# Patient Record
Sex: FEMALE | Race: WHITE | NOT HISPANIC OR LATINO | Employment: OTHER | URBAN - METROPOLITAN AREA
[De-identification: names, ages, dates, MRNs, and addresses within clinical notes are randomized per-mention and may not be internally consistent; named-entity substitution may affect disease eponyms.]

---

## 2019-04-03 DIAGNOSIS — M54.30 SCIATICA: Primary | ICD-10-CM

## 2019-04-08 DIAGNOSIS — M54.2 CERVICALGIA: Primary | ICD-10-CM

## 2019-04-12 ENCOUNTER — HOSPITAL ENCOUNTER (OUTPATIENT)
Dept: RADIOLOGY | Facility: HOSPITAL | Age: 49
Discharge: HOME | End: 2019-04-12
Attending: NURSE PRACTITIONER
Payer: MEDICAID

## 2019-04-12 ENCOUNTER — HOSPITAL ENCOUNTER (OUTPATIENT)
Dept: RADIOLOGY | Facility: HOSPITAL | Age: 49
Discharge: HOME | End: 2019-04-12
Attending: NURSE PRACTITIONER

## 2019-04-12 DIAGNOSIS — M54.30 SCIATICA: ICD-10-CM

## 2019-04-12 DIAGNOSIS — M54.2 CERVICALGIA: ICD-10-CM

## 2019-04-12 PROCEDURE — 72141 MRI NECK SPINE W/O DYE: CPT | Mod: TC

## 2019-04-12 PROCEDURE — 72141 MRI NECK SPINE W/O DYE: CPT | Mod: 26,,, | Performed by: RADIOLOGY

## 2019-04-12 PROCEDURE — 72148 MRI LUMBAR SPINE WITHOUT CONTRAST: ICD-10-PCS | Mod: 26,,, | Performed by: RADIOLOGY

## 2019-04-12 PROCEDURE — 72148 MRI LUMBAR SPINE W/O DYE: CPT | Mod: TC

## 2019-04-12 PROCEDURE — 72148 MRI LUMBAR SPINE W/O DYE: CPT | Mod: 26,,, | Performed by: RADIOLOGY

## 2019-04-12 PROCEDURE — 72141 MRI CERVICAL SPINE WITHOUT CONTRAST: ICD-10-PCS | Mod: 26,,, | Performed by: RADIOLOGY

## 2019-04-15 ENCOUNTER — TELEPHONE (OUTPATIENT)
Dept: PAIN MEDICINE | Facility: CLINIC | Age: 49
End: 2019-04-15

## 2019-04-15 DIAGNOSIS — M54.6 PAIN IN THORACIC SPINE: Primary | ICD-10-CM

## 2019-04-15 NOTE — TELEPHONE ENCOUNTER
Spoke with patient, states that she is approved for [facility]. Patient scheduled for 4/23/19.    ----- Message from Vi Richards sent at 4/15/2019  1:09 PM CDT -----  Type: Returning call    Who Called:  Patient  Best Call Back Number: [phone]  Additional Information: Claudette with [facility] calling nurse (Yolanda)back/stated okay to call patient and schedule appointment or call back if any questions.

## 2019-04-18 ENCOUNTER — HOSPITAL ENCOUNTER (OUTPATIENT)
Dept: RADIOLOGY | Facility: HOSPITAL | Age: 49
Discharge: HOME | End: 2019-04-18
Attending: NURSE PRACTITIONER
Payer: MEDICAID

## 2019-04-18 DIAGNOSIS — M54.6 PAIN IN THORACIC SPINE: ICD-10-CM

## 2019-04-18 PROCEDURE — 72146 MRI THORACIC SPINE WITHOUT CONTRAST: ICD-10-PCS | Mod: 26,,, | Performed by: RADIOLOGY

## 2019-04-18 PROCEDURE — 72146 MRI CHEST SPINE W/O DYE: CPT | Mod: 26,,, | Performed by: RADIOLOGY

## 2019-04-18 PROCEDURE — 72146 MRI CHEST SPINE W/O DYE: CPT | Mod: TC

## 2019-04-23 ENCOUNTER — OFFICE VISIT (OUTPATIENT)
Dept: PAIN MEDICINE | Facility: CLINIC | Age: 49
End: 2019-04-23
Payer: MEDICAID

## 2019-04-23 VITALS
TEMPERATURE: 98 F | DIASTOLIC BLOOD PRESSURE: 81 MMHG | HEIGHT: 65 IN | BODY MASS INDEX: 31.16 KG/M2 | SYSTOLIC BLOOD PRESSURE: 121 MMHG | HEART RATE: 101 BPM | WEIGHT: 187 LBS

## 2019-04-23 DIAGNOSIS — M79.10 MYALGIA: ICD-10-CM

## 2019-04-23 DIAGNOSIS — G89.4 CHRONIC PAIN DISORDER: Primary | ICD-10-CM

## 2019-04-23 DIAGNOSIS — M47.816 LUMBAR SPONDYLOSIS: ICD-10-CM

## 2019-04-23 DIAGNOSIS — M79.18 PIRIFORMIS MUSCLE PAIN: ICD-10-CM

## 2019-04-23 DIAGNOSIS — M53.3 SACROILIAC JOINT PAIN: ICD-10-CM

## 2019-04-23 DIAGNOSIS — M51.36 DDD (DEGENERATIVE DISC DISEASE), LUMBAR: ICD-10-CM

## 2019-04-23 DIAGNOSIS — M62.830 LUMBAR PARASPINAL MUSCLE SPASM: ICD-10-CM

## 2019-04-23 PROCEDURE — 99999 PR PBB SHADOW E&M-EST. PATIENT-LVL III: ICD-10-PCS | Mod: PBBFAC,,, | Performed by: ANESTHESIOLOGY

## 2019-04-23 PROCEDURE — 99999 PR PBB SHADOW E&M-EST. PATIENT-LVL III: CPT | Mod: PBBFAC,,, | Performed by: ANESTHESIOLOGY

## 2019-04-23 PROCEDURE — 99205 PR OFFICE/OUTPT VISIT, NEW, LEVL V, 60-74 MIN: ICD-10-PCS | Mod: S$PBB,,, | Performed by: ANESTHESIOLOGY

## 2019-04-23 PROCEDURE — 99205 OFFICE O/P NEW HI 60 MIN: CPT | Mod: S$PBB,,, | Performed by: ANESTHESIOLOGY

## 2019-04-23 PROCEDURE — 99213 OFFICE O/P EST LOW 20 MIN: CPT | Mod: PBBFAC | Performed by: ANESTHESIOLOGY

## 2019-04-23 RX ORDER — FUROSEMIDE 20 MG/1
20 TABLET ORAL DAILY
Status: ON HOLD | COMMUNITY
Start: 2019-02-25 | End: 2020-07-01 | Stop reason: HOSPADM

## 2019-04-23 RX ORDER — TRAZODONE HYDROCHLORIDE 100 MG/1
100 TABLET ORAL NIGHTLY
Refills: 2 | COMMUNITY
Start: 2019-04-10 | End: 2019-08-27

## 2019-04-23 RX ORDER — NAPROXEN 500 MG/1
TABLET ORAL
COMMUNITY
Start: 2018-12-27 | End: 2019-05-20 | Stop reason: SDUPTHER

## 2019-04-23 RX ORDER — LISINOPRIL 10 MG/1
10 TABLET ORAL DAILY
Status: ON HOLD | COMMUNITY
Start: 2019-02-25 | End: 2020-07-01 | Stop reason: HOSPADM

## 2019-04-23 RX ORDER — OXCARBAZEPINE 300 MG/1
300 TABLET, FILM COATED ORAL 3 TIMES DAILY
Refills: 2 | COMMUNITY
Start: 2019-04-10 | End: 2020-06-22 | Stop reason: CLARIF

## 2019-04-23 RX ORDER — PROMETHAZINE HYDROCHLORIDE AND DEXTROMETHORPHAN HYDROBROMIDE 6.25; 15 MG/5ML; MG/5ML
SYRUP ORAL
Status: ON HOLD | COMMUNITY
Start: 2019-04-01 | End: 2019-04-29 | Stop reason: CLARIF

## 2019-04-23 RX ORDER — HYDROXYZINE PAMOATE 25 MG/1
25 CAPSULE ORAL NIGHTLY
COMMUNITY
End: 2019-11-04

## 2019-04-23 RX ORDER — METFORMIN HYDROCHLORIDE 1000 MG/1
1000 TABLET ORAL 2 TIMES DAILY WITH MEALS
Status: ON HOLD | COMMUNITY
Start: 2019-02-25

## 2019-04-23 RX ORDER — ALPRAZOLAM 0.5 MG/1
TABLET ORAL
Status: ON HOLD | COMMUNITY
Start: 2019-04-15 | End: 2019-04-29 | Stop reason: CLARIF

## 2019-04-23 RX ORDER — PANTOPRAZOLE SODIUM 40 MG/1
40 TABLET, DELAYED RELEASE ORAL DAILY
Status: ON HOLD | COMMUNITY
Start: 2019-02-25

## 2019-04-23 RX ORDER — ESCITALOPRAM OXALATE 20 MG/1
20 TABLET ORAL DAILY
Refills: 3 | Status: ON HOLD | COMMUNITY
Start: 2019-04-06

## 2019-04-23 RX ORDER — DICLOFENAC SODIUM 10 MG/G
GEL TOPICAL
Status: ON HOLD | COMMUNITY
Start: 2019-02-25 | End: 2019-04-29 | Stop reason: CLARIF

## 2019-04-23 RX ORDER — METHOCARBAMOL 500 MG/1
500 TABLET, FILM COATED ORAL
COMMUNITY
Start: 2019-02-25 | End: 2019-05-20 | Stop reason: SDUPTHER

## 2019-04-23 RX ORDER — ALBUTEROL SULFATE 90 UG/1
1 AEROSOL, METERED RESPIRATORY (INHALATION) EVERY 4 HOURS PRN
Refills: 0 | Status: ON HOLD | COMMUNITY
Start: 2019-04-02

## 2019-04-23 NOTE — LETTER
April 23, 2019      Vira Conn NP  [address] [place] [address] [address]  [place] [address]           [facility]  [address] [place] [address]  Phone: [phone]  Fax: [phone]          Patient: Nicole Harris   MR Number: [id number]   YOB: 1970   Date of Visit: 4/23/2019       Dear Dr. Vira Conn:    Thank you for referring Nicole Harris to me for evaluation. Attached you will find relevant portions of my assessment and plan of care.    If you have questions, please do not hesitate to call me. I look forward to following Nicole Harris along with you.    Sincerely,    Sherri Gardiner MD    Enclosure  CC:  No Recipients    If you would like to receive this communication electronically, please contact [email or web address] or [phone] to request more information on [employer] Link access.    For providers and/or their staff who would like to refer a patient to [facility], please contact us through our one-stop-shop provider referral line, [facility], at [phone].    If you feel you have received this communication in error or would no longer like to receive these types of communications, please e-mail [email or web address]
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
## 2019-04-23 NOTE — PATIENT INSTRUCTIONS
We are going to start home piriformis stretches as shown in the office to help with this pain.      Piriformis stretch:  In a seated position, cross the affected leg over the unaffected leg, place your hand on the knee of the affected leg, push down on your knee.  Roll your hips forward until you feel the stretch. Hold this position for 10 seconds. Repeat 3 times. Repeat this session up to 4-6 times daily as needed.    I also recommend using a tennis ball or your fetch it ball for myofascial release.  To do this, place the tennis ball under your hip while seated.  Move it around until you find the point of maximal tenderness.  Lower your full weight on to the tennis ball for 10 sec to a minute as tolerated.  You may repeat this as often as he would like.  Please note that this may cause some temporary numbness in your leg and/or foot that will begin to resolve immediately upon cessation.    Increase robaxin to 2-3 tablets at nighttime as needed for pain.
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
## 2019-04-24 DIAGNOSIS — M54.16 LUMBAR RADICULOPATHY: Primary | ICD-10-CM

## 2019-04-24 DIAGNOSIS — M51.36 DDD (DEGENERATIVE DISC DISEASE), LUMBAR: ICD-10-CM

## 2019-04-29 ENCOUNTER — HOSPITAL ENCOUNTER (OUTPATIENT)
Facility: HOSPITAL | Age: 49
Discharge: HOME | End: 2019-04-29
Attending: ANESTHESIOLOGY | Admitting: ANESTHESIOLOGY
Payer: MEDICAID

## 2019-04-29 DIAGNOSIS — M51.36 DDD (DEGENERATIVE DISC DISEASE), LUMBAR: Primary | ICD-10-CM

## 2019-04-29 DIAGNOSIS — M51.36 LUMBAR DEGENERATIVE DISC DISEASE: ICD-10-CM

## 2019-04-29 PROBLEM — M51.369 LUMBAR DEGENERATIVE DISC DISEASE: Status: ACTIVE | Noted: 2019-04-29

## 2019-04-29 LAB — POCT GLUCOSE: 242 MG/DL (ref 70–110)

## 2019-04-29 PROCEDURE — 62323 NJX INTERLAMINAR LMBR/SAC: CPT | Mod: ,,, | Performed by: ANESTHESIOLOGY

## 2019-04-29 PROCEDURE — 62323 NJX INTERLAMINAR LMBR/SAC: CPT | Performed by: ANESTHESIOLOGY

## 2019-04-29 PROCEDURE — 99152 MOD SED SAME PHYS/QHP 5/>YRS: CPT | Performed by: ANESTHESIOLOGY

## 2019-04-29 PROCEDURE — 63600175 PHARM REV CODE 636 W HCPCS: Performed by: ANESTHESIOLOGY

## 2019-04-29 PROCEDURE — 99152 PR MOD CONSCIOUS SEDATION, SAME PHYS, 5+ YRS, FIRST 15 MIN: ICD-10-PCS | Mod: ,,, | Performed by: ANESTHESIOLOGY

## 2019-04-29 PROCEDURE — 99152 MOD SED SAME PHYS/QHP 5/>YRS: CPT | Mod: ,,, | Performed by: ANESTHESIOLOGY

## 2019-04-29 PROCEDURE — 25000003 PHARM REV CODE 250: Performed by: ANESTHESIOLOGY

## 2019-04-29 PROCEDURE — 62323 PR INJ LUMBAR/SACRAL, W/IMAGING GUIDANCE: ICD-10-PCS | Mod: ,,, | Performed by: ANESTHESIOLOGY

## 2019-04-29 RX ORDER — SODIUM CHLORIDE 9 MG/ML
INJECTION, SOLUTION INTRAVENOUS CONTINUOUS
Status: ACTIVE | OUTPATIENT
Start: 2019-04-29

## 2019-04-29 RX ORDER — MIDAZOLAM HYDROCHLORIDE 5 MG/ML
INJECTION INTRAMUSCULAR; INTRAVENOUS
Status: DISCONTINUED | OUTPATIENT
Start: 2019-04-29 | End: 2019-04-29 | Stop reason: HOSPADM

## 2019-04-29 RX ADMIN — SODIUM CHLORIDE: 9 INJECTION, SOLUTION INTRAVENOUS at 12:04

## 2019-04-29 NOTE — DISCHARGE SUMMARY
"Discharge Note  Short Stay      SUMMARY     Admit Date: 4/29/2019    Attending Physician: Sherri Gardiner    Procedure: L4/5 Interlaminar Epidural Steroid Injection    Discharge Physician: Sherri Gardiner    Discharge Date: 4/29/2019 2:20 PM    Final Diagnosis: Lumbar radiculopathy [M54.16]  DDD (degenerative disc disease), lumbar [M51.36]    Disposition: Home or self care    Patient Instructions:   Current Discharge Medication List      CONTINUE these medications which have NOT CHANGED    Details   albuterol (PROVENTIL/VENTOLIN HFA) 90 mcg/actuation inhaler Inhale 1 puff into the lungs every 4 (four) hours as needed.   Refills: 0      escitalopram oxalate (LEXAPRO) 20 MG tablet Take 20 mg by mouth once daily.   Refills: 3      furosemide (LASIX) 20 MG tablet Take 20 mg by mouth once daily.       hydrOXYzine pamoate (VISTARIL) 25 MG Cap Take 25 mg by mouth every evening.       lisinopril 10 MG tablet Take 10 mg by mouth once daily.       metFORMIN (GLUCOPHAGE) 1000 MG tablet pt states she takes 500 mg po bid-the 1000mg dose "makes me sick"      methocarbamol (ROBAXIN) 500 MG Tab Take 500 mg by mouth.       naproxen (NAPROSYN) 500 MG tablet TAKE ONE TABLET BY MOUTH TWICE DAILY WITH FOOD as needed      OXcarbazepine (TRILEPTAL) 300 MG Tab Take 300 mg by mouth 3 (three) times daily.   Refills: 2      pantoprazole (PROTONIX) 40 MG tablet Take by mouth.      traZODone (DESYREL) 100 MG tablet Take 100 mg by mouth every evening.   Refills: 2             Resume home diet and activity    "

## 2019-04-29 NOTE — OP NOTE
Date of Procedure: 04/29/2019    Procedure: L4/5 Interlaminar Epidural Steroid Injection    Referring Provider: None    Pre-op diagnosis: Lumbar radiculopathy [M54.16]  DDD (degenerative disc disease), lumbar [M51.36]    Post-op diagnosis: Lumbar radiculopathy [M54.16]  DDD (degenerative disc disease), lumbar [M51.36]     Physician: Dr. Sherri Gardiner     Assistant: None    Anesthestia: local/IV sedation: Versed 2 mg IV.  Conscious sedation given by MD and monitored by RN.  Total sedation time was less than 30 min. (See nurse documentation and case log for sedation time)    EBL: None    Specimens: None    All medications, allergies, and relevant histories were reviewed. No recent antibiotics or infections.  A time-out was taken to verify the correct patient, procedure, laterality, and appropriate medications/allergies.    Fluoroscopically-Guided, Contrast-Controlled Lumbar Interlaminar Epidural Steroid Injection:     Following denial of allergy and review of potential side effects and complications including but not necessarily limited to infection, allergic reaction, local tissue breakdown, nerve injury, paresis, paralysis, spinal cord injury, and seizure, the patient indicated they understood and agreed to proceed.     Patient was placed in prone position. Lumbar area was prepped and draped in sterile manner. Local Xylocaine 1% was given subcutaneously at the level L4/5. An 18-gauge Tuohy needle was introduced atraumatically in the interspinous space and advanced up to the epidural space using fluoroscopic guidance in the AP position. Loss of resistance to air was used to identify the epidural space using fluoroscopic guidance in the lateral position. Following negative aspiration for blood and CSF and confirming the absence of paresthesias, injection of approximately 1 cc of Omnipaque 300 demonstrated excellent epidural spread without vascular or intrathecal uptake. At this point, 2cc of 0.25% marcaine with 80 mg  of Depo-Medrol was injected without complication. The needle was flushed with 1% lidocaine and withdrawn.     Patient tolerated the procedure well without any side effects. No noted complications.     The patient was followed post procedure and discharged under their own power in excellent condition in the company of a responsible adult.     Future Management:   If helpful, can repeat as needed.    Follow up with my clinic in 3 weeks or sooner if needed

## 2019-04-29 NOTE — INTERVAL H&P NOTE
The patient has been examined and the H&P has been reviewed:    I concur with the findings and no changes have occurred since H&P was written.     No change in the location or quality of the pain since the most recent clinic visit.  No new symptoms.  She wishes to proceed with the procedure today.    PE, unchanged from previous:  CV:  RRR  Resp: unlabored, no wheezing.    NPO since MN.      Anesthesia/Surgery risks, benefits and alternative options discussed and understood by patient/family.          Active Hospital Problems    Diagnosis  POA    Lumbar degenerative disc disease [M51.36]  Yes      Resolved Hospital Problems   No resolved problems to display.

## 2019-04-30 ENCOUNTER — TELEPHONE (OUTPATIENT)
Dept: NEUROSURGERY | Facility: CLINIC | Age: 49
End: 2019-04-30

## 2019-04-30 NOTE — TELEPHONE ENCOUNTER
Contacted patient to schedule office visit, she has Medicaid as her primary insurance and appointment not scheduled.  She plans to go thru [email or web address] assistance for further advice.

## 2019-05-07 VITALS
HEIGHT: 64 IN | SYSTOLIC BLOOD PRESSURE: 110 MMHG | WEIGHT: 192 LBS | DIASTOLIC BLOOD PRESSURE: 73 MMHG | BODY MASS INDEX: 32.78 KG/M2 | OXYGEN SATURATION: 96 % | TEMPERATURE: 98 F | RESPIRATION RATE: 15 BRPM | HEART RATE: 95 BPM

## 2019-05-20 ENCOUNTER — OFFICE VISIT (OUTPATIENT)
Dept: PAIN MEDICINE | Facility: CLINIC | Age: 49
End: 2019-05-20

## 2019-05-20 VITALS
BODY MASS INDEX: 31.76 KG/M2 | TEMPERATURE: 98 F | SYSTOLIC BLOOD PRESSURE: 123 MMHG | HEIGHT: 64 IN | DIASTOLIC BLOOD PRESSURE: 76 MMHG | RESPIRATION RATE: 16 BRPM | WEIGHT: 186 LBS | HEART RATE: 73 BPM

## 2019-05-20 DIAGNOSIS — M79.10 MYALGIA: ICD-10-CM

## 2019-05-20 DIAGNOSIS — M50.30 DDD (DEGENERATIVE DISC DISEASE), CERVICAL: ICD-10-CM

## 2019-05-20 DIAGNOSIS — M62.838 CERVICAL PARASPINAL MUSCLE SPASM: ICD-10-CM

## 2019-05-20 DIAGNOSIS — M47.816 LUMBAR SPONDYLOSIS: ICD-10-CM

## 2019-05-20 DIAGNOSIS — M62.830 LUMBAR PARASPINAL MUSCLE SPASM: ICD-10-CM

## 2019-05-20 DIAGNOSIS — G89.4 CHRONIC PAIN DISORDER: Primary | ICD-10-CM

## 2019-05-20 DIAGNOSIS — M51.36 DDD (DEGENERATIVE DISC DISEASE), LUMBAR: ICD-10-CM

## 2019-05-20 PROCEDURE — 99214 PR OFFICE/OUTPT VISIT, EST, LEVL IV, 30-39 MIN: ICD-10-PCS | Mod: ,,, | Performed by: ANESTHESIOLOGY

## 2019-05-20 PROCEDURE — 99999 PR PBB SHADOW E&M-EST. PATIENT-LVL IV: CPT | Mod: PBBFAC,,, | Performed by: ANESTHESIOLOGY

## 2019-05-20 PROCEDURE — 99214 OFFICE O/P EST MOD 30 MIN: CPT | Mod: ,,, | Performed by: ANESTHESIOLOGY

## 2019-05-20 PROCEDURE — 99999 PR PBB SHADOW E&M-EST. PATIENT-LVL IV: ICD-10-PCS | Mod: PBBFAC,,, | Performed by: ANESTHESIOLOGY

## 2019-05-20 RX ORDER — METHOCARBAMOL 500 MG/1
500-1000 TABLET, FILM COATED ORAL 2 TIMES DAILY PRN
Qty: 360 TABLET | Refills: 3 | Status: SHIPPED | OUTPATIENT
Start: 2019-05-20 | End: 2019-07-16

## 2019-05-20 RX ORDER — NAPROXEN 500 MG/1
500 TABLET ORAL 2 TIMES DAILY PRN
Qty: 180 TABLET | Refills: 3 | Status: SHIPPED | OUTPATIENT
Start: 2019-05-20 | End: 2020-05-14
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
## 2019-05-20 NOTE — LETTER
May 21, 2019      Vira Conn, NP  [address] [place] [address] [address]  [place] [address]           [facility]  [address] [place] [address]  Phone: [phone]  Fax: [phone]          Patient: Nicole Harris   MR Number: [id number]   YOB: 1970   Date of Visit: 5/20/2019       Dear Dr. Vira Conn:    Thank you for referring Nicole Harris to me for evaluation. Attached you will find relevant portions of my assessment and plan of care.    If you have questions, please do not hesitate to call me. I look forward to following Nicole Harris along with you.    Sincerely,    Sherri Gardiner MD    Enclosure  CC:  No Recipients    If you would like to receive this communication electronically, please contact [email or web address] or [phone] to request more information on [facility] access.    For providers and/or their staff who would like to refer a patient to [facility], please contact us through our one-stop-shop provider referral line, [facility], at [phone].    If you feel you have received this communication in error or would no longer like to receive these types of communications, please e-mail [email or web address]
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
## 2019-05-20 NOTE — PATIENT INSTRUCTIONS
Recommend calling [facility]'s financial assistance department to see about co-pay assistance.  Their number is [phone]

## 2019-05-27 ENCOUNTER — HOSPITAL ENCOUNTER (OUTPATIENT)
Facility: HOSPITAL | Age: 49
Discharge: HOME | End: 2019-05-27
Attending: ANESTHESIOLOGY | Admitting: ANESTHESIOLOGY

## 2019-05-27 VITALS
HEART RATE: 62 BPM | BODY MASS INDEX: 31.76 KG/M2 | DIASTOLIC BLOOD PRESSURE: 65 MMHG | WEIGHT: 186 LBS | OXYGEN SATURATION: 90 % | TEMPERATURE: 98 F | SYSTOLIC BLOOD PRESSURE: 108 MMHG | RESPIRATION RATE: 18 BRPM | HEIGHT: 64 IN

## 2019-05-27 DIAGNOSIS — M50.30 DDD (DEGENERATIVE DISC DISEASE), CERVICAL: Primary | ICD-10-CM

## 2019-05-27 LAB — POCT GLUCOSE: 148 MG/DL (ref 70–110)

## 2019-05-27 PROCEDURE — 62321 PR INJ CERV/THORAC, W/GUIDANCE: ICD-10-PCS | Mod: ,,, | Performed by: ANESTHESIOLOGY

## 2019-05-27 PROCEDURE — 62321 NJX INTERLAMINAR CRV/THRC: CPT | Mod: ,,, | Performed by: ANESTHESIOLOGY

## 2019-05-27 PROCEDURE — 62321 NJX INTERLAMINAR CRV/THRC: CPT | Performed by: ANESTHESIOLOGY

## 2019-05-27 PROCEDURE — 99152 PR MOD CONSCIOUS SEDATION, SAME PHYS, 5+ YRS, FIRST 15 MIN: ICD-10-PCS | Mod: ,,, | Performed by: ANESTHESIOLOGY

## 2019-05-27 PROCEDURE — 63600175 PHARM REV CODE 636 W HCPCS: Performed by: ANESTHESIOLOGY

## 2019-05-27 PROCEDURE — 25000003 PHARM REV CODE 250: Performed by: ANESTHESIOLOGY

## 2019-05-27 PROCEDURE — 99152 MOD SED SAME PHYS/QHP 5/>YRS: CPT | Mod: ,,, | Performed by: ANESTHESIOLOGY

## 2019-05-27 PROCEDURE — 99152 MOD SED SAME PHYS/QHP 5/>YRS: CPT | Performed by: ANESTHESIOLOGY

## 2019-05-27 RX ORDER — MIDAZOLAM HYDROCHLORIDE 1 MG/ML
INJECTION INTRAMUSCULAR; INTRAVENOUS
Status: DISCONTINUED | OUTPATIENT
Start: 2019-05-27 | End: 2019-05-27 | Stop reason: HOSPADM

## 2019-05-27 RX ORDER — SODIUM CHLORIDE 9 MG/ML
INJECTION, SOLUTION INTRAVENOUS CONTINUOUS
Status: DISCONTINUED | OUTPATIENT
Start: 2019-05-27 | End: 2019-05-27 | Stop reason: HOSPADM

## 2019-05-27 RX ADMIN — SODIUM CHLORIDE: 9 INJECTION, SOLUTION INTRAVENOUS at 12:05

## 2019-05-27 NOTE — PLAN OF CARE
Arrived to pacu from or per cervical injection. Responds verbally. No c/o discomfort at this time.

## 2019-05-27 NOTE — DISCHARGE SUMMARY
"Discharge Note  Short Stay      SUMMARY     Admit Date: 5/27/2019    Attending Physician: Sherri Gardiner    Procedure: C7/T1 Epidural Steroid Injection    Discharge Physician: Sherri Gardiner    Discharge Date: 5/27/2019 2:28 PM    Final Diagnosis: DDD (degenerative disc disease), cervical [M50.30]    Disposition: Home or self care    Patient Instructions:   Current Discharge Medication List      CONTINUE these medications which have NOT CHANGED    Details   albuterol (PROVENTIL/VENTOLIN HFA) 90 mcg/actuation inhaler Inhale 1 puff into the lungs every 4 (four) hours as needed.   Refills: 0      furosemide (LASIX) 20 MG tablet Take 20 mg by mouth once daily.       hydrOXYzine pamoate (VISTARIL) 25 MG Cap Take 25 mg by mouth every evening.       lisinopril 10 MG tablet Take 10 mg by mouth once daily.       metFORMIN (GLUCOPHAGE) 1000 MG tablet pt states she takes 500 mg po bid-the 1000mg dose "makes me sick"      methocarbamol (ROBAXIN) 500 MG Tab Take 1-2 tablets (500-1,000 mg total) by mouth 2 (two) times daily as needed (pain).  Qty: 360 tablet, Refills: 3    Associated Diagnoses: Cervical paraspinal muscle spasm; Myalgia; Lumbar paraspinal muscle spasm      naproxen (NAPROSYN) 500 MG tablet Take 1 tablet (500 mg total) by mouth 2 (two) times daily as needed (pain).  Qty: 180 tablet, Refills: 3    Associated Diagnoses: Chronic pain disorder; DDD (degenerative disc disease), lumbar; DDD (degenerative disc disease), cervical; Lumbar spondylosis; Cervical paraspinal muscle spasm; Myalgia; Lumbar paraspinal muscle spasm      OXcarbazepine (TRILEPTAL) 300 MG Tab Take 300 mg by mouth 3 (three) times daily.   Refills: 2      traZODone (DESYREL) 100 MG tablet Take 100 mg by mouth every evening.   Refills: 2      escitalopram oxalate (LEXAPRO) 20 MG tablet Take 20 mg by mouth once daily.   Refills: 3      pantoprazole (PROTONIX) 40 MG tablet Take by mouth.             Resume home diet and activity    "

## 2019-05-27 NOTE — INTERVAL H&P NOTE
The patient has been examined and the H&P has been reviewed:    I concur with the findings and no changes have occurred since H&P was written.     No change in the location or quality of the pain since the most recent clinic visit.  No new symptoms.  She wishes to proceed with the procedure today.    PE, unchanged from previous:  CV:  RRR  Resp: unlabored, no wheezing.    NPO since MN.      Anesthesia/Surgery risks, benefits and alternative options discussed and understood by patient/family.          Active Hospital Problems    Diagnosis  POA    DDD (degenerative disc disease), cervical [M50.30]  Yes      Resolved Hospital Problems   No resolved problems to display.

## 2019-05-27 NOTE — OP NOTE
Date: 05/27/2019    Procedure: C7/T1 Epidural Steroid Injection    Referring Provider: None     Pre-op diagnosis: DDD (degenerative disc disease), cervical [M50.30]    Post-op diagnosis: DDD (degenerative disc disease), cervical [M50.30]    Physician: Dr. Sherri Gardiner     Assistant: None    Anesthestia: local/IV sedation: Versed 2 mg IV.  Conscious sedation given by MD and monitored by RN.  Total sedation time was less than 30 min. (See nurse documentation and case log for sedation time)    All medications, allergies, and relevant histories were reviewed. No recent antibiotics or infections.   A time-out was taken to verify the correct patient, procedure, laterality, and appropriate medications/allergies.    Fluoroscopically-Guided, Contrast-Controlled Cervical Interlaminar Epidural Steroid Injection:     Following denial of allergy and review of potential side effects and complications including but not necessarily limited to infection, allergic reaction, local tissue breakdown, nerve injury, paresis, paralysis, spinal cord injury, and seizure, the patient indicated they understood and agreed to proceed.     The patient was positioned prone and prepped and draped in the usual sterile fashion. The C7-T1 interspace was identified fluoroscopically. The skin was anesthetized via 25-gauge 1.5 needle with approximately 4cc of bicarbonated 1% lidocaine. A 20-gauge Tuohy needle was atraumatically introduced and advanced under fluoroscopic guidance. Using LINNETTE to saline technique with 0.9 % saline the epidural space was entered without difficulties. Following negative aspiration for blood and CSF and confirming the absence of paresthesias, injection of approximately 1.5 cc of Omnipaque 300 demonstrated excellent epidural spread without vascular or intrathecal uptake. At this point, 2 cc of 0.9% normal saline with 80 mg of Depo-Medrol was injected without complication. The needle was flushed with 1% lidocaine withdrawn.      The patient was followedpost procedure and discharged under their own power in excellent condition.    Future Management:   If helpful, can repeat as needed.    Follow up with my clinic in 3 weeks or sooner if needed

## 2019-05-27 NOTE — DISCHARGE INSTRUCTIONS
Cervical Epidural Injection    For certain types of neck pain, your doctor may suggest a cervical epidural injection. During this procedure, medicine is injected deep into your neck near your spine. The injection helps the doctor find the source of your pain. It can also help relieve your pain and soreness either temporarily or more permanently. However, it can be associated with serious complications.  The cervical vertebrae  The cervical vertebrae are the bones that support your neck and head. They form the top part of your spine. The tunnel made by these vertebrae is called the spinal canal. The spinal cord runs through the spinal canal, inside a sac called the dura. Nerves branch off the spinal cord and exit between the vertebrae. Pressure on one of these nerves may cause it to become inflamed (irritated and swollen). An inflamed nerve in your neck may cause neck pain, numbness, or weakness  that may also be felt in your head or arms.  The cervical epidural injection  In certain conditions, medication can be injected into the epidural space. This space surrounds the dura within the spinal canal. Using an anesthetic to reduce discomfort from the procedure, a needle is inserted between the bones of the neck. When the correct location is reached, the steroid treatment may be introduced. The injection is usually done with the help of imaging such as fluoroscopy. Care is taken to ensure a sterile procedure to reduce the chance of infection (which is rare, but can be very serious). The procedure is typically done by one of several types of specialists such as a neurosurgeon, a pain specialist, an interventional radiologist, or anesthesiologist.  Possible risks and complications  · Infection  · Spinal headaches  · Bleeding  · Nerve damage  · Spinal cord damage  · Prolonged increase in pain  Serious complications of various types have been reported. Talk with your doctor.   Date Last Reviewed: 10/19/2015  © 9631-4002  [employer]. [address] [place] [address], [place] [address]. All rights reserved. This information is not intended as a substitute for professional medical care. Always follow your healthcare professional's instructions.      RESUME NORMAL ACTIVITIES TOMORROW.

## 2019-06-13 ENCOUNTER — TELEPHONE (OUTPATIENT)
Dept: PAIN MEDICINE | Facility: CLINIC | Age: 49
End: 2019-06-13

## 2019-06-13 NOTE — TELEPHONE ENCOUNTER
LVM advising patient that appointment with Dr. Gardiner that is scheduled on 6/18/19 @ 11am will need to be rescheduled. Offered patient 9am on same day, as time is on hold for patient.

## 2019-06-18 ENCOUNTER — TELEPHONE (OUTPATIENT)
Dept: PAIN MEDICINE | Facility: CLINIC | Age: 49
End: 2019-06-18

## 2019-06-18 ENCOUNTER — OFFICE VISIT (OUTPATIENT)
Dept: PAIN MEDICINE | Facility: CLINIC | Age: 49
End: 2019-06-18

## 2019-06-18 VITALS
WEIGHT: 195 LBS | SYSTOLIC BLOOD PRESSURE: 124 MMHG | HEIGHT: 64 IN | RESPIRATION RATE: 16 BRPM | DIASTOLIC BLOOD PRESSURE: 71 MMHG | HEART RATE: 59 BPM | BODY MASS INDEX: 33.29 KG/M2 | TEMPERATURE: 98 F

## 2019-06-18 DIAGNOSIS — M62.830 LUMBAR PARASPINAL MUSCLE SPASM: ICD-10-CM

## 2019-06-18 DIAGNOSIS — M51.36 DDD (DEGENERATIVE DISC DISEASE), LUMBAR: Primary | ICD-10-CM

## 2019-06-18 DIAGNOSIS — M79.18 PIRIFORMIS MUSCLE PAIN: ICD-10-CM

## 2019-06-18 DIAGNOSIS — M50.30 DDD (DEGENERATIVE DISC DISEASE), CERVICAL: ICD-10-CM

## 2019-06-18 DIAGNOSIS — M53.3 SACROILIAC JOINT PAIN: ICD-10-CM

## 2019-06-18 DIAGNOSIS — M79.10 MYALGIA: ICD-10-CM

## 2019-06-18 DIAGNOSIS — M62.838 CERVICAL PARASPINAL MUSCLE SPASM: ICD-10-CM

## 2019-06-18 DIAGNOSIS — M51.16 LUMBAR DISC HERNIATION WITH RADICULOPATHY: ICD-10-CM

## 2019-06-18 DIAGNOSIS — G89.4 CHRONIC PAIN DISORDER: ICD-10-CM

## 2019-06-18 DIAGNOSIS — M47.816 LUMBAR SPONDYLOSIS: ICD-10-CM

## 2019-06-18 PROCEDURE — 99214 PR OFFICE/OUTPT VISIT, EST, LEVL IV, 30-39 MIN: ICD-10-PCS | Mod: S$PBB,,, | Performed by: ANESTHESIOLOGY

## 2019-06-18 PROCEDURE — 99214 OFFICE O/P EST MOD 30 MIN: CPT | Mod: S$PBB,,, | Performed by: ANESTHESIOLOGY

## 2019-06-18 PROCEDURE — 99999 PR PBB SHADOW E&M-EST. PATIENT-LVL V: CPT | Mod: PBBFAC,,, | Performed by: ANESTHESIOLOGY

## 2019-06-18 PROCEDURE — 99999 PR PBB SHADOW E&M-EST. PATIENT-LVL V: ICD-10-PCS | Mod: PBBFAC,,, | Performed by: ANESTHESIOLOGY

## 2019-06-18 PROCEDURE — 99215 OFFICE O/P EST HI 40 MIN: CPT | Mod: PBBFAC | Performed by: ANESTHESIOLOGY

## 2019-06-18 RX ORDER — BETAMETHASONE SODIUM PHOSPHATE AND BETAMETHASONE ACETATE 3; 3 MG/ML; MG/ML
6 INJECTION, SUSPENSION INTRA-ARTICULAR; INTRALESIONAL; INTRAMUSCULAR; SOFT TISSUE
Status: CANCELLED | OUTPATIENT
Start: 2019-06-18 | End: 2019-06-18

## 2019-06-18 RX ORDER — NORTRIPTYLINE HYDROCHLORIDE 25 MG/1
25-50 CAPSULE ORAL NIGHTLY
Qty: 60 CAPSULE | Refills: 5 | Status: SHIPPED | OUTPATIENT
Start: 2019-06-18 | End: 2019-11-04

## 2019-06-18 RX ORDER — AMOXICILLIN 500 MG/1
500 CAPSULE ORAL EVERY 8 HOURS
Refills: 0 | COMMUNITY
Start: 2019-06-12 | End: 2019-07-16 | Stop reason: ALTCHOICE

## 2019-06-18 RX ORDER — BUPIVACAINE HYDROCHLORIDE 2.5 MG/ML
9 INJECTION, SOLUTION EPIDURAL; INFILTRATION; INTRACAUDAL
Status: CANCELLED | OUTPATIENT
Start: 2019-06-18 | End: 2019-06-18

## 2019-06-18 NOTE — TELEPHONE ENCOUNTER
Spoke with patient, advised that Dr. Gardiner has no availability this afternoon. Patient is scheduled for 7/9/19 with wait list for appointment. Patient voiced understanding.    ----- Message from Reshma Patel sent at 6/18/2019 12:19 PM CDT -----  Type:  Sooner Apoointment Request    Caller is requesting a sooner appointment.  Caller declined first available appointment listed below.  Caller will not accept being placed on the waitlist and is requesting a message be sent to doctor.    Name of Caller:  patient  When is the first available appointment?  In July 2019  Symptoms:  This appt this morning was a 3 week followup procedure appt but she is having pain and wants to come in this afternoon  Best Call Back Number:  [phone]  Additional Information:  Please call patient to advise about coming into office today
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
## 2019-06-18 NOTE — PATIENT INSTRUCTIONS
We are going to start nortriptyline 25 mg at nighttime for your low back pain.  To help with side effects, I recommend starting this slowly.  This medication may make you sleepy, so do not drive until you know how it affect you.  If it does not make you too sleepy, you can increase this to 50 mg.  Some people choose to only take this at night.  Other side effects are generally mild and include diarrhea, nausea, and dizziness.

## 2019-06-18 NOTE — LETTER
June 20, 2019      Vira Conn, NP  [address] [place] [address] [address]  [place] [address]           [facility]  [address] [place] [address]  Phone: [phone]  Fax: [phone]          Patient: Nicole Harris   MR Number: [id number]   YOB: 1970   Date of Visit: 6/18/2019       Dear Dr. Vira Conn:    Thank you for referring Nicole Harris to me for evaluation. Attached you will find relevant portions of my assessment and plan of care.    If you have questions, please do not hesitate to call me. I look forward to following Nicole Harris along with you.    Sincerely,    Sherri Gardiner MD    Enclosure  CC:  No Recipients    If you would like to receive this communication electronically, please contact [email or web address] or [phone] to request more information on [facility] access.    For providers and/or their staff who would like to refer a patient to [facility], please contact us through our one-stop-shop provider referral line, [facility], at [phone].    If you feel you have received this communication in error or would no longer like to receive these types of communications, please [email or web address] [email or web address]
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
## 2019-07-08 ENCOUNTER — HOSPITAL ENCOUNTER (OUTPATIENT)
Facility: HOSPITAL | Age: 49
Discharge: HOME | End: 2019-07-08
Attending: ANESTHESIOLOGY | Admitting: ANESTHESIOLOGY

## 2019-07-08 VITALS
RESPIRATION RATE: 17 BRPM | OXYGEN SATURATION: 97 % | HEART RATE: 96 BPM | HEIGHT: 64 IN | SYSTOLIC BLOOD PRESSURE: 136 MMHG | WEIGHT: 190 LBS | DIASTOLIC BLOOD PRESSURE: 74 MMHG | BODY MASS INDEX: 32.44 KG/M2 | TEMPERATURE: 99 F

## 2019-07-08 DIAGNOSIS — M51.36 LUMBAR DEGENERATIVE DISC DISEASE: Primary | ICD-10-CM

## 2019-07-08 PROCEDURE — 62323 NJX INTERLAMINAR LMBR/SAC: CPT | Mod: ,,, | Performed by: ANESTHESIOLOGY

## 2019-07-08 PROCEDURE — 25000003 PHARM REV CODE 250: Performed by: ANESTHESIOLOGY

## 2019-07-08 PROCEDURE — 63600175 PHARM REV CODE 636 W HCPCS: Performed by: ANESTHESIOLOGY

## 2019-07-08 PROCEDURE — 99152 MOD SED SAME PHYS/QHP 5/>YRS: CPT | Performed by: ANESTHESIOLOGY

## 2019-07-08 PROCEDURE — 62323 PR INJ LUMBAR/SACRAL, W/IMAGING GUIDANCE: ICD-10-PCS | Mod: ,,, | Performed by: ANESTHESIOLOGY

## 2019-07-08 PROCEDURE — 99152 PR MOD CONSCIOUS SEDATION, SAME PHYS, 5+ YRS, FIRST 15 MIN: ICD-10-PCS | Mod: ,,, | Performed by: ANESTHESIOLOGY

## 2019-07-08 PROCEDURE — 20553 NJX 1/MLT TRIGGER POINTS 3/>: CPT | Mod: 59,,, | Performed by: ANESTHESIOLOGY

## 2019-07-08 PROCEDURE — 20553 NJX 1/MLT TRIGGER POINTS 3/>: CPT | Performed by: ANESTHESIOLOGY

## 2019-07-08 PROCEDURE — 20553 PR INJECT TRIGGER POINTS, > 3: ICD-10-PCS | Mod: 59,,, | Performed by: ANESTHESIOLOGY

## 2019-07-08 PROCEDURE — 62323 NJX INTERLAMINAR LMBR/SAC: CPT | Performed by: ANESTHESIOLOGY

## 2019-07-08 PROCEDURE — S0020 INJECTION, BUPIVICAINE HYDRO: HCPCS | Performed by: ANESTHESIOLOGY

## 2019-07-08 PROCEDURE — 99152 MOD SED SAME PHYS/QHP 5/>YRS: CPT | Mod: ,,, | Performed by: ANESTHESIOLOGY

## 2019-07-08 RX ORDER — SODIUM CHLORIDE 9 MG/ML
INJECTION, SOLUTION INTRAVENOUS CONTINUOUS
Status: DISCONTINUED | OUTPATIENT
Start: 2019-07-08 | End: 2019-07-08 | Stop reason: HOSPADM

## 2019-07-08 RX ORDER — BUPIVACAINE HYDROCHLORIDE 5 MG/ML
INJECTION, SOLUTION EPIDURAL; INTRACAUDAL
Status: DISCONTINUED | OUTPATIENT
Start: 2019-07-08 | End: 2019-07-08 | Stop reason: HOSPADM

## 2019-07-08 RX ORDER — METHYLPREDNISOLONE ACETATE 80 MG/ML
INJECTION, SUSPENSION INTRA-ARTICULAR; INTRALESIONAL; INTRAMUSCULAR; SOFT TISSUE
Status: DISCONTINUED | OUTPATIENT
Start: 2019-07-08 | End: 2019-07-08 | Stop reason: HOSPADM

## 2019-07-08 RX ORDER — FENTANYL CITRATE 50 UG/ML
INJECTION, SOLUTION INTRAMUSCULAR; INTRAVENOUS
Status: DISCONTINUED | OUTPATIENT
Start: 2019-07-08 | End: 2019-07-08 | Stop reason: HOSPADM

## 2019-07-08 RX ORDER — LIDOCAINE HYDROCHLORIDE 10 MG/ML
INJECTION INFILTRATION; PERINEURAL
Status: DISCONTINUED | OUTPATIENT
Start: 2019-07-08 | End: 2019-07-08 | Stop reason: HOSPADM

## 2019-07-08 RX ORDER — MIDAZOLAM HYDROCHLORIDE 1 MG/ML
INJECTION INTRAMUSCULAR; INTRAVENOUS
Status: DISCONTINUED | OUTPATIENT
Start: 2019-07-08 | End: 2019-07-08 | Stop reason: HOSPADM

## 2019-07-08 NOTE — DISCHARGE SUMMARY
"Discharge Note  Short Stay      SUMMARY     Admit Date: 7/8/2019    Attending Physician: Sherri Gardiner    Procedure: L4/5 Interlaminar Epidural Steroid Injection and cervical trigger point injections    Discharge Physician: Sherri Gardiner    Discharge Date: 7/8/2019 2:56 PM    Final Diagnosis: DDD (degenerative disc disease), lumbar [M51.36]  Myalgia [M79.10]    Disposition: Home or self care    Patient Instructions:   Current Discharge Medication List      CONTINUE these medications which have NOT CHANGED    Details   albuterol (PROVENTIL/VENTOLIN HFA) 90 mcg/actuation inhaler Inhale 1 puff into the lungs every 4 (four) hours as needed.   Refills: 0      escitalopram oxalate (LEXAPRO) 20 MG tablet Take 20 mg by mouth once daily.   Refills: 3      furosemide (LASIX) 20 MG tablet Take 20 mg by mouth once daily.       hydrOXYzine pamoate (VISTARIL) 25 MG Cap Take 25 mg by mouth every evening.       lisinopril 10 MG tablet Take 10 mg by mouth once daily.       metFORMIN (GLUCOPHAGE) 1000 MG tablet pt states she takes 500 mg po bid-the 1000mg dose "makes me sick"      methocarbamol (ROBAXIN) 500 MG Tab Take 1-2 tablets (500-1,000 mg total) by mouth 2 (two) times daily as needed (pain).  Qty: 360 tablet, Refills: 3    Associated Diagnoses: Cervical paraspinal muscle spasm; Myalgia; Lumbar paraspinal muscle spasm      naproxen (NAPROSYN) 500 MG tablet Take 1 tablet (500 mg total) by mouth 2 (two) times daily as needed (pain).  Qty: 180 tablet, Refills: 3    Associated Diagnoses: Chronic pain disorder; DDD (degenerative disc disease), lumbar; DDD (degenerative disc disease), cervical; Lumbar spondylosis; Cervical paraspinal muscle spasm; Myalgia; Lumbar paraspinal muscle spasm      nortriptyline (PAMELOR) 25 MG capsule Take 1-2 capsules (25-50 mg total) by mouth every evening.  Qty: 60 capsule, Refills: 5    Associated Diagnoses: DDD (degenerative disc disease), lumbar; Lumbar disc herniation with radiculopathy; Chronic " pain disorder; Lumbar spondylosis; Cervical paraspinal muscle spasm; Myalgia; Sacroiliac joint pain; Lumbar paraspinal muscle spasm; Piriformis muscle pain; DDD (degenerative disc disease), cervical      amoxicillin (AMOXIL) 500 MG capsule Take 500 mg by mouth every 8 (eight) hours.  Refills: 0      OXcarbazepine (TRILEPTAL) 300 MG Tab Take 300 mg by mouth 3 (three) times daily.   Refills: 2      pantoprazole (PROTONIX) 40 MG tablet Take by mouth.      traZODone (DESYREL) 100 MG tablet Take 100 mg by mouth every evening.   Refills: 2             Resume home diet and activity

## 2019-07-08 NOTE — OP NOTE
Date of Procedure: 07/08/2019    Procedure: L4/5 Interlaminar Epidural Steroid Injection and cervical trigger point injections    Referring Provider: None    Pre-op diagnosis: DDD (degenerative disc disease), lumbar [M51.36]  Myalgia [M79.10]    Post-op diagnosis: DDD (degenerative disc disease), lumbar [M51.36]  Myalgia [M79.10]    Physician: Dr. Sherri Gardiner     Assistant: None    Anesthestia: local/IV sedation: Versed 2 mg and fentanyl 50 mcg IV.  Conscious sedation given by MD and monitored by RN.  Total sedation time was less than 30 min. (See nurse documentation and case log for sedation time)      EBL: None    Specimens: None    All medications, allergies, and relevant histories were reviewed. No recent antibiotics or infections.  A time-out was taken to verify the correct patient, procedure, laterality, and appropriate medications/allergies.    Fluoroscopically-Guided, Contrast-Controlled Lumbar Interlaminar Epidural Steroid Injection:     Following denial of allergy and review of potential side effects and complications including but not necessarily limited to infection, allergic reaction, local tissue breakdown, nerve injury, paresis, paralysis, spinal cord injury, and seizure, the patient indicated they understood and agreed to proceed.     Patient was placed in prone position. Lumbar area was prepped and draped in sterile manner. Local Xylocaine 1% was given subcutaneously at the level L4/5. An 18-gauge Tuohy needle was introduced atraumatically in the interspinous space and advanced up to the epidural space using fluoroscopic guidance in the AP position. Loss of resistance to air was used to identify the epidural space using fluoroscopic guidance in the lateral position. Following negative aspiration for blood and CSF and confirming the absence of paresthesias, injection of approximately 1 cc of Omnipaque 300 demonstrated excellent epidural spread without vascular or intrathecal uptake. At this point,  2cc of 0.25% marcaine with 80 mg of Depo-Medrol was injected without complication. The needle was flushed with 1% lidocaine and withdrawn.     Trigger Point Injection:   The procedure was discussed with the patient including complications of damage, bleeding, infection, and failure of pain relief.     All medications, allergies, and relevant histories were reviewed. No recent antibiotics or infections.  A time-out was taken to verify the correct patient, procedure, laterality, and appropriate medications/allergies.    Trigger points were identified by palpation and marked. CHG prep of sites done. A 27-gauge needle was advanced to the point of maximal tenderness, and 1-2 mL of 0.5% bupivacaine was injected after negative aspiration in a fanlike distribution. All sites done in the same manner. Patient tolerated the procedure well and without complications. Sites injected included: bilateral cervical paraspinals x 6 (trap, lev scap, splen cervicis (where neck meets shoulder)     Patient tolerated the procedure well without any side effects. No noted complications.     The patient was followed post procedure and discharged under their own power in excellent condition in the company of a responsible adult.     Future Management:   If helpful, can repeat as needed.    Follow up with my clinic in 1-3 weeks or sooner if needed

## 2019-07-10 ENCOUNTER — TELEPHONE (OUTPATIENT)
Dept: NEUROSURGERY | Facility: CLINIC | Age: 49
End: 2019-07-10

## 2019-07-10 NOTE — TELEPHONE ENCOUNTER
----- Message from Glendy Dailey sent at 7/10/2019 11:07 AM CDT -----  Contact: self  Patient need to speak to someone regarding rescheduling appt that is for 07/11 at 1:15pm    Due to weather         Patient contact [phone] (home)

## 2019-07-16 ENCOUNTER — OFFICE VISIT (OUTPATIENT)
Dept: PAIN MEDICINE | Facility: CLINIC | Age: 49
End: 2019-07-16

## 2019-07-16 VITALS
HEART RATE: 87 BPM | BODY MASS INDEX: 32.61 KG/M2 | TEMPERATURE: 99 F | HEIGHT: 64 IN | SYSTOLIC BLOOD PRESSURE: 126 MMHG | DIASTOLIC BLOOD PRESSURE: 80 MMHG | RESPIRATION RATE: 16 BRPM | WEIGHT: 191 LBS

## 2019-07-16 DIAGNOSIS — M47.816 LUMBAR SPONDYLOSIS: ICD-10-CM

## 2019-07-16 DIAGNOSIS — G89.4 CHRONIC PAIN DISORDER: Primary | ICD-10-CM

## 2019-07-16 DIAGNOSIS — M79.18 MYOFASCIAL PAIN: ICD-10-CM

## 2019-07-16 DIAGNOSIS — M79.10 MYALGIA: ICD-10-CM

## 2019-07-16 DIAGNOSIS — M51.36 DDD (DEGENERATIVE DISC DISEASE), LUMBAR: ICD-10-CM

## 2019-07-16 DIAGNOSIS — M62.838 CERVICAL PARASPINAL MUSCLE SPASM: ICD-10-CM

## 2019-07-16 PROCEDURE — 20553 NJX 1/MLT TRIGGER POINTS 3/>: CPT | Mod: PBBFAC | Performed by: ANESTHESIOLOGY

## 2019-07-16 PROCEDURE — 99214 OFFICE O/P EST MOD 30 MIN: CPT | Mod: S$PBB,,, | Performed by: ANESTHESIOLOGY

## 2019-07-16 PROCEDURE — 99999 PR PBB SHADOW E&M-EST. PATIENT-LVL III: CPT | Mod: PBBFAC,,, | Performed by: ANESTHESIOLOGY

## 2019-07-16 PROCEDURE — 20553 PR INJECT TRIGGER POINTS, > 3: ICD-10-PCS | Mod: S$PBB,,, | Performed by: ANESTHESIOLOGY

## 2019-07-16 PROCEDURE — 99214 PR OFFICE/OUTPT VISIT, EST, LEVL IV, 30-39 MIN: ICD-10-PCS | Mod: S$PBB,,, | Performed by: ANESTHESIOLOGY

## 2019-07-16 PROCEDURE — 20553 NJX 1/MLT TRIGGER POINTS 3/>: CPT | Mod: S$PBB,,, | Performed by: ANESTHESIOLOGY

## 2019-07-16 PROCEDURE — 99999 PR PBB SHADOW E&M-EST. PATIENT-LVL III: ICD-10-PCS | Mod: PBBFAC,,, | Performed by: ANESTHESIOLOGY

## 2019-07-16 PROCEDURE — 99213 OFFICE O/P EST LOW 20 MIN: CPT | Mod: PBBFAC | Performed by: ANESTHESIOLOGY

## 2019-07-16 RX ADMIN — CLONIDINE 50 MCG: 100 INJECTION, SOLUTION EPIDURAL at 03:07

## 2019-07-16 RX ADMIN — BUPIVACAINE HYDROCHLORIDE 23.75 MG: 2.5 INJECTION, SOLUTION EPIDURAL; INFILTRATION; INTRACAUDAL; PERINEURAL at 03:07
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
## 2019-07-16 NOTE — LETTER
July 19, 2019      Vira Conn NP  [address] [place] [address] Dr  [place] [address]           [facility]  [address] [place] [address]  Phone: [phone]  Fax: [phone]          Patient: Nicole Harris   MR Number: [id number]   YOB: 1970   Date of Visit: 7/16/2019       Dear Dr. Vira Conn:    Thank you for referring Nicole Harris to me for evaluation. Attached you will find relevant portions of my assessment and plan of care.    If you have questions, please do not hesitate to call me. I look forward to following Nicole Harris along with you.    Sincerely,    Sherri Gardiner MD    Enclosure  CC:  No Recipients    If you would like to receive this communication electronically, please contact [email or web address] or [phone] to request more information on [facility] [employer] access.    For providers and/or their staff who would like to refer a patient to [facility], please contact us through our one-stop-shop provider referral line, [facility], at [phone].    If you feel you have received this communication in error or would no longer like to receive these types of communications, please e-mail [email or web address]

## 2019-07-19 RX ORDER — BUPIVACAINE HYDROCHLORIDE 2.5 MG/ML
9.5 INJECTION, SOLUTION EPIDURAL; INFILTRATION; INTRACAUDAL ONCE
Status: COMPLETED | OUTPATIENT
Start: 2019-07-16 | End: 2019-07-16

## 2019-07-19 RX ORDER — CLONIDINE 100 UG/ML
100 INJECTION, SOLUTION EPIDURAL ONCE
Status: COMPLETED | OUTPATIENT
Start: 2019-07-16 | End: 2019-07-16

## 2019-07-30 ENCOUNTER — TELEPHONE (OUTPATIENT)
Dept: PAIN MEDICINE | Facility: CLINIC | Age: 49
End: 2019-07-30

## 2019-07-30 NOTE — TELEPHONE ENCOUNTER
Spoke with patient, advised that provider is not in clinic and the first available is the appointment slot she is scheduled in. Also advised that if pain worsens or becomes unbearable, she can go to ER. Patient voiced understanding and states she will go to ER later.      ----- Message from Chantelle Larsen sent at 7/30/2019 11:19 AM CDT -----  Contact: PT  Type:  Same Day Appointment Request    Caller is requesting a same day appointment.  Caller declined first available appointment listed below.    Name of Caller:PT  When is the first available appointment?8/20  Symptoms:she hurt her back  Best Call Back Number:[phone]  Additional Information: .    Thank you

## 2019-07-31 ENCOUNTER — HOSPITAL ENCOUNTER (EMERGENCY)
Facility: HOSPITAL | Age: 49
Discharge: HOME | End: 2019-07-31
Attending: FAMILY MEDICINE

## 2019-07-31 VITALS
HEART RATE: 101 BPM | OXYGEN SATURATION: 99 % | RESPIRATION RATE: 20 BRPM | WEIGHT: 190 LBS | DIASTOLIC BLOOD PRESSURE: 81 MMHG | HEIGHT: 65 IN | TEMPERATURE: 98 F | SYSTOLIC BLOOD PRESSURE: 133 MMHG | BODY MASS INDEX: 31.65 KG/M2

## 2019-07-31 DIAGNOSIS — M54.9 CHRONIC BACK PAIN, UNSPECIFIED BACK LOCATION, UNSPECIFIED BACK PAIN LATERALITY: Primary | ICD-10-CM

## 2019-07-31 DIAGNOSIS — G89.29 CHRONIC BACK PAIN, UNSPECIFIED BACK LOCATION, UNSPECIFIED BACK PAIN LATERALITY: Primary | ICD-10-CM

## 2019-07-31 PROCEDURE — 96372 THER/PROPH/DIAG INJ SC/IM: CPT

## 2019-07-31 PROCEDURE — 63600175 PHARM REV CODE 636 W HCPCS: Performed by: NURSE PRACTITIONER

## 2019-07-31 PROCEDURE — 99284 EMERGENCY DEPT VISIT MOD MDM: CPT | Mod: 25

## 2019-07-31 RX ORDER — TRAMADOL HYDROCHLORIDE 50 MG/1
50 TABLET ORAL EVERY 6 HOURS PRN
Qty: 10 TABLET | Refills: 0 | Status: SHIPPED | OUTPATIENT
Start: 2019-07-31 | End: 2019-08-03

## 2019-07-31 RX ORDER — KETOROLAC TROMETHAMINE 30 MG/ML
30 INJECTION, SOLUTION INTRAMUSCULAR; INTRAVENOUS
Status: COMPLETED | OUTPATIENT
Start: 2019-07-31 | End: 2019-07-31

## 2019-07-31 RX ORDER — ORPHENADRINE CITRATE 30 MG/ML
60 INJECTION INTRAMUSCULAR; INTRAVENOUS
Status: COMPLETED | OUTPATIENT
Start: 2019-07-31 | End: 2019-07-31

## 2019-07-31 RX ORDER — CYCLOBENZAPRINE HCL 10 MG
10 TABLET ORAL 3 TIMES DAILY PRN
Qty: 15 TABLET | Refills: 0 | Status: SHIPPED | OUTPATIENT
Start: 2019-07-31 | End: 2019-08-05

## 2019-07-31 RX ADMIN — ORPHENADRINE CITRATE 60 MG: 30 INJECTION INTRAMUSCULAR; INTRAVENOUS at 01:07

## 2019-07-31 RX ADMIN — KETOROLAC TROMETHAMINE 30 MG: 30 INJECTION, SOLUTION INTRAMUSCULAR at 01:07

## 2019-07-31 NOTE — ED PROVIDER NOTES
Encounter Date: 2019       History     Chief Complaint   Patient presents with    Back Pain     Onset x 1 week. States pain is located to the entire back. Describes pain to be constant and sharp in nature.     Nicole Harris is a 49 y.o female with PMHx including anxiety, bipolar, depression, DM, hypertension and insomnia. She presents to ED with complaint of worsening back pain x 1 week    She reports thoracic and lumbar spine pain. Pain is exacerbated by certain movements. Pain is constant, stabbing, non-radiating in nature.    Patient denies injury or trauma. She reports chronic neck and back pain for over 10 years. She is currently in pain management.    No fever, body aches or chills    No urinary symptoms        Review of patient's allergies indicates:   Allergen Reactions    Hydrocodone Nausea And Vomiting     Past Medical History:   Diagnosis Date    Anxiety     Bipolar disorder     Depression     Diabetes mellitus, type 2     HTN (hypertension)     Insomnia      Past Surgical History:   Procedure Laterality Date     SECTION      Injection, Steroid, Epidural - C7/T1 EPIDURAL STEROID INJECTION N/A 2019    [facility]    Injection, Steroid, Epidural - L4/5 EPIDURAL STEROID INJECTION N/A 2019    [facility]    Injection, Steroid, Epidural - L4/5 EPIDURAL STEROID INJECTION N/A 2019    [facility]    INJECTION, TRIGGER POINT - CERVICAL REGION N/A 2019    [facility]    TONSILLECTOMY       No family history on file.  Social History     Tobacco Use    Smoking status: Never Smoker    Smokeless tobacco: Never Used   Substance Use Topics    Alcohol use: Yes     Frequency: Monthly or less     Comment: occasional    Drug use: Yes     Types: Marijuana     Review of Systems   Constitutional: Negative.  Negative for fever.   HENT: Negative.   Negative for sore throat.    Eyes: Negative.    Respiratory: Negative.  Negative for shortness of breath.    Cardiovascular: Negative.  Negative for chest pain.   Gastrointestinal: Negative.  Negative for nausea.   Endocrine: Negative.    Genitourinary: Negative.  Negative for dysuria.   Musculoskeletal: Positive for back pain.   Skin: Negative for rash.   Allergic/Immunologic: Negative.    Neurological: Negative.  Negative for weakness.   Hematological: Negative.  Does not bruise/bleed easily.   Psychiatric/Behavioral: Negative.    All other systems reviewed and are negative.      Physical Exam     Initial Vitals [07/31/19 1242]   BP Pulse Resp Temp SpO2   133/81 101 20 98.4 °F (36.9 °C) 99 %      MAP       --         Physical Exam    Nursing note and vitals reviewed.  Constitutional: She appears well-developed and well-nourished.   HENT:   Head: Normocephalic.   Eyes: Pupils are equal, round, and reactive to light.   Neck: Normal range of motion.   Cardiovascular: Normal rate, regular rhythm and normal heart sounds.   Pulmonary/Chest: Breath sounds normal.   Musculoskeletal: She exhibits tenderness.        Thoracic back: She exhibits tenderness, bony tenderness, pain and spasm. She exhibits normal range of motion, no swelling, no edema, no deformity, no laceration and normal pulse.        Lumbar back: She exhibits decreased range of motion, tenderness, bony tenderness, pain and spasm. She exhibits no swelling, no edema, no deformity, no laceration and normal pulse.        Back:    Neurological: She is alert and oriented to person, place, and time. GCS score is 15. GCS eye subscore is 4. GCS verbal subscore is 5. GCS motor subscore is 6.   Skin: Skin is warm and dry. Capillary refill takes less than 2 seconds.   Psychiatric: She has a normal mood and affect. Her behavior is normal. Judgment and thought content normal.         ED Course   Procedures  Labs Reviewed - No data to display       Imaging Results    None           Medical Decision Making:   Initial Assessment:   Patient with complaint of worsening back pain x 1 week    She reports thoracic and lumbar spine pain. Pain is exacerbated by certain movements. Pain with diffuse back pain that  is constant, stabbing, non-radiating in nature.    Patient denies injury or trauma. She reports chronic neck and back pain for over 10 years. She is currently in pain management.    No fever, body aches or chills    No urinary symptoms    Differential Diagnosis:   Degenerative changes, herniated disk, fibromyalgia, chronic pain disorder    ED Management:  Meds admin    Discussed physical exam findings with patient  No acute emergent medical condition identified at this time to warrant further testing/diagnostics  At this time, I believe the patient is clinically stable for discharge.   Patient to follow up with PCP in 1-2 days.  The patient acknowledges that close follow up with a MD is required after all ER visits  Pt given instructions; take all medications prescribed in the ER as directed.   Patient agrees to comply with all instruction and direction given in the ER  Pt agrees to return to ER if any symptoms reoccur                               Clinical Impression:       ICD-10-CM ICD-9-CM   1. Chronic back pain, unspecified back location, unspecified back pain laterality M54.9 724.5    G89.29 338.29                                Xena Beverly NP  07/31/19 174

## 2019-08-08 ENCOUNTER — OFFICE VISIT (OUTPATIENT)
Dept: NEUROSURGERY | Facility: CLINIC | Age: 49
End: 2019-08-08

## 2019-08-08 ENCOUNTER — HOSPITAL ENCOUNTER (OUTPATIENT)
Dept: RADIOLOGY | Facility: HOSPITAL | Age: 49
Discharge: HOME | End: 2019-08-08
Attending: PHYSICIAN ASSISTANT

## 2019-08-08 VITALS — HEART RATE: 118 BPM | SYSTOLIC BLOOD PRESSURE: 132 MMHG | DIASTOLIC BLOOD PRESSURE: 84 MMHG

## 2019-08-08 DIAGNOSIS — M48.02 FORAMINAL STENOSIS OF CERVICAL REGION: Primary | ICD-10-CM

## 2019-08-08 DIAGNOSIS — G89.29 OTHER CHRONIC PAIN: ICD-10-CM

## 2019-08-08 DIAGNOSIS — M79.7 FIBROMYALGIA: ICD-10-CM

## 2019-08-08 DIAGNOSIS — M48.02 FORAMINAL STENOSIS OF CERVICAL REGION: ICD-10-CM

## 2019-08-08 PROCEDURE — 72050 XR CERVICAL SPINE AP LAT WITH FLEX EXTEN: ICD-10-PCS | Mod: 26,,, | Performed by: RADIOLOGY

## 2019-08-08 PROCEDURE — 72050 X-RAY EXAM NECK SPINE 4/5VWS: CPT | Mod: 26,,, | Performed by: RADIOLOGY

## 2019-08-08 PROCEDURE — 72050 X-RAY EXAM NECK SPINE 4/5VWS: CPT | Mod: TC,FY

## 2019-08-08 PROCEDURE — 99204 OFFICE O/P NEW MOD 45 MIN: CPT | Mod: S$GLB,,, | Performed by: PHYSICIAN ASSISTANT

## 2019-08-08 PROCEDURE — 99204 PR OFFICE/OUTPT VISIT, NEW, LEVL IV, 45-59 MIN: ICD-10-PCS | Mod: S$GLB,,, | Performed by: PHYSICIAN ASSISTANT

## 2019-08-08 NOTE — LETTER
August 9, 2019      Sherri Gardiner MD  [address]  [address]  [facility] [address]           [address]  [facility] [address]  Phone: [phone]          Patient: Nicole Harris   MR Number: [id number]   YOB: 1970   Date of Visit: 8/8/2019       Dear Dr. Sherri Gardiner:    Thank you for referring Nicole Harris to me for evaluation. Attached you will find relevant portions of my assessment and plan of care.    If you have questions, please do not hesitate to call me. I look forward to following Nicole Harris along with you.    Sincerely,    Angela Velasquez PA-C    Enclosure  CC:  No Recipients    If you would like to receive this communication electronically, please contact [email or web address] or [phone] to request more information on [employer] Link access.    For providers and/or their staff who would like to refer a patient to [facility], please contact us through our one-stop-shop provider referral line, [facility] , at [phone].    If you feel you have received this communication in error or would no longer like to receive these types of communications, please e-mail [email or web address]

## 2019-08-08 NOTE — PROGRESS NOTES
[facility] - Neurosurgery  Clinic Consult     Consult Requested By: Sherri Gardiner MD  PCP: Vira Conn NP    SUBJECTIVE:     Chief Complaint:   Chief Complaint   Patient presents with    Lumbar Spine Pain (L-Spine)     patient reports low back pain with bilateral leg pain; numbness and tingling present; right leg is worse; pain 6/10    Cervical Spine Pain (C-spine)     patient reports cervical pain with bilateral arm pain; c/o numbness and tingling; pain 6/10       History of Present Illness:  Nicole Harris is a 49 y.o. right handed female with fibromyalgia who presents for evaluation of chronic neck and low back pain. She reports the pain has been present for more than 10 years. She reports numbness/tingling in no specific dermatome in bilateral arms and legs. She reports generalized weakness. She denies difficulty with hand dexterity, dropping objects, or gait instability. Denies bowel/bladder dysfunction. She has not attended physical therapy. She has received injections with pain management without relief.     PHQ 26  Oswestry Disability Index 74    Past Medical History:   Diagnosis Date    Anxiety     Bipolar disorder     Depression     Diabetes mellitus, type 2     HTN (hypertension)     Insomnia      Past Surgical History:   Procedure Laterality Date     SECTION      Injection, Steroid, Epidural - C7/T1 EPIDURAL STEROID INJECTION N/A 2019    [facility]    Injection, Steroid, Epidural - L4/5 EPIDURAL STEROID INJECTION N/A 2019    [facility]    Injection, Steroid, Epidural - L4/5 EPIDURAL STEROID INJECTION N/A 2019    [facility] [place]    INJECTION, TRIGGER POINT - CERVICAL REGION N/A 2019    [facility]    TONSILLECTOMY       History reviewed. No pertinent family history.  Social History     Tobacco Use    Smoking status: Never Smoker  "   Smokeless tobacco: Never Used   Substance Use Topics    Alcohol use: Yes     Frequency: Monthly or less     Comment: occasional    Drug use: Yes     Types: Marijuana      Review of patient's allergies indicates:   Allergen Reactions    Hydrocodone Nausea And Vomiting       Current Outpatient Medications:     albuterol (PROVENTIL/VENTOLIN HFA) 90 mcg/actuation inhaler, Inhale 1 puff into the lungs every 4 (four) hours as needed. , Disp: , Rfl: 0    escitalopram oxalate (LEXAPRO) 20 MG tablet, Take 20 mg by mouth once daily. , Disp: , Rfl: 3    furosemide (LASIX) 20 MG tablet, Take 20 mg by mouth once daily. , Disp: , Rfl:     hydrOXYzine pamoate (VISTARIL) 25 MG Cap, Take 25 mg by mouth every evening. , Disp: , Rfl:     lisinopril 10 MG tablet, Take 10 mg by mouth once daily. , Disp: , Rfl:     metFORMIN (GLUCOPHAGE) 1000 MG tablet, pt states she takes 500 mg po bid-the 1000mg dose "makes me sick", Disp: , Rfl:     naproxen (NAPROSYN) 500 MG tablet, Take 1 tablet (500 mg total) by mouth 2 (two) times daily as needed (pain)., Disp: 180 tablet, Rfl: 3    nortriptyline (PAMELOR) 25 MG capsule, Take 1-2 capsules (25-50 mg total) by mouth every evening., Disp: 60 capsule, Rfl: 5    OXcarbazepine (TRILEPTAL) 300 MG Tab, Take 300 mg by mouth 3 (three) times daily. , Disp: , Rfl: 2    pantoprazole (PROTONIX) 40 MG tablet, Take by mouth., Disp: , Rfl:     traZODone (DESYREL) 100 MG tablet, Take 100 mg by mouth every evening. , Disp: , Rfl: 2  No current facility-administered medications for this visit.     Facility-Administered Medications Ordered in Other Visits:     0.9%  NaCl infusion, , Intravenous, Continuous, Sherri Gardiner MD, Last Rate: 20 mL/hr at 04/29/19 1227    Review of Systems:   Constitutional: no fever, chills or night sweats. No changes in weight   Eyes: no visual changes   ENT: no nasal congestion or sore throat   Respiratory: no cough or shortness of breath   Cardiovascular: no chest " pain or palpitations   Gastrointestinal: no nausea or vomiting   Genitourinary: no hematuria or dysuria   Integument/Breast: no rash or pruritis   Hematologic/Lymphatic: no easy bruising or lymphadenopathy   Musculoskeletal: +myalgias, arthralgias, neck pain, back pain   Neurological: no seizures or tremors +paresthesias   Behavioral/Psych: no auditory or visual hallucinations   Endocrine: no heat or cold intolerance         OBJECTIVE:     Vital Signs (Most Recent):  Pulse: (!) 118 (08/08/19 0947)  BP: 132/84 (08/08/19 0947)    Physical Exam:   General: well developed, well nourished, no distress.   Neurologic: Alert and oriented. Thought content appropriate. GCS 15.   Head: normocephalic, atraumatic  Eyes: EOMI.  Neck: trachea midline, no JVD   Cardiovascular: no LE edema  Pulmonary: normal respirations, no signs of respiratory distress  Abdomen: non-distended  Sensory: intact to light touch throughout  Skin: Skin is warm, dry and intact.  Motor Strength: Moves all extremities spontaneously with good tone. No abnormal movements seen. 4-5/5 throughout, effort limited   DTR's: 2 + and symmetric in UE and LE  Clark: absent  Clonus: absent  Gait: slow    Tandem Gait: No difficulty           Able to stand on heels & toes  Spine: diffusely TTP           Diagnostic Results:  I have independently reviewed the following imaging and agree with findings  MRI cervical spine  Impression     1. Mild reversal the normal cervical lordosis.  2. Congenital cervical spinal stenosis.  3. Degenerative disc disease at C2-C3 resulting in mild narrowing the left neural foramen.  4. Degenerative disc disease at C3-C4 resulting in mild central spinal canal stenosis with mild narrowing of the right neural foramen and moderate narrowing the left neural foramen.  5. Multilevel degenerative disc disease from C4 through C7 resulting in mild to moderate central spinal canal stenosis with severe bilateral neural foraminal narrowing.  6.  Degenerative disc disease at C7-T1 resulting in mild central spinal canal stenosis with moderate narrowing of the right neural foramen and mild narrowing the left neural foramen.     MRI thoracic spine  Impression     1. Minimal dextroscoliosis.  2. Mild multilevel degenerative disc disease without significant central spinal canal stenosis.     MRI lumbar spine  Impression     1. Minimal levoscoliosis.  2. Degenerative disc disease at L2-L3 resulting in mild central spinal canal stenosis.  3. Multilevel degenerative disc disease at L3-L4 resulting in mild central spinal canal stenosis with mild bilateral neural foraminal narrowing.  4. Degenerative disc disease at L4-L5 with a small central partially extruded focal disc herniation resulting in moderate central spinal canal stenosis with moderate bilateral neural foraminal narrowing.           ASSESSMENT/PLAN:     Foraminal stenosis of cervical region  -     X-Ray Cervical Spine AP Lat with Flexion  Extension; Future; Expected date: 08/08/2019  -     EMG W/ ULTRASOUND AND NERVE CONDUCTION TEST 2 Extremities; Future  -     Ambulatory Referral to Physical/Occupational Therapy    Fibromyalgia  -     X-Ray Cervical Spine AP Lat with Flexion  Extension; Future; Expected date: 08/08/2019  -     EMG W/ ULTRASOUND AND NERVE CONDUCTION TEST 2 Extremities; Future  -     Ambulatory Referral to Physical/Occupational Therapy    Other chronic pain  -     X-Ray Cervical Spine AP Lat with Flexion  Extension; Future; Expected date: 08/08/2019  -     EMG W/ ULTRASOUND AND NERVE CONDUCTION TEST 2 Extremities; Future  -     Ambulatory Referral to Physical/Occupational Therapy        Nicole Harris is a 49 y.o. female with chronic neck and back pain with nondermatomal UE and LE paresthesias. Patient is intact on exam with mild effort limiting weakness. She is diffusely TTP which is likely related to her fibromyalgia. MRI thoracic and lumbar spine without surgical indication. She has  foraminal stenosis in the cervical spine. Will obtain dynamic x-rays and EMG of the UE to better evaluate for radiculopathy. I have also referred the patient to physical therapy.     I will review EMG once complete and call patient with results.     Patient verbalized understanding of plan. Encouraged to call with any questions or concerns.

## 2019-08-12 ENCOUNTER — TELEPHONE (OUTPATIENT)
Dept: NEUROLOGY | Facility: CLINIC | Age: 49
End: 2019-08-12

## 2019-08-12 NOTE — TELEPHONE ENCOUNTER
----- Message from Nina Mayfield MA sent at 8/12/2019  1:33 PM CDT -----  Contact: Patient      ----- Message -----  From: Arcelia Rebolledo  Sent: 8/12/2019  11:53 AM  To: Duong Ramos Staff    Type:  Patient Returning Call    Who Called:  Patient  Who Left Message for Patient:  Hailey  Does the patient know what this is regarding?:  Appt  Best Call Back Number: [phone]

## 2019-08-13 ENCOUNTER — TELEPHONE (OUTPATIENT)
Dept: NEUROLOGY | Facility: CLINIC | Age: 49
End: 2019-08-13

## 2019-08-13 NOTE — TELEPHONE ENCOUNTER
----- Message from Glendy Dailey sent at 8/12/2019  4:56 PM CDT -----  Contact: self  Patient requesting to speak to nurse regarding appt that is for tomorrow 08/13 for EMG      Patient contact is [phone] (cejn)

## 2019-08-13 NOTE — TELEPHONE ENCOUNTER
Returned pt call and rescheduled EMG appt. Date/time/location confirmed. Verbalized understanding.

## 2019-08-20 ENCOUNTER — TELEPHONE (OUTPATIENT)
Dept: PAIN MEDICINE | Facility: CLINIC | Age: 49
End: 2019-08-20

## 2019-08-27 ENCOUNTER — OFFICE VISIT (OUTPATIENT)
Dept: PAIN MEDICINE | Facility: CLINIC | Age: 49
End: 2019-08-27

## 2019-08-27 ENCOUNTER — OFFICE VISIT (OUTPATIENT)
Dept: SURGERY | Facility: CLINIC | Age: 49
End: 2019-08-27

## 2019-08-27 VITALS
HEIGHT: 65 IN | BODY MASS INDEX: 32.69 KG/M2 | HEART RATE: 83 BPM | SYSTOLIC BLOOD PRESSURE: 128 MMHG | WEIGHT: 196.19 LBS | OXYGEN SATURATION: 97 % | RESPIRATION RATE: 18 BRPM | DIASTOLIC BLOOD PRESSURE: 74 MMHG

## 2019-08-27 VITALS
DIASTOLIC BLOOD PRESSURE: 78 MMHG | BODY MASS INDEX: 32.69 KG/M2 | HEART RATE: 79 BPM | TEMPERATURE: 98 F | WEIGHT: 196.19 LBS | HEIGHT: 65 IN | SYSTOLIC BLOOD PRESSURE: 126 MMHG

## 2019-08-27 DIAGNOSIS — M79.18 PIRIFORMIS MUSCLE PAIN: ICD-10-CM

## 2019-08-27 DIAGNOSIS — J44.9 CHRONIC OBSTRUCTIVE PULMONARY DISEASE, UNSPECIFIED COPD TYPE: ICD-10-CM

## 2019-08-27 DIAGNOSIS — M51.16 LUMBAR DISC HERNIATION WITH RADICULOPATHY: ICD-10-CM

## 2019-08-27 DIAGNOSIS — M79.7 FIBROMYALGIA: ICD-10-CM

## 2019-08-27 DIAGNOSIS — Z12.11 ENCOUNTER FOR SCREENING COLONOSCOPY: Primary | ICD-10-CM

## 2019-08-27 DIAGNOSIS — M51.36 DDD (DEGENERATIVE DISC DISEASE), LUMBAR: ICD-10-CM

## 2019-08-27 DIAGNOSIS — I10 ESSENTIAL HYPERTENSION: ICD-10-CM

## 2019-08-27 DIAGNOSIS — M47.816 LUMBAR SPONDYLOSIS: ICD-10-CM

## 2019-08-27 DIAGNOSIS — R13.19 ESOPHAGEAL DYSPHAGIA: ICD-10-CM

## 2019-08-27 DIAGNOSIS — G89.29 OTHER CHRONIC PAIN: ICD-10-CM

## 2019-08-27 DIAGNOSIS — R10.13 EPIGASTRIC PAIN: ICD-10-CM

## 2019-08-27 DIAGNOSIS — M79.18 MYOFASCIAL PAIN: ICD-10-CM

## 2019-08-27 DIAGNOSIS — M62.830 LUMBAR PARASPINAL MUSCLE SPASM: ICD-10-CM

## 2019-08-27 DIAGNOSIS — G89.4 CHRONIC PAIN DISORDER: Primary | ICD-10-CM

## 2019-08-27 DIAGNOSIS — E11.9 TYPE 2 DIABETES MELLITUS WITHOUT COMPLICATION, WITHOUT LONG-TERM CURRENT USE OF INSULIN: ICD-10-CM

## 2019-08-27 PROCEDURE — 99214 PR OFFICE/OUTPT VISIT, EST, LEVL IV, 30-39 MIN: ICD-10-PCS | Mod: S$PBB,,, | Performed by: ANESTHESIOLOGY

## 2019-08-27 PROCEDURE — 99213 OFFICE O/P EST LOW 20 MIN: CPT | Mod: PBBFAC,PO,25 | Performed by: ANESTHESIOLOGY

## 2019-08-27 PROCEDURE — 99999 PR PBB SHADOW E&M-EST. PATIENT-LVL III: CPT | Mod: PBBFAC,,, | Performed by: ANESTHESIOLOGY

## 2019-08-27 PROCEDURE — 20553 PR INJECT TRIGGER POINTS, > 3: ICD-10-PCS | Mod: S$PBB,,, | Performed by: ANESTHESIOLOGY

## 2019-08-27 PROCEDURE — 99203 OFFICE O/P NEW LOW 30 MIN: CPT | Mod: S$GLB,,, | Performed by: SURGERY

## 2019-08-27 PROCEDURE — 20553 NJX 1/MLT TRIGGER POINTS 3/>: CPT | Mod: PBBFAC,PO | Performed by: ANESTHESIOLOGY

## 2019-08-27 PROCEDURE — 20553 NJX 1/MLT TRIGGER POINTS 3/>: CPT | Mod: S$PBB,,, | Performed by: ANESTHESIOLOGY

## 2019-08-27 PROCEDURE — 99999 PR PBB SHADOW E&M-EST. PATIENT-LVL III: ICD-10-PCS | Mod: PBBFAC,,, | Performed by: ANESTHESIOLOGY

## 2019-08-27 PROCEDURE — 99214 OFFICE O/P EST MOD 30 MIN: CPT | Mod: S$PBB,,, | Performed by: ANESTHESIOLOGY

## 2019-08-27 PROCEDURE — 99203 PR OFFICE/OUTPT VISIT, NEW, LEVL III, 30-44 MIN: ICD-10-PCS | Mod: S$GLB,,, | Performed by: SURGERY

## 2019-08-27 RX ORDER — LIDOCAINE HYDROCHLORIDE 10 MG/ML
1 INJECTION, SOLUTION EPIDURAL; INFILTRATION; INTRACAUDAL; PERINEURAL ONCE
Status: CANCELLED | OUTPATIENT
Start: 2019-08-27 | End: 2019-08-27

## 2019-08-27 RX ORDER — SODIUM CHLORIDE, SODIUM LACTATE, POTASSIUM CHLORIDE, CALCIUM CHLORIDE 600; 310; 30; 20 MG/100ML; MG/100ML; MG/100ML; MG/100ML
INJECTION, SOLUTION INTRAVENOUS CONTINUOUS
Status: CANCELLED | OUTPATIENT
Start: 2019-08-27

## 2019-08-27 RX ORDER — BUPIVACAINE HYDROCHLORIDE 5 MG/ML
9 INJECTION, SOLUTION EPIDURAL; INTRACAUDAL ONCE
Status: COMPLETED | OUTPATIENT
Start: 2019-08-27 | End: 2019-08-30

## 2019-08-27 RX ORDER — SODIUM, POTASSIUM,MAG SULFATES 17.5-3.13G
SOLUTION, RECONSTITUTED, ORAL ORAL
Qty: 2 BOTTLE | Refills: 0 | Status: ON HOLD | OUTPATIENT
Start: 2019-08-27 | End: 2019-09-18 | Stop reason: HOSPADM

## 2019-08-27 RX ORDER — CLONIDINE 100 UG/ML
150 INJECTION, SOLUTION EPIDURAL ONCE
Status: COMPLETED | OUTPATIENT
Start: 2019-08-27 | End: 2019-08-27

## 2019-08-27 RX ADMIN — CLONIDINE 150 MCG: 100 INJECTION, SOLUTION EPIDURAL at 02:08
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
## 2019-08-27 NOTE — H&P (VIEW-ONLY)
"[facility] - General Surgery  General Surgery  H&P      Patient Name: Nicole Harris  MRN: [id number]     Chief Complaint: "I am being scoped today"    HPI:  Ms. Harris  is seen today in consultation from Andree Crow NP-C  for colon cancer screening. She has no complaints. No nausea, vomiting, diarrhea, constipation, melena, hematochezia, change in bowel habits, change in stool characteristics, weight loss, decrease in caliber of stool, etc. No family history of colon cancer. No aggravating or alleviating factors. No other associated symptoms. No prior colonoscopy.  She is also experiencing intermittent epigastric nonradiating burning pain and esophageal dysphagia for solids but not liquids.  She has a history of constipation predominant IBS.      Allergies & Meds:     Allergen Reactions    Hydrocodone Nausea And Vomiting       Current Outpatient Medications   Medication Sig Dispense Refill    albuterol (PROVENTIL/VENTOLIN HFA) 90 mcg/actuation inhaler Inhale 1 puff into the lungs every 4 (four) hours as needed.   0    escitalopram oxalate (LEXAPRO) 20 MG tablet Take 20 mg by mouth once daily.   3    furosemide (LASIX) 20 MG tablet Take 20 mg by mouth once daily.       hydrOXYzine pamoate (VISTARIL) 25 MG Cap Take 25 mg by mouth every evening.       lisinopril 10 MG tablet Take 10 mg by mouth once daily.       metFORMIN (GLUCOPHAGE) 1000 MG tablet pt states she takes 500 mg po bid-the 1000mg dose "makes me sick"      naproxen (NAPROSYN) 500 MG tablet Take 1 tablet (500 mg total) by mouth 2 (two) times daily as needed (pain). 180 tablet 3    nortriptyline (PAMELOR) 25 MG capsule Take 1-2 capsules (25-50 mg total) by mouth every evening. 60 capsule 5    OXcarbazepine (TRILEPTAL) 300 MG Tab Take 300 mg by mouth 3 (three) times daily.   2    pantoprazole (PROTONIX) 40 MG tablet Take by mouth.      sodium,potassium,mag sulfates (SUPREP BOWEL PREP KIT) 17.5-3.13-1.6 gram " SolR Follow written instructions provided by Clinic 2 Bottle 0       Facility-Administered Medications Ordered in Other Visits   Medication Dose Route Frequency Provider Last Rate Last Dose    0.9%  NaCl infusion   Intravenous Continuous Sherri Gardiner MD 20 mL/hr at 19 1227         PMFSHx:  Past Medical History:   Diagnosis Date    Anxiety     Bipolar disorder     Depression     Diabetes mellitus, type 2     HTN (hypertension)     Insomnia        Past Surgical History:   Procedure Laterality Date     SECTION      Injection, Steroid, Epidural - C7/T1 EPIDURAL STEROID INJECTION N/A 2019    Performed by [facility] at [facility]    Injection, Steroid, Epidural - L4/5 EPIDURAL STEROID INJECTION N/A 2019    Performed [facility] at [facility] [place]    Injection, Steroid, Epidural - L4/5 EPIDURAL STEROID INJECTION N/A 2019    Performed by [facility] at [facility] [place]    INJECTION, TRIGGER POINT - CERVICAL REGION N/A 2019    Performed by [facility]iner [facility] at [facility] [place]    TONSILLECTOMY         History reviewed. No pertinent family history.    Social History     Tobacco Use    Smoking status: Never Smoker    Smokeless tobacco: Never Used   Substance Use Topics    Alcohol use: Yes     Frequency: Monthly or less     Comment: occasional    Drug use: Yes     Types: Marijuana       Review of Systems   Constitutional: Negative for appetite change, chills, fatigue, fever and unexpected weight change.   HENT: Negative for congestion, dental problem, ear pain, mouth sores, postnasal drip, rhinorrhea, sore throat, tinnitus and voice change.    Eyes: Negative for photophobia, pain, discharge and visual disturbance.   Respiratory: Negative for cough, chest tightness, shortness of breath and wheezing.    Cardiovascular: Negative for chest pain, palpitations and leg swelling.   Gastrointestinal: Negative for blood in stool, constipation, diarrhea, nausea and vomiting.    Endocrine: Negative for cold intolerance, heat intolerance, polydipsia, polyphagia and polyuria.   Genitourinary: Negative for difficulty urinating, dysuria, flank pain, frequency, hematuria and urgency.   Musculoskeletal: Negative for arthralgias, joint swelling and myalgias.   Skin: Negative for color change and rash.   Allergic/Immunologic: Negative for immunocompromised state.   Neurological: Negative for dizziness, tremors, seizures, syncope, speech difficulty, weakness, numbness and headaches.   Hematological: Negative for adenopathy. Does not bruise/bleed easily.   Psychiatric/Behavioral: Negative for agitation, confusion, hallucinations, self-injury and suicidal ideas. The patient is not nervous/anxious.           Physical Exam   Constitutional: She is oriented to person, place, and time. She appears well-developed and well-nourished. She is active.  Non-toxic appearance. No distress. Body mass index is 32.65 kg/m².   HENT: Head: Normocephalic and atraumatic.   Right Ear: Hearing and external ear normal. No drainage or tenderness.   Left Ear: Hearing and external ear normal. No drainage or tenderness.   Nose: Nose normal. No rhinorrhea. No epistaxis.   Mouth/Throat: Uvula is midline, oropharynx is clear and moist and mucous membranes are normal. Mucous membranes are not pale, not dry and not cyanotic. No oropharyngeal exudate.   Eyes: Pupils are equal, round, and reactive to light. Conjunctivae and lids are normal. Right eye exhibits no discharge and no exudate. Left eye exhibits no discharge and no exudate. Right conjunctiva is not injected. Right eye exhibits no nystagmus. Left eye exhibits no nystagmus.   Neck: Trachea normal, full passive range of motion without pain and phonation normal. No JVD present. Carotid bruit is not present. No tracheal deviation present. No thyroid mass and no thyromegaly present.   Cardiovascular: Normal rate, regular rhythm, S1 normal, S2 normal, normal heart sounds and  intact distal pulses. PMI is not displaced. Exam reveals no gallop and no friction rub.   No murmur heard.  Pulmonary/Chest: Effort normal and breath sounds normal. No accessory muscle usage. No respiratory distress. She exhibits no mass, no tenderness and no crepitus. Right breast exhibits no inverted nipple, no mass, no nipple discharge, no skin change and no tenderness. Left breast exhibits no inverted nipple, no mass, no nipple discharge, no skin change and no tenderness. Breasts are symmetrical.   Abdominal: Soft. Normal appearance and bowel sounds are normal. She exhibits no distension, no fluid wave, no abdominal bruit and no mass. There is no hepatosplenomegaly. There is no tenderness. There is no rebound, no guarding and negative Escobar's sign. No hernia. Hernia confirmed negative in the right inguinal area and confirmed negative in the left inguinal area.   Genitourinary: Rectum normal and vagina normal. There is no rash or lesion on the right labia. There is no rash or lesion on the left labia. Right adnexum displays no mass. Left adnexum displays no mass. No vaginal discharge found.   Musculoskeletal:        Cervical back: Normal.        Thoracic back: Normal.        Lumbar back: Normal.        Right upper arm: Normal.        Left upper arm: Normal.        Right forearm: Normal.        Left forearm: Normal.        Right hand: Normal.        Left hand: Normal.        Right upper leg: Normal.        Left upper leg: Normal.        Right lower leg: Normal.        Left lower leg: Normal.        Right foot: Normal.        Left foot: Normal.   Lymphadenopathy:        Head (right side): No submental, no submandibular and no posterior auricular adenopathy present.        Head (left side): No submental, no submandibular and no posterior auricular adenopathy present.     She has no cervical adenopathy.     She has no axillary adenopathy.        Right: No inguinal and no supraclavicular adenopathy present.         Left: No inguinal and no supraclavicular adenopathy present.   Neurological: She is alert and oriented to person, place, and time. She has normal strength. No cranial nerve deficit or sensory deficit. Coordination and gait normal. GCS eye subscore is 4. GCS verbal subscore is 5. GCS motor subscore is 6.   Skin: Skin is warm, dry and intact. No rash noted. No cyanosis. Nails show no clubbing.   Psychiatric: She has a normal mood and affect. Her speech is normal. Judgment and thought content normal. Her mood appears not anxious. Her affect is not inappropriate. She is not actively hallucinating. She does not exhibit a depressed mood.         Test Results:  Pending    Assessment:       Due for colon cancer screening.  Esophageal dysphagia.  Epigastric abdominal pain.  Heme positive stool.  Differential diagnosis includes but is not limited to esophageal neoplasm, esophagitis, esophageal ulcer, gastroesophageal reflux disease, gastritis, gastric ulcer, gastric cancer, duodenitis, duodenal ulcer, inflammatory bowel disease, diverticulosis, hemorrhoids, gastrointestinal angiodysplasias, benign gastrointestinal neoplasm, colon cancer, etc.  Options at this time include but are not limited to endoscopy, second opinion, capsule endoscopy, radiologic studies, observation, etc.  Endoscopy warranted.  Multiple comorbid issues ( chronic pain, COPD, HTN, DM ) increase the complexity of required perioperative evaluation & management, risks of complications, and likely will increase the difficulty and complexity of postoperative care, etc.  These issues must be factored in to preoperative evaluation and perioperative management.    1. Encounter for screening colonoscopy    2. Esophageal dysphagia    3. Epigastric pain    4. Other chronic pain    5. Chronic obstructive pulmonary disease, unspecified COPD type    6. Essential hypertension    7. Type 2 diabetes mellitus without complication, without long-term current use of insulin     8. Fibromyalgia        Plan:   Encounter for screening colonoscopy  -     Case Request Operating Room: COLONOSCOPY, ESOPHAGOGASTRODUODENOSCOPY (EGD)  -     Comprehensive metabolic panel; Future; Expected date: 08/27/2019  -     CBC auto differential; Future; Expected date: 08/27/2019  -     Pregnancy, urine rapid; Future; Expected date: 08/27/2019  -     EKG 12-lead; Future; Expected date: 08/27/2019    Esophageal dysphagia    Epigastric pain    Other chronic pain    Chronic obstructive pulmonary disease, unspecified COPD type    Essential hypertension    Type 2 diabetes mellitus without complication, without long-term current use of insulin    Fibromyalgia    Other orders  -     sodium,potassium,mag sulfates (SUPREP BOWEL PREP KIT) 17.5-3.13-1.6 gram SolR; Follow written instructions provided by Clinic  Dispense: 2 Bottle; Refill: 0        Follow up in about 5 weeks (around 10/1/2019) for routine post-endosocpy visit.    Counseling/Medical Decision Making:  Nicole Harris was counseled regarding the results of the evaluation thus far and the differential diagnosis.  Diagnostic and therapeutic options were discussed in detail along with the risks, benefits, possible complications, details of, and indications for each option.  Diagnoses discussed included but were not limited to: hemorrhoids, diverticulosis, cancer, IBD, IBS, benign tumors, angiodysplasias.  Options discussed included but were not limited to: colonoscopy, proctoscopy, anoscopy, sigmoidoscopy, radiologic studies, PillCam, empiric therapy, second opinion, etc. Possible complications of colonoscopy discussed included but were not limited to: bleeding, infections, endocarditis, injury to internal organs, incomplete exam, failure to diagnose cancer or other serious condition, need for emergency surgery, need for a temporary colostomy, need for additional operations or procedures, etc.  Entire conversation was held in layman's terms.  Questions  solicited and answered.  I read the consent form to her verbatim.  All unfamiliar terms were defined.  Krames booklets were provided on colonoscopy, polyps, diverticulosis, hemorrhoids, cancer, etc.  A copy of the consent form was provided as well for her review outside the office / hospital prior to the procedure.  At the conclusion of the conversation she voiced complete understanding of all we discussed and satisfaction that all of her questions had been answered to her full understanding.  She stated that she felt fully informed and totally capable of making an informed decision.  She desires and requests to proceed with colonoscopy.

## 2019-08-27 NOTE — LETTER
August 27, 2019      Andree Crow NP  [address] [place] [address]           [facility]  [address] [place] [address]  Phone: [phone]  Fax: [phone]          Patient: Nicole Harris   MR Number: [id number]   YOB: 1970   Date of Visit: 8/27/2019       Dear Andree Crow:    Thank you for referring Nicole Harris to me for evaluation. Attached you will find relevant portions of my assessment and plan of care.    If you have questions, please do not hesitate to call me. I look forward to following Nicole Harris along with you.    Sincerely,    Da Diallo MD    Enclosure  CC:  No Recipients    If you would like to receive this communication electronically, please contact [email or web address] or [phone] to request more information on [employer] access.    For providers and/or their staff who would like to refer a patient to [facility], please contact us through our one-stop-shop provider referral line, [facility], at [phone].    If you feel you have received this communication in error or would no longer like to receive these types of communications, please e-mail [email or web address]

## 2019-08-27 NOTE — H&P
"[facility] - General Surgery  General Surgery  H&P      Patient Name: Nicole Harris  MRN: [id number]     Chief Complaint: "I am being scoped today"    HPI:  Ms. Harris  is seen today in consultation from Andree Crow NP-C  for colon cancer screening. She has no complaints. No nausea, vomiting, diarrhea, constipation, melena, hematochezia, change in bowel habits, change in stool characteristics, weight loss, decrease in caliber of stool, etc. No family history of colon cancer. No aggravating or alleviating factors. No other associated symptoms. No prior colonoscopy.  She is also experiencing intermittent epigastric nonradiating burning pain and esophageal dysphagia for solids but not liquids.  She has a history of constipation predominant IBS.      Allergies & Meds:     Allergen Reactions    Hydrocodone Nausea And Vomiting       Current Outpatient Medications   Medication Sig Dispense Refill    albuterol (PROVENTIL/VENTOLIN HFA) 90 mcg/actuation inhaler Inhale 1 puff into the lungs every 4 (four) hours as needed.   0    escitalopram oxalate (LEXAPRO) 20 MG tablet Take 20 mg by mouth once daily.   3    furosemide (LASIX) 20 MG tablet Take 20 mg by mouth once daily.       hydrOXYzine pamoate (VISTARIL) 25 MG Cap Take 25 mg by mouth every evening.       lisinopril 10 MG tablet Take 10 mg by mouth once daily.       metFORMIN (GLUCOPHAGE) 1000 MG tablet pt states she takes 500 mg po bid-the 1000mg dose "makes me sick"      naproxen (NAPROSYN) 500 MG tablet Take 1 tablet (500 mg total) by mouth 2 (two) times daily as needed (pain). 180 tablet 3    nortriptyline (PAMELOR) 25 MG capsule Take 1-2 capsules (25-50 mg total) by mouth every evening. 60 capsule 5    OXcarbazepine (TRILEPTAL) 300 MG Tab Take 300 mg by mouth 3 (three) times daily.   2    pantoprazole (PROTONIX) 40 MG tablet Take by mouth.      sodium,potassium,mag sulfates (SUPREP BOWEL PREP KIT) 17.5-3.13-1.6 gram " SolR Follow written instructions provided by Clinic 2 Bottle 0       Facility-Administered Medications Ordered in Other Visits   Medication Dose Route Frequency Provider Last Rate Last Dose    0.9%  NaCl infusion   Intravenous Continuous Sherri Gardiner MD 20 mL/hr at 19 1227         PMFSHx:  Past Medical History:   Diagnosis Date    Anxiety     Bipolar disorder     Depression     Diabetes mellitus, type 2     HTN (hypertension)     Insomnia        Past Surgical History:   Procedure Laterality Date     SECTION      Injection, Steroid, Epidural - C7/T1 EPIDURAL STEROID INJECTION N/A 2019    Performed by Sherri Gardiner [facility] at [facility]    Injection, Steroid, Epidural - L4/5 EPIDURAL STEROID INJECTION N/A 2019    Performed by Sherri Gardiner MD at [facility]    Injection, Steroid, Epidural - L4/5 EPIDURAL STEROID INJECTION N/A 2019    Performed by Sherri Gardiner MD at [facility]    INJECTION, TRIGGER POINT - CERVICAL REGION N/A 2019    Performed by Sherri Gardiner MD at [facility]    TONSILLECTOMY         History reviewed. No pertinent family history.    Social History     Tobacco Use    Smoking status: Never Smoker    Smokeless tobacco: Never Used   Substance Use Topics    Alcohol use: Yes     Frequency: Monthly or less     Comment: occasional    Drug use: Yes     Types: Marijuana       Review of Systems   Constitutional: Negative for appetite change, chills, fatigue, fever and unexpected weight change.   HENT: Negative for congestion, dental problem, ear pain, mouth sores, postnasal drip, rhinorrhea, sore throat, tinnitus and voice change.    Eyes: Negative for photophobia, pain, discharge and visual disturbance.   Respiratory: Negative for cough, chest tightness, shortness of breath and wheezing.    Cardiovascular: Negative for chest pain, palpitations and leg swelling.   Gastrointestinal: Negative for blood in stool, constipation, diarrhea, nausea and vomiting.    Endocrine: Negative for cold intolerance, heat intolerance, polydipsia, polyphagia and polyuria.   Genitourinary: Negative for difficulty urinating, dysuria, flank pain, frequency, hematuria and urgency.   Musculoskeletal: Negative for arthralgias, joint swelling and myalgias.   Skin: Negative for color change and rash.   Allergic/Immunologic: Negative for immunocompromised state.   Neurological: Negative for dizziness, tremors, seizures, syncope, speech difficulty, weakness, numbness and headaches.   Hematological: Negative for adenopathy. Does not bruise/bleed easily.   Psychiatric/Behavioral: Negative for agitation, confusion, hallucinations, self-injury and suicidal ideas. The patient is not nervous/anxious.           Physical Exam   Constitutional: She is oriented to person, place, and time. She appears well-developed and well-nourished. She is active.  Non-toxic appearance. No distress. Body mass index is 32.65 kg/m².   HENT: Head: Normocephalic and atraumatic.   Right Ear: Hearing and external ear normal. No drainage or tenderness.   Left Ear: Hearing and external ear normal. No drainage or tenderness.   Nose: Nose normal. No rhinorrhea. No epistaxis.   Mouth/Throat: Uvula is midline, oropharynx is clear and moist and mucous membranes are normal. Mucous membranes are not pale, not dry and not cyanotic. No oropharyngeal exudate.   Eyes: Pupils are equal, round, and reactive to light. Conjunctivae and lids are normal. Right eye exhibits no discharge and no exudate. Left eye exhibits no discharge and no exudate. Right conjunctiva is not injected. Right eye exhibits no nystagmus. Left eye exhibits no nystagmus.   Neck: Trachea normal, full passive range of motion without pain and phonation normal. No JVD present. Carotid bruit is not present. No tracheal deviation present. No thyroid mass and no thyromegaly present.   Cardiovascular: Normal rate, regular rhythm, S1 normal, S2 normal, normal heart sounds and  intact distal pulses. PMI is not displaced. Exam reveals no gallop and no friction rub.   No murmur heard.  Pulmonary/Chest: Effort normal and breath sounds normal. No accessory muscle usage. No respiratory distress. She exhibits no mass, no tenderness and no crepitus. Right breast exhibits no inverted nipple, no mass, no nipple discharge, no skin change and no tenderness. Left breast exhibits no inverted nipple, no mass, no nipple discharge, no skin change and no tenderness. Breasts are symmetrical.   Abdominal: Soft. Normal appearance and bowel sounds are normal. She exhibits no distension, no fluid wave, no abdominal bruit and no mass. There is no hepatosplenomegaly. There is no tenderness. There is no rebound, no guarding and negative Escobar's sign. No hernia. Hernia confirmed negative in the right inguinal area and confirmed negative in the left inguinal area.   Genitourinary: Rectum normal and vagina normal. There is no rash or lesion on the right labia. There is no rash or lesion on the left labia. Right adnexum displays no mass. Left adnexum displays no mass. No vaginal discharge found.   Musculoskeletal:        Cervical back: Normal.        Thoracic back: Normal.        Lumbar back: Normal.        Right upper arm: Normal.        Left upper arm: Normal.        Right forearm: Normal.        Left forearm: Normal.        Right hand: Normal.        Left hand: Normal.        Right upper leg: Normal.        Left upper leg: Normal.        Right lower leg: Normal.        Left lower leg: Normal.        Right foot: Normal.        Left foot: Normal.   Lymphadenopathy:        Head (right side): No submental, no submandibular and no posterior auricular adenopathy present.        Head (left side): No submental, no submandibular and no posterior auricular adenopathy present.     She has no cervical adenopathy.     She has no axillary adenopathy.        Right: No inguinal and no supraclavicular adenopathy present.         Left: No inguinal and no supraclavicular adenopathy present.   Neurological: She is alert and oriented to person, place, and time. She has normal strength. No cranial nerve deficit or sensory deficit. Coordination and gait normal. GCS eye subscore is 4. GCS verbal subscore is 5. GCS motor subscore is 6.   Skin: Skin is warm, dry and intact. No rash noted. No cyanosis. Nails show no clubbing.   Psychiatric: She has a normal mood and affect. Her speech is normal. Judgment and thought content normal. Her mood appears not anxious. Her affect is not inappropriate. She is not actively hallucinating. She does not exhibit a depressed mood.         Test Results:  Pending    Assessment:       Due for colon cancer screening.  Esophageal dysphagia.  Epigastric abdominal pain.  Heme positive stool.  Differential diagnosis includes but is not limited to esophageal neoplasm, esophagitis, esophageal ulcer, gastroesophageal reflux disease, gastritis, gastric ulcer, gastric cancer, duodenitis, duodenal ulcer, inflammatory bowel disease, diverticulosis, hemorrhoids, gastrointestinal angiodysplasias, benign gastrointestinal neoplasm, colon cancer, etc.  Options at this time include but are not limited to endoscopy, second opinion, capsule endoscopy, radiologic studies, observation, etc.  Endoscopy warranted.  Multiple comorbid issues ( chronic pain, COPD, HTN, DM ) increase the complexity of required perioperative evaluation & management, risks of complications, and likely will increase the difficulty and complexity of postoperative care, etc.  These issues must be factored in to preoperative evaluation and perioperative management.    1. Encounter for screening colonoscopy    2. Esophageal dysphagia    3. Epigastric pain    4. Other chronic pain    5. Chronic obstructive pulmonary disease, unspecified COPD type    6. Essential hypertension    7. Type 2 diabetes mellitus without complication, without long-term current use of insulin     8. Fibromyalgia        Plan:   Encounter for screening colonoscopy  -     Case Request Operating Room: COLONOSCOPY, ESOPHAGOGASTRODUODENOSCOPY (EGD)  -     Comprehensive metabolic panel; Future; Expected date: 08/27/2019  -     CBC auto differential; Future; Expected date: 08/27/2019  -     Pregnancy, urine rapid; Future; Expected date: 08/27/2019  -     EKG 12-lead; Future; Expected date: 08/27/2019    Esophageal dysphagia    Epigastric pain    Other chronic pain    Chronic obstructive pulmonary disease, unspecified COPD type    Essential hypertension    Type 2 diabetes mellitus without complication, without long-term current use of insulin    Fibromyalgia    Other orders  -     sodium,potassium,mag sulfates (SUPREP BOWEL PREP KIT) 17.5-3.13-1.6 gram SolR; Follow written instructions provided by Clinic  Dispense: 2 Bottle; Refill: 0        Follow up in about 5 weeks (around 10/1/2019) for routine post-endosocpy visit.    Counseling/Medical Decision Making:  Nicole Harris was counseled regarding the results of the evaluation thus far and the differential diagnosis.  Diagnostic and therapeutic options were discussed in detail along with the risks, benefits, possible complications, details of, and indications for each option.  Diagnoses discussed included but were not limited to: hemorrhoids, diverticulosis, cancer, IBD, IBS, benign tumors, angiodysplasias.  Options discussed included but were not limited to: colonoscopy, proctoscopy, anoscopy, sigmoidoscopy, radiologic studies, PillCam, empiric therapy, second opinion, etc. Possible complications of colonoscopy discussed included but were not limited to: bleeding, infections, endocarditis, injury to internal organs, incomplete exam, failure to diagnose cancer or other serious condition, need for emergency surgery, need for a temporary colostomy, need for additional operations or procedures, etc.  Entire conversation was held in layman's terms.  Questions  solicited and answered.  I read the consent form to her verbatim.  All unfamiliar terms were defined.  Krames booklets were provided on colonoscopy, polyps, diverticulosis, hemorrhoids, cancer, etc.  A copy of the consent form was provided as well for her review outside the office / hospital prior to the procedure.  At the conclusion of the conversation she voiced complete understanding of all we discussed and satisfaction that all of her questions had been answered to her full understanding.  She stated that she felt fully informed and totally capable of making an informed decision.  She desires and requests to proceed with colonoscopy.

## 2019-08-29 DIAGNOSIS — M50.30 DDD (DEGENERATIVE DISC DISEASE), CERVICAL: ICD-10-CM

## 2019-08-29 DIAGNOSIS — M54.12 CERVICAL RADICULOPATHY: Primary | ICD-10-CM

## 2019-08-30 RX ADMIN — BUPIVACAINE HYDROCHLORIDE 45 MG: 5 INJECTION, SOLUTION EPIDURAL; INTRACAUDAL; PERINEURAL at 02:08

## 2019-09-03 ENCOUNTER — CLINICAL SUPPORT (OUTPATIENT)
Dept: REHABILITATION | Facility: HOSPITAL | Age: 49
End: 2019-09-03
Attending: ANESTHESIOLOGY

## 2019-09-03 DIAGNOSIS — M62.81 MUSCLE WEAKNESS (GENERALIZED): ICD-10-CM

## 2019-09-03 DIAGNOSIS — R53.81 PHYSICAL DECONDITIONING: ICD-10-CM

## 2019-09-03 PROCEDURE — 97162 PT EVAL MOD COMPLEX 30 MIN: CPT

## 2019-09-03 PROCEDURE — 97110 THERAPEUTIC EXERCISES: CPT

## 2019-09-03 NOTE — PLAN OF CARE
"Physical Therapy Evaluation/Plan of Care    Name: Nicole Harris  [facility] Number: [id number]    Diagnosis:   Encounter Diagnoses   Name Primary?    Physical deconditioning     Muscle weakness (generalized)      Physician: Sherri Gardiner MD  Treatment Orders: PT Eval and Treat    Past Medical History:   Diagnosis Date    Anxiety     Bipolar disorder     Depression     Diabetes mellitus, type 2     HTN (hypertension)     Insomnia      Current Outpatient Medications   Medication Sig    albuterol (PROVENTIL/VENTOLIN HFA) 90 mcg/actuation inhaler Inhale 1 puff into the lungs every 4 (four) hours as needed.     escitalopram oxalate (LEXAPRO) 20 MG tablet Take 20 mg by mouth once daily.     furosemide (LASIX) 20 MG tablet Take 20 mg by mouth once daily.     hydrOXYzine pamoate (VISTARIL) 25 MG Cap Take 25 mg by mouth every evening.     lisinopril 10 MG tablet Take 10 mg by mouth once daily.     metFORMIN (GLUCOPHAGE) 1000 MG tablet pt states she takes 500 mg po bid-the 1000mg dose "makes me sick"    naproxen (NAPROSYN) 500 MG tablet Take 1 tablet (500 mg total) by mouth 2 (two) times daily as needed (pain).    nortriptyline (PAMELOR) 25 MG capsule Take 1-2 capsules (25-50 mg total) by mouth every evening.    OXcarbazepine (TRILEPTAL) 300 MG Tab Take 300 mg by mouth 3 (three) times daily.     pantoprazole (PROTONIX) 40 MG tablet Take by mouth.    sodium,potassium,mag sulfates (SUPREP BOWEL PREP KIT) 17.5-3.13-1.6 gram SolR Follow written instructions provided by Clinic     No current facility-administered medications for this visit.      Facility-Administered Medications Ordered in Other Visits   Medication    0.9%  NaCl infusion     Review of patient's allergies indicates:   Allergen Reactions    Hydrocodone Nausea And Vomiting     Precautions: standard    Evaluation Date: 9/3/19  Visit # authorized: 12  Authorization period: 12/31/19  Plan of care Expiration: 12/12/19    Subjective     Onset/JACOBO: " "lifting injury    Onset Date: 2010  Prior Level of Function: independent ADL's and IADL's   Current level of Function: pain limiting functional mobility, uses std cane at times in community  Social History: patient lives alone in a single story home which is handicapped accessible  Med History: fibromyalgia, chronic pain  Occupation: does not work    History of Present Illness: Nicole is a 49 y.o. female that presents to [facility] secondary to neck and low back pain . Nicole reports increase in her pain level over the past 6 months but does not recall inciting injury or previous falls contributing. She has had several procedures which she reports benefits were temporary. She also reports a lot of anxiety and stress in her life contributing to her symptoms at this time. She reports she was doing Maurizio up until a few years ago but had to discontinue secondary to worsening of her pain. Currently she describes pain in mid to low back, LE's L > R, neck, fingers, skin, bones, and muscles "all over" affecting her ADL's, mobility, and quality of life.    Imaging: X-ray or MRI taken and revealed: Lumbar: 1. Minimal levoscoliosis.  2. Degenerative disc disease at L2-L3 resulting in mild central spinal canal stenosis.  3. Multilevel degenerative disc disease at L3-L4 resulting in mild central spinal canal stenosis with mild bilateral neural foraminal narrowing.  4. Degenerative disc disease at L4-L5 with a small central partially extruded focal disc herniation resulting in moderate central spinal canal stenosis with moderate bilateral neural foraminal narrowing.  Thoracic: 1. Minimal dextroscoliosis.  2. Mild multilevel degenerative disc disease without significant central spinal canal stenosis.  Cervical: 1. Mild reversal the normal cervical lordosis.  2. Congenital cervical spinal stenosis.  3. Degenerative disc disease at C2-C3 resulting in mild narrowing the left neural foramen.  4. Degenerative disc disease at " "C3-C4 resulting in mild central spinal canal stenosis with mild narrowing of the right neural foramen and moderate narrowing the left neural foramen.  5. Multilevel degenerative disc disease from C4 through C7 resulting in mild to moderate central spinal canal stenosis with severe bilateral neural foraminal narrowing.  6. Degenerative disc disease at C7-T1 resulting in mild central spinal canal stenosis with moderate narrowing of the right neural foramen and mild narrowing the left neural foramen.    Pain: current 4/10, worst 10/10, best 4/10, Aching, Throbbing and stabbing in her mid to low back, constant  Radicular symptoms: LLE > RLE  Aggravating factors: standing, sitting, walking, "everything"  Easing factors: change in position, rest, ice sometimes helps    Interventional Therapies:   · 04/29/2019:  L4/5 ILESI: 90% benefit of her low back pain  · 05/27/2019:  C7/T1 interlaminar SILVIA:  70% benefit  · 07/08/2019:  L4/5 ILESI:  80% relief of her low back pain    Pts goals: pain relief    No cultural, environmental, or spiritual barriers identified to treatment or learning.    Objective     Observation: Patient is a well developed, well nourished female in NAD. Overall posture, gait, and movement patterns are guarded.  Posture:  Slouched sitting and standing posture, forward head/rounded shoulders, dowagers hump, protruding abdomen    Lumbar Range of Motion:    Degrees Pain   Flexion 20   End range pain        Extension 0   Increase pain        Left Side Bending 20 End range pain        Right Side Bending 20 End pain pain        Left rotation   42 End range pain        Right Rotation   30 End range pain           Lower Extremity Strength  Unable to formally assess secondary to break away weakness and patient effort due to pain, however, estimated to be WFL's with left side weaker than right.    Special Tests:  -Repeated Flexion: unable to perform  -Repeated Ext: unable to perform  -Piriformis Test: NT  -Prone " Instability Test: NT  -Bridge Test: NT  -OH Squat: NT  -Slump Test: neg B  -Fabers Test: pos pain B  -Fadirs Test: pos pain B  -SLR Test: equivocal B    DTR:   Right Left   Patellar (L3-4) 2+ 2+   Achilles (S1) 1+ 1+   Femoral Nerve: NT  Joint Mobility: decreased segmental mobility in lumbar spine  Palpation: significant ttp thoracic and lumbar paraspinals, and bilateral SIJ's,   Sensation: impaired light touch bilateral LE's  Flexibility:     90/90 SLR = R moderate restriction, L moderate restriction   Jason's test: R moderate restriction, L moderate restriction   Ruslan test: R moderate restriction, L moderate restriction    Functional Limitations Reports -   Category: mobility  Tool: Mod Oswestry  Score: 70% impairment    PT Evaluation Completed: Yes  Discussed Plan of Care with patient: Yes    TREATMENT:    Home Exercises and Patient Education Provided    Education provided re: use of ice and/or heat as needed for pain and muscle relaxation  - importance of HEP for carry over between treatment sessions and best rehab outcome  - progress towards goals   - role of therapy in multi - disciplinary team, goals for therapy  Pt educated on condition, POC, and expectations in therapy.  No spiritual or educational barriers to learning provided    Home exercises: seated heel-toe raises, seated LAQ's, and bilateral scapular retraction with 10 sec hold, exercises to be performed per written instructions provided  Pt will be provided HEP during course of treatment with progressions as appropriate. Pt was advised to perform these exercises free of pain, and to stop performing them if pain occurs.   Nicole demonstrated good  understanding of the education provided.     Assessment     Nicole is a 49 y.o. female referred to outpatient physical therapy and presents to PT with chronic pain disorder. Patient demonstrates limitations as described in the problem list. Pt will benefit from physcial therapy services in order to maximize  pain free and/or functional mobility. The following goals were discussed with the patient and patient is in agreement with them as to be addressed in the treatment plan.   Pt prognosis is Good.   Pt will benefit from skilled outpatient Physical Therapy to address the deficits stated above and in the chart below, provide pt/family education, and to maximize pt's level of independence.     Anticipated barriers to physical therapy: chronicity of pain    Medical necessity is demonstrated by the following IMPAIRMENTS/PROBLEM LIST:  weakness, impaired endurance, impaired sensation, impaired functional mobility, pain and decreased ROM    Long Term Goals: 6 weeks    Pain: Decrease pain to 2/10 to allow for improved ADL's and functional mobility  Strength: Improve strength in core muscles to 5/5 for improved lumbopelvic stability  ROM: Improve ROM to 80% of normal limits   Functional scale: Improve score on Mod Oswestry to < / = 58% impairment   Lifting: Lift 20 lbs to waist level, 10 lbs to shoulder level, 5 lbs to overhead without pain or compensation  Walking: Increase walking distance and/or duration to 500' without increase pain   Postures: Increase sitting and/or standing duration to 20 min without increase pain   Transfers: Perform sit to stand and supine <> sit transfers without increased pain or limitation  Exercise: demonstrate independence with home exercise program to maintain gains made in therapy.      Plan   Pt will be treated by physical therapy 1-2 times a week for 6 weeks for Pt Education, HEP, therapeutic exercises, neuromuscular re-education, joint mobilizations, modalities prn to achieve established goals. Nicole may at times be seen by a PTA as part of the Rehab Team.     Cont PT for 6 weeks.     Yonas Anderson, PT    I certify the need for these services furnished under this plan of treatment and while under my care.______________________________ Physician/Referring Practitioner  Date of Signature

## 2019-09-03 NOTE — PATIENT INSTRUCTIONS
Heel Raise (Sitting)        Raise heels, keeping toes on floor.  Repeat ____ times per set. Do ____ sets per session. Do ____ sessions per day.     [email or web address]     Copyright © [employer]. All rights reserved.   Toe Raise (Sitting)        Raise toes, keeping heels on floor.  Repeat ____ times per set. Do ____ sets per session. Do ____ sessions per day.     [email or web address]     Copyright © [employer]. All rights reserved.   Knee Extension (Sitting)        Place ____ pound weight on left ankle and straighten knee fully, lower slowly.  Repeat ____ times per set. Do ____ sets per session. Do ____ sessions per day.     [email or web address]     Copyright © [employer]. All rights reserved.   Scapular Retraction (Standing)        With arms at sides, pinch shoulder blades together.  Repeat ____ times per set. Do ____ sets per session. Do ____ sessions per day.     [email or web address]     Copyright © [employer]. All rights reserved.

## 2019-09-04 ENCOUNTER — TELEPHONE (OUTPATIENT)
Dept: SURGERY | Facility: CLINIC | Age: 49
End: 2019-09-04

## 2019-09-04 NOTE — TELEPHONE ENCOUNTER
----- Message from Mary Saenz sent at 9/4/2019  1:29 PM CDT -----  Contact: [phone]  Patient requesting a call concerning the medication its too expensive and she requesting it to be called in to [facility] pharmacy.      Patient will be using   [facility] [place] - [address] [address] [address]  [address] [address] [address]  [place] [address]  Phone: [phone] Fax: [phone]    Please call patient at [phone].     Thanks!

## 2019-09-04 NOTE — TELEPHONE ENCOUNTER
Returned pt's phone call. Pt states that her prep is too expensive and is asking it be sent in to [facility] in [place].  Faxed prescription.  Confirmation received.  Advised pt to call the office back for any further questions/concerns. Pt verbalizes understanding.

## 2019-09-05 PROBLEM — R53.81 PHYSICAL DECONDITIONING: Status: ACTIVE | Noted: 2019-09-05

## 2019-09-05 PROBLEM — M62.81 MUSCLE WEAKNESS (GENERALIZED): Status: ACTIVE | Noted: 2019-09-05

## 2019-09-05 NOTE — PROGRESS NOTES
See Initial Evaluation in the POC section          PT met face to face with Anibal Miller PTA to discuss patient's treatment plan and progress towards established goals.  Treatment will be continued as described in initial report/eval and progress notes.  Patient will be seen by physical therapist every sixth visit and minimally once per month.    Additional information: N/A

## 2019-09-10 ENCOUNTER — TELEPHONE (OUTPATIENT)
Dept: PAIN MEDICINE | Facility: CLINIC | Age: 49
End: 2019-09-10

## 2019-09-10 ENCOUNTER — CLINICAL SUPPORT (OUTPATIENT)
Dept: REHABILITATION | Facility: HOSPITAL | Age: 49
End: 2019-09-10
Attending: ANESTHESIOLOGY

## 2019-09-10 DIAGNOSIS — M62.81 MUSCLE WEAKNESS (GENERALIZED): ICD-10-CM

## 2019-09-10 DIAGNOSIS — R53.81 PHYSICAL DECONDITIONING: ICD-10-CM

## 2019-09-10 PROCEDURE — 97014 ELECTRIC STIMULATION THERAPY: CPT | Mod: PN

## 2019-09-10 PROCEDURE — 97110 THERAPEUTIC EXERCISES: CPT | Mod: PN

## 2019-09-10 NOTE — TELEPHONE ENCOUNTER
----- Message from Mary Saenz sent at 9/10/2019  3:28 PM CDT -----  Contact: [phone]  Patient is requesting a call back from the nurse concerning getting order to PT full back and dry needling.    Please call the patient upon request at phone number [phone].

## 2019-09-10 NOTE — PROGRESS NOTES
Physical Therapy Daily Treatment Note   Name: Nicole Harris 1970  MRN: [id number]    Visit Date: 9/10/2019  Visit #: 4/12  Authorization period Expiration: 12/31/19  Plan of Care Expiration: 12/12/19  Precautions: standard    Time In: 1400   Time Out: 1500  Total 1:1 Treatment Time: 60 min    Treatment Diagnosis:   Encounter Diagnoses   Name Primary?    Muscle weakness (generalized)     Physical deconditioning      Physician: Sherri Gardiner MD    Subjective   Pt reports: having difficulty sleeping last night  Pain Scale:  5/10 on VAS currently, tba/10 on VAS post treatment  Pain Location: right LE    Objective   Nicole received therapeutic exercises to develop strength, flexibility, posture and core stabilization for 45 minutes including:  NUSTEP  x10 min, abd bracing w pelvic tilt, stretch to HS and HC bell. 10 x 5 sec hold.  Nerve flossing bell LE 5 x.  Prone on elbows  X 10, x 10 w [place] ext.  No change in sx.   Pt layed in prone for IFC.   Nicole received the following unsupervised modalities after being cleared for contradictions: IFC Electrical Stimulation:  Nicole received IFC Electrical Stimulation for pain control applied to the low back. Pt received stimulation at  scan at a frequency of 20 min minutes. Nicole tolderated treatment well without any adverse effects.        Home Exercises and Education Provided     Education provided re:   - progress towards goals   - role of therapy in multi - disciplinary team, goals for therapy  Pt educated on condition, POC, and expectations in therapy.  No spiritual or educational barriers to learning provided    Home exercises:  Pt will be provided HEP during course of treatment with progressions as appropriate. Pt was advised to perform these exercises free of pain, and to stop performing them if pain occurs.   Nicole demonstrated good  understanding of the education provided.     Assessment   Nicole is progressing well towards her  goals and no updates to goals at this time.     Pt prognosis is Good. Pt will continue to benefit from skilled outpatient physical therapy to address the deficits listed in the problem list chart on initial evaluation, provide pt/family education and to maximize pt's level of independence in the home and community environment.     Medical necessity is demonstrated by the impairments and functional limitations listed on the Initial Evaluation.     Anticipated barriers to physical therapy: motivation and pain   Pt's spiritual, cultural and educational needs considered and pt agreeable to plan of care and goals.    Goals   Progressing, not met      Plan   Continue with established Plan of Care towards Physical Therapy goals.   Discussed Plan of Care with patient: Yes    Darlin Crow, PT  9/10/2019

## 2019-09-10 NOTE — TELEPHONE ENCOUNTER
Contacted patient.  She stated PT is advising dry needling and an order for the entire spine? I told pt the referral was written to evaluate and treat the chronic neck and back pain.  Unsure of how you want to handle this.

## 2019-09-16 ENCOUNTER — TELEPHONE (OUTPATIENT)
Dept: SURGERY | Facility: CLINIC | Age: 49
End: 2019-09-16

## 2019-09-16 ENCOUNTER — HOSPITAL ENCOUNTER (OUTPATIENT)
Dept: CARDIOLOGY | Facility: HOSPITAL | Age: 49
Discharge: HOME | End: 2019-09-16
Attending: SURGERY

## 2019-09-16 DIAGNOSIS — Z12.11 ENCOUNTER FOR SCREENING COLONOSCOPY: ICD-10-CM

## 2019-09-16 PROCEDURE — 93005 ELECTROCARDIOGRAM TRACING: CPT

## 2019-09-16 NOTE — TELEPHONE ENCOUNTER
Returned pt's phone call. Advised her that she is only supposed to have 1 box of the Suprep with the 2 6 oz bottles inside.  Advised pt that she will need to start her prep tomorrow for her procedure that is scheduled on Wednesday.  Also went over the instructions and times with the patient.  Pt verbalizes understanding of all instructions.

## 2019-09-16 NOTE — TELEPHONE ENCOUNTER
LVM for patient that it was OK for her to eat anything she wants today 09/16/19 up until midnight. Pt instructed that she has a clear liquid diet tomorrow on 09/17/19 and nothing to eat or drink on 09/18/19 the day of her procedure

## 2019-09-16 NOTE — TELEPHONE ENCOUNTER
----- Message from Shanita Mei sent at 9/16/2019  3:26 PM CDT -----  Contact: Patient  Type: Needs Medical Advice    Who Called:  Patient  Best Call Back Number:   Additional Information: Calling to find out if she can eat mashed potatoes today

## 2019-09-16 NOTE — TELEPHONE ENCOUNTER
----- Message from Joann Barfield sent at 9/16/2019  8:57 AM CDT -----  Contact: self  Type: Needs Medical Advice    Who Called:  patient  Best Call Back Number: [phone] (home)   Additional Information: Patient asked to have a nurse to call her back she has two prep boxes and has no clue please call her to advise should use both of them now or later she need is very confused please call her asap

## 2019-09-17 ENCOUNTER — TELEPHONE (OUTPATIENT)
Dept: SURGERY | Facility: CLINIC | Age: 49
End: 2019-09-17

## 2019-09-17 NOTE — TELEPHONE ENCOUNTER
Returned pt's phone call. Advised pt that she is supposed to drink her 1st dose at 2 pm and 2nd dose at 8 pm per her instruction sheet.  She states that she drank the 1st dose about an hour ago.  Advised pt to try to drink plenty of fluids and possibly try Sprite because it may help settle her stomach.  Advised pt to take her 2nd dose a little sooner around 6:30 pm or 7pm since she took the 1st dose early. She verbalizes understanding of all instructions.

## 2019-09-17 NOTE — TELEPHONE ENCOUNTER
----- Message from Brendan Bravo sent at 9/17/2019 12:58 PM CDT -----  Contact: pt  Type: Needs Medical Advice    Who Called:  pt    Best Call Back Number: [phone]  Additional Information: she states the bowel prep made her sick. Pt would like to know what she should do. Please call to advise.

## 2019-09-18 ENCOUNTER — HOSPITAL ENCOUNTER (OUTPATIENT)
Facility: HOSPITAL | Age: 49
Discharge: HOME | End: 2019-09-18
Attending: SURGERY | Admitting: SURGERY

## 2019-09-18 ENCOUNTER — ANESTHESIA EVENT (OUTPATIENT)
Dept: SURGERY | Facility: HOSPITAL | Age: 49
End: 2019-09-18

## 2019-09-18 ENCOUNTER — ANESTHESIA (OUTPATIENT)
Dept: SURGERY | Facility: HOSPITAL | Age: 49
End: 2019-09-18

## 2019-09-18 VITALS
HEIGHT: 65 IN | TEMPERATURE: 98 F | RESPIRATION RATE: 18 BRPM | WEIGHT: 196 LBS | HEART RATE: 70 BPM | OXYGEN SATURATION: 97 % | SYSTOLIC BLOOD PRESSURE: 131 MMHG | DIASTOLIC BLOOD PRESSURE: 82 MMHG | BODY MASS INDEX: 32.65 KG/M2

## 2019-09-18 DIAGNOSIS — Z12.11 ENCOUNTER FOR SCREENING COLONOSCOPY: ICD-10-CM

## 2019-09-18 PROBLEM — K29.30 CHRONIC SUPERFICIAL GASTRITIS WITHOUT BLEEDING: Status: ACTIVE | Noted: 2019-09-18

## 2019-09-18 LAB
POCT GLUCOSE: 188 MG/DL (ref 70–110)
POCT GLUCOSE: 229 MG/DL (ref 70–110)

## 2019-09-18 PROCEDURE — 88305 TISSUE EXAM BY PATHOLOGIST: CPT | Mod: 26,,, | Performed by: PATHOLOGY

## 2019-09-18 PROCEDURE — 43239 EGD BIOPSY SINGLE/MULTIPLE: CPT | Mod: 59 | Performed by: SURGERY

## 2019-09-18 PROCEDURE — 88342 IMHCHEM/IMCYTCHM 1ST ANTB: CPT | Mod: 26,,, | Performed by: PATHOLOGY

## 2019-09-18 PROCEDURE — 43450 DILATE ESOPHAGUS 1/MULT PASS: CPT

## 2019-09-18 PROCEDURE — 43450 DILATE ESOPHAGUS 1/MULT PASS: CPT | Mod: 51,,, | Performed by: SURGERY

## 2019-09-18 PROCEDURE — G0121 COLON CA SCRN NOT HI RSK IND: ICD-10-PCS | Mod: ,,, | Performed by: SURGERY

## 2019-09-18 PROCEDURE — 63600175 PHARM REV CODE 636 W HCPCS

## 2019-09-18 PROCEDURE — G0121 COLON CA SCRN NOT HI RSK IND: HCPCS | Mod: ,,, | Performed by: SURGERY

## 2019-09-18 PROCEDURE — D9220A PRA ANESTHESIA: ICD-10-PCS | Mod: ,,, | Performed by: ANESTHESIOLOGY

## 2019-09-18 PROCEDURE — 43239 PR EGD, FLEX, W/BIOPSY, SGL/MULTI: ICD-10-PCS | Mod: 51,,, | Performed by: SURGERY

## 2019-09-18 PROCEDURE — 37000009 HC ANESTHESIA EA ADD 15 MINS: Performed by: SURGERY

## 2019-09-18 PROCEDURE — 88342 IMHCHEM/IMCYTCHM 1ST ANTB: CPT | Performed by: PATHOLOGY

## 2019-09-18 PROCEDURE — 88342 TISSUE SPECIMEN TO PATHOLOGY - SURGERY: ICD-10-PCS | Mod: 26,,, | Performed by: PATHOLOGY

## 2019-09-18 PROCEDURE — 27201012 HC FORCEPS, HOT/COLD, DISP: Performed by: SURGERY

## 2019-09-18 PROCEDURE — D9220A PRA ANESTHESIA: Mod: ,,, | Performed by: ANESTHESIOLOGY

## 2019-09-18 PROCEDURE — 63600175 PHARM REV CODE 636 W HCPCS: Performed by: SURGERY

## 2019-09-18 PROCEDURE — 45378 DIAGNOSTIC COLONOSCOPY: CPT | Performed by: SURGERY

## 2019-09-18 PROCEDURE — G0121 COLON CA SCRN NOT HI RSK IND: HCPCS | Performed by: SURGERY

## 2019-09-18 PROCEDURE — 43450 PR DILATE ESOPHAGUS: ICD-10-PCS | Mod: 51,,, | Performed by: SURGERY

## 2019-09-18 PROCEDURE — 43239 EGD BIOPSY SINGLE/MULTIPLE: CPT | Mod: 51,,, | Performed by: SURGERY

## 2019-09-18 PROCEDURE — 88305 TISSUE SPECIMEN TO PATHOLOGY - SURGERY: ICD-10-PCS | Mod: 26,,, | Performed by: PATHOLOGY

## 2019-09-18 PROCEDURE — 37000008 HC ANESTHESIA 1ST 15 MINUTES: Performed by: SURGERY

## 2019-09-18 PROCEDURE — 88305 TISSUE EXAM BY PATHOLOGIST: CPT | Performed by: PATHOLOGY

## 2019-09-18 PROCEDURE — 63600175 PHARM REV CODE 636 W HCPCS: Performed by: NURSE ANESTHETIST, CERTIFIED REGISTERED

## 2019-09-18 RX ORDER — SODIUM CHLORIDE, SODIUM LACTATE, POTASSIUM CHLORIDE, CALCIUM CHLORIDE 600; 310; 30; 20 MG/100ML; MG/100ML; MG/100ML; MG/100ML
125 INJECTION, SOLUTION INTRAVENOUS CONTINUOUS
Status: DISCONTINUED | OUTPATIENT
Start: 2019-09-18 | End: 2019-09-18 | Stop reason: HOSPADM

## 2019-09-18 RX ORDER — SODIUM CHLORIDE, SODIUM LACTATE, POTASSIUM CHLORIDE, CALCIUM CHLORIDE 600; 310; 30; 20 MG/100ML; MG/100ML; MG/100ML; MG/100ML
INJECTION, SOLUTION INTRAVENOUS CONTINUOUS
Status: DISCONTINUED | OUTPATIENT
Start: 2019-09-18 | End: 2019-09-18 | Stop reason: HOSPADM

## 2019-09-18 RX ORDER — PROPOFOL 10 MG/ML
VIAL (ML) INTRAVENOUS
Status: DISCONTINUED | OUTPATIENT
Start: 2019-09-18 | End: 2019-09-18

## 2019-09-18 RX ORDER — ONDANSETRON 2 MG/ML
4 INJECTION INTRAMUSCULAR; INTRAVENOUS ONCE
Status: COMPLETED | OUTPATIENT
Start: 2019-09-18 | End: 2019-09-18

## 2019-09-18 RX ORDER — ONDANSETRON 2 MG/ML
INJECTION INTRAMUSCULAR; INTRAVENOUS
Status: COMPLETED
Start: 2019-09-18 | End: 2019-09-18

## 2019-09-18 RX ORDER — MIDAZOLAM HYDROCHLORIDE 1 MG/ML
INJECTION, SOLUTION INTRAMUSCULAR; INTRAVENOUS
Status: DISCONTINUED | OUTPATIENT
Start: 2019-09-18 | End: 2019-09-18

## 2019-09-18 RX ORDER — ONDANSETRON 2 MG/ML
4 INJECTION INTRAMUSCULAR; INTRAVENOUS DAILY PRN
Status: DISCONTINUED | OUTPATIENT
Start: 2019-09-18 | End: 2019-09-18 | Stop reason: HOSPADM

## 2019-09-18 RX ORDER — LIDOCAINE HYDROCHLORIDE 10 MG/ML
1 INJECTION, SOLUTION EPIDURAL; INFILTRATION; INTRACAUDAL; PERINEURAL ONCE
Status: DISCONTINUED | OUTPATIENT
Start: 2019-09-18 | End: 2019-09-18 | Stop reason: HOSPADM

## 2019-09-18 RX ADMIN — PROPOFOL 40 MG: 10 INJECTION, EMULSION INTRAVENOUS at 11:09

## 2019-09-18 RX ADMIN — PROPOFOL 20 MG: 10 INJECTION, EMULSION INTRAVENOUS at 10:09

## 2019-09-18 RX ADMIN — ONDANSETRON 4 MG: 2 INJECTION INTRAMUSCULAR; INTRAVENOUS at 08:09

## 2019-09-18 RX ADMIN — SODIUM CHLORIDE, SODIUM LACTATE, POTASSIUM CHLORIDE, AND CALCIUM CHLORIDE: .6; .31; .03; .02 INJECTION, SOLUTION INTRAVENOUS at 08:09

## 2019-09-18 RX ADMIN — PROPOFOL 20 MG: 10 INJECTION, EMULSION INTRAVENOUS at 11:09

## 2019-09-18 RX ADMIN — MIDAZOLAM HYDROCHLORIDE 1 MG: 1 INJECTION, SOLUTION INTRAMUSCULAR; INTRAVENOUS at 10:09

## 2019-09-18 RX ADMIN — PROPOFOL 30 MG: 10 INJECTION, EMULSION INTRAVENOUS at 11:09

## 2019-09-18 NOTE — [employer] PATIENT INSTRUCTIONS
Discharge Summary/Instructions after an Endoscopic Procedure  Patient Name: Nicole Harris  Patient MRN: [id number]  Patient YOB: 1970 Wednesday, September 18, 2019  Da Diallo MD  RESTRICTIONS:  During your procedure today, you received medications for sedation.  These   medications may affect your judgment, balance and coordination.  Therefore,   for 24 hours, you have the following restrictions:   - DO NOT drive a car, operate machinery, make legal/financial decisions,   sign important papers or drink alcohol.    ACTIVITY:  Today: no heavy lifting, straining or running due to procedural   sedation/anesthesia.  The following day: return to full activity including work.  DIET:  Eat and drink normally unless instructed otherwise.     TREATMENT FOR COMMON SIDE EFFECTS:  - Mild abdominal pain, nausea, belching, bloating or excessive gas:  rest,   eat lightly and use a heating pad.  - Sore Throat: treat with throat lozenges and/or gargle with warm salt   water.  - Because air was used during the procedure, expelling large amounts of air   from your rectum or belching is normal.  - If a bowel prep was taken, you may not have a bowel movement for 1-3 days.    This is normal.  SYMPTOMS TO WATCH FOR AND REPORT TO YOUR PHYSICIAN:  1. Abdominal pain or bloating, other than gas cramps.  2. Chest pain.  3. Back pain.  4. Signs of infection such as: chills or fever occurring within 24 hours   after the procedure.  5. Rectal bleeding, which would show as bright red, maroon, or black stools.   (A tablespoon of blood from the rectum is not serious, especially if   hemorrhoids are present.)  6. Vomiting.  7. Weakness or dizziness.  GO DIRECTLY TO THE NEAREST EMERGENCY ROOM IF YOU HAVE ANY OF THE FOLLOWING:      Difficulty breathing              Chills and/or fever over 101 F   Persistent vomiting and/or vomiting blood   Severe abdominal pain   Severe chest pain   Black, tarry stools   Bleeding- more than one  tablespoon   Any other symptom or condition that you feel may need urgent attention  Your doctor recommends these additional instructions:  If any biopsies were taken, your doctors clinic will contact you in 1 to 2   weeks with any results.  - Discharge patient to home.   - Resume previous diet.   - Continue present medications.   - Repeat colonoscopy in 10 years for screening purposes.   - Return to my office in 2 weeks.   - See EGD Report  For questions, problems or results please call your physician - Da Diallo MD at Work:  [phone].  [facility] EMERGENCY ROOM PHONE NUMBER: [phone]  IF A COMPLICATION OR EMERGENCY SITUATION ARISES AND YOU ARE UNABLE TO REACH   YOUR PHYSICIAN - GO DIRECTLY TO THE EMERGENCY ROOM.  MD Da Bernard MD  9/18/2019 12:07:18 PM  This report has been verified and signed electronically.  [employer]

## 2019-09-18 NOTE — DISCHARGE SUMMARY
"[facility]  Discharge Note  Short Stay    Admit Date: 9/18/2019    Discharge Date and Time: No discharge date for patient encounter.     Attending Physician: Da Diallo MD     Discharge Provider: Da Diallo    Diagnoses:  Active Hospital Problems    Diagnosis  POA    *Encounter for screening colonoscopy [Z12.11]  Not Applicable    Chronic superficial gastritis without bleeding [K29.30]  Yes      Resolved Hospital Problems   No resolved problems to display.       Discharged Condition: good    Hospital Course: Patient was admitted for an outpatient procedure and tolerated the procedure well with no complications.    Final Diagnoses: Same as principal problem.    Disposition: Home or Self Care    Follow up/Patient Instructions:    Medications:  Reconciled Home Medications:      Medication List      CONTINUE taking these medications    albuterol 90 mcg/actuation inhaler  Commonly known as:  PROVENTIL/VENTOLIN HFA  Inhale 1 puff into the lungs every 4 (four) hours as needed.     escitalopram oxalate 20 MG tablet  Commonly known as:  LEXAPRO  Take 20 mg by mouth once daily.     hydrOXYzine pamoate 25 MG Cap  Commonly known as:  VISTARIL  Take 25 mg by mouth every evening.     LASIX 20 MG tablet  Generic drug:  furosemide  Take 20 mg by mouth once daily.     lisinopril 10 MG tablet  Take 10 mg by mouth once daily.     metFORMIN 1000 MG tablet  Commonly known as:  GLUCOPHAGE  pt states she takes 500 mg po bid-the 1000mg dose "makes me sick"     naproxen 500 MG tablet  Commonly known as:  NAPROSYN  Take 1 tablet (500 mg total) by mouth 2 (two) times daily as needed (pain).     nortriptyline 25 MG capsule  Commonly known as:  PAMELOR  Take 1-2 capsules (25-50 mg total) by mouth every evening.     OXcarbazepine 300 MG Tab  Commonly known as:  TRILEPTAL  Take 300 mg by mouth 3 (three) times daily.     PROTONIX 40 MG tablet  Generic drug:  pantoprazole  Take by mouth.        STOP taking these medications  "   sodium,potassium,mag sulfates 17.5-3.13-1.6 gram Solr  Commonly known as:  SUPREP BOWEL PREP KIT          Discharge Procedure Orders   Diet diabetic     No driving until:     Order Specific Question Answer Comments   Date: 9/19/2019      Follow-up Information     Da Diallo MD. Schedule an appointment as soon as possible for a visit in 2 weeks.    Specialty:  General Surgery  Why:  Routine Post Endoscopy Visit  Contact information:  [address]  [facility] [address] [phone]                   Discharge Procedure Orders (must include Diet, Follow-up, Activity):   Discharge Procedure Orders (must include Diet, Follow-up, Activity)   Diet diabetic     No driving until:     Order Specific Question Answer Comments   Date: 9/19/2019

## 2019-09-18 NOTE — INTERVAL H&P NOTE
The patient has been examined and the H&P has been reviewed:    I concur with the findings and no changes have occurred since H&P was written.     Lab results reviewed from 9/16/2019.  CBC showed no evidence of leukocytosis, anemia, or thrombocytopenia.  Electrolytes revealed a mild hyponatremia, hypocarbia, and hypocalcemia.  BUN and creatinine showed no evidence of renal dysfunction.  Glucose was elevated consistent with the history of diabetes.  Liver profile showed no evidence of hepatocellular disease or biliary obstruction. Hyponatremia likely corrects.  Pregnancy test was negative.    EKG reviewed from 9/16/2019.  Twelve lead tracing showed normal sinus rhythm with nonspecific T-wave changes.    Surgery risks, benefits and alternative options discussed and understood by patient/family.    No evident contraindication to proceeding with planned endoscopy today.          Active Hospital Problems    Diagnosis  POA    Encounter for screening colonoscopy [Z12.11]  Not Applicable      Resolved Hospital Problems   No resolved problems to display.

## 2019-09-18 NOTE — ANESTHESIA POSTPROCEDURE EVALUATION
Anesthesia Post Evaluation    Patient: Nicole Harris    Procedure(s) Performed: Procedure(s) (LRB):  COLONOSCOPY (N/A)  ESOPHAGOGASTRODUODENOSCOPY (EGD) (N/A)    Final Anesthesia Type: general  Patient location during evaluation: PACU  Patient participation: Yes- Able to Participate  Level of consciousness: awake and awake and alert  Post-procedure vital signs: reviewed and stable  Pain management: adequate  Airway patency: patent  PONV status at discharge: No PONV  Anesthetic complications: no      Cardiovascular status: blood pressure returned to baseline  Respiratory status: unassisted and spontaneous ventilation  Hydration status: euvolemic  Follow-up not needed.          Vitals Value Taken Time   /82 9/18/2019 12:15 PM   Temp 36.7 °C (98.1 °F) 9/18/2019  8:09 AM   Pulse 70 9/18/2019 12:15 PM   Resp 18 9/18/2019 12:15 PM   SpO2 97 % 9/18/2019 12:15 PM         Event Time     Out of Recovery 12:00:00          Pain/Alyssia Score: Alyssia Score: 10 (9/18/2019 12:00 PM)  Modified Alyssia Score: 20 (9/18/2019 12:15 PM)

## 2019-09-18 NOTE — ANESTHESIA PREPROCEDURE EVALUATION
09/18/2019  Nicole Harris is a 49 y.o., female.    Pre-op Assessment    I have reviewed the Patient Summary Reports.    I have reviewed the Nursing Notes.   I have reviewed the Medications.     Review of Systems  Anesthesia Hx:  No problems with previous Anesthesia  Neg history of prior surgery. Denies Family Hx of Anesthesia complications.   Denies Personal Hx of Anesthesia complications.   Social:  Non-Smoker    Hematology/Oncology:  Hematology Normal   Oncology Normal     EENT/Dental:EENT/Dental Normal   Cardiovascular:   Hypertension, well controlled    Pulmonary:   COPD, mild    Renal/:  Renal/ Normal     Hepatic/GI:  Hepatic/GI Normal    Musculoskeletal:   Arthritis     Neurological:   Neuromuscular Disease,    Endocrine:   Diabetes, well controlled, type 2    Dermatological:  Skin Normal    Psych:   Psychiatric History   Bipolar         Physical Exam  General:  Morbid Obesity    Airway/Jaw/Neck:  Airway Findings: Mallampati: III                Anesthesia Plan  Type of Anesthesia, risks & benefits discussed:  Anesthesia Type:  general  Patient's Preference:   Intra-op Monitoring Plan: standard ASA monitors  Intra-op Monitoring Plan Comments:   Post Op Pain Control Plan:   Post Op Pain Control Plan Comments:   Induction:   IV  Beta Blocker:  Patient is not currently on a Beta-Blocker (No further documentation required).       Informed Consent: Patient understands risks and agrees with Anesthesia plan.  Questions answered. Anesthesia consent signed with patient.  ASA Score: 3     Day of Surgery Review of History & Physical:    H&P update referred to the provider.

## 2019-09-18 NOTE — TRANSFER OF CARE
"Anesthesia Transfer of Care Note    Patient: Nicole Harris    Procedure(s) Performed: Procedure(s) (LRB):  COLONOSCOPY (N/A)  ESOPHAGOGASTRODUODENOSCOPY (EGD) (N/A)    Patient location: PACU    Anesthesia Type: general    Transport from OR: Transported from OR on room air with adequate spontaneous ventilation    Post pain: adequate analgesia    Post assessment: no apparent anesthetic complications and tolerated procedure well    Post vital signs: stable    Level of consciousness: sedated and responds to stimulation    Nausea/Vomiting: no nausea/vomiting    Complications: none    Transfer of care protocol was followed      Last vitals:   Visit Vitals  /79   Pulse 87   Temp 36.7 °C (98.1 °F) (Oral)   Resp 18   Ht 5' 5" (1.651 m)   Wt 88.9 kg (196 lb)   LMP 07/06/2019   SpO2 (!) 94%   Breastfeeding? No   BMI 32.62 kg/m²     "

## 2019-09-18 NOTE — [employer] PATIENT INSTRUCTIONS
Discharge Summary/Instructions after an Endoscopic Procedure  Patient Name: Nicole Harris  Patient MRN: [id number]  Patient YOB: 1970 Wednesday, September 18, 2019  Da Diallo MD  RESTRICTIONS:  During your procedure today, you received medications for sedation.  These   medications may affect your judgment, balance and coordination.  Therefore,   for 24 hours, you have the following restrictions:   - DO NOT drive a car, operate machinery, make legal/financial decisions,   sign important papers or drink alcohol.    ACTIVITY:  Today: no heavy lifting, straining or running due to procedural   sedation/anesthesia.  The following day: return to full activity including work.  DIET:  Eat and drink normally unless instructed otherwise.     TREATMENT FOR COMMON SIDE EFFECTS:  - Mild abdominal pain, nausea, belching, bloating or excessive gas:  rest,   eat lightly and use a heating pad.  - Sore Throat: treat with throat lozenges and/or gargle with warm salt   water.  - Because air was used during the procedure, expelling large amounts of air   from your rectum or belching is normal.  - If a bowel prep was taken, you may not have a bowel movement for 1-3 days.    This is normal.  SYMPTOMS TO WATCH FOR AND REPORT TO YOUR PHYSICIAN:  1. Abdominal pain or bloating, other than gas cramps.  2. Chest pain.  3. Back pain.  4. Signs of infection such as: chills or fever occurring within 24 hours   after the procedure.  5. Rectal bleeding, which would show as bright red, maroon, or black stools.   (A tablespoon of blood from the rectum is not serious, especially if   hemorrhoids are present.)  6. Vomiting.  7. Weakness or dizziness.  GO DIRECTLY TO THE NEAREST EMERGENCY ROOM IF YOU HAVE ANY OF THE FOLLOWING:      Difficulty breathing              Chills and/or fever over 101 F   Persistent vomiting and/or vomiting blood   Severe abdominal pain   Severe chest pain   Black, tarry stools   Bleeding- more than one  tablespoon   Any other symptom or condition that you feel may need urgent attention  Your doctor recommends these additional instructions:  If any biopsies were taken, your doctors clinic will contact you in 1 to 2   weeks with any results.  - Discharge patient to home.   - Resume previous diet.   - Continue present medications.   - Await pathology results.   - Treat Helicobacter if present  - Review Colonoscopy Report  - Further recommendations will depend on clinical course.  - Return to my office in 2 weeks.   - Patient has a contact number available for emergencies.  The signs and   symptoms of potential delayed complications were discussed with the   patient.  Return to normal activities tomorrow.  Written discharge   instructions were provided to the patient.  For questions, problems or results please call your physician - Da Diallo MD at Work:  [phone].  [facility] EMERGENCY ROOM PHONE NUMBER: [phone]  IF A COMPLICATION OR EMERGENCY SITUATION ARISES AND YOU ARE UNABLE TO REACH   YOUR PHYSICIAN - GO DIRECTLY TO THE EMERGENCY ROOM.  MD Da Bernard MD  9/18/2019 1:05:52 PM  This report has been verified and signed electronically.  [employer]

## 2019-09-20 ENCOUNTER — PROCEDURE VISIT (OUTPATIENT)
Dept: NEUROLOGY | Facility: CLINIC | Age: 49
End: 2019-09-20

## 2019-09-20 DIAGNOSIS — G89.29 OTHER CHRONIC PAIN: ICD-10-CM

## 2019-09-20 DIAGNOSIS — M79.7 FIBROMYALGIA: ICD-10-CM

## 2019-09-20 DIAGNOSIS — M48.02 FORAMINAL STENOSIS OF CERVICAL REGION: ICD-10-CM

## 2019-09-20 DIAGNOSIS — G56.03 BILATERAL CARPAL TUNNEL SYNDROME: Primary | ICD-10-CM

## 2019-09-20 PROCEDURE — 95910 NRV CNDJ TEST 7-8 STUDIES: CPT | Mod: PBBFAC,PN | Performed by: PSYCHIATRY & NEUROLOGY

## 2019-09-20 PROCEDURE — 95886 PR EMG COMPLETE, W/ NERVE CONDUCTION STUDIES, 5+ MUSCLES: ICD-10-PCS | Mod: 26,S$PBB,, | Performed by: PSYCHIATRY & NEUROLOGY

## 2019-09-20 PROCEDURE — 95910 NRV CNDJ TEST 7-8 STUDIES: CPT | Mod: 26,S$PBB,, | Performed by: PSYCHIATRY & NEUROLOGY

## 2019-09-20 PROCEDURE — 95910 PR NERVE CONDUCTION STUDY; 7-8 STUDIES: ICD-10-PCS | Mod: 26,S$PBB,, | Performed by: PSYCHIATRY & NEUROLOGY

## 2019-09-20 PROCEDURE — 95886 MUSC TEST DONE W/N TEST COMP: CPT | Mod: 26,S$PBB,, | Performed by: PSYCHIATRY & NEUROLOGY

## 2019-09-20 PROCEDURE — 95886 MUSC TEST DONE W/N TEST COMP: CPT | Mod: PBBFAC,PN | Performed by: PSYCHIATRY & NEUROLOGY

## 2019-09-22 ENCOUNTER — TELEPHONE (OUTPATIENT)
Dept: PAIN MEDICINE | Facility: CLINIC | Age: 49
End: 2019-09-22

## 2019-09-23 ENCOUNTER — TELEPHONE (OUTPATIENT)
Dept: SURGERY | Facility: CLINIC | Age: 49
End: 2019-09-23

## 2019-09-23 ENCOUNTER — TELEPHONE (OUTPATIENT)
Dept: PAIN MEDICINE | Facility: CLINIC | Age: 49
End: 2019-09-23

## 2019-09-23 NOTE — PROCEDURES
[facility]   [facility]  [address] [place] [address]  [phone]             Full Name: Nicole Harris Gender: Female  Patient ID: [id number] YOB: 1970      Visit Date: 9/20/2019 12:58  Age: 49 Years 8 Months Old  Examining Physician: Raina Griffin D.O., ABPN, AOBNP, ABEM   Referring Physician: Angela Velasquez PA-C   History: Patient complains of numbness, tingling and weakness of bilateral hands, worse on the right.  She also complains of neck pain.  She has a history of diabetes.      Sensory NCS      Nerve / Sites Rec. Site Onset Lat Peak Lat NP Amp Segments Distance Velocity     ms ms µV  cm m/s   R Median - Digit II (Antidromic)      Wrist Dig II NR NR NR Wrist - Dig II 13 NR      Ref.   ?3.60 ?15.0 Ref.  ?50   L Median - Digit II (Antidromic)      Wrist Dig II 6.04 7.81 42.1 Wrist - Dig II 13 22      Ref.   ?3.60 ?15.0 Ref.  ?50   R Ulnar - Digit V (Antidromic)      Wrist Dig V 2.14 2.71 31.5 Wrist - Dig V 11 52      Ref.   ?3.10 ?12.0 Ref.  ?50   L Ulnar - Digit V (Antidromic)      Wrist Dig V 1.93 2.55 34.2 Wrist - Dig V 11 57      Ref.   ?3.10 ?12.0 Ref.  ?50       Motor NCS      Nerve / Sites Muscle Latency Ref. Amplitude Ref. Amp % Duration Segments Distance Lat Diff Ref. Velocity Ref.     ms ms mV mV % ms  cm ms ms m/s m/s   R Median - APB      Wrist APB 6.30 ?4.00 1.3 ?6.0 100 7.19 Wrist - APB           Elbow APB 9.58  1.2  95.6 10.36 Elbow - Wrist 20 3.28  61 ?50   L Median - APB      Wrist APB 7.76 ?4.00 9.7 ?6.0 100 4.90 Wrist - APB           Elbow APB 11.93  9.2  94.9 5.26 Elbow - Wrist 20 4.17  48 ?50   R Ulnar - ADM      Wrist ADM 3.02 ?3.10 7.6 ?7.0 100 5.31 Wrist - ADM           B.Elbow ADM 6.41  5.8  76.4 5.31 B.Elbow - Wrist 19 3.39  56 ?50      A.Elbow ADM 8.59  5.6  73.8 5.42 A.Elbow - B.Elbow 11 2.19  50    L Ulnar - ADM      Wrist ADM 2.60 ?3.10 12.6 ?7.0 100 4.69 Wrist - ADM           B.Elbow ADM 5.73  11.8  93.3  5.31 B.Elbow - Wrist 18 3.12  58 ?50      A.Elbow ADM 7.71  11.8  93.1 5.16 A.Elbow - B.Elbow 11 1.98  56        F  Wave      Nerve Fmin Ref.    ms ms   R Median - APB 30.42 ?31.00   R Ulnar - ADM 27.40 ?32.00   L Median - APB 32.66 ?31.00   L Ulnar - ADM 26.56 ?32.00       EMG Summary Table     Spontaneous Recruitment Activation Duration Amplitude Polyphasia Comment   Muscle Ins Act Fib Fasc Pattern - - - - -   L. First dorsal interosseous Normal 0 0 Normal Normal Normal Normal Normal Normal   L. Abductor pollicis brevis Normal 0 0 Sl Dec Normal +1 +1 +1 Normal   L. Pronator teres Normal 0 0 Normal Normal Normal Normal Normal Normal   L. Biceps brachii Normal 0 0 Normal Normal Normal Normal Normal Normal   L. Triceps brachii Normal 0 0 Normal Normal Normal Normal Normal Normal   L. Deltoid Normal 0 0 Normal Normal Normal Normal Normal Normal   L. Cervical paraspinals Normal 0 0 Normal Normal Normal Normal Normal Normal   R. First dorsal interosseous Normal 0 0 Normal Normal Normal Normal Normal Normal   R. Abductor pollicis brevis Normal 0 0 Sl Dec Normal +1 +1 +1 Normal   R. Pronator teres Normal 0 0 Normal Normal Normal Normal Normal Normal   R. Biceps brachii Normal 0 0 Normal Normal Normal Normal Normal Normal   R. Triceps brachii Normal 0 0 Normal Normal Normal Normal Normal Normal   R. Deltoid Normal 0 0 Normal Normal Normal Normal Normal Normal   R. Cervical paraspinals Normal 0 0 Normal Normal Normal Normal Normal Normal         Summary:  Nerve conduction studies were performed on both upper extremities. Right median sensory response was absent. Left median sensory response was markedly prolonged with a normal amplitude.  Bilateral ulnar sensory responses were normal in amplitude and latency.  Right median motor response was markedly prolonged with a reduced amplitude and normal velocity.  Left median motor response was markedly prolonged with normal amplitude and velocity.  Bilateral ulnar motor responses  were normal in amplitude, latency and velocity.  Right median and bilateral ulnar minimal F wave latencies are normal. Left median minimal F-wave latency is prolonged.  Needle EMG was performed in both upper extremities. No active denervation was present in any muscle tested.  Motor units were large, long and poly phasic with reduced recruitment in the abductor pollicis brevis muscles bilaterally.  All other motor unit morphology and recruitment patterns were normal.    Impression:  This is an abnormal EMG of both upper extremities.  There is electrophysiologic evidence of the followin. Chronic, right, moderately severe median mononeuropathy across the wrist (carpal tunnel syndrome) without active denervation.  2. Chronic, left, mild to moderate median mononeuropathy across the wrist (carpal tunnel syndrome) without active denervation.  There is no evidence of any other focal neuropathy, peripheral neuropathy, plexopathy or radiculopathy on the study.      Thank you for referring to the [facility] in [place].  Please feel free to contact the clinic if you have any further questions regarding this study or report.        ____________________________  Raina Griffin D.O., ABPN, AOBNP, QUEENIE

## 2019-09-23 NOTE — TELEPHONE ENCOUNTER
Spoke with patient, advised that procedure must be rescheduled. Patient voiced understanding and procedure date changed to 10/14/19. Patient confirmed date and contact information provided for patient to get specific time to arrive.       ----- Message from Nanette Crow sent at 9/23/2019  7:09 AM CDT -----  Type: Needs Medical Advice    Who Called: self   Symptoms (please be specific):    How long has patient had these symptoms:    Pharmacy name and phone #:    Best Call Back Number: [phone]  Additional Information: Patient asking for the arrival time for scheduled procedure today.

## 2019-09-23 NOTE — TELEPHONE ENCOUNTER
Returned pt's phone call. Advised pt that her pathology results are still pending at this time and make take anywhere from 10-14 days to be resulted.  Pt has follow up appointment on 10-1-19 at 3:30 pm for her results. Pt verbalizes understanding.

## 2019-09-23 NOTE — TELEPHONE ENCOUNTER
----- Message from Rosemary Bailey sent at 9/23/2019  4:10 PM CDT -----  Contact: Nicole  Type: Needs Medical Advice    Who Called:  patient  Best Call Back Number: [phone] (home)   Additional Information: requesting a call back to see if results are in yet--please advise--thank you

## 2019-09-24 ENCOUNTER — CLINICAL SUPPORT (OUTPATIENT)
Dept: REHABILITATION | Facility: HOSPITAL | Age: 49
End: 2019-09-24
Attending: ANESTHESIOLOGY

## 2019-09-24 DIAGNOSIS — R53.81 PHYSICAL DECONDITIONING: ICD-10-CM

## 2019-09-24 DIAGNOSIS — M62.81 MUSCLE WEAKNESS (GENERALIZED): ICD-10-CM

## 2019-09-24 DIAGNOSIS — M79.7 FIBROMYALGIA: ICD-10-CM

## 2019-09-24 DIAGNOSIS — M51.36 LUMBAR DEGENERATIVE DISC DISEASE: ICD-10-CM

## 2019-09-24 DIAGNOSIS — G89.29 OTHER CHRONIC PAIN: ICD-10-CM

## 2019-09-24 PROCEDURE — 97110 THERAPEUTIC EXERCISES: CPT | Mod: PN

## 2019-09-24 PROCEDURE — 97140 MANUAL THERAPY 1/> REGIONS: CPT | Mod: PN

## 2019-09-24 NOTE — PROGRESS NOTES
Physical Therapy Daily Treatment Note   Name: Nicole Harris 1970  MRN: [id number]    Visit Date: 9/24/2019  Visit #: 3/12  Authorization period Expiration: 12/31/19  Plan of Care Expiration: 12/12/19  Precautions: standard    Time In: 1400   Time Out: 1500  Total 1:1 Treatment Time: 60 min    Treatment Diagnosis:    No diagnosis found.  Physician: Sherri Gardiner MD    Subjective   Pt reports: having difficulty sleeping last night  Pain Scale:  5/10 on VAS currently, tba/10 on VAS post treatment  Pain Location: right LE    Objective   Nicole received therapeutic exercises to develop strength, flexibility, posture and core stabilization for 45 minutes including:    NUSTEP  X 10 min,   abd bracing w pelvic tilt, 2 x 10  Supine marching 2 x 10  Bridging 2 x 10   Stretch to hip flexor w opposing knee to chest jason w assist   Assisted glut stretch 5 x 30 sec hold    Manual therapy to Jason UT x 7 min             NOT PERFORMED THIS SESSION   Nerve flossing jason LE 5 x.  Prone on elbows  X 10, x 10 w Mckensie ext.  No change in sx.   Pt layed in prone for IFC.   Nicole received the following unsupervised modalities after being cleared for contradictions: IFC Electrical Stimulation:  Nicole received IFC Electrical Stimulation for pain control applied to the low back. Pt received stimulation at  scan at a frequency of 20 min minutes. Nicole tolderated treatment well without any adverse effects.        Home Exercises and Education Provided     Education provided re:   - progress towards goals   - role of therapy in multi - disciplinary team, goals for therapy  Pt educated on condition, POC, and expectations in therapy.  No spiritual or educational barriers to learning provided    Home exercises:  Pt will be provided HEP during course of treatment with progressions as appropriate. Pt was advised to perform these exercises free of pain, and to stop performing them if pain occurs.   Nicole  demonstrated good  understanding of the education provided.     Assessment   Good participation this session. She will attend aquatic therapy next session    Pt prognosis is Good. Pt will continue to benefit from skilled outpatient physical therapy to address the deficits listed in the problem list chart on initial evaluation, provide pt/family education and to maximize pt's level of independence in the home and community environment.     Medical necessity is demonstrated by the impairments and functional limitations listed on the Initial Evaluation.     Anticipated barriers to physical therapy: motivation and pain   Pt's spiritual, cultural and educational needs considered and pt agreeable to plan of care and goals.    Goals   Progressing, not met      Plan   Continue with established Plan of Care towards Physical Therapy goals.   Discussed Plan of Care with patient: Yes    Darlin Crow, PT  9/24/2019

## 2019-09-26 ENCOUNTER — CLINICAL SUPPORT (OUTPATIENT)
Dept: REHABILITATION | Facility: HOSPITAL | Age: 49
End: 2019-09-26
Attending: ANESTHESIOLOGY

## 2019-09-26 DIAGNOSIS — M62.81 MUSCLE WEAKNESS (GENERALIZED): ICD-10-CM

## 2019-09-26 DIAGNOSIS — R53.81 PHYSICAL DECONDITIONING: ICD-10-CM

## 2019-09-26 PROCEDURE — 97113 AQUATIC THERAPY/EXERCISES: CPT | Mod: PN

## 2019-09-27 ENCOUNTER — TELEPHONE (OUTPATIENT)
Dept: SURGERY | Facility: CLINIC | Age: 49
End: 2019-09-27

## 2019-09-27 NOTE — PROGRESS NOTES
Physical Therapy Daily Treatment Note   Name: Nicole Harris 1970  MRN: [id number]    Visit Date: 9/26/2019  Visit #: 3/12  Authorization period Expiration: 12/31/19  Plan of Care Expiration: 12/12/19  Precautions: standard    Time In: 1400   Time Out: 1500  Total 1:1 Treatment Time: 55 min    Treatment Diagnosis:    Encounter Diagnoses   Name Primary?    Muscle weakness (generalized)     Physical deconditioning      Physician: Sherri Gardiner MD    Subjective   Pt reports: having difficulty sleeping last night  Pain Scale:  5/10 on VAS currently, tba/10 on VAS post treatment  Pain Location: right LE    Objective   Nicole received therapeutic exercises to develop strength, flexibility, posture and core stabilization for 55 minutes including:    Aquatic Exercise 1:1 x 30 mins  Forward Walking x 5 mins   Backward Walking x 5 mins  Side Steps x 5 mins  Squats x 15  Marching x 15  SLR x 15  Hip Abd x 15  Hip Ext x 15   donkey kocks x 2 x 10  Toes raises wirh UE support x 20  Standing hip 4 way  x 10  Leg cirlcles c 2 in  Lumbar distraction w noodle x 5 min  Stretches to HS,     NOT PERFORMED THIS SESSION  NUSTEP  X 10 min,   abd bracing w pelvic tilt, 2 x 10  Supine marching 2 x 10  Bridging 2 x 10   Stretch to hip flexor w opposing knee to chest jason w assist   Assisted glut stretch 5 x 30 sec hold  Manual therapy to Jason UT x 7 min    Nerve flossing jason LE 5 x.  Prone on elbows  X 10, x 10 w Mckensie ext.  No change in sx.   Pt layed in prone for IFC.   Nicole received the following unsupervised modalities after being cleared for contradictions: IFC Electrical Stimulation:  Nicole received IFC Electrical Stimulation for pain control applied to the low back. Pt received stimulation at  scan at a frequency of 20 min minutes. Nicole tolderated treatment well without any adverse effects.        Home Exercises and Education Provided     Education provided re:   - progress towards goals   -  role of therapy in multi - disciplinary team, goals for therapy  Pt educated on condition, POC, and expectations in therapy.  No spiritual or educational barriers to learning provided    Home exercises:  Pt will be provided HEP during course of treatment with progressions as appropriate. Pt was advised to perform these exercises free of pain, and to stop performing them if pain occurs.   Nicole demonstrated good  understanding of the education provided.     Assessment   vaishnavi aquatic session w no adverse affects    Pt prognosis is Good. Pt will continue to benefit from skilled outpatient physical therapy to address the deficits listed in the problem list chart on initial evaluation, provide pt/family education and to maximize pt's level of independence in the home and community environment.     Medical necessity is demonstrated by the impairments and functional limitations listed on the Initial Evaluation.     Anticipated barriers to physical therapy: motivation and pain   Pt's spiritual, cultural and educational needs considered and pt agreeable to plan of care and goals.    Goals   Progressing, not met      Plan   Continue with established Plan of Care towards Physical Therapy goals.   Discussed Plan of Care with patient: Yes    Darlin Crow, PT  9/27/2019

## 2019-09-27 NOTE — TELEPHONE ENCOUNTER
Writer spoke to patient and let her know that Dr Diallo will give her the results from her pathology report at her scheduled f/u appt on Tuesday 10/01/19. Patient expressed verbal understanding.

## 2019-10-01 ENCOUNTER — OFFICE VISIT (OUTPATIENT)
Dept: SURGERY | Facility: CLINIC | Age: 49
End: 2019-10-01

## 2019-10-01 ENCOUNTER — CLINICAL SUPPORT (OUTPATIENT)
Dept: REHABILITATION | Facility: HOSPITAL | Age: 49
End: 2019-10-01
Attending: ANESTHESIOLOGY

## 2019-10-01 VITALS
OXYGEN SATURATION: 95 % | SYSTOLIC BLOOD PRESSURE: 127 MMHG | HEIGHT: 65 IN | HEART RATE: 101 BPM | RESPIRATION RATE: 14 BRPM | WEIGHT: 190.44 LBS | DIASTOLIC BLOOD PRESSURE: 84 MMHG | TEMPERATURE: 98 F | BODY MASS INDEX: 31.73 KG/M2

## 2019-10-01 DIAGNOSIS — M62.81 MUSCLE WEAKNESS (GENERALIZED): ICD-10-CM

## 2019-10-01 DIAGNOSIS — R53.81 PHYSICAL DECONDITIONING: ICD-10-CM

## 2019-10-01 DIAGNOSIS — K29.30 CHRONIC SUPERFICIAL GASTRITIS WITHOUT BLEEDING: Primary | ICD-10-CM

## 2019-10-01 PROCEDURE — 99213 OFFICE O/P EST LOW 20 MIN: CPT | Mod: S$GLB,,, | Performed by: SURGERY

## 2019-10-01 PROCEDURE — 97110 THERAPEUTIC EXERCISES: CPT | Mod: PN

## 2019-10-01 PROCEDURE — 97014 ELECTRIC STIMULATION THERAPY: CPT | Mod: PN

## 2019-10-01 PROCEDURE — 97010 HOT OR COLD PACKS THERAPY: CPT | Mod: PN

## 2019-10-01 PROCEDURE — 99213 PR OFFICE/OUTPT VISIT, EST, LEVL III, 20-29 MIN: ICD-10-PCS | Mod: S$GLB,,, | Performed by: SURGERY

## 2019-10-01 RX ORDER — ARIPIPRAZOLE 2 MG/1
TABLET ORAL
Refills: 1 | COMMUNITY
Start: 2019-08-28 | End: 2020-06-22 | Stop reason: CLARIF

## 2019-10-01 RX ORDER — DAPAGLIFLOZIN 10 MG/1
1 TABLET, FILM COATED ORAL EVERY MORNING
Refills: 2 | COMMUNITY
Start: 2019-09-24 | End: 2020-06-22 | Stop reason: CLARIF

## 2019-10-01 NOTE — PROGRESS NOTES
"Subjective:       Patient ID: Nicole Harris is a 49 y.o. female.    Chief Complaint: Follow-up ( EGD/ Colonoscopy 9-18-19)      HPI:  Ms. Harris presents today following a recent outpatient EGD and colonoscopy  She presents today with no complaints.  No nausea, vomiting, fevers, chills, abdominal pain, diarrhea, constipation, melena, hematochezia, hematemesis, etc.  Appetite is excellent.  Weight is stable.  Activity tolerance is normal.  Overall she feels "great".  EGD revealed mild chronic gastritis.  Colonoscopy was completely unremarkable.  Duodenal biopsies revealed mild chronic duodenitis, gastric biopsies revealed moderate chronic gastritis, gastric biopsy showed no evidence of Helicobacter, esophageal biopsies revealed evidence of mild chronic esophagitis.  Dysphagia symptom she was experiencing prior to endoscopy have resolved following dilation.      Allergies & Meds:  Review of patient's allergies indicates:   Allergen Reactions    Hydrocodone Nausea And Vomiting       Current Outpatient Medications   Medication Sig Dispense Refill    albuterol (PROVENTIL/VENTOLIN HFA) 90 mcg/actuation inhaler Inhale 1 puff into the lungs every 4 (four) hours as needed.   0    escitalopram oxalate (LEXAPRO) 20 MG tablet Take 20 mg by mouth once daily.   3    furosemide (LASIX) 20 MG tablet Take 20 mg by mouth once daily.       lisinopril 10 MG tablet Take 10 mg by mouth once daily.       naproxen (NAPROSYN) 500 MG tablet Take 1 tablet (500 mg total) by mouth 2 (two) times daily as needed (pain). 180 tablet 3    nortriptyline (PAMELOR) 25 MG capsule Take 1-2 capsules (25-50 mg total) by mouth every evening. 60 capsule 5    pantoprazole (PROTONIX) 40 MG tablet Take by mouth.      ARIPiprazole (ABILIFY) 2 MG Tab take 1 tablet by oral route every day  1    FARXIGA 10 mg Tab Take 1 tablet by mouth every morning.  2    hydrOXYzine pamoate (VISTARIL) 25 MG Cap Take 25 mg by mouth every evening.       metFORMIN " "(GLUCOPHAGE) 1000 MG tablet pt states she takes 500 mg po bid-the 1000mg dose "makes me sick"      OXcarbazepine (TRILEPTAL) 300 MG Tab Take 300 mg by mouth 3 (three) times daily.   2     No current facility-administered medications for this visit.      Facility-Administered Medications Ordered in Other Visits   Medication Dose Route Frequency Provider Last Rate Last Dose    0.9%  NaCl infusion   Intravenous Continuous Sherri Gardiner MD 20 mL/hr at 19 1227         PMFSHx:    Past Medical History:   Diagnosis Date    Anxiety     Bipolar disorder     Depression     Diabetes mellitus, type 2     HTN (hypertension)     Insomnia        Past Surgical History:   Procedure Laterality Date     SECTION      COLONOSCOPY N/A 2019    Procedure: COLONOSCOPY;  Surgeon: Da Diallo MD;  Location: [facility] ENDO;  Service: General;  Laterality: N/A;    EPIDURAL STEROID INJECTION N/A 2019    Procedure: Injection, Steroid, Epidural - L4/5 EPIDURAL STEROID INJECTION;  Surgeon: Sherri Gardiner MD;  Location: [facility] OR;  Service: Pain Management;  Laterality: N/A;    EPIDURAL STEROID INJECTION N/A 2019    Procedure: Injection, Steroid, Epidural - C7/T1 EPIDURAL STEROID INJECTION;  Surgeon: Sherri Gardiner MD;  Location: [facility] OR;  Service: Pain Management;  Laterality: N/A;    EPIDURAL STEROID INJECTION N/A 2019    Procedure: Injection, Steroid, Epidural - L4/5 EPIDURAL STEROID INJECTION;  Surgeon: Sherri Gardiner MD;  Location: [facility] OR;  Service: Pain Management;  Laterality: N/A;    ESOPHAGOGASTRODUODENOSCOPY N/A 2019    Procedure: ESOPHAGOGASTRODUODENOSCOPY (EGD);  Surgeon: Da Diallo MD;  Location: [facility] ENDO;  Service: General;  Laterality: N/A;    TONSILLECTOMY      TRIGGER POINT INJECTION N/A 2019    Procedure: INJECTION, TRIGGER POINT - CERVICAL REGION;  Surgeon: Sherri Gardiner MD;  Location: [facility] OR;  Service: Pain Management;  Laterality: N/A;       History " reviewed. No pertinent family history.    Social History     Tobacco Use    Smoking status: Former Smoker     Packs/day: 1.00     Years: 0.00     Pack years: 0.00     Last attempt to quit: 2012     Years since quittin.0    Smokeless tobacco: Never Used   Substance Use Topics    Alcohol use: Yes     Frequency: Monthly or less     Comment: occasional    Drug use: Yes     Types: Marijuana, Other-see comments         Review of Systems   Constitutional: Negative for appetite change, chills, fatigue, fever and unexpected weight change.   HENT: Negative for congestion, dental problem, ear pain, mouth sores, postnasal drip, rhinorrhea, sore throat, tinnitus, trouble swallowing and voice change.    Eyes: Negative for photophobia, pain, discharge and visual disturbance.   Respiratory: Negative for cough, chest tightness, shortness of breath and wheezing.    Cardiovascular: Negative for chest pain, palpitations and leg swelling.   Gastrointestinal: Negative for abdominal pain, blood in stool, constipation, diarrhea, nausea and vomiting.   Endocrine: Negative for cold intolerance, heat intolerance, polydipsia, polyphagia and polyuria.   Genitourinary: Negative for difficulty urinating, dysuria, flank pain, frequency, hematuria and urgency.   Musculoskeletal: Negative for arthralgias, joint swelling and myalgias.   Skin: Negative for color change and rash.   Allergic/Immunologic: Negative for immunocompromised state.   Neurological: Negative for dizziness, tremors, seizures, syncope, speech difficulty, weakness, numbness and headaches.   Hematological: Negative for adenopathy. Does not bruise/bleed easily.   Psychiatric/Behavioral: Negative for agitation, confusion, hallucinations, self-injury and suicidal ideas. The patient is not nervous/anxious.        Objective:      Physical Exam   Constitutional: She appears well-developed and well-nourished.  Non-toxic appearance. She does not appear ill. No distress.    Cardiovascular: Normal rate, regular rhythm and normal heart sounds. PMI is not displaced. Exam reveals no gallop.   No murmur heard.  Pulmonary/Chest: Effort normal and breath sounds normal. No accessory muscle usage. No tachypnea. No respiratory distress.   Abdominal: Soft. Normal appearance and bowel sounds are normal. There is no tenderness. No hernia.   Skin: Skin is warm, dry and intact. No rash noted.           Medical Records Review:  EGD, colonoscopy, and pathology reports were reviewed from 9/18/2019 with the above summarized findings confirmed.      Assessment:       Resolved dysphagia.  Chronic gastritis.  Chronic duodenitis.  Gastroesophageal reflux disease with esophagitis.  Symptoms resolved.  Normal screening colonoscopy.  Due for repeat screening colonoscopy in 10 years.    1. Chronic superficial gastritis without bleeding        Plan:   Chronic superficial gastritis without bleeding      Continue Protonix.  Follow up for any concerns or questions as needed, resume care with PCP.

## 2019-10-01 NOTE — PROGRESS NOTES
Physical Therapy Daily Treatment Note   Name: Nicole Harris 1970  MRN: [id number]    Visit Date: 10/1/2019  Visit #: 5/12  Authorization period Expiration: 12/31/19  Plan of Care Expiration: 12/12/19  Precautions: standard    Time In: 2:00 PM   Time Out: 3:00 PM   Total 1:1 Treatment Time: 60 min    Treatment Diagnosis:    Encounter Diagnoses   Name Primary?    Muscle weakness (generalized)     Physical deconditioning      Physician: Sherri Gardiner MD    Subjective   Pt reports: having difficulty sleeping last night  Pain Scale:  5/10 on VAS currently   Pain Location: right LE    Objective   Nicole received therapeutic exercises to develop strength, flexibility, posture and core stabilization for 45 minutes including:    NUSTEP  X 15 min   abd bracing w pelvic tilt, 2 x 10  Supine marching 2 x 10  Bridging x 15  Stretch to hip flexor w opposing knee to chest jason w assist   Assisted glut stretch 5 x 30 sec hold    Manual therapy to Jason UT x 7 min             NOT PERFORMED THIS SESSION   Nerve flossing jason LE 5 x.  Prone on elbows  X 10, x 10 w Mckensie ext.  No change in sx.   Pt layed in prone for IFC.   Nicole received the following unsupervised modalities after being cleared for contradictions: IFC Electrical Stimulation:  Nicole received IFC Electrical Stimulation for pain control applied to the low back. Pt received stimulation at static at a frequency of 20 min minutes. Nicole tolderated treatment well without any adverse effects.        Home Exercises and Education Provided     Education provided re:   - progress towards goals   - role of therapy in multi - disciplinary team, goals for therapy  Pt educated on condition, POC, and expectations in therapy.  No spiritual or educational barriers to learning provided    Home exercises:  Pt will be provided HEP during course of treatment with progressions as appropriate. Pt was advised to perform these exercises free of pain, and  to stop performing them if pain occurs.   Nicole demonstrated good  understanding of the education provided.     Assessment   Patient with limited tolerance of exercise today.     Pt prognosis is Good. Pt will continue to benefit from skilled outpatient physical therapy to address the deficits listed in the problem list chart on initial evaluation, provide pt/family education and to maximize pt's level of independence in the home and community environment.     Medical necessity is demonstrated by the impairments and functional limitations listed on the Initial Evaluation.     Anticipated barriers to physical therapy: motivation and pain   Pt's spiritual, cultural and educational needs considered and pt agreeable to plan of care and goals.    Goals   Progressing, not met      Plan   Continue with established Plan of Care towards Physical Therapy goals.   Discussed Plan of Care with patient: Yes    Jonathan Favre, PTA  10/1/2019

## 2019-10-02 ENCOUNTER — TELEPHONE (OUTPATIENT)
Dept: NEUROSURGERY | Facility: CLINIC | Age: 49
End: 2019-10-02

## 2019-10-02 DIAGNOSIS — G56.13 MEDIAN NEUROPATHY OF BOTH UPPER EXTREMITIES: Primary | ICD-10-CM

## 2019-10-02 NOTE — TELEPHONE ENCOUNTER
Called patient to discuss EMG results: bilateral median neuropathy. Will refer to hand surgery for CTR discussion.     Angela Velasquez PA-C  Neurosurgery - [facility]

## 2019-10-07 ENCOUNTER — TELEPHONE (OUTPATIENT)
Dept: PAIN MEDICINE | Facility: CLINIC | Age: 49
End: 2019-10-07

## 2019-10-07 NOTE — TELEPHONE ENCOUNTER
Spoke with patient, rescheduled f/u for 11/4/19      ----- Message from Rosette Argueta sent at 10/7/2019  2:14 PM CDT -----  Contact: patient  Type: Needs Medical Advice    Who Called:  patient  Symptoms (please be specific):  Want to know if can't make scheduled appt for 10/8 can she still have injection done on 10/14  Best Call Back Number: [phone]  Additional Information: please call to advise

## 2019-10-17 ENCOUNTER — CLINICAL SUPPORT (OUTPATIENT)
Dept: REHABILITATION | Facility: HOSPITAL | Age: 49
End: 2019-10-17
Attending: ANESTHESIOLOGY

## 2019-10-17 DIAGNOSIS — R53.81 PHYSICAL DECONDITIONING: ICD-10-CM

## 2019-10-17 DIAGNOSIS — M62.81 MUSCLE WEAKNESS (GENERALIZED): ICD-10-CM

## 2019-10-17 PROCEDURE — 97014 ELECTRIC STIMULATION THERAPY: CPT | Mod: PN

## 2019-10-17 PROCEDURE — 97110 THERAPEUTIC EXERCISES: CPT | Mod: PN

## 2019-10-17 PROCEDURE — 97010 HOT OR COLD PACKS THERAPY: CPT | Mod: PN

## 2019-10-17 NOTE — PROGRESS NOTES
Physical Therapy Daily Treatment Note   Name: Nicole Harris 1970  MRN: [id number]    Visit Date: 10/17/2019  Visit #: 6/12  Authorization period Expiration: 12/31/19  Plan of Care Expiration: 12/12/19  Precautions: standard    Time In:1055 am   Time Out: 1155 aM   Total 1:1 Treatment Time: 60 min    Treatment Diagnosis:    Encounter Diagnoses   Name Primary?    Muscle weakness (generalized)     Physical deconditioning      Physician: Sherri Gardiner MD    Subjective   Pt reports: having difficulty sleeping last night  Pain Scale:  5/10 on VAS currently   Pain Location: right LE    Objective   Nicole received therapeutic exercises to develop strength, flexibility, posture and core stabilization for 45 minutes including:    NUSTEP  X 15 min   abd bracing w pelvic tilt, 2 x 10  Supine marching 2 x 10  Bridging x 15  Stretch to hip flexor w opposing knee to chest bell w assist   Assisted glut stretch 5 x 30 sec hold   Nerve flossing bell LE 5 x.  Prone on elbows  X 10, x 10 w Mckensie ext.  No change in sx.   Pt layed in prone for IFC.   Nicole received the following unsupervised modalities after being cleared for contradictions: IFC Electrical Stimulation:  Nicole received IFC Electrical Stimulation for pain control applied to the low back. Pt received stimulation at static at a frequency of 15 min minutes. Nicole tolderated treatment well without any adverse effects.        Home Exercises and Education Provided     Education provided re:   - progress towards goals   - role of therapy in multi - disciplinary team, goals for therapy  Pt educated on condition, POC, and expectations in therapy.  No spiritual or educational barriers to learning provided    Home exercises:  Pt will be provided HEP during course of treatment with progressions as appropriate. Pt was advised to perform these exercises free of pain, and to stop performing them if pain occurs.   Nicole demonstrated good   understanding of the education provided.     Assessment   Patient with limited tolerance of exercise today.     Pt prognosis is Good. Pt will continue to benefit from skilled outpatient physical therapy to address the deficits listed in the problem list chart on initial evaluation, provide pt/family education and to maximize pt's level of independence in the home and community environment.     Medical necessity is demonstrated by the impairments and functional limitations listed on the Initial Evaluation.     Anticipated barriers to physical therapy: motivation and pain   Pt's spiritual, cultural and educational needs considered and pt agreeable to plan of care and goals.    Goals   Progressing, not met      Plan   Continue with established Plan of Care towards Physical Therapy goals.   Discussed Plan of Care with patient: Yes  Supervised face to face visit with Doug Koch PTA to discuss, current level of function, POC and progress toward goals.   Darlin Crow, PT  10/17/2019

## 2019-10-21 ENCOUNTER — HOSPITAL ENCOUNTER (OUTPATIENT)
Facility: HOSPITAL | Age: 49
Discharge: HOME | End: 2019-10-21
Attending: ANESTHESIOLOGY | Admitting: ANESTHESIOLOGY

## 2019-10-21 VITALS
SYSTOLIC BLOOD PRESSURE: 121 MMHG | WEIGHT: 195 LBS | BODY MASS INDEX: 32.49 KG/M2 | OXYGEN SATURATION: 97 % | TEMPERATURE: 98 F | RESPIRATION RATE: 18 BRPM | HEART RATE: 83 BPM | DIASTOLIC BLOOD PRESSURE: 64 MMHG | HEIGHT: 65 IN

## 2019-10-21 DIAGNOSIS — M79.18 MYOFASCIAL PAIN: ICD-10-CM

## 2019-10-21 DIAGNOSIS — M50.30 DDD (DEGENERATIVE DISC DISEASE), CERVICAL: Primary | ICD-10-CM

## 2019-10-21 LAB — POCT GLUCOSE: 238 MG/DL (ref 70–110)

## 2019-10-21 PROCEDURE — 99152 PR MOD CONSCIOUS SEDATION, SAME PHYS, 5+ YRS, FIRST 15 MIN: ICD-10-PCS | Mod: ,,, | Performed by: ANESTHESIOLOGY

## 2019-10-21 PROCEDURE — 99152 MOD SED SAME PHYS/QHP 5/>YRS: CPT | Mod: ,,, | Performed by: ANESTHESIOLOGY

## 2019-10-21 PROCEDURE — 62321 NJX INTERLAMINAR CRV/THRC: CPT | Mod: ,,, | Performed by: ANESTHESIOLOGY

## 2019-10-21 PROCEDURE — 62321 NJX INTERLAMINAR CRV/THRC: CPT | Performed by: ANESTHESIOLOGY

## 2019-10-21 PROCEDURE — 25500020 PHARM REV CODE 255: Performed by: ANESTHESIOLOGY

## 2019-10-21 PROCEDURE — 63600175 PHARM REV CODE 636 W HCPCS: Performed by: ANESTHESIOLOGY

## 2019-10-21 PROCEDURE — 99153 MOD SED SAME PHYS/QHP EA: CPT | Performed by: ANESTHESIOLOGY

## 2019-10-21 PROCEDURE — 20552 NJX 1/MLT TRIGGER POINT 1/2: CPT | Performed by: ANESTHESIOLOGY

## 2019-10-21 PROCEDURE — 99152 MOD SED SAME PHYS/QHP 5/>YRS: CPT | Performed by: ANESTHESIOLOGY

## 2019-10-21 PROCEDURE — 62321 PR INJ CERV/THORAC, W/GUIDANCE: ICD-10-PCS | Mod: ,,, | Performed by: ANESTHESIOLOGY

## 2019-10-21 PROCEDURE — 20552 NJX 1/MLT TRIGGER POINT 1/2: CPT | Mod: ,,, | Performed by: ANESTHESIOLOGY

## 2019-10-21 PROCEDURE — 20552 PR INJECT TRIGGER POINT, 1 OR 2: ICD-10-PCS | Mod: ,,, | Performed by: ANESTHESIOLOGY

## 2019-10-21 RX ORDER — METHYLPREDNISOLONE ACETATE 80 MG/ML
INJECTION, SUSPENSION INTRA-ARTICULAR; INTRALESIONAL; INTRAMUSCULAR; SOFT TISSUE
Status: DISCONTINUED | OUTPATIENT
Start: 2019-10-21 | End: 2019-10-21 | Stop reason: HOSPADM

## 2019-10-21 RX ORDER — SODIUM CHLORIDE 9 MG/ML
INJECTION, SOLUTION INTRAVENOUS CONTINUOUS
Status: DISCONTINUED | OUTPATIENT
Start: 2019-10-21 | End: 2019-10-21 | Stop reason: HOSPADM

## 2019-10-21 RX ORDER — MIDAZOLAM HYDROCHLORIDE 5 MG/ML
INJECTION INTRAMUSCULAR; INTRAVENOUS
Status: DISCONTINUED | OUTPATIENT
Start: 2019-10-21 | End: 2019-10-21 | Stop reason: HOSPADM

## 2019-10-21 NOTE — H&P
HPI  Patient presenting for Procedure(s) (LRB):  Injection, Steroid, Epidural, CERVICAL C7/T1 SILVIA (N/A)     Patient on Anti-coagulation No    No health changes since previous encounter    Past Medical History:   Diagnosis Date    Anxiety     Bipolar disorder     Depression     Diabetes mellitus, type 2     HTN (hypertension)     Insomnia      Past Surgical History:   Procedure Laterality Date     SECTION      COLONOSCOPY N/A 2019    Procedure: COLONOSCOPY;  Surgeon: Da Diallo MD;  Location: [facility];  Service: General;  Laterality: N/A;    EPIDURAL STEROID INJECTION N/A 2019    Procedure: Injection, Steroid, Epidural - L4/5 EPIDURAL STEROID INJECTION;  Surgeon: Sherri Gardiner MD;  Location: [facility] OR;  Service: Pain Management;  Laterality: N/A;    EPIDURAL STEROID INJECTION N/A 2019    Procedure: Injection, Steroid, Epidural - C7/T1 EPIDURAL STEROID INJECTION;  Surgeon: Sherri Gardiner MD;  Location: [facility] OR;  Service: Pain Management;  Laterality: N/A;    EPIDURAL STEROID INJECTION N/A 2019    Procedure: Injection, Steroid, Epidural - L4/5 EPIDURAL STEROID INJECTION;  Surgeon: Sherri Gardiner MD;  Location: [facility] OR;  Service: Pain Management;  Laterality: N/A;    ESOPHAGOGASTRODUODENOSCOPY N/A 2019    Procedure: ESOPHAGOGASTRODUODENOSCOPY (EGD);  Surgeon: Da Diallo MD;  Location: [facility];  Service: General;  Laterality: N/A;    TONSILLECTOMY      TRIGGER POINT INJECTION N/A 2019    Procedure: INJECTION, TRIGGER POINT - CERVICAL REGION;  Surgeon: Sherri Gardiner MD;  Location: [facility] OR;  Service: Pain Management;  Laterality: N/A;     Review of patient's allergies indicates:   Allergen Reactions    Hydrocodone Nausea And Vomiting      Current Facility-Administered Medications   Medication    0.9%  NaCl infusion     Facility-Administered Medications Ordered in Other Encounters   Medication    0.9%  NaCl infusion       PMHx, PSHx, Allergies,  "Medications reviewed in epic    ROS negative except pain complaints in HPI    OBJECTIVE:    BP (!) 110/58 (BP Location: Right arm, Patient Position: Lying)   Pulse (!) 58   Temp 97.9 °F (36.6 °C) (Oral)   Resp 20   Ht 5' 5" (1.651 m)   Wt 88.5 kg (195 lb)   LMP 07/06/2019   SpO2 97%   Breastfeeding? No   BMI 32.45 kg/m²     PHYSICAL EXAMINATION:    GENERAL: Well appearing, in no acute distress, alert and oriented x3.  PSYCH:  Mood and affect appropriate.  SKIN: Skin color, texture, turgor normal, no rashes or lesions which will impact the procedure.  CV: RRR   PULM: No evidence of respiratory difficulty, symmetric chest rise.   NEURO: Cranial nerves grossly intact.    Plan:    Proceed with procedure as planned Procedure(s) (LRB):  Injection, Steroid, Epidural, CERVICAL C7/T1 SILVIA (N/A)    Sherri Gardiner  10/21/2019            "

## 2019-10-21 NOTE — PLAN OF CARE
Pre op glucose 238  
Pt tolerating sprite and now in restroom. Pt awake and alert. VSS. Pt verbalizes discharge instructions.   
9

## 2019-10-21 NOTE — OP NOTE
Date: 10/21/2019    Procedure: C7/T1 Epidural Steroid Injection and thoracic TPIs    Referring Provider: None     Pre-op diagnosis: Cervical radiculopathy [M54.12]  DDD (degenerative disc disease), cervical [M50.30]  Myofascial pain    Post-op diagnosis: Cervical radiculopathy [M54.12]  DDD (degenerative disc disease), cervical [M50.30]  Myofascial pain    Physician: Dr. Sherri Gardiner     Assistant: None    Anesthestia: local/IV sedation: Versed 2 mg IV.  Conscious sedation given by MD and monitored by RN.  Total sedation time was less than 15 min. (See nurse documentation and case log for sedation time)      All medications, allergies, and relevant histories were reviewed. No recent antibiotics or infections.   A time-out was taken to verify the correct patient, procedure, laterality, and appropriate medications/allergies.    Fluoroscopically-Guided, Contrast-Controlled Cervical Interlaminar Epidural Steroid Injection:     Following denial of allergy and review of potential side effects and complications including but not necessarily limited to infection, allergic reaction, local tissue breakdown, nerve injury, paresis, paralysis, spinal cord injury, and seizure, the patient indicated they understood and agreed to proceed.     The patient was positioned prone and prepped and draped in the usual sterile fashion. The C7-T1 interspace was identified fluoroscopically. The skin was anesthetized via 25-gauge 1.5 needle with approximately 4cc of bicarbonated 1% lidocaine. A 20-gauge Tuohy needle was atraumatically introduced and advanced under fluoroscopic guidance. Using LINNETTE to saline technique with 0.9 % saline the epidural space was entered without difficulties. Following negative aspiration for blood and CSF and confirming the absence of paresthesias, injection of approximately 1.5 cc of Omnipaque 300 demonstrated excellent epidural spread without vascular or intrathecal uptake. At this point, 2 cc of 0.9% normal  saline with 80 mg of Depo-Medrol was injected without complication. The needle was flushed with 1% lidocaine withdrawn.     Trigger Point Injection:   The procedure was discussed with the patient including complications of damage, bleeding, infection, and failure of pain relief.     All medications, allergies, and relevant histories were reviewed. No recent antibiotics or infections.  A time-out was taken to verify the correct patient, procedure, laterality, and appropriate medications/allergies.    Trigger points were identified by palpation and marked. CHG prep of sites done. A 27-gauge needle was advanced to the point of maximal tenderness, and 8 mL of a mixture of lidocaine with 10 mg Depo-Medrol was injected after negative aspiration in a fanlike distribution. All sites done in the same manner. Patient tolerated the procedure well and without complications. Sites injected included: right thoracic paraspinals x 2     The patient tolerated the procedure well and was discharged in excellent condition.    The patient was followedpost procedure and discharged under their own power in excellent condition.    Future Management:   If helpful, can repeat as needed.    Follow up with my clinic in 3 weeks or sooner if needed

## 2019-10-22 ENCOUNTER — CLINICAL SUPPORT (OUTPATIENT)
Dept: REHABILITATION | Facility: HOSPITAL | Age: 49
End: 2019-10-22
Attending: ANESTHESIOLOGY

## 2019-10-22 DIAGNOSIS — R53.81 PHYSICAL DECONDITIONING: ICD-10-CM

## 2019-10-22 DIAGNOSIS — M62.81 MUSCLE WEAKNESS (GENERALIZED): ICD-10-CM

## 2019-10-22 PROCEDURE — 97140 MANUAL THERAPY 1/> REGIONS: CPT | Mod: PN

## 2019-10-22 PROCEDURE — 97124 MASSAGE THERAPY: CPT | Mod: PN

## 2019-10-22 PROCEDURE — 97110 THERAPEUTIC EXERCISES: CPT | Mod: PN

## 2019-10-22 PROCEDURE — 97014 ELECTRIC STIMULATION THERAPY: CPT | Mod: PN

## 2019-10-22 NOTE — PROGRESS NOTES
Physical Therapy Daily Treatment Note   Name: Nicole Harris 1970  MRN: [id number]    Visit Date: 10/22/2019  Visit #: 7/12  Authorization period Expiration: 12/31/19  Plan of Care Expiration: 12/12/19  Precautions: standard    Time In300               Time Out: 445   Total 1:1 Treatment Time: 45 min    Treatment Diagnosis:    Encounter Diagnoses   Name Primary?    Muscle weakness (generalized)     Physical deconditioning      Physician: Sherri Gardiner MD    Subjective   Pt reports: pt had steroid injection previous day; limited with thera exes  Pain Scale:  6/10 on VAS currently   Pain Location: mid back    Objective   Nicole received therapeutic exercises to develop strength, flexibility, posture and core stabilization for 45 minutes including:    NOT PERFORMED THIS SESSION  NUSTEP  X 15 min   abd bracing w pelvic tilt, 2 x 10  Supine marching 2 x 10  Bridging x 15  Stretch to hip flexor w opposing knee to chest bell w assist   Assisted glut stretch 5 x 30 sec hold   Nerve flossing bell LE 5 x.  Prone on elbows  X 10, x 10 w Mckensie ext.  No change in sx.     10/24/19  Stretch to bell LE in all planes x 3  Manual therapy to include piriformis release, deep tissue massage of paraspinals, MFR  Along spine for 25 min  Pt layed in prone for IFC.   Nicole received the following unsupervised modalities after being cleared for contradictions: IFC Electrical Stimulation:  Nicole received IFC Electrical Stimulation for pain control applied to the low back. Pt received stimulation at static at a frequency of 15 min minutes. Nicole tolderated treatment well without any adverse effects.        Home Exercises and Education Provided     Education provided re:   - progress towards goals   - role of therapy in multi - disciplinary team, goals for therapy  Pt educated on condition, POC, and expectations in therapy.  No spiritual or educational barriers to learning provided    Home exercises:  Pt  will be provided HEP during course of treatment with progressions as appropriate. Pt was advised to perform these exercises free of pain, and to stop performing them if pain occurs.   Nicole demonstrated good  understanding of the education provided.     Assessment   Patient needs motivation to perform exes and participate fully in session. .     Pt prognosis is Good. Pt will continue to benefit from skilled outpatient physical therapy to address the deficits listed in the problem list chart on initial evaluation, provide pt/family education and to maximize pt's level of independence in the home and community environment.     Medical necessity is demonstrated by the impairments and functional limitations listed on the Initial Evaluation.     Anticipated barriers to physical therapy: motivation and pain   Pt's spiritual, cultural and educational needs considered and pt agreeable to plan of care and goals.    Goals   Progressing, not met      Plan   Continue with established Plan of Care towards Physical Therapy goals.   Discussed Plan of Care with patient: Yes  Supervised face to face visit with Doug Koch PTA to discuss, current level of function, POC and progress toward goals.   Darlin Crow, PT  10/22/2019

## 2019-10-24 ENCOUNTER — CLINICAL SUPPORT (OUTPATIENT)
Dept: REHABILITATION | Facility: HOSPITAL | Age: 49
End: 2019-10-24
Attending: ANESTHESIOLOGY

## 2019-10-24 DIAGNOSIS — R53.81 PHYSICAL DECONDITIONING: ICD-10-CM

## 2019-10-24 DIAGNOSIS — M62.81 MUSCLE WEAKNESS (GENERALIZED): ICD-10-CM

## 2019-10-24 PROCEDURE — 97014 ELECTRIC STIMULATION THERAPY: CPT | Mod: PN

## 2019-10-24 PROCEDURE — 97110 THERAPEUTIC EXERCISES: CPT | Mod: PN

## 2019-10-24 NOTE — PROGRESS NOTES
Physical Therapy Daily Treatment Note   Name: Nicole Harris 1970  MRN: [id number]    Visit Date: 10/24/2019  Visit #: 7/12  Authorization period Expiration: 12/31/19  Plan of Care Expiration: 12/12/19  Precautions: standard    Time In 11               Time Out: 1155  Total 1:1 Treatment Time: 45 min    Treatment Diagnosis:    Encounter Diagnoses   Name Primary?    Muscle weakness (generalized)     Physical deconditioning      Physician: Sherri Gardiner MD    Subjective   Pt reportspt reports feeling good today, with some soreness   Pain Scale:  4/10 on VAS currently   Pain Location: mid back    Objective   Nicole received therapeutic exercises to develop strength, flexibility, posture and core stabilization for 45 minutes including:    Treadmill at slow pace allowing pt to inc stride and stretch x 15 min  Pt layed in prone for IFC.   Nicole received the following unsupervised modalities after being cleared for contradictions: IFC Electrical Stimulation:  Nicole received IFC Electrical Stimulation for pain control applied to the low back. Pt received stimulation at static at a frequency of 15 min minutes. Nicole tolerated treatment well without any adverse effects.        abd bracing w pelvic tilt, 2 x 10 NOT PERFORMED THIS SESSION  Supine marching 2 x 10 NOT PERFORMED THIS SESSION  Bridging x 15 NOT PERFORMED THIS SESSION  Stretch to hip flexor w opposing knee to chest bell w assist   Assisted glut stretch 5 x 30 sec hold   Nerve flossing bell LE 5 x.NOT PERFORMED THIS SESSION  Prone on elbows  X 10, x 10 w Mckensie ext.   Stretch to hip adductors in butterfly  Stretch to  Hip add w one knee flexed to chest  Clamshells x 10 R and L sidelyingx 10  Butterfly exes x 10          Home Exercises and Education Provided     Education provided re:   - progress towards goals   - role of therapy in multi - disciplinary team, goals for therapy  Pt educated on condition, POC, and expectations in  therapy.  No spiritual or educational barriers to learning provided    Home exercises:  Pt will be provided HEP during course of treatment with progressions as appropriate. Pt was advised to perform these exercises free of pain, and to stop performing them if pain occurs.   Nicole demonstrated good  understanding of the education provided.     Assessment   Pt enjoyed the treadmill stating it felt good.  She requests to stretch in every session, with no interest or refusal to perform all of the exes stating it hurts.  She does perform  30 -40% of the exes prescribed.      Pt prognosis is Good. Pt will continue to benefit from skilled outpatient physical therapy to address the deficits listed in the problem list chart on initial evaluation, provide pt/family education and to maximize pt's level of independence in the home and community environment.     Medical necessity is demonstrated by the impairments and functional limitations listed on the Initial Evaluation.     Anticipated barriers to physical therapy: motivation and pain   Pt's spiritual, cultural and educational needs considered and pt agreeable to plan of care and goals.    Goals   Progressing, not met      Plan   Continue with established Plan of Care towards Physical Therapy goals.   Discussed Plan of Care with patient: Yes  Supervised face to face visit with Dogu Koch PTA to discuss, current level of function, POC and progress toward goals.   Darlin Crow, PT  10/24/2019

## 2019-10-28 ENCOUNTER — PATIENT MESSAGE (OUTPATIENT)
Dept: ORTHOPEDICS | Facility: CLINIC | Age: 49
End: 2019-10-28

## 2019-10-31 ENCOUNTER — TELEPHONE (OUTPATIENT)
Dept: NEUROLOGY | Facility: CLINIC | Age: 49
End: 2019-10-31

## 2019-10-31 NOTE — TELEPHONE ENCOUNTER
Returned pt call and discussed fax number; faxed documentation to Leidy Christensen, pts  at [phone] per pt request.

## 2019-10-31 NOTE — TELEPHONE ENCOUNTER
----- Message from [employer] sent at 10/31/2019  1:01 PM CDT -----  Contact: Patient  Type: Needs Medical Advice    Who Called:  Patient  Best Call Back Number: [phone]  Additional Information: Patient would like a copy of their diagnosis faxed to [phone]. Please call to advise. Thanks!

## 2019-11-01 ENCOUNTER — OFFICE VISIT (OUTPATIENT)
Dept: ORTHOPEDICS | Facility: CLINIC | Age: 49
End: 2019-11-01

## 2019-11-01 ENCOUNTER — HOSPITAL ENCOUNTER (OUTPATIENT)
Dept: RADIOLOGY | Facility: HOSPITAL | Age: 49
Discharge: HOME | End: 2019-11-01
Attending: ORTHOPAEDIC SURGERY

## 2019-11-01 VITALS
HEART RATE: 69 BPM | DIASTOLIC BLOOD PRESSURE: 75 MMHG | SYSTOLIC BLOOD PRESSURE: 124 MMHG | RESPIRATION RATE: 18 BRPM | HEIGHT: 65 IN | WEIGHT: 195.13 LBS | BODY MASS INDEX: 32.51 KG/M2

## 2019-11-01 DIAGNOSIS — M79.642 BILATERAL HAND PAIN: Primary | ICD-10-CM

## 2019-11-01 DIAGNOSIS — M79.641 BILATERAL HAND PAIN: ICD-10-CM

## 2019-11-01 DIAGNOSIS — M54.10 RADICULAR PAIN OF UPPER EXTREMITY: ICD-10-CM

## 2019-11-01 DIAGNOSIS — G56.03 CARPAL TUNNEL SYNDROME ON BOTH SIDES: Primary | ICD-10-CM

## 2019-11-01 DIAGNOSIS — M79.642 BILATERAL HAND PAIN: ICD-10-CM

## 2019-11-01 DIAGNOSIS — G56.23 CUBITAL TUNNEL SYNDROME, BILATERAL: ICD-10-CM

## 2019-11-01 DIAGNOSIS — M79.641 BILATERAL HAND PAIN: Primary | ICD-10-CM

## 2019-11-01 PROCEDURE — 73110 X-RAY EXAM OF WRIST: CPT | Mod: 50,TC,PN

## 2019-11-01 PROCEDURE — 73110 X-RAY EXAM OF WRIST: CPT | Mod: 26,50,, | Performed by: RADIOLOGY

## 2019-11-01 PROCEDURE — 73130 XR HAND COMPLETE 3 VIEWS BILATERAL: ICD-10-PCS | Mod: 26,50,, | Performed by: RADIOLOGY

## 2019-11-01 PROCEDURE — 73110 XR WRIST COMPLETE 3 VIEWS BILATERAL: ICD-10-PCS | Mod: 26,50,, | Performed by: RADIOLOGY

## 2019-11-01 PROCEDURE — 20526 PR INJECT CARPAL TUNNEL: ICD-10-PCS | Mod: 50,S$PBB,, | Performed by: ORTHOPAEDIC SURGERY

## 2019-11-01 PROCEDURE — 99999 PR PBB SHADOW E&M-EST. PATIENT-LVL III: ICD-10-PCS | Mod: PBBFAC,,, | Performed by: ORTHOPAEDIC SURGERY

## 2019-11-01 PROCEDURE — 73130 X-RAY EXAM OF HAND: CPT | Mod: 26,50,, | Performed by: RADIOLOGY

## 2019-11-01 PROCEDURE — 99213 OFFICE O/P EST LOW 20 MIN: CPT | Mod: PBBFAC,25,PN | Performed by: ORTHOPAEDIC SURGERY

## 2019-11-01 PROCEDURE — 73130 X-RAY EXAM OF HAND: CPT | Mod: 50,TC,PN

## 2019-11-01 PROCEDURE — 99205 OFFICE O/P NEW HI 60 MIN: CPT | Mod: 25,S$PBB,, | Performed by: ORTHOPAEDIC SURGERY

## 2019-11-01 PROCEDURE — 99205 PR OFFICE/OUTPT VISIT, NEW, LEVL V, 60-74 MIN: ICD-10-PCS | Mod: 25,S$PBB,, | Performed by: ORTHOPAEDIC SURGERY

## 2019-11-01 PROCEDURE — 99999 PR PBB SHADOW E&M-EST. PATIENT-LVL III: CPT | Mod: PBBFAC,,, | Performed by: ORTHOPAEDIC SURGERY

## 2019-11-01 PROCEDURE — 20526 THER INJECTION CARP TUNNEL: CPT | Mod: 50,S$PBB,, | Performed by: ORTHOPAEDIC SURGERY

## 2019-11-01 RX ORDER — TRIAMCINOLONE ACETONIDE 40 MG/ML
40 INJECTION, SUSPENSION INTRA-ARTICULAR; INTRAMUSCULAR
Status: DISCONTINUED | OUTPATIENT
Start: 2019-11-01 | End: 2019-11-01 | Stop reason: HOSPADM

## 2019-11-01 RX ADMIN — TRIAMCINOLONE ACETONIDE 40 MG: 40 INJECTION, SUSPENSION INTRA-ARTICULAR; INTRAMUSCULAR at 10:11

## 2019-11-01 NOTE — PROGRESS NOTES
"  Subjective:      Patient ID: Nicole Harris is a 49 y.o. female.    Chief Complaint: Pain of the Right Hand and Pain of the Left Hand    Referring Provider: Angela Velasquez Pa-c  [address] [place] [address]  [address]  [place] [address]    HPI:  Ms. Harris is a 49-year-old right-hand-dominant lady who presented today for evaluation of approximately 8-9 years of numbness and tingling with shooting pains into the fingers of both of her hands which began approximately in  after her arm started falling asleep.  Her right hand is worse than her left and her symptoms have increased over the last year.  All the fingers of her hands are affected.  She stated, I can not snap the fingers on my right hand anymore I can on my left."  She drops items and her symptoms awaken her at night.  She used to be able to flicker hands to get feeling in him but cannot now.  She is seeing a spine surgeon who has given her epidural steroid injections which helped to relieve her symptoms for approximately 1 week.  She has taken NSAIDs with help.  She has not done physical/occupational therapy, worn braces, nor had injections. She stated that typically her symptoms radiate from her shoulders to her hands.    Past Medical History:   Diagnosis Date    Anxiety     Bipolar disorder     Depression     Diabetes mellitus, type 2     HTN (hypertension)      *  *  *  *  *  *  *  *  *  * Insomnia  Hyperlipidemia  Seasonal allergies  Anxiety  GERD  IBS  Hiatal hernia  Fibromyalgia  Pelvic inflammatory disease  Migraines  Pain management      Past Surgical History:   Procedure Laterality Date     SECTION      COLONOSCOPY N/A 2019    Procedure: COLONOSCOPY;  Surgeon: Da Diallo MD;  Location: [facility] ENDO;  Service: General;  Laterality: N/A;    EPIDURAL STEROID INJECTION N/A 2019    Procedure: Injection, Steroid, Epidural - L4/5 EPIDURAL STEROID INJECTION;  Surgeon: Sherri Gardiner MD;  Location: [facility] OR;  Service: " Pain Management;  Laterality: N/A;    EPIDURAL STEROID INJECTION N/A 2019    Procedure: Injection, Steroid, Epidural - C7/T1 EPIDURAL STEROID INJECTION;  Surgeon: Sherri Gardiner MD;  Location: [facility] OR;  Service: Pain Management;  Laterality: N/A;    EPIDURAL STEROID INJECTION N/A 2019    Procedure: Injection, Steroid, Epidural - L4/5 EPIDURAL STEROID INJECTION;  Surgeon: Sherri Gardiner MD;  Location: [facility] OR;  Service: Pain Management;  Laterality: N/A;    EPIDURAL STEROID INJECTION N/A 10/21/2019    Procedure: Injection, Steroid, Epidural, CERVICAL C7/T1 SILVIA;  Surgeon: Sherri Gardiner MD;  Location: [facility] OR;  Service: Pain Management;  Laterality: N/A;  19    ESOPHAGOGASTRODUODENOSCOPY N/A 2019    Procedure: ESOPHAGOGASTRODUODENOSCOPY (EGD);  Surgeon: Da Diallo MD;  Location: [facility] ENDO;  Service: General;  Laterality: N/A;    TONSILLECTOMY      TRIGGER POINT INJECTION N/A 2019    Procedure: INJECTION, TRIGGER POINT - CERVICAL REGION;  Surgeon: Sherri Gardiner MD;  Location: [facility] OR;  Service: Pain Management;  Laterality: N/A;    TRIGGER POINT INJECTION N/A 10/21/2019       * Procedure: INJECTION, TRIGGER POINT;  Surgeon: Sherri Gardiner MD;  Location: [facility] OR;  Service: Pain Management;  Laterality: N/A;  COLONOSCOPY       Review of patient's allergies indicates:   Allergen Reactions    Hydrocodone Nausea And Vomiting       Social History     Occupational History    Retired    Tobacco Use    Smoking status: Former Smoker     Packs/day: 1.00     Years: 0.00     Pack years: 0.00     Last attempt to quit: 2012     Years since quittin.1    Smokeless tobacco: Never Used   Substance and Sexual Activity    Alcohol use: Yes     Frequency: Monthly or less     Comment: occasional    Drug use: Yes     Types: Marijuana, Other-see comments    Sexual activity: Yes      Family history:  Father:  Alive, CHF.  Mother:  , cancer.  Sister:  1,  , cancer.  Daughter:  1, alive, chronic plantar fasciitis.    Previous Hospitalizations:  Childbirth, pelvic inflammatory disease.    ROS:   Review of Systems   Constitution: Negative for chills and fever.   HENT: Negative for congestion.    Eyes: Negative for blurred vision.   Cardiovascular: Negative for chest pain.   Respiratory: Negative for cough.    Endocrine: Negative for polydipsia.   Hematologic/Lymphatic: Negative for adenopathy.   Skin: Negative for flushing and itching.   Musculoskeletal: Negative for gout.   Gastrointestinal: Positive for heartburn.   Genitourinary: Negative for nocturia.   Neurological: Positive for headaches. Negative for seizures.   Psychiatric/Behavioral: Positive for depression. The patient is nervous/anxious.    Allergic/Immunologic: Positive for environmental allergies.           Objective:      Physical Exam:   General: AAOx3.  No acute distress  HEENT: Normocephalic, PEARLA EOMI.  Fair dentition.  Neck: Supple, No JVD  Chest: Symetric, equal excursion on inspiration  Abdomen: Soft NTND  Vascular:  Pulses intact and equal bilaterally.  Capillary refill less than 3 seconds and equal bilaterally  Neurologic:  Pinprick and soft touch intact and equal bilaterally. Tinel's both wrists negative. Tinel's positive right elbow.  Phalen's negative both wrists.  Elbow flexion test positive both elbows.  Spurling's positive.  Integment:  No ecchymosis, no errythema  Extremity:  Wrist:  Pronation/supination equal bilaterally 90/85 degrees. Dorsiflexion/volar flexion equal bilaterally 80/75 degrees. Nontender in the anatomic snuffbox bilaterally. Nontender at the 1st dorsal compartment bilaterally.  No swelling at the 1st dorsal compartment bilaterally.  Finkelstein's negative bilaterally.  Nontender at the scapholunate interval bilaterally. Aguero's test negative bilaterally. Nontender at the DRUJ/TFCC bilaterally. Nontender with forced ulnar deviation bilaterally.  strength  equal bilaterally. Minimal thenar atrophy both hands.  Wartenberg sign mildly positive both hands.  Durkan's test negative both hands.                      Elbow:  Pronation/supination equal bilaterally 90/85 degrees. Extension/flexion equal bilaterally 0/130 degrees. Nontender at the epicondyles bilaterally.  Radial/ulna stressing equal bilaterally with endpoint.  Nontender over the radial head bilaterally.  Nontender over the olecranon and the triceps insertion both elbows.  Triceps tendon intact throughout full distribution.                       C-spine:  Forward flexion/backward flexion 70/70 degrees. Right/left rotation 80/80 degrees. Right/left side bending 50/50 degrees. Mild tenderness with C-spine motion. Mild tenderness with C-spine palpation.  Radiography:  Personally reviewed x-rays of both wrists and hands completed on 11/01/2019 showed mild widening of the scapholunate interval bilateral hands and an accessory ossicle off the PT form both hands.  Carpal canals are open without interposing masses.  Changes of the 5th metacarpal of the left hand consistent with a healed shaft fracture.  Neurodiagnostic studies:  Nerve conduction study of both upper extremities completed through [facility] on 09/20/2019 was consistent with moderate to severe carpal tunnel syndrome of the right upper extremity and mild to moderate carpal tunnel syndrome of the left upper extremity      Assessment:       Impression:      1. Carpal tunnel syndrome on both sides    2. Clinical cubital tunnel syndrome, bilateral    3. Radicular pain of upper extremity          Plan:       1.  Discussed physical examination and radiographic findings with the patient. Nicole understands that she has a multifaceted problem of which it appears that she has some radicular problems causing her hand issues along with neurodiagnostic proven carpal tunnel syndrome and she also appears to have cubital tunnel syndrome.  She has not completed  conservative management with respect to the carpal tunnel syndrome and recommend she trial conservative management on her hands.  Also discussed in detail with the patient that she appears to have a radicular component which may be the primary nidus of her bilateral upper extremity issues since her pain radiates from her shoulders to her hands on both extremities.  She may also have a double crush and addressing 1 issue may help but not completely resolved the other issue unless both issues are addressed.  2.  Offered a steroid injection to both carpal canals, she elected to proceed.  3.  Cock-up wrist splints to both hands.  4.  Home exercises to include carpal canal massage and stretching were shown discussed with the patient.  5.  Continue with NSAIDs as tolerated allowed by PCM.  6.  Recommend the patient return to her spine surgeon and discuss possible spine surgery as this may help resolve her upper extremity issues especially since epidural steroid injections did help her symptoms.  7.  Discussed possible referral to physical therapy she would like to hold off for now.  8.  [facility] portal was discussed with the patient and information was given.  The patient was encouraged to use the portal for future encounters.  9.  Follow up in 1 month for re-evaluation.

## 2019-11-01 NOTE — LETTER
November 1, 2019      Angela Velasquez PA-C  [address]  [address]  [facility] [address]           [facility]  [address] [place] [address] [address]  [place] [address]  Phone: [phone]  Fax: [phone]          Patient: Nicole Harris   MR Number: [id number]   YOB: 1970   Date of Visit: 11/1/2019       Dear Angela Velasquez:    Thank you for referring Nicole Harris to me for evaluation. Attached you will find relevant portions of my assessment and plan of care.    If you have questions, please do not hesitate to call me. I look forward to following Nicole Harris along with you.    Sincerely,    Alcides Yancey, DO    Enclosure  CC:  No Recipients    If you would like to receive this communication electronically, please contact [email or web address] or [phone] to request more information on [employer] access.    For providers and/or their staff who would like to refer a patient to [facility], please contact us through our one-stop-shop provider referral line, [facility], at [phone].    If you feel you have received this communication in error or would no longer like to receive these types of communications, please e-mail [email or web address]

## 2019-11-04 ENCOUNTER — OFFICE VISIT (OUTPATIENT)
Dept: PAIN MEDICINE | Facility: CLINIC | Age: 49
End: 2019-11-04

## 2019-11-04 VITALS
BODY MASS INDEX: 31.65 KG/M2 | WEIGHT: 190 LBS | SYSTOLIC BLOOD PRESSURE: 139 MMHG | HEART RATE: 76 BPM | TEMPERATURE: 98 F | HEIGHT: 65 IN | OXYGEN SATURATION: 97 % | DIASTOLIC BLOOD PRESSURE: 76 MMHG

## 2019-11-04 DIAGNOSIS — M51.16 LUMBAR DISC HERNIATION WITH RADICULOPATHY: ICD-10-CM

## 2019-11-04 DIAGNOSIS — M54.12 CERVICAL RADICULOPATHY: ICD-10-CM

## 2019-11-04 DIAGNOSIS — M50.30 DDD (DEGENERATIVE DISC DISEASE), CERVICAL: ICD-10-CM

## 2019-11-04 DIAGNOSIS — M79.18 MYOFASCIAL PAIN: ICD-10-CM

## 2019-11-04 DIAGNOSIS — G89.4 CHRONIC PAIN DISORDER: Primary | ICD-10-CM

## 2019-11-04 DIAGNOSIS — M62.830 LUMBAR PARASPINAL MUSCLE SPASM: ICD-10-CM

## 2019-11-04 DIAGNOSIS — M62.838 CERVICAL PARASPINAL MUSCLE SPASM: ICD-10-CM

## 2019-11-04 DIAGNOSIS — M47.816 LUMBAR SPONDYLOSIS: ICD-10-CM

## 2019-11-04 DIAGNOSIS — M51.36 DDD (DEGENERATIVE DISC DISEASE), LUMBAR: ICD-10-CM

## 2019-11-04 PROCEDURE — 99999 PR PBB SHADOW E&M-EST. PATIENT-LVL III: ICD-10-PCS | Mod: PBBFAC,,, | Performed by: ANESTHESIOLOGY

## 2019-11-04 PROCEDURE — 99213 OFFICE O/P EST LOW 20 MIN: CPT | Mod: PBBFAC,PO | Performed by: ANESTHESIOLOGY

## 2019-11-04 PROCEDURE — 99214 PR OFFICE/OUTPT VISIT, EST, LEVL IV, 30-39 MIN: ICD-10-PCS | Mod: S$PBB,,, | Performed by: ANESTHESIOLOGY

## 2019-11-04 PROCEDURE — 99999 PR PBB SHADOW E&M-EST. PATIENT-LVL III: CPT | Mod: PBBFAC,,, | Performed by: ANESTHESIOLOGY

## 2019-11-04 PROCEDURE — 99214 OFFICE O/P EST MOD 30 MIN: CPT | Mod: S$PBB,,, | Performed by: ANESTHESIOLOGY

## 2019-11-04 RX ORDER — LORAZEPAM 1 MG/1
TABLET ORAL
Refills: 0 | COMMUNITY
Start: 2019-10-16 | End: 2020-07-09

## 2019-11-04 RX ORDER — FLUCONAZOLE 150 MG/1
TABLET ORAL
Refills: 5 | COMMUNITY
Start: 2019-10-21 | End: 2020-06-22 | Stop reason: CLARIF

## 2019-11-04 NOTE — PATIENT INSTRUCTIONS
"Recommend talking to Niru about additional treatment options for your diabetes.    Recommend reading "The Obesity Code" by Dr. Jey Mirza.    Recommend continuing the stretches and exercises from PT, as well as actually going to the facility.    Recommending considering the over the counter supplement called Alirio's legs cramps if still having issues with your legs.  Take as directed on the box.    Recommend drinking at least half your body weight (190/2 = 95) in ounces per day.  When your blood sugar is high, you lose water faster, so you will need to drink more.     Recommend restarting the nortriptyline 25 mg at bedtime, increasing to 50 mg after 1 week if needed.   "

## 2019-11-04 NOTE — PROGRESS NOTES
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 still having issues with your legs.  Take as directed on the box.  5. Recommend drinking at least half your body weight (190/2 = 95) in ounces per day.  When your blood sugar is high, you lose water faster, so you will need to drink more.   6. Recommend restarting the nortriptyline 25 mg at bedtime, increasing to 50 mg after 1 week if needed.   7. She has now had 4 steroid doses in 4 months.  We will need to be mindful of her total steroid dose moving forward  8. Continue naproxen 500 mg, no more than twice daily as needed.  9. Continue Lexapro.  10. Continue physical therapy. She will likely need extensive physical therapy. We had an extensive discussion regarding the expected time course for PT for chronic pain vs after a surgery.  Specifically, we discussed that PT for chronic pain almost always causes a worsening of pain before any improvement, which is generally not seen for 3-6 months, in which time she will likely already be home doing home exercises given that a formal course of PT generally lasts anywhere from 6-12 weeks.    11. RTC in 3-6 weeks or sooner if needed.    Trigger Point Injection:   The procedure was discussed with the patient including complications of damage, bleeding, infection, and failure of pain " relief.     All medications, allergies, and relevant histories were reviewed. No recent antibiotics or infections.  A time-out was taken to verify the correct patient, procedure, laterality, and appropriate medications/allergies.    Trigger points were identified by palpation and marked. CHG prep of sites done. A 27-gauge needle was advanced to the point of maximal tenderness, and is come up from a 2 mL of a mixture of 0.5% bupivacaine with clonidine 150 mcg was injected after negative aspiration in a fanlike distribution. All sites done in the same manner. Patient tolerated the procedure well and without complications. Sites injected included:  Bilateral upper traps, bilateral levator scap, bilateral rhomboids at T4, bilateral rhomboids at T6     The patient tolerated the procedure well and was discharged in excellent condition.      Sherri Gardiner MD  11/04/2019     The above plan and management options were discussed at length with patient. Patient is in agreement with the above and verbalized understanding. It will be communicated with the referring physician via electronic record, fax, or mail.

## 2019-11-06 ENCOUNTER — CLINICAL SUPPORT (OUTPATIENT)
Dept: REHABILITATION | Facility: HOSPITAL | Age: 49
End: 2019-11-06
Attending: ANESTHESIOLOGY

## 2019-11-06 DIAGNOSIS — M62.81 MUSCLE WEAKNESS (GENERALIZED): Primary | ICD-10-CM

## 2019-11-06 DIAGNOSIS — R53.81 PHYSICAL DECONDITIONING: ICD-10-CM

## 2019-11-06 PROCEDURE — 97110 THERAPEUTIC EXERCISES: CPT | Mod: PN

## 2019-11-06 PROCEDURE — 97014 ELECTRIC STIMULATION THERAPY: CPT | Mod: PN

## 2019-11-06 NOTE — PROGRESS NOTES
Physical Therapy Daily Treatment Note   Name: Nicole Harris 1970  MRN: [id number]    Visit Date: 11/6/2019  Visit #: 8/12  Authorization period Expiration: 12/31/19  Plan of Care Expiration: 12/12/19  Precautions: standard    Time In 1:55 PM               Time Out: 3:05 PM   Total 1:1 Treatment Time: 45 min    Treatment Diagnosis:    Encounter Diagnoses   Name Primary?    Muscle weakness (generalized) Yes    Physical deconditioning      Physician: Sherri Gardiner MD    Subjective   Pt reportspt reports feeling good today, with some soreness   Pain Scale:  5/10 on VAS currently   Pain Location: mid back    Objective   Nicole received therapeutic exercises to develop strength, flexibility, posture and core stabilization for 45 minutes including:    Treadmill at 1.7 mph x 15 min  Pelvic Tilts x 15  Supine marching with pelvic tilt x 15   Bridging x 15  S/L Clams x 15  DNP This Session:  Stretch to hip flexor w opposing knee to chest bell w assist   Assisted glut stretch 5 x 30 sec hold  Nerve flossing bell LE 5 x.NOT PERFORMED THIS SESSION  Prone on elbows  X 10, x 10 w [place] ext.   Stretch to hip adductors in butterfly  Stretch to  Hip add w one knee flexed to chest  Butterfly exes x 10    IFC x 20 mins to low back with CP after exercises.         Home Exercises and Education Provided     Education provided re:   - progress towards goals   - role of therapy in multi - disciplinary team, goals for therapy  Pt educated on condition, POC, and expectations in therapy.  No spiritual or educational barriers to learning provided    Home exercises:  Pt will be provided HEP during course of treatment with progressions as appropriate. Pt was advised to perform these exercises free of pain, and to stop performing them if pain occurs.   Nicole demonstrated good  understanding of the education provided.     Assessment   Pt performed several exercises then decided she had enough and was ready  for e-stim; patient has been inconsistent with attending therapy resulting in minimal progress.     Pt prognosis is Good. Pt will continue to benefit from skilled outpatient physical therapy to address the deficits listed in the problem list chart on initial evaluation, provide pt/family education and to maximize pt's level of independence in the home and community environment.     Medical necessity is demonstrated by the impairments and functional limitations listed on the Initial Evaluation.     Anticipated barriers to physical therapy: motivation and pain   Pt's spiritual, cultural and educational needs considered and pt agreeable to plan of care and goals.    Goals   Progressing, not met      Plan   Continue with established Plan of Care towards Physical Therapy goals.   Discussed Plan of Care with patient: Yes  Supervised face to face visit with Doug Koch PTA to discuss, current level of function, POC and progress toward goals.   Jonathan Favre, PTA  11/6/2019

## 2019-11-08 ENCOUNTER — CLINICAL SUPPORT (OUTPATIENT)
Dept: REHABILITATION | Facility: HOSPITAL | Age: 49
End: 2019-11-08
Attending: ANESTHESIOLOGY

## 2019-11-08 DIAGNOSIS — M62.81 MUSCLE WEAKNESS (GENERALIZED): Primary | ICD-10-CM

## 2019-11-08 DIAGNOSIS — R53.81 PHYSICAL DECONDITIONING: ICD-10-CM

## 2019-11-08 PROCEDURE — 97014 ELECTRIC STIMULATION THERAPY: CPT | Mod: PN

## 2019-11-08 PROCEDURE — 97010 HOT OR COLD PACKS THERAPY: CPT | Mod: PN

## 2019-11-08 NOTE — PROGRESS NOTES
"                            Physical Therapy Daily Treatment Note   Name: Nicole Harris 1970  MRN: [id number]    Visit Date: 11/8/2019  Visit #: 9/12  Authorization period Expiration: 12/31/19  Plan of Care Expiration: 12/12/19  Precautions: standard    Time In 10:00 AM               Time Out: 11:10 AM   Total 1:1 Treatment Time: 45 min    Treatment Diagnosis:    Encounter Diagnoses   Name Primary?    Muscle weakness (generalized) Yes    Physical deconditioning      Physician: Sherri Gardiner MD    Subjective   Pt reports: "I am not really feeling good today."  Pain Scale:  5/10 on VAS currently   Pain Location: mid back    Objective   Nicole received therapeutic exercises to develop strength, flexibility, posture and core stabilization for 45 minutes including:    Treadmill at 1.7 mph x 15 min  Nustep level 5 x 20 mins  Pelvic Tilts x 15  Supine marching with pelvic tilt x 15   Bridging x 15  S/L Clams x 15  DNP This Session:  Stretch to hip flexor w opposing knee to chest bell w assist   Assisted glut stretch 5 x 30 sec hold  Nerve flossing bell LE 5 x.NOT PERFORMED THIS SESSION  Prone on elbows  X 10, x 10 w [place] ext.   Stretch to hip adductors in butterfly  Stretch to  Hip add w one knee flexed to chest  Butterfly exes x 10    IFC x 25 mins to low back with CP after exercises.         Home Exercises and Education Provided     Education provided re:   - progress towards goals   - role of therapy in multi - disciplinary team, goals for therapy  Pt educated on condition, POC, and expectations in therapy.  No spiritual or educational barriers to learning provided    Home exercises:  Pt will be provided HEP during course of treatment with progressions as appropriate. Pt was advised to perform these exercises free of pain, and to stop performing them if pain occurs.   Nicole demonstrated good  understanding of the education provided.     Assessment   Pt only rode the nustep then decided she had enough and " was ready for e-stim; patient has been inconsistent with attending therapy resulting in minimal progress.     Pt prognosis is Good. Pt will continue to benefit from skilled outpatient physical therapy to address the deficits listed in the problem list chart on initial evaluation, provide pt/family education and to maximize pt's level of independence in the home and community environment.     Medical necessity is demonstrated by the impairments and functional limitations listed on the Initial Evaluation.     Anticipated barriers to physical therapy: motivation and pain   Pt's spiritual, cultural and educational needs considered and pt agreeable to plan of care and goals.    Goals   Progressing, not met      Plan   Continue with established Plan of Care towards Physical Therapy goals.   Discussed Plan of Care with patient: Yes  Supervised face to face visit with Doug Koch PTA to discuss, current level of function, POC and progress toward goals.   Jonathan Favre, PTA  11/8/2019

## 2019-11-13 ENCOUNTER — CLINICAL SUPPORT (OUTPATIENT)
Dept: REHABILITATION | Facility: HOSPITAL | Age: 49
End: 2019-11-13
Attending: ANESTHESIOLOGY

## 2019-11-13 DIAGNOSIS — M62.81 MUSCLE WEAKNESS (GENERALIZED): Primary | ICD-10-CM

## 2019-11-13 DIAGNOSIS — R53.81 PHYSICAL DECONDITIONING: ICD-10-CM

## 2019-11-13 PROCEDURE — 97110 THERAPEUTIC EXERCISES: CPT | Mod: PN

## 2019-11-13 PROCEDURE — 97010 HOT OR COLD PACKS THERAPY: CPT | Mod: PN

## 2019-11-13 NOTE — PROGRESS NOTES
Physical Therapy Daily Treatment Note   Name: Nicole Harris 1970  MRN: [id number]    Visit Date: 11/13/2019  Visit #: 10/12  Authorization period Expiration: 12/31/19  Plan of Care Expiration: 12/12/19  Precautions: standard    Time In 12:45 PM               Time Out: 1:55 PM   Total 1:1 Treatment Time: 45 min    Treatment Diagnosis:    Encounter Diagnoses   Name Primary?    Muscle weakness (generalized) Yes    Physical deconditioning      Physician: Sherri Gardiner MD    Subjective   Pt reports: No new c/o's.   Pain Scale:  5/10 on VAS currently   Pain Location: mid back    Objective   Nicole received therapeutic exercises to develop strength, flexibility, posture and core stabilization for 45 minutes including:    Treadmill at 1.7 mph x 15 min  Nustep level 5 x 25 mins  Pelvic Tilts x 15  Supine marching with pelvic tilt x 15   Bridging x 15  SLR x 10  S/L Clams x 15  Prone Lumbar Ext x 10   DNP This Session:  Stretch to hip flexor w opposing knee to chest bell w assist   Assisted glut stretch 5 x 30 sec hold  Nerve flossing bell LE x 5  Stretch to hip adductors in butterfly  Stretch to  Hip add w one knee flexed to chest  Butterfly exes x 10    CP x 15 mins to low back with  after exercises.         Home Exercises and Education Provided     Education provided re:   - progress towards goals   - role of therapy in multi - disciplinary team, goals for therapy  Pt educated on condition, POC, and expectations in therapy.  No spiritual or educational barriers to learning provided    Home exercises:  Pt will be provided HEP during course of treatment with progressions as appropriate. Pt was advised to perform these exercises free of pain, and to stop performing them if pain occurs.   Nicole demonstrated good  understanding of the education provided.     Assessment   Pt able to perform more exercises prior to becoming fatigued; patient has been inconsistent with attending therapy  resulting in minimal progress.     Pt prognosis is Good. Pt will continue to benefit from skilled outpatient physical therapy to address the deficits listed in the problem list chart on initial evaluation, provide pt/family education and to maximize pt's level of independence in the home and community environment.     Medical necessity is demonstrated by the impairments and functional limitations listed on the Initial Evaluation.     Anticipated barriers to physical therapy: motivation and pain   Pt's spiritual, cultural and educational needs considered and pt agreeable to plan of care and goals.    Goals   Progressing toward goals, but not met at this time.      Plan   Continue with established Plan of Care towards Physical Therapy goals.   Discussed Plan of Care with patient: Yes  Supervised face to face visit with Doug Koch PTA to discuss, current level of function, POC and progress toward goals.   Jonathan Favre, PTA  11/13/2019

## 2019-11-26 ENCOUNTER — CLINICAL SUPPORT (OUTPATIENT)
Dept: REHABILITATION | Facility: HOSPITAL | Age: 49
End: 2019-11-26
Attending: ANESTHESIOLOGY

## 2019-11-26 DIAGNOSIS — R53.81 PHYSICAL DECONDITIONING: ICD-10-CM

## 2019-11-26 DIAGNOSIS — M62.81 MUSCLE WEAKNESS (GENERALIZED): Primary | ICD-10-CM

## 2019-11-26 PROCEDURE — 97110 THERAPEUTIC EXERCISES: CPT | Mod: PN

## 2019-11-26 NOTE — PROGRESS NOTES
"                            Physical Therapy Daily Treatment Note   Name: Nicole Harris 1970  MRN: [id number]    Visit Date: 11/26/2019  Visit #: 11/12  Authorization period Expiration: 12/31/19  Plan of Care Expiration: 12/12/19  Precautions: standard    Time In 12:50 PM               Time Out: 1:50 PM   Total 1:1 Treatment Time: 45 min    Treatment Diagnosis:    Encounter Diagnoses   Name Primary?    Muscle weakness (generalized) Yes    Physical deconditioning      Physician: Sherri Gardiner MD    Subjective   Pt reports: "My left leg has been hurting."  Pain Scale: 7- 8/10 on VAS currently   Pain Location: mid back    Objective   Nicole received therapeutic exercises to develop strength, flexibility, posture and core stabilization for 45 minutes including:    Treadmill at 1.7 mph x 15 min  Nustep level 5 x 15 mins  Pelvic Tilts x 15  Supine marching with pelvic tilt x 15   Bridging x 15  SLR x 10  S/L Clams x 15  DKC with SB x 3 mins  Prone Lumbar Ext x 10   Manual H/S Stretches 5 x 15 sec  Manual Piriformis Stretches 5 x 15 sec  DNP This Session:  Stretch to hip flexor w opposing knee to chest bell w assist   Assisted glut stretch 5 x 30 sec hold  Nerve flossing bell LE x 5  Stretch to hip adductors in butterfly  Stretch to  Hip add w one knee flexed to chest  Butterfly exes x 10    CP x 15 mins to low back with  after exercises.         Home Exercises and Education Provided     Education provided re:   - progress towards goals   - role of therapy in multi - disciplinary team, goals for therapy  Pt educated on condition, POC, and expectations in therapy.  No spiritual or educational barriers to learning provided    Home exercises:  Pt will be provided HEP during course of treatment with progressions as appropriate. Pt was advised to perform these exercises free of pain, and to stop performing them if pain occurs.   Nicole demonstrated good  understanding of the education provided.     Assessment   Pt able " to perform more exercises prior to becoming fatigued; patient has been inconsistent with attending therapy resulting in minimal progress.     Pt prognosis is Good. Pt will continue to benefit from skilled outpatient physical therapy to address the deficits listed in the problem list chart on initial evaluation, provide pt/family education and to maximize pt's level of independence in the home and community environment.     Medical necessity is demonstrated by the impairments and functional limitations listed on the Initial Evaluation.     Anticipated barriers to physical therapy: motivation and pain   Pt's spiritual, cultural and educational needs considered and pt agreeable to plan of care and goals.    Goals   Progressing toward goals, but not met at this time.      Plan   Continue with established Plan of Care towards Physical Therapy goals.   Discussed Plan of Care with patient: Yes  Supervised face to face visit with Doug Koch PTA to discuss, current level of function, POC and progress toward goals.   Jonathan Favre, PTA  11/26/2019

## 2019-12-03 ENCOUNTER — CLINICAL SUPPORT (OUTPATIENT)
Dept: REHABILITATION | Facility: HOSPITAL | Age: 49
End: 2019-12-03
Attending: ANESTHESIOLOGY

## 2019-12-03 DIAGNOSIS — R53.81 PHYSICAL DECONDITIONING: ICD-10-CM

## 2019-12-03 DIAGNOSIS — M62.81 MUSCLE WEAKNESS (GENERALIZED): Primary | ICD-10-CM

## 2019-12-03 PROCEDURE — 97110 THERAPEUTIC EXERCISES: CPT | Mod: PN

## 2019-12-03 PROCEDURE — 97010 HOT OR COLD PACKS THERAPY: CPT | Mod: PN

## 2019-12-03 PROCEDURE — 97014 ELECTRIC STIMULATION THERAPY: CPT | Mod: PN

## 2019-12-03 NOTE — PROGRESS NOTES
"                            Physical Therapy Daily Treatment Note   Name: Nicole Harris 1970  MRN: [id number]    Visit Date: 12/3/2019  Visit #: 12/12  Authorization period Expiration: 12/31/19  Plan of Care Expiration: 12/12/19  Precautions: standard    Time In 12:55 PM               Time Out: 2:20 PM   Total 1:1 Treatment Time: 45 min    Treatment Diagnosis:    Encounter Diagnoses   Name Primary?    Muscle weakness (generalized) Yes    Physical deconditioning      Physician: Sherri Gardiner MD    Subjective   Pt reports: "My back is killing me today."   Pain Scale: 7/10 on VAS currently   Pain Location: mid back    Objective   Nicole received therapeutic exercises to develop strength, flexibility, posture and core stabilization for 45 minutes including:    Treadmill at 1.7 mph x 15 min  Nustep level 4 x 20 mins  Pelvic Tilts x 15  Supine marching with pelvic tilt x 15   Bridging x 15  SLR x 10  S/L Clams x 15  DKC with SB x 3 mins  Prone Lumbar Ext x 10   Manual H/S Stretches 5 x 15 sec  Manual Piriformis Stretches 5 x 15 sec  DNP This Session:  Stretch to hip flexor w opposing knee to chest bell w assist   Assisted glut stretch 5 x 30 sec hold  Nerve flossing bell LE x 5  Stretch to hip adductors in butterfly  Stretch to Hip add w one knee flexed to chest  Butterfly exes x 10    CP x 15 mins to low back with IFC x 15 mins after exercises.         Home Exercises and Education Provided     Education provided re:   - progress towards goals   - role of therapy in multi - disciplinary team, goals for therapy  Pt educated on condition, POC, and expectations in therapy.  No spiritual or educational barriers to learning provided    Home exercises:  Pt will be provided HEP during course of treatment with progressions as appropriate. Pt was advised to perform these exercises free of pain, and to stop performing them if pain occurs.   Nicole demonstrated good  understanding of the education provided.     Assessment "   Pt able to perform more exercises prior to becoming fatigued; patient has been inconsistent with attending therapy resulting in minimal progress.     Pt prognosis is Good. Pt will continue to benefit from skilled outpatient physical therapy to address the deficits listed in the problem list chart on initial evaluation, provide pt/family education and to maximize pt's level of independence in the home and community environment.     Medical necessity is demonstrated by the impairments and functional limitations listed on the Initial Evaluation.     Anticipated barriers to physical therapy: motivation and pain   Pt's spiritual, cultural and educational needs considered and pt agreeable to plan of care and goals.    Goals   Progressing toward goals, but not met at this time.      Plan   Continue with established Plan of Care towards Physical Therapy goals.   Discussed Plan of Care with patient: Yes  Supervised face to face visit with Doug Koch PTA to discuss, current level of function, POC and progress toward goals.   Jonathan Favre, PTA  12/3/2019

## 2019-12-09 ENCOUNTER — CLINICAL SUPPORT (OUTPATIENT)
Dept: REHABILITATION | Facility: HOSPITAL | Age: 49
End: 2019-12-09
Attending: ANESTHESIOLOGY

## 2019-12-09 DIAGNOSIS — R53.81 PHYSICAL DECONDITIONING: ICD-10-CM

## 2019-12-09 DIAGNOSIS — M62.81 MUSCLE WEAKNESS (GENERALIZED): Primary | ICD-10-CM

## 2019-12-09 PROCEDURE — 97110 THERAPEUTIC EXERCISES: CPT | Mod: PN

## 2019-12-09 PROCEDURE — 97010 HOT OR COLD PACKS THERAPY: CPT | Mod: PN

## 2019-12-09 NOTE — PROGRESS NOTES
"                            Physical Therapy Daily Treatment Note   Name: Nicole Harris 1970  MRN: [id number]    Visit Date: 12/9/2019  Visit #: 16  Authorization period Expiration: 12/31/19  Plan of Care Expiration: 12/12/19  Precautions: standard    Time In 10:20 AM               Time Out: 11:55 AM   Total 1:1 Treatment Time: 45 min    Treatment Diagnosis:    Encounter Diagnoses   Name Primary?    Muscle weakness (generalized) Yes    Physical deconditioning      Physician: Sherri Gardiner MD    Subjective   Pt reports: "My back and legs are really bothering me."   Pain Scale: 7/10 on VAS currently   Pain Location: mid back    Objective   Nicole received therapeutic exercises to develop strength, flexibility, posture and core stabilization for 45 minutes including:    Treadmill at 1.7 mph x 10 min  Nustep level 4 x 20 mins  Pelvic Tilts x 15  Supine marching with pelvic tilt x 15   Bridging x 15  SLR x 10  S/L Clams x 15  DKC with SB x 3 mins  Prone Lumbar Ext x 10   Manual H/S Stretches 5 x 15 sec  Manual Piriformis Stretches 5 x 15 sec  DNP This Session:  Stretch to hip flexor w opposing knee to chest bell w assist   Assisted glut stretch 5 x 30 sec hold  Nerve flossing bell LE x 5  Stretch to hip adductors in butterfly  Stretch to Hip add w one knee flexed to chest  Butterfly exes x 10    CP x 15 mins to low back x 15 mins after exercises.         Home Exercises and Education Provided     Education provided re:   - progress towards goals   - role of therapy in multi - disciplinary team, goals for therapy  Pt educated on condition, POC, and expectations in therapy.  No spiritual or educational barriers to learning provided    Home exercises:  Pt will be provided HEP during course of treatment with progressions as appropriate. Pt was advised to perform these exercises free of pain, and to stop performing them if pain occurs.   Nicole demonstrated good  understanding of the education provided.     Assessment "   Pt did pretty well with exercises; will continue to progress as tolerated to improve strength and decrease pain.    Pt prognosis is Good. Pt will continue to benefit from skilled outpatient physical therapy to address the deficits listed in the problem list chart on initial evaluation, provide pt/family education and to maximize pt's level of independence in the home and community environment.     Medical necessity is demonstrated by the impairments and functional limitations listed on the Initial Evaluation.     Anticipated barriers to physical therapy: motivation and pain   Pt's spiritual, cultural and educational needs considered and pt agreeable to plan of care and goals.    Goals   Progressing toward goals, but not met at this time.      Plan   Continue with established Plan of Care towards Physical Therapy goals.   Discussed Plan of Care with patient: Yes  Supervised face to face visit with Doug Koch PTA to discuss, current level of function, POC and progress toward goals.   Jonathan Favre, PTA  12/9/2019

## 2019-12-12 ENCOUNTER — HOSPITAL ENCOUNTER (EMERGENCY)
Facility: HOSPITAL | Age: 49
Discharge: HOME | End: 2019-12-12
Attending: FAMILY MEDICINE

## 2019-12-12 VITALS
OXYGEN SATURATION: 96 % | HEIGHT: 65 IN | DIASTOLIC BLOOD PRESSURE: 65 MMHG | WEIGHT: 198 LBS | RESPIRATION RATE: 20 BRPM | TEMPERATURE: 99 F | HEART RATE: 95 BPM | BODY MASS INDEX: 32.99 KG/M2 | SYSTOLIC BLOOD PRESSURE: 127 MMHG
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
## 2019-12-12 DIAGNOSIS — T07.XXXA MULTIPLE CONTUSIONS: ICD-10-CM

## 2019-12-12 PROCEDURE — 99284 EMERGENCY DEPT VISIT MOD MDM: CPT | Mod: 25

## 2019-12-12 PROCEDURE — 73502 X-RAY EXAM HIP UNI 2-3 VIEWS: CPT | Mod: 26,LT,, | Performed by: RADIOLOGY

## 2019-12-12 PROCEDURE — 73502 XR PELVIS 3 VIEW INC HIP 2 VIEW LEFT: ICD-10-PCS | Mod: 26,LT,, | Performed by: RADIOLOGY

## 2019-12-12 PROCEDURE — 73502 X-RAY EXAM HIP UNI 2-3 VIEWS: CPT | Mod: TC,FY,LT

## 2019-12-12 PROCEDURE — 72070 XR THORACIC SPINE AP LATERAL: ICD-10-PCS | Mod: 26,,, | Performed by: RADIOLOGY

## 2019-12-12 PROCEDURE — 72070 X-RAY EXAM THORAC SPINE 2VWS: CPT | Mod: 26,,, | Performed by: RADIOLOGY

## 2019-12-12 PROCEDURE — 72070 X-RAY EXAM THORAC SPINE 2VWS: CPT | Mod: TC,FY

## 2019-12-12 RX ORDER — CYCLOBENZAPRINE HCL 10 MG
10 TABLET ORAL 3 TIMES DAILY PRN
Qty: 12 TABLET | Refills: 0 | Status: SHIPPED | OUTPATIENT
Start: 2019-12-12 | End: 2019-12-17

## 2019-12-12 NOTE — ED PROVIDER NOTES
Encounter Date: 2019       History     Chief Complaint   Patient presents with    Fall     Patient tripped over her dog, complaining of left hip, lower back and left ankle pain.     Nicole Harris is a 49 y.o female with PMHx including bipolar, depression, DM, HTN, and insomnia. She presents to ED with complaint of injuries sustained from fall last night    Patient reports that she attempted to separate her dogs from fighting and fell onto floor    She complaints of pain to back, left hip and left ankle.    She denies head injury or LOC    Lower extremities NVI           Review of patient's allergies indicates:   Allergen Reactions    Hydrocodone Nausea And Vomiting     Past Medical History:   Diagnosis Date    Anxiety     Bipolar disorder     Depression     Diabetes mellitus, type 2     HTN (hypertension)     Insomnia      Past Surgical History:   Procedure Laterality Date     SECTION      COLONOSCOPY N/A 2019    Procedure: COLONOSCOPY;  Surgeon: Da Diallo MD;  Location: [facility] ENDO;  Service: General;  Laterality: N/A;    EPIDURAL STEROID INJECTION N/A 2019    Procedure: Injection, Steroid, Epidural - L4/5 EPIDURAL STEROID INJECTION;  Surgeon: Sherri Gardiner MD;  Location: [facility] OR;  Service: Pain Management;  Laterality: N/A;    EPIDURAL STEROID INJECTION N/A 2019    Procedure: Injection, Steroid, Epidural - C7/T1 EPIDURAL STEROID INJECTION;  Surgeon: Sherri Gardiner MD;  Location: [facility] OR;  Service: Pain Management;  Laterality: N/A;    EPIDURAL STEROID INJECTION N/A 2019    Procedure: Injection, Steroid, Epidural - L4/5 EPIDURAL STEROID INJECTION;  Surgeon: Sherri Gardiner MD;  Location: [facility] OR;  Service: Pain Management;  Laterality: N/A;    EPIDURAL STEROID INJECTION N/A 10/21/2019    Procedure: Injection, Steroid, Epidural, CERVICAL C7/T1 SILVIA;  Surgeon: Sherri Gardiner MD;  Location: [facility] OR;  Service: Pain Management;  Laterality: N/A;  19     ESOPHAGOGASTRODUODENOSCOPY N/A 2019    Procedure: ESOPHAGOGASTRODUODENOSCOPY (EGD);  Surgeon: Da Diallo MD;  Location: [facility] ENDO;  Service: General;  Laterality: N/A;    TONSILLECTOMY      TRIGGER POINT INJECTION N/A 2019    Procedure: INJECTION, TRIGGER POINT - CERVICAL REGION;  Surgeon: Sherri Gardiner MD;  Location: [facility] OR;  Service: Pain Management;  Laterality: N/A;    TRIGGER POINT INJECTION N/A 10/21/2019    Procedure: INJECTION, TRIGGER POINT;  Surgeon: Sherri Gardiner MD;  Location: [facility] OR;  Service: Pain Management;  Laterality: N/A;     History reviewed. No pertinent family history.  Social History     Tobacco Use    Smoking status: Former Smoker     Packs/day: 1.00     Years: 0.00     Pack years: 0.00     Last attempt to quit: 2012     Years since quittin.2    Smokeless tobacco: Never Used   Substance Use Topics    Alcohol use: Yes     Frequency: Monthly or less     Comment: occasional    Drug use: Yes     Types: Marijuana, Other-see comments     Review of Systems   Constitutional: Negative.  Negative for fever.   HENT: Negative.  Negative for sore throat.    Eyes: Negative.    Respiratory: Negative.  Negative for shortness of breath.    Cardiovascular: Negative.  Negative for chest pain.   Gastrointestinal: Negative.  Negative for nausea.   Endocrine: Negative.    Genitourinary: Negative.  Negative for dysuria.   Musculoskeletal: Positive for arthralgias (left hip and ankle) and back pain.   Skin: Negative for rash.   Allergic/Immunologic: Negative.    Neurological: Negative.  Negative for weakness.   Hematological: Negative.  Does not bruise/bleed easily.   Psychiatric/Behavioral: Negative.    All other systems reviewed and are negative.      Physical Exam     Initial Vitals [19 1009]   BP Pulse Resp Temp SpO2   127/65 95 20 98.7 °F (37.1 °C) 96 %      MAP       --         Physical Exam    Nursing note and vitals reviewed.  Constitutional: She appears  well-developed and well-nourished.   HENT:   Head: Normocephalic.   Eyes: Conjunctivae are normal.   Neck: Normal range of motion. Neck supple.   Cardiovascular: Normal rate.   Pulmonary/Chest: Breath sounds normal.   Musculoskeletal: She exhibits tenderness.        Right hip: Normal.        Left hip: She exhibits decreased range of motion, tenderness and bony tenderness. She exhibits normal strength, no swelling, no crepitus, no deformity and no laceration.        Left ankle: She exhibits normal range of motion, no swelling, no ecchymosis, no deformity (mild.), no laceration and normal pulse. Tenderness. Medial malleolus tenderness found. Achilles tendon normal.        Thoracic back: She exhibits decreased range of motion, tenderness, bony tenderness, pain and spasm. She exhibits no swelling, no edema, no deformity, no laceration and normal pulse.        Lumbar back: Normal.        Back:         Feet:    Neurological: She is alert and oriented to person, place, and time. GCS score is 15. GCS eye subscore is 4. GCS verbal subscore is 5. GCS motor subscore is 6.   Skin: Skin is warm. Capillary refill takes less than 2 seconds.   Psychiatric: She has a normal mood and affect. Her behavior is normal. Judgment and thought content normal.         ED Course   Procedures  Labs Reviewed - No data to display       Imaging Results    None          Medical Decision Making:   Initial Assessment:   Patient with complaint of injuries sustained from fall last night    Patient reports that she attempted to separate her dogs from fighting and fell onto floor    She complaints of pain to back, left hip and left ankle.    She denies head injury or LOC    Lower extremities NVI       Differential Diagnosis:   Bone injury, strain, contusion  ED Management:  XR thoracic spine and left hip with no acute findings    Discussed physical exam findings with patient  No acute emergent medical condition identified at this time to warrant further  testing/diagnostics  At this time, I believe the patient is clinically stable for discharge.   Patient to follow up with PCP in 1-2 days.  The patient acknowledges that close follow up with a MD is required after all ER visits  Pt given instructions; take all medications prescribed in the ER as directed.   Patient agrees to comply with all instruction and direction given in the ER  Pt agrees to return to ER if any symptoms reoccur                                          Clinical Impression:       ICD-10-CM ICD-9-CM   1. Fall, initial encounter W19.XXXA E888.9   2. Multiple contusions T07.XXXA 924.8                             Xena Beverly NP  12/12/19 1202

## 2019-12-13 ENCOUNTER — OFFICE VISIT (OUTPATIENT)
Dept: ORTHOPEDICS | Facility: CLINIC | Age: 49
End: 2019-12-13

## 2019-12-13 VITALS
BODY MASS INDEX: 32.99 KG/M2 | SYSTOLIC BLOOD PRESSURE: 125 MMHG | HEIGHT: 65 IN | DIASTOLIC BLOOD PRESSURE: 85 MMHG | HEART RATE: 80 BPM | WEIGHT: 198 LBS

## 2019-12-13 DIAGNOSIS — G56.23 CUBITAL TUNNEL SYNDROME, BILATERAL: ICD-10-CM

## 2019-12-13 DIAGNOSIS — M54.10 RADICULAR PAIN OF UPPER EXTREMITY: ICD-10-CM

## 2019-12-13 DIAGNOSIS — G56.03 CARPAL TUNNEL SYNDROME ON BOTH SIDES: Primary | ICD-10-CM

## 2019-12-13 PROCEDURE — 99214 PR OFFICE/OUTPT VISIT, EST, LEVL IV, 30-39 MIN: ICD-10-PCS | Mod: S$PBB,,, | Performed by: ORTHOPAEDIC SURGERY

## 2019-12-13 PROCEDURE — 99214 OFFICE O/P EST MOD 30 MIN: CPT | Mod: S$PBB,,, | Performed by: ORTHOPAEDIC SURGERY

## 2019-12-13 PROCEDURE — 99999 PR PBB SHADOW E&M-EST. PATIENT-LVL III: CPT | Mod: PBBFAC,,, | Performed by: ORTHOPAEDIC SURGERY

## 2019-12-13 PROCEDURE — 99213 OFFICE O/P EST LOW 20 MIN: CPT | Mod: PBBFAC,PN | Performed by: ORTHOPAEDIC SURGERY

## 2019-12-13 PROCEDURE — 99999 PR PBB SHADOW E&M-EST. PATIENT-LVL III: ICD-10-PCS | Mod: PBBFAC,,, | Performed by: ORTHOPAEDIC SURGERY

## 2019-12-13 NOTE — PROGRESS NOTES
Subjective:      Patient ID: Nicole Harris is a 49 y.o. female.    Chief Complaint: Hand Pain (Bilateral hands)      HPI: Ms. Harris returns today for re-evaluation of both of her hands.  At her last visit she was diagnosed with carpal tunnel syndrome and clinical cubital tunnel syndrome.  She also had radicular symptoms of her upper extremities.  He has seen a spine surgeon who said that she did not need spine surgery.  She has been getting some epidural steroid injections which gave her relief but her symptoms recur.  She has been wearing her wrist splints but does not wear them at night.  She stated that the steroid injections in her hands did improve her symptoms but she still has numbness and tingling.  She is still being awakened at night with her symptoms.    ROS:  New diagnosis/surgery/prescriptions since last office visit on 11/01/2019.  Epidural steroid injections.  Constitution: Negative for chills and fever.   HENT: Negative for congestion.    Eyes: Negative for blurred vision.   Cardiovascular: Negative for chest pain.   Respiratory: Negative for cough.    Endocrine: Negative for polydipsia.   Hematologic/Lymphatic: Negative for adenopathy.   Skin: Negative for flushing and itching.   Musculoskeletal: Negative for gout.   Gastrointestinal: Positive for heartburn.   Genitourinary: Negative for nocturia.   Neurological: Positive for headaches. Negative for seizures.   Psychiatric/Behavioral: Positive for depression. The patient is nervous/anxious.    Allergic/Immunologic: Positive for environmental allergies.       Objective:      Physical Exam:   General: AAOx3.  No acute distress  Vascular:  Pulses intact and equal bilaterally.  Capillary refill less than 3 seconds and equal bilaterally  Neurologic:  Pinprick and soft touch intact and equal bilaterally.  Spurling's positive.  Tinel's positive both wrists and both elbows.  Phalen's positive bilaterally. Elbow flexion test positive  bilaterally  Integment:  No ecchymosis, no errythema  Extremity:  Wrist:  Pronation/supination equal bilaterally 90/85 degrees. Dorsiflexion/volar flexion equal bilaterally 80/75 degrees. Nontender in the anatomic snuffbox bilaterally. Nontender at the 1st dorsal compartment bilaterally.  No swelling at the 1st dorsal compartment bilaterally.  Finkelstein's negative bilaterally.  Nontender at the scapholunate interval bilaterally. Aguero's test negative bilaterally. Nontender at the DRUJ/TFCC bilaterally. Nontender with forced ulnar deviation bilaterally.  strength equal bilaterally. Minimal thenar atrophy both hands.  Wartenberg sign mildly positive both hands.  Durkan's test negative both hands.                      Elbow:  Pronation/supination equal bilaterally 90/85 degrees. Extension/flexion equal bilaterally 0/130 degrees. Nontender at the epicondyles bilaterally.  Radial/ulna stressing equal bilaterally with endpoint.  Nontender over the radial head bilaterally.  Nontender over the olecranon and the triceps insertion both elbows.  Triceps tendon intact throughout full distribution.                       C-spine:  Forward flexion/backward flexion 70/70 degrees. Right/left rotation 80/80 degrees. Right/left side bending 50/50 degrees. Mild tenderness with C-spine motion. Mild tenderness with C-spine palpation.  Radiography:  No new x-rays done today.        Assessment:       Impression:   1.  Bilateral carpal tunnel syndrome.  2.  Clinical bilateral cubital tunnel syndrome.  3.  Radicular upper extremity pain.      Plan:       1.  Discussed physical examination and radiographic findings with the patient. Nicole understands that she has multiple issues associated her hands where she appears to have what is called a double crush.  She could have her neck addressed and may improve or she could have releases of her wrist and elbow to see if she improves.  She states she is deathly afraid of surgery and does not  want surgery and wants to continue with conservative management.  2.  Discussed carpal tunnel and cubital tunnel release in detail with the patient she declined.  3.  Continue with wrist splints.  She also understands she should wear them at night.  4.  Take NSAIDs as tolerated allowed by PCM.  5.  Continue with home exercises as shown discussed.  6.  [facility] portal was discussed with the patient and information was given.  The patient was encouraged to use the portal for future encounters.  7.  Follow up p.r.n..  If the patient is still symptomatic at next visit will readdress nerve decompression.

## 2020-01-07 ENCOUNTER — HOSPITAL ENCOUNTER (OUTPATIENT)
Dept: RADIOLOGY | Facility: HOSPITAL | Age: 50
Discharge: HOME | End: 2020-01-07
Attending: NURSE PRACTITIONER

## 2020-01-07 DIAGNOSIS — M25.552 LEFT HIP PAIN: ICD-10-CM

## 2020-01-07 DIAGNOSIS — M25.552 LEFT HIP PAIN: Primary | ICD-10-CM

## 2020-01-07 PROCEDURE — 73502 X-RAY EXAM HIP UNI 2-3 VIEWS: CPT | Mod: 26,LT,, | Performed by: RADIOLOGY

## 2020-01-07 PROCEDURE — 73502 X-RAY EXAM HIP UNI 2-3 VIEWS: CPT | Mod: TC,FY,LT

## 2020-01-07 PROCEDURE — 73502 XR HIP 2 VIEW LEFT: ICD-10-PCS | Mod: 26,LT,, | Performed by: RADIOLOGY

## 2020-01-08 ENCOUNTER — CLINICAL SUPPORT (OUTPATIENT)
Dept: REHABILITATION | Facility: HOSPITAL | Age: 50
End: 2020-01-08
Attending: ANESTHESIOLOGY

## 2020-01-08 DIAGNOSIS — G89.29 CHRONIC LEFT-SIDED LOW BACK PAIN WITH LEFT-SIDED SCIATICA: Primary | ICD-10-CM

## 2020-01-08 DIAGNOSIS — M51.36 LUMBAR DEGENERATIVE DISC DISEASE: ICD-10-CM

## 2020-01-08 DIAGNOSIS — R53.81 PHYSICAL DECONDITIONING: ICD-10-CM

## 2020-01-08 DIAGNOSIS — M62.81 MUSCLE WEAKNESS (GENERALIZED): ICD-10-CM

## 2020-01-08 DIAGNOSIS — M54.42 CHRONIC LEFT-SIDED LOW BACK PAIN WITH LEFT-SIDED SCIATICA: Primary | ICD-10-CM

## 2020-01-08 PROCEDURE — 97161 PT EVAL LOW COMPLEX 20 MIN: CPT | Mod: PN

## 2020-01-08 NOTE — PLAN OF CARE
[facility]  Physical Therapy Initial Evaluation    Name: Nicole Harris  Clinic Number: [id number]    Therapy Diagnosis:   Encounter Diagnoses   Name Primary?    Lumbar degenerative disc disease     Physical deconditioning     Muscle weakness (generalized)     Chronic left-sided low back pain with left-sided sciatica Yes     Physician: Vira Conn, NP    Physician Orders: PT Eval and Treat   Medical Diagnosis: Left Hip pain, Lumbar DDD  Evaluation Date: 2020  Authorization period Expiration: 2020  Plan of Care Certification Period: 2020    Visit #: 1/ Visits authorized: 12  Time In: 9:50 AM   Time Out: 11:00 AM   Total Billable Time: 60 minutes    Precautions: Standard    Subjective   Date of onset: Chronic - see initial PT Evaluation on 9/3/2019.  Nicole completed 16 PT visits with last one on 19.   Date of Surgery: n/a    Past Medical History:   Diagnosis Date    Anxiety     Bipolar disorder     Depression     Diabetes mellitus, type 2     HTN (hypertension)     Insomnia      Nicole Harris  has a past surgical history that includes  section; Tonsillectomy; Epidural steroid injection (N/A, 2019); Epidural steroid injection (N/A, 2019); Epidural steroid injection (N/A, 2019); Trigger point injection (N/A, 2019); Colonoscopy (N/A, 2019); Esophagogastroduodenoscopy (N/A, 2019); Epidural steroid injection (N/A, 10/21/2019); and Trigger point injection (N/A, 10/21/2019).    Nicole has a current medication list which includes the following prescription(s): albuterol, aripiprazole, escitalopram oxalate, farxiga, fluconazole, furosemide, lisinopril, lorazepam, metformin, naproxen, oxcarbazepine, and pantoprazole, and the following Facility-Administered Medications: sodium chloride 0.9%.    Review of patient's allergies indicates:   Allergen Reactions    Hydrocodone Nausea And Vomiting        Imaging, recent xray of  hips : : negative; other spinal xrays / MRI already noted in Epic;     Prior Therapy: Nicole has been seen in PT since 9/3/2019 for current condition; Her last visit was 12/9/19 - was told that she needed new orders to continue - went to see her PCP and new orders received.   Social History: Patient lives in a single story home with 0 steps to enter ; lives alone; home is handicapped accessible   Occupation: does not work   Prior Level of Function: sedentary; but independent with ADL's and ambulation  Current Level of Function: sedentary; Independent with ADL's ; uses a cane at times for ambulation secondary to pain       Pain: current 5/10, worst 10/10, best 5/10,   Throbbing, Tight and Sharp, constant  Radicular symptoms: left LE - hip primarily   Aggravating Factors: everything - it's always there   Easing Factors: ice, lying down, heating pad and rest      Onset/JACOBO: Bailey has been a physical therapy patient here since October 2019 - Last visit was in December; Has fallen since this time - trying to separate her dogs and she lost her balance and fell onto her knees; went to ER with c/o's of left hip pain - xrays were negative;     History of current condition - Nicole reports: chronic history of symptoms, including over a year history of chronic pain; she participated fairly well in therapy, performing about 30-40% of the exercises on her POC; she quit coming right before the holidays and is now just getting back; she really has not demonstrated much progress towards goals established; she continues to have pain that limits her mobility; she reports chronic pain limiting her her ADL's, mobility and quality of life; Pain in mid-back to lower back, bilateral hips/LE's with left greater than right; Bilateral carpal tunnel with right greater than left; She has had multiple SILVIA over the past year and does return to Dr. Gardiner again in about 1-2 weeks.     Pts goals: To improve my quality of life by moving more with  less pain       Objective     Observation: Nicole ambulated into clinic; no AD noted; She moves slowly, guarded, but does not appear in distress.     Posture: Slouched sitting and standing posture; forward head, rounded shoulders; dowagers hump and protruding abdomen     Gait: slow pace, increased lateral trunk flexion noted.     Lumbar Range of Motion:    Degrees Pain   Flexion 20   + pain at end range         Extension 10   + end range pain         Left Side Bending 20 ok        Right Side Bending 20 ok        Left rotation   44 End range discomfort         Right Rotation   35 End range discomfort            Lower Extremity Strength  Right LE  Left LE    Knee extension: 5/5 Knee extension: 5/5   Knee flexion: 5/5 Knee flexion: 5/5   Hip flexion: 5/5 Hip flexion: 5/5   Hip extension:  5/5 Hip extension: 5/5   Hip abduction: 5/5 Hip abduction: 5/5   Hip adduction: 5/5 Hip adduction 5/5   Ankle dorsiflexion: 5/5 Ankle dorsiflexion: 5/5   Ankle plantarflexion: 5/5 Ankle plantarflexion: 5/5   Upper abdominals 3-/5     Lower abdominals 3-/5     Back extensors 3-/5       Special Tests:    Repeated Flexion Positive   Repeated Extension Positive   Prone Instability Negative   Straight Leg Raise Negative   Slump Negative   Quadrant Negative   Femoral nerve test Negative       DTR:   Right Left Comment   Patellar (L3-4) 2+ 2+    Achilles (S1) 1+ 1+        Joint Mobility:   Lumbar: CPA restricted,   RPA restricted  LPA restricted    Thoracic: restricted    Palpation: moderate tenderness to palpation at lumbar paraspinals, thoracic paraspinals, bilateral hips     Sensation: intact to light touch     Flexibility:     90/90 SLR = R moderate restriction, L moderate restriction   Ely's test: R moderate restriction, L moderate restriction   Jason's test: R moderate restriction, L moderate restriction   Ruslan test: R moderate restriction, L moderate restriction      Functional Limitations Reports - G Codes  Category: Mobility, Body  position, Carrying, Self care, Other  Tool: Oswestry   Score: 70% impairment     PT Evaluation Completed: Yes  Discussed Plan of Care with patient: Yes    TREATMENT:    Home Exercises and Patient Education Provided  Education provided re:   - progress towards goals   - role of therapy in multi - disciplinary team, goals for therapy  Pt educated on condition, POC, and expectations in therapy.  No spiritual or educational barriers to learning provided    Home exercises:  Pt will be provided HEP during course of treatment with progressions as appropriate. Pt was advised to perform these exercises free of pain, and to stop performing them if pain occurs.   Nicole demonstrate good  understanding of the education provided.     Assessment     Nicole is a 49 y.o. female referred to outpatient physical therapy and presents to PT with chronic pain disorder . Nicole initiated physical therapy back in September and has competed 16 visits to date with minimal improvement in her pain levels. Completed Oswestry remains at 70% impairment - no change from initial evaluation.  Nicole has not  Been seen since December 9th - she called to get herself back on the schedule as she felt she was getting worse in terms of stiffness and pain. Nicole did have a fall early in December that preceded a trip to the ER for increased left hip pain. Nicole will be returning to Dr. Gardiner in a week or two - will continue with PT until at least this visit. Patient demonstrates limitations as described in the problem list. Pt will benefit from physcial therapy services in order to maximize pain free functional mobility. The following goals were discussed with the patient and patient is in agreement with them as to be addressed in the treatment plan.     Anticipated barriers to physical therapy: none    Medical necessity is demonstrated by the following IMPAIRMENTS/PROBLEM LIST:    weakness, impaired endurance, impaired sensation, impaired functional mobility,  pain and decreased ROM        GOALS:    Long Term Goals: 6 weeks    Pain: Decrease pain to no more than 3/10 to allow for improved ability to perform ADL's and functional mobility   Strength: Improve strength in core muscles to 5/5 for improved lumbopelvic stability  ROM: Improve ROM to 75% of normal limits   Functional scale: Improve score on Oswestry  to < 58% impairment   Lifting: Lift 20 lbs to waist level, 10 lbs to shoulder level, 5 lbs to overhead without pain or compensation  Walking: Increase walking distance and/or duration to 700 ft without pain   Postures: Increase sitting and/or standing duration to 20 mins  without pain   Transfers: Perform Sit <> Stand and Supine <> Sit  transfers without increased pain or limitation  Exercise: demonstrate independence with home exercise program to maintain gains made in therapy.        Plan   .    Outpatient physical therapy 2 times weekly to include: Manual Therapy, Moist Heat/ Ice, Neuromuscular Re-ed, Patient Education and Therapeutic Exercise. Cont PT for 6 weeks.   Pt may be seen by PTA as part of the rehabilitation team.     I certify the need for these services furnished under this plan of treatment and while under my care.    Quita Holliday, PT          Attestation:   I have seen the patient, reviewed the therapist's plan of care, and I agree with the plan of care.   I certify the need for these services furnished under this plan of treatment and while under my care.         _______________            ________                                               _____________________  Physician/Referring Practitioner                                                            Date of Signature

## 2020-01-14 ENCOUNTER — CLINICAL SUPPORT (OUTPATIENT)
Dept: REHABILITATION | Facility: HOSPITAL | Age: 50
End: 2020-01-14
Attending: ANESTHESIOLOGY

## 2020-01-14 DIAGNOSIS — M62.81 MUSCLE WEAKNESS (GENERALIZED): ICD-10-CM

## 2020-01-14 DIAGNOSIS — M54.42 CHRONIC LEFT-SIDED LOW BACK PAIN WITH LEFT-SIDED SCIATICA: Primary | ICD-10-CM

## 2020-01-14 DIAGNOSIS — G89.29 CHRONIC LEFT-SIDED LOW BACK PAIN WITH LEFT-SIDED SCIATICA: Primary | ICD-10-CM

## 2020-01-14 DIAGNOSIS — R53.81 PHYSICAL DECONDITIONING: ICD-10-CM

## 2020-01-14 PROCEDURE — 97140 MANUAL THERAPY 1/> REGIONS: CPT | Mod: PN

## 2020-01-14 PROCEDURE — 97014 ELECTRIC STIMULATION THERAPY: CPT | Mod: PN

## 2020-01-14 PROCEDURE — 97110 THERAPEUTIC EXERCISES: CPT | Mod: PN

## 2020-01-14 NOTE — PROGRESS NOTES
Physical Therapy Daily Note     Name: Nicole BONILLA [facility] Number: [id number]  Diagnosis:   Encounter Diagnoses   Name Primary?    Muscle weakness (generalized)     Physical deconditioning     Chronic left-sided low back pain with left-sided sciatica Yes     Physician: Vira Conn NP  Precautions: standard   Visit #: 2 of 12  PTA Visit #: 0  Time In: 3:00 PM   Time Out: 4:00 pm     Subjective     Pt reports: My back is hurting today   Pain Scale: Nicole rates pain on a scale of 0-10 to be 6 currently.    Objective     Nicole received individual therapeutic exercises to develop strength, ROM, flexibility, posture and core stabilization for 30 minutes including:    Nustep level 4 x 20 mins  Pelvic Tilts x 15  Supine marching with pelvic tilt x 15   Bridging x 15  SLR x 10  S/L Clams x 15  DKC with SB x 3 mins  Prone Lumbar Ext x 10   Manual H/S Stretches 5 x 15 sec  Manual Piriformis Stretches 5 x 15 sec       Nicole received the following manual therapy techniques: Joint mobilizations and Soft tissue Mobilization were applied to the: lumbar spine  for 15 minutes including:  S/L lumbar flexion to all levels on both right and left; Prone - lamina release with paraspinal skin rolling.      The patient received the following direct contact modalities after being cleared for contraindications:     The patient received the following supervised modalities after being cleared for contradictions: Ice and e-stim to lumbar spine x 17 mins.    Written Home Exercises Provided:   To continue with lumbar stretches from previous PT   Pt demo good understanding of the education provided. Nicole demonstrated good return demonstration of activities.     Education provided re:  Nicole verbalized good understanding of education provided.   No spiritual or educational barriers to learning provided    Assessment     Patient tolerated treatment well.  Having some increased      Short Stay Endoscopy History and Physical    PCP - Carlyle Gordillo MD    Procedure - Colonoscopy  + EGD  ASA - per anesthesia  Mallampati - per anesthesia  History of Anesthesia problems - no  Family history Anesthesia problems - no   Plan of anesthesia - General / MAC    HPI:  This is a 44 y.o. female here for evaluation of :   EGD for reflux, dyspepsia, gastroparesis, nausea, dysphagia.  Potential botox pylorus for gastroparesis.    Colonoscopy for change in bowel habits, abdominal pains, family hx of colon cancer (mother diagnosed in 50s), abnormal CT imaging concern for diverticulitis in past      ROS:  Constitutional: No fevers, chills, No weight loss  CV: No chest pain  Pulm: No cough, No shortness of breath  GI: see HPI  Derm: No rash    Medical History:  has a past medical history of Allergy, Anemia, Asthma, Depression, Diabetes with neurologic complications, GERD (gastroesophageal reflux disease), High cholesterol, Hyperprolactinemia, Hypertension, Pseudotumor cerebri, Seizures, Simple endometrial hyperplasia (2018), Sleep apnea, and Smoker.    Surgical History:  has a past surgical history that includes Cholecystectomy; Sinus surgery;  section; Breast cyst aspiration; hysteroscopy with dilation and curettage of uterus (N/A, 10/16/2018); Colonoscopy; and Upper gastrointestinal endoscopy.    Family History: family history includes Breast cancer in her maternal grandmother; COPD in her father; Cancer in her maternal grandmother; Colon cancer in her maternal grandmother; Colon cancer (age of onset: 50) in her mother; Colon polyps in her maternal grandmother and mother; Diabetes in her mother; Heart attack in her father; Heart disease in her father; Hypertension in her father and mother; Ulcers in her father.. Otherwise no colon cancer, inflammatory bowel disease, or GI malignancies.    Social History:  reports that she has been smoking cigarettes.  She has a 15.00 pack-year smoking history. she has  musculoskeletal pain today; no increased sciatic pain.   This is a 49 y.o. female referred to outpatient physical therapy and presents with a medical diagnosis of lumbar radiculopathy  and demonstrates limitations as described in the problem list. Pt prognosis is Good. Pt will continue to benefit from skilled outpatient physical therapy to address the deficits listed in the problem list, provide pt/family education and to maximize pt's level of independence in the home and community environment.     Goals as follows:    Long Term Goals: 6 weeks     Pain: Decrease pain to no more than 3/10 to allow for improved ability to perform ADL's and functional mobility   Strength: Improve strength in core muscles to 5/5 for improved lumbopelvic stability  ROM: Improve ROM to 75% of normal limits   Functional scale: Improve score on Oswestry  to < 58% impairment   Lifting: Lift 20 lbs to waist level, 10 lbs to shoulder level, 5 lbs to overhead without pain or compensation  Walking: Increase walking distance and/or duration to 700 ft without pain   Postures: Increase sitting and/or standing duration to 20 mins  without pain   Transfers: Perform Sit <> Stand and Supine <> Sit  transfers without increased pain or limitation  Exercise: demonstrate independence with home exercise program to maintain gains made in therapy     Plan     Continue with established Plan of Care towards PT goals.    Therapist: Quita Holliday, PT  1/14/2020   never used smokeless tobacco. She reports that she does not drink alcohol or use drugs.    Review of patient's allergies indicates:  No Known Allergies    Medications:   Medications Prior to Admission   Medication Sig Dispense Refill Last Dose    acetaZOLAMIDE (DIAMOX) 500 mg CpSR Take 1 capsule (500 mg total) by mouth 2 (two) times daily. 360 capsule 1 1/13/2019 at Unknown time    albuterol 90 mcg/actuation inhaler Inhale 2 puffs into the lungs every 6 (six) hours as needed for Wheezing. Rescue 1 Inhaler 0 Past Week at Unknown time    albuterol-ipratropium (DUO-NEB) 2.5 mg-0.5 mg/3 mL nebulizer solution Take 3 mLs by nebulization every 6 (six) hours as needed for Wheezing or Shortness of Breath. Rescue 100 mL 3 Past Week at Unknown time    blood sugar diagnostic Strp 1 each by Misc.(Non-Drug; Combo Route) route 4 (four) times daily before meals and nightly. ACCU CHEK ELVIA PLUS METER 200 each 3 1/13/2019 at Unknown time    blood-glucose meter (ACCU-CHEK ELVIA PLUS METER) [facility] [place] [facility] TEST FOUR TIMES DAILY BEFORE MEALS AND EVERY NIGHT 90 each 1 1/13/2019 at Unknown time    budesonide-formoterol 160-4.5 mcg (SYMBICORT) 160-4.5 mcg/actuation HFAA Inhale 2 puffs into the lungs every 12 (twelve) hours. Controller 1 Inhaler 5 Past Week at Unknown time    capsaicin 0.1 % Crea Apply 1 application topically 2 (two) times daily. 56.6 g 1 Past Month at Unknown time    fluticasone (FLONASE) 50 mcg/actuation nasal spray 1 spray (50 mcg total) by Each Nare route once daily. 16 g 5 Past Week at Unknown time    hydroCHLOROthiazide (HYDRODIURIL) 25 MG tablet Take 1 tablet (25 mg total) by mouth once daily. 30 tablet 11 Past Week at Unknown time    hydrOXYzine (ATARAX) 50 MG tablet Take 1-4 at night for itching. 120 tablet 3 Past Week at Unknown time    lancets (ACCU-CHEK SOFTCLIX LANCETS) Misc 1 Device by Misc.(Non-Drug; Combo Route) route 4 (four) times daily. 200 each 3 1/13/2019 at Unknown time    levocetirizine (XYZAL) 5 MG  tablet Take 1 tablet (5 mg total) by mouth every evening. 30 tablet 11 Past Week at Unknown time    losartan (COZAAR) 100 MG tablet TAKE 1 TABLET BY MOUTH EVERY DAY 90 tablet 0 Past Week at Unknown time    medroxyPROGESTERone (PROVERA) 10 MG tablet Take 1 tablet (10 mg total) by mouth once daily. TAKE DAY 1-12 Q MONTH 12 tablet 12 Past Week at Unknown time    meloxicam (MOBIC) 15 MG tablet TAKE 1 TABLET(15 MG) BY MOUTH DAILY AS NEEDED FOR HEADACHE 90 tablet 1 Past Week at Unknown time    metFORMIN (GLUCOPHAGE-XR) 500 MG 24 hr tablet Take 2 tablets (1,000 mg total) by mouth 2 (two) times daily with meals. 360 tablet 0 Past Week at Unknown time    mometasone 0.1% (ELOCON) 0.1 % cream    Past Week at Unknown time    montelukast (SINGULAIR) 10 mg tablet TAKE 1 TABLET(10 MG) BY MOUTH EVERY EVENING 30 tablet 0 Past Week at Unknown time    naproxen (EC NAPROSYN) 500 MG EC tablet Take 1 tablet (500 mg total) by mouth 2 (two) times daily. 30 tablet 3 Past Week at Unknown time    nicotine polacrilex 2 MG Lozg Take 1 lozenge (2 mg total) by mouth as needed. 168 lozenge 0 Past Week at Unknown time    ondansetron (ZOFRAN) 8 MG tablet Take 1 tablet (8 mg total) by mouth every 8 (eight) hours as needed for Nausea. 90 tablet 5 1/13/2019 at Unknown time    oxyCODONE-acetaminophen (PERCOCET)  mg per tablet TAKE 1 TABLET BY MOUTH EVERY 8 HOURS AS NEEDED. MAY CAUSE DROWSINESS  0 Past Week at Unknown time    pantoprazole (PROTONIX) 40 MG tablet TK 1 T PO QD 1 HOUR B MEALS  5 Past Week at Unknown time    QUEtiapine (SEROQUEL) 25 MG Tab TAKE 1 TABLET(25 MG) BY MOUTH EVERY EVENING 90 tablet 0 Past Week at Unknown time    tiZANidine (ZANAFLEX) 4 MG tablet Take 4 mg by mouth every 8 (eight) hours.    1/13/2019 at Unknown time    topiramate (TOPAMAX) 100 MG tablet Take 3 tablets (300 mg total) by mouth 2 (two) times daily. 180 tablet 5 1/13/2019 at Unknown time    venlafaxine (EFFEXOR-XR) 75 MG 24 hr capsule TAKE 1  CAPSULE(75 MG) BY MOUTH EVERY EVENING 90 capsule 1 1/13/2019 at Unknown time    doxycycline (VIBRAMYCIN) 100 MG Cap Take 1 capsule (100 mg total) by mouth every 12 (twelve) hours. for 10 days 20 capsule 0 Unknown at Unknown time    GAVILYTE-G 236-22.74-6.74 -5.86 gram suspension   0 Unknown at Unknown time    plecanatide (TRULANCE) 3 mg Tab Take 3 mg by mouth once daily. 30 tablet 6 Unknown at Unknown time    sumatriptan (IMITREX) 50 MG tablet Take 1 tab at onset of headaches.  If no improvement in 2 hours, take another.  Do not take more than 2 tabs in 24 hours. 9 tablet 5 More than a month at Unknown time    tiotropium (SPIRIVA) 18 mcg inhalation capsule Inhale 1 capsule (18 mcg total) into the lungs once daily. Controller 30 capsule 11 Unknown at Unknown time         Physical Exam:    Vital Signs:   Vitals:    01/14/19 0927   BP: 123/60   Pulse: 70   Resp: 17   Temp: 97.9 °F (36.6 °C)       General Appearance: Well appearing , obese nontoxic appearing  Eyes:    No scleral icterus  ENT: Neck supple, Lips, mucosa, and tongue normal  Lungs: nonlabored respirations ; no stridor  Heart:  Regular rate ; distal pulse intact  Abdomen: Soft, non tender, non distended ; no organomegaly ; obese abd  Extremities: 2+ pulses, no clubbing, cyanosis or edema  Skin: No rash      Labs:  Lab Results   Component Value Date    WBC 5.30 11/05/2018    HGB 11.2 (L) 11/05/2018    HCT 36.9 (L) 11/05/2018     11/05/2018    CHOL 201 (H) 01/22/2018    TRIG 104 01/22/2018    HDL 56 01/22/2018    ALT 11 11/05/2018    AST 11 11/05/2018     (L) 11/05/2018    K 3.6 11/05/2018     11/05/2018    CREATININE 0.9 11/05/2018    BUN 13 11/05/2018    CO2 24 11/05/2018    TSH 1.229 11/05/2018    INR 1.0 10/24/2016    HGBA1C 5.8 (H) 07/30/2018    MICROALBUR 7.2 04/01/2014       I have explained the risks and benefits of endoscopy procedures to the patient including but not limited to bleeding, perforation, infection, and  death.      Brady Morales MD

## 2020-01-16 ENCOUNTER — CLINICAL SUPPORT (OUTPATIENT)
Dept: REHABILITATION | Facility: HOSPITAL | Age: 50
End: 2020-01-16
Attending: ANESTHESIOLOGY

## 2020-01-16 DIAGNOSIS — G89.29 CHRONIC LEFT-SIDED LOW BACK PAIN WITH LEFT-SIDED SCIATICA: Primary | ICD-10-CM

## 2020-01-16 DIAGNOSIS — M54.42 CHRONIC LEFT-SIDED LOW BACK PAIN WITH LEFT-SIDED SCIATICA: Primary | ICD-10-CM

## 2020-01-16 DIAGNOSIS — M62.81 MUSCLE WEAKNESS (GENERALIZED): ICD-10-CM

## 2020-01-16 PROCEDURE — 97010 HOT OR COLD PACKS THERAPY: CPT | Mod: PN,CQ

## 2020-01-16 PROCEDURE — 97110 THERAPEUTIC EXERCISES: CPT | Mod: PN,CQ

## 2020-01-16 PROCEDURE — 97014 ELECTRIC STIMULATION THERAPY: CPT | Mod: PN,CQ

## 2020-01-16 NOTE — PROGRESS NOTES
Physical Therapy Daily Note     Name: Nicole BONILLA [facility] Number: [id number]  Diagnosis:   Encounter Diagnoses   Name Primary?    Chronic left-sided low back pain with left-sided sciatica Yes    Muscle weakness (generalized)      Physician: Vira Conn NP  Precautions: standard   Visit #: 3 of 12  PTA Visit #: 1  Time In: 12:50 PM   Time Out: 2:10 PM     Subjective     Pt reports: My back is hurting today   Pain Scale: Nicole rates pain on a scale of 0-10 to be 6 currently.    Objective     Nicole received individual therapeutic exercises to develop strength, ROM, flexibility, posture and core stabilization for 30 minutes including:    Recumbent Bike x 15 mins  Treadmill x 10 mins  Pelvic Tilts x 15  Supine marching with pelvic tilt x 15   Bridging x 15  SLR x 10  S/L Hip Abd x 10  S/L Clams x 15  DKC with SB x 3 mins  Prone Lumbar Ext x 10   Manual H/S Stretches 5 x 15 sec  Manual Piriformis Stretches 5 x 15 sec     DNP  Nicoel received the following manual therapy techniques: Joint mobilizations and Soft tissue Mobilization were applied to the: lumbar spine  for 15 minutes including:  S/L lumbar flexion to all levels on both right and left; Prone - lamina release with paraspinal skin rolling.      The patient received the following direct contact modalities after being cleared for contraindications:     The patient received the following supervised modalities after being cleared for contradictions: CP with IFC to lumbar spine x 15 mins after exercise.     Written Home Exercises Provided:   To continue with lumbar stretches from previous PT   Pt demo good understanding of the education provided. Nicole demonstrated good return demonstration of activities.     Education provided re:  Nicole verbalized good understanding of education provided.   No spiritual or educational barriers to learning provided    Assessment     Patient tolerated treatment well.   Having some increased musculoskeletal pain today; no increased sciatic pain.   This is a 49 y.o. female referred to outpatient physical therapy and presents with a medical diagnosis of lumbar radiculopathy  and demonstrates limitations as described in the problem list. Pt prognosis is Good. Pt will continue to benefit from skilled outpatient physical therapy to address the deficits listed in the problem list, provide pt/family education and to maximize pt's level of independence in the home and community environment.     Goals as follows:    Long Term Goals: 6 weeks     Pain: Decrease pain to no more than 3/10 to allow for improved ability to perform ADL's and functional mobility   Strength: Improve strength in core muscles to 5/5 for improved lumbopelvic stability  ROM: Improve ROM to 75% of normal limits   Functional scale: Improve score on Oswestry  to < 58% impairment   Lifting: Lift 20 lbs to waist level, 10 lbs to shoulder level, 5 lbs to overhead without pain or compensation  Walking: Increase walking distance and/or duration to 700 ft without pain   Postures: Increase sitting and/or standing duration to 20 mins  without pain   Transfers: Perform Sit <> Stand and Supine <> Sit  transfers without increased pain or limitation  Exercise: demonstrate independence with home exercise program to maintain gains made in therapy     Plan     Continue with established Plan of Care towards PT goals.    Therapist: Jonathan Favre, PTA  1/16/2020

## 2020-01-21 ENCOUNTER — CLINICAL SUPPORT (OUTPATIENT)
Dept: REHABILITATION | Facility: HOSPITAL | Age: 50
End: 2020-01-21
Attending: ANESTHESIOLOGY

## 2020-01-21 DIAGNOSIS — M54.42 CHRONIC LEFT-SIDED LOW BACK PAIN WITH LEFT-SIDED SCIATICA: Primary | ICD-10-CM

## 2020-01-21 DIAGNOSIS — G89.29 CHRONIC LEFT-SIDED LOW BACK PAIN WITH LEFT-SIDED SCIATICA: Primary | ICD-10-CM

## 2020-01-21 PROCEDURE — 97110 THERAPEUTIC EXERCISES: CPT | Mod: PN,CQ

## 2020-01-21 NOTE — PROGRESS NOTES
Physical Therapy Daily Note     Name: Nicole BONILLA [facility] Number: [id number]  Diagnosis:   Encounter Diagnosis   Name Primary?    Chronic left-sided low back pain with left-sided sciatica Yes     Physician: Vira Conn NP  Precautions: standard   Visit #: 4 of 12  PTA Visit #: 2  Time In: 9:55 AM   Time Out: 10:35 AM     Subjective     Pt reports: I am a little disappointed today, my pain management doctor called and canceled my appointment.   Pain Scale: Nicole rates pain on a scale of 0-10 to be 7 currently.    Objective     Nicole received individual therapeutic exercises to develop strength, ROM, flexibility, posture and core stabilization for 30 minutes including:    Recumbent Bike x 10 mins  Treadmill x 10 mins  Pelvic Tilts x 15  Supine marching with pelvic tilt x 15   Bridging x 15  SLR x 10  S/L Hip Abd x 10  S/L Clams x 15  DKC with SB x 3 mins  Prone Lumbar Ext x 10   Manual H/S Stretches 5 x 15 sec  Manual Piriformis Stretches 5 x 15 sec     DNP  Nicole received the following manual therapy techniques: Joint mobilizations and Soft tissue Mobilization were applied to the: lumbar spine  for 15 minutes including:  S/L lumbar flexion to all levels on both right and left; Prone - lamina release with paraspinal skin rolling.      The patient received the following direct contact modalities after being cleared for contraindications:     The patient received the following supervised modalities after being cleared for contradictions: CP with IFC to lumbar spine x 15 mins after exercise.     Written Home Exercises Provided:   To continue with lumbar stretches from previous PT   Pt demo good understanding of the education provided. Nicole demonstrated good return demonstration of activities.     Education provided re:  Nicole verbalized good understanding of education provided.   No spiritual or educational barriers to learning provided    Assessment      Patient tolerated treatment well.  Having some increased musculoskeletal pain today; no increased sciatic pain.   This is a 50 y.o. female referred to outpatient physical therapy and presents with a medical diagnosis of lumbar radiculopathy  and demonstrates limitations as described in the problem list. Pt prognosis is Good. Pt will continue to benefit from skilled outpatient physical therapy to address the deficits listed in the problem list, provide pt/family education and to maximize pt's level of independence in the home and community environment.     Goals as follows:    Long Term Goals: 6 weeks     Pain: Decrease pain to no more than 3/10 to allow for improved ability to perform ADL's and functional mobility   Strength: Improve strength in core muscles to 5/5 for improved lumbopelvic stability  ROM: Improve ROM to 75% of normal limits   Functional scale: Improve score on Oswestry  to < 58% impairment   Lifting: Lift 20 lbs to waist level, 10 lbs to shoulder level, 5 lbs to overhead without pain or compensation  Walking: Increase walking distance and/or duration to 700 ft without pain   Postures: Increase sitting and/or standing duration to 20 mins  without pain   Transfers: Perform Sit <> Stand and Supine <> Sit  transfers without increased pain or limitation  Exercise: demonstrate independence with home exercise program to maintain gains made in therapy     Plan     Continue with established Plan of Care towards PT goals.    Therapist: Jonathan Favre, PTA  1/21/2020

## 2020-01-27 ENCOUNTER — CLINICAL SUPPORT (OUTPATIENT)
Dept: REHABILITATION | Facility: HOSPITAL | Age: 50
End: 2020-01-27
Attending: ANESTHESIOLOGY

## 2020-01-27 DIAGNOSIS — M54.42 CHRONIC LEFT-SIDED LOW BACK PAIN WITH LEFT-SIDED SCIATICA: Primary | ICD-10-CM

## 2020-01-27 DIAGNOSIS — G89.29 CHRONIC LEFT-SIDED LOW BACK PAIN WITH LEFT-SIDED SCIATICA: Primary | ICD-10-CM

## 2020-01-27 PROCEDURE — 97110 THERAPEUTIC EXERCISES: CPT | Mod: PN,CQ

## 2020-01-27 PROCEDURE — 97014 ELECTRIC STIMULATION THERAPY: CPT | Mod: PN,CQ

## 2020-01-27 PROCEDURE — 97010 HOT OR COLD PACKS THERAPY: CPT | Mod: PN,CQ

## 2020-01-27 NOTE — PROGRESS NOTES
Physical Therapy Daily Note     Name: Nicole BONILLA [facility] Number: [id number]  Diagnosis:   Encounter Diagnosis   Name Primary?    Chronic left-sided low back pain with left-sided sciatica Yes     Physician: Vira Conn NP  Precautions: standard   Visit #: 5 of 12  PTA Visit #: 3  Time In: 12:45 PM   Time Out: 1:45 PM     Subjective     Pt reports: No new c/o's.  Pain Scale: Nicole rates pain on a scale of 0-10 to be 6 currently.    Objective     Nicole received individual therapeutic exercises to develop strength, ROM, flexibility, posture and core stabilization for 30 minutes including:    Nustep level 5 x 10 mins  Treadmill x 10 mins  Pelvic Tilts x 15  Supine marching with pelvic tilt x 15   Bridging x 15  SLR x 10  S/L Hip Abd x 10  S/L Clams x 15  DKC with SB x 3 mins  Prone Lumbar Ext x 10   Manual H/S Stretches 5 x 15 sec  Manual Piriformis Stretches 5 x 15 sec     DNP  Nicole received the following manual therapy techniques: Joint mobilizations and Soft tissue Mobilization were applied to the: lumbar spine  for 15 minutes including:  S/L lumbar flexion to all levels on both right and left; Prone - lamina release with paraspinal skin rolling.      The patient received the following direct contact modalities after being cleared for contraindications:     The patient received the following supervised modalities after being cleared for contradictions: CP with IFC to thoracic/lumbar spine x 15 mins after exercise.     Written Home Exercises Provided:   To continue with lumbar stretches from previous PT   Pt demo good understanding of the education provided. Nicole demonstrated good return demonstration of activities.     Education provided re:  Nicole verbalized good understanding of education provided.   No spiritual or educational barriers to learning provided    Assessment     Patient tolerated treatment well.  Having some increased musculoskeletal  pain today; no increased sciatic pain.   This is a 50 y.o. female referred to outpatient physical therapy and presents with a medical diagnosis of lumbar radiculopathy  and demonstrates limitations as described in the problem list. Pt prognosis is Good. Pt will continue to benefit from skilled outpatient physical therapy to address the deficits listed in the problem list, provide pt/family education and to maximize pt's level of independence in the home and community environment.     Goals as follows:    Long Term Goals: 6 weeks     Pain: Decrease pain to no more than 3/10 to allow for improved ability to perform ADL's and functional mobility   Strength: Improve strength in core muscles to 5/5 for improved lumbopelvic stability  ROM: Improve ROM to 75% of normal limits   Functional scale: Improve score on Oswestry  to < 58% impairment   Lifting: Lift 20 lbs to waist level, 10 lbs to shoulder level, 5 lbs to overhead without pain or compensation  Walking: Increase walking distance and/or duration to 700 ft without pain   Postures: Increase sitting and/or standing duration to 20 mins  without pain   Transfers: Perform Sit <> Stand and Supine <> Sit  transfers without increased pain or limitation  Exercise: demonstrate independence with home exercise program to maintain gains made in therapy     Plan     Continue with established Plan of Care towards PT goals.    Therapist: Jonathan Favre, PTA  1/27/2020

## 2020-02-04 ENCOUNTER — CLINICAL SUPPORT (OUTPATIENT)
Dept: REHABILITATION | Facility: HOSPITAL | Age: 50
End: 2020-02-04
Attending: ANESTHESIOLOGY

## 2020-02-04 DIAGNOSIS — R53.81 PHYSICAL DECONDITIONING: ICD-10-CM

## 2020-02-04 DIAGNOSIS — M62.81 MUSCLE WEAKNESS (GENERALIZED): Primary | ICD-10-CM

## 2020-02-04 PROCEDURE — 97110 THERAPEUTIC EXERCISES: CPT | Mod: PN

## 2020-02-04 NOTE — PROGRESS NOTES
Physical Therapy Daily Note     Name: Nicole BONILLA [facility] Number: [id number]  Diagnosis:   Encounter Diagnoses   Name Primary?    Muscle weakness (generalized) Yes    Physical deconditioning      Physician: Vira Conn NP  Precautions: standard   Visit #: 6 of 12  PTA Visit #: 3  Time In: 10:50 AM  Time Out: 12:00 PM     Subjective     Pt reports: No new c/o's. Lower back continues to hurt.   Pain Scale: Nicole rates pain on a scale of 0-10 to be 6 currently.    Objective     Nicole received individual therapeutic exercises to develop strength, ROM, flexibility, posture and core stabilization for 30 minutes including:    Nustep level 5 x 10 mins  Treadmill x 10 mins  Pelvic Tilts x 15  Supine marching with pelvic tilt x 15   Bridging x 15  SLR x 10  S/L Hip Abd x 10  S/L Clams x 15  DKC with SB x 3 mins  Straight leg bridges with feet on ball x 15   Prone Lumbar Ext x 10   Manual H/S Stretches 5 x 15 sec  Manual Piriformis Stretches 5 x 15 sec     DNP  Nicole received the following manual therapy techniques: Joint mobilizations and Soft tissue Mobilization were applied to the: lumbar spine  for 15 minutes including:  S/L lumbar flexion to all levels on both right and left; Prone - lamina release with paraspinal skin rolling.      The patient received the following direct contact modalities after being cleared for contraindications:     The patient received the following supervised modalities after being cleared for contradictions: CP to  thoracic/lumbar spine x 15 mins after exercise.     Written Home Exercises Provided:   To continue with lumbar stretches from previous PT   Pt demo good understanding of the education provided. Nicole demonstrated good return demonstration of activities.     Education provided re:  Nicole verbalized good understanding of education provided.   No spiritual or educational barriers to learning provided    Assessment      Patient tolerated treatment well.  Having some increased musculoskeletal pain today; no increased sciatic pain.   This is a 50 y.o. female referred to outpatient physical therapy and presents with a medical diagnosis of lumbar radiculopathy  and demonstrates limitations as described in the problem list. Pt prognosis is Good. Pt will continue to benefit from skilled outpatient physical therapy to address the deficits listed in the problem list, provide pt/family education and to maximize pt's level of independence in the home and community environment.     Goals as follows:    Long Term Goals: 6 weeks     Pain: Decrease pain to no more than 3/10 to allow for improved ability to perform ADL's and functional mobility   Strength: Improve strength in core muscles to 5/5 for improved lumbopelvic stability  ROM: Improve ROM to 75% of normal limits   Functional scale: Improve score on Oswestry  to < 58% impairment   Lifting: Lift 20 lbs to waist level, 10 lbs to shoulder level, 5 lbs to overhead without pain or compensation  Walking: Increase walking distance and/or duration to 700 ft without pain   Postures: Increase sitting and/or standing duration to 20 mins  without pain   Transfers: Perform Sit <> Stand and Supine <> Sit  transfers without increased pain or limitation  Exercise: demonstrate independence with home exercise program to maintain gains made in therapy     Plan     Continue with established Plan of Care towards PT goals.    Therapist: Quita Holliday, PT  2/4/2020

## 2020-03-11 ENCOUNTER — CLINICAL SUPPORT (OUTPATIENT)
Dept: REHABILITATION | Facility: HOSPITAL | Age: 50
End: 2020-03-11
Attending: ANESTHESIOLOGY

## 2020-03-11 DIAGNOSIS — R53.81 PHYSICAL DECONDITIONING: ICD-10-CM

## 2020-03-11 DIAGNOSIS — M79.7 FIBROMYALGIA: ICD-10-CM

## 2020-03-11 DIAGNOSIS — M54.42 CHRONIC LEFT-SIDED LOW BACK PAIN WITH LEFT-SIDED SCIATICA: Primary | ICD-10-CM

## 2020-03-11 DIAGNOSIS — G89.29 CHRONIC LEFT-SIDED LOW BACK PAIN WITH LEFT-SIDED SCIATICA: Primary | ICD-10-CM

## 2020-03-11 DIAGNOSIS — M62.81 MUSCLE WEAKNESS (GENERALIZED): ICD-10-CM

## 2020-03-11 PROCEDURE — 97010 HOT OR COLD PACKS THERAPY: CPT | Mod: PN

## 2020-03-11 PROCEDURE — 97110 THERAPEUTIC EXERCISES: CPT | Mod: PN

## 2020-03-11 PROCEDURE — 97140 MANUAL THERAPY 1/> REGIONS: CPT | Mod: PN

## 2020-03-11 NOTE — PROGRESS NOTES
Physical Therapy Daily Note     Name: Nicole BONILLA [facility] Number: [id number]  Diagnosis:   Encounter Diagnoses   Name Primary?    Chronic left-sided low back pain with left-sided sciatica Yes    Fibromyalgia     Muscle weakness (generalized)     Physical deconditioning      Physician: Vira Conn NP  Precautions: standard   Visit #: 7   PTA Visit #: 3  Time In:  2:40 PM   Time Out:  4:00 PM     Subjective     Pt reports:  Nicole has not been to therapy since 2020 - Financial Assistance date had ; Nicole has not returned to see Dr. Gardiner - has an appointment on the  of this month.   Pain Scale: Nicole rates pain on a scale of 0-10 to be 6 currently. Pain located in lower back/sacral area.     Objective     Nicole received individual therapeutic exercises to develop strength, ROM, flexibility, posture and core stabilization for 30 minutes including:    Nustep level 5 x 10 mins  Treadmill x 10 mins  Pelvic Tilts x 15  Supine marching with pelvic tilt x 15   Bridging x 15  SLR x 10  S/L Hip Abd x 10  S/L Clams x 15  DKC with SB x 3 mins  Straight leg bridges with feet on ball x 15   Prone Lumbar Ext x 10   Manual H/S Stretches 5 x 15 sec  Manual Piriformis Stretches 5 x 15 sec      Nicole received the following manual therapy techniques: Joint mobilizations and Soft tissue Mobilization were applied to the: lumbar spine  for 15 minutes including:  S/L lumbar flexion to all levels on both right and left; Prone - lamina release with paraspinal skin rolling.      The patient received the following direct contact modalities after being cleared for contraindications:     The patient received the following supervised modalities after being cleared for contradictions: CP to  thoracic/lumbar spine x 15 mins after exercise.     Written Home Exercises Provided:   To continue with lumbar stretches from previous PT   Pt demo good understanding of the  education provided. Nicole demonstrated good return demonstration of activities.     Education provided re:  Nicole verbalized good understanding of education provided.   No spiritual or educational barriers to learning provided    Assessment     Patient tolerated treatment well.  Having some increased musculoskeletal pain today; no increased sciatic pain, just having sacral pain.   This is a 50 y.o. female referred to outpatient physical therapy and presents with a medical diagnosis of lumbar radiculopathy  and demonstrates limitations as described in the problem list. Pt prognosis is Good. Pt will continue to benefit from skilled outpatient physical therapy to address the deficits listed in the problem list, provide pt/family education and to maximize pt's level of independence in the home and community environment.     Goals as follows:    Long Term Goals: 6 weeks     Pain: Decrease pain to no more than 3/10 to allow for improved ability to perform ADL's and functional mobility   Strength: Improve strength in core muscles to 5/5 for improved lumbopelvic stability  ROM: Improve ROM to 75% of normal limits   Functional scale: Improve score on Oswestry  to < 58% impairment   Lifting: Lift 20 lbs to waist level, 10 lbs to shoulder level, 5 lbs to overhead without pain or compensation  Walking: Increase walking distance and/or duration to 700 ft without pain   Postures: Increase sitting and/or standing duration to 20 mins  without pain   Transfers: Perform Sit <> Stand and Supine <> Sit  transfers without increased pain or limitation  Exercise: demonstrate independence with home exercise program to maintain gains made in therapy     Plan     Continue with established Plan of Care towards PT goals. Will see patient 1-2x/week until she returns to Dr. Gardiner on the 23rd.     Therapist: Quita Holliday, PT  3/11/2020

## 2020-03-19 ENCOUNTER — CLINICAL SUPPORT (OUTPATIENT)
Dept: REHABILITATION | Facility: HOSPITAL | Age: 50
End: 2020-03-19
Attending: ANESTHESIOLOGY

## 2020-03-19 DIAGNOSIS — M79.7 FIBROMYALGIA: Primary | ICD-10-CM

## 2020-03-19 NOTE — PROGRESS NOTES
Nicole came in to clinic today for Physical Therapy.  She was c/o pain all over - arms, legs, neck and back. Pain levels 8-9/10. Stated she just hurt everywhere and was having a flare-up of her fibromylagia.  She is due to see Dr. Gardiner next week.  Nicole started to do some exercise on the Nu-Step - lasted about 2 mins and decided that she was hurting too much to stay.  She left, stated that she would call for her next appointment following her visit with Dr. Gardiner.       Quita Holliday, PT  3/19/2020

## 2020-03-24 ENCOUNTER — OFFICE VISIT (OUTPATIENT)
Dept: PAIN MEDICINE | Facility: CLINIC | Age: 50
End: 2020-03-24

## 2020-03-24 ENCOUNTER — PATIENT MESSAGE (OUTPATIENT)
Dept: PAIN MEDICINE | Facility: CLINIC | Age: 50
End: 2020-03-24

## 2020-03-24 DIAGNOSIS — M62.838 CERVICAL PARASPINAL MUSCLE SPASM: ICD-10-CM

## 2020-03-24 DIAGNOSIS — M54.12 CERVICAL RADICULOPATHY: ICD-10-CM

## 2020-03-24 DIAGNOSIS — M50.30 DDD (DEGENERATIVE DISC DISEASE), CERVICAL: ICD-10-CM

## 2020-03-24 DIAGNOSIS — G89.4 CHRONIC PAIN DISORDER: Primary | ICD-10-CM

## 2020-03-24 DIAGNOSIS — M79.18 MYOFASCIAL PAIN: ICD-10-CM

## 2020-03-24 PROCEDURE — 99213 PR OFFICE/OUTPT VISIT, EST, LEVL III, 20-29 MIN: ICD-10-PCS | Mod: 95,,, | Performed by: ANESTHESIOLOGY

## 2020-03-24 PROCEDURE — 99213 OFFICE O/P EST LOW 20 MIN: CPT | Mod: 95,,, | Performed by: ANESTHESIOLOGY

## 2020-03-24 NOTE — PATIENT INSTRUCTIONS
As we discussed in our visit today, increase nortriptyline to 2 capsules at night.  If this is helpful, I will send in a refill in 2 weeks.  If this is not helpful, we will increase this.    Continue the home stretching exercises as much as tolerated.

## 2020-03-24 NOTE — LETTER
March 24, 2020      Vira Conn NP  [address] [place] [address] [address] [place] [address]           [facility]  [address] [facility]  [place] [address]  Phone: [phone]  Fax: [phone]          Patient: Nicole Harris   MR Number: [id number]   YOB: 1970   Date of Visit: 3/24/2020       Dear Dr. Vira Conn:    Thank you for referring Nicole Harris to me for evaluation. Attached you will find relevant portions of my assessment and plan of care.    If you have questions, please do not hesitate to call me. I look forward to following Nicole Harris along with you.    Sincerely,    Sherri Gardiner MD    Enclosure  CC:  No Recipients    If you would like to receive this communication electronically, please contact [email or web address] or [phone] to request more information on [employer] access.    For providers and/or their staff who would like to refer a patient to [facility], please contact us through our one-stop-shop provider referral line, [facility], at [phone].    If you feel you have received this communication in error or would no longer like to receive these types of communications, please e-mail [email or web address]

## 2020-03-24 NOTE — PROGRESS NOTES
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 disease process, prognosis, treatment plan, and risks and benefits. The patient is aware of the risks and benefits of the medications being prescribed, common side effects, and proper usage. The following is the plan we agreed on:     1. I do not recommend any additional injections at this point due to her blood sugar.  2. I recommend talking to her primary care provider regarding her blood sugar.  3. Increase nortriptyline to 50 mg at bedtime.  She does not need a refill on this today.  We will touch base in 2 weeks.  If this is helpful, I will send in a refill at 50 mg.  If not, we will increase it to 75 mg  4. Recommend reading "The Obesity Code" by Dr. Jey Mirza.  5. Recommending considering the over the counter supplement called Alirio's legs cramps if still having issues with your legs.  Take as directed on the box.  6. Recommend drinking at least half your body weight (190/2 = 95) in ounces per day.  When your blood sugar is high, you lose water faster, so you will need to drink more.   7. She has now had 4 steroid doses in 4 months.  We will need to be mindful of her total steroid dose moving forward  8. Continue naproxen 500 mg, no more than twice daily as needed.  9. Continue Lexapro.  10. Continue physical therapy. She will likely need " extensive physical therapy. We had an extensive discussion regarding the expected time course for PT for chronic pain vs after a surgery.  Specifically, we discussed that PT for chronic pain almost always causes a worsening of pain before any improvement, which is generally not seen for 3-6 months, in which time she will likely already be home doing home exercises given that a formal course of PT generally lasts anywhere from 6-12 weeks.    11. RTC in 2 weeks for a virtual visit    Sherri Gardiner MD  03/24/2020     The above plan and management options were discussed at length with patient. Patient is in agreement with the above and verbalized understanding. It will be communicated with the referring physician via electronic record, fax, or mail.

## 2020-05-08 RX ORDER — NORTRIPTYLINE HYDROCHLORIDE 50 MG/1
50 CAPSULE ORAL NIGHTLY
Qty: 30 CAPSULE | Refills: 11 | Status: SHIPPED | OUTPATIENT
Start: 2020-05-08 | End: 2020-06-01 | Stop reason: SDUPTHER

## 2020-05-08 NOTE — TELEPHONE ENCOUNTER
----- Message from Mehnaz Shaikh sent at 5/8/2020  9:46 AM CDT -----  Contact: Pt  Pt called and need a refill on     Nortoripcylyne 50mg     Pharmacy phone:[phone]    Pt can be reached at [phone]

## 2020-05-11 ENCOUNTER — TELEPHONE (OUTPATIENT)
Dept: PAIN MEDICINE | Facility: CLINIC | Age: 50
End: 2020-05-11

## 2020-05-11 NOTE — TELEPHONE ENCOUNTER
----- Message from Sil Bernard sent at 5/11/2020 11:10 AM CDT -----  Contact: Nicole garcia  Type:  RX Refill Request    Who Called:  Nicole  Refill or New Rx:  refill  RX Name and Strength:  nortriptyline (PAMELOR) 50 MG capsule  How is the patient currently taking it? (ex. 1XDay):  bid  Is this a 30 day or 90 day RX:  90  Preferred Pharmacy with phone number:      [facility] [address] [address] [address]  [address] [address] [address]  [place] [address]  Phone: [phone] Fax: [phone]      Local or Mail Order:  local  Ordering Provider:  Zuleyka Baron Call Back Number:  [phone]  Additional Information:  Pls call pt if any issues w/ refill

## 2020-05-11 NOTE — TELEPHONE ENCOUNTER
Notified pt. she has refills listed on Rx. Pt was unaware. Instructed any further questions or problems do not hesitate to contact our office.

## 2020-06-01 RX ORDER — NORTRIPTYLINE HYDROCHLORIDE 50 MG/1
100 CAPSULE ORAL NIGHTLY
Qty: 60 CAPSULE | Refills: 11 | Status: SHIPPED | OUTPATIENT
Start: 2020-06-01 | End: 2023-01-10

## 2020-06-01 NOTE — TELEPHONE ENCOUNTER
----- Message from Jack Wan sent at 6/1/2020 10:07 AM CDT -----  Contact: Ptnt [phone]  Type:  RX Refill Request    Who Called:  Ptnt [phone]    Refill or New Rx:  Refill    RX Name and Strength:  nortriptyline (PAMELOR) 50 MG capsule 30 capsule 11 5/8/2020 5/8/2021 --  Sig - Route: Take 1 capsule (50 mg total) by mouth every evening. - Oral  Sent to pharmacy as: nortriptyline (PAMELOR) 50 MG capsule  Class: Normal  Order: [id number]      How is the patient currently taking it? (ex. 1XDay):  See above.    Is this a 30 day or 90 day RX:  30 day    Preferred Pharmacy with phone number:      [facility] [address] [facility] [address] [address]  [address] [address] [address]  Phone: [phone] Fax: [phone]    Local or Mail Order:  Local    Ordering Provider:  Dr Estefanía Baron Call Back Number:  Ptnt [phone]    Additional Information:      Advised she is completely out of the medication since last Thursday at this time. Advised Dr Gardiner had changed her dosage to 100 MG a day. Please send to pharmcy as 100 MG per day as soon as possible.

## 2020-06-09 DIAGNOSIS — Z12.31 ENCOUNTER FOR SCREENING MAMMOGRAM FOR BREAST CANCER: Primary | ICD-10-CM

## 2020-06-10 ENCOUNTER — TELEPHONE (OUTPATIENT)
Dept: PAIN MEDICINE | Facility: CLINIC | Age: 50
End: 2020-06-10

## 2020-06-10 ENCOUNTER — TELEPHONE (OUTPATIENT)
Dept: DERMATOLOGY | Facility: CLINIC | Age: 50
End: 2020-06-10

## 2020-06-10 ENCOUNTER — HOSPITAL ENCOUNTER (OUTPATIENT)
Dept: RADIOLOGY | Facility: HOSPITAL | Age: 50
Discharge: HOME | End: 2020-06-10
Attending: NURSE PRACTITIONER
Payer: MEDICAID

## 2020-06-10 VITALS — BODY MASS INDEX: 32.49 KG/M2 | HEIGHT: 65 IN | WEIGHT: 195 LBS

## 2020-06-10 DIAGNOSIS — Z12.31 ENCOUNTER FOR SCREENING MAMMOGRAM FOR BREAST CANCER: ICD-10-CM

## 2020-06-10 PROCEDURE — 77063 MAMMO DIGITAL SCREENING BILAT WITH TOMOSYNTHESIS_CAD: ICD-10-PCS | Mod: 26,,, | Performed by: RADIOLOGY

## 2020-06-10 PROCEDURE — 77067 SCR MAMMO BI INCL CAD: CPT | Mod: TC

## 2020-06-10 PROCEDURE — 77063 BREAST TOMOSYNTHESIS BI: CPT | Mod: 26,,, | Performed by: RADIOLOGY

## 2020-06-10 PROCEDURE — 77067 SCR MAMMO BI INCL CAD: CPT | Mod: 26,,, | Performed by: RADIOLOGY

## 2020-06-10 PROCEDURE — 77067 MAMMO DIGITAL SCREENING BILAT WITH TOMOSYNTHESIS_CAD: ICD-10-PCS | Mod: 26,,, | Performed by: RADIOLOGY

## 2020-06-10 NOTE — TELEPHONE ENCOUNTER
----- Message from Clayton Dee sent at 6/10/2020  2:05 PM CDT -----  Type:  Patient Returning Call    Who Called:  Patient  Who Left Message for Patient:  Monica  Does the patient know what this is regarding?:  Appointment  Best Call Back Number:  [phone]  Additional Information:

## 2020-06-10 NOTE — TELEPHONE ENCOUNTER
rec'd referral for pt to be seen in clinic no answer, not able to leave message. Referral faxed back to office w/ note to have pt return call to schedule appt.

## 2020-06-17 ENCOUNTER — OFFICE VISIT (OUTPATIENT)
Dept: PAIN MEDICINE | Facility: CLINIC | Age: 50
End: 2020-06-17
Payer: MEDICAID

## 2020-06-17 VITALS
SYSTOLIC BLOOD PRESSURE: 124 MMHG | DIASTOLIC BLOOD PRESSURE: 78 MMHG | BODY MASS INDEX: 32.18 KG/M2 | TEMPERATURE: 98 F | HEART RATE: 111 BPM | WEIGHT: 193.13 LBS | HEIGHT: 65 IN

## 2020-06-17 DIAGNOSIS — M51.36 DDD (DEGENERATIVE DISC DISEASE), LUMBAR: ICD-10-CM

## 2020-06-17 DIAGNOSIS — M50.30 DEGENERATIVE DISC DISEASE, CERVICAL: ICD-10-CM

## 2020-06-17 DIAGNOSIS — M79.18 MYOFASCIAL PAIN SYNDROME: Primary | ICD-10-CM

## 2020-06-17 PROCEDURE — 96372 PR INJECTION,THERAP/PROPH/DIAG2ST, IM OR SUBCUT: ICD-10-PCS | Mod: ,,, | Performed by: ANESTHESIOLOGY

## 2020-06-17 PROCEDURE — 99999 PR PBB SHADOW E&M-EST. PATIENT-LVL III: ICD-10-PCS | Mod: PBBFAC,,, | Performed by: ANESTHESIOLOGY

## 2020-06-17 PROCEDURE — 99999 PR PBB SHADOW E&M-EST. PATIENT-LVL III: CPT | Mod: PBBFAC,,, | Performed by: ANESTHESIOLOGY

## 2020-06-17 PROCEDURE — 99213 OFFICE O/P EST LOW 20 MIN: CPT | Mod: PBBFAC,PO | Performed by: ANESTHESIOLOGY

## 2020-06-17 PROCEDURE — 96372 THER/PROPH/DIAG INJ SC/IM: CPT | Mod: ,,, | Performed by: ANESTHESIOLOGY

## 2020-06-17 PROCEDURE — 99214 PR OFFICE/OUTPT VISIT, EST, LEVL IV, 30-39 MIN: ICD-10-PCS | Mod: S$PBB,25,, | Performed by: ANESTHESIOLOGY

## 2020-06-17 PROCEDURE — 99214 OFFICE O/P EST MOD 30 MIN: CPT | Mod: S$PBB,25,, | Performed by: ANESTHESIOLOGY

## 2020-06-17 RX ORDER — CYCLOBENZAPRINE HCL 10 MG
10 TABLET ORAL 3 TIMES DAILY PRN
Qty: 90 TABLET | Refills: 2 | Status: SHIPPED | OUTPATIENT
Start: 2020-06-17 | End: 2020-08-28

## 2020-06-17 RX ORDER — METHYLPREDNISOLONE ACETATE 40 MG/ML
40 INJECTION, SUSPENSION INTRA-ARTICULAR; INTRALESIONAL; INTRAMUSCULAR; SOFT TISSUE
Status: COMPLETED | OUTPATIENT
Start: 2020-06-17 | End: 2020-06-17

## 2020-06-17 RX ORDER — LINAGLIPTIN 5 MG/1
5 TABLET, FILM COATED ORAL DAILY
Status: ON HOLD | COMMUNITY
End: 2020-07-01 | Stop reason: HOSPADM

## 2020-06-17 RX ORDER — NAPROXEN 500 MG/1
500 TABLET ORAL 2 TIMES DAILY
Status: ON HOLD | COMMUNITY
End: 2020-07-01 | Stop reason: HOSPADM

## 2020-06-17 RX ADMIN — METHYLPREDNISOLONE ACETATE 40 MG: 40 INJECTION, SUSPENSION INTRA-ARTICULAR; INTRALESIONAL; INTRAMUSCULAR; SOFT TISSUE at 05:06
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
## 2020-06-17 NOTE — LETTER
June 17, 2020      Vira Conn, NP  [address] [place] [address] [address] [place] [address]           [facility]  [address] [facility]  [place] [address]  Phone: [phone]  Fax: [phone]          Patient: Nicole Harris   MR Number: [id number]   YOB: 1970   Date of Visit: 6/17/2020       Dear Dr. Vira Conn:    Thank you for referring Nicole Harris to me for evaluation. Attached you will find relevant portions of my assessment and plan of care.    If you have questions, please do not hesitate to call me. I look forward to following Nicole Harris along with you.    Sincerely,    Emily José MD    Enclosure  CC:  No Recipients    If you would like to receive this communication electronically, please contact [email or web address] or [phone] to request more information on [facility] Link access.    For providers and/or their staff who would like to refer a patient to [facility], please contact us through our one-stop-shop provider referral line, [facility], at [phone].    If you feel you have received this communication in error or would no longer like to receive these types of communications, please e-mail [email or web address]

## 2020-06-22 ENCOUNTER — HOSPITAL ENCOUNTER (EMERGENCY)
Facility: HOSPITAL | Age: 50
Discharge: ANOTHER HOSPITAL | End: 2020-06-23
Attending: FAMILY MEDICINE
Payer: MEDICAID

## 2020-06-22 DIAGNOSIS — R07.9 CHEST PAIN: ICD-10-CM

## 2020-06-22 DIAGNOSIS — R52 PAIN: ICD-10-CM

## 2020-06-22 DIAGNOSIS — I25.9 CARDIAC ISCHEMIA: Primary | ICD-10-CM

## 2020-06-22 DIAGNOSIS — E11.9 TYPE 2 DIABETES MELLITUS WITHOUT COMPLICATION, WITHOUT LONG-TERM CURRENT USE OF INSULIN: ICD-10-CM

## 2020-06-22 LAB
ALBUMIN SERPL BCP-MCNC: 4.4 G/DL (ref 3.5–5.2)
ALP SERPL-CCNC: 79 U/L (ref 55–135)
ALT SERPL W/O P-5'-P-CCNC: 32 U/L (ref 10–44)
ANION GAP SERPL CALC-SCNC: 11 MMOL/L (ref 8–16)
AST SERPL-CCNC: 18 U/L (ref 10–40)
BASOPHILS # BLD AUTO: 0.05 K/UL (ref 0–0.2)
BASOPHILS NFR BLD: 0.4 % (ref 0–1.9)
BILIRUB SERPL-MCNC: 0.8 MG/DL (ref 0.1–1)
BNP SERPL-MCNC: 16 PG/ML (ref 0–99)
BUN SERPL-MCNC: 23 MG/DL (ref 6–20)
CALCIUM SERPL-MCNC: 9.4 MG/DL (ref 8.7–10.5)
CHLORIDE SERPL-SCNC: 103 MMOL/L (ref 95–110)
CO2 SERPL-SCNC: 21 MMOL/L (ref 23–29)
CREAT SERPL-MCNC: 0.7 MG/DL (ref 0.5–1.4)
DIFFERENTIAL METHOD: ABNORMAL
EOSINOPHIL # BLD AUTO: 0.1 K/UL (ref 0–0.5)
EOSINOPHIL NFR BLD: 0.6 % (ref 0–8)
ERYTHROCYTE [DISTWIDTH] IN BLOOD BY AUTOMATED COUNT: 13.1 % (ref 11.5–14.5)
EST. GFR  (AFRICAN AMERICAN): >60 ML/MIN/1.73 M^2
EST. GFR  (NON AFRICAN AMERICAN): >60 ML/MIN/1.73 M^2
GLUCOSE SERPL-MCNC: 288 MG/DL (ref 70–110)
HCT VFR BLD AUTO: 39.4 % (ref 37–48.5)
HGB BLD-MCNC: 13 G/DL (ref 12–16)
IMM GRANULOCYTES # BLD AUTO: 0.08 K/UL (ref 0–0.04)
IMM GRANULOCYTES NFR BLD AUTO: 0.7 % (ref 0–0.5)
INR PPP: 0.9 (ref 0.8–1.2)
LYMPHOCYTES # BLD AUTO: 3.4 K/UL (ref 1–4.8)
LYMPHOCYTES NFR BLD: 28.9 % (ref 18–48)
MCH RBC QN AUTO: 29 PG (ref 27–31)
MCHC RBC AUTO-ENTMCNC: 33 G/DL (ref 32–36)
MCV RBC AUTO: 88 FL (ref 82–98)
MONOCYTES # BLD AUTO: 1 K/UL (ref 0.3–1)
MONOCYTES NFR BLD: 8.1 % (ref 4–15)
NEUTROPHILS # BLD AUTO: 7.3 K/UL (ref 1.8–7.7)
NEUTROPHILS NFR BLD: 61.3 % (ref 38–73)
NRBC BLD-RTO: 0 /100 WBC
PLATELET # BLD AUTO: 343 K/UL (ref 150–350)
PMV BLD AUTO: 9.7 FL (ref 9.2–12.9)
POTASSIUM SERPL-SCNC: 4 MMOL/L (ref 3.5–5.1)
PROT SERPL-MCNC: 7.2 G/DL (ref 6–8.4)
PROTHROMBIN TIME: 9.5 SEC (ref 9–12.5)
RBC # BLD AUTO: 4.48 M/UL (ref 4–5.4)
SODIUM SERPL-SCNC: 135 MMOL/L (ref 136–145)
TROPONIN I SERPL DL<=0.01 NG/ML-MCNC: <0.01 NG/ML (ref 0.02–0.5)
WBC # BLD AUTO: 11.84 K/UL (ref 3.9–12.7)

## 2020-06-22 PROCEDURE — 84484 ASSAY OF TROPONIN QUANT: CPT

## 2020-06-22 PROCEDURE — 94760 N-INVAS EAR/PLS OXIMETRY 1: CPT

## 2020-06-22 PROCEDURE — 80053 COMPREHEN METABOLIC PANEL: CPT

## 2020-06-22 PROCEDURE — 71045 X-RAY EXAM CHEST 1 VIEW: CPT | Mod: TC,FY

## 2020-06-22 PROCEDURE — 93005 ELECTROCARDIOGRAM TRACING: CPT

## 2020-06-22 PROCEDURE — 36000 PLACE NEEDLE IN VEIN: CPT

## 2020-06-22 PROCEDURE — 25000242 PHARM REV CODE 250 ALT 637 W/ HCPCS: Performed by: FAMILY MEDICINE

## 2020-06-22 PROCEDURE — 85025 COMPLETE CBC W/AUTO DIFF WBC: CPT

## 2020-06-22 PROCEDURE — 99285 EMERGENCY DEPT VISIT HI MDM: CPT | Mod: 25

## 2020-06-22 PROCEDURE — 71045 X-RAY EXAM CHEST 1 VIEW: CPT | Mod: 26,,, | Performed by: RADIOLOGY

## 2020-06-22 PROCEDURE — 85610 PROTHROMBIN TIME: CPT

## 2020-06-22 PROCEDURE — 83880 ASSAY OF NATRIURETIC PEPTIDE: CPT

## 2020-06-22 PROCEDURE — 25000003 PHARM REV CODE 250: Performed by: FAMILY MEDICINE

## 2020-06-22 PROCEDURE — 71045 XR CHEST AP PORTABLE: ICD-10-PCS | Mod: 26,,, | Performed by: RADIOLOGY

## 2020-06-22 RX ORDER — NITROGLYCERIN 0.4 MG/1
0.4 TABLET SUBLINGUAL
Status: COMPLETED | OUTPATIENT
Start: 2020-06-22 | End: 2020-06-22

## 2020-06-22 RX ORDER — ASPIRIN 325 MG
325 TABLET, DELAYED RELEASE (ENTERIC COATED) ORAL
Status: COMPLETED | OUTPATIENT
Start: 2020-06-22 | End: 2020-06-22

## 2020-06-22 RX ADMIN — NITROGLYCERIN 0.4 MG: 0.4 TABLET SUBLINGUAL at 10:06

## 2020-06-22 RX ADMIN — ASPIRIN 325 MG: 325 TABLET, DELAYED RELEASE ORAL at 10:06

## 2020-06-23 ENCOUNTER — HOSPITAL ENCOUNTER (INPATIENT)
Facility: HOSPITAL | Age: 50
LOS: 5 days | Discharge: HOME | End: 2020-07-01
Attending: EMERGENCY MEDICINE | Admitting: INTERNAL MEDICINE
Payer: MEDICAID

## 2020-06-23 ENCOUNTER — HOSPITAL ENCOUNTER (OUTPATIENT)
Dept: RADIOLOGY | Facility: HOSPITAL | Age: 50
Discharge: HOME | End: 2020-06-23
Payer: MEDICAID

## 2020-06-23 ENCOUNTER — CLINICAL SUPPORT (OUTPATIENT)
Dept: CARDIOLOGY | Facility: HOSPITAL | Age: 50
End: 2020-06-23
Payer: MEDICAID

## 2020-06-23 VITALS
HEART RATE: 101 BPM | SYSTOLIC BLOOD PRESSURE: 147 MMHG | TEMPERATURE: 98 F | HEIGHT: 64 IN | BODY MASS INDEX: 33.12 KG/M2 | OXYGEN SATURATION: 98 % | DIASTOLIC BLOOD PRESSURE: 76 MMHG | RESPIRATION RATE: 18 BRPM | WEIGHT: 194 LBS

## 2020-06-23 DIAGNOSIS — I25.10 CORONARY ARTERY DISEASE: ICD-10-CM

## 2020-06-23 DIAGNOSIS — E11.59 TYPE 2 DIABETES MELLITUS WITH OTHER CIRCULATORY COMPLICATION, WITHOUT LONG-TERM CURRENT USE OF INSULIN: ICD-10-CM

## 2020-06-23 DIAGNOSIS — Z95.1 S/P CABG X 3: ICD-10-CM

## 2020-06-23 DIAGNOSIS — I10 ESSENTIAL HYPERTENSION: ICD-10-CM

## 2020-06-23 DIAGNOSIS — R07.9 CHEST PAIN: ICD-10-CM

## 2020-06-23 DIAGNOSIS — R94.39 ABNORMAL STRESS TEST: ICD-10-CM

## 2020-06-23 DIAGNOSIS — E11.9 TYPE 2 DIABETES MELLITUS WITHOUT COMPLICATION, WITHOUT LONG-TERM CURRENT USE OF INSULIN: ICD-10-CM

## 2020-06-23 DIAGNOSIS — I20.0 UNSTABLE ANGINA: ICD-10-CM

## 2020-06-23 DIAGNOSIS — I25.110 CORONARY ARTERY DISEASE INVOLVING NATIVE CORONARY ARTERY OF NATIVE HEART WITH UNSTABLE ANGINA PECTORIS: ICD-10-CM

## 2020-06-23 DIAGNOSIS — R94.31 NONSPECIFIC ABNORMAL ELECTROCARDIOGRAM (ECG) (EKG): ICD-10-CM

## 2020-06-23 DIAGNOSIS — Z01.818 PRE-OP TESTING: Primary | ICD-10-CM

## 2020-06-23 DIAGNOSIS — R07.9 CHEST PAIN, UNSPECIFIED TYPE: ICD-10-CM

## 2020-06-23 DIAGNOSIS — M50.30 DEGENERATIVE DISC DISEASE, CERVICAL: ICD-10-CM

## 2020-06-23 DIAGNOSIS — Z98.890 POST-OPERATIVE STATE: ICD-10-CM

## 2020-06-23 LAB
CV PHARM DOSE: 0.4 MG
CV STRESS BASE HR: 87 BPM
DIASTOLIC BLOOD PRESSURE: 80 MMHG
ESTIMATED AVG GLUCOSE: 249 MG/DL (ref 68–131)
GLUCOSE SERPL-MCNC: 199 MG/DL (ref 70–110)
GLUCOSE SERPL-MCNC: 209 MG/DL (ref 70–110)
GLUCOSE SERPL-MCNC: 213 MG/DL (ref 70–110)
GLUCOSE SERPL-MCNC: 227 MG/DL (ref 70–110)
HBA1C MFR BLD HPLC: 10.3 % (ref 4.5–6.2)
OHS CV CPX 85 PERCENT MAX PREDICTED HEART RATE MALE: 136
OHS CV CPX MAX PREDICTED HEART RATE: 160
OHS CV CPX PATIENT IS FEMALE: 1
OHS CV CPX PATIENT IS MALE: 0
OHS CV CPX PEAK DIASTOLIC BLOOD PRESSURE: 82 MMHG
OHS CV CPX PEAK HEAR RATE: 123 BPM
OHS CV CPX PEAK RATE PRESSURE PRODUCT: NORMAL
OHS CV CPX PEAK SYSTOLIC BLOOD PRESSURE: 143 MMHG
OHS CV CPX PERCENT MAX PREDICTED HEART RATE ACHIEVED: 77
OHS CV CPX RATE PRESSURE PRODUCT PRESENTING: NORMAL
SARS-COV-2 RDRP RESP QL NAA+PROBE: NEGATIVE
SYSTOLIC BLOOD PRESSURE: 132 MMHG
TROPONIN I SERPL DL<=0.01 NG/ML-MCNC: <0.03 NG/ML
TROPONIN I SERPL DL<=0.01 NG/ML-MCNC: <0.03 NG/ML

## 2020-06-23 PROCEDURE — 83036 HEMOGLOBIN GLYCOSYLATED A1C: CPT

## 2020-06-23 PROCEDURE — 63600175 PHARM REV CODE 636 W HCPCS: Performed by: NURSE PRACTITIONER

## 2020-06-23 PROCEDURE — 36415 COLL VENOUS BLD VENIPUNCTURE: CPT

## 2020-06-23 PROCEDURE — 25000003 PHARM REV CODE 250: Performed by: NURSE PRACTITIONER

## 2020-06-23 PROCEDURE — 93005 ELECTROCARDIOGRAM TRACING: CPT | Performed by: INTERNAL MEDICINE

## 2020-06-23 PROCEDURE — C9113 INJ PANTOPRAZOLE SODIUM, VIA: HCPCS | Performed by: NURSE PRACTITIONER

## 2020-06-23 PROCEDURE — G0378 HOSPITAL OBSERVATION PER HR: HCPCS

## 2020-06-23 PROCEDURE — 84484 ASSAY OF TROPONIN QUANT: CPT | Mod: 91

## 2020-06-23 PROCEDURE — 63600175 PHARM REV CODE 636 W HCPCS: Performed by: INTERNAL MEDICINE

## 2020-06-23 PROCEDURE — 25000003 PHARM REV CODE 250: Performed by: THORACIC SURGERY (CARDIOTHORACIC VASCULAR SURGERY)

## 2020-06-23 PROCEDURE — 99900035 HC TECH TIME PER 15 MIN (STAT)

## 2020-06-23 PROCEDURE — 93017 CV STRESS TEST TRACING ONLY: CPT

## 2020-06-23 PROCEDURE — 25000003 PHARM REV CODE 250: Performed by: INTERNAL MEDICINE

## 2020-06-23 PROCEDURE — 25000003 PHARM REV CODE 250: Performed by: FAMILY MEDICINE

## 2020-06-23 PROCEDURE — 99285 EMERGENCY DEPT VISIT HI MDM: CPT | Mod: 25

## 2020-06-23 PROCEDURE — A9502 TC99M TETROFOSMIN: HCPCS

## 2020-06-23 PROCEDURE — U0002 COVID-19 LAB TEST NON-CDC: HCPCS

## 2020-06-23 PROCEDURE — 84484 ASSAY OF TROPONIN QUANT: CPT

## 2020-06-23 RX ORDER — ACETAMINOPHEN 325 MG/1
650 TABLET ORAL EVERY 4 HOURS PRN
Status: DISCONTINUED | OUTPATIENT
Start: 2020-06-23 | End: 2020-07-01 | Stop reason: HOSPADM

## 2020-06-23 RX ORDER — SODIUM CHLORIDE 0.9 % (FLUSH) 0.9 %
10 SYRINGE (ML) INJECTION
Status: DISCONTINUED | OUTPATIENT
Start: 2020-06-23 | End: 2020-06-26

## 2020-06-23 RX ORDER — IBUPROFEN 200 MG
24 TABLET ORAL
Status: DISCONTINUED | OUTPATIENT
Start: 2020-06-23 | End: 2020-06-23

## 2020-06-23 RX ORDER — NAPROXEN SODIUM 220 MG/1
81 TABLET, FILM COATED ORAL DAILY
Status: DISCONTINUED | OUTPATIENT
Start: 2020-06-23 | End: 2020-06-26

## 2020-06-23 RX ORDER — LORAZEPAM 1 MG/1
1 TABLET ORAL ONCE
Status: COMPLETED | OUTPATIENT
Start: 2020-06-23 | End: 2020-06-23

## 2020-06-23 RX ORDER — LORAZEPAM 1 MG/1
1 TABLET ORAL NIGHTLY PRN
Status: DISCONTINUED | OUTPATIENT
Start: 2020-06-23 | End: 2020-07-01 | Stop reason: HOSPADM

## 2020-06-23 RX ORDER — PANTOPRAZOLE SODIUM 40 MG/10ML
40 INJECTION, POWDER, LYOPHILIZED, FOR SOLUTION INTRAVENOUS DAILY
Status: DISCONTINUED | OUTPATIENT
Start: 2020-06-23 | End: 2020-06-23

## 2020-06-23 RX ORDER — ONDANSETRON 2 MG/ML
4 INJECTION INTRAMUSCULAR; INTRAVENOUS EVERY 8 HOURS PRN
Status: DISCONTINUED | OUTPATIENT
Start: 2020-06-23 | End: 2020-06-26

## 2020-06-23 RX ORDER — REGADENOSON 0.08 MG/ML
0.4 INJECTION, SOLUTION INTRAVENOUS ONCE
Status: COMPLETED | OUTPATIENT
Start: 2020-06-23 | End: 2020-06-23

## 2020-06-23 RX ORDER — IBUPROFEN 200 MG
16 TABLET ORAL
Status: DISCONTINUED | OUTPATIENT
Start: 2020-06-23 | End: 2020-06-23

## 2020-06-23 RX ORDER — NORTRIPTYLINE HYDROCHLORIDE 25 MG/1
100 CAPSULE ORAL NIGHTLY
Status: DISCONTINUED | OUTPATIENT
Start: 2020-06-23 | End: 2020-07-01 | Stop reason: HOSPADM

## 2020-06-23 RX ORDER — PANTOPRAZOLE SODIUM 40 MG/1
40 TABLET, DELAYED RELEASE ORAL
Status: DISCONTINUED | OUTPATIENT
Start: 2020-06-23 | End: 2020-07-01 | Stop reason: HOSPADM

## 2020-06-23 RX ORDER — ALBUTEROL SULFATE 90 UG/1
1 AEROSOL, METERED RESPIRATORY (INHALATION) EVERY 4 HOURS PRN
Status: DISCONTINUED | OUTPATIENT
Start: 2020-06-23 | End: 2020-06-25

## 2020-06-23 RX ORDER — INSULIN ASPART 100 [IU]/ML
1-10 INJECTION, SOLUTION INTRAVENOUS; SUBCUTANEOUS
Status: DISCONTINUED | OUTPATIENT
Start: 2020-06-23 | End: 2020-06-26

## 2020-06-23 RX ORDER — INSULIN ASPART 100 [IU]/ML
0-5 INJECTION, SOLUTION INTRAVENOUS; SUBCUTANEOUS
Status: DISCONTINUED | OUTPATIENT
Start: 2020-06-23 | End: 2020-06-23

## 2020-06-23 RX ORDER — GLUCAGON 1 MG
1 KIT INJECTION
Status: DISCONTINUED | OUTPATIENT
Start: 2020-06-23 | End: 2020-06-23

## 2020-06-23 RX ORDER — TALC
6 POWDER (GRAM) TOPICAL NIGHTLY PRN
Status: DISCONTINUED | OUTPATIENT
Start: 2020-06-23 | End: 2020-06-26

## 2020-06-23 RX ORDER — NITROGLYCERIN 0.4 MG/1
0.4 TABLET SUBLINGUAL EVERY 5 MIN PRN
Status: DISCONTINUED | OUTPATIENT
Start: 2020-06-23 | End: 2020-06-26

## 2020-06-23 RX ORDER — LORAZEPAM 0.5 MG/1
1 TABLET ORAL
Status: COMPLETED | OUTPATIENT
Start: 2020-06-23 | End: 2020-06-23

## 2020-06-23 RX ORDER — LABETALOL HYDROCHLORIDE 5 MG/ML
10 INJECTION, SOLUTION INTRAVENOUS EVERY 6 HOURS PRN
Status: DISCONTINUED | OUTPATIENT
Start: 2020-06-23 | End: 2020-06-26

## 2020-06-23 RX ORDER — PANTOPRAZOLE SODIUM 40 MG/10ML
40 INJECTION, POWDER, LYOPHILIZED, FOR SOLUTION INTRAVENOUS ONCE
Status: COMPLETED | OUTPATIENT
Start: 2020-06-23 | End: 2020-06-23

## 2020-06-23 RX ORDER — ESCITALOPRAM OXALATE 10 MG/1
20 TABLET ORAL DAILY
Status: DISCONTINUED | OUTPATIENT
Start: 2020-06-23 | End: 2020-07-01 | Stop reason: HOSPADM

## 2020-06-23 RX ORDER — ATORVASTATIN CALCIUM 40 MG/1
40 TABLET, FILM COATED ORAL NIGHTLY
Status: DISCONTINUED | OUTPATIENT
Start: 2020-06-23 | End: 2020-07-01 | Stop reason: HOSPADM

## 2020-06-23 RX ORDER — MORPHINE SULFATE 2 MG/ML
2 INJECTION, SOLUTION INTRAMUSCULAR; INTRAVENOUS EVERY 4 HOURS PRN
Status: DISCONTINUED | OUTPATIENT
Start: 2020-06-23 | End: 2020-06-26

## 2020-06-23 RX ORDER — BISACODYL 10 MG
10 SUPPOSITORY, RECTAL RECTAL DAILY PRN
Status: DISCONTINUED | OUTPATIENT
Start: 2020-06-23 | End: 2020-06-26

## 2020-06-23 RX ORDER — LISINOPRIL 10 MG/1
10 TABLET ORAL DAILY
Status: DISCONTINUED | OUTPATIENT
Start: 2020-06-23 | End: 2020-06-28

## 2020-06-23 RX ADMIN — ATORVASTATIN CALCIUM 40 MG: 40 TABLET, FILM COATED ORAL at 10:06

## 2020-06-23 RX ADMIN — ALUMINUM HYDROXIDE, MAGNESIUM HYDROXIDE, AND SIMETHICONE: 200; 200; 20 SUSPENSION ORAL at 04:06

## 2020-06-23 RX ADMIN — LISINOPRIL 10 MG: 10 TABLET ORAL at 09:06

## 2020-06-23 RX ADMIN — ASPIRIN 81 MG 81 MG: 81 TABLET ORAL at 09:06

## 2020-06-23 RX ADMIN — INSULIN ASPART 4 UNITS: 100 INJECTION, SOLUTION INTRAVENOUS; SUBCUTANEOUS at 12:06

## 2020-06-23 RX ADMIN — INSULIN ASPART 2 UNITS: 100 INJECTION, SOLUTION INTRAVENOUS; SUBCUTANEOUS at 10:06

## 2020-06-23 RX ADMIN — REGADENOSON 0.4 MG: 0.08 INJECTION, SOLUTION INTRAVENOUS at 09:06

## 2020-06-23 RX ADMIN — MORPHINE SULFATE 2 MG: 2 INJECTION, SOLUTION INTRAMUSCULAR; INTRAVENOUS at 09:06

## 2020-06-23 RX ADMIN — PANTOPRAZOLE SODIUM 40 MG: 40 INJECTION, POWDER, FOR SOLUTION INTRAVENOUS at 04:06

## 2020-06-23 RX ADMIN — LORAZEPAM 1 MG: 1 TABLET ORAL at 04:06

## 2020-06-23 RX ADMIN — LORAZEPAM 1 MG: 0.5 TABLET ORAL at 12:06

## 2020-06-23 RX ADMIN — NORTRIPTYLINE HYDROCHLORIDE 100 MG: 25 CAPSULE ORAL at 10:06

## 2020-06-23 RX ADMIN — MORPHINE SULFATE 2 MG: 2 INJECTION, SOLUTION INTRAMUSCULAR; INTRAVENOUS at 10:06

## 2020-06-23 RX ADMIN — INSULIN ASPART 4 UNITS: 100 INJECTION, SOLUTION INTRAVENOUS; SUBCUTANEOUS at 05:06

## 2020-06-23 RX ADMIN — ESCITALOPRAM OXALATE 20 MG: 10 TABLET ORAL at 09:06

## 2020-06-23 NOTE — CONSULTS
[facility]  Department of Cardiology  Consult Note      PATIENT NAME: Nicole Harris  MRN: [id number]  TODAY'S DATE: 06/23/2020  ADMIT DATE: 6/23/2020                          CONSULT REQUESTED BY: Dede Hidalgo MD    SUBJECTIVE     PRINCIPAL PROBLEM: Chest pain      REASON FOR CONSULT: chest pain    From H&P: The patient is a 50-year-old nonsmoker female with history significant for hypertension, diabetes mellitus, fibromyalgia, chronic neck and back pain.  She was initially seen at an outlying emergency department with chest pain.  Work up was unmarkable except abnormal EKG (T wave inversions V1-V3) and subsequently transferred here for further evaluation and treatment.     She reports that she had neck pain and headache yesterday afternoon. After eating ice cream, she later developed moderate to severe mid chest pain, radiated to right chestt, associated with bilateral jaw discomfort, shortness of breath, and nausea. She felt that her neck was swollen. There were no aggravating/alleviating factors. Pain lasted for a few hours and relieved after NTG sublingual given in the ED. Currently, she reports headache, neck pain, and indigestion.     She reports having injection for next pain last week.  She denies fever, chills, cough, abdominal pain, vomiting, diarrhea, urinary symptoms.  She denies weight gain or edema.     Workup so for:  Afebrile  Vital signs stable  WBC 4.48  Hemoglobin 13  Hematocrit 39.4  Platelet 343  Sodium 135  Potassium 4.0  Glucose 288  BUN 23, creatinine 0.7  Troponin less than 0.01  COVID 19 screen negative  EKG, personally reviewed, SR with T wave inversions V1-V3        HPI:  Ms. Harris is a 50-year-old female who presented to the emergency room with complaints of mid sternal chest discomfort that radiated to the right side of her chest yesterday on 2 occasions.  Patient reports that she initially had the chest discomfort while sitting in her car and then later felt  the same chest discomfort after eating and ice cream .  Patient reports experiencing shortness of breath and being easily fatigued on exertion for quite some time.  However she denies any exertional chest discomfort.    Patient has about a 10 year history of diabetes along with history of hypertension, fibromyalgia, chronic neck and back pain, and history of smoking for about 10 years.        Review of patient's allergies indicates:   Allergen Reactions    Hydrocodone Nausea And Vomiting       Past Medical History:   Diagnosis Date    Anxiety     Bipolar disorder     Depression     Diabetes mellitus, type 2     HTN (hypertension)     Insomnia      Past Surgical History:   Procedure Laterality Date     SECTION      COLONOSCOPY N/A 2019    Procedure: COLONOSCOPY;  Surgeon: Da Diallo MD;  Location: [facility] ENDO;  Service: General;  Laterality: N/A;    EPIDURAL STEROID INJECTION N/A 2019    Procedure: Injection, Steroid, Epidural - L4/5 EPIDURAL STEROID INJECTION;  Surgeon: Sherri Gardiner MD;  Location: [facility] OR;  Service: Pain Management;  Laterality: N/A;    EPIDURAL STEROID INJECTION N/A 2019    Procedure: Injection, Steroid, Epidural - C7/T1 EPIDURAL STEROID INJECTION;  Surgeon: Sherri Gardiner MD;  Location: [facility] OR;  Service: Pain Management;  Laterality: N/A;    EPIDURAL STEROID INJECTION N/A 2019    Procedure: Injection, Steroid, Epidural - L4/5 EPIDURAL STEROID INJECTION;  Surgeon: Sherri Gardiner MD;  Location: [facility] OR;  Service: Pain Management;  Laterality: N/A;    EPIDURAL STEROID INJECTION N/A 10/21/2019    Procedure: Injection, Steroid, Epidural, CERVICAL C7/T1 SILVIA;  Surgeon: Sherri Gardiner MD;  Location: [facility] OR;  Service: Pain Management;  Laterality: N/A;  19    ESOPHAGOGASTRODUODENOSCOPY N/A 2019    Procedure: ESOPHAGOGASTRODUODENOSCOPY (EGD);  Surgeon: Da Diallo MD;  Location: [facility] ENDO;  Service: General;  Laterality: N/A;     TONSILLECTOMY      TRIGGER POINT INJECTION N/A 2019    Procedure: INJECTION, TRIGGER POINT - CERVICAL REGION;  Surgeon: Sherri Gardiner MD;  Location: [facility] OR;  Service: Pain Management;  Laterality: N/A;    TRIGGER POINT INJECTION N/A 10/21/2019    Procedure: INJECTION, TRIGGER POINT;  Surgeon: Sherri Gardiner MD;  Location: [facility] OR;  Service: Pain Management;  Laterality: N/A;     Social History     Tobacco Use    Smoking status: Former Smoker     Packs/day: 1.00     Years: 0.00     Pack years: 0.00     Quit date: 2012     Years since quittin.7    Smokeless tobacco: Never Used   Substance Use Topics    Alcohol use: Yes     Frequency: Monthly or less     Comment: occasional    Drug use: Yes     Types: Marijuana, Other-see comments        REVIEW OF SYSTEMS  CONSTITUTIONAL: Negative for chills, fatigue and fever.   EYES: No double vision, No blurred vision  NEURO: No headaches, No dizziness  RESPIRATORY: Negative for cough and wheezing.    CARDIOVASCULAR:  Positive for chest pain. Negative for palpitations and leg swelling.  Positive for shortness of breath and fatigue on exertion  GI: Negative for abdominal pain, No melena, diarrhea, nausea and vomiting.   : Negative for dysuria and frequency, Negative for hematuria  SKIN: Negative for bruising, Negative for edema or discoloration noted.   ENDOCRINE: Negative for polyphagia, Negative for heat intolerance, Negative for cold intolerance  PSYCHIATRIC: Negative for depression, Negative for anxiety, Negative for memory loss  MUSCULOSKELETAL: Negative for neck pain, Negative for muscle weakness, Negative for back pain     OBJECTIVE     VITAL SIGNS (Most Recent)  Temp: 97.7 °F (36.5 °C) (20 0719)  Pulse: 95 (20 0719)  Resp: 18 (20 0947)  BP: (!) 153/84 (20 1001)  SpO2: 97 % (20 0719)    I & O (Last 24H):    Intake/Output Summary (Last 24 hours) at 2020 1021  Last data filed at 2020 0457  Gross per 24 hour    Intake --   Output 400 ml   Net -400 ml       WEIGHTS  Wt Readings from Last 1 Encounters:   06/23/20 0456 87.6 kg (193 lb 2 oz)   06/23/20 0257 88 kg (194 lb 0.1 oz)       PHYSICAL EXAM  GENERAL: well built, well nourished, well-developed in no apparent distress alert and oriented.   HEENT: Normocephalic. Pupils normal and conjunctivae normal.  Mucous membranes normal, no cyanosis or icterus, trachea central,no pallor or icterus is noted..   NECK: No JVD. No bruit..   CARDIAC: Regular rate and rhythm. S1 is normal.S2 is normal.No gallops, clicks or murmurs noted at this time.  CHEST ANATOMY: normal.   LUNGS: Clear to auscultation. No wheezing or rhonchi..   ABDOMEN: Soft no masses or organomegaly.  No abdomen pulsations or bruits.  Normal bowel sounds. No pulsations and no masses felt, No guarding or rebound.   URINARY: No jules catheter   EXTREMITIES: No cyanosis, clubbing or edema noted at this time., no calf tenderness bilaterally.   PERIPHERAL VASCULAR SYSTEM: Good palpable distal pulses.   CENTRAL NERVOUS SYSTEM: No focal motor or sensory deficits noted.   SKIN: Skin without lesions, moist, well perfused.   MUSCLE STRENGTH & TONE: No noteable weakness, atrophy or abnormal movement.     HOME MEDICATIONS:  Current Facility-Administered Medications on File Prior to Encounter   Medication Dose Route Frequency Provider Last Rate Last Dose    0.9%  NaCl infusion   Intravenous Continuous Sherri Gardiner MD 20 mL/hr at 04/29/19 1227      [COMPLETED] aspirin EC tablet 325 mg  325 mg Oral ED 1 Time Van Hernandez MD   325 mg at 06/22/20 2226    [COMPLETED] LORazepam tablet 1 mg  1 mg Oral ED 1 Time Van Hernandez MD   1 mg at 06/23/20 0038    [COMPLETED] nitroGLYCERIN SL tablet 0.4 mg  0.4 mg Sublingual ED 1 Time Van Hernandez MD   0.4 mg at 06/22/20 2227     Current Outpatient Medications on File Prior to Encounter   Medication Sig Dispense Refill    albuterol (PROVENTIL/VENTOLIN HFA) 90  "mcg/actuation inhaler Inhale 1 puff into the lungs every 4 (four) hours as needed.   0    cyclobenzaprine (FLEXERIL) 10 MG tablet Take 1 tablet (10 mg total) by mouth 3 (three) times daily as needed for Muscle spasms. 90 tablet 2    escitalopram oxalate (LEXAPRO) 20 MG tablet Take 20 mg by mouth once daily.   3    furosemide (LASIX) 20 MG tablet Take 20 mg by mouth once daily.       linaGLIPtin (TRADJENTA) 5 mg Tab tablet Take 5 mg by mouth once daily.      lisinopril 10 MG tablet Take 10 mg by mouth once daily.       LORazepam (ATIVAN) 1 MG tablet TAKE 1 TABLET BY MOUTH ONCE DAILY AS NEEDED AT BEDTIME  0    metFORMIN (GLUCOPHAGE) 1000 MG tablet pt states she takes 500 mg po bid-the 1000mg dose "makes me sick"      naproxen (NAPROSYN) 500 MG tablet Take 500 mg by mouth 2 (two) times daily.      nortriptyline (PAMELOR) 50 MG capsule Take 2 capsules (100 mg total) by mouth every evening. 60 capsule 11    pantoprazole (PROTONIX) 40 MG tablet Take by mouth.         SCHEDULED MEDS:   aspirin  81 mg Oral Daily    atorvastatin  40 mg Oral QHS    escitalopram oxalate  20 mg Oral Daily    lisinopriL  10 mg Oral Daily    nortriptyline  100 mg Oral QHS    pantoprazole  40 mg Oral Before breakfast       CONTINUOUS INFUSIONS:    PRN MEDS:acetaminophen, albuterol, bisacodyL, insulin aspart U-100, labetalol, LORazepam, melatonin, morphine, nitroGLYCERIN, ondansetron, promethazine (PHENERGAN) IVPB, sodium chloride 0.9%    LABS AND DIAGNOSTICS     CBC LAST 3 DAYS  Recent Labs   Lab 06/22/20 2226   WBC 11.84   RBC 4.48   HGB 13.0   HCT 39.4   MCV 88   MCH 29.0   MCHC 33.0   RDW 13.1      MPV 9.7   GRAN 61.3  7.3   LYMPH 28.9  3.4   MONO 8.1  1.0   BASO 0.05   NRBC 0       COAGULATION LAST 3 DAYS  Recent Labs   Lab 06/22/20 2226   INR 0.9       CHEMISTRY LAST 3 DAYS  Recent Labs   Lab 06/22/20 2226   *   K 4.0      CO2 21*   ANIONGAP 11   BUN 23*   CREATININE 0.7   *   CALCIUM 9.4 "   ALBUMIN 4.4   PROT 7.2   ALKPHOS 79   ALT 32   AST 18   BILITOT 0.8       CARDIAC PROFILE LAST 3 DAYS  Recent Labs   Lab 06/22/20  2226 06/23/20  0337   BNP 16  --    TROPONINI <0.01* <0.030       ENDOCRINE LAST 3 DAYS  No results for input(s): TSH, PROCAL in the last 168 hours.    LAST ARTERIAL BLOOD GAS  ABG  No results for input(s): PH, PO2, PCO2, HCO3, BE in the last 168 hours.    LAST 7 DAYS MICROBIOLOGY   Microbiology Results (last 7 days)     ** No results found for the last 168 hours. **          MOST RECENT IMAGING  X-Ray Chest AP Portable  Narrative: EXAMINATION:  XR CHEST AP PORTABLE    CLINICAL HISTORY:  . Pain, unspecified    TECHNIQUE:  Single frontal portable view of the chest was performed.    COMPARISON:  None    FINDINGS:  Support devices: None    Patient is rotated to the LPO position.The lungs are clear, with normal appearance of pulmonary vasculature and no pleural effusion or pneumothorax.    The cardiac silhouette is top-normal in size.  The hilar and mediastinal contours are unremarkable.    Bones are intact.  Impression: 1. Rotated technique.  2. No acute chest disease.    Electronically signed by: Jack Oliver  Date:    06/23/2020  Time:    08:40      ECHOCARDIOGRAM RESULTS (last 5)  No results found for this or any previous visit.    CURRENT/PREVIOUS VISIT EKG  Results for orders placed or performed during the hospital encounter of 06/22/20   EKG 12-lead    Collection Time: 06/22/20 11:45 PM    Narrative    Test Reason : R52,    Vent. Rate : 100 BPM     Atrial Rate : 100 BPM     P-R Int : 184 ms          QRS Dur : 078 ms      QT Int : 364 ms       P-R-T Axes : 049 023 043 degrees     QTc Int : 469 ms    Normal sinus rhythm  Low voltage QRS  Nonspecific T wave abnormality  Prolonged QT  Abnormal ECG  When compared with ECG of 22-JUN-2020 22:07,  No significant change was found  Confirmed by Huseyin Carrero MD ([id number]) on 6/23/2020 10:08:17 AM    Referred By: [email or web address]   SELF            Confirmed By:Huseyin Carrero MD       ASSESSMENT/PLAN:     Active Hospital Problems    Diagnosis    *Chest pain    COPD (chronic obstructive pulmonary disease)    Essential hypertension    Diabetes mellitus       ASSESSMENT & PLAN:     1.  Chest pain  2.  Hypertension  3.  Type 2 diabetes mellitus  4.  COPD  5.  History of smoking      RECOMMENDATIONS:    1.  Troponin levels were negative x2 sets.  2.  Stress testing was completed today without much difficulty.  Awaiting nuclear imaging results.  3.  Upon assessment today the chest pain is noted to be reproducible with palpation to the mid and right side of her chest.  Suspect musculoskeletal chest discomfort.  There may also be an element of GI involvement since the chest pain did occur after eating ice cream.  4.  Further recommendations based on hospital course.  If stress testing is negative for reversible ischemia, would consider discharge home with follow up with her PCP on an outpatient basis.        [facility]  Department of Cardiology  Odette Leroy NP  Date of Service: 06/23/2020  10:21 AM

## 2020-06-23 NOTE — ED PROVIDER NOTES
Encounter Date: 2020       History     Chief Complaint   Patient presents with    Chest Pain     transfer from [place] for nstemi, denies chest pain at this time     Chief complaint is chest pain.  This patient is a transfer from [facility].  She had a cardiac workup for chest pain that revealed T-wave inversion in leads V1 V2 and V3.  For that reason she was transferred here for cardiac evaluation.  She does have a history of hypertension diabetes and family history.  At the present time she is comfortable chest pain-free.  Please refer to the chart from [place] for her presentation there.  She did complain of indigestion type pain in the chest area.        Review of patient's allergies indicates:   Allergen Reactions    Hydrocodone Nausea And Vomiting     Past Medical History:   Diagnosis Date    Anxiety     Bipolar disorder     Depression     Diabetes mellitus, type 2     HTN (hypertension)     Insomnia      Past Surgical History:   Procedure Laterality Date     SECTION      COLONOSCOPY N/A 2019    Procedure: COLONOSCOPY;  Surgeon: Da Diallo MD;  Location: [facility] ENDO;  Service: General;  Laterality: N/A;    EPIDURAL STEROID INJECTION N/A 2019    Procedure: Injection, Steroid, Epidural - L4/5 EPIDURAL STEROID INJECTION;  Surgeon: Sherri Gardiner MD;  Location: [facility] OR;  Service: Pain Management;  Laterality: N/A;    EPIDURAL STEROID INJECTION N/A 2019    Procedure: Injection, Steroid, Epidural - C7/T1 EPIDURAL STEROID INJECTION;  Surgeon: Sherri Gardiner MD;  Location: [facility] OR;  Service: Pain Management;  Laterality: N/A;    EPIDURAL STEROID INJECTION N/A 2019    Procedure: Injection, Steroid, Epidural - L4/5 EPIDURAL STEROID INJECTION;  Surgeon: Sherri Gardiner MD;  Location: [facility] OR;  Service: Pain Management;  Laterality: N/A;    EPIDURAL STEROID INJECTION N/A 10/21/2019    Procedure: Injection, Steroid, Epidural, CERVICAL C7/T1 SILVIA;  Surgeon: Sherri  ANN Gardiner MD;  Location: [facility] OR;  Service: Pain Management;  Laterality: N/A;  19    ESOPHAGOGASTRODUODENOSCOPY N/A 2019    Procedure: ESOPHAGOGASTRODUODENOSCOPY (EGD);  Surgeon: Da Diallo MD;  Location: [facility] ENDO;  Service: General;  Laterality: N/A;    TONSILLECTOMY      TRIGGER POINT INJECTION N/A 2019    Procedure: INJECTION, TRIGGER POINT - CERVICAL REGION;  Surgeon: Sherri Gardiner MD;  Location: [facility] OR;  Service: Pain Management;  Laterality: N/A;    TRIGGER POINT INJECTION N/A 10/21/2019    Procedure: INJECTION, TRIGGER POINT;  Surgeon: Sherri Gardiner MD;  Location: [facility] OR;  Service: Pain Management;  Laterality: N/A;     Family History   Problem Relation Age of Onset    Lung cancer Mother     Breast cancer Neg Hx      Social History     Tobacco Use    Smoking status: Former Smoker     Packs/day: 1.00     Years: 0.00     Pack years: 0.00     Quit date: 2012     Years since quittin.7    Smokeless tobacco: Never Used   Substance Use Topics    Alcohol use: Yes     Frequency: Monthly or less     Comment: occasional    Drug use: Yes     Types: Marijuana, Other-see comments     Review of Systems   Constitutional: Negative for chills and fever.   HENT: Negative for ear pain, rhinorrhea and sore throat.    Eyes: Negative for pain and visual disturbance.   Respiratory: Negative for cough and shortness of breath.    Cardiovascular: Positive for chest pain. Negative for palpitations.   Gastrointestinal: Negative for abdominal pain, constipation, diarrhea, nausea and vomiting.   Genitourinary: Negative for dysuria, frequency, hematuria and urgency.   Musculoskeletal: Negative for back pain, joint swelling and myalgias.   Skin: Negative for rash.   Neurological: Negative for dizziness, seizures, weakness and headaches.   Psychiatric/Behavioral: Negative for dysphoric mood. The patient is not nervous/anxious.        Physical Exam     Initial Vitals [20 0257]   BP Pulse  Resp Temp SpO2   (!) 158/85 97 16 98 °F (36.7 °C) 99 %      MAP       --         Physical Exam    Nursing note and vitals reviewed.  Constitutional: She appears well-developed and well-nourished.   HENT:   Head: Normocephalic and atraumatic.   Eyes: Conjunctivae, EOM and lids are normal. Pupils are equal, round, and reactive to light.   Neck: Trachea normal and normal range of motion. Neck supple. No thyroid mass and no thyromegaly present.   Cardiovascular: Normal rate, regular rhythm and normal heart sounds.   Pulmonary/Chest: Effort normal and breath sounds normal.   Abdominal: Soft. Normal appearance and bowel sounds are normal. There is no abdominal tenderness.   Musculoskeletal: Normal range of motion.   Neurological: She is alert and oriented to person, place, and time. She has normal strength and normal reflexes. No cranial nerve deficit or sensory deficit.   Skin: Skin is warm and dry.   Psychiatric: She has a normal mood and affect. Her speech is normal and behavior is normal. Judgment and thought content normal.         ED Course   Procedures  Labs Reviewed - No data to display       Imaging Results    None          Medical Decision Making:   ED Management:  The patient at the present time will be admitted.  We will repeat a troponin at this point time.  Her EKG is similar to previous EKG at the transferring hospital.                                 Clinical Impression:       ICD-10-CM ICD-9-CM   1. Chest pain  R07.9 786.50             ED Disposition Condition    Observation                           Catina Gomes MD  06/23/20 [id number]

## 2020-06-23 NOTE — PLAN OF CARE
Problem: Adult Inpatient Plan of Care  Goal: Plan of Care Review  Outcome: Ongoing, Progressing  Goal: Patient-Specific Goal (Individualization)  Outcome: Ongoing, Progressing  Goal: Absence of Hospital-Acquired Illness or Injury  Outcome: Ongoing, Progressing  Intervention: Identify and Manage Fall Risk  Flowsheets (Taken 6/23/2020 1806)  Safety Promotion/Fall Prevention:   assistive device/personal item within reach   bed alarm set   Fall Risk reviewed with patient/family   nonskid shoes/socks when out of bed   side rails raised x 2   instructed to call staff for mobility  Intervention: Prevent VTE (venous thromboembolism)  Flowsheets (Taken 6/23/2020 1806)  VTE Prevention/Management: ambulation promoted  Goal: Optimal Comfort and Wellbeing  Outcome: Ongoing, Progressing  Goal: Readiness for Transition of Care  Outcome: Ongoing, Progressing  Goal: Rounds/Family Conference  Outcome: Ongoing, Progressing     Problem: Diabetes Comorbidity  Goal: Blood Glucose Level Within Desired Range  Outcome: Ongoing, Progressing  Intervention: Maintain Glycemic Control  Flowsheets (Taken 6/23/2020 1806)  Glycemic Management: blood glucose monitoring     Problem: Fall Injury Risk  Goal: Absence of Fall and Fall-Related Injury  Outcome: Ongoing, Progressing  Intervention: Identify and Manage Contributors to Fall Injury Risk  Flowsheets (Taken 6/23/2020 1806)  Self-Care Promotion: independence encouraged  Medication Review/Management: medications reviewed  Intervention: Promote Injury-Free Environment  Flowsheets (Taken 6/23/2020 1806)  Safety Promotion/Fall Prevention:   assistive device/personal item within reach   bed alarm set   Fall Risk reviewed with patient/family   nonskid shoes/socks when out of bed   side rails raised x 2   instructed to call staff for mobility  Environmental Safety Modification:   assistive device/personal items within reach   clutter free environment maintained   room organization consistent   lighting  adjusted

## 2020-06-23 NOTE — PROGRESS NOTES
Myocardial Perfusion Rest injection LAC arm IV at 07:40        -Myocardial perfusion resting images in progress at 08:30    -Patient to remain NPO status    JOSIAH PATEL

## 2020-06-23 NOTE — H&P
[facility] Medicine  History & Physical    Patient Name: Nicole Harris  MRN: [id number]  Admission Date: 6/23/2020  Attending Physician: Brianna Rich MD   Primary Care Provider: Vira Conn NP         Patient information was obtained from patient, past medical records and ER records.     Subjective:     Principal Problem:Chest pain    Chief Complaint:   Chief Complaint   Patient presents with    Chest Pain     transfer from [place] for nstemi, denies chest pain at this time        HPI: The patient is a 50-year-old nonsmoker female with history significant for hypertension, diabetes mellitus, fibromyalgia, chronic neck and back pain.  She was initially seen at an outlying emergency department with chest pain.  Work up was unmarkable except abnormal EKG (T wave inversions V1-V3) and subsequently transferred here for further evaluation and treatment.    She reports that she had neck pain and headache yesterday afternoon. After eating ice cream, she later developed moderate to severe mid chest pain, radiated to right chestt, associated with bilateral jaw discomfort, shortness of breath, and nausea. She felt that her neck was swollen. There were no aggravating/alleviating factors. Pain lasted for a few hours and relieved after NTG sublingual given in the ED. Currently, she reports headache, neck pain, and indigestion.    She reports having injection for next pain last week.  She denies fever, chills, cough, abdominal pain, vomiting, diarrhea, urinary symptoms.  She denies weight gain or edema.    Workup so for:  Afebrile  Vital signs stable  WBC 4.48  Hemoglobin 13  Hematocrit 39.4  Platelet 343  Sodium 135  Potassium 4.0  Glucose 288  BUN 23, creatinine 0.7  Troponin less than 0.01  COVID 19 screen negative  EKG, personally reviewed, SR with T wave inversions V1-V3    Past Medical History:   Diagnosis Date    Anxiety     Bipolar disorder     Depression     Diabetes mellitus,  type 2     HTN (hypertension)     Insomnia        Past Surgical History:   Procedure Laterality Date     SECTION      COLONOSCOPY N/A 2019    Procedure: COLONOSCOPY;  Surgeon: Da Diallo MD;  Location: [facility] ENDO;  Service: General;  Laterality: N/A;    EPIDURAL STEROID INJECTION N/A 2019    Procedure: Injection, Steroid, Epidural - L4/5 EPIDURAL STEROID INJECTION;  Surgeon: Sherri Gardiner MD;  Location: [facility] OR;  Service: Pain Management;  Laterality: N/A;    EPIDURAL STEROID INJECTION N/A 2019    Procedure: Injection, Steroid, Epidural - C7/T1 EPIDURAL STEROID INJECTION;  Surgeon: Sherri Gardiner MD;  Location: [facility] OR;  Service: Pain Management;  Laterality: N/A;    EPIDURAL STEROID INJECTION N/A 2019    Procedure: Injection, Steroid, Epidural - L4/5 EPIDURAL STEROID INJECTION;  Surgeon: Sherri Gardiner MD;  Location: [facility] OR;  Service: Pain Management;  Laterality: N/A;    EPIDURAL STEROID INJECTION N/A 10/21/2019    Procedure: Injection, Steroid, Epidural, CERVICAL C7/T1 SILVIA;  Surgeon: Sherri Gardiner MD;  Location: [facility] OR;  Service: Pain Management;  Laterality: N/A;  19    ESOPHAGOGASTRODUODENOSCOPY N/A 2019    Procedure: ESOPHAGOGASTRODUODENOSCOPY (EGD);  Surgeon: Da Diallo MD;  Location: [facility] ENDO;  Service: General;  Laterality: N/A;    TONSILLECTOMY      TRIGGER POINT INJECTION N/A 2019    Procedure: INJECTION, TRIGGER POINT - CERVICAL REGION;  Surgeon: Sherri Gardiner MD;  Location: [facility] OR;  Service: Pain Management;  Laterality: N/A;    TRIGGER POINT INJECTION N/A 10/21/2019    Procedure: INJECTION, TRIGGER POINT;  Surgeon: Sherri Gardiner MD;  Location: [facility] OR;  Service: Pain Management;  Laterality: N/A;       Review of patient's allergies indicates:   Allergen Reactions    Hydrocodone Nausea And Vomiting       Current Facility-Administered Medications on File Prior to Encounter   Medication    0.9%  NaCl infusion     "[COMPLETED] aspirin EC tablet 325 mg    [COMPLETED] LORazepam tablet 1 mg    [COMPLETED] nitroGLYCERIN SL tablet 0.4 mg     Current Outpatient Medications on File Prior to Encounter   Medication Sig    albuterol (PROVENTIL/VENTOLIN HFA) 90 mcg/actuation inhaler Inhale 1 puff into the lungs every 4 (four) hours as needed.     cyclobenzaprine (FLEXERIL) 10 MG tablet Take 1 tablet (10 mg total) by mouth 3 (three) times daily as needed for Muscle spasms.    escitalopram oxalate (LEXAPRO) 20 MG tablet Take 20 mg by mouth once daily.     furosemide (LASIX) 20 MG tablet Take 20 mg by mouth once daily.     linaGLIPtin (TRADJENTA) 5 mg Tab tablet Take 5 mg by mouth once daily.    lisinopril 10 MG tablet Take 10 mg by mouth once daily.     LORazepam (ATIVAN) 1 MG tablet TAKE 1 TABLET BY MOUTH ONCE DAILY AS NEEDED AT BEDTIME    metFORMIN (GLUCOPHAGE) 1000 MG tablet pt states she takes 500 mg po bid-the 1000mg dose "makes me sick"    naproxen (NAPROSYN) 500 MG tablet Take 500 mg by mouth 2 (two) times daily.    nortriptyline (PAMELOR) 50 MG capsule Take 2 capsules (100 mg total) by mouth every evening.    pantoprazole (PROTONIX) 40 MG tablet Take by mouth.     Family History     Problem Relation (Age of Onset)    Lung cancer Mother        Tobacco Use    Smoking status: Former Smoker     Packs/day: 1.00     Years: 0.00     Pack years: 0.00     Quit date: 2012     Years since quittin.7    Smokeless tobacco: Never Used   Substance and Sexual Activity    Alcohol use: Yes     Frequency: Monthly or less     Comment: occasional    Drug use: Yes     Types: Marijuana, Other-see comments    Sexual activity: Yes     Review of Systems   Constitutional:        Insommnia   Musculoskeletal: Positive for back pain and neck pain.   All other systems reviewed and are negative.    Objective:     Vital Signs (Most Recent):  Temp: 98 °F (36.7 °C) (20)  Pulse: 97 (20)  Resp: 16 (20)  BP: " (!) 158/85 (06/23/20 0257)  SpO2: 99 % (06/23/20 0257) Vital Signs (24h Range):  Temp:  [98 °F (36.7 °C)-98.2 °F (36.8 °C)] 98 °F (36.7 °C)  Pulse:  [] 97  Resp:  [16-18] 16  SpO2:  [95 %-99 %] 99 %  BP: (123-173)/(66-96) 158/85     Weight: 88 kg (194 lb 0.1 oz)  Body mass index is 33.3 kg/m².    Physical Exam  Vitals signs reviewed.   Constitutional:       General: She is not in acute distress.     Appearance: Normal appearance. She is not ill-appearing.   HENT:      Head: Normocephalic and atraumatic.      Right Ear: External ear normal.      Mouth/Throat:      Mouth: Mucous membranes are moist.   Eyes:      General:         Right eye: No discharge.         Left eye: No discharge.   Neck:      Musculoskeletal: Normal range of motion and neck supple. No muscular tenderness.   Cardiovascular:      Rate and Rhythm: Normal rate and regular rhythm.      Pulses: Normal pulses.      Heart sounds: Normal heart sounds.   Pulmonary:      Effort: Pulmonary effort is normal.      Breath sounds: Normal breath sounds. No wheezing.   Chest:      Chest wall: No tenderness.   Abdominal:      General: Bowel sounds are normal.      Palpations: Abdomen is soft.   Genitourinary:     General: Normal vulva.   Musculoskeletal:         General: No swelling, tenderness or deformity.   Skin:     General: Skin is dry.      Capillary Refill: Capillary refill takes less than 2 seconds.   Neurological:      General: No focal deficit present.      Mental Status: She is alert and oriented to person, place, and time.   Psychiatric:         Mood and Affect: Mood normal.         Behavior: Behavior normal.             Significant Labs:   CBC:   Recent Labs   Lab 06/22/20  2226   WBC 11.84   HGB 13.0   HCT 39.4        CMP:   Recent Labs   Lab 06/22/20  2226   *   K 4.0      CO2 21*   *   BUN 23*   CREATININE 0.7   CALCIUM 9.4   PROT 7.2   ALBUMIN 4.4   BILITOT 0.8   ALKPHOS 79   AST 18   ALT 32   ANIONGAP 11   EGFRNONAA  >60.0     Cardiac Markers:   Recent Labs   Lab 06/22/20 2226   BNP 16     Coagulation:   Recent Labs   Lab 06/22/20 2226   INR 0.9     Troponin:   Recent Labs   Lab 06/22/20 2226   TROPONINI <0.01*       Significant Imaging: I have reviewed all pertinent imaging results/findings within the past 24 hours.   Chest radiograph, personally reviewed, no obvious infiltrates/overt heart failure    Assessment/Plan:     * Chest pain  No history of CAD  Nonsmoker  Has risk factors: Diabetes and hypertension  First troponin negative  Await for 2nd set of troponin  EKGs: sinus rhythm with T-wave inversion in lead V1 to V3, no ST elevation  Continue cardiac monitor  Lexiscan stress test later today  Consult Cardiology  Aspirin nitroglycerin  IV PPI and GI cocktail x1        Diabetes mellitus  With hyperglycemia  Monitor blood glucose  Low-dose sliding scale insulin  Hypoglycemic measures      Essential hypertension  Chronic medical condition  Continue home medication  Monitor      COPD (chronic obstructive pulmonary disease)  Chronic medical condition  No wheezes  Resume inhalers      VTE Risk Mitigation (From admission, onward)         Ordered     IP VTE HIGH RISK PATIENT  Once      06/23/20 0335     Place sequential compression device  Until discontinued      06/23/20 0335                   NIKIA Walker  Department of Hospital Medicine   [facility]

## 2020-06-23 NOTE — Clinical Note
Catheter is inserted into the ostium   left main. Angiography performed of the left coronary arteries in multiple views. Angiography performed via power injection with 8 mL contrast at 4 mL/sPower injection PSI was 450.. JL4

## 2020-06-23 NOTE — PROGRESS NOTES
[facility] Medicine  Progress Note    Patient name: Nicole Harris  MRN: [id number]  Admit Date: 6/23/2020   LOS: 0 days     SUBJECTIVE:     Principal problem: Chest pain    Interval History:    Patient currently denies chest pain  Reports persistent neck pain  Nuclear stress test showed small anti lateral wall near the apex reversible defect  Patient's daughter requested trans radial access if angio planned  Cardiologist notified, will place on NPO from midnight  Blood sugar un controlled, a1c 10.3  Will add  beta-blocker coreg     Scheduled Meds:   aspirin  81 mg Oral Daily    atorvastatin  40 mg Oral QHS    escitalopram oxalate  20 mg Oral Daily    lisinopriL  10 mg Oral Daily    nortriptyline  100 mg Oral QHS    pantoprazole  40 mg Oral Before breakfast     Continuous Infusions:  PRN Meds:acetaminophen, albuterol, bisacodyL, insulin aspart U-100, labetalol, LORazepam, melatonin, morphine, nitroGLYCERIN, ondansetron, promethazine (PHENERGAN) IVPB, sodium chloride 0.9%    Review of patient's allergies indicates:   Allergen Reactions    Hydrocodone Nausea And Vomiting       Review of Systems: As per interval history    OBJECTIVE:     Vital Signs (Most Recent)  Temp: 97.8 °F (36.6 °C) (06/23/20 1546)  Pulse: 92 (06/23/20 1546)  Resp: 19 (06/23/20 1546)  BP: 131/67 (06/23/20 1546)  SpO2: 97 % (06/23/20 1546)    Vital Signs Range (Last 24H):  Temp:  [97.7 °F (36.5 °C)-98.4 °F (36.9 °C)]   Pulse:  []   Resp:  [16-21]   BP: (123-173)/(66-96)   SpO2:  [95 %-99 %]     I & O (Last 24H):    Intake/Output Summary (Last 24 hours) at 6/23/2020 9277  Last data filed at 6/23/2020 0457  Gross per 24 hour   Intake --   Output 400 ml   Net -400 ml       Physical Exam:  General: Patient resting comfortably in no acute distress. Appears as stated age. Calm obese  Eyes: No conjunctival injection. No scleral icterus.  ENT: Hearing grossly intact. No discharge from ears. No nasal discharge.   Neck:  Supple, trachea midline. No JVDmild low  posterior neck tenderness  CVS: RRR. No LE edema BL  Lungs: CTA BL, no wheezing or crackles. Good breath sounds. No accessory muscle use. No acute respiratory distress  Abdomen:  Soft, nontender and nondistended.  No organomegaly  Neuro: AOx3. Moves all extremities. Follows commands. Responds appropriately   Skin:  No rash or erythema noted    Laboratory:  CBC:   Recent Labs   Lab 06/22/20  2226   WBC 11.84   RBC 4.48   HGB 13.0   HCT 39.4      MCV 88   MCH 29.0   MCHC 33.0     CMP:   Recent Labs   Lab 06/22/20  2226   *   CALCIUM 9.4   ALBUMIN 4.4   PROT 7.2   *   K 4.0   CO2 21*      BUN 23*   CREATININE 0.7   ALKPHOS 79   ALT 32   AST 18   BILITOT 0.8         ASSESSMENT/PLAN:     [place] now is in  Active Hospital Problems    Diagnosis  POA    *Chest pain [R07.9]  Yes    COPD (chronic obstructive pulmonary disease) [J44.9]  Yes    Essential hypertension [I10]  Yes    Diabetes mellitus [E11.9]  Yes      Resolved Hospital Problems   No resolved problems to display.         Plan:   No history of CAD  Hx of smoking   Diabetes uncontrolled   troponin negativex2  Await for 2nd set of troponin  EKGs: sinus rhythm with T-wave inversion in lead V1 to V3, no ST elevation  Continue cardiac monitor  Lexiscan stress test positive  Consult Cardiology appreciated  Aspirin nitroglycerin, statin and beta-blocker  IV PPI and GI cocktail x1      VTE Risk Mitigation (From admission, onward)         Ordered     IP VTE HIGH RISK PATIENT  Once      06/23/20 0335     Place sequential compression device  Until discontinued      06/23/20 0335                  Department Hospital Medicine  [facility]  Dede Hidalgo MD  Date of service: 06/23/2020

## 2020-06-23 NOTE — PROGRESS NOTES
Myocardial perfusion  Stress Images in progress at 10:54    -Myocardial  Perfusion images will be completed at 11:04    [facility]

## 2020-06-23 NOTE — SUBJECTIVE & OBJECTIVE
Past Medical History:   Diagnosis Date    Anxiety     Bipolar disorder     Depression     Diabetes mellitus, type 2     HTN (hypertension)     Insomnia        Past Surgical History:   Procedure Laterality Date     SECTION      COLONOSCOPY N/A 2019    Procedure: COLONOSCOPY;  Surgeon: Da Diallo MD;  Location: [facility] ENDO;  Service: General;  Laterality: N/A;    EPIDURAL STEROID INJECTION N/A 2019    Procedure: Injection, Steroid, Epidural - L4/5 EPIDURAL STEROID INJECTION;  Surgeon: Sherri Gardiner MD;  Location: [facility] OR;  Service: Pain Management;  Laterality: N/A;    EPIDURAL STEROID INJECTION N/A 2019    Procedure: Injection, Steroid, Epidural - C7/T1 EPIDURAL STEROID INJECTION;  Surgeon: Sherri Gardiner MD;  Location: [facility] OR;  Service: Pain Management;  Laterality: N/A;    EPIDURAL STEROID INJECTION N/A 2019    Procedure: Injection, Steroid, Epidural - L4/5 EPIDURAL STEROID INJECTION;  Surgeon: Sherri Gardiner MD;  Location: [facility] OR;  Service: Pain Management;  Laterality: N/A;    EPIDURAL STEROID INJECTION N/A 10/21/2019    Procedure: Injection, Steroid, Epidural, CERVICAL C7/T1 SILVIA;  Surgeon: Sherri Gardiner MD;  Location: [facility] OR;  Service: Pain Management;  Laterality: N/A;  19    ESOPHAGOGASTRODUODENOSCOPY N/A 2019    Procedure: ESOPHAGOGASTRODUODENOSCOPY (EGD);  Surgeon: Da Diallo MD;  Location: [facility] ENDO;  Service: General;  Laterality: N/A;    TONSILLECTOMY      TRIGGER POINT INJECTION N/A 2019    Procedure: INJECTION, TRIGGER POINT - CERVICAL REGION;  Surgeon: Sherri Gardiner MD;  Location: [facility] OR;  Service: Pain Management;  Laterality: N/A;    TRIGGER POINT INJECTION N/A 10/21/2019    Procedure: INJECTION, TRIGGER POINT;  Surgeon: Sherri Gardiner MD;  Location: [facility] OR;  Service: Pain Management;  Laterality: N/A;       Review of patient's allergies indicates:   Allergen Reactions    Hydrocodone Nausea And Vomiting  "      Current Facility-Administered Medications on File Prior to Encounter   Medication    0.9%  NaCl infusion    [COMPLETED] aspirin EC tablet 325 mg    [COMPLETED] LORazepam tablet 1 mg    [COMPLETED] nitroGLYCERIN SL tablet 0.4 mg     Current Outpatient Medications on File Prior to Encounter   Medication Sig    albuterol (PROVENTIL/VENTOLIN HFA) 90 mcg/actuation inhaler Inhale 1 puff into the lungs every 4 (four) hours as needed.     cyclobenzaprine (FLEXERIL) 10 MG tablet Take 1 tablet (10 mg total) by mouth 3 (three) times daily as needed for Muscle spasms.    escitalopram oxalate (LEXAPRO) 20 MG tablet Take 20 mg by mouth once daily.     furosemide (LASIX) 20 MG tablet Take 20 mg by mouth once daily.     linaGLIPtin (TRADJENTA) 5 mg Tab tablet Take 5 mg by mouth once daily.    lisinopril 10 MG tablet Take 10 mg by mouth once daily.     LORazepam (ATIVAN) 1 MG tablet TAKE 1 TABLET BY MOUTH ONCE DAILY AS NEEDED AT BEDTIME    metFORMIN (GLUCOPHAGE) 1000 MG tablet pt states she takes 500 mg po bid-the 1000mg dose "makes me sick"    naproxen (NAPROSYN) 500 MG tablet Take 500 mg by mouth 2 (two) times daily.    nortriptyline (PAMELOR) 50 MG capsule Take 2 capsules (100 mg total) by mouth every evening.    pantoprazole (PROTONIX) 40 MG tablet Take by mouth.     Family History     Problem Relation (Age of Onset)    Lung cancer Mother        Tobacco Use    Smoking status: Former Smoker     Packs/day: 1.00     Years: 0.00     Pack years: 0.00     Quit date: 2012     Years since quittin.7    Smokeless tobacco: Never Used   Substance and Sexual Activity    Alcohol use: Yes     Frequency: Monthly or less     Comment: occasional    Drug use: Yes     Types: Marijuana, Other-see comments    Sexual activity: Yes     Review of Systems   Constitutional:        Insommnia   Musculoskeletal: Positive for back pain and neck pain.   All other systems reviewed and are negative.    Objective:     Vital " Signs (Most Recent):  Temp: 98 °F (36.7 °C) (06/23/20 0257)  Pulse: 97 (06/23/20 0257)  Resp: 16 (06/23/20 0257)  BP: (!) 158/85 (06/23/20 0257)  SpO2: 99 % (06/23/20 0257) Vital Signs (24h Range):  Temp:  [98 °F (36.7 °C)-98.2 °F (36.8 °C)] 98 °F (36.7 °C)  Pulse:  [] 97  Resp:  [16-18] 16  SpO2:  [95 %-99 %] 99 %  BP: (123-173)/(66-96) 158/85     Weight: 88 kg (194 lb 0.1 oz)  Body mass index is 33.3 kg/m².    Physical Exam  Vitals signs reviewed.   Constitutional:       General: She is not in acute distress.     Appearance: Normal appearance. She is not ill-appearing.   HENT:      Head: Normocephalic and atraumatic.      Right Ear: External ear normal.      Mouth/Throat:      Mouth: Mucous membranes are moist.   Eyes:      General:         Right eye: No discharge.         Left eye: No discharge.   Neck:      Musculoskeletal: Normal range of motion and neck supple. No muscular tenderness.   Cardiovascular:      Rate and Rhythm: Normal rate and regular rhythm.      Pulses: Normal pulses.      Heart sounds: Normal heart sounds.   Pulmonary:      Effort: Pulmonary effort is normal.      Breath sounds: Normal breath sounds. No wheezing.   Chest:      Chest wall: No tenderness.   Abdominal:      General: Bowel sounds are normal.      Palpations: Abdomen is soft.   Genitourinary:     General: Normal vulva.   Musculoskeletal:         General: No swelling, tenderness or deformity.   Skin:     General: Skin is dry.      Capillary Refill: Capillary refill takes less than 2 seconds.   Neurological:      General: No focal deficit present.      Mental Status: She is alert and oriented to person, place, and time.   Psychiatric:         Mood and Affect: Mood normal.         Behavior: Behavior normal.             Significant Labs:   CBC:   Recent Labs   Lab 06/22/20 2226   WBC 11.84   HGB 13.0   HCT 39.4        CMP:   Recent Labs   Lab 06/22/20 2226   *   K 4.0      CO2 21*   *   BUN 23*    CREATININE 0.7   CALCIUM 9.4   PROT 7.2   ALBUMIN 4.4   BILITOT 0.8   ALKPHOS 79   AST 18   ALT 32   ANIONGAP 11   EGFRNONAA >60.0     Cardiac Markers:   Recent Labs   Lab 06/22/20 2226   BNP 16     Coagulation:   Recent Labs   Lab 06/22/20 2226   INR 0.9     Troponin:   Recent Labs   Lab 06/22/20 2226   TROPONINI <0.01*       Significant Imaging: I have reviewed all pertinent imaging results/findings within the past 24 hours.   Chest radiograph, personally reviewed, no obvious infiltrates/overt heart failure

## 2020-06-23 NOTE — ED NOTES
Patient accepted to [facility] ED to Dr. Richardson. Number for report is [phone].

## 2020-06-23 NOTE — HPI
The patient is a 50-year-old nonsmoker female with history significant for hypertension, diabetes mellitus, fibromyalgia, chronic neck and back pain.  She was initially seen at an outlying emergency department with chest pain.  Work up was unmarkable except abnormal EKG (T wave inversions V1-V3) and subsequently transferred here for further evaluation and treatment.    She reports that she had neck pain and headache yesterday afternoon. After eating ice cream, she later developed moderate to severe mid chest pain, radiated to right chestt, associated with bilateral jaw discomfort, shortness of breath, and nausea. She felt that her neck was swollen. There were no aggravating/alleviating factors. Pain lasted for a few hours and relieved after NTG sublingual given in the ED. Currently, she reports headache, neck pain, and indigestion.    She reports having injection for next pain last week.  She denies fever, chills, cough, abdominal pain, vomiting, diarrhea, urinary symptoms.  She denies weight gain or edema.    Workup so for:  Afebrile  Vital signs stable  WBC 4.48  Hemoglobin 13  Hematocrit 39.4  Platelet 343  Sodium 135  Potassium 4.0  Glucose 288  BUN 23, creatinine 0.7  Troponin less than 0.01  COVID 19 screen negative  EKG, personally reviewed, SR with T wave inversions V1-V3

## 2020-06-23 NOTE — ED NOTES
RN informs patient that [facility] has no bed availability. She states she will agree to being transferred to [facility] or [facility]. [facility] and Dr. Hernandez informed.

## 2020-06-23 NOTE — PROGRESS NOTES
-Myocardial perfusion Stress injection LAC  IV at 09:15    -Lexiscan Stress    -Patient released from NPO status    JOSIAH Patel [facility]

## 2020-06-23 NOTE — Clinical Note
Catheter is inserted into the ostium   right coronary artery. Angiography performed of the right coronary arteries in multiple views. Angiography performed via power injection with 6 mL contrast at 3 mL/sPower injection PSI was 300.. JR4

## 2020-06-23 NOTE — ASSESSMENT & PLAN NOTE
No history of CAD  Nonsmoker  Has risk factors: Diabetes and hypertension  First troponin negative  Await for 2nd set of troponin  EKGs: sinus rhythm with T-wave inversion in lead V1 to V3, no ST elevation  Continue cardiac monitor  Lexiscan stress test later today  Consult Cardiology  Aspirin nitroglycerin  IV PPI and GI cocktail x1

## 2020-06-23 NOTE — ASSESSMENT & PLAN NOTE
With hyperglycemia  Monitor blood glucose  Low-dose sliding scale insulin  Hypoglycemic measures

## 2020-06-23 NOTE — ED PROVIDER NOTES
Encounter Date: 2020       History     Chief Complaint   Patient presents with    Chest Pain    Nausea    Heartburn     50-year-old female presents to the ED complaining of intermittent substernal chest pain radiating to the left and right neck anteriorly into the jaw, she has not had pain in the past it is not associated with exertion or eating or not eating, she denies any history of smoking but has a history of diabetes, I thought it might be just heartburn patient presents to the ED at the strong suggestion of her daughter who is an ER nurse at a local hospital, patient has use physicians spaced out [facility] and prefers that as transfer destination if cardiology services are required        Review of patient's allergies indicates:   Allergen Reactions    Hydrocodone Nausea And Vomiting     Past Medical History:   Diagnosis Date    Anxiety     Bipolar disorder     Depression     Diabetes mellitus, type 2     HTN (hypertension)     Insomnia      Past Surgical History:   Procedure Laterality Date     SECTION      COLONOSCOPY N/A 2019    Procedure: COLONOSCOPY;  Surgeon: Da Diallo MD;  Location: [facility] ENDO;  Service: General;  Laterality: N/A;    EPIDURAL STEROID INJECTION N/A 2019    Procedure: Injection, Steroid, Epidural - L4/5 EPIDURAL STEROID INJECTION;  Surgeon: Sherri Gardiner MD;  Location: [facility] OR;  Service: Pain Management;  Laterality: N/A;    EPIDURAL STEROID INJECTION N/A 2019    Procedure: Injection, Steroid, Epidural - C7/T1 EPIDURAL STEROID INJECTION;  Surgeon: Sherri Gardiner MD;  Location: [facility] OR;  Service: Pain Management;  Laterality: N/A;    EPIDURAL STEROID INJECTION N/A 2019    Procedure: Injection, Steroid, Epidural - L4/5 EPIDURAL STEROID INJECTION;  Surgeon: Sherri Gardiner MD;  Location: [facility] OR;  Service: Pain Management;  Laterality: N/A;    EPIDURAL STEROID INJECTION N/A 10/21/2019    Procedure: Injection,  Steroid, Epidural, CERVICAL C7/T1 SILVIA;  Surgeon: Sherri Gardiner MD;  Location: [facility] OR;  Service: Pain Management;  Laterality: N/A;  19    ESOPHAGOGASTRODUODENOSCOPY N/A 2019    Procedure: ESOPHAGOGASTRODUODENOSCOPY (EGD);  Surgeon: Da Diallo MD;  Location: [facility] ENDO;  Service: General;  Laterality: N/A;    TONSILLECTOMY      TRIGGER POINT INJECTION N/A 2019    Procedure: INJECTION, TRIGGER POINT - CERVICAL REGION;  Surgeon: Sherri Gardiner MD;  Location: [facility] OR;  Service: Pain Management;  Laterality: N/A;    TRIGGER POINT INJECTION N/A 10/21/2019    Procedure: INJECTION, TRIGGER POINT;  Surgeon: Sherri Gardiner MD;  Location: [facility] OR;  Service: Pain Management;  Laterality: N/A;     Family History   Problem Relation Age of Onset    Lung cancer Mother     Breast cancer Neg Hx      Social History     Tobacco Use    Smoking status: Former Smoker     Packs/day: 1.00     Years: 0.00     Pack years: 0.00     Quit date: 2012     Years since quittin.7    Smokeless tobacco: Never Used   Substance Use Topics    Alcohol use: Yes     Frequency: Monthly or less     Comment: occasional    Drug use: Yes     Types: Marijuana, Other-see comments     Review of Systems   Constitutional: Negative for fever.   HENT: Negative for sore throat.    Respiratory: Negative for shortness of breath.    Cardiovascular: Negative for chest pain.   Gastrointestinal: Negative for nausea.   Genitourinary: Negative for dysuria.   Musculoskeletal: Negative for back pain.   Skin: Negative for rash.   Neurological: Negative for weakness.   Hematological: Does not bruise/bleed easily.       Physical Exam     Initial Vitals [20 2155]   BP Pulse Resp Temp SpO2   (!) 173/84 (!) 117 18 98.2 °F (36.8 °C) 95 %      MAP       --         Physical Exam    Nursing note and vitals reviewed.  Constitutional: She appears well-developed and well-nourished. She is not diaphoretic. No distress.   HENT:   Head:  Normocephalic and atraumatic.   Right Ear: External ear normal.   Left Ear: External ear normal.   Eyes: Pupils are equal, round, and reactive to light. Right eye exhibits no discharge. Left eye exhibits no discharge.   Neck: No tracheal deviation present. No JVD present.   Cardiovascular: Exam reveals no friction rub.    No murmur heard.  Pulmonary/Chest: No stridor. No respiratory distress. She has no wheezes. She has no rales.   Abdominal: Bowel sounds are normal. She exhibits no distension.   Musculoskeletal: Normal range of motion.   Neurological: She is alert.   Skin: Skin is warm.   Psychiatric: She has a normal mood and affect.         ED Course   Procedures  Labs Reviewed   CBC W/ AUTO DIFFERENTIAL - Abnormal; Notable for the following components:       Result Value    Immature Granulocytes 0.7 (*)     Immature Grans (Abs) 0.08 (*)     All other components within normal limits   COMPREHENSIVE METABOLIC PANEL - Abnormal; Notable for the following components:    Sodium 135 (*)     CO2 21 (*)     Glucose 288 (*)     BUN, Bld 23 (*)     All other components within normal limits   TROPONIN I - Abnormal; Notable for the following components:    Troponin I <0.01 (*)     All other components within normal limits   B-TYPE NATRIURETIC PEPTIDE   PROTIME-INR   SARS-COV-2 RNA AMPLIFICATION, QUAL     EKG Readings: (Independently Interpreted)   Rhythm: Normal Sinus Rhythm. Heart Rate: 107. Ectopy: No Ectopy. Conduction: Normal. ST Segments: Normal ST Segments. T Waves: Normal. T Waves Flipped: V1, V2 and V3.   Other EKG Interpretations: EKG done at 23 45 was unchanged with T-wave inversion V1-3 ST abnormality       Imaging Results          X-Ray Chest AP Portable (In process)                                    ED Course as of Jun 23 0415   Mon Jun 22, 2020   6623 PatientHas no chest pain now    [WK]   2357 Patient remains asymptomatic workup is not remarkable however the history is suspicious for angina and evidence of  some level of blocking in the LAD, Case discussed with CLIFFORD Rosado (hospitalist) he agrees with transferring patient to facility with advanced cardiac evaluation capability    [WK]   Tue Jun 23, 2020   0051 Patient remained symptom-free there is no bed availability at [facility] patient is agreeable with transfer to the [facility]    [WK]      ED Course User Index  [WK] Van Hernandez MD       Patient Condition: The patient has been stabilized such that, within reasonable medical probability, no material deterioration of the patient's condition or the condition of the unborn child(aileen) is likely to result from transfer.  Reason for Transfer: Service(s) unavailable  Benefits of Transfer: need for advanced cardiology evaluation/consultation  Risks of Transfer: MVC, arrthmia  Accepting Physician: Dr. Richardson  Sending Physician: Dr. Mary ARRIAGA Certification: Patient examined and risks and benefits explained, Patient and/or family/representative verbalize understanding        Clinical Impression:       ICD-10-CM ICD-9-CM   1. Cardiac ischemia  I25.9 414.9   2. Pain  R52 780.96   3. Chest pain  R07.9 786.50   4. Type 2 diabetes mellitus without complication, without long-term current use of insulin  E11.9 250.00             ED Disposition Condition    Transfer to Another Facility Stable                          Van Hernandez MD  06/23/20 0415       Van Hernandez MD  07/13/20 0344

## 2020-06-23 NOTE — Clinical Note
Catheter is inserted into the left ventricle. Angiography performed of the LV. Angiography performed via power injection with 20 mL contrast at 10 mL/sPower injection PSI was 700.. pigtail

## 2020-06-23 NOTE — ED NOTES
RN calls [facility] to inform them that the patient is being transported at this time.

## 2020-06-24 LAB
ANION GAP SERPL CALC-SCNC: 9 MMOL/L (ref 8–16)
B-HCG UR QL: NEGATIVE
B-HCG UR QL: NEGATIVE
BASOPHILS # BLD AUTO: 0.04 K/UL (ref 0–0.2)
BASOPHILS NFR BLD: 0.4 % (ref 0–1.9)
BUN SERPL-MCNC: 16 MG/DL (ref 6–20)
CALCIUM SERPL-MCNC: 8.6 MG/DL (ref 8.7–10.5)
CHLORIDE SERPL-SCNC: 102 MMOL/L (ref 95–110)
CO2 SERPL-SCNC: 25 MMOL/L (ref 23–29)
CREAT SERPL-MCNC: 0.6 MG/DL (ref 0.5–1.4)
DIFFERENTIAL METHOD: ABNORMAL
EOSINOPHIL # BLD AUTO: 0.1 K/UL (ref 0–0.5)
EOSINOPHIL NFR BLD: 1 % (ref 0–8)
ERYTHROCYTE [DISTWIDTH] IN BLOOD BY AUTOMATED COUNT: 13.3 % (ref 11.5–14.5)
EST. GFR  (AFRICAN AMERICAN): >60 ML/MIN/1.73 M^2
EST. GFR  (NON AFRICAN AMERICAN): >60 ML/MIN/1.73 M^2
GLUCOSE SERPL-MCNC: 200 MG/DL (ref 70–110)
GLUCOSE SERPL-MCNC: 212 MG/DL (ref 70–110)
GLUCOSE SERPL-MCNC: 251 MG/DL (ref 70–110)
GLUCOSE SERPL-MCNC: 259 MG/DL (ref 70–110)
HCT VFR BLD AUTO: 37.1 % (ref 37–48.5)
HGB BLD-MCNC: 12.3 G/DL (ref 12–16)
IMM GRANULOCYTES # BLD AUTO: 0.07 K/UL (ref 0–0.04)
IMM GRANULOCYTES NFR BLD AUTO: 0.6 % (ref 0–0.5)
LYMPHOCYTES # BLD AUTO: 3.3 K/UL (ref 1–4.8)
LYMPHOCYTES NFR BLD: 29.7 % (ref 18–48)
MCH RBC QN AUTO: 29.3 PG (ref 27–31)
MCHC RBC AUTO-ENTMCNC: 33.2 G/DL (ref 32–36)
MCV RBC AUTO: 88 FL (ref 82–98)
MONOCYTES # BLD AUTO: 0.8 K/UL (ref 0.3–1)
MONOCYTES NFR BLD: 7 % (ref 4–15)
NEUTROPHILS # BLD AUTO: 6.7 K/UL (ref 1.8–7.7)
NEUTROPHILS NFR BLD: 61.3 % (ref 38–73)
NRBC BLD-RTO: 0 /100 WBC
PLATELET # BLD AUTO: 282 K/UL (ref 150–350)
PMV BLD AUTO: 9.9 FL (ref 9.2–12.9)
POTASSIUM SERPL-SCNC: 4 MMOL/L (ref 3.5–5.1)
RBC # BLD AUTO: 4.2 M/UL (ref 4–5.4)
SODIUM SERPL-SCNC: 136 MMOL/L (ref 136–145)
WBC # BLD AUTO: 10.93 K/UL (ref 3.9–12.7)

## 2020-06-24 PROCEDURE — 25000003 PHARM REV CODE 250: Performed by: NURSE PRACTITIONER

## 2020-06-24 PROCEDURE — 63600175 PHARM REV CODE 636 W HCPCS: Performed by: INTERNAL MEDICINE

## 2020-06-24 PROCEDURE — 99152 MOD SED SAME PHYS/QHP 5/>YRS: CPT | Performed by: INTERNAL MEDICINE

## 2020-06-24 PROCEDURE — 99153 MOD SED SAME PHYS/QHP EA: CPT | Performed by: INTERNAL MEDICINE

## 2020-06-24 PROCEDURE — 25000003 PHARM REV CODE 250: Performed by: THORACIC SURGERY (CARDIOTHORACIC VASCULAR SURGERY)

## 2020-06-24 PROCEDURE — 63600175 PHARM REV CODE 636 W HCPCS: Performed by: NURSE PRACTITIONER

## 2020-06-24 PROCEDURE — 25500020 PHARM REV CODE 255: Performed by: INTERNAL MEDICINE

## 2020-06-24 PROCEDURE — 93458 L HRT ARTERY/VENTRICLE ANGIO: CPT | Performed by: INTERNAL MEDICINE

## 2020-06-24 PROCEDURE — 99900035 HC TECH TIME PER 15 MIN (STAT)

## 2020-06-24 PROCEDURE — C1725 CATH, TRANSLUMIN NON-LASER: HCPCS | Performed by: INTERNAL MEDICINE

## 2020-06-24 PROCEDURE — 81025 URINE PREGNANCY TEST: CPT

## 2020-06-24 PROCEDURE — 80048 BASIC METABOLIC PNL TOTAL CA: CPT

## 2020-06-24 PROCEDURE — 36415 COLL VENOUS BLD VENIPUNCTURE: CPT

## 2020-06-24 PROCEDURE — C1887 CATHETER, GUIDING: HCPCS | Performed by: INTERNAL MEDICINE

## 2020-06-24 PROCEDURE — 25000003 PHARM REV CODE 250: Performed by: INTERNAL MEDICINE

## 2020-06-24 PROCEDURE — C9399 UNCLASSIFIED DRUGS OR BIOLOG: HCPCS | Performed by: INTERNAL MEDICINE

## 2020-06-24 PROCEDURE — G0378 HOSPITAL OBSERVATION PER HR: HCPCS

## 2020-06-24 PROCEDURE — 85025 COMPLETE CBC W/AUTO DIFF WBC: CPT

## 2020-06-24 PROCEDURE — C1769 GUIDE WIRE: HCPCS | Performed by: INTERNAL MEDICINE

## 2020-06-24 PROCEDURE — 94761 N-INVAS EAR/PLS OXIMETRY MLT: CPT

## 2020-06-24 PROCEDURE — C1894 INTRO/SHEATH, NON-LASER: HCPCS | Performed by: INTERNAL MEDICINE

## 2020-06-24 PROCEDURE — 27201423 OPTIME MED/SURG SUP & DEVICES STERILE SUPPLY: Performed by: INTERNAL MEDICINE

## 2020-06-24 PROCEDURE — 82962 GLUCOSE BLOOD TEST: CPT

## 2020-06-24 PROCEDURE — C1752 CATH,HEMODIALYSIS,SHORT-TERM: HCPCS | Performed by: INTERNAL MEDICINE

## 2020-06-24 RX ORDER — FENTANYL CITRATE 50 UG/ML
INJECTION, SOLUTION INTRAMUSCULAR; INTRAVENOUS
Status: DISCONTINUED | OUTPATIENT
Start: 2020-06-24 | End: 2020-06-24 | Stop reason: HOSPADM

## 2020-06-24 RX ORDER — SODIUM CHLORIDE 450 MG/100ML
100 INJECTION, SOLUTION INTRAVENOUS CONTINUOUS
Status: ACTIVE | OUTPATIENT
Start: 2020-06-24 | End: 2020-06-24

## 2020-06-24 RX ORDER — CIPROFLOXACIN 250 MG/1
250 TABLET, FILM COATED ORAL ONCE
Status: COMPLETED | OUTPATIENT
Start: 2020-06-24 | End: 2020-06-24

## 2020-06-24 RX ORDER — LIDOCAINE HYDROCHLORIDE 10 MG/ML
INJECTION, SOLUTION EPIDURAL; INFILTRATION; INTRACAUDAL; PERINEURAL
Status: DISCONTINUED | OUTPATIENT
Start: 2020-06-24 | End: 2020-06-24 | Stop reason: HOSPADM

## 2020-06-24 RX ORDER — MIDAZOLAM HYDROCHLORIDE 1 MG/ML
INJECTION INTRAMUSCULAR; INTRAVENOUS
Status: DISCONTINUED | OUTPATIENT
Start: 2020-06-24 | End: 2020-06-24 | Stop reason: HOSPADM

## 2020-06-24 RX ORDER — SODIUM CHLORIDE 9 MG/ML
INJECTION, SOLUTION INTRAVENOUS ONCE
Status: DISCONTINUED | OUTPATIENT
Start: 2020-06-24 | End: 2020-06-24 | Stop reason: HOSPADM

## 2020-06-24 RX ORDER — DIPHENHYDRAMINE HCL 25 MG
50 CAPSULE ORAL
Status: DISCONTINUED | OUTPATIENT
Start: 2020-06-24 | End: 2020-06-24 | Stop reason: HOSPADM

## 2020-06-24 RX ADMIN — INSULIN ASPART 2 UNITS: 100 INJECTION, SOLUTION INTRAVENOUS; SUBCUTANEOUS at 07:06

## 2020-06-24 RX ADMIN — INSULIN DETEMIR 5 UNITS: 100 INJECTION, SOLUTION SUBCUTANEOUS at 08:06

## 2020-06-24 RX ADMIN — ASPIRIN 81 MG 81 MG: 81 TABLET ORAL at 10:06

## 2020-06-24 RX ADMIN — INSULIN ASPART 6 UNITS: 100 INJECTION, SOLUTION INTRAVENOUS; SUBCUTANEOUS at 04:06

## 2020-06-24 RX ADMIN — LISINOPRIL 10 MG: 10 TABLET ORAL at 10:06

## 2020-06-24 RX ADMIN — LORAZEPAM 1 MG: 1 TABLET ORAL at 07:06

## 2020-06-24 RX ADMIN — MORPHINE SULFATE 2 MG: 2 INJECTION, SOLUTION INTRAMUSCULAR; INTRAVENOUS at 06:06

## 2020-06-24 RX ADMIN — ATORVASTATIN CALCIUM 40 MG: 40 TABLET, FILM COATED ORAL at 08:06

## 2020-06-24 RX ADMIN — NORTRIPTYLINE HYDROCHLORIDE 100 MG: 25 CAPSULE ORAL at 08:06

## 2020-06-24 RX ADMIN — SODIUM CHLORIDE 100 ML/HR: 0.45 INJECTION, SOLUTION INTRAVENOUS at 12:06

## 2020-06-24 RX ADMIN — CIPROFLOXACIN HYDROCHLORIDE 250 MG: 250 TABLET, FILM COATED ORAL at 04:06

## 2020-06-24 RX ADMIN — MELATONIN 6 MG: at 09:06

## 2020-06-24 RX ADMIN — PANTOPRAZOLE SODIUM 40 MG: 40 TABLET, DELAYED RELEASE ORAL at 05:06

## 2020-06-24 RX ADMIN — MORPHINE SULFATE 2 MG: 2 INJECTION, SOLUTION INTRAMUSCULAR; INTRAVENOUS at 05:06

## 2020-06-24 NOTE — PLAN OF CARE
Problem: Adult Inpatient Plan of Care  Goal: Plan of Care Review  Outcome: Ongoing, Progressing  Goal: Patient-Specific Goal (Individualization)  Outcome: Ongoing, Progressing  Goal: Absence of Hospital-Acquired Illness or Injury  Outcome: Ongoing, Progressing  Intervention: Identify and Manage Fall Risk  Flowsheets (Taken 6/24/2020 1743)  Safety Promotion/Fall Prevention:   assistive device/personal item within reach   bed alarm set   Fall Risk reviewed with patient/family   nonskid shoes/socks when out of bed   side rails raised x 2   instructed to call staff for mobility  Intervention: Prevent VTE (venous thromboembolism)  Flowsheets (Taken 6/24/2020 1743)  VTE Prevention/Management:   ambulation promoted   bleeding precautions maintained  Goal: Optimal Comfort and Wellbeing  Outcome: Ongoing, Progressing  Goal: Readiness for Transition of Care  Outcome: Ongoing, Progressing  Goal: Rounds/Family Conference  Outcome: Ongoing, Progressing     Problem: Diabetes Comorbidity  Goal: Blood Glucose Level Within Desired Range  Outcome: Ongoing, Progressing  Intervention: Maintain Glycemic Control  Flowsheets (Taken 6/24/2020 1743)  Glycemic Management: blood glucose monitoring     Problem: Fall Injury Risk  Goal: Absence of Fall and Fall-Related Injury  Outcome: Ongoing, Progressing  Intervention: Identify and Manage Contributors to Fall Injury Risk  Flowsheets (Taken 6/24/2020 1743)  Self-Care Promotion: independence encouraged  Medication Review/Management: medications reviewed  Intervention: Promote Injury-Free Environment  Flowsheets (Taken 6/24/2020 1743)  Safety Promotion/Fall Prevention:   assistive device/personal item within reach   bed alarm set   Fall Risk reviewed with patient/family   nonskid shoes/socks when out of bed   side rails raised x 2   instructed to call staff for mobility  Environmental Safety Modification:   assistive device/personal items within reach   clutter free environment maintained    room organization consistent   lighting adjusted     Problem: Chest Pain  Goal: Resolution of Chest Pain Symptoms  Outcome: Ongoing, Progressing     Problem: Arrhythmia/Dysrhythmia (Cardiac Catheterization)  Goal: Stable Heart Rate and Rhythm  Outcome: Ongoing, Progressing     Problem: Bleeding (Cardiac Catheterization)  Goal: Absence of Bleeding  Outcome: Ongoing, Progressing     Problem: Contrast-Induced Injury Risk (Cardiac Catheterization)  Goal: Absence of Contrast-Induced Injury  Outcome: Ongoing, Progressing     Problem: Embolism (Cardiac Catheterization)  Goal: Absence of Embolism Signs/Symptoms  Outcome: Ongoing, Progressing  Intervention: Prevent or Manage Embolism  Flowsheets (Taken 6/24/2020 1743)  VTE Prevention/Management:   ambulation promoted   bleeding precautions maintained     Problem: Ongoing Anesthesia/Sedation Effects (Cardiac Catheterization)  Goal: Anesthesia/Sedation Recovery  Outcome: Ongoing, Progressing  Intervention: Optimize Anesthesia Recovery  Flowsheets (Taken 6/24/2020 1743)  Safety Promotion/Fall Prevention:   assistive device/personal item within reach   bed alarm set   Fall Risk reviewed with patient/family   nonskid shoes/socks when out of bed   side rails raised x 2   instructed to call staff for mobility     Problem: Pain (Cardiac Catheterization)  Goal: Acceptable Pain Control  Outcome: Ongoing, Progressing     Problem: Vascular Access Protection (Cardiac Catheterization)  Goal: Absence of Vascular Access Complication  Outcome: Ongoing, Progressing     Problem: Infection  Goal: Infection Symptom Resolution  Outcome: Ongoing, Progressing  Intervention: Prevent or Manage Infection  Flowsheets (Taken 6/24/2020 1743)  Infection Management: aseptic technique maintained

## 2020-06-24 NOTE — PROGRESS NOTES
[facility] Medicine  Progress Note    Patient name: Nicole Harris  MRN: [id number]  Admit Date: 6/23/2020   LOS: 0 days     SUBJECTIVE:     Principal problem: Chest pain    Interval History:    Nuclear stress test showed small anti lateral wall near the apex reversible defect  Patient underwent angiogram today, patient needs CT bypass surgery  Awaiting for surgery consult  Currently denies any chest pain, but reports throat pain  Levemir 5 units added     Scheduled Meds:   aspirin  81 mg Oral Daily    atorvastatin  40 mg Oral QHS    ciprofloxacin HCl  250 mg Oral Once    escitalopram oxalate  20 mg Oral Daily    lisinopriL  10 mg Oral Daily    nortriptyline  100 mg Oral QHS    pantoprazole  40 mg Oral Before breakfast     Continuous Infusions:   sodium chloride 0.45% 100 mL/hr (06/24/20 1226)     PRN Meds:acetaminophen, albuterol, bisacodyL, insulin aspart U-100, labetalol, LORazepam, melatonin, morphine, nitroGLYCERIN, ondansetron, promethazine (PHENERGAN) IVPB, sodium chloride 0.9%    Review of patient's allergies indicates:   Allergen Reactions    Hydrocodone Nausea And Vomiting       Review of Systems: As per interval history    OBJECTIVE:     Vital Signs (Most Recent)  Temp: 98.1 °F (36.7 °C) (06/24/20 1515)  Pulse: 87 (06/24/20 1515)  Resp: 20 (06/24/20 1515)  BP: 133/69 (06/24/20 1515)  SpO2: 96 % (06/24/20 1515)    Vital Signs Range (Last 24H):  Temp:  [97.9 °F (36.6 °C)-98.6 °F (37 °C)]   Pulse:  []   Resp:  [11-23]   BP: (116-137)/(7-90)   SpO2:  [94 %-98 %]     I & O (Last 24H):    Intake/Output Summary (Last 24 hours) at 6/24/2020 1549  Last data filed at 6/24/2020 1445  Gross per 24 hour   Intake 231.67 ml   Output 2600 ml   Net -2368.33 ml       Physical Exam:  General: Patient resting comfortably in no acute distress. Appears as stated age. Calm obese  Eyes: No conjunctival injection. No scleral icterus.  ENT: Hearing grossly intact. No discharge from ears. No  nasal discharge.   Neck: Supple, trachea midline. No JVDmild low  posterior neck tenderness  CVS: RRR. No LE edema BL  Lungs: CTA BL, no wheezing or crackles. Good breath sounds. No accessory muscle use. No acute respiratory distress  Abdomen:  Soft, nontender and nondistended.  No organomegaly  Neuro: AOx3. Moves all extremities. Follows commands. Responds appropriately   Skin:  No rash or erythema noted    Laboratory:  CBC:   Recent Labs   Lab 06/24/20  0431   WBC 10.93   RBC 4.20   HGB 12.3   HCT 37.1      MCV 88   MCH 29.3   MCHC 33.2     CMP:   Recent Labs   Lab 06/22/20  2226 06/24/20  0430   * 212*   CALCIUM 9.4 8.6*   ALBUMIN 4.4  --    PROT 7.2  --    * 136   K 4.0 4.0   CO2 21* 25    102   BUN 23* 16   CREATININE 0.7 0.6   ALKPHOS 79  --    ALT 32  --    AST 18  --    BILITOT 0.8  --          ASSESSMENT/PLAN:     [place] now is in  Active Hospital Problems    Diagnosis  POA    *Chest pain [R07.9]  Yes    COPD (chronic obstructive pulmonary disease) [J44.9]  Yes    Essential hypertension [I10]  Yes    Diabetes mellitus [E11.9]  Yes      Resolved Hospital Problems   No resolved problems to display.         Plan:   No history of CAD  Hx of smoking   Diabetes uncontrolled   troponin negativex2  Await for 2nd set of troponin  EKGs: sinus rhythm with T-wave inversion in lead V1 to V3, no ST elevation  Continue cardiac monitor  Lexiscan stress test positive  Consult Cardiology appreciated  Aspirin nitroglycerin, statin and beta-blocker  IV PPI and GI cocktail x1      VTE Risk Mitigation (From admission, onward)         Ordered     IP VTE HIGH RISK PATIENT  Once      06/23/20 0335     Place sequential compression device  Until discontinued      06/23/20 0335                  Department Hospital Medicine  [facility]  Dede Hidalgo MD  Date of service: 06/24/2020

## 2020-06-24 NOTE — CARE UPDATE
06/24/20 0844   Patient Assessment/Suction   Level of Consciousness (AVPU) alert   Respiratory Effort Normal   Expansion/Accessory Muscles/Retractions no use of accessory muscles   All Lung Fields Breath Sounds clear   Rhythm/Pattern, Respiratory unlabored;pattern regular;depth regular   Cough Frequency no cough   PRE-TX-O2   O2 Device (Oxygen Therapy) room air   SpO2 98 %   Pulse Oximetry Type Intermittent   $ Pulse Oximetry - Multiple Charge Pulse Oximetry - Multiple   Pulse 89   Inhaler   $ Inhaler Charges PRN treatment not required   Respiratory Evaluation   $ Care Plan Tech Time 15 min

## 2020-06-24 NOTE — PLAN OF CARE
Patient is NPO for possible angiogram, instructed patient on diet and procedure stated understanding, two IV saline locked present, pain management as ordered see MAR, maintain Fall precautions and safety, monitor VS and I/O's, monitor patient for chest pains, O2 PRN, patient in stable condition without distress, call light in reach.

## 2020-06-24 NOTE — PROGRESS NOTES
[facility]  Department of Cardiology  Progress Note    PATIENT NAME: Nicole Harris  MRN: [id number]  TODAY'S DATE: 06/24/2020  ADMIT DATE: 6/23/2020    SUBJECTIVE     PRINCIPLE PROBLEM: Chest pain    INTERVAL HISTORY:  Patient resting in bed with no acute distress noted.    Review of patient's allergies indicates:   Allergen Reactions    Hydrocodone Nausea And Vomiting       REVIEW OF SYSTEMS  CARDIOVASCULAR: + recent chest pain, neck, and jaw pain; no palpitations  RESPIRATORY: No recent fever, cough chills, SOB or congestion  : No blood in the urine  GI: No Nausea, vomiting, constipation, diarrhea, blood, or reflux.  MUSCULOSKELETAL: No myalgias  NEURO: No lightheadedness or dizziness  EYES: No Double vision, blurry, vision or headache     OBJECTIVE     VITAL SIGNS (Most Recent)  Temp: 98.4 °F (36.9 °C) (06/24/20 0730)  Pulse: 89 (06/24/20 0844)  Resp: 18 (06/24/20 0730)  BP: 116/62 (06/24/20 0730)  SpO2: 98 % (06/24/20 0844)    I & O (Last 24H):    Intake/Output Summary (Last 24 hours) at 6/24/2020 1118  Last data filed at 6/24/2020 0314  Gross per 24 hour   Intake --   Output 1800 ml   Net -1800 ml       WEIGHTS  Wt Readings from Last 1 Encounters:   06/23/20 0456 87.6 kg (193 lb 2 oz)   06/23/20 0257 88 kg (194 lb 0.1 oz)       PHYSICAL EXAM  CONSTITUTIONAL: Well built, well nourished in no apparent distress  NECK: no carotid bruit, no JVD  LUNGS: CTA  CHEST WALL: no tenderness  HEART: regular rate and rhythm, S1, S2 normal, no murmur, click, rub or gallop   ABDOMEN: soft, non-tender; bowel sounds normal; no masses,  no organomegaly  EXTREMITIES: Extremities normal, no edema  NEURO: AAO X 3    SCHEDULED MEDS:   sodium chloride 0.9%   Intravenous Once    aspirin  81 mg Oral Daily    atorvastatin  40 mg Oral QHS    escitalopram oxalate  20 mg Oral Daily    lisinopriL  10 mg Oral Daily    nortriptyline  100 mg Oral QHS    pantoprazole  40 mg Oral Before breakfast       CONTINUOUS  INFUSIONS:    PRN MEDS:acetaminophen, albuterol, bisacodyL, diphenhydrAMINE, fentaNYL, insulin aspart U-100, labetalol, lidocaine (PF) 10 mg/ml (1%), LORazepam, melatonin, midazolam, morphine, nitroGLYCERIN, ondansetron, promethazine (PHENERGAN) IVPB, sodium chloride 0.9%    LABS AND DIAGNOSTICS     CBC LAST 3 DAYS  Recent Labs   Lab 06/22/20 2226 06/24/20  0431   WBC 11.84 10.93   RBC 4.48 4.20   HGB 13.0 12.3   HCT 39.4 37.1   MCV 88 88   MCH 29.0 29.3   MCHC 33.0 33.2   RDW 13.1 13.3    282   MPV 9.7 9.9   GRAN 61.3  7.3 61.3  6.7   LYMPH 28.9  3.4 29.7  3.3   MONO 8.1  1.0 7.0  0.8   BASO 0.05 0.04   NRBC 0 0       COAGULATION LAST 3 DAYS  Recent Labs   Lab 06/22/20 2226   INR 0.9       CHEMISTRY LAST 3 DAYS  Recent Labs   Lab 06/22/20 2226 06/24/20  0430   * 136   K 4.0 4.0    102   CO2 21* 25   ANIONGAP 11 9   BUN 23* 16   CREATININE 0.7 0.6   * 212*   CALCIUM 9.4 8.6*   ALBUMIN 4.4  --    PROT 7.2  --    ALKPHOS 79  --    ALT 32  --    AST 18  --    BILITOT 0.8  --        CARDIAC PROFILE LAST 3 DAYS  Recent Labs   Lab 06/22/20 2226 06/23/20  0337 06/23/20  1046   BNP 16  --   --    TROPONINI <0.01* <0.030 <0.030       ENDOCRINE LAST 3 DAYS  No results for input(s): TSH, PROCAL in the last 168 hours.    LAST ARTERIAL BLOOD GAS  ABG  No results for input(s): PH, PO2, PCO2, HCO3, BE in the last 168 hours.    LAST 7 DAYS MICROBIOLOGY   Microbiology Results (last 7 days)     ** No results found for the last 168 hours. **          MOST RECENT IMAGING  Nuclear Stress Test    The EKG portion of this study is negative for ischemia.    The patient reported no chest pain during the stress test.    ECG Stress Nuclear portion of this study will be reported separately.  NM Myocardial Perfusion Spect Multi Pharmacologic  Narrative: EXAMINATION:  NM MYOCARDIAL PERFUSION SPECT MULTI PHARM    CLINICAL HISTORY:  Chest pain, ACS suspected;    TECHNIQUE:  Lexiscan is utilized as a  pharmacologic stress agent. At peak stress, 25.4 millicuries of technetium Myoview were administered intravenously. The rest portion of the study is accomplished on the same day, utilizing 10.1 millicuries of technetium Myoview intravenously.    COMPARISON:  None.    FINDINGS:  There is a subtle area of focally diminished tracer accumulation within the anterolateral wall near the apex on stress as compared to rest imaging potentially representing minimal pharmacologically induced reversibility.  Diminished accumulation within the inferolateral wall is more pronounced on the rest portion of the examination.  The distribution of tracer activity elsewhere appears unremarkable.  The chamber size is within normal limits. There are no wall motion abnormalities and the estimated ejection fraction is 73%.  Impression: 1. Small area of diminished tracer accumulation within the anterolateral wall near the apex, potentially representative of small area of pharmacologically induced reversibility.  2. Matched mildly diminished tracer accumulation within the inferolateral wall  3. ESTIMATED EJECTION FRACTION OF 73%.    Electronically signed by: Trinidad Norton IV., MD  Date:    06/23/2020  Time:    12:01  X-Ray Chest AP Portable  Narrative: EXAMINATION:  XR CHEST AP PORTABLE    CLINICAL HISTORY:  . Pain, unspecified    TECHNIQUE:  Single frontal portable view of the chest was performed.    COMPARISON:  None    FINDINGS:  Support devices: None    Patient is rotated to the LPO position.The lungs are clear, with normal appearance of pulmonary vasculature and no pleural effusion or pneumothorax.    The cardiac silhouette is top-normal in size.  The hilar and mediastinal contours are unremarkable.    Bones are intact.  Impression: 1. Rotated technique.  2. No acute chest disease.    Electronically signed by: Jack Oliver  Date:    06/23/2020  Time:    08:40      [facility]  No results found for this or any previous  visit.    CURRENT/PREVIOUS VISIT EKG  Results for orders placed or performed during the hospital encounter of 06/23/20   EKG 12-lead    Collection Time: 06/23/20  3:11 AM    Narrative    Test Reason : I21.4,    Vent. Rate : 097 BPM     Atrial Rate : 097 BPM     P-R Int : 184 ms          QRS Dur : 086 ms      QT Int : 368 ms       P-R-T Axes : 036 011 005 degrees     QTc Int : 467 ms    Normal sinus rhythm  Low voltage QRS  T wave abnormality, consider anterior ischemia  Abnormal ECG  No previous ECGs available    Referred By: [email or web address]   SELF           Confirmed By:          ASSESSMENT/PLAN:     Active Hospital Problems    Diagnosis    *Chest pain    COPD (chronic obstructive pulmonary disease)    Essential hypertension    Diabetes mellitus       ASSESSMENT & PLAN:     1.  Abnormal Lexiscan stress test  2.  Chest pain  3.  Hypertension  4.  Type 2 diabetes  5.  COPD  6.  History of smoking      RECOMMENDATIONS:    1.  Lexiscan stress test showed a small anterior lateral wall of the apex reversible defect possibly reflecting ischemia.  I discussed these findings with the patient and her daughter who was on the phone this morning.  2.  Options include angiogram with possible PCI versus medical management.  Discussed with the patient and her daughter the risk and benefits of both medical management and angiogram with possible PCI.  3.  Patient and daughter informed of risk of angiogram including but not limited to risk of bleeding, allergic reaction to the dye, kidney injury, vascular damage, emergency bypass surgery, stroke, heart attack, and even death.  4.  Patient agreed to proceed with angiogram with possible PCI today.  She has been NPO since midnight.  Also discussed with the patient and her daughter that should she require cardiac stenting, she would be dedicated to anti-platelet therapy for minimum of 12-18 months without interruption.  Patient denied any history of bleeding issues or falls.  5.  Further  recommendations based on hospital course.      [facility]  Department of Cardiology  Odette Leroy NP  Date of service: 06/24/2020  11:18 AM

## 2020-06-24 NOTE — PLAN OF CARE
06/24/20 1739   Discharge Assessment   Assessment Type Discharge Planning Reassessment     Pt Uninsured, lives in [place], had a LHC today and needs a CABG. Spoke with CHUYITA Jeronimo about pt status, she emailed Mrs Vora, and Mrs vora said that she spoke with pt earlier and she does not qualify for [facility] Medicaid. Later today pt asked to speak with CM, went to her and she asking about what to do now, she does own her home, we discussed no [place] since lives in [place], and she has applied before for [facility] Medicaid and was denied due to owning her home and income she said.     Explained that this Cm will call the pt advocate tomorrow am to come speak with her abiut her situation, and pt in agreement. CM will follow for dc planning needs.

## 2020-06-24 NOTE — PLAN OF CARE
06/23/20 0453   Patient Assessment/Suction   Level of Consciousness (AVPU) alert   Respiratory Effort Unlabored   Expansion/Accessory Muscles/Retractions no use of accessory muscles   All Lung Fields Breath Sounds clear   Rhythm/Pattern, Respiratory unlabored   Cough Frequency infrequent   Cough Type nonproductive   PRE-TX-O2   O2 Device (Oxygen Therapy) room air   SpO2 97 %   Pulse 88   Resp 18   Inhaler   $ Inhaler Charges PRN treatment not required   Respiratory Treatment Status (Inhaler) PRN treatment not required   Respiratory Evaluation   $ Care Plan Tech Time 15 min   Evaluation For   (CARE PLAN)

## 2020-06-25 ENCOUNTER — TELEPHONE (OUTPATIENT)
Dept: PAIN MEDICINE | Facility: CLINIC | Age: 50
End: 2020-06-25

## 2020-06-25 LAB
ANION GAP SERPL CALC-SCNC: 8 MMOL/L (ref 8–16)
BACTERIA #/AREA URNS HPF: NEGATIVE /HPF
BASOPHILS # BLD AUTO: 0.04 K/UL (ref 0–0.2)
BASOPHILS NFR BLD: 0.3 % (ref 0–1.9)
BILIRUB UR QL STRIP: NEGATIVE
BUN SERPL-MCNC: 15 MG/DL (ref 6–20)
CALCIUM SERPL-MCNC: 8.6 MG/DL (ref 8.7–10.5)
CHLORIDE SERPL-SCNC: 101 MMOL/L (ref 95–110)
CLARITY UR: CLEAR
CO2 SERPL-SCNC: 26 MMOL/L (ref 23–29)
COLOR UR: YELLOW
CREAT SERPL-MCNC: 0.6 MG/DL (ref 0.5–1.4)
DIFFERENTIAL METHOD: ABNORMAL
EOSINOPHIL # BLD AUTO: 0.1 K/UL (ref 0–0.5)
EOSINOPHIL NFR BLD: 0.8 % (ref 0–8)
ERYTHROCYTE [DISTWIDTH] IN BLOOD BY AUTOMATED COUNT: 13.2 % (ref 11.5–14.5)
EST. GFR  (AFRICAN AMERICAN): >60 ML/MIN/1.73 M^2
EST. GFR  (NON AFRICAN AMERICAN): >60 ML/MIN/1.73 M^2
GLUCOSE SERPL-MCNC: 212 MG/DL (ref 70–110)
GLUCOSE SERPL-MCNC: 222 MG/DL (ref 70–110)
GLUCOSE SERPL-MCNC: 225 MG/DL (ref 70–110)
GLUCOSE SERPL-MCNC: 234 MG/DL (ref 70–110)
GLUCOSE SERPL-MCNC: 310 MG/DL (ref 70–110)
GLUCOSE SERPL-MCNC: 326 MG/DL (ref 70–110)
GLUCOSE UR QL STRIP: ABNORMAL
HCT VFR BLD AUTO: 40.3 % (ref 37–48.5)
HGB BLD-MCNC: 13.4 G/DL (ref 12–16)
HGB UR QL STRIP: NEGATIVE
HYALINE CASTS #/AREA URNS LPF: 7 /LPF
IMM GRANULOCYTES # BLD AUTO: 0.12 K/UL (ref 0–0.04)
IMM GRANULOCYTES NFR BLD AUTO: 1 % (ref 0–0.5)
KETONES UR QL STRIP: NEGATIVE
LEUKOCYTE ESTERASE UR QL STRIP: NEGATIVE
LYMPHOCYTES # BLD AUTO: 2.8 K/UL (ref 1–4.8)
LYMPHOCYTES NFR BLD: 22.2 % (ref 18–48)
MCH RBC QN AUTO: 29.5 PG (ref 27–31)
MCHC RBC AUTO-ENTMCNC: 33.3 G/DL (ref 32–36)
MCV RBC AUTO: 89 FL (ref 82–98)
MICROSCOPIC COMMENT: ABNORMAL
MONOCYTES # BLD AUTO: 0.9 K/UL (ref 0.3–1)
MONOCYTES NFR BLD: 7.1 % (ref 4–15)
NEUTROPHILS # BLD AUTO: 8.6 K/UL (ref 1.8–7.7)
NEUTROPHILS NFR BLD: 68.6 % (ref 38–73)
NITRITE UR QL STRIP: NEGATIVE
NRBC BLD-RTO: 0 /100 WBC
PH UR STRIP: 6 [PH] (ref 5–8)
PLATELET # BLD AUTO: 306 K/UL (ref 150–350)
PMV BLD AUTO: 9.9 FL (ref 9.2–12.9)
POTASSIUM SERPL-SCNC: 3.9 MMOL/L (ref 3.5–5.1)
PROT UR QL STRIP: NEGATIVE
RBC # BLD AUTO: 4.55 M/UL (ref 4–5.4)
RBC #/AREA URNS HPF: 0 /HPF (ref 0–4)
SODIUM SERPL-SCNC: 135 MMOL/L (ref 136–145)
SP GR UR STRIP: 1.02 (ref 1–1.03)
SQUAMOUS #/AREA URNS HPF: 3 /HPF
URN SPEC COLLECT METH UR: ABNORMAL
UROBILINOGEN UR STRIP-ACNC: NEGATIVE EU/DL
WBC # BLD AUTO: 12.5 K/UL (ref 3.9–12.7)
WBC #/AREA URNS HPF: 3 /HPF (ref 0–5)
YEAST URNS QL MICRO: ABNORMAL

## 2020-06-25 PROCEDURE — 63700000 PHARM REV CODE 250 ALT 637 W/O HCPCS: Performed by: INTERNAL MEDICINE

## 2020-06-25 PROCEDURE — 25000003 PHARM REV CODE 250: Performed by: NURSE PRACTITIONER

## 2020-06-25 PROCEDURE — 85025 COMPLETE CBC W/AUTO DIFF WBC: CPT

## 2020-06-25 PROCEDURE — 99900035 HC TECH TIME PER 15 MIN (STAT)

## 2020-06-25 PROCEDURE — 25000003 PHARM REV CODE 250: Performed by: THORACIC SURGERY (CARDIOTHORACIC VASCULAR SURGERY)

## 2020-06-25 PROCEDURE — 25000003 PHARM REV CODE 250: Performed by: INTERNAL MEDICINE

## 2020-06-25 PROCEDURE — G0378 HOSPITAL OBSERVATION PER HR: HCPCS

## 2020-06-25 PROCEDURE — 94761 N-INVAS EAR/PLS OXIMETRY MLT: CPT

## 2020-06-25 PROCEDURE — 80048 BASIC METABOLIC PNL TOTAL CA: CPT

## 2020-06-25 PROCEDURE — 82962 GLUCOSE BLOOD TEST: CPT

## 2020-06-25 PROCEDURE — 81001 URINALYSIS AUTO W/SCOPE: CPT

## 2020-06-25 PROCEDURE — 87491 CHLMYD TRACH DNA AMP PROBE: CPT

## 2020-06-25 PROCEDURE — 63600175 PHARM REV CODE 636 W HCPCS: Performed by: NURSE PRACTITIONER

## 2020-06-25 PROCEDURE — 99223 1ST HOSP IP/OBS HIGH 75: CPT | Mod: ,,, | Performed by: THORACIC SURGERY (CARDIOTHORACIC VASCULAR SURGERY)

## 2020-06-25 PROCEDURE — 99223 PR INITIAL HOSPITAL CARE,LEVL III: ICD-10-PCS | Mod: ,,, | Performed by: THORACIC SURGERY (CARDIOTHORACIC VASCULAR SURGERY)

## 2020-06-25 PROCEDURE — 36415 COLL VENOUS BLD VENIPUNCTURE: CPT

## 2020-06-25 RX ORDER — ALBUTEROL SULFATE 90 UG/1
2 AEROSOL, METERED RESPIRATORY (INHALATION) EVERY 4 HOURS PRN
Status: DISCONTINUED | OUTPATIENT
Start: 2020-06-25 | End: 2020-07-01 | Stop reason: HOSPADM

## 2020-06-25 RX ADMIN — ESCITALOPRAM OXALATE 20 MG: 10 TABLET ORAL at 09:06

## 2020-06-25 RX ADMIN — INSULIN ASPART 6 UNITS: 100 INJECTION, SOLUTION INTRAVENOUS; SUBCUTANEOUS at 01:06

## 2020-06-25 RX ADMIN — LISINOPRIL 10 MG: 10 TABLET ORAL at 09:06

## 2020-06-25 RX ADMIN — MORPHINE SULFATE 2 MG: 2 INJECTION, SOLUTION INTRAMUSCULAR; INTRAVENOUS at 11:06

## 2020-06-25 RX ADMIN — MORPHINE SULFATE 2 MG: 2 INJECTION, SOLUTION INTRAMUSCULAR; INTRAVENOUS at 09:06

## 2020-06-25 RX ADMIN — FLUCONAZOLE 150 MG: 50 TABLET ORAL at 09:06

## 2020-06-25 RX ADMIN — ACETAMINOPHEN 650 MG: 325 TABLET ORAL at 11:06

## 2020-06-25 RX ADMIN — LORAZEPAM 1 MG: 1 TABLET ORAL at 09:06

## 2020-06-25 RX ADMIN — ASPIRIN 81 MG 81 MG: 81 TABLET ORAL at 09:06

## 2020-06-25 RX ADMIN — ATORVASTATIN CALCIUM 40 MG: 40 TABLET, FILM COATED ORAL at 09:06

## 2020-06-25 RX ADMIN — INSULIN ASPART 4 UNITS: 100 INJECTION, SOLUTION INTRAVENOUS; SUBCUTANEOUS at 09:06

## 2020-06-25 RX ADMIN — PANTOPRAZOLE SODIUM 40 MG: 40 TABLET, DELAYED RELEASE ORAL at 05:06

## 2020-06-25 RX ADMIN — INSULIN DETEMIR 5 UNITS: 100 INJECTION, SOLUTION SUBCUTANEOUS at 09:06

## 2020-06-25 RX ADMIN — MELATONIN 6 MG: at 11:06

## 2020-06-25 RX ADMIN — NORTRIPTYLINE HYDROCHLORIDE 100 MG: 25 CAPSULE ORAL at 09:06

## 2020-06-25 NOTE — PROGRESS NOTES
Cardiac Rehab     Nicole Harris   [id number]   6/25/2020         Cardiac Rehab Phase Taught: Phase 1    Teaching Method: Verbal, Written    Handouts: Pre-Op CABG Booklet    Educational Videos: None    Understanding:  Learning indicated by feedback and Verbalize understanding    Comments: pre and postop cabg education including sternal precautions, activity, IS use, meds,. Questions answered. Pt voiced understanding. Will follow for further education    Total Time Spent:30mins            Aguead Nolen RN

## 2020-06-25 NOTE — PLAN OF CARE
06/25/20 1509   Discharge Assessment   Assessment Type Discharge Planning Reassessment     Earlier this am Mrs Anya Galan came to talk with this CM since Patient Advocate Mrs Patel out for today, and discussed pt needs for CABG and uninsured. We both updated status of our discussions with pt yesterday. Still waiting for a CV Surgeon to see pt to discuss options +/- CABG.    Around 250 pm, Dr Hidalgo came and got this CM that Bladimir Vallejo here speaking with pt in her room about the CABG option and her situation with uninsured. All discussed with pt, and the pt and Dr Ryan both decided to go on with CABG for tomorrow afternoon, called for Mrs Joyner to update her at Mrs Agueda ext [phone] and  Maria De Jesus Ext [phone] that MD here discussing CABG with pt and if she would wanted to talk with him about it. Also sent an email to update Mrs Joyner. Explained to Dr Ryan that Mrs Joyner was the Patient Advocate and following this case and may be calling him to discuss case and he appears very agreeable to talk with mrs Joyner if needed, as for being uninsured.  CM will follow pt for any further dc planning needs.     Mrs Joyner called this CM back and she emailing Mrs Vora to see if anything else found for uninsured pt.

## 2020-06-25 NOTE — PLAN OF CARE
06/25/20 0904   Discharge Assessment   Assessment Type Discharge Planning Reassessment     I spoke with pt yesterday around 530pm, see previous CM Note, called and left a VM for the Patient Advocate at [phone] and Mrs Romeo at [phone] to evaluate if any other help available. Pt had [facility] and has a consult for CV Surgeon for a possible CABG, since uninsured, lives in [place], has been denied for [facility] Medicaid due to owning her home and other reasons, and also cannot get [place] since lives in [place], and pt asking how she will pay for the surgery.

## 2020-06-25 NOTE — PROGRESS NOTES
[facility]  Progress Note    Patient name: Nicole Harris  MRN: [id number]  Admit Date: 6/23/2020   LOS: 0 days     SUBJECTIVE:     Principal problem: Chest pain    Interval History:    CT surgery consult appreciated for CABG  Scheduled for tomorrow afternoon  Plan of care discussed with patient in detail along with CT surgery ,nurse and   Patient appears anxious about the procedure, all questions answered  Complaint of neck pain and vaginal itching  Will empirically treat Candida with fluconazole  STD swab ordered  Patient did not have insurance,  consulted for further assistance.     Scheduled Meds:   aspirin  81 mg Oral Daily    atorvastatin  40 mg Oral QHS    escitalopram oxalate  20 mg Oral Daily    insulin detemir U-100  5 Units Subcutaneous QHS    lisinopriL  10 mg Oral Daily    nortriptyline  100 mg Oral QHS    pantoprazole  40 mg Oral Before breakfast     Continuous Infusions:    PRN Meds:acetaminophen, albuterol, bisacodyL, insulin aspart U-100, labetalol, LORazepam, melatonin, morphine, nitroGLYCERIN, ondansetron, promethazine (PHENERGAN) IVPB, sodium chloride 0.9%    Review of patient's allergies indicates:   Allergen Reactions    Hydrocodone Nausea And Vomiting       Review of Systems: As per interval history    OBJECTIVE:     Vital Signs (Most Recent)  Temp: 97.8 °F (36.6 °C) (06/25/20 1136)  Pulse: 106 (06/25/20 1136)  Resp: 19 (06/25/20 1158)  BP: 115/71 (06/25/20 1136)  SpO2: 97 % (06/25/20 1136)    Vital Signs Range (Last 24H):  Temp:  [97.8 °F (36.6 °C)-98.7 °F (37.1 °C)]   Pulse:  []   Resp:  [18-20]   BP: (107-133)/(61-73)   SpO2:  [94 %-99 %]     I & O (Last 24H):    Intake/Output Summary (Last 24 hours) at 6/25/2020 1500  Last data filed at 6/25/2020 0807  Gross per 24 hour   Intake 200 ml   Output 3200 ml   Net -3000 ml       Physical Exam:  General: Patient resting comfortably in no acute distress. Appears as stated  age. Calm obese  Eyes: No conjunctival injection. No scleral icterus.  ENT: Hearing grossly intact. No discharge from ears. No nasal discharge.   Neck: Supple, trachea midline. No JVDmild low  posterior neck tenderness  CVS: RRR. No LE edema BL  Lungs: CTA BL, no wheezing or crackles. Good breath sounds. No accessory muscle use. No acute respiratory distress  Abdomen:  Soft, nontender and nondistended.  No organomegaly  Neuro: AOx3. Moves all extremities. Follows commands. Responds appropriately   Skin:  No rash or erythema noted  No visible erythema or discharge on vaginal inspection    Laboratory:  CBC:   Recent Labs   Lab 06/25/20  0416   WBC 12.50   RBC 4.55   HGB 13.4   HCT 40.3      MCV 89   MCH 29.5   MCHC 33.3     CMP:   Recent Labs   Lab 06/22/20  2226  06/25/20  0416   *   < > 225*   CALCIUM 9.4   < > 8.6*   ALBUMIN 4.4  --   --    PROT 7.2  --   --    *   < > 135*   K 4.0   < > 3.9   CO2 21*   < > 26      < > 101   BUN 23*   < > 15   CREATININE 0.7   < > 0.6   ALKPHOS 79  --   --    ALT 32  --   --    AST 18  --   --    BILITOT 0.8  --   --     < > = values in this interval not displayed.         ASSESSMENT/PLAN:     [place] now is in  Active Hospital Problems    Diagnosis  POA    *Chest pain [R07.9]  Yes    COPD (chronic obstructive pulmonary disease) [J44.9]  Yes    Essential hypertension [I10]  Yes    Diabetes mellitus [E11.9]  Yes      Resolved Hospital Problems   No resolved problems to display.         Plan:   No history of CAD  Hx of smoking   Diabetes uncontrolled   troponin negativex2  Await for 2nd set of troponin  EKGs: sinus rhythm with T-wave inversion in lead V1 to V3, no ST elevation  Continue cardiac monitor  Lexiscan stress test positive  Found to have three-vessel coronary artery disease with severe distal left main, ostial LAD, and ostial circumflex stenosis.   Consult Cardiology appreciated  Aspirin nitroglycerin, statin and beta-blocker  IV PPI and GI  cocktail x1  Awaiting for CABG      VTE Risk Mitigation (From admission, onward)         Ordered     IP VTE HIGH RISK PATIENT  Once      06/23/20 0335     Place sequential compression device  Until discontinued      06/23/20 0335                  Department Hospital Medicine  [facility]  Dede Hidalgo MD  Date of service: 06/25/2020

## 2020-06-25 NOTE — PLAN OF CARE
Maintain patient safety and fall precautions, monitor VS and I/O's, patient will be from free bleeding from angiogram site (right groin), patient will be free from chest pains, O2 PRN, call light in reach, patient in stable condition.

## 2020-06-25 NOTE — PLAN OF CARE
Problem: Adult Inpatient Plan of Care  Goal: Plan of Care Review  Outcome: Ongoing, Progressing  Goal: Patient-Specific Goal (Individualization)  Outcome: Ongoing, Progressing  Goal: Absence of Hospital-Acquired Illness or Injury  Outcome: Ongoing, Progressing  Intervention: Identify and Manage Fall Risk  Flowsheets (Taken 6/25/2020 1855)  Safety Promotion/Fall Prevention:   assistive device/personal item within reach   Fall Risk reviewed with patient/family   nonskid shoes/socks when out of bed   side rails raised x 2   instructed to call staff for mobility   bed alarm refused  Intervention: Prevent VTE (venous thromboembolism)  Flowsheets (Taken 6/25/2020 1855)  VTE Prevention/Management: ambulation promoted  Goal: Optimal Comfort and Wellbeing  Outcome: Ongoing, Progressing  Goal: Readiness for Transition of Care  Outcome: Ongoing, Progressing  Goal: Rounds/Family Conference  Outcome: Ongoing, Progressing     Problem: Diabetes Comorbidity  Goal: Blood Glucose Level Within Desired Range  Outcome: Ongoing, Progressing     Problem: Fall Injury Risk  Goal: Absence of Fall and Fall-Related Injury  Outcome: Ongoing, Progressing  Intervention: Identify and Manage Contributors to Fall Injury Risk  Flowsheets (Taken 6/25/2020 1855)  Self-Care Promotion: independence encouraged  Medication Review/Management: medications reviewed  Intervention: Promote Injury-Free Environment  Flowsheets (Taken 6/25/2020 1855)  Safety Promotion/Fall Prevention:   assistive device/personal item within reach   Fall Risk reviewed with patient/family   nonskid shoes/socks when out of bed   side rails raised x 2   instructed to call staff for mobility   bed alarm refused  Environmental Safety Modification:   assistive device/personal items within reach   clutter free environment maintained   room organization consistent   lighting adjusted     Problem: Chest Pain  Goal: Resolution of Chest Pain Symptoms  Outcome: Ongoing, Progressing      Problem: Arrhythmia/Dysrhythmia (Cardiac Catheterization)  Goal: Stable Heart Rate and Rhythm  Outcome: Ongoing, Progressing     Problem: Bleeding (Cardiac Catheterization)  Goal: Absence of Bleeding  Outcome: Ongoing, Progressing     Problem: Contrast-Induced Injury Risk (Cardiac Catheterization)  Goal: Absence of Contrast-Induced Injury  Outcome: Ongoing, Progressing     Problem: Embolism (Cardiac Catheterization)  Goal: Absence of Embolism Signs/Symptoms  Outcome: Ongoing, Progressing     Problem: Ongoing Anesthesia/Sedation Effects (Cardiac Catheterization)  Goal: Anesthesia/Sedation Recovery  Outcome: Ongoing, Progressing     Problem: Pain (Cardiac Catheterization)  Goal: Acceptable Pain Control  Outcome: Ongoing, Progressing     Problem: Vascular Access Protection (Cardiac Catheterization)  Goal: Absence of Vascular Access Complication  Outcome: Ongoing, Progressing     Problem: Infection  Goal: Infection Symptom Resolution  Outcome: Ongoing, Progressing  Intervention: Prevent or Manage Infection  Flowsheets (Taken 6/25/2020 2635)  Infection Management: aseptic technique maintained  Isolation Precautions: protective environment maintained

## 2020-06-25 NOTE — PLAN OF CARE
06/25/20 0807   Patient Assessment/Suction   Level of Consciousness (AVPU) alert   Respiratory Effort Unlabored   All Lung Fields Breath Sounds clear   PRE-TX-O2   O2 Device (Oxygen Therapy) room air   SpO2 (!) 94 %   Pulse Oximetry Type Intermittent   $ Pulse Oximetry - Multiple Charge Pulse Oximetry - Multiple   Pulse 90   Resp 18   Respiratory Evaluation   $ Care Plan Tech Time 15 min

## 2020-06-25 NOTE — NURSING
Patient called for nurse. Patient c/o sweating. Patient stayed she thinks her blood sugars are high. Blood glucose is 222. Patient stated she had the best sleep last night. Patient ambulated to restroom and back to bed without difficulty. Patient in stable condition at the present time. Will continue to monitor blood glucose.

## 2020-06-25 NOTE — PLAN OF CARE
06/24/20 2005   Patient Assessment/Suction   Level of Consciousness (AVPU) alert   Respiratory Effort Unlabored   Expansion/Accessory Muscles/Retractions no use of accessory muscles   All Lung Fields Breath Sounds clear   Rhythm/Pattern, Respiratory unlabored   Cough Frequency no cough   PRE-TX-O2   O2 Device (Oxygen Therapy) room air   SpO2 96 %   Pulse 106   Resp 18   Inhaler   $ Inhaler Charges PRN treatment not required   Respiratory Treatment Status (Inhaler) PRN treatment not required   Respiratory Evaluation   $ Care Plan Tech Time 15 min   Evaluation For   (CARE PLAN)

## 2020-06-25 NOTE — TELEPHONE ENCOUNTER
----- Message from Brendan Bravo sent at 6/25/2020  9:23 AM CDT -----  Contact: pt  Type: Needs Medical Advice    Who Called:  pt    Best Call Back Number: [phone]  Additional Information: pt has been admitted to [facility]  and may be having a triple bypass. It hasn't been determined yet. Would like to speak to someone in the office.

## 2020-06-25 NOTE — PROGRESS NOTES
[facility]  Department of Cardiology  Progress Note    PATIENT NAME: Nicole Harris  MRN: [id number]  TODAY'S DATE: 06/25/2020  ADMIT DATE: 6/23/2020    SUBJECTIVE     PRINCIPLE PROBLEM: Chest pain    INTERVAL HISTORY:  Patient resting in bed with no acute distress noted.  No complaints of chest discomfort this morning.    Review of patient's allergies indicates:   Allergen Reactions    Hydrocodone Nausea And Vomiting       REVIEW OF SYSTEMS  CARDIOVASCULAR: + recent chest pain, neck, and jaw pain; no palpitations  RESPIRATORY: No recent fever, cough chills, SOB or congestion  : No blood in the urine  GI: No Nausea, vomiting, constipation, diarrhea, blood, or reflux.  MUSCULOSKELETAL: No myalgias  NEURO: No lightheadedness or dizziness  EYES: No Double vision, blurry, vision or headache     OBJECTIVE     VITAL SIGNS (Most Recent)  Temp: 98.3 °F (36.8 °C) (06/25/20 0710)  Pulse: 90 (06/25/20 0807)  Resp: 18 (06/25/20 0807)  BP: 107/73 (06/25/20 0710)  SpO2: (!) 94 % (06/25/20 0807)    I & O (Last 24H):    Intake/Output Summary (Last 24 hours) at 6/25/2020 1116  Last data filed at 6/25/2020 0807  Gross per 24 hour   Intake 431.67 ml   Output 4000 ml   Net -3568.33 ml       WEIGHTS  Wt Readings from Last 1 Encounters:   06/25/20 0444 85 kg (187 lb 6.3 oz)   06/23/20 0456 87.6 kg (193 lb 2 oz)   06/23/20 0257 88 kg (194 lb 0.1 oz)       PHYSICAL EXAM  CONSTITUTIONAL: Well built, well nourished in no apparent distress  NECK: no carotid bruit, no JVD  LUNGS: CTA  CHEST WALL: no tenderness  HEART: regular rate and rhythm, S1, S2 normal, no murmur, click, rub or gallop   ABDOMEN: soft, non-tender; bowel sounds normal; no masses,  no organomegaly  EXTREMITIES: Extremities normal, no edema  NEURO: AAO X 3    SCHEDULED MEDS:   aspirin  81 mg Oral Daily    atorvastatin  40 mg Oral QHS    escitalopram oxalate  20 mg Oral Daily    insulin detemir U-100  5 Units Subcutaneous QHS    lisinopriL  10 mg Oral  Daily    nortriptyline  100 mg Oral QHS    pantoprazole  40 mg Oral Before breakfast       CONTINUOUS INFUSIONS:    PRN MEDS:acetaminophen, albuterol, bisacodyL, insulin aspart U-100, labetalol, LORazepam, melatonin, morphine, nitroGLYCERIN, ondansetron, promethazine (PHENERGAN) IVPB, sodium chloride 0.9%    LABS AND DIAGNOSTICS     CBC LAST 3 DAYS  Recent Labs   Lab 06/22/20 2226 06/24/20  0431 06/25/20  0416   WBC 11.84 10.93 12.50   RBC 4.48 4.20 4.55   HGB 13.0 12.3 13.4   HCT 39.4 37.1 40.3   MCV 88 88 89   MCH 29.0 29.3 29.5   MCHC 33.0 33.2 33.3   RDW 13.1 13.3 13.2    282 306   MPV 9.7 9.9 9.9   GRAN 61.3  7.3 61.3  6.7 68.6  8.6*   LYMPH 28.9  3.4 29.7  3.3 22.2  2.8   MONO 8.1  1.0 7.0  0.8 7.1  0.9   BASO 0.05 0.04 0.04   NRBC 0 0 0       COAGULATION LAST 3 DAYS  Recent Labs   Lab 06/22/20 2226   INR 0.9       CHEMISTRY LAST 3 DAYS  Recent Labs   Lab 06/22/20 2226 06/24/20  0430 06/25/20  0416   * 136 135*   K 4.0 4.0 3.9    102 101   CO2 21* 25 26   ANIONGAP 11 9 8   BUN 23* 16 15   CREATININE 0.7 0.6 0.6   * 212* 225*   CALCIUM 9.4 8.6* 8.6*   ALBUMIN 4.4  --   --    PROT 7.2  --   --    ALKPHOS 79  --   --    ALT 32  --   --    AST 18  --   --    BILITOT 0.8  --   --        CARDIAC PROFILE LAST 3 DAYS  Recent Labs   Lab 06/22/20 2226 06/23/20  0337 06/23/20  1046   BNP 16  --   --    TROPONINI <0.01* <0.030 <0.030       ENDOCRINE LAST 3 DAYS  No results for input(s): TSH, PROCAL in the last 168 hours.    LAST ARTERIAL BLOOD GAS  ABG  No results for input(s): PH, PO2, PCO2, HCO3, BE in the last 168 hours.    LAST 7 DAYS MICROBIOLOGY   Microbiology Results (last 7 days)     ** No results found for the last 168 hours. **          MOST RECENT IMAGING  Cardiac catheterization  · The left ventricular systolic function is normal.  · LVEDP (Pre): 9  · LVEDP (Post): 10  · Estimated blood loss: none  · Three vessel coronary artery disease. Left Main disease.  · Distal  left main and ostial LAD and ostial circumflex has severe   stenosis.  · Proximal left circumflex artery 70-75% stenosis  · Ostial and proximal left anterior descending artery has 75% stenosis and   mid 50% stenosis.  · Proximal RCA has 35-40% tubular narrowing.  · Ostial and proximal 1st diagonal branch has 60% stenosis.  · Preserved LV function with left ventricular ejection fraction of 60%.     I certify that I was present for catheter insertion, catheter   manipulation, angiography, and angiographic interpretation of this   patient.    Procedure Log documented by Documenter: RT Starla and verified by   Kannan Santana MD.    Date: 6/25/2020  Time: 10:12 AM      LASTECHO  No results found for this or any previous visit.    CURRENT/PREVIOUS VISIT EKG  Results for orders placed or performed during the hospital encounter of 06/23/20   EKG 12-lead    Collection Time: 06/23/20  3:11 AM    Narrative    Test Reason : I21.4,    Vent. Rate : 097 BPM     Atrial Rate : 097 BPM     P-R Int : 184 ms          QRS Dur : 086 ms      QT Int : 368 ms       P-R-T Axes : 036 011 005 degrees     QTc Int : 467 ms    Normal sinus rhythm  Low voltage QRS  T wave abnormality, consider anterior ischemia  Abnormal ECG  No previous ECGs available  Confirmed by Sumanth Santana MD ([id number]) on 6/24/2020 9:16:53 PM    Referred By: SAMMY   SELF           Confirmed By:Sumanth Santana MD     Cath report 6/24/20:  · The left ventricular systolic function is normal.  · LVEDP (Pre): 9  · LVEDP (Post): 10  · Estimated blood loss: none  · Three vessel coronary artery disease. Left Main disease.  · Distal left main and ostial LAD and ostial circumflex has severe stenosis.  · Proximal left circumflex artery 70-75% stenosis  · Ostial and proximal left anterior descending artery has 75% stenosis and mid 50% stenosis.  · Proximal RCA has 35-40% tubular narrowing.  · Ostial and proximal 1st diagonal branch has 60% stenosis.  · Preserved LV  function with left ventricular ejection fraction of 60%.    ASSESSMENT/PLAN:     Active Hospital Problems    Diagnosis    *Chest pain    COPD (chronic obstructive pulmonary disease)    Essential hypertension    Diabetes mellitus       ASSESSMENT & PLAN:     1. S/p left heart cath: Atherosclerotic CAD  2. Three vessel disease with Left main disease   3.  Chest pain  4.  Hypertension  5.  Type 2 diabetes  6.  COPD  7.  History of smoking      RECOMMENDATIONS:      1. Patient was found to have three vessel coronary artery disease with severe distal left main, ostial LAD, and ostial circumflex stenosis.   2. CT surgery has been consulted for CABG.   3. Cath results and recommendations for CABG have been discussed with both the patient and her daughter.   Further recommendations based on hospital course.         [facility]  Department of Cardiology  Odette Leroy NP  Date of service: 06/25/2020  11:18 AM

## 2020-06-26 ENCOUNTER — ANESTHESIA EVENT (OUTPATIENT)
Dept: SURGERY | Facility: HOSPITAL | Age: 50
End: 2020-06-26
Payer: MEDICAID

## 2020-06-26 ENCOUNTER — ANESTHESIA (OUTPATIENT)
Dept: SURGERY | Facility: HOSPITAL | Age: 50
End: 2020-06-26
Payer: MEDICAID

## 2020-06-26 LAB
ABO + RH BLD: NORMAL
ALLENS TEST: ABNORMAL
ALLENS TEST: ABNORMAL
ANION GAP SERPL CALC-SCNC: 11 MMOL/L (ref 8–16)
ANION GAP SERPL CALC-SCNC: 6 MMOL/L (ref 8–16)
APTT PPP: 27.6 SEC (ref 23.6–33.3)
BASOPHILS # BLD AUTO: 0.03 K/UL (ref 0–0.2)
BASOPHILS # BLD AUTO: 0.04 K/UL (ref 0–0.2)
BASOPHILS NFR BLD: 0.2 % (ref 0–1.9)
BASOPHILS NFR BLD: 0.3 % (ref 0–1.9)
BLD GP AB SCN CELLS X3 SERPL QL: NORMAL
BUN SERPL-MCNC: 11 MG/DL (ref 6–20)
BUN SERPL-MCNC: 17 MG/DL (ref 6–20)
CALCIUM SERPL-MCNC: 7.5 MG/DL (ref 8.7–10.5)
CALCIUM SERPL-MCNC: 9 MG/DL (ref 8.7–10.5)
CHLORIDE SERPL-SCNC: 111 MMOL/L (ref 95–110)
CHLORIDE SERPL-SCNC: 99 MMOL/L (ref 95–110)
CO2 SERPL-SCNC: 19 MMOL/L (ref 23–29)
CO2 SERPL-SCNC: 24 MMOL/L (ref 23–29)
CREAT SERPL-MCNC: 0.7 MG/DL (ref 0.5–1.4)
CREAT SERPL-MCNC: 0.7 MG/DL (ref 0.5–1.4)
DELSYS: ABNORMAL
DELSYS: ABNORMAL
DIFFERENTIAL METHOD: ABNORMAL
DIFFERENTIAL METHOD: ABNORMAL
EOSINOPHIL # BLD AUTO: 0 K/UL (ref 0–0.5)
EOSINOPHIL # BLD AUTO: 0.1 K/UL (ref 0–0.5)
EOSINOPHIL NFR BLD: 0.1 % (ref 0–8)
EOSINOPHIL NFR BLD: 0.9 % (ref 0–8)
ERYTHROCYTE [DISTWIDTH] IN BLOOD BY AUTOMATED COUNT: 13.2 % (ref 11.5–14.5)
ERYTHROCYTE [DISTWIDTH] IN BLOOD BY AUTOMATED COUNT: 13.2 % (ref 11.5–14.5)
ERYTHROCYTE [SEDIMENTATION RATE] IN BLOOD BY WESTERGREN METHOD: 12 MM/H
EST. GFR  (AFRICAN AMERICAN): >60 ML/MIN/1.73 M^2
EST. GFR  (AFRICAN AMERICAN): >60 ML/MIN/1.73 M^2
EST. GFR  (NON AFRICAN AMERICAN): >60 ML/MIN/1.73 M^2
EST. GFR  (NON AFRICAN AMERICAN): >60 ML/MIN/1.73 M^2
FIO2: 0.21
FIO2: 100
GLUCOSE SERPL-MCNC: 174 MG/DL (ref 70–110)
GLUCOSE SERPL-MCNC: 176 MG/DL (ref 70–110)
GLUCOSE SERPL-MCNC: 189 MG/DL (ref 70–110)
GLUCOSE SERPL-MCNC: 194 MG/DL (ref 70–110)
GLUCOSE SERPL-MCNC: 195 MG/DL (ref 70–110)
GLUCOSE SERPL-MCNC: 197 MG/DL (ref 70–110)
GLUCOSE SERPL-MCNC: 202 MG/DL (ref 70–110)
GLUCOSE SERPL-MCNC: 207 MG/DL (ref 70–110)
GLUCOSE SERPL-MCNC: 232 MG/DL (ref 70–110)
GLUCOSE SERPL-MCNC: 243 MG/DL (ref 70–110)
HCO3 UR-SCNC: 17.6 MMOL/L (ref 24–28)
HCO3 UR-SCNC: 21.4 MMOL/L (ref 24–28)
HCO3 UR-SCNC: 22.7 MMOL/L (ref 24–28)
HCO3 UR-SCNC: 23.3 MMOL/L (ref 24–28)
HCO3 UR-SCNC: 23.9 MMOL/L (ref 24–28)
HCO3 UR-SCNC: 24.1 MMOL/L (ref 24–28)
HCO3 UR-SCNC: 25.4 MMOL/L (ref 24–28)
HCT VFR BLD AUTO: 30.8 % (ref 37–48.5)
HCT VFR BLD AUTO: 40.9 % (ref 37–48.5)
HCT VFR BLD CALC: 25 %PCV (ref 36–54)
HCT VFR BLD CALC: 29 %PCV (ref 36–54)
HCT VFR BLD CALC: 31 %PCV (ref 36–54)
HGB BLD-MCNC: 10.2 G/DL (ref 12–16)
HGB BLD-MCNC: 13.3 G/DL (ref 12–16)
HIV1+2 IGG SERPL QL IA.RAPID: NEGATIVE
IMM GRANULOCYTES # BLD AUTO: 0.08 K/UL (ref 0–0.04)
IMM GRANULOCYTES # BLD AUTO: 0.12 K/UL (ref 0–0.04)
IMM GRANULOCYTES NFR BLD AUTO: 0.7 % (ref 0–0.5)
IMM GRANULOCYTES NFR BLD AUTO: 0.8 % (ref 0–0.5)
LYMPHOCYTES # BLD AUTO: 1.5 K/UL (ref 1–4.8)
LYMPHOCYTES # BLD AUTO: 3.6 K/UL (ref 1–4.8)
LYMPHOCYTES NFR BLD: 10.4 % (ref 18–48)
LYMPHOCYTES NFR BLD: 31.2 % (ref 18–48)
MAGNESIUM SERPL-MCNC: 2 MG/DL (ref 1.6–2.6)
MCH RBC QN AUTO: 28.5 PG (ref 27–31)
MCH RBC QN AUTO: 29 PG (ref 27–31)
MCHC RBC AUTO-ENTMCNC: 32.5 G/DL (ref 32–36)
MCHC RBC AUTO-ENTMCNC: 33.1 G/DL (ref 32–36)
MCV RBC AUTO: 88 FL (ref 82–98)
MCV RBC AUTO: 88 FL (ref 82–98)
MODE: ABNORMAL
MODE: ABNORMAL
MONOCYTES # BLD AUTO: 0.7 K/UL (ref 0.3–1)
MONOCYTES # BLD AUTO: 1 K/UL (ref 0.3–1)
MONOCYTES NFR BLD: 4.6 % (ref 4–15)
MONOCYTES NFR BLD: 8.6 % (ref 4–15)
NEUTROPHILS # BLD AUTO: 12.2 K/UL (ref 1.8–7.7)
NEUTROPHILS # BLD AUTO: 6.8 K/UL (ref 1.8–7.7)
NEUTROPHILS NFR BLD: 58.3 % (ref 38–73)
NEUTROPHILS NFR BLD: 83.9 % (ref 38–73)
NRBC BLD-RTO: 0 /100 WBC
NRBC BLD-RTO: 0 /100 WBC
PCO2 BLDA: 31.5 MMHG (ref 35–45)
PCO2 BLDA: 33.1 MMHG (ref 35–45)
PCO2 BLDA: 33.5 MMHG (ref 35–45)
PCO2 BLDA: 34.9 MMHG (ref 35–45)
PCO2 BLDA: 39.5 MMHG (ref 35–45)
PCO2 BLDA: 40.9 MMHG (ref 35–45)
PCO2 BLDA: 42.3 MMHG (ref 35–45)
PEEP: 5
PH SMN: 7.33 [PH] (ref 7.35–7.45)
PH SMN: 7.36 [PH] (ref 7.35–7.45)
PH SMN: 7.39 [PH] (ref 7.35–7.45)
PH SMN: 7.39 [PH] (ref 7.35–7.45)
PH SMN: 7.42 [PH] (ref 7.35–7.45)
PH SMN: 7.45 [PH] (ref 7.35–7.45)
PH SMN: 7.46 [PH] (ref 7.35–7.45)
PLATELET # BLD AUTO: 200 K/UL (ref 150–350)
PLATELET # BLD AUTO: 340 K/UL (ref 150–350)
PMV BLD AUTO: 9.7 FL (ref 9.2–12.9)
PMV BLD AUTO: 9.9 FL (ref 9.2–12.9)
PO2 BLDA: 133 MMHG (ref 80–100)
PO2 BLDA: 366 MMHG (ref 80–100)
PO2 BLDA: 371 MMHG (ref 80–100)
PO2 BLDA: 378 MMHG (ref 80–100)
PO2 BLDA: 408 MMHG (ref 80–100)
PO2 BLDA: 438 MMHG (ref 80–100)
PO2 BLDA: 88 MMHG (ref 80–100)
POC ACTIVATED CLOTTING TIME K: 109 SEC (ref 74–137)
POC ACTIVATED CLOTTING TIME K: 417 SEC (ref 74–137)
POC ACTIVATED CLOTTING TIME K: 461 SEC (ref 74–137)
POC ACTIVATED CLOTTING TIME K: 466 SEC (ref 74–137)
POC ACTIVATED CLOTTING TIME K: 477 SEC (ref 74–137)
POC ACTIVATED CLOTTING TIME K: 98 SEC (ref 74–137)
POC BE: -1 MMOL/L
POC BE: -1 MMOL/L
POC BE: -2 MMOL/L
POC BE: -3 MMOL/L
POC BE: -8 MMOL/L
POC BE: 0 MMOL/L
POC BE: 0 MMOL/L
POC IONIZED CALCIUM: 0.93 MMOL/L (ref 1.06–1.42)
POC IONIZED CALCIUM: 0.98 MMOL/L (ref 1.06–1.42)
POC IONIZED CALCIUM: 0.99 MMOL/L (ref 1.06–1.42)
POC IONIZED CALCIUM: 1.05 MMOL/L (ref 1.06–1.42)
POC IONIZED CALCIUM: 1.13 MMOL/L (ref 1.06–1.42)
POC IONIZED CALCIUM: 1.2 MMOL/L (ref 1.06–1.42)
POC SATURATED O2: 100 % (ref 95–100)
POC SATURATED O2: 97 % (ref 95–100)
POC SATURATED O2: 99 % (ref 95–100)
POC TCO2: 19 MMOL/L (ref 23–27)
POC TCO2: 22 MMOL/L (ref 23–27)
POC TCO2: 24 MMOL/L (ref 23–27)
POC TCO2: 25 MMOL/L (ref 23–27)
POC TCO2: 27 MMOL/L (ref 23–27)
POTASSIUM BLD-SCNC: 3.2 MMOL/L (ref 3.5–5.1)
POTASSIUM BLD-SCNC: 3.6 MMOL/L (ref 3.5–5.1)
POTASSIUM BLD-SCNC: 3.7 MMOL/L (ref 3.5–5.1)
POTASSIUM BLD-SCNC: 3.8 MMOL/L (ref 3.5–5.1)
POTASSIUM BLD-SCNC: 4 MMOL/L (ref 3.5–5.1)
POTASSIUM BLD-SCNC: 4.2 MMOL/L (ref 3.5–5.1)
POTASSIUM SERPL-SCNC: 3.9 MMOL/L (ref 3.5–5.1)
POTASSIUM SERPL-SCNC: 4.2 MMOL/L (ref 3.5–5.1)
PS: 10
RBC # BLD AUTO: 3.52 M/UL (ref 4–5.4)
RBC # BLD AUTO: 4.66 M/UL (ref 4–5.4)
SAMPLE: ABNORMAL
SAMPLE: NORMAL
SAMPLE: NORMAL
SITE: ABNORMAL
SITE: ABNORMAL
SODIUM BLD-SCNC: 134 MMOL/L (ref 136–145)
SODIUM BLD-SCNC: 136 MMOL/L (ref 136–145)
SODIUM BLD-SCNC: 136 MMOL/L (ref 136–145)
SODIUM BLD-SCNC: 138 MMOL/L (ref 136–145)
SODIUM BLD-SCNC: 139 MMOL/L (ref 136–145)
SODIUM BLD-SCNC: 139 MMOL/L (ref 136–145)
SODIUM SERPL-SCNC: 134 MMOL/L (ref 136–145)
SODIUM SERPL-SCNC: 136 MMOL/L (ref 136–145)
VT: 700
WBC # BLD AUTO: 11.67 K/UL (ref 3.9–12.7)
WBC # BLD AUTO: 14.57 K/UL (ref 3.9–12.7)

## 2020-06-26 PROCEDURE — 36000712 HC OR TIME LEV V 1ST 15 MIN: Performed by: THORACIC SURGERY (CARDIOTHORACIC VASCULAR SURGERY)

## 2020-06-26 PROCEDURE — 85025 COMPLETE CBC W/AUTO DIFF WBC: CPT

## 2020-06-26 PROCEDURE — 27100025 HC TUBING, SET FLUID WARMER: Performed by: ANESTHESIOLOGY

## 2020-06-26 PROCEDURE — 33533 CABG ARTERIAL SINGLE: CPT | Mod: AS,,, | Performed by: NURSE PRACTITIONER

## 2020-06-26 PROCEDURE — 94761 N-INVAS EAR/PLS OXIMETRY MLT: CPT

## 2020-06-26 PROCEDURE — 27000673 HC TUBING BLOOD Y: Performed by: ANESTHESIOLOGY

## 2020-06-26 PROCEDURE — 27200678 HC TRANSDUCER MONITOR KIT TRIPLE: Performed by: ANESTHESIOLOGY

## 2020-06-26 PROCEDURE — 25000003 PHARM REV CODE 250: Performed by: NURSE PRACTITIONER

## 2020-06-26 PROCEDURE — 86703 HIV-1/HIV-2 1 RESULT ANTBDY: CPT

## 2020-06-26 PROCEDURE — 36556 INSERT NON-TUNNEL CV CATH: CPT

## 2020-06-26 PROCEDURE — 63600175 PHARM REV CODE 636 W HCPCS: Performed by: NURSE PRACTITIONER

## 2020-06-26 PROCEDURE — 27000675 HC TUBING MICRODRIP: Performed by: ANESTHESIOLOGY

## 2020-06-26 PROCEDURE — 27000656 HC EYE GOGGLES: Performed by: ANESTHESIOLOGY

## 2020-06-26 PROCEDURE — 85730 THROMBOPLASTIN TIME PARTIAL: CPT

## 2020-06-26 PROCEDURE — 33533 PR CABG, ARTERIAL, SINGLE: ICD-10-PCS | Mod: AS,,, | Performed by: NURSE PRACTITIONER

## 2020-06-26 PROCEDURE — 27000671 HC TUBING MICROBORE EXT: Performed by: ANESTHESIOLOGY

## 2020-06-26 PROCEDURE — 82330 ASSAY OF CALCIUM: CPT

## 2020-06-26 PROCEDURE — 25000003 PHARM REV CODE 250: Performed by: THORACIC SURGERY (CARDIOTHORACIC VASCULAR SURGERY)

## 2020-06-26 PROCEDURE — 82962 GLUCOSE BLOOD TEST: CPT

## 2020-06-26 PROCEDURE — 33518 PR CABG, ARTERY-VEIN, TWO: ICD-10-PCS | Mod: AS,,, | Performed by: NURSE PRACTITIONER

## 2020-06-26 PROCEDURE — 27000080 OPTIME MED/SURG SUP & DEVICES GENERAL CLASSIFICATION: Performed by: THORACIC SURGERY (CARDIOTHORACIC VASCULAR SURGERY)

## 2020-06-26 PROCEDURE — 63600175 PHARM REV CODE 636 W HCPCS: Performed by: NURSE ANESTHETIST, CERTIFIED REGISTERED

## 2020-06-26 PROCEDURE — C1751 CATH, INF, PER/CENT/MIDLINE: HCPCS | Performed by: ANESTHESIOLOGY

## 2020-06-26 PROCEDURE — 63600175 PHARM REV CODE 636 W HCPCS: Mod: JG | Performed by: THORACIC SURGERY (CARDIOTHORACIC VASCULAR SURGERY)

## 2020-06-26 PROCEDURE — 33508 PR ENDOSCOPY W/VIDEO-ASST VEIN HARVEST,CABG: ICD-10-PCS | Mod: ,,, | Performed by: THORACIC SURGERY (CARDIOTHORACIC VASCULAR SURGERY)

## 2020-06-26 PROCEDURE — 33533 CABG ARTERIAL SINGLE: CPT | Mod: ,,, | Performed by: THORACIC SURGERY (CARDIOTHORACIC VASCULAR SURGERY)

## 2020-06-26 PROCEDURE — 33533 PR CABG, ARTERIAL, SINGLE: ICD-10-PCS | Mod: ,,, | Performed by: THORACIC SURGERY (CARDIOTHORACIC VASCULAR SURGERY)

## 2020-06-26 PROCEDURE — P9047 ALBUMIN (HUMAN), 25%, 50ML: HCPCS | Mod: JG | Performed by: THORACIC SURGERY (CARDIOTHORACIC VASCULAR SURGERY)

## 2020-06-26 PROCEDURE — 36000713 HC OR TIME LEV V EA ADD 15 MIN: Performed by: THORACIC SURGERY (CARDIOTHORACIC VASCULAR SURGERY)

## 2020-06-26 PROCEDURE — 93503 INSERT/PLACE HEART CATHETER: CPT

## 2020-06-26 PROCEDURE — 27202107 HC XP QUATRO SENSOR: Performed by: ANESTHESIOLOGY

## 2020-06-26 PROCEDURE — 86850 RBC ANTIBODY SCREEN: CPT

## 2020-06-26 PROCEDURE — 20000000 HC ICU ROOM

## 2020-06-26 PROCEDURE — P9045 ALBUMIN (HUMAN), 5%, 250 ML: HCPCS | Mod: JG

## 2020-06-26 PROCEDURE — 63600175 PHARM REV CODE 636 W HCPCS

## 2020-06-26 PROCEDURE — 82803 BLOOD GASES ANY COMBINATION: CPT

## 2020-06-26 PROCEDURE — 80048 BASIC METABOLIC PNL TOTAL CA: CPT | Mod: 91

## 2020-06-26 PROCEDURE — 83735 ASSAY OF MAGNESIUM: CPT

## 2020-06-26 PROCEDURE — 27000658 HC ARTERIAL LINE ALL: Performed by: ANESTHESIOLOGY

## 2020-06-26 PROCEDURE — 33518 CABG ARTERY-VEIN TWO: CPT | Mod: AS,,, | Performed by: NURSE PRACTITIONER

## 2020-06-26 PROCEDURE — 63600175 PHARM REV CODE 636 W HCPCS: Performed by: THORACIC SURGERY (CARDIOTHORACIC VASCULAR SURGERY)

## 2020-06-26 PROCEDURE — S0028 INJECTION, FAMOTIDINE, 20 MG: HCPCS | Performed by: NURSE ANESTHETIST, CERTIFIED REGISTERED

## 2020-06-26 PROCEDURE — 94002 VENT MGMT INPAT INIT DAY: CPT

## 2020-06-26 PROCEDURE — 85014 HEMATOCRIT: CPT

## 2020-06-26 PROCEDURE — 86920 COMPATIBILITY TEST SPIN: CPT

## 2020-06-26 PROCEDURE — 27000683 HC PILLOW THERAPEUTIC

## 2020-06-26 PROCEDURE — 27100019 HC AMBU BAG ADULT/PED: Performed by: ANESTHESIOLOGY

## 2020-06-26 PROCEDURE — 99900026 HC AIRWAY MAINTENANCE (STAT)

## 2020-06-26 PROCEDURE — 80048 BASIC METABOLIC PNL TOTAL CA: CPT

## 2020-06-26 PROCEDURE — 36415 COLL VENOUS BLD VENIPUNCTURE: CPT

## 2020-06-26 PROCEDURE — 27201423 OPTIME MED/SURG SUP & DEVICES STERILE SUPPLY: Performed by: THORACIC SURGERY (CARDIOTHORACIC VASCULAR SURGERY)

## 2020-06-26 PROCEDURE — 37000008 HC ANESTHESIA 1ST 15 MINUTES: Performed by: THORACIC SURGERY (CARDIOTHORACIC VASCULAR SURGERY)

## 2020-06-26 PROCEDURE — 37799 UNLISTED PX VASCULAR SURGERY: CPT

## 2020-06-26 PROCEDURE — 27202163 HC CATH MULTI LUMEN: Performed by: ANESTHESIOLOGY

## 2020-06-26 PROCEDURE — 93005 ELECTROCARDIOGRAM TRACING: CPT | Performed by: INTERNAL MEDICINE

## 2020-06-26 PROCEDURE — 37000009 HC ANESTHESIA EA ADD 15 MINS: Performed by: THORACIC SURGERY (CARDIOTHORACIC VASCULAR SURGERY)

## 2020-06-26 PROCEDURE — 25000003 PHARM REV CODE 250: Performed by: NURSE ANESTHETIST, CERTIFIED REGISTERED

## 2020-06-26 PROCEDURE — 84295 ASSAY OF SERUM SODIUM: CPT

## 2020-06-26 PROCEDURE — C9248 INJ, CLEVIDIPINE BUTYRATE: HCPCS | Performed by: NURSE ANESTHETIST, CERTIFIED REGISTERED

## 2020-06-26 PROCEDURE — 27000666 HC INFUSOR PRESSURE BAG: Performed by: ANESTHESIOLOGY

## 2020-06-26 PROCEDURE — 27000676 HC TUBING PRIMARY PLUMSET: Performed by: ANESTHESIOLOGY

## 2020-06-26 PROCEDURE — 84132 ASSAY OF SERUM POTASSIUM: CPT

## 2020-06-26 PROCEDURE — 33518 PR CABG, ARTERY-VEIN, TWO: ICD-10-PCS | Mod: ,,, | Performed by: THORACIC SURGERY (CARDIOTHORACIC VASCULAR SURGERY)

## 2020-06-26 PROCEDURE — 33508 ENDOSCOPIC VEIN HARVEST: CPT | Mod: ,,, | Performed by: THORACIC SURGERY (CARDIOTHORACIC VASCULAR SURGERY)

## 2020-06-26 PROCEDURE — 36600 WITHDRAWAL OF ARTERIAL BLOOD: CPT

## 2020-06-26 PROCEDURE — 27000221 HC OXYGEN, UP TO 24 HOURS

## 2020-06-26 PROCEDURE — 33518 CABG ARTERY-VEIN TWO: CPT | Mod: ,,, | Performed by: THORACIC SURGERY (CARDIOTHORACIC VASCULAR SURGERY)

## 2020-06-26 PROCEDURE — C1729 CATH, DRAINAGE: HCPCS | Performed by: THORACIC SURGERY (CARDIOTHORACIC VASCULAR SURGERY)

## 2020-06-26 PROCEDURE — 27202276 HC INTRODUCER, PERC 8 FR: Performed by: ANESTHESIOLOGY

## 2020-06-26 PROCEDURE — 99900035 HC TECH TIME PER 15 MIN (STAT)

## 2020-06-26 PROCEDURE — 63600175 PHARM REV CODE 636 W HCPCS: Mod: JG

## 2020-06-26 PROCEDURE — S0017 INJECTION, AMINOCAPROIC ACID: HCPCS | Performed by: NURSE ANESTHETIST, CERTIFIED REGISTERED

## 2020-06-26 PROCEDURE — 27201107 HC STYLET, STANDARD: Performed by: ANESTHESIOLOGY

## 2020-06-26 PROCEDURE — 36620 INSERTION CATHETER ARTERY: CPT

## 2020-06-26 DEVICE — PLEDGET 4.5X6 007970: Type: IMPLANTABLE DEVICE | Site: CHEST | Status: FUNCTIONAL

## 2020-06-26 RX ORDER — CHLORHEXIDINE GLUCONATE ORAL RINSE 1.2 MG/ML
10 SOLUTION DENTAL 2 TIMES DAILY
Status: DISCONTINUED | OUTPATIENT
Start: 2020-06-26 | End: 2020-07-01

## 2020-06-26 RX ORDER — SODIUM CHLORIDE, SODIUM LACTATE, POTASSIUM CHLORIDE, CALCIUM CHLORIDE 600; 310; 30; 20 MG/100ML; MG/100ML; MG/100ML; MG/100ML
INJECTION, SOLUTION INTRAVENOUS CONTINUOUS PRN
Status: DISCONTINUED | OUTPATIENT
Start: 2020-06-26 | End: 2020-06-26

## 2020-06-26 RX ORDER — MUPIROCIN 20 MG/G
1 OINTMENT TOPICAL 2 TIMES DAILY
Status: DISCONTINUED | OUTPATIENT
Start: 2020-06-26 | End: 2020-07-01

## 2020-06-26 RX ORDER — SODIUM BICARBONATE 1 MEQ/ML
SYRINGE (ML) INTRAVENOUS
Status: DISPENSED
Start: 2020-06-26 | End: 2020-06-27

## 2020-06-26 RX ORDER — CEFAZOLIN SODIUM 1 G/3ML
INJECTION, POWDER, FOR SOLUTION INTRAMUSCULAR; INTRAVENOUS
Status: DISCONTINUED | OUTPATIENT
Start: 2020-06-26 | End: 2020-06-26

## 2020-06-26 RX ORDER — TALC
6 POWDER (GRAM) TOPICAL NIGHTLY PRN
Status: DISCONTINUED | OUTPATIENT
Start: 2020-06-27 | End: 2020-07-01 | Stop reason: HOSPADM

## 2020-06-26 RX ORDER — AMINOCAPROIC ACID 250 MG/ML
INJECTION, SOLUTION INTRAVENOUS
Status: DISCONTINUED | OUTPATIENT
Start: 2020-06-26 | End: 2020-06-26

## 2020-06-26 RX ORDER — SODIUM CHLORIDE 9 MG/ML
INJECTION, SOLUTION INTRAVENOUS CONTINUOUS
Status: DISCONTINUED | OUTPATIENT
Start: 2020-06-26 | End: 2020-06-28

## 2020-06-26 RX ORDER — CALCIUM CHLORIDE INJECTION 100 MG/ML
INJECTION, SOLUTION INTRAVENOUS
Status: DISPENSED
Start: 2020-06-26 | End: 2020-06-27

## 2020-06-26 RX ORDER — OXYCODONE AND ACETAMINOPHEN 5; 325 MG/1; MG/1
1 TABLET ORAL EVERY 4 HOURS PRN
Status: DISCONTINUED | OUTPATIENT
Start: 2020-06-26 | End: 2020-07-01 | Stop reason: HOSPADM

## 2020-06-26 RX ORDER — FAMOTIDINE 10 MG/ML
INJECTION INTRAVENOUS
Status: DISCONTINUED | OUTPATIENT
Start: 2020-06-26 | End: 2020-06-26

## 2020-06-26 RX ORDER — POTASSIUM CHLORIDE 29.8 MG/ML
40 INJECTION INTRAVENOUS
Status: DISCONTINUED | OUTPATIENT
Start: 2020-06-26 | End: 2020-06-28

## 2020-06-26 RX ORDER — CEFAZOLIN SODIUM 2 G/50ML
2 SOLUTION INTRAVENOUS
Status: COMPLETED | OUTPATIENT
Start: 2020-06-26 | End: 2020-06-28

## 2020-06-26 RX ORDER — LIDOCAINE HYDROCHLORIDE 20 MG/ML
JELLY TOPICAL
Status: DISCONTINUED | OUTPATIENT
Start: 2020-06-26 | End: 2020-06-26

## 2020-06-26 RX ORDER — ONDANSETRON 2 MG/ML
4 INJECTION INTRAMUSCULAR; INTRAVENOUS EVERY 8 HOURS PRN
Status: DISCONTINUED | OUTPATIENT
Start: 2020-06-26 | End: 2020-07-01 | Stop reason: HOSPADM

## 2020-06-26 RX ORDER — DOCUSATE SODIUM 100 MG/1
100 CAPSULE, LIQUID FILLED ORAL DAILY
Status: DISCONTINUED | OUTPATIENT
Start: 2020-06-27 | End: 2020-07-01 | Stop reason: HOSPADM

## 2020-06-26 RX ORDER — ESMOLOL HYDROCHLORIDE 10 MG/ML
INJECTION INTRAVENOUS
Status: DISCONTINUED | OUTPATIENT
Start: 2020-06-26 | End: 2020-06-26

## 2020-06-26 RX ORDER — ACETAMINOPHEN 325 MG/1
650 TABLET ORAL EVERY 6 HOURS PRN
Status: DISCONTINUED | OUTPATIENT
Start: 2020-06-26 | End: 2020-07-01 | Stop reason: HOSPADM

## 2020-06-26 RX ORDER — ROCURONIUM BROMIDE 10 MG/ML
INJECTION, SOLUTION INTRAVENOUS
Status: DISCONTINUED | OUTPATIENT
Start: 2020-06-26 | End: 2020-06-26

## 2020-06-26 RX ORDER — VECURONIUM BROMIDE FOR INJECTION 1 MG/ML
INJECTION, POWDER, LYOPHILIZED, FOR SOLUTION INTRAVENOUS
Status: DISCONTINUED | OUTPATIENT
Start: 2020-06-26 | End: 2020-06-26

## 2020-06-26 RX ORDER — HEPARIN SODIUM 10000 [USP'U]/ML
INJECTION, SOLUTION INTRAVENOUS; SUBCUTANEOUS
Status: DISCONTINUED | OUTPATIENT
Start: 2020-06-26 | End: 2020-06-26 | Stop reason: HOSPADM

## 2020-06-26 RX ORDER — MORPHINE SULFATE 2 MG/ML
2 INJECTION, SOLUTION INTRAMUSCULAR; INTRAVENOUS EVERY 10 MIN PRN
Status: DISCONTINUED | OUTPATIENT
Start: 2020-06-26 | End: 2020-06-28

## 2020-06-26 RX ORDER — ALBUMIN HUMAN 50 G/1000ML
SOLUTION INTRAVENOUS
Status: COMPLETED
Start: 2020-06-26 | End: 2020-06-26

## 2020-06-26 RX ORDER — CALCIUM CHLORIDE, MAGNESIUM CHLORIDE, POTASSIUM CHLORIDE AND SODIUM CHLORIDE 17.6; 325.3; 119.3; 643 MG/100ML; MG/100ML; MG/100ML; MG/100ML
SOLUTION INTRA-ARTERIAL ONCE
Status: DISCONTINUED | OUTPATIENT
Start: 2020-06-26 | End: 2020-06-26

## 2020-06-26 RX ORDER — ETOMIDATE 2 MG/ML
INJECTION INTRAVENOUS
Status: DISCONTINUED | OUTPATIENT
Start: 2020-06-26 | End: 2020-06-26

## 2020-06-26 RX ORDER — HEPARIN SODIUM 1000 [USP'U]/ML
INJECTION, SOLUTION INTRAVENOUS; SUBCUTANEOUS
Status: DISCONTINUED | OUTPATIENT
Start: 2020-06-26 | End: 2020-06-26 | Stop reason: HOSPADM

## 2020-06-26 RX ORDER — HYDROCODONE BITARTRATE AND ACETAMINOPHEN 500; 5 MG/1; MG/1
TABLET ORAL
Status: DISCONTINUED | OUTPATIENT
Start: 2020-06-26 | End: 2020-06-26

## 2020-06-26 RX ORDER — POTASSIUM CHLORIDE 14.9 MG/ML
20 INJECTION INTRAVENOUS
Status: DISCONTINUED | OUTPATIENT
Start: 2020-06-26 | End: 2020-06-28

## 2020-06-26 RX ORDER — FENTANYL CITRATE 50 UG/ML
INJECTION, SOLUTION INTRAMUSCULAR; INTRAVENOUS
Status: DISCONTINUED | OUTPATIENT
Start: 2020-06-26 | End: 2020-06-26

## 2020-06-26 RX ORDER — MIDAZOLAM HYDROCHLORIDE 1 MG/ML
1 INJECTION INTRAMUSCULAR; INTRAVENOUS
Status: DISPENSED | OUTPATIENT
Start: 2020-06-26 | End: 2020-06-27

## 2020-06-26 RX ORDER — PROTAMINE SULFATE 10 MG/ML
INJECTION, SOLUTION INTRAVENOUS
Status: DISCONTINUED | OUTPATIENT
Start: 2020-06-26 | End: 2020-06-26

## 2020-06-26 RX ORDER — SODIUM CHLORIDE 9 MG/ML
INJECTION, SOLUTION INTRAVENOUS CONTINUOUS PRN
Status: DISCONTINUED | OUTPATIENT
Start: 2020-06-26 | End: 2020-06-26

## 2020-06-26 RX ORDER — MAGNESIUM SULFATE HEPTAHYDRATE 40 MG/ML
2 INJECTION, SOLUTION INTRAVENOUS
Status: DISCONTINUED | OUTPATIENT
Start: 2020-06-26 | End: 2020-06-28

## 2020-06-26 RX ORDER — HEPARIN SODIUM 5000 [USP'U]/ML
INJECTION, SOLUTION INTRAVENOUS; SUBCUTANEOUS
Status: DISCONTINUED | OUTPATIENT
Start: 2020-06-26 | End: 2020-06-26

## 2020-06-26 RX ORDER — MIDAZOLAM HYDROCHLORIDE 1 MG/ML
INJECTION INTRAMUSCULAR; INTRAVENOUS
Status: COMPLETED
Start: 2020-06-26 | End: 2020-06-26

## 2020-06-26 RX ORDER — ASPIRIN 325 MG
325 TABLET ORAL DAILY
Status: DISCONTINUED | OUTPATIENT
Start: 2020-06-27 | End: 2020-07-01 | Stop reason: HOSPADM

## 2020-06-26 RX ORDER — POTASSIUM CHLORIDE 14.9 MG/ML
60 INJECTION INTRAVENOUS
Status: DISCONTINUED | OUTPATIENT
Start: 2020-06-26 | End: 2020-06-28

## 2020-06-26 RX ORDER — SUCCINYLCHOLINE CHLORIDE 20 MG/ML
INJECTION INTRAMUSCULAR; INTRAVENOUS
Status: DISCONTINUED | OUTPATIENT
Start: 2020-06-26 | End: 2020-06-26

## 2020-06-26 RX ORDER — PHENYLEPHRINE HYDROCHLORIDE 10 MG/ML
INJECTION INTRAVENOUS
Status: DISCONTINUED | OUTPATIENT
Start: 2020-06-26 | End: 2020-06-26

## 2020-06-26 RX ORDER — MIDAZOLAM HYDROCHLORIDE 1 MG/ML
INJECTION, SOLUTION INTRAMUSCULAR; INTRAVENOUS
Status: DISCONTINUED | OUTPATIENT
Start: 2020-06-26 | End: 2020-06-26

## 2020-06-26 RX ORDER — MAGNESIUM SULFATE HEPTAHYDRATE 40 MG/ML
4 INJECTION, SOLUTION INTRAVENOUS
Status: DISCONTINUED | OUTPATIENT
Start: 2020-06-26 | End: 2020-06-28

## 2020-06-26 RX ADMIN — SODIUM CHLORIDE: 0.9 INJECTION, SOLUTION INTRAVENOUS at 10:06

## 2020-06-26 RX ADMIN — ALBUMIN (HUMAN) 12.5 G: 12.5 SOLUTION INTRAVENOUS at 10:06

## 2020-06-26 RX ADMIN — MIDAZOLAM HYDROCHLORIDE 1 MG: 1 INJECTION, SOLUTION INTRAMUSCULAR; INTRAVENOUS at 10:06

## 2020-06-26 RX ADMIN — PHENYLEPHRINE HYDROCHLORIDE 200 MCG: 10 INJECTION INTRAVENOUS at 05:06

## 2020-06-26 RX ADMIN — PHENYLEPHRINE HYDROCHLORIDE 200 MCG: 10 INJECTION INTRAVENOUS at 09:06

## 2020-06-26 RX ADMIN — FENTANYL CITRATE 250 MCG: 50 INJECTION INTRAMUSCULAR; INTRAVENOUS at 05:06

## 2020-06-26 RX ADMIN — MUPIROCIN 1 G: 20 OINTMENT TOPICAL at 11:06

## 2020-06-26 RX ADMIN — FAMOTIDINE 20 MG: 10 INJECTION, SOLUTION INTRAVENOUS at 05:06

## 2020-06-26 RX ADMIN — VECURONIUM BROMIDE 10 MG: 1 INJECTION, POWDER, LYOPHILIZED, FOR SOLUTION INTRAVENOUS at 05:06

## 2020-06-26 RX ADMIN — SODIUM CHLORIDE 2.4 UNITS/HR: 9 INJECTION, SOLUTION INTRAVENOUS at 10:06

## 2020-06-26 RX ADMIN — ROCURONIUM BROMIDE 10 MG: 10 INJECTION, SOLUTION INTRAVENOUS at 04:06

## 2020-06-26 RX ADMIN — MIDAZOLAM HYDROCHLORIDE 3 MG: 1 INJECTION INTRAMUSCULAR; INTRAVENOUS at 04:06

## 2020-06-26 RX ADMIN — MIDAZOLAM HYDROCHLORIDE 2 MG: 1 INJECTION INTRAMUSCULAR; INTRAVENOUS at 04:06

## 2020-06-26 RX ADMIN — CHLORHEXIDINE GLUCONATE 10 ML: 1.2 RINSE ORAL at 11:06

## 2020-06-26 RX ADMIN — SODIUM CHLORIDE: 0.9 INJECTION, SOLUTION INTRAVENOUS at 05:06

## 2020-06-26 RX ADMIN — ESCITALOPRAM OXALATE 20 MG: 10 TABLET ORAL at 08:06

## 2020-06-26 RX ADMIN — FENTANYL CITRATE 250 MCG: 50 INJECTION INTRAMUSCULAR; INTRAVENOUS at 08:06

## 2020-06-26 RX ADMIN — MIDAZOLAM HYDROCHLORIDE 3 MG: 1 INJECTION INTRAMUSCULAR; INTRAVENOUS at 07:06

## 2020-06-26 RX ADMIN — FENTANYL CITRATE 250 MCG: 50 INJECTION INTRAMUSCULAR; INTRAVENOUS at 04:06

## 2020-06-26 RX ADMIN — SODIUM CHLORIDE 2.4 UNITS/HR: 9 INJECTION, SOLUTION INTRAVENOUS at 05:06

## 2020-06-26 RX ADMIN — CLEVIPIDINE 2 MG/HR: 0.5 EMULSION INTRAVENOUS at 06:06

## 2020-06-26 RX ADMIN — SODIUM CHLORIDE: 0.9 INJECTION, SOLUTION INTRAVENOUS at 04:06

## 2020-06-26 RX ADMIN — VANCOMYCIN HYDROCHLORIDE 1.5 G: 1.5 INJECTION, POWDER, LYOPHILIZED, FOR SOLUTION INTRAVENOUS at 04:06

## 2020-06-26 RX ADMIN — ALBUMIN (HUMAN) 25 G: 25 SOLUTION INTRAVENOUS at 02:06

## 2020-06-26 RX ADMIN — MIDAZOLAM HYDROCHLORIDE 2 MG: 1 INJECTION INTRAMUSCULAR; INTRAVENOUS at 07:06

## 2020-06-26 RX ADMIN — MIDAZOLAM HYDROCHLORIDE 2 MG: 1 INJECTION INTRAMUSCULAR; INTRAVENOUS at 06:06

## 2020-06-26 RX ADMIN — MORPHINE SULFATE 2 MG: 2 INJECTION, SOLUTION INTRAMUSCULAR; INTRAVENOUS at 03:06

## 2020-06-26 RX ADMIN — ESMOLOL HYDROCHLORIDE 30 MG: 10 INJECTION, SOLUTION INTRAVENOUS at 07:06

## 2020-06-26 RX ADMIN — SUCCINYLCHOLINE CHLORIDE 160 MG: 20 INJECTION, SOLUTION INTRAMUSCULAR; INTRAVENOUS at 04:06

## 2020-06-26 RX ADMIN — ESMOLOL HYDROCHLORIDE 30 MG: 10 INJECTION, SOLUTION INTRAVENOUS at 06:06

## 2020-06-26 RX ADMIN — SODIUM CHLORIDE: 0.9 INJECTION, SOLUTION INTRAVENOUS at 08:06

## 2020-06-26 RX ADMIN — LIDOCAINE HYDROCHLORIDE 5 ML: 20 JELLY TOPICAL at 04:06

## 2020-06-26 RX ADMIN — SODIUM BICARBONATE 50 MEQ: 84 INJECTION, SOLUTION INTRAVENOUS at 10:06

## 2020-06-26 RX ADMIN — CEFAZOLIN 2 G: 330 INJECTION, POWDER, FOR SOLUTION INTRAMUSCULAR; INTRAVENOUS at 04:06

## 2020-06-26 RX ADMIN — HEPARIN SODIUM 35000 UNITS: 5000 INJECTION, SOLUTION INTRAVENOUS; SUBCUTANEOUS at 06:06

## 2020-06-26 RX ADMIN — SODIUM BICARBONATE: 84 INJECTION, SOLUTION INTRAVENOUS at 06:06

## 2020-06-26 RX ADMIN — CEFAZOLIN SODIUM 2 G: 2 SOLUTION INTRAVENOUS at 11:06

## 2020-06-26 RX ADMIN — MORPHINE SULFATE 2 MG: 2 INJECTION, SOLUTION INTRAMUSCULAR; INTRAVENOUS at 08:06

## 2020-06-26 RX ADMIN — MORPHINE SULFATE 2 MG: 2 INJECTION, SOLUTION INTRAMUSCULAR; INTRAVENOUS at 12:06

## 2020-06-26 RX ADMIN — AMINOCAPROIC ACID 5 G: 250 INJECTION, SOLUTION INTRAVENOUS at 08:06

## 2020-06-26 RX ADMIN — VECURONIUM BROMIDE 10 MG: 1 INJECTION, POWDER, LYOPHILIZED, FOR SOLUTION INTRAVENOUS at 06:06

## 2020-06-26 RX ADMIN — ROCURONIUM BROMIDE 40 MG: 10 INJECTION, SOLUTION INTRAVENOUS at 04:06

## 2020-06-26 RX ADMIN — AMINOCAPROIC ACID 5 G: 250 INJECTION, SOLUTION INTRAVENOUS at 05:06

## 2020-06-26 RX ADMIN — LISINOPRIL 10 MG: 10 TABLET ORAL at 08:06

## 2020-06-26 RX ADMIN — ACETAMINOPHEN 650 MG: 325 TABLET ORAL at 09:06

## 2020-06-26 RX ADMIN — SODIUM CHLORIDE, SODIUM LACTATE, POTASSIUM CHLORIDE, AND CALCIUM CHLORIDE: .6; .31; .03; .02 INJECTION, SOLUTION INTRAVENOUS at 04:06

## 2020-06-26 RX ADMIN — SODIUM CHLORIDE: 0.9 IRRIGANT IRRIGATION at 06:06

## 2020-06-26 RX ADMIN — ETOMIDATE 20 MG: 2 INJECTION, SOLUTION INTRAVENOUS at 04:06

## 2020-06-26 RX ADMIN — PROTAMINE SULFATE 50 MG: 10 INJECTION, SOLUTION INTRAVENOUS at 08:06

## 2020-06-26 NOTE — NURSING
Patient arrived to unit via wheelchair. AAOx3. Oriented to room and POC. Denies pain. Said she had nothing to lock up with security. Pt belongings: cell phone, , purse, wallet, credit card, bag with clothing, shoes. No jewelry or cash. Said daughter will take wallet and valuables in the morning. VSS. Will CPOC.

## 2020-06-26 NOTE — ANESTHESIA PROCEDURE NOTES
Central Line    Diagnosis: CAD  Patient location during procedure: ICU  Procedure start time: 6/26/2020 1:50 PM  Timeout: 6/26/2020 1:50 PM  Procedure end time: 6/26/2020 2:22 PM    Staffing  Authorizing Provider: Harvinder Howard MD  Performing Provider: Harvinder Howard MD    Staffing  Anesthesiologist: Harvinder Howard MD  Performed: anesthesiologist   Anesthesiologist was present at the time of the procedure.  Preanesthetic Checklist  Completed: patient identified, surgical consent, pre-op evaluation, timeout performed, IV checked, risks and benefits discussed, monitors and equipment checked and anesthesia consent given  Indication   Indication: vascular access, hemodynamic monitoring, med administration     Anesthesia   local infiltration    Central Line   Skin Prep: skin prepped with ChloraPrep, skin prep agent completely dried prior to procedure  maximum sterile barriers used during central venous catheter insertion  hand hygiene performed prior to central venous catheter insertion  Location: right, internal jugular.   Catheter type: triple lumen  Catheter Size: 7 Fr  Inserted Catheter Length: 14 cm  Ultrasound: vascular probe with ultrasound  Vessel Caliber: small, patent, compressibility normal  Needle advanced into vessel with real time Ultrasound guidance.  Guidewire confirmed in vessel.  Manometry: none  Insertion Attempts: 1   Securement:line sutured, chlorhexidine patch, sterile dressing applied and blood return through all ports    Post-Procedure   X-Ray: no pneumothorax on x-ray, placement verified by x-ray and successful placement  Adverse Events:none    Guidewire Guidewire removed intact. Guidewire removed intact, verified with nurse.

## 2020-06-26 NOTE — NURSING
Consent  obtained for CABG and blood tx. Tylenol administered for chronic back pain and melatonin for sleep. IS provided with education on use. Demonstrated understanding with 2500 ml pulled effectively. No c/o chest pain but anxious.

## 2020-06-26 NOTE — PROGRESS NOTES
"[facility]  Adult Nutrition   Progress Note (Initial Assessment)     SUMMARY     Recommendations  Recommendation/Intervention: 1. RD to monitor days NPO. Plans for CABG later today. Recommend diet be initiated post surgery as medically able. 2. Recommend eventual advancement to 2000 calorie Diabetic; Cardiac Diet. 3. RD to provide diet education when appropriate after CABG.  Goals: 1. Diet to be initiated and advanced to 2000 calorie Diabetic; Cardiac Diet. 2. Patient to meet at least 75% of estimated energy and protein needs. 3. RD to provide diet education.  Nutrition Goal Status: new  Communication of RD Recs: reviewed with RN     Dietitian Rounds Brief  Patient assessed 2' ICU status. Patient NPO at time of rounds and plans to get CABG today. Unable to interview patient today. RD plans to interview patient post surgery when appropriate.    Reason for Assessment  Reason For Assessment: other (see comments)(RD identified; ICU status)  Diagnosis: cardiac disease  Relevant Medical History: anxiety, bipolar disorder, type 2 DM, HTN, insomnia, COPD  Interdisciplinary Rounds: attended  General Information Comments: Plans for CABG today    Nutrition Risk Screen  Nutrition Risk Screen: no indicators present             Nutrition/Diet History  Food Allergies: NKFA  Factors Affecting Nutritional Intake: NPO    Anthropometrics  Temp: 98.2 °F (36.8 °C)  Height Method: Stated  Height: 5' 4" (162.6 cm)  Height (inches): 64 in  Weight Method: Standard Scale  Weight: 85 kg (187 lb 6.3 oz)  Weight (lb): 187.39 lb  Ideal Body Weight (IBW), Female: 120 lb  % Ideal Body Weight, Female (lb): 160.93 %  BMI (Calculated): 32.1  BMI Grade: 30 - 34.9- obesity - grade I       Weight History:  Wt Readings from Last 10 Encounters:   06/25/20 85 kg (187 lb 6.3 oz)   06/22/20 88 kg (194 lb)   06/23/20 88 kg (194 lb 0.1 oz)   06/17/20 87.6 kg (193 lb 2 oz)   06/10/20 88.5 kg (195 lb)   12/13/19 89.8 kg (198 lb)   12/12/19 89.8 kg " (198 lb)   11/04/19 86.2 kg (190 lb)   11/01/19 88.5 kg (195 lb 1.7 oz)   10/21/19 88.5 kg (195 lb)       Lab/Procedures/Meds: Pertinent Labs Reviewed  Clinical Chemistry:  Recent Labs   Lab 06/22/20 2226 06/26/20  0342   *   < > 134*   K 4.0   < > 3.9      < > 99   CO2 21*   < > 24   *   < > 243*   BUN 23*   < > 17   CREATININE 0.7   < > 0.7   CALCIUM 9.4   < > 9.0   PROT 7.2  --   --    ALBUMIN 4.4  --   --    BILITOT 0.8  --   --    ALKPHOS 79  --   --    AST 18  --   --    ALT 32  --   --    ANIONGAP 11   < > 11   ESTGFRAFRICA >60.0   < > >60.0   EGFRNONAA >60.0   < > >60.0    < > = values in this interval not displayed.     CBC:   Recent Labs   Lab 06/26/20  0342   WBC 11.67   RBC 4.66   HGB 13.3   HCT 40.9      MCV 88   MCH 28.5   MCHC 32.5     Cardiac Profile:  Recent Labs   Lab 06/22/20 2226 06/23/20  0337 06/23/20  1046   BNP 16  --   --    TROPONINI <0.01* <0.030 <0.030     Diabetes:  Recent Labs   Lab 06/23/20  0337   HGBA1C 10.3*     Medications: Pertinent Medications reviewed  Scheduled Meds:   aspirin  81 mg Oral Daily    atorvastatin  40 mg Oral QHS    normosol-R 300 mL with niCARdipine 5 mg, nitroGLYCERIN 5 mg, sodium bicarbonate 0.3 mEq   Irrigation Once    escitalopram oxalate  20 mg Oral Daily    insulin detemir U-100  5 Units Subcutaneous QHS    insulin (HUMAN R) infusion (adults)  1 Units/hr Intravenous Once    lisinopriL  10 mg Oral Daily    nortriptyline  100 mg Oral QHS    pantoprazole  40 mg Oral Before breakfast    cardioplegic solution   Intravenous Once    cardioplegic solution   Intravenous Once    sodium chloride 0.9% 1,000 mL with bacitracin 100,000 Units irrigation   Irrigation Once     Continuous Infusions:   custom IV infusion builder (for pharmacist use only)       PRN Meds:.sodium chloride, sodium chloride, acetaminophen, albuterol, bisacodyL, insulin aspart U-100, labetalol, LORazepam, melatonin, morphine, nitroGLYCERIN, ondansetron,  promethazine (PHENERGAN) IVPB, sodium chloride 0.9%    Estimated/Assessed Needs  Weight Used For Calorie Calculations: 85 kg (187 lb 6.3 oz)  Energy Calorie Requirements (kcal): 1700 - 2125 (20 - 25 kcal/kg)  Energy Need Method: Kcal/kg  Protein Requirements: 110 (2 g/kg IBW)  Weight Used For Protein Calculations: 55 kg (121 lb 4.1 oz)     Estimated Fluid Requirement Method: RDA Method  RDA Method (mL): 1700       Nutrition Prescription Ordered  Current Diet Order: NPO    Evaluation of Received Nutrient/Fluid Intake  Energy Calories Required: not meeting needs  Protein Required: not meeting needs  Fluid Required: meeting needs  Tolerance: other (see comments)(NPO)     Intake/Output Summary (Last 24 hours) at 6/26/2020 1639  Last data filed at 6/26/2020 0800  Gross per 24 hour   Intake 40 ml   Output 1950 ml   Net -1910 ml      % Intake of Estimated Energy Needs: 0%  % Meal Intake: NPO    Nutrition Risk  Level of Risk/Frequency of Follow-up: high     Monitor and Evaluation  Food and Nutrient Intake: energy intake, food and beverage intake  Food and Nutrient Adminstration: diet order  Knowledge/Beliefs/Attitudes: beliefs and attitudes, food and nutrition knowledge/skill  Physical Activity and Function: nutrition-related ADLs and IADLs, factors affecting access to physical activity  Anthropometric Measurements: weight, weight change, body mass index  Biochemical Data, Medical Tests and Procedures: electrolyte and renal panel, inflammatory profile, gastrointestinal profile, lipid profile, glucose/endocrine profile  Nutrition-Focused Physical Findings: overall appearance     Nutrition Follow-Up  RD Follow-up?: Yes     Shiloh Kauffman RD 06/26/2020 4:41 PM

## 2020-06-26 NOTE — ANESTHESIA PREPROCEDURE EVALUATION
2020  Nicole Harris is a 50 y.o., female.      Patient Active Problem List   Diagnosis    Lumbar degenerative disc disease    DDD (degenerative disc disease), cervical    Diabetes mellitus    Fibromyalgia    Essential hypertension    COPD (chronic obstructive pulmonary disease)    Other chronic pain    Muscle weakness (generalized)    Physical deconditioning    Chronic superficial gastritis without bleeding    Carpal tunnel syndrome on both sides    Cubital tunnel syndrome, bilateral    Radicular pain of upper extremity    Chronic left-sided low back pain with left-sided sciatica    Chest pain    Coronary artery disease involving native coronary artery of native heart with unstable angina pectoris    Unstable angina    Abnormal stress test    Nonspecific abnormal electrocardiogram (ECG) (EKG)       Past Surgical History:   Procedure Laterality Date    ANGIOGRAM, CORONARY, WITH LEFT HEART CATHETERIZATION Left 2020    Procedure: Left heart cath;  Surgeon: Kannan Santana MD;  Location: [facility] CATH/EP LAB;  Service: Cardiology;  Laterality: Left;     SECTION      COLONOSCOPY N/A 2019    Procedure: COLONOSCOPY;  Surgeon: Da Diallo MD;  Location: [facility] ENDO;  Service: General;  Laterality: N/A;    EPIDURAL STEROID INJECTION N/A 2019    Procedure: Injection, Steroid, Epidural - L4/5 EPIDURAL STEROID INJECTION;  Surgeon: Sherri Gardiner MD;  Location: [facility] OR;  Service: Pain Management;  Laterality: N/A;    EPIDURAL STEROID INJECTION N/A 2019    Procedure: Injection, Steroid, Epidural - C7/T1 EPIDURAL STEROID INJECTION;  Surgeon: Sherri Gardiner MD;  Location: [facility] OR;  Service: Pain Management;  Laterality: N/A;    EPIDURAL STEROID INJECTION N/A 2019    Procedure: Injection, Steroid, Epidural - L4/5 EPIDURAL STEROID INJECTION;  Surgeon:  Sherri Gardiner MD;  Location: [facility] OR;  Service: Pain Management;  Laterality: N/A;    EPIDURAL STEROID INJECTION N/A 10/21/2019    Procedure: Injection, Steroid, Epidural, CERVICAL C7/T1 SILVIA;  Surgeon: Sherri Gardiner MD;  Location: [facility] OR;  Service: Pain Management;  Laterality: N/A;  09-23-19    ESOPHAGOGASTRODUODENOSCOPY N/A 9/18/2019    Procedure: ESOPHAGOGASTRODUODENOSCOPY (EGD);  Surgeon: Da Diallo MD;  Location: [facility] ENDO;  Service: General;  Laterality: N/A;    TONSILLECTOMY      TRIGGER POINT INJECTION N/A 7/8/2019    Procedure: INJECTION, TRIGGER POINT - CERVICAL REGION;  Surgeon: Sherri Gardiner MD;  Location: [facility] OR;  Service: Pain Management;  Laterality: N/A;    TRIGGER POINT INJECTION N/A 10/21/2019    Procedure: INJECTION, TRIGGER POINT;  Surgeon: Sherri Gardiner MD;  Location: [facility] OR;  Service: Pain Management;  Laterality: N/A;        Tobacco Use:  The patient  reports that she quit smoking about 7 years ago. She smoked 1.00 pack per day for 0.00 years. She has never used smokeless tobacco.     Results for orders placed or performed during the hospital encounter of 06/23/20   EKG 12-lead    Collection Time: 06/23/20  3:11 AM    Narrative    Test Reason : I21.4,    Vent. Rate : 097 BPM     Atrial Rate : 097 BPM     P-R Int : 184 ms          QRS Dur : 086 ms      QT Int : 368 ms       P-R-T Axes : 036 011 005 degrees     QTc Int : 467 ms    Normal sinus rhythm  Low voltage QRS  T wave abnormality, consider anterior ischemia  Abnormal ECG  No previous ECGs available  Confirmed by Sumanth Santana MD ([id number]) on 6/24/2020 9:16:53 PM    Referred By: [email or web address]   SELF           Confirmed By:Sumanth Santana MD        Imaging Results          NM Myocardial Perfusion Spect Multi Pharmacologic (Final result)  Result time 06/23/20 12:01:12    Final result by Trinidad Norton IV, MD (06/23/20 12:01:12)                 Impression:      1. Small area of diminished tracer accumulation within the  anterolateral wall near the apex, potentially representative of small area of pharmacologically induced reversibility.  2. Matched mildly diminished tracer accumulation within the inferolateral wall  3. ESTIMATED EJECTION FRACTION OF 73%.      Electronically signed by: Trinidad Norton IV., MD  Date:    06/23/2020  Time:    12:01             Narrative:    EXAMINATION:  NM MYOCARDIAL PERFUSION SPECT MULTI PHARM    CLINICAL HISTORY:  Chest pain, ACS suspected;    TECHNIQUE:  Lexiscan is utilized as a pharmacologic stress agent. At peak stress, 25.4 millicuries of technetium Myoview were administered intravenously. The rest portion of the study is accomplished on the same day, utilizing 10.1 millicuries of technetium Myoview intravenously.    COMPARISON:  None.    FINDINGS:  There is a subtle area of focally diminished tracer accumulation within the anterolateral wall near the apex on stress as compared to rest imaging potentially representing minimal pharmacologically induced reversibility.  Diminished accumulation within the inferolateral wall is more pronounced on the rest portion of the examination.  The distribution of tracer activity elsewhere appears unremarkable.  The chamber size is within normal limits. There are no wall motion abnormalities and the estimated ejection fraction is 73%.                                 Lab Results   Component Value Date    WBC 11.67 06/26/2020    HGB 13.3 06/26/2020    HCT 40.9 06/26/2020    MCV 88 06/26/2020     06/26/2020     BMP  Lab Results   Component Value Date     (L) 06/26/2020    K 3.9 06/26/2020    CL 99 06/26/2020    CO2 24 06/26/2020    BUN 17 06/26/2020    CREATININE 0.7 06/26/2020    CALCIUM 9.0 06/26/2020    ANIONGAP 11 06/26/2020     (H) 06/26/2020     (H) 06/25/2020     (H) 06/24/2020       No results found for this or any previous visit.    Conclusion       · The left ventricular systolic function is normal.  · LVEDP (Pre):  9  · LVEDP (Post): 10  · Estimated blood loss: none  · Three vessel coronary artery disease. Left Main disease.  · Distal left main and ostial LAD and ostial circumflex has severe stenosis.  · Proximal left circumflex artery 70-75% stenosis  · Ostial and proximal left anterior descending artery has 75% stenosis and mid 50% stenosis.  · Proximal RCA has 35-40% tubular narrowing.  · Ostial and proximal 1st diagonal branch has 60% stenosis.  · Preserved LV function with left ventricular ejection fraction of 60%.           Anesthesia Evaluation    I have reviewed the Patient Summary Reports.    I have reviewed the Nursing Notes. I have reviewed the NPO Status.   I have reviewed the Medications.     Review of Systems  Anesthesia Hx:  History of prior surgery of interest to airway management or planning:   Social:  Former Smoker    Cardiovascular:   Hypertension, poorly controlled Angina (CP on admit)    Pulmonary:   COPD, mild  Chronic Obstructive Pulmonary Disease (COPD): (mild, uses albuterol PRN, no home oxygen)  is secondary to smoking.    Musculoskeletal:   Arthritis   Spine Disorders: (chronic LBP with bilat leg symptoms L>R, hx of neck pain with occ upper ext symptoms.  s/p cervical SILVIA June 17, 2020.  Reports good range of motion.) lumbar and cervical Disc disease and Chronic Pain    Neurological:   Neuromuscular Disease, (Fibromyalgia, chronic LBP with left leg sciatica)  Neuro Symptoms (bilateral feet diabetic neuropathy) Neuromuscular Disease (bilateral ulnar and carpal tunnel syndrome with numbness in both hands.)   Endocrine:   Diabetes, poorly controlled, type 2    Psych:   Psychiatric History (bipolar disorder, anxiety)          Physical Exam  General:  Obesity    Airway/Jaw/Neck:  Airway Findings: Mouth Opening: Normal Tongue: Normal  General Airway Assessment: Adult  Mallampati: III  TM Distance: Normal, at least 6 cm  Jaw/Neck Findings:  Neck ROM: Normal ROM       Chest/Lungs:  Chest/Lungs Findings:  Clear to auscultation, Normal Respiratory Rate     Heart/Vascular:  Heart Findings: Rate: Normal  Rhythm: Regular Rhythm  Sounds: Normal     Abdomen:  Abdomen Findings: Normal    Musculoskeletal:  Musculoskeletal Findings: Normal   Skin:  Skin Findings: Normal    Mental Status:  Mental Status Findings:  Cooperative, Alert and Oriented         Anesthesia Plan  Type of Anesthesia, risks & benefits discussed:  Anesthesia Type:  general  Patient's Preference:   Intra-op Monitoring Plan: arterial line, central line, [place] and cardiac output  Intra-op Monitoring Plan Comments:   Post Op Pain Control Plan: per primary service following discharge from PACU  Post Op Pain Control Plan Comments:   Induction:   IV  Beta Blocker:         Informed Consent: Patient understands risks and agrees with Anesthesia plan.  Questions answered. Anesthesia consent signed with patient.  ASA Score: 3  emergent   Day of Surgery Review of History & Physical:        Anesthesia Plan Notes: GETA  A-line, TLC, PA cath  Zofran        Ready For Surgery From Anesthesia Perspective.

## 2020-06-26 NOTE — PROGRESS NOTES
Cardiac Rehab     Nicole Harris   [id number]   6/26/2020         Cardiac Rehab Phase Taught: Phase 1    Teaching Method: Verbal    Handouts: None    Educational Videos: None    Understanding:  Learning indicated by feedback and Verbalize understanding    Comments: pt in bed, daughter at bedside. Waiting for CABG today. Questions answered. Pt voiced understanding. Will follow postop for further education    Total Time Spent:15mins            Agueda Nolen RN

## 2020-06-26 NOTE — PROGRESS NOTES
Patient scheduled for coronary artery bypass graft surgery and general endotracheal anesthesia later today.    Patient in ICU on standard monitors and oxygen via nasal cannula    Patient sedated with Versed and fentanyl titrated to effect a total of 3 mg of Versed and 150 micro g of fentanyl were given.  Patient was relaxed and comfortable throughout the procedure and maintained adequate oxygenation throughout.    See procedure notes for procedure details.    After the placement of central line and pulmonary artery catheter a portable chest x-ray was obtained which shows right internal jugular triple-lumen catheter in good position with the tip near the atrial caval junction.  In addition the pulmonary artery catheter is noted to be in good position    There is no evidence of pneumothorax or other line placement related complications at this time.    Okay to use right internal jugular triple-lumen catheter and pulmonary artery catheter.

## 2020-06-26 NOTE — RESPIRATORY THERAPY
06/25/20 2056   Patient Assessment/Suction   Level of Consciousness (AVPU) alert   Respiratory Effort Normal;Unlabored   Expansion/Accessory Muscles/Retractions expansion symmetric;no retractions;no use of accessory muscles   All Lung Fields Breath Sounds clear   Rhythm/Pattern, Respiratory no shortness of breath reported;pattern regular;unlabored   PRE-TX-O2   O2 Device (Oxygen Therapy) nasal cannula   Flow (L/min) 0   SpO2 95 %   Pulse Oximetry Type Intermittent   Pulse 101   Resp 16   Aerosol Therapy   $ Aerosol Therapy Charges PRN treatment not required   Respiratory Interventions   Cough And Deep Breathing done with encouragement   Breathing Techniques/Airway Clearance deep/controlled cough encouraged;diaphragmatic breathing promoted   Respiratory Evaluation   $ Care Plan Tech Time 15 min   Evaluation For   (care plan)   Admitting Diagnosis Chest pain   Pulmonary Diagnosis COPD   Home Oxygen   Has Home Oxygen? No   Home Aerosol, MDI, DPI, and Other Treatments/Therapies   Home Respiratory Therapy Per Patient/Review of Chart Yes   MDI Home Meds/Freq Albuterol hfa/prn   Oxygen Care Plan   SPO2 Goal (%) 92% non-cardiac   Bronchodilator Care Plan   Bronchodilator Care Plan MDI   MDI Meds w/ frequency Albuterol  puff inhaler PRN   Rationale Maintain home respiratory medicine

## 2020-06-26 NOTE — CONSULTS
Chief Complaint   Patient presents with    Chest Pain     transfer from [place] for nstemi, denies chest pain at this time       Reason for consult: evaluation of coronary artery disease    Date of Consultation: 2020    Referred by: Brianna Rich MD       Subjective:     Patient is a 50 y.o. female who was admitted with job pain and history of recent chest pain that radiates to the jaw. She had an abnormal electrocardiogram. She was referred from an outside emergency room to this facility for management. She underwent a stress test that was mildly abnormal. She is undergone cardiac catheterization revealing multivessel coronary artery disease.    Patient Active Problem List    Diagnosis Date Noted    Chest pain 2020    Chronic left-sided low back pain with left-sided sciatica 2020    Carpal tunnel syndrome on both sides 2019    Cubital tunnel syndrome, bilateral 2019    Radicular pain of upper extremity 2019    Chronic superficial gastritis without bleeding 2019    Muscle weakness (generalized) 2019    Physical deconditioning 2019    Fibromyalgia 2019    COPD (chronic obstructive pulmonary disease) 2019    Other chronic pain 2019    DDD (degenerative disc disease), cervical 2019    Lumbar degenerative disc disease 2019    Essential hypertension 2019    Diabetes mellitus 2015     Past Medical History:   Diagnosis Date    Anxiety     Bipolar disorder     Depression     Diabetes mellitus, type 2     HTN (hypertension)     Insomnia       Past Surgical History:   Procedure Laterality Date    ANGIOGRAM, CORONARY, WITH LEFT HEART CATHETERIZATION Left 2020    Procedure: Left heart cath;  Surgeon: Kannan Santana MD;  Location: [facility] CATH/EP LAB;  Service: Cardiology;  Laterality: Left;     SECTION      COLONOSCOPY N/A 2019    Procedure: COLONOSCOPY;  Surgeon: Da Diallo,  MD;  Location: [facility] ENDO;  Service: General;  Laterality: N/A;    EPIDURAL STEROID INJECTION N/A 4/29/2019    Procedure: Injection, Steroid, Epidural - L4/5 EPIDURAL STEROID INJECTION;  Surgeon: Sherri Gardiner MD;  Location: [facility] OR;  Service: Pain Management;  Laterality: N/A;    EPIDURAL STEROID INJECTION N/A 5/27/2019    Procedure: Injection, Steroid, Epidural - C7/T1 EPIDURAL STEROID INJECTION;  Surgeon: Sherri Gardiner MD;  Location: [facility] OR;  Service: Pain Management;  Laterality: N/A;    EPIDURAL STEROID INJECTION N/A 7/8/2019    Procedure: Injection, Steroid, Epidural - L4/5 EPIDURAL STEROID INJECTION;  Surgeon: Sherri Gardiner MD;  Location: [facility] OR;  Service: Pain Management;  Laterality: N/A;    EPIDURAL STEROID INJECTION N/A 10/21/2019    Procedure: Injection, Steroid, Epidural, CERVICAL C7/T1 SILVIA;  Surgeon: Sherri Gardiner MD;  Location: [facility] OR;  Service: Pain Management;  Laterality: N/A;  09-23-19    ESOPHAGOGASTRODUODENOSCOPY N/A 9/18/2019    Procedure: ESOPHAGOGASTRODUODENOSCOPY (EGD);  Surgeon: Da Diallo MD;  Location: [facility] ENDO;  Service: General;  Laterality: N/A;    TONSILLECTOMY      TRIGGER POINT INJECTION N/A 7/8/2019    Procedure: INJECTION, TRIGGER POINT - CERVICAL REGION;  Surgeon: Sherri Gardiner MD;  Location: [facility] OR;  Service: Pain Management;  Laterality: N/A;    TRIGGER POINT INJECTION N/A 10/21/2019    Procedure: INJECTION, TRIGGER POINT;  Surgeon: Sherri Gardiner MD;  Location: [facility] OR;  Service: Pain Management;  Laterality: N/A;      Medications Prior to Admission   Medication Sig Dispense Refill Last Dose    albuterol (PROVENTIL/VENTOLIN HFA) 90 mcg/actuation inhaler Inhale 1 puff into the lungs every 4 (four) hours as needed.   0 6/22/2020    cyclobenzaprine (FLEXERIL) 10 MG tablet Take 1 tablet (10 mg total) by mouth 3 (three) times daily as needed for Muscle spasms. 90 tablet 2 6/22/2020    escitalopram oxalate (LEXAPRO) 20 MG tablet Take 20 mg by  "mouth once daily.   3 2020    furosemide (LASIX) 20 MG tablet Take 20 mg by mouth once daily.    2020    linaGLIPtin (TRADJENTA) 5 mg Tab tablet Take 5 mg by mouth once daily.   2020    lisinopril 10 MG tablet Take 10 mg by mouth once daily.    2020    LORazepam (ATIVAN) 1 MG tablet TAKE 1 TABLET BY MOUTH ONCE DAILY AS NEEDED AT BEDTIME  0 Past Week    metFORMIN (GLUCOPHAGE) 1000 MG tablet pt states she takes 500 mg po bid-the 1000mg dose "makes me sick"   2020    naproxen (NAPROSYN) 500 MG tablet Take 500 mg by mouth 2 (two) times daily.   2020    nortriptyline (PAMELOR) 50 MG capsule Take 2 capsules (100 mg total) by mouth every evening. 60 capsule 11 2020    pantoprazole (PROTONIX) 40 MG tablet Take by mouth.   2020     Review of patient's allergies indicates:   Allergen Reactions    Hydrocodone Nausea And Vomiting      Social History     Tobacco Use    Smoking status: Former Smoker     Packs/day: 1.00     Years: 0.00     Pack years: 0.00     Quit date: 2012     Years since quittin.7    Smokeless tobacco: Never Used   Substance Use Topics    Alcohol use: Yes     Frequency: Monthly or less     Comment: occasional      Family History   Problem Relation Age of Onset    Lung cancer Mother     Breast cancer Neg Hx       Review of Systems  General: No fevers, chills, night sweats.  HEENT: She has had some fullness and discomfort in the area of the left parotid gland. She denies warmth over the area or drainage from the area. She does mention a whitish discharge from her left eye yesterday.she has no periodontal complaints.  Neck: No complaints.  Respiratory: Mild shortness of breath.  Cardiac: Positive chest tightness with activities. This radiates to the jaw.  Skin: No rash.  GI: No dysuria.  : Positive vaginal itching.  Musculoskeletal: Positive fibromyalgia.  Neurologic: No lateralizing complaints. She states that she may have had a stroke several " years ago with some speech impediment although she personally did not notice any problem with her speech. She states this was pointed out by a neighbor, but she was never evaluated.    Objective: Physical Exam     Patient Vitals for the past 8 hrs:   BP Temp Temp src Pulse Resp SpO2   06/25/20 1909 109/62 99 °F (37.2 °C) Axillary 109 18 96 %   06/25/20 1527 106/63 98.4 °F (36.9 °C) Oral (!) 112 19 95 %   06/25/20 1158 -- -- -- -- 19 --   06/25/20 1136 115/71 97.8 °F (36.6 °C) Oral 106 18 97 %     Gen.: Pleasant, heavyset lady in no obvious distress.  HEENT: Normocephalic, atraumatic. There is good facial symmetry. Both parotid areas look slightly full. I do not feel any fullness particularly on the left side and no mass. There is no obvious periodontal disease or gingivitis.  Neck: Trachea is midline. There are no carotid bruits.  Chest: No chest wall abnormalities.  Lungs: Clear.  Heart: Regular rate and rhythm.  Abdomen: Soft.  Extremities: No cyanosis, clubbing, edema.  Vascular: 2+ radial pulses bilaterally. No varicosities in the legs.  Neurologic: Alert and oriented ×4 in no focal deficits.    Data Review:   CBC:   Lab Results   Component Value Date    WBC 12.50 06/25/2020    RBC 4.55 06/25/2020    HGB 13.4 06/25/2020    HCT 40.3 06/25/2020     06/25/2020     BMP:   Lab Results   Component Value Date     (H) 06/25/2020     (L) 06/25/2020    K 3.9 06/25/2020     06/25/2020    CO2 26 06/25/2020    BUN 15 06/25/2020    CREATININE 0.6 06/25/2020    CALCIUM 8.6 (L) 06/25/2020     Coagulation:   Lab Results   Component Value Date    INR 0.9 06/22/2020     Cardiac markers: No results found for: CKMB, TROPONINT, MYOGLOBIN    ECG:  Results for orders placed or performed during the hospital encounter of 06/23/20   EKG 12-lead    Collection Time: 06/23/20  3:11 AM    Narrative    Test Reason : I21.4,    Vent. Rate : 097 BPM     Atrial Rate : 097 BPM     P-R Int : 184 ms          QRS Dur : 086  ms      QT Int : 368 ms       P-R-T Axes : 036 011 005 degrees     QTc Int : 467 ms    Normal sinus rhythm  Low voltage QRS  T wave abnormality, consider anterior ischemia  Abnormal ECG  No previous ECGs available  Confirmed by Sumanth Santana MD ([id number]) on 6/24/2020 9:16:53 PM    Referred By: SAMMY   SELF           Confirmed By:Sumanth Santana MD        Chest X-Ray: No results found for this visit on 06/23/20.No results found for this visit on 06/23/20.    CARDIAC CATH:6/24/2020  [facility].  Diagnostic  Dominance: Right  Left Main   The proximal in the mid left main is patent the distal left main has haziness at the ostium of the left circumflex artery and left anterior descending artery.   Left Anterior Descending   Left anterior descending artery is a moderate caliber vessel it has an ostial and proximal stenosis about 75%. And there is a mid luminal narrowing about 50%. The remaining portion of the vessel appears to be patent. Ostium in the proximal portion of the 1st diagonal branch has a luminal narrowing about 60-70%.   Left Circumflex   The left circumflex artery is a moderate caliber vessel has an ostial stenosis about 40%. And in its proximal portion after giving of the high 1st obtuse marginal branch has a luminal narrowing about 75% the remaining portion of the vessel continues down as a large 2nd obtuse marginal branch and is patent.   Right Coronary Artery   The right coronary artery is a moderate to large caliber vessel and has proximal luminal narrowing about 35-40%. The remaining portion of the vessel appears to be patent.           NM MYOCARDIAL PERFUSION SPECT MULTI PHARM     CLINICAL HISTORY:  Chest pain, ACS suspected;     TECHNIQUE:  Lexiscan is utilized as a pharmacologic stress agent. At peak stress, 25.4 millicuries of technetium Myoview were administered intravenously. The rest portion of the study is accomplished on the same day, utilizing 10.1 millicuries of technetium  Myoview intravenously.     COMPARISON:  None.     FINDINGS:  There is a subtle area of focally diminished tracer accumulation within the anterolateral wall near the apex on stress as compared to rest imaging potentially representing minimal pharmacologically induced reversibility.  Diminished accumulation within the inferolateral wall is more pronounced on the rest portion of the examination.  The distribution of tracer activity elsewhere appears unremarkable.  The chamber size is within normal limits. There are no wall motion abnormalities and the estimated ejection fraction is 73%.     Impression:     1. Small area of diminished tracer accumulation within the anterolateral wall near the apex, potentially representative of small area of pharmacologically induced reversibility.  2. Matched mildly diminished tracer accumulation within the inferolateral wall  3. ESTIMATED EJECTION FRACTION OF 73%.        Electronically signed by: Trinidad Norton IV., MD  Date:                                            06/23/2020  Time:                                           12:01    Assessment:     1. Coronary artery disease native arteries native artery with unstable angina.  2. Unstable angina.  3. Abnormal stress test.  4. Type II diabetes.  5. Fibromyalgia  6. Abnormal electrocardiogram        Plan:     Recommend the patient undergo surgical coronary revascularization. I explained the benefits as well as risk of surgery including death, bleeding, infection, stroke, heart failure, lung failure, kidney failure, need for blood transfusion, arrhythmias. She is informed consent to proceed.   has been consulted regarding her finances. This will not affect my decision in any way.

## 2020-06-26 NOTE — ANESTHESIA PROCEDURE NOTES
Intubation  Performed by: Yahir Villanueva CRNA  Authorized by: Juan Stewart Jr., MD     Intubation:     Induction:  Intravenous    Intubated:  Postinduction    Mask Ventilation:  N/a    Attempts:  1    Attempted By:  CRNA    Method of Intubation:  Direct    Blade:  Howard 3    Laryngeal View Grade: Grade I - full view of chords      Difficult Airway Encountered?: No      Complications:  None    Airway Device:  Oral endotracheal tube    Airway Device Size:  8.0    Style/Cuff Inflation:  Cuffed    Secured at:  The lips    Placement Verified By:  Capnometry    Complicating Factors:  None    Findings Post-Intubation:  BS equal bilateral and atraumatic/condition of teeth unchanged

## 2020-06-26 NOTE — PLAN OF CARE
06/26/20 0745   Patient Assessment/Suction   Level of Consciousness (AVPU) alert   Respiratory Effort Normal;Unlabored   Expansion/Accessory Muscles/Retractions no use of accessory muscles;no retractions   All Lung Fields Breath Sounds clear   PRE-TX-O2   O2 Device (Oxygen Therapy) room air   SpO2 98 %   Pulse Oximetry Type Continuous   $ Pulse Oximetry - Multiple Charge Pulse Oximetry - Multiple   Pulse 89   Inhaler   $ Inhaler Charges PRN treatment not required   Labs   $ Was an ABG obtained? Arterial Puncture;ISTAT - Blood gas   $ Labs Tech Time 15 min   Respiratory Evaluation   $ Care Plan Tech Time 15 min   Evaluation For   (care plan)

## 2020-06-26 NOTE — ANESTHESIA PROCEDURE NOTES
Arterial    Diagnosis: CAD    Patient location during procedure: ICU  Procedure start time: 6/26/2020 1:33 PM  Timeout: 6/26/2020 1:33 PM  Procedure end time: 6/26/2020 1:46 PM    Staffing  Authorizing Provider: Harvinder Howard MD  Performing Provider: Harvinder Howard MD    Anesthesiologist was present at the time of the procedure.    Preanesthetic Checklist  Completed: patient identified, timeout performed, risks and benefits discussed, monitors and equipment checked and anesthesia consent givenArterial  Skin Prep: chlorhexidine gluconate  Local Infiltration: lidocaine  Orientation: left  Location: radial  Catheter Size: 20 G  Catheter placement by Ultrasound guidance. Heme positive aspiration all ports.  Vessel Caliber: small, patent, compressibility normal  Vascular Doppler:  not done  Needle advanced into vessel with real time Ultrasound guidance.  Sterile sheath used.Insertion Attempts: 1  Assessment  Dressing: sutured in place and taped and tegaderm  Patient: Tolerated well

## 2020-06-26 NOTE — ANESTHESIA PROCEDURE NOTES
Intubation  Performed by: Yahir Villanueva CRNA  Authorized by: Juan Stewart Jr., MD     Intubation:     Induction:  Intravenous    Intubated:  Postinduction    Mask Ventilation:  N/a    Attempts:  1    Attempted By:  CRNA    Method of Intubation:  Direct    Blade:  Howard 3    Laryngeal View Grade: Grade I - full view of chords      Difficult Airway Encountered?: No      Complications:  None    Airway Device:  Oral endotracheal tube    Airway Device Size:  8.0    Style/Cuff Inflation:  Cuffed    Tube secured:  23    Secured at:  The lips    Placement Verified By:  Capnometry    Complicating Factors:  None    Findings Post-Intubation:  BS equal bilateral and atraumatic/condition of teeth unchanged

## 2020-06-26 NOTE — ANESTHESIA PROCEDURE NOTES
[place] [facility]    Diagnosis: CAD  Patient location during procedure: ICU  Procedure start time: 6/26/2020 1:50 PM  Timeout: 6/26/2020 1:50 PM  Procedure end time: 6/26/2020 2:22 PM    Staffing  Authorizing Provider: Harvinder Howard MD  Performing Provider: Harvinder Howard MD    Anesthesiologist was present at the time of the procedure.  Preanesthetic Checklist  Completed: patient identified, surgical consent, pre-op evaluation, timeout performed, IV checked, risks and benefits discussed, monitors and equipment checked and anesthesia consent given  [place] [facility]  Skin Prep: chlorhexidine gluconate  Local Infiltration: lidocaine  Location: right,  internal jugular vein  Vessel Caliber: small, patent, compressibility normal  Vascular Doppler:  not done  Introducer: 9 Fr single lumen, manometry not used.  Device: CCO/Oximetric Catheter  Catheter Size: 7.5 Fr  Catheter placement by yes. Heme positive aspiration all ports. PAC floated with balloon up until wedgedSterile sheath used  Locked at: 49 cm.Insertion Attempts: 1  Indication: intravenous therapy, hemodynamic monitoring  Ultrasound Guidance  Needle advanced into vessel with real time Ultrasound guidance.  Guidewire confirmed in vessel.  Sterile sheath used.  Assessment  Central Line Bundle Protocol followed. Hand hygiene before procedure, surgical cap worn, surgical mask worn, sterile surgical gloves worn, large sterile drape used.  Verification: chest x-ray, blood return, pulsatile blood flow and ultrasound  Dressing: sutured in place and taped and tegaderm  Patient: Tolerated Well  Additional Notes  Pt. Tolerated well  Placement verified with portable CXR

## 2020-06-27 LAB
ALLENS TEST: ABNORMAL
ANION GAP SERPL CALC-SCNC: 9 MMOL/L (ref 8–16)
ANISOCYTOSIS BLD QL SMEAR: SLIGHT
BASOPHILS # BLD AUTO: ABNORMAL K/UL (ref 0–0.2)
BASOPHILS NFR BLD: 0 % (ref 0–1.9)
BUN SERPL-MCNC: 11 MG/DL (ref 6–20)
CALCIUM SERPL-MCNC: 8.3 MG/DL (ref 8.7–10.5)
CHLORIDE SERPL-SCNC: 110 MMOL/L (ref 95–110)
CO2 SERPL-SCNC: 23 MMOL/L (ref 23–29)
CREAT SERPL-MCNC: 0.7 MG/DL (ref 0.5–1.4)
DELSYS: ABNORMAL
DIFFERENTIAL METHOD: ABNORMAL
EOSINOPHIL # BLD AUTO: ABNORMAL K/UL (ref 0–0.5)
EOSINOPHIL NFR BLD: 0 % (ref 0–8)
ERYTHROCYTE [DISTWIDTH] IN BLOOD BY AUTOMATED COUNT: 13.3 % (ref 11.5–14.5)
ERYTHROCYTE [SEDIMENTATION RATE] IN BLOOD BY WESTERGREN METHOD: 12 MM/H
ERYTHROCYTE [SEDIMENTATION RATE] IN BLOOD BY WESTERGREN METHOD: 6 MM/H
EST. GFR  (AFRICAN AMERICAN): >60 ML/MIN/1.73 M^2
EST. GFR  (NON AFRICAN AMERICAN): >60 ML/MIN/1.73 M^2
FIO2: 0.4
FIO2: 40
FIO2: 40
FIO2: 60
FIO2: 60
FIO2: 80
FLOW: 5
GLUCOSE SERPL-MCNC: 139 MG/DL (ref 70–110)
GLUCOSE SERPL-MCNC: 149 MG/DL (ref 70–110)
GLUCOSE SERPL-MCNC: 153 MG/DL (ref 70–110)
GLUCOSE SERPL-MCNC: 155 MG/DL (ref 70–110)
GLUCOSE SERPL-MCNC: 157 MG/DL (ref 70–110)
GLUCOSE SERPL-MCNC: 189 MG/DL (ref 70–110)
GLUCOSE SERPL-MCNC: 190 MG/DL (ref 70–110)
GLUCOSE SERPL-MCNC: 193 MG/DL (ref 70–110)
GLUCOSE SERPL-MCNC: 197 MG/DL (ref 70–110)
GLUCOSE SERPL-MCNC: 208 MG/DL (ref 70–110)
GLUCOSE SERPL-MCNC: 213 MG/DL (ref 70–110)
GLUCOSE SERPL-MCNC: 217 MG/DL (ref 70–110)
GLUCOSE SERPL-MCNC: 218 MG/DL (ref 70–110)
GLUCOSE SERPL-MCNC: 221 MG/DL (ref 70–110)
GLUCOSE SERPL-MCNC: 224 MG/DL (ref 70–110)
GLUCOSE SERPL-MCNC: 224 MG/DL (ref 70–110)
GLUCOSE SERPL-MCNC: 225 MG/DL (ref 70–110)
GLUCOSE SERPL-MCNC: 229 MG/DL (ref 70–110)
GLUCOSE SERPL-MCNC: 233 MG/DL (ref 70–110)
GLUCOSE SERPL-MCNC: 259 MG/DL (ref 70–110)
HCO3 UR-SCNC: 20.2 MMOL/L (ref 24–28)
HCO3 UR-SCNC: 20.8 MMOL/L (ref 24–28)
HCO3 UR-SCNC: 21.2 MMOL/L (ref 24–28)
HCO3 UR-SCNC: 22 MMOL/L (ref 24–28)
HCO3 UR-SCNC: 22.9 MMOL/L (ref 24–28)
HCO3 UR-SCNC: 23.3 MMOL/L (ref 24–28)
HCO3 UR-SCNC: 23.5 MMOL/L (ref 24–28)
HCO3 UR-SCNC: 23.8 MMOL/L (ref 24–28)
HCO3 UR-SCNC: 25 MMOL/L (ref 24–28)
HCT VFR BLD AUTO: 29.2 % (ref 37–48.5)
HCT VFR BLD CALC: 30 %PCV (ref 36–54)
HCT VFR BLD CALC: 30 %PCV (ref 36–54)
HCT VFR BLD CALC: 31 %PCV (ref 36–54)
HCT VFR BLD CALC: 32 %PCV (ref 36–54)
HCT VFR BLD CALC: 33 %PCV (ref 36–54)
HGB BLD-MCNC: 9.7 G/DL (ref 12–16)
IMM GRANULOCYTES # BLD AUTO: ABNORMAL K/UL (ref 0–0.04)
IMM GRANULOCYTES NFR BLD AUTO: ABNORMAL % (ref 0–0.5)
LYMPHOCYTES # BLD AUTO: ABNORMAL K/UL (ref 1–4.8)
LYMPHOCYTES NFR BLD: 4 % (ref 18–48)
MAGNESIUM SERPL-MCNC: 1.7 MG/DL (ref 1.6–2.6)
MCH RBC QN AUTO: 29.3 PG (ref 27–31)
MCHC RBC AUTO-ENTMCNC: 33.2 G/DL (ref 32–36)
MCV RBC AUTO: 88 FL (ref 82–98)
MODE: ABNORMAL
MONOCYTES # BLD AUTO: ABNORMAL K/UL (ref 0.3–1)
MONOCYTES NFR BLD: 3 % (ref 4–15)
NEUTROPHILS # BLD AUTO: ABNORMAL K/UL (ref 1.8–7.7)
NEUTROPHILS NFR BLD: 76 % (ref 38–73)
NEUTS BAND NFR BLD MANUAL: 17 %
NRBC BLD-RTO: 0 /100 WBC
PCO2 BLDA: 38.3 MMHG (ref 35–45)
PCO2 BLDA: 39.1 MMHG (ref 35–45)
PCO2 BLDA: 39.8 MMHG (ref 35–45)
PCO2 BLDA: 41.3 MMHG (ref 35–45)
PCO2 BLDA: 42.9 MMHG (ref 35–45)
PCO2 BLDA: 43.4 MMHG (ref 35–45)
PCO2 BLDA: 44.1 MMHG (ref 35–45)
PCO2 BLDA: 45.6 MMHG (ref 35–45)
PCO2 BLDA: 48.8 MMHG (ref 35–45)
PEEP: 5
PH SMN: 7.29 [PH] (ref 7.35–7.45)
PH SMN: 7.29 [PH] (ref 7.35–7.45)
PH SMN: 7.3 [PH] (ref 7.35–7.45)
PH SMN: 7.32 [PH] (ref 7.35–7.45)
PH SMN: 7.32 [PH] (ref 7.35–7.45)
PH SMN: 7.33 [PH] (ref 7.35–7.45)
PH SMN: 7.37 [PH] (ref 7.35–7.45)
PH SMN: 7.37 [PH] (ref 7.35–7.45)
PH SMN: 7.38 [PH] (ref 7.35–7.45)
PLATELET # BLD AUTO: 250 K/UL (ref 150–350)
PLATELET BLD QL SMEAR: ABNORMAL
PMV BLD AUTO: 10.6 FL (ref 9.2–12.9)
PO2 BLDA: 108 MMHG (ref 80–100)
PO2 BLDA: 112 MMHG (ref 80–100)
PO2 BLDA: 158 MMHG (ref 80–100)
PO2 BLDA: 68 MMHG (ref 80–100)
PO2 BLDA: 73 MMHG (ref 80–100)
PO2 BLDA: 76 MMHG (ref 80–100)
PO2 BLDA: 80 MMHG (ref 80–100)
PO2 BLDA: 84 MMHG (ref 80–100)
PO2 BLDA: 91 MMHG (ref 80–100)
POC BE: -1 MMOL/L
POC BE: -2 MMOL/L
POC BE: -3 MMOL/L
POC BE: -3 MMOL/L
POC BE: -4 MMOL/L
POC BE: -5 MMOL/L
POC BE: -6 MMOL/L
POC BE: -6 MMOL/L
POC BE: 0 MMOL/L
POC IONIZED CALCIUM: 1.24 MMOL/L (ref 1.06–1.42)
POC IONIZED CALCIUM: 1.25 MMOL/L (ref 1.06–1.42)
POC IONIZED CALCIUM: 1.28 MMOL/L (ref 1.06–1.42)
POC IONIZED CALCIUM: 1.29 MMOL/L (ref 1.06–1.42)
POC IONIZED CALCIUM: 1.35 MMOL/L (ref 1.06–1.42)
POC SATURATED O2: 91 % (ref 95–100)
POC SATURATED O2: 93 % (ref 95–100)
POC SATURATED O2: 95 % (ref 95–100)
POC SATURATED O2: 95 % (ref 95–100)
POC SATURATED O2: 96 % (ref 95–100)
POC SATURATED O2: 96 % (ref 95–100)
POC SATURATED O2: 98 % (ref 95–100)
POC SATURATED O2: 98 % (ref 95–100)
POC SATURATED O2: 99 % (ref 95–100)
POC TCO2: 21 MMOL/L (ref 23–27)
POC TCO2: 22 MMOL/L (ref 23–27)
POC TCO2: 22 MMOL/L (ref 23–27)
POC TCO2: 23 MMOL/L (ref 23–27)
POC TCO2: 24 MMOL/L (ref 23–27)
POC TCO2: 25 MMOL/L (ref 23–27)
POC TCO2: 26 MMOL/L (ref 23–27)
POTASSIUM BLD-SCNC: 3.9 MMOL/L (ref 3.5–5.1)
POTASSIUM BLD-SCNC: 4.1 MMOL/L (ref 3.5–5.1)
POTASSIUM BLD-SCNC: 4.2 MMOL/L (ref 3.5–5.1)
POTASSIUM BLD-SCNC: 4.3 MMOL/L (ref 3.5–5.1)
POTASSIUM BLD-SCNC: 4.3 MMOL/L (ref 3.5–5.1)
POTASSIUM SERPL-SCNC: 3.9 MMOL/L (ref 3.5–5.1)
PS: 10
RBC # BLD AUTO: 3.31 M/UL (ref 4–5.4)
ROULEAUX BLD QL SMEAR: PRESENT
SAMPLE: ABNORMAL
SITE: ABNORMAL
SODIUM BLD-SCNC: 140 MMOL/L (ref 136–145)
SODIUM BLD-SCNC: 140 MMOL/L (ref 136–145)
SODIUM BLD-SCNC: 141 MMOL/L (ref 136–145)
SODIUM SERPL-SCNC: 142 MMOL/L (ref 136–145)
SPONT RATE: 13
SPONT RATE: 13
SPONT RATE: 14
VT: 700
WBC # BLD AUTO: 14.49 K/UL (ref 3.9–12.7)

## 2020-06-27 PROCEDURE — 85027 COMPLETE CBC AUTOMATED: CPT

## 2020-06-27 PROCEDURE — 25000003 PHARM REV CODE 250: Performed by: NURSE PRACTITIONER

## 2020-06-27 PROCEDURE — 84132 ASSAY OF SERUM POTASSIUM: CPT

## 2020-06-27 PROCEDURE — 99024 PR POST-OP FOLLOW-UP VISIT: ICD-10-PCS | Mod: ,,, | Performed by: PHYSICIAN ASSISTANT

## 2020-06-27 PROCEDURE — 20000000 HC ICU ROOM

## 2020-06-27 PROCEDURE — 99900026 HC AIRWAY MAINTENANCE (STAT)

## 2020-06-27 PROCEDURE — C9248 INJ, CLEVIDIPINE BUTYRATE: HCPCS

## 2020-06-27 PROCEDURE — 85007 BL SMEAR W/DIFF WBC COUNT: CPT

## 2020-06-27 PROCEDURE — 94761 N-INVAS EAR/PLS OXIMETRY MLT: CPT

## 2020-06-27 PROCEDURE — 85014 HEMATOCRIT: CPT

## 2020-06-27 PROCEDURE — 99900035 HC TECH TIME PER 15 MIN (STAT)

## 2020-06-27 PROCEDURE — 25000003 PHARM REV CODE 250

## 2020-06-27 PROCEDURE — 27000683 HC PILLOW THERAPEUTIC

## 2020-06-27 PROCEDURE — 84295 ASSAY OF SERUM SODIUM: CPT

## 2020-06-27 PROCEDURE — 25000003 PHARM REV CODE 250: Performed by: THORACIC SURGERY (CARDIOTHORACIC VASCULAR SURGERY)

## 2020-06-27 PROCEDURE — 80048 BASIC METABOLIC PNL TOTAL CA: CPT

## 2020-06-27 PROCEDURE — 27000221 HC OXYGEN, UP TO 24 HOURS

## 2020-06-27 PROCEDURE — 63600175 PHARM REV CODE 636 W HCPCS: Performed by: THORACIC SURGERY (CARDIOTHORACIC VASCULAR SURGERY)

## 2020-06-27 PROCEDURE — 63600175 PHARM REV CODE 636 W HCPCS: Performed by: INTERNAL MEDICINE

## 2020-06-27 PROCEDURE — 25000003 PHARM REV CODE 250: Performed by: INTERNAL MEDICINE

## 2020-06-27 PROCEDURE — 94003 VENT MGMT INPAT SUBQ DAY: CPT

## 2020-06-27 PROCEDURE — 82330 ASSAY OF CALCIUM: CPT

## 2020-06-27 PROCEDURE — 82803 BLOOD GASES ANY COMBINATION: CPT

## 2020-06-27 PROCEDURE — 82962 GLUCOSE BLOOD TEST: CPT

## 2020-06-27 PROCEDURE — 63600175 PHARM REV CODE 636 W HCPCS

## 2020-06-27 PROCEDURE — 83735 ASSAY OF MAGNESIUM: CPT

## 2020-06-27 PROCEDURE — 37799 UNLISTED PX VASCULAR SURGERY: CPT

## 2020-06-27 PROCEDURE — 93005 ELECTROCARDIOGRAM TRACING: CPT | Performed by: INTERNAL MEDICINE

## 2020-06-27 PROCEDURE — 99024 POSTOP FOLLOW-UP VISIT: CPT | Mod: ,,, | Performed by: PHYSICIAN ASSISTANT

## 2020-06-27 RX ORDER — DEXMEDETOMIDINE HYDROCHLORIDE 4 UG/ML
0.2 INJECTION, SOLUTION INTRAVENOUS CONTINUOUS
Status: DISCONTINUED | OUTPATIENT
Start: 2020-06-27 | End: 2020-06-28

## 2020-06-27 RX ORDER — GLUCAGON 1 MG
1 KIT INJECTION
Status: DISCONTINUED | OUTPATIENT
Start: 2020-06-27 | End: 2020-06-29

## 2020-06-27 RX ORDER — METOPROLOL TARTRATE 25 MG/1
12.5 TABLET ORAL 2 TIMES DAILY
Status: DISCONTINUED | OUTPATIENT
Start: 2020-06-27 | End: 2020-06-28

## 2020-06-27 RX ORDER — METOPROLOL TARTRATE 25 MG/1
12.5 TABLET ORAL 2 TIMES DAILY
Status: DISCONTINUED | OUTPATIENT
Start: 2020-06-27 | End: 2020-06-27

## 2020-06-27 RX ORDER — IBUPROFEN 200 MG
24 TABLET ORAL
Status: DISCONTINUED | OUTPATIENT
Start: 2020-06-27 | End: 2020-06-29

## 2020-06-27 RX ORDER — INSULIN ASPART 100 [IU]/ML
1-10 INJECTION, SOLUTION INTRAVENOUS; SUBCUTANEOUS
Status: DISCONTINUED | OUTPATIENT
Start: 2020-06-27 | End: 2020-06-29

## 2020-06-27 RX ORDER — SODIUM BICARBONATE 1 MEQ/ML
50 SYRINGE (ML) INTRAVENOUS ONCE
Status: COMPLETED | OUTPATIENT
Start: 2020-06-27 | End: 2020-06-26

## 2020-06-27 RX ORDER — METOPROLOL TARTRATE 1 MG/ML
INJECTION, SOLUTION INTRAVENOUS
Status: COMPLETED
Start: 2020-06-27 | End: 2020-06-27

## 2020-06-27 RX ORDER — ALBUMIN HUMAN 250 G/1000ML
25 SOLUTION INTRAVENOUS ONCE
Status: COMPLETED | OUTPATIENT
Start: 2020-06-27 | End: 2020-06-26

## 2020-06-27 RX ORDER — IBUPROFEN 200 MG
16 TABLET ORAL
Status: DISCONTINUED | OUTPATIENT
Start: 2020-06-27 | End: 2020-06-29

## 2020-06-27 RX ORDER — METOPROLOL TARTRATE 1 MG/ML
2 INJECTION, SOLUTION INTRAVENOUS ONCE
Status: COMPLETED | OUTPATIENT
Start: 2020-06-27 | End: 2020-06-27

## 2020-06-27 RX ADMIN — SODIUM CHLORIDE, SODIUM LACTATE, POTASSIUM CHLORIDE, AND CALCIUM CHLORIDE 250 ML: .6; .31; .03; .02 INJECTION, SOLUTION INTRAVENOUS at 01:06

## 2020-06-27 RX ADMIN — ESCITALOPRAM OXALATE 20 MG: 10 TABLET ORAL at 09:06

## 2020-06-27 RX ADMIN — CEFAZOLIN SODIUM 2 G: 2 SOLUTION INTRAVENOUS at 11:06

## 2020-06-27 RX ADMIN — DOCUSATE SODIUM 100 MG: 100 CAPSULE, LIQUID FILLED ORAL at 03:06

## 2020-06-27 RX ADMIN — POTASSIUM CHLORIDE 20 MEQ: 14.9 INJECTION, SOLUTION INTRAVENOUS at 10:06

## 2020-06-27 RX ADMIN — LISINOPRIL 10 MG: 10 TABLET ORAL at 09:06

## 2020-06-27 RX ADMIN — CEFAZOLIN SODIUM 2 G: 2 SOLUTION INTRAVENOUS at 07:06

## 2020-06-27 RX ADMIN — MORPHINE SULFATE 2 MG: 2 INJECTION, SOLUTION INTRAMUSCULAR; INTRAVENOUS at 08:06

## 2020-06-27 RX ADMIN — MIDAZOLAM HYDROCHLORIDE 1 MG: 1 INJECTION, SOLUTION INTRAMUSCULAR; INTRAVENOUS at 02:06

## 2020-06-27 RX ADMIN — INSULIN ASPART 1 UNITS: 100 INJECTION, SOLUTION INTRAVENOUS; SUBCUTANEOUS at 09:06

## 2020-06-27 RX ADMIN — METOPROLOL TARTRATE 2 MG: 5 INJECTION, SOLUTION INTRAVENOUS at 12:06

## 2020-06-27 RX ADMIN — CHLORHEXIDINE GLUCONATE 10 ML: 1.2 RINSE ORAL at 09:06

## 2020-06-27 RX ADMIN — MIDAZOLAM HYDROCHLORIDE 1 MG: 1 INJECTION, SOLUTION INTRAMUSCULAR; INTRAVENOUS at 12:06

## 2020-06-27 RX ADMIN — ATORVASTATIN CALCIUM 40 MG: 40 TABLET, FILM COATED ORAL at 08:06

## 2020-06-27 RX ADMIN — CEFAZOLIN SODIUM 2 G: 2 SOLUTION INTRAVENOUS at 03:06

## 2020-06-27 RX ADMIN — ONDANSETRON 4 MG: 2 INJECTION INTRAMUSCULAR; INTRAVENOUS at 03:06

## 2020-06-27 RX ADMIN — MAGNESIUM SULFATE 2 G: 2 INJECTION INTRAVENOUS at 10:06

## 2020-06-27 RX ADMIN — OXYCODONE AND ACETAMINOPHEN 1 TABLET: 5; 325 TABLET ORAL at 08:06

## 2020-06-27 RX ADMIN — NORTRIPTYLINE HYDROCHLORIDE 100 MG: 25 CAPSULE ORAL at 08:06

## 2020-06-27 RX ADMIN — METOPROLOL TARTRATE 2 MG: 1 INJECTION, SOLUTION INTRAVENOUS at 12:06

## 2020-06-27 RX ADMIN — METOPROLOL TARTRATE 12.5 MG: 25 TABLET, FILM COATED ORAL at 09:06

## 2020-06-27 RX ADMIN — ONDANSETRON 4 MG: 2 INJECTION INTRAMUSCULAR; INTRAVENOUS at 08:06

## 2020-06-27 RX ADMIN — LORAZEPAM 1 MG: 1 TABLET ORAL at 08:06

## 2020-06-27 RX ADMIN — METOPROLOL TARTRATE 12.5 MG: 25 TABLET, FILM COATED ORAL at 08:06

## 2020-06-27 RX ADMIN — DEXMEDETOMIDINE HYDROCHLORIDE 0.2 MCG/KG/HR: 400 INJECTION INTRAVENOUS at 08:06

## 2020-06-27 RX ADMIN — CLEVIPIDINE 3 MG: 0.5 EMULSION INTRAVENOUS at 07:06

## 2020-06-27 RX ADMIN — OXYCODONE AND ACETAMINOPHEN 1 TABLET: 5; 325 TABLET ORAL at 03:06

## 2020-06-27 RX ADMIN — PANTOPRAZOLE SODIUM 40 MG: 40 TABLET, DELAYED RELEASE ORAL at 09:06

## 2020-06-27 RX ADMIN — DEXMEDETOMIDINE HYDROCHLORIDE 0.2 MCG/KG/HR: 400 INJECTION INTRAVENOUS at 11:06

## 2020-06-27 RX ADMIN — ASPIRIN 325 MG ORAL TABLET 325 MG: 325 PILL ORAL at 03:06

## 2020-06-27 NOTE — NURSING
Precedex infusing. Pt calm & easily arouses. CPAP trial in place. HR 90's. Will monitor.   Glory Stiles RN  6/27/2020

## 2020-06-27 NOTE — PROGRESS NOTES
[facility] Medicine  Progress Note    Patient name: Nicole Harris  MRN: [id number]  Admit Date: 6/23/2020   LOS: 1 day     SUBJECTIVE:     Principal problem: Chest pain    Interval History:    Patient was seen after extubation following CABG surgery  Appears very irritable, complaining to in charge nurse that she was not treated appropriately postop intubation, later on she apologized to staff for being mean  Denies any other complaints  Hemodynamically stable with mediastinal tubes  Reactive Leukocytosis noted     Scheduled Meds:   aspirin  325 mg Oral Daily    atorvastatin  40 mg Oral QHS    ceFAZolin (ANCEF) IVPB  2 g Intravenous Q8H    chlorhexidine  10 mL Mouth/Throat BID    docusate sodium  100 mg Oral Daily    escitalopram oxalate  20 mg Oral Daily    lisinopriL  10 mg Oral Daily    metoprolol tartrate  12.5 mg Oral BID    mupirocin  1 g Nasal BID    nortriptyline  100 mg Oral QHS    pantoprazole  40 mg Oral Before breakfast     Continuous Infusions:   sodium chloride 0.9% 25 mL/hr at 06/27/20 1300    dexmedetomidine (PRECEDEX) infusion 0.2 mcg/kg/hr (06/27/20 1145)    insulin (HUMAN R) infusion (adults) 3.6 Units/hr (06/27/20 0100)     PRN Meds:acetaminophen, acetaminophen, albuterol, calcium gluconate IVPB, calcium gluconate IVPB, calcium gluconate IVPB, dextrose 50%, dextrose 50%, dextrose 50%, dextrose 50%, glucagon (human recombinant), glucose, glucose, insulin aspart U-100, LORazepam, magnesium sulfate IVPB, magnesium sulfate IVPB, melatonin, midazolam, morphine, ondansetron, oxyCODONE-acetaminophen, potassium chloride in water, potassium chloride in water, potassium chloride in water    Review of patient's allergies indicates:   Allergen Reactions    Hydrocodone Nausea And Vomiting       Review of Systems: As per interval history    OBJECTIVE:     Vital Signs (Most Recent)  Temp: 98.6 °F (37 °C) (06/27/20 1505)  Pulse: 102 (06/27/20 1705)  Resp: (!) 24  (06/27/20 1705)  BP: 108/82 (06/27/20 1705)  SpO2: (!) 94 % (06/27/20 1705)    Vital Signs Range (Last 24H):  Temp:  [98.1 °F (36.7 °C)-99.1 °F (37.3 °C)]   Pulse:  []   Resp:  [11-29]   BP: (101-148)/(55-92)   SpO2:  [87 %-100 %]   Arterial Line BP: ()/(53-85)     I & O (Last 24H):    Intake/Output Summary (Last 24 hours) at 6/27/2020 1857  Last data filed at 6/27/2020 1700  Gross per 24 hour   Intake 2670 ml   Output 3268 ml   Net -598 ml       Physical Exam:  General: Patient resting comfortably in no acute distress. Appears as stated age. Calm obese appears anxious  Eyes: No conjunctival injection. No scleral icterus.  ENT: Hearing grossly intact. No discharge from ears. No nasal discharge.   Neck: Supple, trachea midline. No JVDmild low  posterior neck tenderness, right IJ  CVS:  sternotomy dressing intact dry RRR.  Mediastinal drain + No LE edema BL  Lungs: CTA BL, no wheezing or crackles. Good breath sounds. No accessory muscle use. No acute respiratory distress  Abdomen:  Soft, nontender and nondistended.  No organomegaly  Neuro: AOx3. Moves all extremities. Follows commands. Responds appropriately   Skin:  No rash or erythema noted  No visible erythema or discharge on vaginal inspection    Laboratory:  CBC:   Recent Labs   Lab 06/27/20  0600   WBC 14.49*   RBC 3.31*   HGB 9.7*   HCT 29.2*      MCV 88   MCH 29.3   MCHC 33.2     CMP:   Recent Labs   Lab 06/22/20  2226  06/27/20  0600   *   < > 224*   CALCIUM 9.4   < > 8.3*   ALBUMIN 4.4  --   --    PROT 7.2  --   --    *   < > 142   K 4.0   < > 3.9   CO2 21*   < > 23      < > 110   BUN 23*   < > 11   CREATININE 0.7   < > 0.7   ALKPHOS 79  --   --    ALT 32  --   --    AST 18  --   --    BILITOT 0.8  --   --     < > = values in this interval not displayed.         ASSESSMENT/PLAN:     Lucretia now is in  Active Hospital Problems    Diagnosis  POA    *Chest pain [R07.9]  Yes    S/P CABG x 3 [Z95.1]  Not Applicable     Coronary artery disease involving native coronary artery of native heart with unstable angina pectoris [I25.110]  Yes    Unstable angina [I20.0]  Yes    Abnormal stress test [R94.39]  Yes    Nonspecific abnormal electrocardiogram (ECG) (EKG) [R94.31]  Yes    COPD (chronic obstructive pulmonary disease) [J44.9]  Yes    Essential hypertension [I10]  Yes    Diabetes mellitus [E11.9]  Yes      Resolved Hospital Problems   No resolved problems to display.         Plan:   S/p CABG on 6/26/20 for Severe left main and multivessel CAD  Hx of smoking  Continue with cardiac monitoring for  Trend CBC hemoglobin and leukocytosis   Diabetes uncontrolled-continue with insulin drip  Monitor and replace electrolytes  Lexiscan stress test positive  Found to have three-vessel coronary artery disease with severe distal left main, ostial LAD, and ostial circumflex stenosis.   Aspirin nitroglycerin, statin and beta-blocker    Cardiology and CT surgery consult appreciated    VTE Risk Mitigation (From admission, onward)    None            Department Hospital Medicine  [facility]  Dede Hidalgo MD  Date of service: 06/27/2020

## 2020-06-27 NOTE — NURSING
Pt extremely anxious. Pulling at restraints, attempting to talk, gagging on ET tube. 's. Denies pain. Attempt to calm pt verbally unsuccessful. Precedex infusion initiated. Will monitor.  Glory Stiles RN  6/27/2020

## 2020-06-27 NOTE — PROGRESS NOTES
Patient is POD #1 s/p CABG. This morning she remains intubated, although she is awake and alert. She is able to answer simple yes/no questions appropriately. She is not on any pressors. She is on cleviprex this am. Per ICU RN, this was started when the patient was awakening this morning for CPAP trial. She was reportedly more somnolent during CPAP trial and ABG at that time revealed respiratory acidosis, and she was therefore put back on the ventilator. Anticipate extubation later this morning, as patient appears to be much more awake and alert.    Dora Young
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
## 2020-06-27 NOTE — CARE UPDATE
06/27/20 1330   Ready to Wean Parameters   $ Extubation Tech Time Tech Time 15 min   Extubated? Yes   Reason for Extubation Extubated   Ventilator Discontinued Yes

## 2020-06-27 NOTE — PROGRESS NOTES
[facility]  Cardiology  Progress Note    Patient Name: Nicole Harris  MRN: [id number]  Admission Date: 6/23/2020  Hospital Length of Stay: 1 days  Code Status: Full Code   Attending Physician: Dede Hidalgo MD   Primary Care Physician: Vira Conn NP  Expected Discharge Date:   Principal Problem:Chest pain    Subjective:       Interval History:  Patient remains intubated.  She is awake and obeys simple commands appropriately.    ROS  Objective:     Vital Signs (Most Recent):  Temp: 99.1 °F (37.3 °C) (06/27/20 0200)  Pulse: (!) 120 (06/27/20 0526)  Resp: 17 (06/27/20 0526)  BP: 131/85 (06/27/20 0500)  SpO2: (!) 87 % (06/27/20 0526) Vital Signs (24h Range):  Temp:  [98.1 °F (36.7 °C)-99.1 °F (37.3 °C)] 99.1 °F (37.3 °C)  Pulse:  [] 120  Resp:  [12-24] 17  SpO2:  [87 %-100 %] 87 %  BP: (106-148)/(55-92) 131/85  Arterial Line BP: (107-192)/(53-85) 129/73     Weight: 85 kg (187 lb 6.3 oz)  Body mass index is 32.17 kg/m².    SpO2: (!) 87 %  O2 Device (Oxygen Therapy): ventilator      Intake/Output Summary (Last 24 hours) at 6/27/2020 0742  Last data filed at 6/27/2020 0200  Gross per 24 hour   Intake 4224 ml   Output 2398 ml   Net 1826 ml       Lines/Drains/Airways     Central Venous Catheter Line            Percutaneous Central Line Insertion/Assessment - Triple Lumen  06/26/20 1350 less than 1 day    Pulmonary Artery Catheter Assessment  06/26/20 1350 less than 1 day          Drain                 Chest Tube 06/26/20 2053 1 Right Mediastinal 28 Fr. less than 1 day         Chest Tube 06/26/20 2055 2 Left Mediastinal 28 Fr. less than 1 day         Closed/Suction Drain 06/26/20 1844 Left Chest Bulb 19 Fr. less than 1 day         Urethral Catheter 06/26/20 1700 Non-latex;Straight-tip 16 Fr. less than 1 day          Airway                 Airway - Non-Surgical 06/26/20 1754 less than 1 day         Airway - Non-Surgical 06/26/20 1754 less than 1 day          Arterial Line                  Arterial Line 06/26/20 1333 Left Radial less than 1 day          Peripheral Intravenous Line                 Peripheral IV - Single Lumen 06/22/20 2225 20 G Left Antecubital 4 days         Peripheral IV - Single Lumen 06/23/20 2230 20 G Anterior;Left Forearm 3 days                Scheduled Meds:   aspirin  325 mg Oral Daily    atorvastatin  40 mg Oral QHS    calcium chloride        calcium chloride        ceFAZolin (ANCEF) IVPB  2 g Intravenous Q8H    chlorhexidine  10 mL Mouth/Throat BID    docusate sodium  100 mg Oral Daily    escitalopram oxalate  20 mg Oral Daily    lisinopriL  10 mg Oral Daily    metoprolol tartrate  12.5 mg Oral BID    mupirocin  1 g Nasal BID    nortriptyline  100 mg Oral QHS    pantoprazole  40 mg Oral Before breakfast    sodium bicarbonate         Continuous Infusions:   sodium chloride 0.9% 25 mL/hr at 06/27/20 0200    insulin (HUMAN R) infusion (adults) 3.6 Units/hr (06/27/20 0100)     PRN Meds:.acetaminophen, acetaminophen, albuterol, calcium gluconate IVPB, calcium gluconate IVPB, calcium gluconate IVPB, dextrose 50%, dextrose 50%, LORazepam, magnesium sulfate IVPB, magnesium sulfate IVPB, melatonin, midazolam, morphine, ondansetron, oxyCODONE-acetaminophen, potassium chloride in water, potassium chloride in water, potassium chloride in water     Physical Exam  HEENT: Normocephalic, atraumatic, PERRL, Conjunctiva pink, no scleral icterus.  Right IJ CVL in place.  CVS: S1S2+, RRR, no murmurs, rubs or gallops, tachycardia.  LUNGS: Clear  ABDOMEN: Soft, NT, BS+  EXTREMITIES: No cyanosis, clubbing or edema  NEURO: Obeys simple commadns.   STERNOTOMY: C/D/I    Significant Labs:   BMP:   Recent Labs   Lab 06/26/20  0342 06/26/20  2212   * 189*   * 136   K 3.9 4.2   CL 99 111*   CO2 24 19*   BUN 17 11   CREATININE 0.7 0.7   CALCIUM 9.0 7.5*   MG  --  2.0   , CMP   Recent Labs   Lab 06/26/20  0342 06/26/20  2212   * 136   K 3.9 4.2   CL 99 111*   CO2 24 19*    * 189*   BUN 17 11   CREATININE 0.7 0.7   CALCIUM 9.0 7.5*   ANIONGAP 11 6*   ESTGFRAFRICA >60.0 >60.0   EGFRNONAA >60.0 >60.0   , CBC   Recent Labs   Lab 06/26/20  0342  06/26/20  2212  06/27/20  0526   WBC 11.67  --  14.57*  --   --    HGB 13.3  --  10.2*  --   --    HCT 40.9   < > 30.8*   < > 33*     --  200  --   --     < > = values in this interval not displayed.   , INR No results for input(s): INR, PROTIME in the last 48 hours., Lipid Panel No results for input(s): CHOL, HDL, LDLCALC, TRIG, CHOLHDL in the last 48 hours. and Troponin No results for input(s): TROPONINI in the last 48 hours.    Significant Imaging: Reviewed  Assessment and Plan:     IMPRESSION:  Severe left main and multivessel CAD s/p CABG X 3 on 6/26/2020.  Hemodynamics acceptable.  Hypertension.  Currently on cleviprex .  Diabetes.  Dyslipidemia.   Anemia. Post op.     PLAN:  1.  Wean vent and extubate as feasible.  She is currently on CPAP trial.  2.  Use Lopressor IV as needed for her tachycardia.      Damon Salinas MD  Cardiology  [facility]

## 2020-06-27 NOTE — TRANSFER OF CARE
"Anesthesia Transfer of Care Note    Patient: Nicole Harris    Procedure(s) Performed: Procedure(s) (LRB):  CORONARY ARTERY BYPASS GRAFT (CABG) (N/A)  SURGICAL PROCUREMENT, VEIN, ENDOSCOPIC (Left)    Patient location: ICU    Anesthesia Type: general    Transport from OR: Upon arrival to PACU/ICU, patient attached to ventilator and auscultated to confirm bilateral breath sounds and adequate TV. Continuous ECG monitoring in transport. Continuous SpO2 monitoring in transport. Continuos invasive BP monitoring in transport. Transported from OR intubated on 100% O2 by AMBU with adequate controlled ventilation    Post pain: adequate analgesia    Post assessment: no apparent anesthetic complications    Post vital signs: stable    Level of consciousness: sedated    Nausea/Vomiting: no nausea/vomiting    Complications: none    Transfer of care protocol was followed      Last vitals:   Visit Vitals  /65   Pulse 92   Temp 36.8 °C (98.2 °F)   Resp 18   Ht 5' 4" (1.626 m)   Wt 85 kg (187 lb 6.3 oz)   LMP 05/03/2020   SpO2 95%   Breastfeeding No   BMI 32.17 kg/m²     "

## 2020-06-27 NOTE — RESPIRATORY THERAPY
Pt recd from or to icu post cabg. Pt placed on pb840 ventilator upon arrival. Pt tolerated well with no adverse reactions. Pt appears to be resting comfortably with no apparent distress noted.

## 2020-06-27 NOTE — PROGRESS NOTES
"CVT SURGERY PROGRESS NOTE  Nicole Harris  50 y.o.  1970    Patient's Chief Complaint:  Chest pain    Subjective:  Symptoms:  Stable.  No shortness of breath.    Diet:  NPO.    Activity level: Activity impairment: Impaired, just extubated.    Pain:  She reports no pain.  (On precedex, sleepy).           HPI:  50 year old female with hx of HTN, DM, fibromyalgia, chronic pain who was transferred from [facility] facility to here after presenting with complaints of chest pain with abnormal EKG. She had an abnormal stress test followed by [facility] that revealed multivessel CAD. CVTS consulted for CABG evaluation. Dr Villagomez performed CABG x 3 (DEE DEE-LAD, SVG-D1, SVG-OM) on 6/26/2020.     Last 24 hour interval:  -S/P CABG x 3 by Dr Villagomez  -Just extubated--now on 5L NC with adequate oxygenation  -Off Cleviprex this am   -MS tube with 280 output in last 24 hours, no air leak, to suction  -Pt seen this afternoon and was just extubated, presently on 5L NC and precedex due to agitation--will wean now that pt is extubated. She is sleepy but awakens easily.                                                        Objective:  General Appearance:  In no acute distress and comfortable.    Vital signs: (most recent): Blood pressure 101/65, pulse 87, temperature 98.8 °F (37.1 °C), temperature source Core ([place]-Radha), resp. rate 13, height 5' 4" (1.626 m), weight 85 kg (187 lb 6.3 oz), last menstrual period 05/03/2020, SpO2 96 %, not currently breastfeeding.    Output: Producing urine.    Lungs:  Normal effort and normal respiratory rate.  She is not in respiratory distress.  (Coarse BS bilaterally)  Heart: Normal rate.  Regular rhythm.  No murmur.   Chest: Symmetric chest wall expansion. (Sternal incision with dressing in place, chest tube present with dressing in place)  Abdomen: Abdomen is soft and non-distended.  Hypoactive bowel sounds.   There is no abdominal tenderness.     Pulses: Distal pulses are intact.    Neurological: " (Awakens easily, on precedex due to vent and agitation).    Skin:  Warm and dry.      Recent Vitals:  Vitals:    06/27/20 1105 06/27/20 1205 06/27/20 1230 06/27/20 1305   BP: 101/65      Pulse: 92 90 89 87   Resp: 17 13 12 13   Temp: 98.8 °F (37.1 °C)      TempSrc: Core ([place]Radha)      SpO2: 97% (!) 93% (!) 93% 96%   Weight:       Height:           INPATIENT MEDS   sodium chloride 0.9% 25 mL/hr at 06/27/20 1200    dexmedetomidine (PRECEDEX) infusion 0.5 mcg/kg/hr (06/27/20 1000)    insulin (HUMAN R) infusion (adults) 3.6 Units/hr (06/27/20 0100)      aspirin  325 mg Oral Daily    atorvastatin  40 mg Oral QHS    ceFAZolin (ANCEF) IVPB  2 g Intravenous Q8H    chlorhexidine  10 mL Mouth/Throat BID    docusate sodium  100 mg Oral Daily    escitalopram oxalate  20 mg Oral Daily    lisinopriL  10 mg Oral Daily    metoprolol tartrate  12.5 mg Oral BID    mupirocin  1 g Nasal BID    nortriptyline  100 mg Oral QHS    pantoprazole  40 mg Oral Before breakfast     acetaminophen, acetaminophen, albuterol, calcium gluconate IVPB, calcium gluconate IVPB, calcium gluconate IVPB, dextrose 50%, dextrose 50%, LORazepam, magnesium sulfate IVPB, magnesium sulfate IVPB, melatonin, midazolam, morphine, ondansetron, oxyCODONE-acetaminophen, potassium chloride in water, potassium chloride in water, potassium chloride in water  HEMODYNAMICS  PAP: (25-45)/(9-21) 27/17  PAP (Mean):  [16 mmHg-30 mmHg] 22 mmHg  CO:  [4 L/min-8.7 L/min] 4.4 L/min     Recent O2 Therapy/Vent Settings: just extubated to 5L NC    I/O last 24 hrs:  Intake/Output - Last 3 Shifts       06/25 0700 - 06/26 0659 06/26 0700 - 06/27 0659 06/27 0700 - 06/28 0659    P.O. 40      I.V. (mL/kg)  3500 (41.2) 50 (0.6)    Blood  424     IV Piggyback  300 156    Total Intake(mL/kg) 40 (0.5) 4224 (49.7) 206 (2.4)    Urine (mL/kg/hr) 2550 (1.3) 1620 (0.8) 625 (1.1)    Drains  120 40    Blood  500     Chest Tube  158 90    Total Output 2550 2398 755    Net -6204 +9750  -549               Recent Cardiac Rhythm: ST/SR    Recent Pain Assessment: controlled    CBC  Recent Labs   Lab 06/26/20  0342 06/26/20  2212 06/27/20  0600   WBC 11.67 14.57* 14.49*   RBC 4.66 3.52* 3.31*   HGB 13.3 10.2* 9.7*    200 250   MCV 88 88 88   MCH 28.5 29.0 29.3   MCHC 32.5 33.1 33.2     BMP  Recent Labs   Lab 06/26/20  0342 06/26/20  2212 06/27/20  0600   CO2 24 19* 23   BUN 17 11 11   CREATININE 0.7 0.7 0.7   CALCIUM 9.0 7.5* 8.3*     CARDIAC ENZYMES  Recent Labs   Lab 06/22/20  2226 06/23/20  0337 06/23/20  1046   TROPONINI <0.01* <0.030 <0.030     BNP  Recent Labs   Lab 06/22/20  2226   BNP 16     PT/INR  INR   Date Value Ref Range Status   06/22/2020 0.9 0.8 - 1.2 Final     Comment:     Coumadin Therapy:  2.0 - 3.0 for INR for all indicators except mechanical heart valves  and antiphospholipid syndromes which should use 2.5 - 3.5.       DIAGNOSTIC RESULTS:  CXR 6/26/2020  Patient has undergone CABG since the prior exam. Endotracheal tube   terminates 4 cm above the velia, NG tube courses into the stomach and   continues below the field-of-view. There are 2 mediastinal drains and   a left chest tube which terminates in the upper hemithorax. Right IJ   central vascular catheter terminates in the SVC, and pulmonary artery   catheter tip is in the main pulmonary artery. There is no pneumothorax   or pleural effusion. There is mild central bilateral atelectasis.   Cardiac mediastinal contours are normal.     CURRENT CONSULTS:  IP CONSULT TO CARDIOLOGY  IP CONSULT TO CARDIOTHORACIC SURGERY  IP CONSULT TO SOCIAL WORK/CASE MANAGEMENT    ASSESSMENT/PLAN:  S/P CABGx 3 6/26/2020 by Dr Villagomez  -Hemodynamically stable  -Mediastinal tubes to remain  -Pacer wires to remain  -Continue ASA, BB, statin  -Leukocytosis noted, likely reactive  -Afebrile  -Monitor H/H   -Encourage IS   -Hopefully OOB later today  -Cards following    HTN  -Presently controlled  -Off Cleviprex since early am    DMII  -Remains on  insulin infusion    Dyslipidemia  -Statin therapy    Chronic pain  -Pain control    Case and plan of care discussed with MD  GI Prophylaxis: PPI:proton pump inhibitor per orders  DVT Prophylaxis: NI at this time  Anticoagulants   Medication Route Frequency       Vi Larsen PA-C  Cardiovascular Thoracic Surgery  6/27/2020  1:24 PM

## 2020-06-27 NOTE — OP NOTE
[facility]  Cardiothoracic Surgery  Operative Note    SUMMARY     Date of Procedure: 6/26/2020     Procedure:  1.  CORONARY ARTERY BYPASS GRAFT x3 (left internal thoracic artery to left anterior descending, reverse saphenous vein graft from aorta to diagonal 1, reverse saphenous vein graft from aorta to obtuse marginal branch of circumflex).  2.  Endoscopic harvest left greater saphenous vein    Surgeon(s):   * Anshul Villagomez MD - Primary    Assistants:  Julia Hernandez NP    Pre-Operative Diagnosis:  Coronary artery disease native arteries native heart with unstable angina    Post-Operative Diagnosis:  Same  Anesthesia: General    Preliminary Note: Patient is a 50 y.o. female who was admitted with job pain and history of recent chest pain that radiates to the jaw. She had an abnormal electrocardiogram. She was referred from an outside emergency room to this facility for management. She underwent a stress test that was mildly abnormal. She is undergone cardiac catheterization revealing multivessel coronary artery disease.    Procedure in Detail:  After obtaining informed consent, the patient was taken to the operating room and placed in a supine position on the operating table.  General endotracheal anesthesia was initiated.  An appropriate time-out was performed.  She was prepped and draped in usual sterile fashion.  An appropriate length of left greater saphenous vein was harvested endoscopically and prepared for implantation.  A median sternotomy was performed.  The left internal thoracic artery was harvested as a pedicle.  After heparinization, it was transected distally and infused with an antispasmodic solution.  Had excellent flow in was 2.5- 3 mm in diameter.  A 19 mm Randy drain was placed into the left pleural cavity.  The pericardium was incised and suspended circumferentially.  An aortic cannula, two-stage venous cannula, retrograde cardioplegia cannula, and antegrade cardioplegia cannula  were placed.  Cardiopulmonary bypass was initiated.  The aortic cross-clamp was placed.  Cardiac arrest was achieved with antegrade and retrograde cold blood cardioplegia.  Throughout the remainder of the procedure, intermittent retrograde cardioplegia was administered.  Two separate saphenous vein grafts were placed in end-to-side fashion to a 2.5 mm circumflex obtuse marginal branch and a 2 mm 1st diagonal branch.  Flow and hemostasis were excellent at each anastomosis.  The left internal thoracic artery was anastomosed in end-to-side fashion to a 2.5-3 mm left anterior descending coronary artery.  Hemostasis was excellent.  The pedicle was tacked to the epicardium to prevent torsion.  Using a single cross-clamp technique, 2 punch aortotomies were performed.  The proximal ends of the saphenous vein grafts were anastomosed to the ascending aorta and marked with radiopaque markers.  Air evacuation was performed.  The cross-clamp was removed.  Normal sinus rhythm arose.  She was weaned from cardiopulmonary bypass.  Cannulas were removed.  Heparin was reversed with protamine.  Hemostasis was assured.  Two epicardial ventricular pacing wires were placed.  Two mediastinal drains were placed.  The sternal edges were treated with Ostene and reapproximated with stainless steel wires.  The remaining layers were irrigated with antibiotic solution and closed with Vicryl sutures.  Clean sterile dressings were placed on the operative sites.  All instrument and sponge counts were correct at the end of the procedure.    Description of the Findings of the Procedure:  Relatively large heart.  Coronary artery targets were of excellent quality.    Complications: No    Estimated Blood Loss (EBL): 500 mL           Implants:   Implant Name Type Inv. Item Serial No.  Lot No. LRB No. Used Action   PLEDGET 4.5X6 [id number] - [id number]  PLEDGET 4.5X6 [id number]   [id number] N/A 1 Implanted       Specimens:   Specimen (12h ago, onward)     None                  Condition: Good    Disposition: ICU - intubated and hemodynamically stable.     CC: Kannan Santana MD

## 2020-06-27 NOTE — PROGRESS NOTES
[facility] Medicine  Progress Note    Patient name: Nicole Harris  MRN: [id number]  Admit Date: 6/23/2020   LOS: 0 days     SUBJECTIVE:     Principal problem: Chest pain    Interval History:    Patient was seen prior to bypass surgery  No new complaint  Vitals are stable, labs reviewed unremarkable  Ultrasound neck negative for any neck swelling/inflammatory process     Scheduled Meds:   aspirin  81 mg Oral Daily    atorvastatin  40 mg Oral QHS    escitalopram oxalate  20 mg Oral Daily    insulin detemir U-100  5 Units Subcutaneous QHS    lisinopriL  10 mg Oral Daily    nortriptyline  100 mg Oral QHS    pantoprazole  40 mg Oral Before breakfast    cardioplegic solution   Intravenous Once    cardioplegic solution   Intravenous Once     Continuous Infusions:   custom IV infusion builder (for pharmacist use only)       PRN Meds:sodium chloride, sodium chloride, acetaminophen, albuterol, bisacodyL, heparin (porcine), heparin (porcine), insulin aspart U-100, labetalol, LORazepam, melatonin, morphine, nitroGLYCERIN, ondansetron, promethazine (PHENERGAN) IVPB, sodium chloride 0.9%    Review of patient's allergies indicates:   Allergen Reactions    Hydrocodone Nausea And Vomiting       Review of Systems: As per interval history    OBJECTIVE:     Vital Signs (Most Recent)  Temp: 98.2 °F (36.8 °C) (06/26/20 1200)  Pulse: 92 (06/26/20 1200)  Resp: 18 (06/26/20 1216)  BP: 111/65 (06/26/20 1200)  SpO2: 95 % (06/26/20 1200)    Vital Signs Range (Last 24H):  Temp:  [98 °F (36.7 °C)-98.7 °F (37.1 °C)]   Pulse:  []   Resp:  [16-22]   BP: (104-145)/(58-88)   SpO2:  [93 %-100 %]     I & O (Last 24H):    Intake/Output Summary (Last 24 hours) at 6/26/2020 1916  Last data filed at 6/26/2020 1914  Gross per 24 hour   Intake 2040 ml   Output 2320 ml   Net -280 ml       Physical Exam:  General: Patient resting comfortably in no acute distress. Appears as stated age. Calm obese  Eyes: No  conjunctival injection. No scleral icterus.  ENT: Hearing grossly intact. No discharge from ears. No nasal discharge.   Neck: Supple, trachea midline. No JVDmild low  posterior neck tenderness  CVS: RRR. No LE edema BL  Lungs: CTA BL, no wheezing or crackles. Good breath sounds. No accessory muscle use. No acute respiratory distress  Abdomen:  Soft, nontender and nondistended.  No organomegaly  Neuro: AOx3. Moves all extremities. Follows commands. Responds appropriately   Skin:  No rash or erythema noted  No visible erythema or discharge on vaginal inspection    Laboratory:  CBC:   Recent Labs   Lab 06/26/20  0342 06/26/20  1724   WBC 11.67  --    RBC 4.66  --    HGB 13.3  --    HCT 40.9 31*     --    MCV 88  --    MCH 28.5  --    MCHC 32.5  --      CMP:   Recent Labs   Lab 06/22/20  2226  06/26/20  0342   *   < > 243*   CALCIUM 9.4   < > 9.0   ALBUMIN 4.4  --   --    PROT 7.2  --   --    *   < > 134*   K 4.0   < > 3.9   CO2 21*   < > 24      < > 99   BUN 23*   < > 17   CREATININE 0.7   < > 0.7   ALKPHOS 79  --   --    ALT 32  --   --    AST 18  --   --    BILITOT 0.8  --   --     < > = values in this interval not displayed.         ASSESSMENT/PLAN:     Aline now is in  Active Hospital Problems    Diagnosis  POA    *Chest pain [R07.9]  Yes    Coronary artery disease involving native coronary artery of native heart with unstable angina pectoris [I25.110]  Yes    Unstable angina [I20.0]  Yes    Abnormal stress test [R94.39]  Yes    Nonspecific abnormal electrocardiogram (ECG) (EKG) [R94.31]  Yes    COPD (chronic obstructive pulmonary disease) [J44.9]  Yes    Essential hypertension [I10]  Yes    Diabetes mellitus [E11.9]  Yes      Resolved Hospital Problems   No resolved problems to display.         Plan:   No history of CAD  Hx of smoking   Diabetes uncontrolled   troponin negativex2  Await for 2nd set of troponin  EKGs: sinus rhythm with T-wave inversion in lead V1 to V3, no ST  elevation  Continue cardiac monitor  Lexiscan stress test positive  Found to have three-vessel coronary artery disease with severe distal left main, ostial LAD, and ostial circumflex stenosis.   Consult Cardiology appreciated  Aspirin nitroglycerin, statin and beta-blocker  IV PPI and GI cocktail x1  Awaiting for CABG      VTE Risk Mitigation (From admission, onward)         Ordered     heparin (porcine) injection  As needed (PRN)      06/26/20 1811     heparin (porcine) injection  As needed (PRN)      06/26/20 1805     lactated ringers 1,000 mL with heparin (porcine) 6,000 Units infusion  Continuous      06/26/20 0916     IP VTE HIGH RISK PATIENT  Once      06/23/20 0335     Place sequential compression device  Until discontinued      06/23/20 0335                  Department Hospital Medicine  [facility]  Dede Hidalgo MD  Date of service: 06/26/2020

## 2020-06-27 NOTE — ANESTHESIA POSTPROCEDURE EVALUATION
Anesthesia Post Evaluation    Patient: Nicole Harris    Procedure(s) Performed: Procedure(s) (LRB):  CORONARY ARTERY BYPASS GRAFT (CABG) (N/A)  SURGICAL PROCUREMENT, VEIN, ENDOSCOPIC (Left)    Final Anesthesia Type: general    Patient location during evaluation: ICU  Patient participation: Yes- Able to Participate  Level of consciousness: awake and alert  Post-procedure vital signs: reviewed and stable  Pain management: adequate  Airway patency: patent    PONV status at discharge: No PONV  Anesthetic complications: no      Cardiovascular status: stable  Respiratory status: unassisted and spontaneous ventilation  Hydration status: euvolemic  Follow-up not needed.    Patient extubated and looks good with no shortness of breath.  No intraoperative recall.  No throat or eye pain. No problems with upper extremities except for her usual numbness of her right fingers due to carpal tunnel syndrome. Hands and fingers look well perfused.  Line sites look good.  No anesthesia complications.        Vitals Value Taken Time   /65 06/27/20 1318   Temp 37.1 °C (98.8 °F) 06/27/20 1305   Pulse 98 06/27/20 1621   Resp 22 06/27/20 1621   SpO2 92 % 06/27/20 1621   Vitals shown include unvalidated device data.      No case tracking events are documented in the log.      Pain/Alyssia Score: Pain Rating Prior to Med Admin: 5 (6/27/2020  3:43 PM)  Pain Rating Post Med Admin: 0 (6/26/2020  4:00 AM)

## 2020-06-27 NOTE — NURSING
ABG WNL. Pt awake, alert & calm. Pt extubated to 5LNC. Able to speak. Will monitor.  Glory Stiles RN  6/27/2020

## 2020-06-28 LAB
ANION GAP SERPL CALC-SCNC: 6 MMOL/L (ref 8–16)
BASOPHILS # BLD AUTO: 0.03 K/UL (ref 0–0.2)
BASOPHILS NFR BLD: 0.1 % (ref 0–1.9)
BUN SERPL-MCNC: 13 MG/DL (ref 6–20)
CALCIUM SERPL-MCNC: 7.7 MG/DL (ref 8.7–10.5)
CHLORIDE SERPL-SCNC: 103 MMOL/L (ref 95–110)
CO2 SERPL-SCNC: 26 MMOL/L (ref 23–29)
CREAT SERPL-MCNC: 0.6 MG/DL (ref 0.5–1.4)
DIFFERENTIAL METHOD: ABNORMAL
EOSINOPHIL # BLD AUTO: 0 K/UL (ref 0–0.5)
EOSINOPHIL NFR BLD: 0 % (ref 0–8)
ERYTHROCYTE [DISTWIDTH] IN BLOOD BY AUTOMATED COUNT: 13.8 % (ref 11.5–14.5)
EST. GFR  (AFRICAN AMERICAN): >60 ML/MIN/1.73 M^2
EST. GFR  (NON AFRICAN AMERICAN): >60 ML/MIN/1.73 M^2
GLUCOSE SERPL-MCNC: 251 MG/DL (ref 70–110)
GLUCOSE SERPL-MCNC: 278 MG/DL (ref 70–110)
HCT VFR BLD AUTO: 27.6 % (ref 37–48.5)
HGB BLD-MCNC: 9.1 G/DL (ref 12–16)
IMM GRANULOCYTES # BLD AUTO: 0.14 K/UL (ref 0–0.04)
IMM GRANULOCYTES NFR BLD AUTO: 0.7 % (ref 0–0.5)
LYMPHOCYTES # BLD AUTO: 1.5 K/UL (ref 1–4.8)
LYMPHOCYTES NFR BLD: 7.2 % (ref 18–48)
MCH RBC QN AUTO: 29.8 PG (ref 27–31)
MCHC RBC AUTO-ENTMCNC: 33 G/DL (ref 32–36)
MCV RBC AUTO: 91 FL (ref 82–98)
MONOCYTES # BLD AUTO: 2 K/UL (ref 0.3–1)
MONOCYTES NFR BLD: 9.2 % (ref 4–15)
NEUTROPHILS # BLD AUTO: 17.7 K/UL (ref 1.8–7.7)
NEUTROPHILS NFR BLD: 82.8 % (ref 38–73)
NRBC BLD-RTO: 0 /100 WBC
PLATELET # BLD AUTO: 188 K/UL (ref 150–350)
PMV BLD AUTO: 10.2 FL (ref 9.2–12.9)
POTASSIUM SERPL-SCNC: 4.5 MMOL/L (ref 3.5–5.1)
RBC # BLD AUTO: 3.05 M/UL (ref 4–5.4)
SODIUM SERPL-SCNC: 135 MMOL/L (ref 136–145)
WBC # BLD AUTO: 21.37 K/UL (ref 3.9–12.7)

## 2020-06-28 PROCEDURE — 25000003 PHARM REV CODE 250: Performed by: NURSE PRACTITIONER

## 2020-06-28 PROCEDURE — 27000683 HC PILLOW THERAPEUTIC

## 2020-06-28 PROCEDURE — 25000003 PHARM REV CODE 250: Performed by: THORACIC SURGERY (CARDIOTHORACIC VASCULAR SURGERY)

## 2020-06-28 PROCEDURE — 82962 GLUCOSE BLOOD TEST: CPT

## 2020-06-28 PROCEDURE — 99900035 HC TECH TIME PER 15 MIN (STAT)

## 2020-06-28 PROCEDURE — 80048 BASIC METABOLIC PNL TOTAL CA: CPT

## 2020-06-28 PROCEDURE — 85025 COMPLETE CBC W/AUTO DIFF WBC: CPT

## 2020-06-28 PROCEDURE — 99024 PR POST-OP FOLLOW-UP VISIT: ICD-10-PCS | Mod: ,,, | Performed by: THORACIC SURGERY (CARDIOTHORACIC VASCULAR SURGERY)

## 2020-06-28 PROCEDURE — 27000221 HC OXYGEN, UP TO 24 HOURS

## 2020-06-28 PROCEDURE — 99024 POSTOP FOLLOW-UP VISIT: CPT | Mod: ,,, | Performed by: THORACIC SURGERY (CARDIOTHORACIC VASCULAR SURGERY)

## 2020-06-28 PROCEDURE — 63600175 PHARM REV CODE 636 W HCPCS: Performed by: THORACIC SURGERY (CARDIOTHORACIC VASCULAR SURGERY)

## 2020-06-28 PROCEDURE — 21000000 HC CCU ICU ROOM CHARGE

## 2020-06-28 PROCEDURE — 94761 N-INVAS EAR/PLS OXIMETRY MLT: CPT

## 2020-06-28 RX ORDER — FERROUS SULFATE 325(65) MG
325 TABLET ORAL DAILY
Status: DISCONTINUED | OUTPATIENT
Start: 2020-06-28 | End: 2020-07-01 | Stop reason: HOSPADM

## 2020-06-28 RX ORDER — MORPHINE SULFATE 2 MG/ML
1 INJECTION, SOLUTION INTRAMUSCULAR; INTRAVENOUS EVERY 4 HOURS PRN
Status: DISCONTINUED | OUTPATIENT
Start: 2020-06-28 | End: 2020-07-01 | Stop reason: HOSPADM

## 2020-06-28 RX ORDER — METOPROLOL TARTRATE 25 MG/1
25 TABLET, FILM COATED ORAL 2 TIMES DAILY
Status: DISCONTINUED | OUTPATIENT
Start: 2020-06-28 | End: 2020-06-29

## 2020-06-28 RX ORDER — LISINOPRIL 5 MG/1
5 TABLET ORAL DAILY
Status: DISCONTINUED | OUTPATIENT
Start: 2020-06-28 | End: 2020-07-01 | Stop reason: HOSPADM

## 2020-06-28 RX ADMIN — LORAZEPAM 1 MG: 1 TABLET ORAL at 11:06

## 2020-06-28 RX ADMIN — ATORVASTATIN CALCIUM 40 MG: 40 TABLET, FILM COATED ORAL at 08:06

## 2020-06-28 RX ADMIN — INSULIN ASPART 6 UNITS: 100 INJECTION, SOLUTION INTRAVENOUS; SUBCUTANEOUS at 09:06

## 2020-06-28 RX ADMIN — MELATONIN 6 MG: at 10:06

## 2020-06-28 RX ADMIN — LISINOPRIL 5 MG: 5 TABLET ORAL at 09:06

## 2020-06-28 RX ADMIN — INSULIN ASPART 3 UNITS: 100 INJECTION, SOLUTION INTRAVENOUS; SUBCUTANEOUS at 08:06

## 2020-06-28 RX ADMIN — CHLORHEXIDINE GLUCONATE 10 ML: 1.2 RINSE ORAL at 09:06

## 2020-06-28 RX ADMIN — CHLORHEXIDINE GLUCONATE: 1.2 RINSE ORAL at 08:06

## 2020-06-28 RX ADMIN — MUPIROCIN 1 G: 20 OINTMENT TOPICAL at 09:06

## 2020-06-28 RX ADMIN — OXYCODONE AND ACETAMINOPHEN 1 TABLET: 5; 325 TABLET ORAL at 11:06

## 2020-06-28 RX ADMIN — ONDANSETRON 4 MG: 2 INJECTION INTRAMUSCULAR; INTRAVENOUS at 11:06

## 2020-06-28 RX ADMIN — ESCITALOPRAM OXALATE 20 MG: 10 TABLET ORAL at 09:06

## 2020-06-28 RX ADMIN — ONDANSETRON 4 MG: 2 INJECTION INTRAMUSCULAR; INTRAVENOUS at 06:06

## 2020-06-28 RX ADMIN — CEFAZOLIN SODIUM 2 G: 2 SOLUTION INTRAVENOUS at 06:06

## 2020-06-28 RX ADMIN — OXYCODONE AND ACETAMINOPHEN 1 TABLET: 5; 325 TABLET ORAL at 08:06

## 2020-06-28 RX ADMIN — METOPROLOL TARTRATE 25 MG: 25 TABLET, FILM COATED ORAL at 09:06

## 2020-06-28 RX ADMIN — MORPHINE SULFATE 2 MG: 2 INJECTION, SOLUTION INTRAMUSCULAR; INTRAVENOUS at 01:06

## 2020-06-28 RX ADMIN — OXYCODONE AND ACETAMINOPHEN 1 TABLET: 5; 325 TABLET ORAL at 06:06

## 2020-06-28 RX ADMIN — DOCUSATE SODIUM 100 MG: 100 CAPSULE, LIQUID FILLED ORAL at 09:06

## 2020-06-28 RX ADMIN — NORTRIPTYLINE HYDROCHLORIDE 100 MG: 25 CAPSULE ORAL at 08:06

## 2020-06-28 RX ADMIN — ASPIRIN 325 MG ORAL TABLET 325 MG: 325 PILL ORAL at 09:06

## 2020-06-28 RX ADMIN — METOPROLOL TARTRATE 25 MG: 25 TABLET, FILM COATED ORAL at 08:06

## 2020-06-28 RX ADMIN — FERROUS SULFATE TAB 325 MG (65 MG ELEMENTAL FE) 325 MG: 325 (65 FE) TAB at 11:06

## 2020-06-28 NOTE — PROGRESS NOTES
[facility]  Cardiology  Progress Note    Patient Name: Nicole aHrris  MRN: [id number]  Admission Date: 6/23/2020  Hospital Length of Stay: 2 days  Code Status: Full Code   Attending Physician: Dede Hidalgo MD   Primary Care Physician: Vira Conn NP  Expected Discharge Date:   Principal Problem:Chest pain    Subjective:       Interval History:  Patient extubated yesterday.  She denies any significant pain or shortness of breath.    ROS   She denies any abdominal pain.  She denies any focal weakness.  Objective:     Vital Signs (Most Recent):  Temp: 98.3 °F (36.8 °C) (06/28/20 0700)  Pulse: (!) 115 (06/28/20 0804)  Resp: (!) 41 (06/28/20 0804)  BP: 115/65 (06/28/20 0800)  SpO2: 99 % (06/28/20 0804) Vital Signs (24h Range):  Temp:  [98 °F (36.7 °C)-98.8 °F (37.1 °C)] 98.3 °F (36.8 °C)  Pulse:  [] 115  Resp:  [11-41] 41  SpO2:  [88 %-99 %] 99 %  BP: (101-130)/(55-82) 115/65  Arterial Line BP: ()/(62-76) 112/67     Weight: 85 kg (187 lb 6.3 oz)  Body mass index is 32.17 kg/m².    SpO2: 99 %  O2 Device (Oxygen Therapy): High Flow nasal Cannula      Intake/Output Summary (Last 24 hours) at 6/28/2020 0932  Last data filed at 6/28/2020 0000  Gross per 24 hour   Intake 790 ml   Output 1060 ml   Net -270 ml       Lines/Drains/Airways     Central Venous Catheter Line            Percutaneous Central Line Insertion/Assessment - Triple Lumen  06/26/20 1350 1 day          Drain                 Chest Tube 06/26/20 2053 1 Right Mediastinal 28 Fr. 1 day         Chest Tube 06/26/20 2055 2 Left Mediastinal 28 Fr. 1 day         Closed/Suction Drain 06/26/20 1844 Left Chest Bulb 19 Fr. 1 day         Urethral Catheter 06/26/20 1700 Non-latex;Straight-tip 16 Fr. 1 day                Scheduled Meds:   aspirin  325 mg Oral Daily    atorvastatin  40 mg Oral QHS    chlorhexidine  10 mL Mouth/Throat BID    docusate sodium  100 mg Oral Daily    escitalopram oxalate  20 mg Oral Daily    ferrous  sulfate  325 mg Oral Daily    lisinopriL  5 mg Oral Daily    metoprolol tartrate  25 mg Oral BID    mupirocin  1 g Nasal BID    nortriptyline  100 mg Oral QHS    pantoprazole  40 mg Oral Before breakfast     Continuous Infusions:    PRN Meds:.acetaminophen, acetaminophen, albuterol, dextrose 50%, dextrose 50%, glucagon (human recombinant), glucose, glucose, insulin aspart U-100, LORazepam, melatonin, morphine, ondansetron, oxyCODONE-acetaminophen     Physical Exam  HEENT: Normocephalic, atraumatic, PERRL, Conjunctiva pink, no scleral icterus.  Right IJ CVL in place.  CVS: S1S2+, RRR, no murmurs, rubs or gallops, tachycardia.  LUNGS: Clear  ABDOMEN: Soft, NT, BS+  EXTREMITIES: No cyanosis, clubbing or edema  NEURO: AAO x3.   STERNOTOMY: C/D/I    Significant Labs:   BMP:   Recent Labs   Lab 06/26/20 2212 06/27/20  0600   * 224*    142   K 4.2 3.9   * 110   CO2 19* 23   BUN 11 11   CREATININE 0.7 0.7   CALCIUM 7.5* 8.3*   MG 2.0 1.7   , CMP   Recent Labs   Lab 06/26/20 2212 06/27/20  0600    142   K 4.2 3.9   * 110   CO2 19* 23   * 224*   BUN 11 11   CREATININE 0.7 0.7   CALCIUM 7.5* 8.3*   ANIONGAP 6* 9   ESTGFRAFRICA >60.0 >60.0   EGFRNONAA >60.0 >60.0   , CBC   Recent Labs   Lab 06/26/20 2212 06/27/20  0600   WBC 14.57*  --  14.49*   HGB 10.2*  --  9.7*   HCT 30.8*   < > 29.2*     --  250    < > = values in this interval not displayed.   , INR No results for input(s): INR, PROTIME in the last 48 hours., Lipid Panel No results for input(s): CHOL, HDL, LDLCALC, TRIG, CHOLHDL in the last 48 hours. and Troponin No results for input(s): TROPONINI in the last 48 hours.    Significant Imaging: Reviewed  Assessment and Plan:     IMPRESSION:  Severe left main and multivessel CAD s/p CABG X 3 on 6/26/2020.  Hemodynamics acceptable.  Hypertension.  Currently on cleviprex.  Diabetes.  Dyslipidemia.   Anemia. Post op.  H&H acceptable.    PLAN:  1.  Continue current medical  regimen.  2.  If she remains tachycardic may need to consider increasing her Lopressor to 25 mg p.o. t.i.d..      Damon Salinas MD  Cardiology  [facility]

## 2020-06-28 NOTE — NURSING
Patient up in chair with minimum assistance. Patient is awake, alert, oriented with a cooperative and pleasant affect. Dr. Oakes recently at bedside for patient examination and chest tube and sari drain removal. MD made aware of patient increased WBC, no new ordered given.

## 2020-06-28 NOTE — PLAN OF CARE
06/28/20 0804   Patient Assessment/Suction   Level of Consciousness (AVPU) alert   Respiratory Effort Normal;Unlabored   Expansion/Accessory Muscles/Retractions no use of accessory muscles;no retractions   All Lung Fields Breath Sounds clear   PRE-TX-O2   O2 Device (Oxygen Therapy) High Flow nasal Cannula   $ Is the patient on Low Flow Oxygen? Yes   Flow (L/min) 4   SpO2 99 %   Pulse Oximetry Type Continuous   $ Pulse Oximetry - Multiple Charge Pulse Oximetry - Multiple   Pulse (!) 115   Resp (!) 41   Inhaler   $ Inhaler Charges PRN treatment not required   Respiratory Evaluation   $ Care Plan Tech Time 15 min   Evaluation For   (care plan)

## 2020-06-28 NOTE — PROGRESS NOTES
"Nicole Harris is a 50 y.o. female patient.    Chief Complaint   Patient presents with    Chest Pain     transfer from [place] for nstemi, denies chest pain at this time       Active Hospital Problems    Diagnosis  POA    *Chest pain [R07.9]  Yes    S/P CABG x 3 [Z95.1]  Not Applicable    Coronary artery disease involving native coronary artery of native heart with unstable angina pectoris [I25.110]  Yes    Unstable angina [I20.0]  Yes    Abnormal stress test [R94.39]  Yes    Nonspecific abnormal electrocardiogram (ECG) (EKG) [R94.31]  Yes    COPD (chronic obstructive pulmonary disease) [J44.9]  Yes    Essential hypertension [I10]  Yes    Diabetes mellitus [E11.9]  Yes      Resolved Hospital Problems   No resolved problems to display.       Temp: 98.3 °F (36.8 °C) (06/28/20 0700)  Pulse: (!) 115 (06/28/20 0804)  Resp: (!) 41 (06/28/20 0804)  BP: 115/65 (06/28/20 0800)  SpO2: 99 % (06/28/20 0804)  Weight: 85 kg (187 lb 6.3 oz) (06/25/20 0444)  Height: 5' 4" (162.6 cm) (06/26/20 1615)    CBC:  Recent Labs   Lab 06/26/20  0342  06/26/20  2212  06/27/20  0412 06/27/20  0453 06/27/20  0526 06/27/20  0600   WBC 11.67  --  14.57*  --   --   --   --  14.49*   HGB 13.3  --  10.2*  --   --   --   --  9.7*   HCT 40.9   < > 30.8*   < > 31* 30* 33* 29.2*     --  200  --   --   --   --  250   MCV 88  --  88  --   --   --   --  88    < > = values in this interval not displayed.     BMP:  Recent Labs   Lab 06/26/20  0342 06/26/20  2212 06/27/20  0600   * 189* 224*   * 136 142   K 3.9 4.2 3.9   CL 99 111* 110   CO2 24 19* 23   BUN 17 11 11   CREATININE 0.7 0.7 0.7   CALCIUM 9.0 7.5* 8.3*   MG  --  2.0 1.7     LFTs:  No results for input(s): ALT, AST, ALKPHOS, BILITOT, PROT, ALBUMIN in the last 72 hours.  COAGS:  Recent Labs   Lab 06/26/20  2212   APTT 27.6     CARDIAC MARKERS:  No results for input(s): CPK, CPKMB, TROPONINI in the last 72 hours.    Output (mL)  Urine: 200 mL    Imaging Results       " Medication filled 12-18-17 "   NM Myocardial Perfusion Spect Multi Pharmacologic (Final result)  Result time 06/23/20 12:01:12    Final result by Trinidad Norton IV, MD (06/23/20 12:01:12)                 Impression:      1. Small area of diminished tracer accumulation within the anterolateral wall near the apex, potentially representative of small area of pharmacologically induced reversibility.  2. Matched mildly diminished tracer accumulation within the inferolateral wall  3. ESTIMATED EJECTION FRACTION OF 73%.      Electronically signed by: Trinidad Norton IV., MD  Date:    06/23/2020  Time:    12:01             Narrative:    EXAMINATION:  NM MYOCARDIAL PERFUSION SPECT MULTI PHARM    CLINICAL HISTORY:  Chest pain, ACS suspected;    TECHNIQUE:  Lexiscan is utilized as a pharmacologic stress agent. At peak stress, 25.4 millicuries of technetium Myoview were administered intravenously. The rest portion of the study is accomplished on the same day, utilizing 10.1 millicuries of technetium Myoview intravenously.    COMPARISON:  None.    FINDINGS:  There is a subtle area of focally diminished tracer accumulation within the anterolateral wall near the apex on stress as compared to rest imaging potentially representing minimal pharmacologically induced reversibility.  Diminished accumulation within the inferolateral wall is more pronounced on the rest portion of the examination.  The distribution of tracer activity elsewhere appears unremarkable.  The chamber size is within normal limits. There are no wall motion abnormalities and the estimated ejection fraction is 73%.                                Subjective:  Symptoms:  Stable.  No chest pain.    Diet:  Adequate intake.    Pain:  She complains of pain that is mild.  Pain is well controlled.        Objective:  General Appearance:  Comfortable.    Vital signs: (most recent): Blood pressure 115/65, pulse (!) 115, temperature 98.3 °F (36.8 °C), resp. rate (!) 41, height 5' 4" (1.626 m), weight 85 kg " (187 lb 6.3 oz), last menstrual period 05/03/2020, SpO2 99 %, not currently breastfeeding.  Vital signs are normal.  (Sinus tachycardia).    Lungs:  Breath sounds clear to auscultation.    Heart: Tachycardia.  Regular rhythm.  (Sinus)  Chest: (Decreased output from mediastinal and pleural drains.  Chest x-ray with good lung expansion bilaterally)  Neurological: (Intact throughout).        Assessment:    Condition: In stable condition.  Improving.   (Post CABG).     Plan:   Transfer Plan: Clear from surgical standpoint for transfer from intensive care.  Encourage ambulation.  Start/continue incentive spirometry.  Mediastinal drains removed.  Pleural drains removed.  Pacemaker wires removed.  Increase metoprolol from 12.5 mg to 25 mg twice daily.  Decreased lisinopril to 5 mg daily.       Anshul Oakes MD  6/28/2020

## 2020-06-28 NOTE — PROGRESS NOTES
[facility] Medicine  Progress Note    Patient name: Nicole Harris  MRN: [id number]  Admit Date: 6/23/2020   LOS: 2 days     SUBJECTIVE:     Principal problem: Chest pain    Interval History:    S/p  CABG surgery POD 1  Patient appears to be in positive spirits, chest tubes and mediastinal drain removed  Able to ambulate to restroom  Downgraded to Cardio a  PT OT ordered  Reactive Leukocytosis noted  Cardiology and CT surgery follow-up appreciated     Scheduled Meds:   aspirin  325 mg Oral Daily    atorvastatin  40 mg Oral QHS    chlorhexidine  10 mL Mouth/Throat BID    docusate sodium  100 mg Oral Daily    escitalopram oxalate  20 mg Oral Daily    ferrous sulfate  325 mg Oral Daily    lisinopriL  5 mg Oral Daily    metoprolol tartrate  25 mg Oral BID    mupirocin  1 g Nasal BID    nortriptyline  100 mg Oral QHS    pantoprazole  40 mg Oral Before breakfast     Continuous Infusions:    PRN Meds:acetaminophen, acetaminophen, albuterol, dextrose 50%, dextrose 50%, glucagon (human recombinant), glucose, glucose, insulin aspart U-100, LORazepam, melatonin, morphine, ondansetron, oxyCODONE-acetaminophen    Review of patient's allergies indicates:   Allergen Reactions    Hydrocodone Nausea And Vomiting       Review of Systems: As per interval history    OBJECTIVE:     Vital Signs (Most Recent)  Temp: 98.6 °F (37 °C) (06/28/20 1515)  Pulse: 100 (06/28/20 1600)  Resp: 20 (06/28/20 1600)  BP: 113/71 (06/28/20 1701)  SpO2: 98 % (06/28/20 1301)    Vital Signs Range (Last 24H):  Temp:  [98 °F (36.7 °C)-98.9 °F (37.2 °C)]   Pulse:  []   Resp:  [13-41]   BP: ()/(54-71)   SpO2:  [85 %-100 %]     I & O (Last 24H):    Intake/Output Summary (Last 24 hours) at 6/28/2020 1824  Last data filed at 6/28/2020 1501  Gross per 24 hour   Intake 1500 ml   Output 1600 ml   Net -100 ml       Physical Exam:  General: Patient resting comfortably in no acute distress. Appears as stated age. Calm  obese appears anxious  Eyes: No conjunctival injection. No scleral icterus.  ENT: Hearing grossly intact. No discharge from ears. No nasal discharge.   Neck: Supple, trachea midline. No JVDmild low  posterior neck tenderness, right IJ  CVS:  sternotomy dressing intact dry RRR.  No LE edema BL  Lungs: CTA BL, no wheezing or crackles. Good breath sounds. No accessory muscle use. No acute respiratory distress  Abdomen:  Soft, nontender and nondistended.  No organomegaly  Neuro: AOx3. Moves all extremities. Follows commands. Responds appropriately   Skin:  No rash or erythema noted      Laboratory:  CBC:   Recent Labs   Lab 06/28/20  0300   WBC 21.37*   RBC 3.05*   HGB 9.1*   HCT 27.6*      MCV 91   MCH 29.8   MCHC 33.0     CMP:   Recent Labs   Lab 06/22/20  2226  06/28/20  0330   *   < > 251*   CALCIUM 9.4   < > 7.7*   ALBUMIN 4.4  --   --    PROT 7.2  --   --    *   < > 135*   K 4.0   < > 4.5   CO2 21*   < > 26      < > 103   BUN 23*   < > 13   CREATININE 0.7   < > 0.6   ALKPHOS 79  --   --    ALT 32  --   --    AST 18  --   --    BILITOT 0.8  --   --     < > = values in this interval not displayed.         ASSESSMENT/PLAN:     Sherrill now is in  Active Hospital Problems    Diagnosis  POA    *Chest pain [R07.9]  Yes    S/P CABG x 3 [Z95.1]  Not Applicable    Coronary artery disease involving native coronary artery of native heart with unstable angina pectoris [I25.110]  Yes    Unstable angina [I20.0]  Yes    Abnormal stress test [R94.39]  Yes    Nonspecific abnormal electrocardiogram (ECG) (EKG) [R94.31]  Yes    COPD (chronic obstructive pulmonary disease) [J44.9]  Yes    Essential hypertension [I10]  Yes    Diabetes mellitus [E11.9]  Yes      Resolved Hospital Problems   No resolved problems to display.         Plan:   S/p CABG on 6/26/20 for Severe left main and multivessel CAD  Hx of smoking  Continue with cardiac monitoring for  Trend CBC hemoglobin and leukocytosis   Diabetes  uncontrolled-continue with insulin drip  Monitor and replace electrolytes  Lexiscan stress test positive  Found to have three-vessel coronary artery disease with severe distal left main, ostial LAD, and ostial circumflex stenosis.   Aspirin nitroglycerin, statin and beta-blocker    Cardiology and CT surgery consult appreciated    VTE Risk Mitigation (From admission, onward)    None            Department Hospital Medicine  [facility]  Dede Hidalgo MD  Date of service: 06/28/2020

## 2020-06-29 PROBLEM — D64.9 ANEMIA: Status: ACTIVE | Noted: 2020-06-29

## 2020-06-29 PROBLEM — K59.09 OTHER CONSTIPATION: Status: ACTIVE | Noted: 2020-06-29

## 2020-06-29 LAB
ANION GAP SERPL CALC-SCNC: 10 MMOL/L (ref 8–16)
BASOPHILS # BLD AUTO: 0.02 K/UL (ref 0–0.2)
BASOPHILS NFR BLD: 0.2 % (ref 0–1.9)
BUN SERPL-MCNC: 11 MG/DL (ref 6–20)
CALCIUM SERPL-MCNC: 8.3 MG/DL (ref 8.7–10.5)
CHLORIDE SERPL-SCNC: 98 MMOL/L (ref 95–110)
CO2 SERPL-SCNC: 29 MMOL/L (ref 23–29)
CREAT SERPL-MCNC: 0.6 MG/DL (ref 0.5–1.4)
DIFFERENTIAL METHOD: ABNORMAL
EOSINOPHIL # BLD AUTO: 0.1 K/UL (ref 0–0.5)
EOSINOPHIL NFR BLD: 0.8 % (ref 0–8)
ERYTHROCYTE [DISTWIDTH] IN BLOOD BY AUTOMATED COUNT: 13.7 % (ref 11.5–14.5)
EST. GFR  (AFRICAN AMERICAN): >60 ML/MIN/1.73 M^2
EST. GFR  (NON AFRICAN AMERICAN): >60 ML/MIN/1.73 M^2
GLUCOSE SERPL-MCNC: 196 MG/DL (ref 70–110)
GLUCOSE SERPL-MCNC: 247 MG/DL (ref 70–110)
GLUCOSE SERPL-MCNC: 254 MG/DL (ref 70–110)
GLUCOSE SERPL-MCNC: 269 MG/DL (ref 70–110)
GLUCOSE SERPL-MCNC: 271 MG/DL (ref 70–110)
HCT VFR BLD AUTO: 25.6 % (ref 37–48.5)
HGB BLD-MCNC: 8.4 G/DL (ref 12–16)
IMM GRANULOCYTES # BLD AUTO: 0.09 K/UL (ref 0–0.04)
IMM GRANULOCYTES NFR BLD AUTO: 0.7 % (ref 0–0.5)
LYMPHOCYTES # BLD AUTO: 2.4 K/UL (ref 1–4.8)
LYMPHOCYTES NFR BLD: 18.9 % (ref 18–48)
MCH RBC QN AUTO: 29.7 PG (ref 27–31)
MCHC RBC AUTO-ENTMCNC: 32.8 G/DL (ref 32–36)
MCV RBC AUTO: 91 FL (ref 82–98)
MONOCYTES # BLD AUTO: 1.1 K/UL (ref 0.3–1)
MONOCYTES NFR BLD: 8.7 % (ref 4–15)
NEUTROPHILS # BLD AUTO: 8.8 K/UL (ref 1.8–7.7)
NEUTROPHILS NFR BLD: 70.7 % (ref 38–73)
NRBC BLD-RTO: 0 /100 WBC
PLATELET # BLD AUTO: 178 K/UL (ref 150–350)
PMV BLD AUTO: 10.1 FL (ref 9.2–12.9)
POTASSIUM SERPL-SCNC: 4.1 MMOL/L (ref 3.5–5.1)
RBC # BLD AUTO: 2.83 M/UL (ref 4–5.4)
SODIUM SERPL-SCNC: 137 MMOL/L (ref 136–145)
WBC # BLD AUTO: 12.49 K/UL (ref 3.9–12.7)

## 2020-06-29 PROCEDURE — 85025 COMPLETE CBC W/AUTO DIFF WBC: CPT

## 2020-06-29 PROCEDURE — 25000003 PHARM REV CODE 250: Performed by: NURSE PRACTITIONER

## 2020-06-29 PROCEDURE — 25000003 PHARM REV CODE 250: Performed by: INTERNAL MEDICINE

## 2020-06-29 PROCEDURE — 25000003 PHARM REV CODE 250: Performed by: THORACIC SURGERY (CARDIOTHORACIC VASCULAR SURGERY)

## 2020-06-29 PROCEDURE — 99900035 HC TECH TIME PER 15 MIN (STAT)

## 2020-06-29 PROCEDURE — 99024 POSTOP FOLLOW-UP VISIT: CPT | Mod: ,,, | Performed by: PHYSICIAN ASSISTANT

## 2020-06-29 PROCEDURE — 94761 N-INVAS EAR/PLS OXIMETRY MLT: CPT

## 2020-06-29 PROCEDURE — 63600175 PHARM REV CODE 636 W HCPCS: Performed by: INTERNAL MEDICINE

## 2020-06-29 PROCEDURE — 97162 PT EVAL MOD COMPLEX 30 MIN: CPT

## 2020-06-29 PROCEDURE — C9399 UNCLASSIFIED DRUGS OR BIOLOG: HCPCS | Performed by: INTERNAL MEDICINE

## 2020-06-29 PROCEDURE — 25000003 PHARM REV CODE 250: Performed by: PHYSICIAN ASSISTANT

## 2020-06-29 PROCEDURE — 97165 OT EVAL LOW COMPLEX 30 MIN: CPT

## 2020-06-29 PROCEDURE — 97116 GAIT TRAINING THERAPY: CPT

## 2020-06-29 PROCEDURE — 21000000 HC CCU ICU ROOM CHARGE

## 2020-06-29 PROCEDURE — 82962 GLUCOSE BLOOD TEST: CPT

## 2020-06-29 PROCEDURE — 80048 BASIC METABOLIC PNL TOTAL CA: CPT

## 2020-06-29 PROCEDURE — 99024 PR POST-OP FOLLOW-UP VISIT: ICD-10-PCS | Mod: ,,, | Performed by: PHYSICIAN ASSISTANT

## 2020-06-29 RX ORDER — NYSTATIN 100000 [USP'U]/ML
500000 SUSPENSION ORAL 4 TIMES DAILY
Status: DISCONTINUED | OUTPATIENT
Start: 2020-06-29 | End: 2020-07-01 | Stop reason: HOSPADM

## 2020-06-29 RX ORDER — METOPROLOL TARTRATE 25 MG/1
25 TABLET, FILM COATED ORAL 2 TIMES DAILY
Status: DISCONTINUED | OUTPATIENT
Start: 2020-06-29 | End: 2020-06-29

## 2020-06-29 RX ORDER — POLYETHYLENE GLYCOL 3350 17 G/17G
17 POWDER, FOR SOLUTION ORAL DAILY
Status: DISCONTINUED | OUTPATIENT
Start: 2020-06-29 | End: 2020-07-01 | Stop reason: HOSPADM

## 2020-06-29 RX ORDER — METOPROLOL TARTRATE 25 MG/1
25 TABLET, FILM COATED ORAL 3 TIMES DAILY
Status: DISCONTINUED | OUTPATIENT
Start: 2020-06-29 | End: 2020-06-29

## 2020-06-29 RX ORDER — ENOXAPARIN SODIUM 100 MG/ML
40 INJECTION SUBCUTANEOUS
Status: DISCONTINUED | OUTPATIENT
Start: 2020-06-29 | End: 2020-07-01 | Stop reason: HOSPADM

## 2020-06-29 RX ORDER — METOPROLOL TARTRATE 25 MG/1
25 TABLET, FILM COATED ORAL 2 TIMES DAILY
Status: DISCONTINUED | OUTPATIENT
Start: 2020-06-29 | End: 2020-06-30

## 2020-06-29 RX ADMIN — MUPIROCIN 1 G: 20 OINTMENT TOPICAL at 02:06

## 2020-06-29 RX ADMIN — DOCUSATE SODIUM 100 MG: 100 CAPSULE, LIQUID FILLED ORAL at 09:06

## 2020-06-29 RX ADMIN — NYSTATIN 500000 UNITS: 500000 SUSPENSION ORAL at 05:06

## 2020-06-29 RX ADMIN — INSULIN DETEMIR 5 UNITS: 100 INJECTION, SOLUTION SUBCUTANEOUS at 09:06

## 2020-06-29 RX ADMIN — NYSTATIN 500000 UNITS: 500000 SUSPENSION ORAL at 01:06

## 2020-06-29 RX ADMIN — INSULIN ASPART 6 UNITS: 100 INJECTION, SOLUTION INTRAVENOUS; SUBCUTANEOUS at 07:06

## 2020-06-29 RX ADMIN — PANTOPRAZOLE SODIUM 40 MG: 40 TABLET, DELAYED RELEASE ORAL at 05:06

## 2020-06-29 RX ADMIN — CHLORHEXIDINE GLUCONATE 10 ML: 1.2 RINSE ORAL at 09:06

## 2020-06-29 RX ADMIN — OXYCODONE AND ACETAMINOPHEN 1 TABLET: 5; 325 TABLET ORAL at 02:06

## 2020-06-29 RX ADMIN — POLYETHYLENE GLYCOL 3350 17 G: 17 POWDER, FOR SOLUTION ORAL at 01:06

## 2020-06-29 RX ADMIN — HUMAN INSULIN 6 UNITS: 100 INJECTION, SOLUTION SUBCUTANEOUS at 09:06

## 2020-06-29 RX ADMIN — ESCITALOPRAM OXALATE 20 MG: 10 TABLET ORAL at 08:06

## 2020-06-29 RX ADMIN — FERROUS SULFATE TAB 325 MG (65 MG ELEMENTAL FE) 325 MG: 325 (65 FE) TAB at 09:06

## 2020-06-29 RX ADMIN — ATORVASTATIN CALCIUM 40 MG: 40 TABLET, FILM COATED ORAL at 09:06

## 2020-06-29 RX ADMIN — NYSTATIN 500000 UNITS: 500000 SUSPENSION ORAL at 09:06

## 2020-06-29 RX ADMIN — LISINOPRIL 5 MG: 5 TABLET ORAL at 09:06

## 2020-06-29 RX ADMIN — LORAZEPAM 1 MG: 1 TABLET ORAL at 09:06

## 2020-06-29 RX ADMIN — METOPROLOL TARTRATE 25 MG: 25 TABLET, FILM COATED ORAL at 09:06

## 2020-06-29 RX ADMIN — MUPIROCIN 1 G: 20 OINTMENT TOPICAL at 09:06

## 2020-06-29 RX ADMIN — INSULIN ASPART 6 UNITS: 100 INJECTION, SOLUTION INTRAVENOUS; SUBCUTANEOUS at 01:06

## 2020-06-29 RX ADMIN — OXYCODONE AND ACETAMINOPHEN 1 TABLET: 5; 325 TABLET ORAL at 01:06

## 2020-06-29 RX ADMIN — OXYCODONE AND ACETAMINOPHEN 1 TABLET: 5; 325 TABLET ORAL at 09:06

## 2020-06-29 RX ADMIN — ASPIRIN 325 MG ORAL TABLET 325 MG: 325 PILL ORAL at 08:06

## 2020-06-29 RX ADMIN — NORTRIPTYLINE HYDROCHLORIDE 100 MG: 25 CAPSULE ORAL at 09:06

## 2020-06-29 RX ADMIN — ENOXAPARIN SODIUM 40 MG: 100 INJECTION SUBCUTANEOUS at 05:06

## 2020-06-29 NOTE — NURSING
AMBULATED TO BATHROOM WITH MINIMUM ASSISTANCE.  PT. TOLERATED WELL. ENCOURAGED DEEP BREATHING. DRESSING TO MID STERNUM INTACT WITH SCANT SHADOWING NOTED.  LEFT INNER GROIN DRESSING X 2 CDI. GENERALIZED BRUISING NOTED. VOIDS SUFFICIENT QUANTITY. TEDS ON PPPX4 EXTREMETIES. BBS DIMINISHED AND CLEAR. INSTRUCTED TO CALL IF NEEDED. VERBALIZES UNDERSTANDING.

## 2020-06-29 NOTE — PROGRESS NOTES
[facility]  Cardiology  Progress Note    Patient Name: Nicole Harris  MRN: [id number]  Admission Date: 6/23/2020  Hospital Length of Stay: 3 days  Code Status: Full Code   Attending Physician: Dede Hidalgo MD   Primary Care Physician: Vira Conn NP  Expected Discharge Date:   Principal Problem:Chest pain    Subjective:       Interval History: Patient sitting up in chair with no acute distress noted.     ROS     Review of Systems   Constitution: Negative for diaphoresis and fever.   HENT: Negative for nosebleeds.    Cardiovascular: Negative for chest pain, dyspnea on exertion, leg swelling and palpitations.   Respiratory: Negative for shortness of breath and wheezing.    Hematologic/Lymphatic: Negative for bleeding problem. Does not bruise/bleed easily.   Skin: Negative for color change and rash.   Musculoskeletal: Negative for falls and myalgias.   Gastrointestinal: Negative for hematemesis and hematochezia.   Genitourinary: Negative for hematuria.   Neurological: Negative for dizziness and light-headedness.   Psychiatric/Behavioral: Negative for altered mental status and memory loss.       Objective:     Vital Signs (Most Recent):  Temp: 99 °F (37.2 °C) (06/29/20 0701)  Pulse: 102 (06/29/20 1052)  Resp: 16 (06/29/20 1052)  BP: 105/71 (06/29/20 1000)  SpO2: 99 % (06/29/20 1052) Vital Signs (24h Range):  Temp:  [97.8 °F (36.6 °C)-99 °F (37.2 °C)] 99 °F (37.2 °C)  Pulse:  [] 102  Resp:  [13-34] 16  SpO2:  [84 %-100 %] 99 %  BP: ()/(54-80) 105/71     Weight: 85 kg (187 lb 6.3 oz)  Body mass index is 32.17 kg/m².    SpO2: 99 %  O2 Device (Oxygen Therapy): room air      Intake/Output Summary (Last 24 hours) at 6/29/2020 1122  Last data filed at 6/29/2020 1000  Gross per 24 hour   Intake 1100 ml   Output 4100 ml   Net -3000 ml       Lines/Drains/Airways     Central Venous Catheter Line            Percutaneous Central Line Insertion/Assessment - Triple Lumen  06/26/20 1350 2 days                 Scheduled Meds:   aspirin  325 mg Oral Daily    atorvastatin  40 mg Oral QHS    chlorhexidine  10 mL Mouth/Throat BID    docusate sodium  100 mg Oral Daily    enoxparin  40 mg Subcutaneous Q24H    escitalopram oxalate  20 mg Oral Daily    ferrous sulfate  325 mg Oral Daily    lisinopriL  5 mg Oral Daily    metoprolol tartrate  25 mg Oral BID    mupirocin  1 g Nasal BID    nortriptyline  100 mg Oral QHS    nystatin  500,000 Units Oral QID    pantoprazole  40 mg Oral Before breakfast     Continuous Infusions:    PRN Meds:.acetaminophen, acetaminophen, albuterol, dextrose 50%, dextrose 50%, glucagon (human recombinant), glucose, glucose, insulin aspart U-100, LORazepam, melatonin, morphine, ondansetron, oxyCODONE-acetaminophen     Physical Exam  HEENT: Normocephalic, atraumatic, PERRL, Conjunctiva pink, no scleral icterus.  Right IJ CVL in place.  CVS: S1S2+, RRR, no murmurs, rubs or gallops, tachycardia.  LUNGS: Clear  ABDOMEN: Soft, NT, BS+  EXTREMITIES: No cyanosis, clubbing or edema  NEURO: AAO x3.   STERNOTOMY: C/D/I    Significant Labs:   BMP:   Recent Labs   Lab 06/28/20  0330 06/29/20  0414   * 271*   * 137   K 4.5 4.1    98   CO2 26 29   BUN 13 11   CREATININE 0.6 0.6   CALCIUM 7.7* 8.3*   , CMP   Recent Labs   Lab 06/28/20  0330 06/29/20  0414   * 137   K 4.5 4.1    98   CO2 26 29   * 271*   BUN 13 11   CREATININE 0.6 0.6   CALCIUM 7.7* 8.3*   ANIONGAP 6* 10   ESTGFRAFRICA >60.0 >60.0   EGFRNONAA >60.0 >60.0   , CBC   Recent Labs   Lab 06/28/20  0300 06/29/20  0414   WBC 21.37* 12.49   HGB 9.1* 8.4*   HCT 27.6* 25.6*    178   , INR No results for input(s): INR, PROTIME in the last 48 hours., Lipid Panel No results for input(s): CHOL, HDL, LDLCALC, TRIG, CHOLHDL in the last 48 hours. and Troponin No results for input(s): TROPONINI in the last 48 hours.    Significant Imaging: Reviewed  Assessment and Plan:     IMPRESSION:  Severe left  main and multivessel CAD s/p CABG X 3 on 6/26/2020  Hypertension  Diabetes.  Dyslipidemia  Postoperative anemia- hgb 8.4 today     PLAN:    1. She has been up walking in the hallway and using IS.   2. Continue walking and OOB to chair often.   3. Encourage IS.  4. HR in the low 100's but BP has been borderline low. Will continue metoprolol tartrate 25 mg po BID.  5. Chest tubes have been removed. Transfer to Cardio A when bed is available.   6. H/H has trended down slightly over the past few days. Repeat H/H this afternoon and continue to trend labs daily.     Odette Leroy, SAVANNAH  Cardiology  [facility]

## 2020-06-29 NOTE — PLAN OF CARE
This note also relates to the following rows which could not be included:  SpO2 - Cannot attach notes to unvalidated device data  Pulse - Cannot attach notes to unvalidated device data       06/29/20 1052   Patient Assessment/Suction   Level of Consciousness (AVPU) alert   All Lung Fields Breath Sounds clear;diminished   PRE-TX-O2   O2 Device (Oxygen Therapy) room air   Pulse Oximetry Type Continuous   $ Pulse Oximetry - Multiple Charge Pulse Oximetry - Multiple   Resp 16   Aerosol Therapy   $ Aerosol Therapy Charges PRN treatment not required   Respiratory Evaluation   $ Care Plan Tech Time 15 min

## 2020-06-29 NOTE — PT/OT/SLP EVAL
Physical Therapy Evaluation    Patient Name:  Nicole Harris   MRN:  [id number]    Recommendations:     Discharge Recommendations:  home(pt may or may not need HH upon dc. Will cont to assess)   Discharge Equipment Recommendations: none   Barriers to discharge: None    Assessment:     Nicole Harris is a 50 y.o. female admitted with a medical diagnosis of Chest pain. Pt is s/p CABG x3 6/26/20. She presents with the following impairments/functional limitations:  impaired endurance, impaired self care skills, impaired functional mobilty, gait instability, impaired balance, pain. Pt agrees to PT/OT eval. Pt in bathroom with RN. RN reports min A to get OOB. Sternal precautions reviewed. Good vital signs. Pt had one episode of O2 dropping to 88% w/o symptoms but with pursed lip breathing, came up to 91%. BP before rx 103/73 , BP after ambulation 137/80 . HR during ambulation 127, no sob.     Rehab Prognosis: Good; patient would benefit from acute skilled PT services to address these deficits and reach maximum level of function.    Recent Surgery: Procedure(s) (LRB):  CORONARY ARTERY BYPASS GRAFT (CABG) (N/A)  SURGICAL PROCUREMENT, VEIN, ENDOSCOPIC (Left) 3 Days Post-Op    Plan:     During this hospitalization, patient to be seen 5 x/week to address the identified rehab impairments via gait training, therapeutic activities, therapeutic exercises and progress toward the following goals:    · Plan of Care Expires:  07/29/20    Subjective     Chief Complaint: back pain  Patient/Family Comments/goals: to go home   Pain/Comfort:  · Pain Rating 1: (pt did not give number but has chronic back pain)  · Location 1: back    Patients cultural, spiritual, Yazidi conflicts given the current situation:      Living Environment:  Pt lives alone but will go home and stay with daughter and son in law and father. 2 steps to enter. Pt reports her father has a rollator for her to use and BSC if needed.   Prior to  admission, patients level of function was independent.  Equipment used at home: none.  DME owned (not currently used): none.  Upon discharge, patient will have assistance from family.    Objective:     Communicated with rn prior to session.  Patient found in bathroom with rn with central line, peripheral IV, pulse ox (continuous), telemetry, blood pressure cuff  upon PT entry to room.    General Precautions: Standard, fall, sternal   Orthopedic Precautions:N/A   Braces: N/A     Exams:  · Cognitive Exam:  Patient is oriented to Person, Place, Time and Situation  · Gross Motor Coordination:  WFL  · RLE ROM: WFL  · RLE Strength: WFL  · LLE ROM: WFL  · LLE Strength: WFL    Functional Mobility:  · Transfers:     · Sit to Stand:  contact guard assistance with rolling walker with instructions for sternal precautions.   · Gait: pt able to ambulate 125 ft RW cga for safety, cues needed for pursed lip breathing room air 88-91% and . One occasion of mild dizziness. Pt requires use of RW for safe ambulation today.     Therapeutic Activities and Exercises:   education, fall prevention, poc, dc planning, pursed lip breathing, sternal precautions.    AM-PAC 6 CLICK MOBILITY  Total Score:18     Patient left up in chair with all lines intact, call button in reach and rn notified.     GOALS:   Multidisciplinary Problems     Physical Therapy Goals        Problem: Physical Therapy Goal    Goal Priority Disciplines Outcome Goal Variances Interventions   Physical Therapy Goal     PT, PT/OT      Description: Goals to be met by: discharge     Patient will increase functional independence with mobility by performin. Supine to sit with Stand-by Assistance  2. Sit to stand transfer with Supervision  3. Bed to chair transfer with Supervision using Rolling Walker  4. Gait  x 300 feet with Supervision using Rolling Walker or no assistive device.                      History:     Past Medical History:   Diagnosis Date    Anxiety      Bipolar disorder     Depression     Diabetes mellitus, type 2     HTN (hypertension)     Insomnia        Past Surgical History:   Procedure Laterality Date    ANGIOGRAM, CORONARY, WITH LEFT HEART CATHETERIZATION Left 2020    Procedure: Left heart cath;  Surgeon: Kannan Santana MD;  Location: [facility] CATH/EP LAB;  Service: Cardiology;  Laterality: Left;     SECTION      COLONOSCOPY N/A 2019    Procedure: COLONOSCOPY;  Surgeon: Da Diallo MD;  Location: [facility] ENDO;  Service: General;  Laterality: N/A;    CORONARY ARTERY BYPASS GRAFT (CABG) N/A 2020    Procedure: CORONARY ARTERY BYPASS GRAFT (CABG);  Surgeon: Anshul Oakes MD;  Location: [facility] OR;  Service: Cardiothoracic;  Laterality: N/A;    ENDOSCOPIC HARVEST OF VEIN Left 2020    Procedure: SURGICAL PROCUREMENT, VEIN, ENDOSCOPIC;  Surgeon: Anshul Oakes MD;  Location: [facility] OR;  Service: Cardiothoracic;  Laterality: Left;    EPIDURAL STEROID INJECTION N/A 2019    Procedure: Injection, Steroid, Epidural - L4/5 EPIDURAL STEROID INJECTION;  Surgeon: Sherri Gardiner MD;  Location: [facility] OR;  Service: Pain Management;  Laterality: N/A;    EPIDURAL STEROID INJECTION N/A 2019    Procedure: Injection, Steroid, Epidural - C7/T1 EPIDURAL STEROID INJECTION;  Surgeon: Sherri Gardiner MD;  Location: [facility] OR;  Service: Pain Management;  Laterality: N/A;    EPIDURAL STEROID INJECTION N/A 2019    Procedure: Injection, Steroid, Epidural - L4/5 EPIDURAL STEROID INJECTION;  Surgeon: Sherri Gardiner MD;  Location: [facility] OR;  Service: Pain Management;  Laterality: N/A;    EPIDURAL STEROID INJECTION N/A 10/21/2019    Procedure: Injection, Steroid, Epidural, CERVICAL C7/T1 SILVIA;  Surgeon: Sherri Gardiner MD;  Location: [facility] OR;  Service: Pain Management;  Laterality: N/A;  19    ESOPHAGOGASTRODUODENOSCOPY N/A 2019    Procedure: ESOPHAGOGASTRODUODENOSCOPY (EGD);  Surgeon: Da Diallo MD;  Location:  [facility] ENDO;  Service: General;  Laterality: N/A;    TONSILLECTOMY      TRIGGER POINT INJECTION N/A 7/8/2019    Procedure: INJECTION, TRIGGER POINT - CERVICAL REGION;  Surgeon: Sherri Gardiner MD;  Location: [facility] OR;  Service: Pain Management;  Laterality: N/A;    TRIGGER POINT INJECTION N/A 10/21/2019    Procedure: INJECTION, TRIGGER POINT;  Surgeon: Sherri Gardiner MD;  Location: [facility] OR;  Service: Pain Management;  Laterality: N/A;       Time Tracking:     PT Received On: 06/29/20  PT Start Time: 0915     PT Stop Time: 0935  PT Total Time (min): 20 min     Billable Minutes: Evaluation 12 and Gait Training 8      Chiquita Cervantes, PT  06/29/2020

## 2020-06-29 NOTE — PROGRESS NOTES
[facility] Medicine  Progress Note    Patient name: Nicole Harris  MRN: [id number]  Admit Date: 6/23/2020   LOS: 3 days     SUBJECTIVE:     Principal problem: Chest pain    Interval History:    S/p  CABG surgery POD 2  Patient appears to be in positive spirits  Downgraded to Cardio a  PT OT appreciated cleared for discharge  Reactive Leukocytosis resolved  Cardiology  follow-up appreciated, remain tachycardic borderline blood pressure   central line can be removed prior to discharge    Hospital course  Patient was admitted for the evaluation of chest pain with EKG changes T-wave inversion in lead V1 to V3 without troponin elevation, underwent stress test which showed small anti lateral wall near the apex reversible defect, patient underwent LHC by Dr Fuller, next day found to have three-vessel coronary artery disease with severe distal left main ostial LAD and ostial circumflex stenosis. CTS consulted, apparently patient did not have insurance,  consulted for clearance for procedure, patient had CABG on  6/25/20 successfully by Dr Oakes, postop course noted to have transient mood swing with agitation with nursing, resolved uneventfully in ICU, mediastinal drain and chest tube removed next day.  Patient also concerned about STD with the recent sexual encounter, requested STD screen and candidiasis, empirically treated with fluconazole prior to the procedure, STD test results pending.HIV neg.  Noted to have uncontrolled blood sugar with A1c of 10.3, blood sugar in control improving with insulin.  Postop mild reactive leukocytosis gradually resolved.  Hemoglobin trending down slowly, need close monitor, physical therapy eval appreciated, patient can go home on discharge.     Scheduled Meds:   aspirin  325 mg Oral Daily    atorvastatin  40 mg Oral QHS    chlorhexidine  10 mL Mouth/Throat BID    docusate sodium  100 mg Oral Daily    enoxparin  40 mg Subcutaneous Q24H     escitalopram oxalate  20 mg Oral Daily    ferrous sulfate  325 mg Oral Daily    insulin detemir U-100  5 Units Subcutaneous QHS    lisinopriL  5 mg Oral Daily    metoprolol tartrate  25 mg Oral BID    mupirocin  1 g Nasal BID    nortriptyline  100 mg Oral QHS    nystatin  500,000 Units Oral QID    pantoprazole  40 mg Oral Before breakfast    polyethylene glycol  17 g Oral Daily     Continuous Infusions:    PRN Meds:acetaminophen, acetaminophen, albuterol, insulin regular, LORazepam, melatonin, morphine, ondansetron, oxyCODONE-acetaminophen    Review of patient's allergies indicates:   Allergen Reactions    Hydrocodone Nausea And Vomiting       Review of Systems: As per interval history    OBJECTIVE:     Vital Signs (Most Recent)  Temp: 98.5 °F (36.9 °C) (06/29/20 1524)  Pulse: 104 (06/29/20 1524)  Resp: 17 (06/29/20 1524)  BP: (!) 96/53 (06/29/20 1524)  SpO2: (!) 94 % (06/29/20 1524)    Vital Signs Range (Last 24H):  Temp:  [97.8 °F (36.6 °C)-99 °F (37.2 °C)]   Pulse:  [102-126]   Resp:  [13-34]   BP: ()/(53-80)   SpO2:  [84 %-99 %]     I & O (Last 24H):    Intake/Output Summary (Last 24 hours) at 6/29/2020 1751  Last data filed at 6/29/2020 1522  Gross per 24 hour   Intake 240 ml   Output 3000 ml   Net -2760 ml       Physical Exam:  General: Patient resting comfortably in no acute distress. Appears as stated age. Calm obese appears anxious  Eyes: No conjunctival injection. No scleral icterus.  ENT: Hearing grossly intact. No discharge from ears. No nasal discharge.   Neck: Supple, trachea midline. No JVDmild low  posterior neck tenderness, right IJ  CVS:  sternotomy dressing intact dry RRR.  No LE edema BL  Lungs: CTA BL, no wheezing or crackles. Good breath sounds. No accessory muscle use. No acute respiratory distress  Abdomen:  Soft, nontender and nondistended.  No organomegaly  Neuro: AOx3. Moves all extremities. Follows commands. Responds appropriately   Skin:  No rash or erythema  noted      Laboratory:  CBC:   Recent Labs   Lab 06/29/20  0414   WBC 12.49   RBC 2.83*   HGB 8.4*   HCT 25.6*      MCV 91   MCH 29.7   MCHC 32.8     CMP:   Recent Labs   Lab 06/22/20  2226  06/29/20  0414   *   < > 271*   CALCIUM 9.4   < > 8.3*   ALBUMIN 4.4  --   --    PROT 7.2  --   --    *   < > 137   K 4.0   < > 4.1   CO2 21*   < > 29      < > 98   BUN 23*   < > 11   CREATININE 0.7   < > 0.6   ALKPHOS 79  --   --    ALT 32  --   --    AST 18  --   --    BILITOT 0.8  --   --     < > = values in this interval not displayed.         ASSESSMENT/PLAN:     [place] now is in  Active Hospital Problems    Diagnosis  POA    *Chest pain [R07.9]  Yes    Anemia [D64.9]  No    Other constipation [K59.09]  No    S/P CABG x 3 [Z95.1]  Not Applicable    Coronary artery disease involving native coronary artery of native heart with unstable angina pectoris [I25.110]  Yes    Unstable angina [I20.0]  Yes    Abnormal stress test [R94.39]  Yes    Nonspecific abnormal electrocardiogram (ECG) (EKG) [R94.31]  Yes    COPD (chronic obstructive pulmonary disease) [J44.9]  Yes    Essential hypertension [I10]  Yes    Diabetes mellitus [E11.9]  Yes      Resolved Hospital Problems   No resolved problems to display.         Plan:   S/p CABG on 6/26/20 for Severe left main and multivessel CAD  Hx of smoking  Continue with cardiac monitoring for  Trend CBC hemoglobin and leukocytosis   Diabetes uncontrolled-continue with insulin drip  Monitor and replace electrolytes  Lexiscan stress test positive  Found to have three-vessel coronary artery disease with severe distal left main, ostial LAD, and ostial circumflex stenosis.   Aspirin nitroglycerin, statin and beta-blocker    Cardiology and CT surgery consult appreciated    VTE Risk Mitigation (From admission, onward)         Ordered     enoxaparin injection 40 mg  Every 24 hours (non-standard times)      06/29/20 1103                  [facility]  Medicine  [facility]  Dede Hidalgo MD  Date of service: 06/29/2020

## 2020-06-29 NOTE — NURSING
Full report called to cardio A RN  Pat,   Transferred via  with this RN, Personal belongings transferred also, including cell phone, , personal clothing and toiletries.

## 2020-06-29 NOTE — PROGRESS NOTES
"[facility]  Adult Nutrition   Progress Note (Follow-Up)    SUMMARY     Recommendations  Recommendation/Intervention:   1) Add unjury BID.   2) Heart healthy diet education handout and contact information provided. Pt declined verbal education. Encouraged pt to call with questions.   RD will continue to monitor.    Goals: 1. Diet to be initiated and advanced to 2000 calorie Diabetic; Cardiac Diet. 2. Patient to meet at least 75% of estimated energy and protein needs. 3. RD to provide diet education.  Nutrition Goal Status: progressing towards goal  Communication of RD Recs: reviewed with RN    Dietitian Rounds Brief  Patient reports tolerating diet well, eating >50% of meals. Patient had no c/o N/V/D and main c/o is thirst. Patient accepted education handouts but not interested in verbal diet education at this time. RD reviewed what I had brought to her and my contact information.    Reason for Assessment  Reason For Assessment: RD follow-up  Diagnosis: cardiac disease  Relevant Medical History: anxiety, bipolar disorder, type 2 DM, HTN, insomnia, COPD  Interdisciplinary Rounds: attended  General Information Comments: Plans for CABG today    Nutrition Risk Screen  Nutrition Risk Screen: no indicators present    Nutrition/Diet History  Food Allergies: NKFA  Factors Affecting Nutritional Intake: None identified at this time    Anthropometrics  Temp: 98.8 °F (37.1 °C)  Height Method: Stated  Height: 5' 4" (162.6 cm)  Height (inches): 64 in  Weight Method: Standard Scale  Weight: 85 kg (187 lb 6.3 oz)  Weight (lb): 187.39 lb  Ideal Body Weight (IBW), Female: 120 lb  % Ideal Body Weight, Female (lb): 160.93 %  BMI (Calculated): 32.1  BMI Grade: 30 - 34.9- obesity - grade I       Weight History:  Wt Readings from Last 10 Encounters:   06/25/20 85 kg (187 lb 6.3 oz)   06/22/20 88 kg (194 lb)   06/23/20 88 kg (194 lb 0.1 oz)   06/17/20 87.6 kg (193 lb 2 oz)   06/10/20 88.5 kg (195 lb)   12/13/19 89.8 kg (198 " lb)   12/12/19 89.8 kg (198 lb)   11/04/19 86.2 kg (190 lb)   11/01/19 88.5 kg (195 lb 1.7 oz)   10/21/19 88.5 kg (195 lb)       Lab/Procedures/Meds: Pertinent Labs Reviewed  Clinical Chemistry:  Recent Labs   Lab 06/22/20 2226 06/27/20  0600  06/29/20  0414   *   < > 142   < > 137   K 4.0   < > 3.9   < > 4.1      < > 110   < > 98   CO2 21*   < > 23   < > 29   *   < > 224*   < > 271*   BUN 23*   < > 11   < > 11   CREATININE 0.7   < > 0.7   < > 0.6   CALCIUM 9.4   < > 8.3*   < > 8.3*   PROT 7.2  --   --   --   --    ALBUMIN 4.4  --   --   --   --    BILITOT 0.8  --   --   --   --    ALKPHOS 79  --   --   --   --    AST 18  --   --   --   --    ALT 32  --   --   --   --    ANIONGAP 11   < > 9   < > 10   ESTGFRAFRICA >60.0   < > >60.0   < > >60.0   EGFRNONAA >60.0   < > >60.0   < > >60.0   MG  --    < > 1.7  --   --     < > = values in this interval not displayed.     CBC:   Recent Labs   Lab 06/29/20  0414   WBC 12.49   RBC 2.83*   HGB 8.4*   HCT 25.6*      MCV 91   MCH 29.7   MCHC 32.8     Cardiac Profile:  Recent Labs   Lab 06/22/20 2226 06/23/20  0337 06/23/20  1046   BNP 16  --   --    TROPONINI <0.01* <0.030 <0.030     Diabetes:  Recent Labs   Lab 06/23/20 0337   HGBA1C 10.3*     Medications: Pertinent Medications reviewed  Scheduled Meds:   aspirin  325 mg Oral Daily    atorvastatin  40 mg Oral QHS    chlorhexidine  10 mL Mouth/Throat BID    docusate sodium  100 mg Oral Daily    enoxparin  40 mg Subcutaneous Q24H    escitalopram oxalate  20 mg Oral Daily    ferrous sulfate  325 mg Oral Daily    insulin detemir U-100  5 Units Subcutaneous QHS    lisinopriL  5 mg Oral Daily    metoprolol tartrate  25 mg Oral BID    mupirocin  1 g Nasal BID    nortriptyline  100 mg Oral QHS    nystatin  500,000 Units Oral QID    pantoprazole  40 mg Oral Before breakfast    polyethylene glycol  17 g Oral Daily     Continuous Infusions:  PRN Meds:.acetaminophen, acetaminophen, albuterol,  insulin regular, LORazepam, melatonin, morphine, ondansetron, oxyCODONE-acetaminophen    Estimated/Assessed Needs  Weight Used For Calorie Calculations: 85 kg (187 lb 6.3 oz)  Energy Calorie Requirements (kcal): 1700 - 2125 (20 - 25 kcal/kg)  Energy Need Method: Kcal/kg  Protein Requirements: 110 (2 g/kg IBW)  Weight Used For Protein Calculations: 55 kg (121 lb 4.1 oz)     Estimated Fluid Requirement Method: RDA Method  RDA Method (mL): 1700       Nutrition Prescription Ordered  Current Diet Order: diabetic    Evaluation of Received Nutrient/Fluid Intake  Energy Calories Required: not meeting needs  Protein Required: not meeting needs  Fluid Required: meeting needs  Tolerance: tolerating     Intake/Output Summary (Last 24 hours) at 6/29/2020 1415  Last data filed at 6/29/2020 1000  Gross per 24 hour   Intake 1100 ml   Output 4100 ml   Net -3000 ml      % Intake of Estimated Energy Needs: 50 - 75 %  % Meal Intake: 50 - 75 %    Nutrition Risk  Level of Risk/Frequency of Follow-up: moderate     Monitor and Evaluation  Food and Nutrient Intake: energy intake, food and beverage intake  Food and Nutrient Adminstration: diet order  Knowledge/Beliefs/Attitudes: beliefs and attitudes, food and nutrition knowledge/skill  Physical Activity and Function: nutrition-related ADLs and IADLs, factors affecting access to physical activity  Anthropometric Measurements: weight, weight change, body mass index  Biochemical Data, Medical Tests and Procedures: electrolyte and renal panel, inflammatory profile, gastrointestinal profile, lipid profile, glucose/endocrine profile  Nutrition-Focused Physical Findings: overall appearance     Nutrition Follow-Up  RD Follow-up?: Yes

## 2020-06-29 NOTE — PROGRESS NOTES
Cardiac Rehab     Nicole Harris   [id number]   6/29/2020         Cardiac Rehab Phase Taught: Phase 1    Teaching Method: Verbal    Handouts: None    Educational Videos: Recovery - Your First Few Days    Understanding:  Knowledge indicated by feedback, Learning indicated by feedback and Verbalize understanding    Comments: pt sitting up in chair, review of postop education. Pt assisted to restroom with minimal assist. Pt tolerated well. Review of IS use. Pt voiced understanding. Will follow    Total Time Spent:15mins            Agueda Nolen RN

## 2020-06-29 NOTE — PT/OT/SLP EVAL
Occupational Therapy   Evaluation    Name: Nicole Harris  MRN: [id number]  Admitting Diagnosis:  Chest pain 3 Days Post-Op    Recommendations:     Discharge Recommendations: home  Discharge Equipment Recommendations:  none  Barriers to discharge:  None    Assessment:     Nicole Harris is a 50 y.o. female with a medical diagnosis of Chest pain.  Pt agreeable to OT/PT co-eval this AM. Performance deficits affecting function: impaired endurance, impaired self care skills, impaired functional mobilty, gait instability, impaired balance, decreased upper extremity function, pain, orthopedic precautions.  Pt oriented X 4, no family at bedside. Pt lives alone, but reports she will be staying with her daughter, son-in-law, and father at d/c. Rec home with family at d/c.     Rehab Prognosis: Good; patient would benefit from acute skilled OT services to address these deficits and reach maximum level of function.       Plan:     Patient to be seen 5 x/week to address the above listed problems via self-care/home management, therapeutic activities, therapeutic exercises  · Plan of Care Expires: 07/29/20  · Plan of Care Reviewed with: patient    Subjective     Chief Complaint: Chronic back pain  Patient/Family Comments/goals: to return home    Occupational Profile:  Living Environment: Pt lives alone, but will be staying with family at d/c in a 1 story home with 2 steps to enter. Pt has a walk-in shower with a shower chair and a raised toilet.   Previous level of function: Independent ADLs/functional mobility   Roles and Routines: drives  Equipment Used at Home:  none(Pt reports she has a BSC, showerchair, and rollator she can use at home at d/c)  Assistance upon Discharge: yes, from family    Pain/Comfort:  · Pain Rating 1: (not rated)  · Location 1: (chronic back pain)    Patients cultural, spiritual, [employer] conflicts given the current situation:      Objective:     Communicated with: nursing prior to session.  Patient  found on toilet with RN with central line, peripheral IV, pulse ox (continuous), telemetry, blood pressure cuff upon OT entry to room.    General Precautions: Standard, fall, sternal   Orthopedic Precautions:N/A   Braces: N/A     Occupational Performance:    Functional Mobility/Transfers:  · Patient completed Sit <> Stand Transfer with contact guard assistance  with  rolling walker cues for sternal precautions  · Functional Mobility: pt amb down dozier with PT with CGA with RW    Activities of Daily Living:  · Grooming: stand by assistance standing at sink to wash hands  · Toileting: supervision for hygiene    Cognitive/Visual Perceptual:  Cognitive/Psychosocial Skills:     -       Oriented to: Person, Place, Time and Situation   -       Follows Commands/attention:Follows multistep  commands  -       Communication: clear/fluent  -       Memory: No Deficits noted  -       Safety awareness/insight to disability: intact   -       Mood/Affect/Coping skills/emotional control: Appropriate to situation, Cooperative and Pleasant    Physical Exam:  Balance:    -       SBA seated balance; CGA standing balance   Upper Extremity Range of Motion:     -       Right Upper Extremity: WFL within sternal precautions  -       Left Upper Extremity: WFL within sternal precautions  Upper Extremity Strength:    -       Right Upper Extremity: NT 2/2 sternal precautions  -       Left Upper Extremity: NT 2/2 sternal precautions   Strength:    -       Right Upper Extremity: WFL  -       Left Upper Extremity: WFL  Fine Motor Coordination:    -       Intact  Gross motor coordination:   WFL    AMPAC 6 Click ADL:  AMPAC Total Score: 19    Treatment & Education:  Pt educated on role of OT/POC, sternal precautions, and safety during ADLs, transfers, and functional mobility.   Education:    Patient left up in chair with all lines intact and call button in reach    GOALS:   Multidisciplinary Problems     Occupational Therapy Goals        Problem:  Occupational Therapy Goal    Goal Priority Disciplines Outcome Interventions   Occupational Therapy Goal     OT, PT/OT     Description: Goals to be met by: discharge    Patient will increase functional independence with ADLs by performing:    LE Dressing with Supervision and Assistive Devices as needed.  Grooming while standing at sink with Supervision.  Toileting from toilet with Modified [place] for hygiene and clothing management.   Toilet transfer to toilet with Supervision.                     History:     Past Medical History:   Diagnosis Date    Anxiety     Bipolar disorder     Depression     Diabetes mellitus, type 2     HTN (hypertension)     Insomnia        Past Surgical History:   Procedure Laterality Date    ANGIOGRAM, CORONARY, WITH LEFT HEART CATHETERIZATION Left 2020    Procedure: Left heart cath;  Surgeon: Kannan Santana MD;  Location: [facility] CATH/EP LAB;  Service: Cardiology;  Laterality: Left;     SECTION      COLONOSCOPY N/A 2019    Procedure: COLONOSCOPY;  Surgeon: Da Diallo MD;  Location: [facility] ENDO;  Service: General;  Laterality: N/A;    CORONARY ARTERY BYPASS GRAFT (CABG) N/A 2020    Procedure: CORONARY ARTERY BYPASS GRAFT (CABG);  Surgeon: Anshul Oakes MD;  Location: [facility] OR;  Service: Cardiothoracic;  Laterality: N/A;    ENDOSCOPIC HARVEST OF VEIN Left 2020    Procedure: SURGICAL PROCUREMENT, VEIN, ENDOSCOPIC;  Surgeon: Anshul Oakes MD;  Location: [facility] OR;  Service: Cardiothoracic;  Laterality: Left;    EPIDURAL STEROID INJECTION N/A 2019    Procedure: Injection, Steroid, Epidural - L4/5 EPIDURAL STEROID INJECTION;  Surgeon: Sherri Gardiner MD;  Location: [facility] OR;  Service: Pain Management;  Laterality: N/A;    EPIDURAL STEROID INJECTION N/A 2019    Procedure: Injection, Steroid, Epidural - C7/T1 EPIDURAL STEROID INJECTION;  Surgeon: Sherri Gardiner MD;  Location: [facility] OR;  Service: Pain Management;  Laterality:  N/A;    EPIDURAL STEROID INJECTION N/A 7/8/2019    Procedure: Injection, Steroid, Epidural - L4/5 EPIDURAL STEROID INJECTION;  Surgeon: Sherri Gardiner MD;  Location: [facility] OR;  Service: Pain Management;  Laterality: N/A;    EPIDURAL STEROID INJECTION N/A 10/21/2019    Procedure: Injection, Steroid, Epidural, CERVICAL C7/T1 SILVIA;  Surgeon: Sherri Gardiner MD;  Location: [facility] OR;  Service: Pain Management;  Laterality: N/A;  09-23-19    ESOPHAGOGASTRODUODENOSCOPY N/A 9/18/2019    Procedure: ESOPHAGOGASTRODUODENOSCOPY (EGD);  Surgeon: Da Diallo MD;  Location: [facility] ENDO;  Service: General;  Laterality: N/A;    TONSILLECTOMY      TRIGGER POINT INJECTION N/A 7/8/2019    Procedure: INJECTION, TRIGGER POINT - CERVICAL REGION;  Surgeon: Sherri Gardiner MD;  Location: [facility] OR;  Service: Pain Management;  Laterality: N/A;    TRIGGER POINT INJECTION N/A 10/21/2019    Procedure: INJECTION, TRIGGER POINT;  Surgeon: Sherri Gardiner MD;  Location: [facility] OR;  Service: Pain Management;  Laterality: N/A;       Time Tracking:     OT Date of Treatment: 06/29/20  OT Start Time: 0914  OT Stop Time: 0934  OT Total Time (min): 20 min    Billable Minutes:Evaluation 10    Hailey Stanley, OT  6/29/2020

## 2020-06-29 NOTE — PROGRESS NOTES
"CVT SURGERY PROGRESS NOTE  Nicole Harris  50 y.o.  1970    Patient's Chief Complaint:  Chest pain    Subjective:  Symptoms:  Stable.  No shortness of breath.    Diet:  Adequate intake.    Activity level: Returning to normal.    Pain:  She complains of pain that is mild.  She reports pain is improving.  Pain is well controlled.            HPI:  50 year old female with hx of HTN, DM, fibromyalgia, chronic pain [facility] with complaints of chest pain with abnormal EKG. She had an abnormal stress test followed by [facility] that revealed multivessel CAD. CVTS consulted for CABG evaluation. Dr Villagomez performed CABG x 3 (DEE DEE-LAD, SVG-D1, SVG-OM) on 6/26/2020.     Last 24 hour interval:  -MS tube, pleural drain and pacer wires removed 6/28/2020  -Ambulated in dozier  -IS 1250  -Pt seen this afternoon, sitting up in chair on RA with stable oxygenation. She reports no BM in several days and complaints of her usual chronic back pain. Pt request Miralax.  -Discussed with nurse, surgical site dressings may be removed and open to air, central line to be removed                                                        Objective:  General Appearance:  In no acute distress and comfortable.    Vital signs: (most recent): Blood pressure 105/71, pulse 102, temperature 99 °F (37.2 °C), temperature source Oral, resp. rate 16, height 5' 4" (1.626 m), weight 85 kg (187 lb 6.3 oz), last menstrual period 05/03/2020, SpO2 99 %, not currently breastfeeding.    Output: Producing urine.    Lungs:  Normal effort and normal respiratory rate.  Breath sounds clear to auscultation.  She is not in respiratory distress.    Heart: Normal rate.  Regular rhythm.  No murmur.   Chest: Symmetric chest wall expansion. (Sternal incision with dressing in place, chest tube site with dressing in place)  Abdomen: Abdomen is soft and distended.  Hyperactive bowel sounds.   There is no abdominal tenderness.  "    Extremities: (LLE incision site with dressing in place)  Neurological: Patient is alert and oriented to person, place and time.    Skin:  Warm and dry.      Recent Vitals:  Vitals:    06/29/20 0930 06/29/20 0945 06/29/20 1000 06/29/20 1052   BP: 137/80  105/71    Pulse: (!) 119  104 102   Resp: (!) 25 (!) 24 19 16   Temp:       TempSrc:       SpO2: (!) 90%  (!) 89% 99%   Weight:       Height:           INPATIENT MEDS     aspirin  325 mg Oral Daily    atorvastatin  40 mg Oral QHS    chlorhexidine  10 mL Mouth/Throat BID    docusate sodium  100 mg Oral Daily    enoxparin  40 mg Subcutaneous Q24H    escitalopram oxalate  20 mg Oral Daily    ferrous sulfate  325 mg Oral Daily    lisinopriL  5 mg Oral Daily    metoprolol tartrate  25 mg Oral BID    mupirocin  1 g Nasal BID    nortriptyline  100 mg Oral QHS    nystatin  500,000 Units Oral QID    pantoprazole  40 mg Oral Before breakfast     acetaminophen, acetaminophen, albuterol, dextrose 50%, dextrose 50%, glucagon (human recombinant), glucose, glucose, insulin aspart U-100, LORazepam, melatonin, morphine, ondansetron, oxyCODONE-acetaminophen  HEMODYNAMICS        Recent O2 Therapy/Vent Settings: RA    I/O last 24 hrs:  Intake/Output - Last 3 Shifts       06/27 0700 - 06/28 0659 06/28 0700 - 06/29 0659 06/29 0700 - 06/30 0659    P.O. 640 1100     I.V. (mL/kg) 50 (0.6)      Blood       IV Piggyback 156      Total Intake(mL/kg) 846 (10) 1100 (12.9)     Urine (mL/kg/hr) 1135 (0.6) 3100 (1.5) 1000 (2)    Drains 90      Blood       Chest Tube 190      Total Output 1415 3100 1000    Net -569 -2000 -1000               Recent Cardiac Rhythm: ST/SR    Recent Pain Assessment: controlled    CBC  Recent Labs   Lab 06/27/20  0600 06/28/20  0300 06/29/20  0414   WBC 14.49* 21.37* 12.49   RBC 3.31* 3.05* 2.83*   HGB 9.7* 9.1* 8.4*    188 178   MCV 88 91 91   MCH 29.3 29.8 29.7   MCHC 33.2 33.0 32.8     BMP  Recent Labs   Lab 06/27/20  0600 06/28/20  6393  06/29/20  0414   CO2 23 26 29   BUN 11 13 11   CREATININE 0.7 0.6 0.6   CALCIUM 8.3* 7.7* 8.3*     CARDIAC ENZYMES  Recent Labs   Lab 06/22/20  2226 06/23/20  0337 06/23/20  1046   TROPONINI <0.01* <0.030 <0.030     BNP  Recent Labs   Lab 06/22/20  2226   BNP 16     PT/INR  INR   Date Value Ref Range Status   06/22/2020 0.9 0.8 - 1.2 Final     Comment:     Coumadin Therapy:  2.0 - 3.0 for INR for all indicators except mechanical heart valves  and antiphospholipid syndromes which should use 2.5 - 3.5.       CURRENT CONSULTS:  IP CONSULT TO CARDIOLOGY  IP CONSULT TO CARDIOTHORACIC SURGERY  IP CONSULT TO SOCIAL WORK/CASE MANAGEMENT    ASSESSMENT/PLAN:  S/P CABGx 3 6/26/2020 by Dr Villagomez  -Hemodynamically stable  -MS and pleural tubes and pacer wires have been removed  -Continue ASA, BB, statin  -Leukocytosis resolved  -Afebrile  -Monitor H/H   -Encourage IS   -Increase activity  -Cards following  -Awaiting a bed on tele    HTN  -Controlled  -If continues to have tachycardia, consider further decreasing lisinopril to allow increase in BB--defer to cards    DMII  -Off Insulin infusion   -Accuchecks and SSI  -Likely would benefit from long acting insulin  -Defer to hospital medicine    Dyslipidemia  -Statin therapy    Chronic pain  -Pain control    Anemia  -Monitor H/H for need of PRBC transfusion  -Repeat H/H ordered for later today  -No active bleeding noted    Constipation  -Add Miralax    Case and plan of care discussed with MD  GI Prophylaxis: PPI:proton pump inhibitor per orders  DVT Prophylaxis: NI at this time  Anticoagulants   Medication Route Frequency    enoxaparin injection 40 mg Subcutaneous Q24H       Vi Larsen PA-C  Cardiovascular Thoracic Surgery  6/29/2020  1:24 PM

## 2020-06-29 NOTE — PROGRESS NOTES
[facility] Rehab     Nicole Harris   [id number]   6/29/2020         Cardiac Rehab Phase Taught: Phase 1    Teaching Method: Verbal, Written    Handouts: Activity Illustrations/Plan Sheet    Educational Videos: None    Understanding:  Knowledge indicated by feedback, Learning indicated by feedback and Verbalize understanding    Comments: pt ambulating in room from restroom with bedside RN, pt tolerated well. Review of postop cabg education including sternal precautions, IS, daily activity, pain meds, diet. Questions answered. Pt voiced understanding. Will follow    Total Time Spent:15mins            Agueda Nolen RN

## 2020-06-30 PROBLEM — R07.9 CHEST PAIN: Status: RESOLVED | Noted: 2020-06-23 | Resolved: 2020-06-30

## 2020-06-30 LAB
BLD PROD TYP BPU: NORMAL
BLOOD UNIT EXPIRATION DATE: NORMAL
BLOOD UNIT TYPE CODE: 6200
BLOOD UNIT TYPE: NORMAL
CHLAMYDIA, AMPLIFIED DNA: NEGATIVE
CODING SYSTEM: NORMAL
DISPENSE STATUS: NORMAL
GLUCOSE SERPL-MCNC: 192 MG/DL (ref 70–110)
GLUCOSE SERPL-MCNC: 207 MG/DL (ref 70–110)
GLUCOSE SERPL-MCNC: 227 MG/DL (ref 70–110)
GLUCOSE SERPL-MCNC: 252 MG/DL (ref 70–110)
HCT VFR BLD AUTO: 26.6 % (ref 37–48.5)
HGB BLD-MCNC: 8.7 G/DL (ref 12–16)
N GONORRHOEAE, AMPLIFIED DNA: NEGATIVE
NUM UNITS TRANS PACKED RBC: NORMAL

## 2020-06-30 PROCEDURE — 25000003 PHARM REV CODE 250: Performed by: INTERNAL MEDICINE

## 2020-06-30 PROCEDURE — 63600175 PHARM REV CODE 636 W HCPCS: Performed by: INTERNAL MEDICINE

## 2020-06-30 PROCEDURE — 25000003 PHARM REV CODE 250: Performed by: NURSE PRACTITIONER

## 2020-06-30 PROCEDURE — 25000003 PHARM REV CODE 250: Performed by: THORACIC SURGERY (CARDIOTHORACIC VASCULAR SURGERY)

## 2020-06-30 PROCEDURE — 97116 GAIT TRAINING THERAPY: CPT | Mod: CQ

## 2020-06-30 PROCEDURE — 94761 N-INVAS EAR/PLS OXIMETRY MLT: CPT

## 2020-06-30 PROCEDURE — 21000000 HC CCU ICU ROOM CHARGE

## 2020-06-30 PROCEDURE — 97535 SELF CARE MNGMENT TRAINING: CPT

## 2020-06-30 PROCEDURE — 85018 HEMOGLOBIN: CPT

## 2020-06-30 PROCEDURE — 27000221 HC OXYGEN, UP TO 24 HOURS

## 2020-06-30 PROCEDURE — 85014 HEMATOCRIT: CPT

## 2020-06-30 PROCEDURE — 25000003 PHARM REV CODE 250: Performed by: PHYSICIAN ASSISTANT

## 2020-06-30 PROCEDURE — 99900035 HC TECH TIME PER 15 MIN (STAT)

## 2020-06-30 RX ADMIN — NORTRIPTYLINE HYDROCHLORIDE 100 MG: 25 CAPSULE ORAL at 08:06

## 2020-06-30 RX ADMIN — OXYCODONE AND ACETAMINOPHEN 1 TABLET: 5; 325 TABLET ORAL at 09:06

## 2020-06-30 RX ADMIN — CHLORHEXIDINE GLUCONATE 10 ML: 1.2 RINSE ORAL at 08:06

## 2020-06-30 RX ADMIN — CHLORHEXIDINE GLUCONATE 10 ML: 1.2 RINSE ORAL at 09:06

## 2020-06-30 RX ADMIN — HUMAN INSULIN 6 UNITS: 100 INJECTION, SOLUTION SUBCUTANEOUS at 12:06

## 2020-06-30 RX ADMIN — FERROUS SULFATE TAB 325 MG (65 MG ELEMENTAL FE) 325 MG: 325 (65 FE) TAB at 09:06

## 2020-06-30 RX ADMIN — MELATONIN 6 MG: at 11:06

## 2020-06-30 RX ADMIN — OXYCODONE AND ACETAMINOPHEN 1 TABLET: 5; 325 TABLET ORAL at 08:06

## 2020-06-30 RX ADMIN — METOPROLOL TARTRATE 37.5 MG: 25 TABLET, FILM COATED ORAL at 08:06

## 2020-06-30 RX ADMIN — POLYETHYLENE GLYCOL 3350 17 G: 17 POWDER, FOR SOLUTION ORAL at 09:06

## 2020-06-30 RX ADMIN — NYSTATIN 500000 UNITS: 500000 SUSPENSION ORAL at 09:06

## 2020-06-30 RX ADMIN — HUMAN INSULIN 3 UNITS: 100 INJECTION, SOLUTION SUBCUTANEOUS at 01:06

## 2020-06-30 RX ADMIN — ATORVASTATIN CALCIUM 40 MG: 40 TABLET, FILM COATED ORAL at 08:06

## 2020-06-30 RX ADMIN — HUMAN INSULIN 9 UNITS: 100 INJECTION, SOLUTION SUBCUTANEOUS at 08:06

## 2020-06-30 RX ADMIN — OXYCODONE AND ACETAMINOPHEN 1 TABLET: 5; 325 TABLET ORAL at 02:06

## 2020-06-30 RX ADMIN — METOPROLOL TARTRATE 25 MG: 25 TABLET, FILM COATED ORAL at 09:06

## 2020-06-30 RX ADMIN — PANTOPRAZOLE SODIUM 40 MG: 40 TABLET, DELAYED RELEASE ORAL at 06:06

## 2020-06-30 RX ADMIN — ESCITALOPRAM OXALATE 20 MG: 10 TABLET ORAL at 09:06

## 2020-06-30 RX ADMIN — NYSTATIN 500000 UNITS: 500000 SUSPENSION ORAL at 05:06

## 2020-06-30 RX ADMIN — OXYCODONE AND ACETAMINOPHEN 1 TABLET: 5; 325 TABLET ORAL at 01:06

## 2020-06-30 RX ADMIN — LORAZEPAM 1 MG: 1 TABLET ORAL at 11:06

## 2020-06-30 RX ADMIN — NYSTATIN 500000 UNITS: 500000 SUSPENSION ORAL at 08:06

## 2020-06-30 RX ADMIN — LISINOPRIL 5 MG: 5 TABLET ORAL at 09:06

## 2020-06-30 RX ADMIN — NYSTATIN 500000 UNITS: 500000 SUSPENSION ORAL at 12:06

## 2020-06-30 RX ADMIN — ENOXAPARIN SODIUM 40 MG: 100 INJECTION SUBCUTANEOUS at 05:06

## 2020-06-30 RX ADMIN — ASPIRIN 325 MG ORAL TABLET 325 MG: 325 PILL ORAL at 09:06

## 2020-06-30 RX ADMIN — MUPIROCIN 1 G: 20 OINTMENT TOPICAL at 09:06

## 2020-06-30 RX ADMIN — ACETAMINOPHEN 650 MG: 325 TABLET ORAL at 08:06

## 2020-06-30 RX ADMIN — DOCUSATE SODIUM 100 MG: 100 CAPSULE, LIQUID FILLED ORAL at 09:06

## 2020-06-30 NOTE — PROGRESS NOTES
Cardiac Rehab     Nicole Harris   [id number]   6/30/2020         Cardiac Rehab Phase Taught: Phase 1    Teaching Method: Verbal    Handouts: None    Educational Videos: None    Understanding:  Learning indicated by feedback and Verbalize understanding    Comments: pt in bed, complaints of HA and pain to chest area, bedside nurse aware. Pt states she will get up later today. Discharge cabg packet given for review. Educated on sternal precautions and use of IS. Pt voiced understanding    Total Time Spent:15mins            Agueda Nolen RN

## 2020-06-30 NOTE — PT/OT/SLP PROGRESS
Occupational Therapy   Treatment/Discharge Note    Name: Nicole Harris  MRN: [id number]  Admitting Diagnosis:  S/P CABG x 3  4 Days Post-Op    Recommendations:     Discharge Recommendations: home  Discharge Equipment Recommendations:  none  Barriers to discharge:  None    Assessment:     Nicole Harris is a 50 y.o. female with a medical diagnosis of S/P CABG x 3.  She presents with improving medical acuity and functional mobility s/p CABG.     Rehab Prognosis:  Good; patient would benefit from acute skilled OT services to address these deficits and reach maximum level of function.       Plan:     Patient to be seen 5 x/week to address the above listed problems via self-care/home management, therapeutic activities, therapeutic exercises  · Plan of Care Expires: 07/29/20  · Plan of Care Reviewed with: patient    Subjective     Pain/Comfort:  · Pain Rating 1: 0/10  · Pain Rating Post-Intervention 1: 0/10    Objective:     Communicated with: nurse prior to session.  Patient found up in chair with telemetry, peripheral IV upon OT entry to room.    General Precautions: Standard, (None)   Orthopedic Precautions:N/A   Braces: N/A     Occupational Performance:     Functional Mobility/Transfers:  · Patient completed Toilet Transfer Step Transfer technique with independence with  no AD  · Functional Mobility: ambulated 20 feet in the hospital room and bathroom independently with no AD.    Activities of Daily Living:  · Grooming: independence to wash hands, brush teeth and don contacts standing at sink.  · Lower Body Dressing: independence to don socks sitting EOB.      [facility] 6 Click ADL: 24    Treatment & Education:  Patient demonstrated good sternal precautions while performing sitting/standing ADL activity. All OT goals met. Acute OT signing off.     Patient left up in chair with all lines intact and call button in reachEducation:      GOALS:   Multidisciplinary Problems     Occupational Therapy Goals        Problem:  Occupational Therapy Goal    Goal Priority Disciplines Outcome Interventions   Occupational Therapy Goal     OT, PT/OT Ongoing, Progressing    Description: Goals to be met by: discharge    Patient will increase functional independence with ADLs by performing:    LE Dressing with Supervision and Assistive Devices as needed.  Grooming while standing at sink with Supervision.  Toileting from toilet with Modified [place] for hygiene and clothing management.   Toilet transfer to toilet with Supervision.                     Time Tracking:     OT Date of Treatment: 07/30/20  OT Start Time: 1322  OT Stop Time: 1345  OT Total Time (min): 23 min    Billable Minutes:Self Care/Home Management 23    Rohan Castaneda OT  6/30/2020

## 2020-06-30 NOTE — PLAN OF CARE
06/30/20 1053   Discharge Reassessment   Assessment Type Discharge Planning Assessment   Anticipated Discharge Disposition Home     Delivered patient financial assistance form to be completed by patient. Explained purpose and directions on how to complete. Patient will work to complete and call CM when done.

## 2020-06-30 NOTE — PLAN OF CARE
Problem: Physical Therapy Goal  Goal: Physical Therapy Goal  Description: Goals to be met by: discharge     Patient will increase functional independence with mobility by performin. Supine to sit with Stand-by Assistance  2. Sit to stand transfer with Supervision  3. Bed to chair transfer with Supervision using Rolling Walker  4. Gait  x 300 feet with Supervision using Rolling Walker or no assistive device.     Outcome: Ongoing, Progressing   Pt continues to progress towards goals.

## 2020-06-30 NOTE — PLAN OF CARE
Problem: Occupational Therapy Goal  Goal: Occupational Therapy Goal  Description: Goals to be met by: discharge    Patient will increase functional independence with ADLs by performing:    LE Dressing with Supervision and Assistive Devices as needed.  Grooming while standing at sink with Supervision.  Toileting from toilet with Modified [place] for hygiene and clothing management.   Toilet transfer to toilet with Supervision.    Outcome: Ongoing, Progressing

## 2020-06-30 NOTE — PROGRESS NOTES
[facility]  Cardiology  Progress Note    Patient Name: Nicole Harris  MRN: [id number]  Admission Date: 6/23/2020  Hospital Length of Stay: 4 days  Code Status: Full Code   Attending Physician: Emilia Salvador MD   Primary Care Physician: Vira Conn NP  Expected Discharge Date:   Principal Problem:S/P CABG x 3    Subjective:       Interval History: Patient resting in bed with no acute distress noted. Reports she has a headache and is just not feeling as well today. O2 sat 89% on room air while lying near flat. Improved to 98% on 2L nasal cannula and when sitting up.     ROS     Review of Systems   Constitution: Negative for diaphoresis and fever.   HENT: Negative for nosebleeds.    Cardiovascular: Negative for chest pain, dyspnea on exertion, leg swelling and palpitations.   Respiratory: Negative for shortness of breath and wheezing.    Hematologic/Lymphatic: Negative for bleeding problem. Does not bruise/bleed easily.   Skin: Negative for color change and rash.   Musculoskeletal: Negative for falls and myalgias.   Gastrointestinal: Negative for hematemesis and hematochezia.   Genitourinary: Negative for hematuria.   Neurological: Negative for dizziness and light-headedness.   Psychiatric/Behavioral: Negative for altered mental status and memory loss.       Objective:     Vital Signs (Most Recent):  Temp: 97.8 °F (36.6 °C) (06/30/20 1148)  Pulse: 104 (06/30/20 0745)  Resp: 16 (06/30/20 1148)  BP: 126/73 (06/30/20 1148)  SpO2: 97 % (06/30/20 0745) Vital Signs (24h Range):  Temp:  [97.3 °F (36.3 °C)-98.6 °F (37 °C)] 97.8 °F (36.6 °C)  Pulse:  [] 104  Resp:  [16-21] 16  SpO2:  [91 %-97 %] 97 %  BP: ()/(53-85) 126/73     Weight: 85 kg (187 lb 6.3 oz)  Body mass index is 32.17 kg/m².    SpO2: 97 %  O2 Device (Oxygen Therapy): nasal cannula      Intake/Output Summary (Last 24 hours) at 6/30/2020 1203  Last data filed at 6/30/2020 1012  Gross per 24 hour   Intake 360 ml   Output 1450 ml    Net -1090 ml       Lines/Drains/Airways     Central Venous Catheter Line            Percutaneous Central Line Insertion/Assessment - Triple Lumen  06/26/20 1350 3 days                Scheduled Meds:   aspirin  325 mg Oral Daily    atorvastatin  40 mg Oral QHS    chlorhexidine  10 mL Mouth/Throat BID    docusate sodium  100 mg Oral Daily    enoxparin  40 mg Subcutaneous Q24H    escitalopram oxalate  20 mg Oral Daily    ferrous sulfate  325 mg Oral Daily    insulin detemir U-100  5 Units Subcutaneous QHS    lisinopriL  5 mg Oral Daily    metoprolol tartrate  25 mg Oral BID    mupirocin  1 g Nasal BID    nortriptyline  100 mg Oral QHS    nystatin  500,000 Units Oral QID    pantoprazole  40 mg Oral Before breakfast    polyethylene glycol  17 g Oral Daily     Continuous Infusions:    PRN Meds:.acetaminophen, acetaminophen, albuterol, insulin regular, LORazepam, melatonin, morphine, ondansetron, oxyCODONE-acetaminophen     Physical Exam  HEENT: Normocephalic, atraumatic, PERRL, Conjunctiva pink, no scleral icterus.  Right IJ CVL in place.  CVS: S1S2+, RRR, no murmurs, rubs or gallops, tachycardia.  LUNGS: Clear  ABDOMEN: Soft, NT, BS+  EXTREMITIES: No cyanosis, clubbing or edema  NEURO: AAO x3.   STERNOTOMY: C/D/I    Significant Labs:   BMP:   Recent Labs   Lab 06/29/20  0414   *      K 4.1   CL 98   CO2 29   BUN 11   CREATININE 0.6   CALCIUM 8.3*   , CMP   Recent Labs   Lab 06/29/20  0414      K 4.1   CL 98   CO2 29   *   BUN 11   CREATININE 0.6   CALCIUM 8.3*   ANIONGAP 10   ESTGFRAFRICA >60.0   EGFRNONAA >60.0   , CBC   Recent Labs   Lab 06/29/20  0414 06/30/20  0440   WBC 12.49  --    HGB 8.4* 8.7*   HCT 25.6* 26.6*     --    , INR No results for input(s): INR, PROTIME in the last 48 hours., Lipid Panel No results for input(s): CHOL, HDL, LDLCALC, TRIG, CHOLHDL in the last 48 hours. and Troponin No results for input(s): TROPONINI in the last 48 hours.    Significant  Imaging: Reviewed  Assessment and Plan:     IMPRESSION:  Severe left main and multivessel CAD s/p CABG X 3 on 6/26/2020  Hypertension  Diabetes.  Dyslipidemia  Postoperative anemia- hgb 8.4 today     PLAN:    1. OOB to chair and up walking often.   2. Encourage IS often.   3. HR has improved to the 90's today and BP is 120-130s systolic.   Will increase metoprolol tartrate to 37.5 mg po BID.   4. H/H has remained stable.   5. Home soon.         Odette Leroy NP  Cardiology  [facility]

## 2020-06-30 NOTE — PT/OT/SLP PROGRESS
Physical Therapy Treatment    Patient Name:  Nicole Harris   MRN:  [id number]    Recommendations:     Discharge Recommendations:  home   Discharge Equipment Recommendations: none   Barriers to discharge: None    Assessment:     Nicole Harris is a 50 y.o. female admitted with a medical diagnosis of S/P CABG x 3.  She presents with the following impairments/functional limitations:  impaired endurance, impaired self care skills, impaired functional mobilty, gait instability, impaired balance, pain. Patient presents up in room upon PTA entering; agreeable to PT treatment and eager to participate. Patient progressing with gait and mobility today, increasing gait distance without AD and no LOB or SOB noted. Pt did require a standing rest break 2/2 general fatigue. Pt aware of and able to maintain sternal precautions. Continue with PT and POC.    Rehab Prognosis: Good; patient would benefit from acute skilled PT services to address these deficits and reach maximum level of function.    Recent Surgery: Procedure(s) (LRB):  CORONARY ARTERY BYPASS GRAFT (CABG) (N/A)  SURGICAL PROCUREMENT, VEIN, ENDOSCOPIC (Left) 4 Days Post-Op    Plan:     During this hospitalization, patient to be seen 5 x/week to address the identified rehab impairments via gait training, therapeutic activities, therapeutic exercises and progress toward the following goals:    · Plan of Care Expires:  07/29/20    Subjective     Chief Complaint: Back pain which patient states was present prior to CABG  Patient/Family Comments/goals:   Pain/Comfort:  · Pain Rating 1: (Pt did not quantify)  · Location 1: back  · Pain Addressed 1: Cessation of Activity, Distraction, Reposition      Objective:     Communicated with RN prior to session.  Patient found up in room with telemetry upon PT entry to room.     General Precautions: Standard, fall   Orthopedic Precautions:N/A   Braces:       Functional Mobility:  · Bed Mobility:     · Sit to Supine:  supervision  · Transfers:     · Sit to Stand:  supervision with no AD  · Gait: 220ft without AD and SBA; one standing rest break 2/2 general fatigue      AM-PAC 6 CLICK MOBILITY          Therapeutic Activities and Exercises:   bed mobility; sitting EOB for trunk control and midline orientation; sit <> stands; transfer training; sternal precaution education    Patient left HOB elevated with all lines intact and call button in reach..    GOALS:   Multidisciplinary Problems     Physical Therapy Goals        Problem: Physical Therapy Goal    Goal Priority Disciplines Outcome Goal Variances Interventions   Physical Therapy Goal     PT, PT/OT Ongoing, Progressing     Description: Goals to be met by: discharge     Patient will increase functional independence with mobility by performin. Supine to sit with Stand-by Assistance  2. Sit to stand transfer with Supervision  3. Bed to chair transfer with Supervision using Rolling Walker  4. Gait  x 300 feet with Supervision using Rolling Walker or no assistive device.                      Time Tracking:     PT Received On: 20  PT Start Time: 1342     PT Stop Time: 1352  PT Total Time (min): 10 min     Billable Minutes: Gait Training 10    Treatment Type: Treatment  PT/PTA: PTA     PTA Visit Number: 1     Giovana Ragland PTA  2020

## 2020-07-01 VITALS
BODY MASS INDEX: 31.99 KG/M2 | TEMPERATURE: 98 F | SYSTOLIC BLOOD PRESSURE: 127 MMHG | HEIGHT: 64 IN | RESPIRATION RATE: 15 BRPM | WEIGHT: 187.38 LBS | OXYGEN SATURATION: 94 % | DIASTOLIC BLOOD PRESSURE: 73 MMHG | HEART RATE: 107 BPM

## 2020-07-01 LAB
ANION GAP SERPL CALC-SCNC: 10 MMOL/L (ref 8–16)
ANION GAP SERPL CALC-SCNC: 10 MMOL/L (ref 8–16)
BASOPHILS # BLD AUTO: 0.03 K/UL (ref 0–0.2)
BASOPHILS # BLD AUTO: 0.03 K/UL (ref 0–0.2)
BASOPHILS NFR BLD: 0.4 % (ref 0–1.9)
BASOPHILS NFR BLD: 0.4 % (ref 0–1.9)
BUN SERPL-MCNC: 12 MG/DL (ref 6–20)
BUN SERPL-MCNC: 12 MG/DL (ref 6–20)
CALCIUM SERPL-MCNC: 8.7 MG/DL (ref 8.7–10.5)
CALCIUM SERPL-MCNC: 8.7 MG/DL (ref 8.7–10.5)
CHLORIDE SERPL-SCNC: 101 MMOL/L (ref 95–110)
CHLORIDE SERPL-SCNC: 101 MMOL/L (ref 95–110)
CO2 SERPL-SCNC: 30 MMOL/L (ref 23–29)
CO2 SERPL-SCNC: 30 MMOL/L (ref 23–29)
CREAT SERPL-MCNC: 0.6 MG/DL (ref 0.5–1.4)
CREAT SERPL-MCNC: 0.6 MG/DL (ref 0.5–1.4)
DIFFERENTIAL METHOD: ABNORMAL
DIFFERENTIAL METHOD: ABNORMAL
EOSINOPHIL # BLD AUTO: 0.2 K/UL (ref 0–0.5)
EOSINOPHIL # BLD AUTO: 0.2 K/UL (ref 0–0.5)
EOSINOPHIL NFR BLD: 2 % (ref 0–8)
EOSINOPHIL NFR BLD: 2 % (ref 0–8)
ERYTHROCYTE [DISTWIDTH] IN BLOOD BY AUTOMATED COUNT: 13.4 % (ref 11.5–14.5)
ERYTHROCYTE [DISTWIDTH] IN BLOOD BY AUTOMATED COUNT: 13.4 % (ref 11.5–14.5)
EST. GFR  (AFRICAN AMERICAN): >60 ML/MIN/1.73 M^2
EST. GFR  (AFRICAN AMERICAN): >60 ML/MIN/1.73 M^2
EST. GFR  (NON AFRICAN AMERICAN): >60 ML/MIN/1.73 M^2
EST. GFR  (NON AFRICAN AMERICAN): >60 ML/MIN/1.73 M^2
GLUCOSE SERPL-MCNC: 156 MG/DL (ref 70–110)
GLUCOSE SERPL-MCNC: 156 MG/DL (ref 70–110)
GLUCOSE SERPL-MCNC: 249 MG/DL (ref 70–110)
HCT VFR BLD AUTO: 28.1 % (ref 37–48.5)
HCT VFR BLD AUTO: 28.1 % (ref 37–48.5)
HGB BLD-MCNC: 9.3 G/DL (ref 12–16)
HGB BLD-MCNC: 9.3 G/DL (ref 12–16)
IMM GRANULOCYTES # BLD AUTO: 0.11 K/UL (ref 0–0.04)
IMM GRANULOCYTES # BLD AUTO: 0.11 K/UL (ref 0–0.04)
IMM GRANULOCYTES NFR BLD AUTO: 1.4 % (ref 0–0.5)
IMM GRANULOCYTES NFR BLD AUTO: 1.4 % (ref 0–0.5)
LYMPHOCYTES # BLD AUTO: 2.7 K/UL (ref 1–4.8)
LYMPHOCYTES # BLD AUTO: 2.7 K/UL (ref 1–4.8)
LYMPHOCYTES NFR BLD: 34.3 % (ref 18–48)
LYMPHOCYTES NFR BLD: 34.3 % (ref 18–48)
MAGNESIUM SERPL-MCNC: 1.8 MG/DL (ref 1.6–2.6)
MAGNESIUM SERPL-MCNC: 1.8 MG/DL (ref 1.6–2.6)
MCH RBC QN AUTO: 29.5 PG (ref 27–31)
MCH RBC QN AUTO: 29.5 PG (ref 27–31)
MCHC RBC AUTO-ENTMCNC: 33.1 G/DL (ref 32–36)
MCHC RBC AUTO-ENTMCNC: 33.1 G/DL (ref 32–36)
MCV RBC AUTO: 89 FL (ref 82–98)
MCV RBC AUTO: 89 FL (ref 82–98)
MONOCYTES # BLD AUTO: 0.7 K/UL (ref 0.3–1)
MONOCYTES # BLD AUTO: 0.7 K/UL (ref 0.3–1)
MONOCYTES NFR BLD: 8.8 % (ref 4–15)
MONOCYTES NFR BLD: 8.8 % (ref 4–15)
NEUTROPHILS # BLD AUTO: 4.2 K/UL (ref 1.8–7.7)
NEUTROPHILS # BLD AUTO: 4.2 K/UL (ref 1.8–7.7)
NEUTROPHILS NFR BLD: 53.1 % (ref 38–73)
NEUTROPHILS NFR BLD: 53.1 % (ref 38–73)
NRBC BLD-RTO: 0 /100 WBC
NRBC BLD-RTO: 0 /100 WBC
PLATELET # BLD AUTO: 256 K/UL (ref 150–350)
PLATELET # BLD AUTO: 256 K/UL (ref 150–350)
PMV BLD AUTO: 9.7 FL (ref 9.2–12.9)
PMV BLD AUTO: 9.7 FL (ref 9.2–12.9)
POTASSIUM SERPL-SCNC: 4 MMOL/L (ref 3.5–5.1)
POTASSIUM SERPL-SCNC: 4 MMOL/L (ref 3.5–5.1)
RBC # BLD AUTO: 3.15 M/UL (ref 4–5.4)
RBC # BLD AUTO: 3.15 M/UL (ref 4–5.4)
SODIUM SERPL-SCNC: 141 MMOL/L (ref 136–145)
SODIUM SERPL-SCNC: 141 MMOL/L (ref 136–145)
WBC # BLD AUTO: 7.98 K/UL (ref 3.9–12.7)
WBC # BLD AUTO: 7.98 K/UL (ref 3.9–12.7)

## 2020-07-01 PROCEDURE — 94761 N-INVAS EAR/PLS OXIMETRY MLT: CPT

## 2020-07-01 PROCEDURE — 25000003 PHARM REV CODE 250: Performed by: PHYSICIAN ASSISTANT

## 2020-07-01 PROCEDURE — 25000003 PHARM REV CODE 250: Performed by: THORACIC SURGERY (CARDIOTHORACIC VASCULAR SURGERY)

## 2020-07-01 PROCEDURE — 85025 COMPLETE CBC W/AUTO DIFF WBC: CPT

## 2020-07-01 PROCEDURE — 25000003 PHARM REV CODE 250: Performed by: INTERNAL MEDICINE

## 2020-07-01 PROCEDURE — 99900035 HC TECH TIME PER 15 MIN (STAT)

## 2020-07-01 PROCEDURE — 99024 POSTOP FOLLOW-UP VISIT: CPT | Mod: ,,, | Performed by: NURSE PRACTITIONER

## 2020-07-01 PROCEDURE — 83735 ASSAY OF MAGNESIUM: CPT

## 2020-07-01 PROCEDURE — 99024 PR POST-OP FOLLOW-UP VISIT: ICD-10-PCS | Mod: ,,, | Performed by: NURSE PRACTITIONER

## 2020-07-01 PROCEDURE — 25000003 PHARM REV CODE 250: Performed by: NURSE PRACTITIONER

## 2020-07-01 PROCEDURE — 80048 BASIC METABOLIC PNL TOTAL CA: CPT

## 2020-07-01 RX ORDER — NYSTATIN 100000 [USP'U]/ML
500000 SUSPENSION ORAL 4 TIMES DAILY
Qty: 200 ML | Refills: 0 | Status: SHIPPED | OUTPATIENT
Start: 2020-07-01 | End: 2020-07-11

## 2020-07-01 RX ORDER — INSULIN GLARGINE 100 [IU]/ML
20 INJECTION, SOLUTION SUBCUTANEOUS NIGHTLY
Qty: 4.5 ML | Refills: 0 | Status: SHIPPED | OUTPATIENT
Start: 2020-07-01 | End: 2020-07-09

## 2020-07-01 RX ORDER — FERROUS SULFATE 325(65) MG
325 TABLET ORAL DAILY
Qty: 30 TABLET | Refills: 0 | Status: SHIPPED | OUTPATIENT
Start: 2020-07-02 | End: 2022-10-11

## 2020-07-01 RX ORDER — LISINOPRIL 5 MG/1
5 TABLET ORAL DAILY
Qty: 30 TABLET | Refills: 0 | Status: ON HOLD | OUTPATIENT
Start: 2020-07-01 | End: 2023-12-27

## 2020-07-01 RX ORDER — POLYETHYLENE GLYCOL 3350 17 G/17G
17 POWDER, FOR SOLUTION ORAL DAILY
Qty: 100 EACH | Refills: 0 | Status: SHIPPED | OUTPATIENT
Start: 2020-07-02 | End: 2020-07-09

## 2020-07-01 RX ORDER — METOPROLOL TARTRATE 37.5 MG/1
37.5 TABLET, FILM COATED ORAL 2 TIMES DAILY
Qty: 60 TABLET | Refills: 0 | Status: ON HOLD | OUTPATIENT
Start: 2020-07-01 | End: 2020-07-11 | Stop reason: HOSPADM

## 2020-07-01 RX ORDER — ASPIRIN 325 MG
325 TABLET ORAL DAILY
Qty: 30 TABLET | Refills: 0 | Status: SHIPPED | OUTPATIENT
Start: 2020-07-02 | End: 2024-03-25 | Stop reason: CLARIF

## 2020-07-01 RX ORDER — DOCUSATE SODIUM 100 MG/1
100 CAPSULE, LIQUID FILLED ORAL DAILY
Qty: 30 CAPSULE | Refills: 0 | Status: SHIPPED | OUTPATIENT
Start: 2020-07-02 | End: 2020-07-28

## 2020-07-01 RX ORDER — ATORVASTATIN CALCIUM 40 MG/1
40 TABLET, FILM COATED ORAL NIGHTLY
Qty: 30 TABLET | Refills: 0 | Status: SHIPPED | OUTPATIENT
Start: 2020-07-01 | End: 2020-08-28

## 2020-07-01 RX ADMIN — METOPROLOL TARTRATE 37.5 MG: 25 TABLET, FILM COATED ORAL at 11:07

## 2020-07-01 RX ADMIN — NYSTATIN 500000 UNITS: 500000 SUSPENSION ORAL at 11:07

## 2020-07-01 RX ADMIN — ESCITALOPRAM OXALATE 20 MG: 10 TABLET ORAL at 11:07

## 2020-07-01 RX ADMIN — CHLORHEXIDINE GLUCONATE 10 ML: 1.2 RINSE ORAL at 11:07

## 2020-07-01 RX ADMIN — DOCUSATE SODIUM 100 MG: 100 CAPSULE, LIQUID FILLED ORAL at 11:07

## 2020-07-01 RX ADMIN — POLYETHYLENE GLYCOL 3350 17 G: 17 POWDER, FOR SOLUTION ORAL at 11:07

## 2020-07-01 RX ADMIN — FERROUS SULFATE TAB 325 MG (65 MG ELEMENTAL FE) 325 MG: 325 (65 FE) TAB at 11:07

## 2020-07-01 RX ADMIN — ASPIRIN 325 MG ORAL TABLET 325 MG: 325 PILL ORAL at 11:07

## 2020-07-01 RX ADMIN — LISINOPRIL 5 MG: 5 TABLET ORAL at 11:07

## 2020-07-01 RX ADMIN — PANTOPRAZOLE SODIUM 40 MG: 40 TABLET, DELAYED RELEASE ORAL at 07:07

## 2020-07-01 RX ADMIN — MUPIROCIN 1 G: 20 OINTMENT TOPICAL at 11:07

## 2020-07-01 NOTE — PLAN OF CARE
07/01/20 1426   Final Note   Assessment Type Final Discharge Note   Anticipated Discharge Disposition Home

## 2020-07-01 NOTE — PROGRESS NOTES
Cardiac Rehab     Nicole Harris   [id number]   7/1/2020         Cardiac Rehab Phase Taught: Phase 1    Teaching Method: Verbal, Written, Audio/Visual    Handouts: After Heart Surgery Booklet, Cardiac Diet Pack, Cardiac Rehab Tear Sheet, Discharge Instructions First Two Weeks Post-Op, Home Activity Sheet, Home Walking Program Sheet, Post-op CABG Booklet and Smoking Cessation    Educational Videos: Preparing for Discharge    Understanding:  Knowledge indicated by feedback, Learning indicated by feedback and Verbalize understanding    Comments: pt in bed, review of discharge CABG education including sternal precautions, diet, meds, daily activity, wound care, IS, s/s to report. Questions answered. Pt voiced understanding. Will follow as out pt. Pt states she will check with [facility] for cardiac rehab    Total Time Spent:30mins            Agueda Nolen RN

## 2020-07-01 NOTE — NURSING
PT discharged with all belongings and follow up appointments. Pt given education for new medications. Pt right ij triple lumen discontinued using sterile technique with no adverse outcomes and tolerated well. 10 minutes of pressure applied. Mid sternal incision dressing cdi , left leg dressings cdi .  Telemetry discontinued.

## 2020-07-01 NOTE — PROGRESS NOTES
[facility]  Progress Note    Patient name: Nicole Harris  MRN: [id number]  Admit Date: 6/23/2020   LOS: 4 days     SUBJECTIVE:     Principal problem: S/P CABG x 3    Interval History:      6/30:    POD3 s/p CABG  Pt reports she feels fatigue but no pain.  No CP, no SOB.    Continue monitoring in progressive care unit.  Cardiology following, thank you      6/29:  S/p  CABG surgery POD 2  Patient appears to be in positive spirits  Downgraded to Cardio a  PT OT appreciated cleared for discharge  Reactive Leukocytosis resolved  Cardiology  follow-up appreciated, remain tachycardic borderline blood pressure   central line can be removed prior to discharge    Hospital course  Patient was admitted for the evaluation of chest pain with EKG changes T-wave inversion in lead V1 to V3 without troponin elevation, underwent stress test which showed small anti lateral wall near the apex reversible defect, patient underwent LHC by Dr Fuller, next day found to have three-vessel coronary artery disease with severe distal left main ostial LAD and ostial circumflex stenosis. CTS consulted, apparently patient did not have insurance,  consulted for clearance for procedure, patient had CABG on  6/25/20 successfully by Dr Oakes, postop course noted to have transient mood swing with agitation with nursing, resolved uneventfully in ICU, mediastinal drain and chest tube removed next day.  Patient also concerned about STD with the recent sexual encounter, requested STD screen and candidiasis, empirically treated with fluconazole prior to the procedure, STD test results pending.HIV neg.  Noted to have uncontrolled blood sugar with A1c of 10.3, blood sugar in control improving with insulin.  Postop mild reactive leukocytosis gradually resolved.  Hemoglobin trending down slowly, need close monitor, physical therapy eval appreciated, patient can go home on discharge.     Scheduled Meds:    aspirin  325 mg Oral Daily    atorvastatin  40 mg Oral QHS    chlorhexidine  10 mL Mouth/Throat BID    docusate sodium  100 mg Oral Daily    enoxparin  40 mg Subcutaneous Q24H    escitalopram oxalate  20 mg Oral Daily    ferrous sulfate  325 mg Oral Daily    insulin detemir U-100  15 Units Subcutaneous QHS    lisinopriL  5 mg Oral Daily    metoprolol tartrate  37.5 mg Oral BID    mupirocin  1 g Nasal BID    nortriptyline  100 mg Oral QHS    nystatin  500,000 Units Oral QID    pantoprazole  40 mg Oral Before breakfast    polyethylene glycol  17 g Oral Daily     Continuous Infusions:    PRN Meds:acetaminophen, acetaminophen, albuterol, insulin regular, LORazepam, melatonin, morphine, ondansetron, oxyCODONE-acetaminophen    Review of patient's allergies indicates:   Allergen Reactions    Hydrocodone Nausea And Vomiting       Review of Systems: As per interval history    OBJECTIVE:     Vital Signs (Most Recent)  Temp: 98.6 °F (37 °C) (06/30/20 1906)  Pulse: (!) 111 (06/30/20 1906)  Resp: 16 (06/30/20 1906)  BP: 136/69 (06/30/20 1906)  SpO2: (!) 92 % (06/30/20 1906)    Vital Signs Range (Last 24H):  Temp:  [97.3 °F (36.3 °C)-98.6 °F (37 °C)]   Pulse:  []   Resp:  [16-21]   BP: (112-144)/(63-85)   SpO2:  [90 %-98 %]     I & O (Last 24H):    Intake/Output Summary (Last 24 hours) at 6/30/2020 1953  Last data filed at 6/30/2020 1012  Gross per 24 hour   Intake 120 ml   Output 450 ml   Net -330 ml       Physical Exam:    Gen: alert, responsive  HEENT:  Eyes - no pallor  External ears with no lesions  Nares patent  Mouth - lips chapped  CV: RRR  Lungs: CTA B/L  Abd: +BS, soft, NT, ND  Ext: no atrophy or edema  Skin: warm, dry  Neuro: grossly intact  Psych: pleasant    All labs, images, and other studies reviewed personally by me.      Laboratory:  CBC:   Recent Labs   Lab 06/29/20  0414 06/30/20  0440   WBC 12.49  --    RBC 2.83*  --    HGB 8.4* 8.7*   HCT 25.6* 26.6*     --    MCV 91  --    MCH  29.7  --    MCHC 32.8  --      CMP:   Recent Labs   Lab 06/29/20  0414   *   CALCIUM 8.3*      K 4.1   CO2 29   CL 98   BUN 11   CREATININE 0.6         ASSESSMENT/PLAN:     Lucretia now is in  Active Hospital Problems    Diagnosis  POA    *S/P CABG x 3 [Z95.1]  Not Applicable    Anemia [D64.9]  No    Other constipation [K59.09]  No    Coronary artery disease involving native coronary artery of native heart with unstable angina pectoris [I25.110]  Yes    COPD (chronic obstructive pulmonary disease) [J44.9]  Yes    Essential hypertension [I10]  Yes    Diabetes mellitus [E11.9]  Yes      Resolved Hospital Problems    Diagnosis Date Resolved POA    Unstable angina [I20.0] 06/30/2020 Yes    Abnormal stress test [R94.39] 06/30/2020 Yes    Nonspecific abnormal electrocardiogram (ECG) (EKG) [R94.31] 06/30/2020 Yes    Chest pain [R07.9] 06/30/2020 Yes         Plan:   S/p CABG on 6/26/20 for Severe left main and multivessel CAD  Hx of smoking  Continue with cardiac monitoring for  Trend CBC hemoglobin and leukocytosis   Diabetes uncontrolled-continue with insulin drip  Monitor and replace electrolytes  Aspirin nitroglycerin, statin and beta-blocker  Cardiology and CT surgery consult appreciated    VTE Risk Mitigation (From admission, onward)         Ordered     enoxaparin injection 40 mg  Every 24 hours (non-standard times)      06/29/20 1103              Department Hospital Medicine  [facility]  Emilia Salvador MD  Date of service: 06/30/2020

## 2020-07-01 NOTE — PROGRESS NOTES
Cardiac Rehab     Nicole Harris   [id number]   7/1/2020         Cardiac Rehab Phase Taught: Phase 1    Teaching Method: Verbal, Audio/Visual    Handouts: None    Educational Videos: Preparing for Discharge    Understanding:  Knowledge indicated by feedback, Learning indicated by feedback and Verbalize understanding    Comments: pt in bed, states she feels better today. Review of sternal precautions, IS and daily activity. Pt states she may discharge today. Will follow    Total Time Spent:15mins            Agueda Nolen RN

## 2020-07-01 NOTE — DISCHARGE SUMMARY
[facility]  Discharge Summary      Patient Name: Nicole Harris  MRN: [id number]  Admission Date: 6/23/2020  Hospital Length of Stay: 5 days  Discharge Date and Time:  07/02/2020 2:19 PM  Discharging Provider: Emilia Salvador MD  Primary Care Provider: Vira Conn NP      HPI:   The patient is a 50-year-old nonsmoker female with history significant for hypertension, diabetes mellitus, fibromyalgia, chronic neck and back pain.  She was initially seen at an outlying emergency department with chest pain.  Work up was unmarkable except abnormal EKG (T wave inversions V1-V3) and subsequently transferred here for further evaluation and treatment.    She reports that she had neck pain and headache yesterday afternoon. After eating ice cream, she later developed moderate to severe mid chest pain, radiated to right chestt, associated with bilateral jaw discomfort, shortness of breath, and nausea. She felt that her neck was swollen. There were no aggravating/alleviating factors. Pain lasted for a few hours and relieved after NTG sublingual given in the ED. Currently, she reports headache, neck pain, and indigestion.    She reports having injection for next pain last week.  She denies fever, chills, cough, abdominal pain, vomiting, diarrhea, urinary symptoms.  She denies weight gain or edema.    Workup so for:  Afebrile  Vital signs stable  WBC 4.48  Hemoglobin 13  Hematocrit 39.4  Platelet 343  Sodium 135  Potassium 4.0  Glucose 288  BUN 23, creatinine 0.7  Troponin less than 0.01  COVID 19 screen negative  EKG, personally reviewed, SR with T wave inversions V1-V3    Procedure(s) (LRB):  CORONARY ARTERY BYPASS GRAFT (CABG) (N/A)  SURGICAL PROCUREMENT, VEIN, ENDOSCOPIC (Left)      Hospital Course:     Patient was admitted for the evaluation of chest pain with EKG changes T-wave inversion in lead V1 to V3 without troponin elevation, underwent stress test which showed small anti lateral  "wall near the apex reversible defect, patient underwent LHC by Dr Fuller, next day found to have three-vessel coronary artery disease with severe distal left main ostial LAD and ostial circumflex stenosis. CTS consulted, apparently patient did not have insurance,  consulted for clearance for procedure, patient had CABG on  6/25/20 successfully by Dr Oakes, postop course noted to have transient mood swing with agitation with nursing, resolved uneventfully in ICU, mediastinal drain and chest tube removed next day.    Patient also concerned about STD with the recent sexual encounter, requested STD screen and candidiasis, empirically treated with fluconazole prior to the procedure, STD test results pending.HIV neg.  Noted to have uncontrolled blood sugar with A1c of 10.3, blood sugar in control improving with insulin.  Postop mild reactive leukocytosis gradually resolved.  Hemoglobin trending down slowly, need close monitor, physical therapy eval appreciated, patient can go home on discharge.    6/29:  S/p  CABG surgery POD 2  Patient appears to be in positive spirits  Downgraded to Cardio a  PT OT appreciated cleared for discharge  Reactive Leukocytosis resolved  Cardiology  follow-up appreciated, remain tachycardic borderline blood pressure  central line can be removed prior to discharge    6/30:    POD3 s/p CABG  Pt reports she feels fatigue but no pain.  No CP, no SOB.    Continue monitoring in progressive care unit.  Cardiology following, thank you     7/1:  Pt considered stable for discharge and will follow-up with specialists as noted below.  Medications as noted below.    /73 (BP Location: Right arm, Patient Position: Lying)   Pulse 107   Temp 98.1 °F (36.7 °C) (Oral)   Resp 15   Ht 5' 4" (1.626 m)   Wt 85 kg (187 lb 6.3 oz)   LMP 05/03/2020   SpO2 (!) 94%   Breastfeeding No   BMI 32.17 kg/m²   Gen: alert, responsive  HEENT:  Eyes - no pallor  External ears with no " lesions  Nares patent  Mouth - lips chapped  CV: RRR  Lungs: CTA B/L  Abd: +BS, soft, NT, ND  Ext: no atrophy or edema  Skin: warm, dry  Neuro: grossly intact  Psych: pleasant    Consults:   Consults (From admission, onward)        Status Ordering Provider     Inpatient consult to Cardiology  Once     Provider:  Sumanth Santana MD    Completed MARY ALICE MEJIA     Inpatient consult to Cardiothoracic Surgery  Once     Provider:  Parth Bazzi MD    Completed MICHAEL VALLE     Inpatient consult to   Once     Provider:  (Not yet assigned)    DARREN Solis        Final Active Diagnoses:    Diagnosis Date Noted POA    PRINCIPAL PROBLEM:  S/P CABG x 3 [Z95.1]  Not Applicable    Anemia [D64.9] 06/29/2020 No    Other constipation [K59.09] 06/29/2020 No    Coronary artery disease involving native coronary artery of native heart with unstable angina pectoris [I25.110]  Yes    COPD (chronic obstructive pulmonary disease) [J44.9] 08/27/2019 Yes    Essential hypertension [I10] 04/01/2019 Yes    Diabetes mellitus [E11.9] 02/19/2015 Yes      Problems Resolved During this Admission:    Diagnosis Date Noted Date Resolved POA    Unstable angina [I20.0]  06/30/2020 Yes    Abnormal stress test [R94.39]  06/30/2020 Yes    Nonspecific abnormal electrocardiogram (ECG) (EKG) [R94.31]  06/30/2020 Yes    Chest pain [R07.9] 06/23/2020 06/30/2020 Yes       Discharged Condition: stable    Disposition: Home or Self Care    Follow Up:  Follow-up Information     Vira Conn NP. Call today.    Specialty: Family Medicine  Why: for hospital discharge follow-up  Contact information:  [address] [place] [address] Dr  [place] [address] [phone]                 Patient Instructions:      Ambulatory referral/consult to Cardiothoracic Surgery   Standing Status: Future   Referral Priority: Routine Referral Type: Consultation   Referral Reason: Specialty Services Required   Referred to Provider:  RENZO KELLEY Requested Specialty: Cardiothoracic Surgery   Number of Visits Requested: 1     Ambulatory referral/consult to Cardiology   Standing Status: Future   Referral Priority: Routine Referral Type: Consultation   Referral Reason: Specialty Services Required   Referred to Provider: ERIKA BRYSON Requested Specialty: Cardiology   Number of Visits Requested: 1       Pending Diagnostic Studies:     Procedure Component Value Units Date/Time    Basic metabolic panel [[id number]] Collected: 06/28/20 1259    Order Status: Sent Lab Status: In process Updated: 06/28/20 1259    Specimen: Blood     Magnesium [[id number]] Collected: 06/28/20 1259    Order Status: Sent Lab Status: In process Updated: 06/28/20 1259    Specimen: Blood     Potassium [[id number]] Collected: 06/26/20 2212    Order Status: Sent Lab Status: In process Updated: 06/26/20 2219    Specimen: Blood     Protime-INR [[id number]] Collected: 06/26/20 2212    Order Status: Sent Lab Status: In process Updated: 06/26/20 2219    Specimen: Blood          Medications:  Reconciled Home Medications:      Medication List      START taking these medications    aspirin 325 MG tablet  Take 1 tablet (325 mg total) by mouth once daily.     atorvastatin 40 MG tablet  Commonly known as: LIPITOR  Take 1 tablet (40 mg total) by mouth every evening.     BASAGLAR KWIKPEN U-100 INSULIN glargine 100 units/mL (3mL) SubQ pen  Generic drug: insulin  Inject 20 Units into the skin every evening.     docusate sodium 100 MG capsule  Commonly known as: COLACE  Take 1 capsule (100 mg total) by mouth once daily.     ferrous sulfate 325 mg (65 mg iron) Tab tablet  Commonly known as: FEOSOL  Take 1 tablet (325 mg total) by mouth once daily.     metoprolol tartrate 37.5 mg Tab  Take 37.5 mg by mouth 2 (two) times daily.     nystatin 100,000 unit/mL suspension  Commonly known as: MYCOSTATIN  Take 5 mLs (500,000 Units total) by mouth 4 (four) times daily. for 10 days     polyethylene  "glycol 17 gram Pwpk  Commonly known as: GLYCOLAX  Take 17 g by mouth once daily.        CHANGE how you take these medications    lisinopriL 5 MG tablet  Commonly known as: PRINIVIL,ZESTRIL  Take 1 tablet (5 mg total) by mouth once daily.  What changed:   · medication strength  · how much to take        CONTINUE taking these medications    albuterol 90 mcg/actuation inhaler  Commonly known as: PROVENTIL/VENTOLIN HFA  Inhale 1 puff into the lungs every 4 (four) hours as needed.     cyclobenzaprine 10 MG tablet  Commonly known as: FLEXERIL  Take 1 tablet (10 mg total) by mouth 3 (three) times daily as needed for Muscle spasms.     escitalopram oxalate 20 MG tablet  Commonly known as: LEXAPRO  Take 20 mg by mouth once daily.     LORazepam 1 MG tablet  Commonly known as: ATIVAN  TAKE 1 TABLET BY MOUTH ONCE DAILY AS NEEDED AT BEDTIME     metFORMIN 1000 MG tablet  Commonly known as: GLUCOPHAGE  pt states she takes 500 mg po bid-the 1000mg dose "makes me sick"     nortriptyline 50 MG capsule  Commonly known as: PAMELOR  Take 2 capsules (100 mg total) by mouth every evening.     PROTONIX 40 MG tablet  Generic drug: pantoprazole  Take by mouth.        STOP taking these medications    LASIX 20 MG tablet  Generic drug: furosemide     naproxen 500 MG tablet  Commonly known as: NAPROSYN     TRADJENTA 5 mg Tab tablet  Generic drug: linaGLIPtin          Emilia Salvador MD  Department of Hospital Medicine  Replaced by [facility]  "

## 2020-07-01 NOTE — CARE UPDATE
06/30/20 2100   PRE-TX-O2   O2 Device (Oxygen Therapy) room air   $ Is the patient on Low Flow Oxygen? Yes   Flow (L/min) 2   Pulse Oximetry Type Continuous   $ Pulse Oximetry - Multiple Charge Pulse Oximetry - Multiple   Pulse (!) 115

## 2020-07-01 NOTE — PROGRESS NOTES
"CVT SURGERY PROGRESS NOTE  Nicole Harris  50 y.o.  1970    Patient's Chief Complaint:  S/P CABG x 3    HPI: 50 year old female with hx of HTN, DM, fibromyalgia, chronic pain [facility] presenting with complaints of chest pain with abnormal EKG. She had an abnormal stress test followed by [facility] that revealed multivessel CAD. CVTS consulted for CABG evaluation. Dr Villagomez performed CABG x 3 (DEE DEE-LAD, SVG-D1, SVG-OM) on 6/26/2020.     Last 24 hour Interval:  - ambulating with staff.  - Denies any ACS symptoms or shortness of breath  - NAD on RA sitting up in bed.  - Tachycardic and hypertensive. Recommendations below.       Subjective:  Symptoms:  Stable.  No shortness of breath, chest pain or chest pressure.    Diet:  Adequate intake.    Activity level: Normal.    Pain:  She reports no pain.                                                                 Objective:  General Appearance:  Comfortable and in no acute distress.    Vital signs: (most recent): Blood pressure (!) 160/81, pulse 95, temperature 98.3 °F (36.8 °C), temperature source Oral, resp. rate 16, height 5' 4" (1.626 m), weight 85 kg (187 lb 6.3 oz), last menstrual period 05/03/2020, SpO2 (!) 92 %, not currently breastfeeding.  Vital signs are normal.  No fever.    Output: Producing urine.  Stool output assessment: flatulent.    Lungs:  Normal effort and normal respiratory rate.  Breath sounds clear to auscultation.    Heart: Normal rate.  Regular rhythm.    Abdomen: Abdomen is soft.  Bowel sounds are normal.   There is no abdominal tenderness.     Pulses: Distal pulses are intact.    Neurological: Patient is alert and oriented to person, place and time.    Skin:  Warm and dry.  (Surgical incisions with no evidence of infection)    Recent Vitals:  Vitals:    06/30/20 2100 06/30/20 2330 07/01/20 0315 07/01/20 0747   BP:  118/81 129/73 (!) 160/81   BP Location:  Left arm Left arm Right arm   Patient " Position:  Sitting Sitting Lying   Pulse: (!) 115 103 95    Resp:  (!) 22 (!) 22 16   Temp:   98.3 °F (36.8 °C) 98.3 °F (36.8 °C)   TempSrc:   Oral Oral   SpO2:  96% 95% (!) 92%   Weight:       Height:           INPATIENT MEDS     aspirin  325 mg Oral Daily    atorvastatin  40 mg Oral QHS    chlorhexidine  10 mL Mouth/Throat BID    docusate sodium  100 mg Oral Daily    enoxparin  40 mg Subcutaneous Q24H    escitalopram oxalate  20 mg Oral Daily    ferrous sulfate  325 mg Oral Daily    insulin detemir U-100  15 Units Subcutaneous QHS    lisinopriL  5 mg Oral Daily    metoprolol tartrate  37.5 mg Oral BID    mupirocin  1 g Nasal BID    nortriptyline  100 mg Oral QHS    nystatin  500,000 Units Oral QID    pantoprazole  40 mg Oral Before breakfast    polyethylene glycol  17 g Oral Daily     acetaminophen, acetaminophen, albuterol, insulin regular, LORazepam, melatonin, morphine, ondansetron, oxyCODONE-acetaminophen  HEMODYNAMICS      Recent O2 Therapy/Vent Settings:  RA  I/O last 24 hrs:  Intake/Output - Last 3 Shifts       06/29 0700 - 06/30 0659 06/30 0700 - 07/01 0659 07/01 0700 - 07/02 0659    P.O. 240 120 240    Total Intake(mL/kg) 240 (2.8) 120 (1.4) 240 (2.8)    Urine (mL/kg/hr) 2450 (1.2)      Total Output 2450      Net -2210 +120 +240           Urine Occurrence 2 x          Recent Cardiac Rhythm: RRR    Recent Pain Assessment: Denies    CBC  Recent Labs   Lab 06/28/20  0300 06/29/20  0414 06/30/20  0440 07/01/20  0423   WBC 21.37* 12.49  --  7.98  7.98   RBC 3.05* 2.83*  --  3.15*  3.15*   HGB 9.1* 8.4* 8.7* 9.3*  9.3*    178  --  256  256   MCV 91 91  --  89  89   MCH 29.8 29.7  --  29.5  29.5   MCHC 33.0 32.8  --  33.1  33.1     BMP  Recent Labs   Lab 06/28/20  0330 06/29/20  0414 07/01/20  0423   CO2 26 29 30*  30*   BUN 13 11 12  12   CREATININE 0.6 0.6 0.6  0.6   CALCIUM 7.7* 8.3* 8.7  8.7     CARDIAC ENZYMES  No results for input(s): TROPONINI, CPK, CKMB in the last 168  hours.  BNP  No results for input(s): BNP in the last 168 hours.  PT/INR  INR   Date Value Ref Range Status   06/22/2020 0.9 0.8 - 1.2 Final     Comment:     Coumadin Therapy:  2.0 - 3.0 for INR for all indicators except mechanical heart valves  and antiphospholipid syndromes which should use 2.5 - 3.5.       DRUG LEVEL  No results found for: DIGOXIN    DIAGNOSTIC RESULTS:    ECG Results          EKG 12-lead (Final result)  Result time 06/24/20 21:17:00    Final result by Interface, Lab In [facility] (06/24/20 21:17:00)                 Narrative:    Test Reason : I21.4,    Vent. Rate : 097 BPM     Atrial Rate : 097 BPM     P-R Int : 184 ms          QRS Dur : 086 ms      QT Int : 368 ms       P-R-T Axes : 036 011 005 degrees     QTc Int : 467 ms    Normal sinus rhythm  Low voltage QRS  T wave abnormality, consider anterior ischemia  Abnormal ECG  No previous ECGs available  Confirmed by Sumanth Santana MD ([id number]) on 6/24/2020 9:16:53 PM    Referred By: SAMMY   SELF           Confirmed By:Sumanth Santana MD                            @[facility]{[email or web address]@    CURRENT CONSULTS:  IP CONSULT TO CARDIOLOGY  IP CONSULT TO CARDIOTHORACIC SURGERY  IP CONSULT TO SOCIAL WORK/CASE MANAGEMENT    ASSESSMENT/PLAN:    S/P CABGx 3 6/26/2020 by Dr Villagomez  - Hemodynamically stable  - MS and pleural tubes and pacer wires have been removed  - Continue ASA, and statin  - increase BB as tolerated  - Leukocytosis resolved  - Afebrile  - Monitor H/H   - Encourage IS   - Increase activity  - Cards following  - OK to DC home later today from surgical standpoint. Follow up in 2 weeks with Dr. Villagomez. Hard prescription for pain medications put in patients chart for discharge.     HTN  - uncontrolled  - recommend increasing lisinopril dose to keep SBP < 150  DMII  - Off Insulin infusion   - Accuchecks and SSI  - Likely would benefit from long acting insulin  - Defer to hospital medicine     Dyslipidemia  - Statin therapy     Chronic pain  - Pain  control     Anemia  -Stable post transfusion     Constipation  -Add Miralax    GI Prophylaxis: proton pump inhibitor per orders  DVT Prophylaxis:   Anticoagulants   Medication Route Frequency    enoxaparin injection 40 mg Subcutaneous Q24H       Case and plan of care discussed with MD Lloyd Fernando [facility]  7/1/2020  9:48 AM

## 2020-07-01 NOTE — PT/OT/SLP PROGRESS
Physical Therapy      Patient Name:  Nicole Harris   MRN:  [id number]    Patient not seen today secondary to Other (Comment)(Patient declined PT treatment 2/2 patient eating lunch and stating that she wants to save her energy for when she discharges to go home.). Will follow-up 07/02/20 if discharge falls through.    Giovana Ragland PTA

## 2020-07-01 NOTE — CARE UPDATE
07/01/20 0753   Patient Assessment/Suction   Level of Consciousness (AVPU) alert   Respiratory Effort Normal;Unlabored   Expansion/Accessory Muscles/Retractions no use of accessory muscles   All Lung Fields Breath Sounds clear   PRE-TX-O2   O2 Device (Oxygen Therapy) room air   Pulse Oximetry Type Continuous   $ Pulse Oximetry - Multiple Charge Pulse Oximetry - Multiple   Inhaler   $ Inhaler Charges PRN treatment not required   Respiratory Evaluation   $ Care Plan Tech Time 15 min

## 2020-07-02 ENCOUNTER — TELEPHONE (OUTPATIENT)
Dept: CARDIAC REHAB | Facility: HOSPITAL | Age: 50
End: 2020-07-02

## 2020-07-09 ENCOUNTER — HOSPITAL ENCOUNTER (OUTPATIENT)
Facility: HOSPITAL | Age: 50
Discharge: HOME | End: 2020-07-11
Attending: EMERGENCY MEDICINE | Admitting: INTERNAL MEDICINE
Payer: MEDICAID

## 2020-07-09 DIAGNOSIS — R07.9 CHEST PAIN: ICD-10-CM

## 2020-07-09 DIAGNOSIS — R06.09 DYSPNEA ON EXERTION: ICD-10-CM

## 2020-07-09 DIAGNOSIS — R00.2 PALPITATIONS: ICD-10-CM

## 2020-07-09 DIAGNOSIS — M54.9 BACK PAIN: ICD-10-CM

## 2020-07-09 DIAGNOSIS — R06.02 SHORTNESS OF BREATH: ICD-10-CM

## 2020-07-09 DIAGNOSIS — R07.9 CHEST PAIN, UNSPECIFIED TYPE: Primary | ICD-10-CM

## 2020-07-09 PROBLEM — R00.0 SINUS TACHYCARDIA: Status: ACTIVE | Noted: 2020-07-09

## 2020-07-09 PROBLEM — E83.42 HYPOMAGNESEMIA: Status: ACTIVE | Noted: 2020-07-09

## 2020-07-09 LAB
ALBUMIN SERPL BCP-MCNC: 3.8 G/DL (ref 3.5–5.2)
ALP SERPL-CCNC: 79 U/L (ref 55–135)
ALT SERPL W/O P-5'-P-CCNC: 26 U/L (ref 10–44)
ANION GAP SERPL CALC-SCNC: 14 MMOL/L (ref 8–16)
AST SERPL-CCNC: 21 U/L (ref 10–40)
BACTERIA #/AREA URNS HPF: NEGATIVE /HPF
BASOPHILS # BLD AUTO: 0.08 K/UL (ref 0–0.2)
BASOPHILS NFR BLD: 0.6 % (ref 0–1.9)
BILIRUB SERPL-MCNC: 0.6 MG/DL (ref 0.1–1)
BILIRUB UR QL STRIP: NEGATIVE
BNP SERPL-MCNC: 92 PG/ML (ref 0–99)
BUN SERPL-MCNC: 17 MG/DL (ref 6–20)
CALCIUM SERPL-MCNC: 9.3 MG/DL (ref 8.7–10.5)
CHLORIDE SERPL-SCNC: 98 MMOL/L (ref 95–110)
CK SERPL-CCNC: 24 U/L (ref 20–180)
CLARITY UR: CLEAR
CO2 SERPL-SCNC: 21 MMOL/L (ref 23–29)
COLOR UR: YELLOW
CREAT SERPL-MCNC: 0.8 MG/DL (ref 0.5–1.4)
DIFFERENTIAL METHOD: ABNORMAL
EOSINOPHIL # BLD AUTO: 0.2 K/UL (ref 0–0.5)
EOSINOPHIL NFR BLD: 1.7 % (ref 0–8)
ERYTHROCYTE [DISTWIDTH] IN BLOOD BY AUTOMATED COUNT: 13.5 % (ref 11.5–14.5)
EST. GFR  (AFRICAN AMERICAN): >60 ML/MIN/1.73 M^2
EST. GFR  (NON AFRICAN AMERICAN): >60 ML/MIN/1.73 M^2
GLUCOSE SERPL-MCNC: 151 MG/DL (ref 70–110)
GLUCOSE SERPL-MCNC: 175 MG/DL (ref 70–110)
GLUCOSE SERPL-MCNC: 317 MG/DL (ref 70–110)
GLUCOSE UR QL STRIP: ABNORMAL
HCT VFR BLD AUTO: 33.5 % (ref 37–48.5)
HGB BLD-MCNC: 11.1 G/DL (ref 12–16)
HGB UR QL STRIP: NEGATIVE
HYALINE CASTS #/AREA URNS LPF: 28 /LPF
IMM GRANULOCYTES # BLD AUTO: 0.09 K/UL (ref 0–0.04)
IMM GRANULOCYTES NFR BLD AUTO: 0.7 % (ref 0–0.5)
INR PPP: 1
KETONES UR QL STRIP: ABNORMAL
LEUKOCYTE ESTERASE UR QL STRIP: ABNORMAL
LYMPHOCYTES # BLD AUTO: 2.3 K/UL (ref 1–4.8)
LYMPHOCYTES NFR BLD: 18.4 % (ref 18–48)
MAGNESIUM SERPL-MCNC: 1.3 MG/DL (ref 1.6–2.6)
MCH RBC QN AUTO: 29.1 PG (ref 27–31)
MCHC RBC AUTO-ENTMCNC: 33.1 G/DL (ref 32–36)
MCV RBC AUTO: 88 FL (ref 82–98)
MICROSCOPIC COMMENT: ABNORMAL
MONOCYTES # BLD AUTO: 0.7 K/UL (ref 0.3–1)
MONOCYTES NFR BLD: 5.3 % (ref 4–15)
NEUTROPHILS # BLD AUTO: 9.3 K/UL (ref 1.8–7.7)
NEUTROPHILS NFR BLD: 73.3 % (ref 38–73)
NITRITE UR QL STRIP: NEGATIVE
NRBC BLD-RTO: 0 /100 WBC
PH UR STRIP: 6 [PH] (ref 5–8)
PLATELET # BLD AUTO: 564 K/UL (ref 150–350)
PMV BLD AUTO: 9.6 FL (ref 9.2–12.9)
POTASSIUM SERPL-SCNC: 4.3 MMOL/L (ref 3.5–5.1)
PROCALCITONIN SERPL IA-MCNC: <0.05 NG/ML (ref 0–0.5)
PROT SERPL-MCNC: 7.2 G/DL (ref 6–8.4)
PROT UR QL STRIP: ABNORMAL
PROTHROMBIN TIME: 13.1 SEC (ref 10.6–14.8)
RBC # BLD AUTO: 3.81 M/UL (ref 4–5.4)
RBC #/AREA URNS HPF: 2 /HPF (ref 0–4)
SARS-COV-2 RDRP RESP QL NAA+PROBE: NEGATIVE
SODIUM SERPL-SCNC: 133 MMOL/L (ref 136–145)
SP GR UR STRIP: 1.02 (ref 1–1.03)
SQUAMOUS #/AREA URNS HPF: 9 /HPF
TROPONIN I SERPL DL<=0.01 NG/ML-MCNC: <0.03 NG/ML
TROPONIN I SERPL DL<=0.01 NG/ML-MCNC: <0.03 NG/ML
TSH SERPL DL<=0.005 MIU/L-ACNC: 1.82 UIU/ML (ref 0.34–5.6)
URN SPEC COLLECT METH UR: ABNORMAL
UROBILINOGEN UR STRIP-ACNC: NEGATIVE EU/DL
WBC # BLD AUTO: 12.74 K/UL (ref 3.9–12.7)
WBC #/AREA URNS HPF: 8 /HPF (ref 0–5)
YEAST URNS QL MICRO: ABNORMAL

## 2020-07-09 PROCEDURE — 85025 COMPLETE CBC W/AUTO DIFF WBC: CPT

## 2020-07-09 PROCEDURE — 25000003 PHARM REV CODE 250: Performed by: INTERNAL MEDICINE

## 2020-07-09 PROCEDURE — G0378 HOSPITAL OBSERVATION PER HR: HCPCS

## 2020-07-09 PROCEDURE — U0002 COVID-19 LAB TEST NON-CDC: HCPCS

## 2020-07-09 PROCEDURE — 84484 ASSAY OF TROPONIN QUANT: CPT

## 2020-07-09 PROCEDURE — 81001 URINALYSIS AUTO W/SCOPE: CPT

## 2020-07-09 PROCEDURE — 96374 THER/PROPH/DIAG INJ IV PUSH: CPT

## 2020-07-09 PROCEDURE — 96375 TX/PRO/DX INJ NEW DRUG ADDON: CPT

## 2020-07-09 PROCEDURE — 84484 ASSAY OF TROPONIN QUANT: CPT | Mod: 91

## 2020-07-09 PROCEDURE — 83735 ASSAY OF MAGNESIUM: CPT

## 2020-07-09 PROCEDURE — 99285 EMERGENCY DEPT VISIT HI MDM: CPT | Mod: 25

## 2020-07-09 PROCEDURE — 82550 ASSAY OF CK (CPK): CPT

## 2020-07-09 PROCEDURE — 84145 PROCALCITONIN (PCT): CPT

## 2020-07-09 PROCEDURE — C9399 UNCLASSIFIED DRUGS OR BIOLOG: HCPCS | Performed by: INTERNAL MEDICINE

## 2020-07-09 PROCEDURE — 83880 ASSAY OF NATRIURETIC PEPTIDE: CPT

## 2020-07-09 PROCEDURE — 84443 ASSAY THYROID STIM HORMONE: CPT

## 2020-07-09 PROCEDURE — 93005 ELECTROCARDIOGRAM TRACING: CPT | Performed by: INTERNAL MEDICINE

## 2020-07-09 PROCEDURE — 36415 COLL VENOUS BLD VENIPUNCTURE: CPT

## 2020-07-09 PROCEDURE — 99900035 HC TECH TIME PER 15 MIN (STAT)

## 2020-07-09 PROCEDURE — 63600175 PHARM REV CODE 636 W HCPCS: Performed by: INTERNAL MEDICINE

## 2020-07-09 PROCEDURE — 85610 PROTHROMBIN TIME: CPT

## 2020-07-09 PROCEDURE — 96372 THER/PROPH/DIAG INJ SC/IM: CPT | Mod: 59

## 2020-07-09 PROCEDURE — 80053 COMPREHEN METABOLIC PANEL: CPT

## 2020-07-09 RX ORDER — LORAZEPAM 0.5 MG/1
0.5 TABLET ORAL EVERY 8 HOURS PRN
Status: DISCONTINUED | OUTPATIENT
Start: 2020-07-09 | End: 2020-07-11 | Stop reason: HOSPADM

## 2020-07-09 RX ORDER — NORTRIPTYLINE HYDROCHLORIDE 25 MG/1
100 CAPSULE ORAL NIGHTLY
Status: DISCONTINUED | OUTPATIENT
Start: 2020-07-09 | End: 2020-07-11 | Stop reason: HOSPADM

## 2020-07-09 RX ORDER — ALBUTEROL SULFATE 90 UG/1
1 AEROSOL, METERED RESPIRATORY (INHALATION) EVERY 4 HOURS PRN
Status: DISCONTINUED | OUTPATIENT
Start: 2020-07-09 | End: 2020-07-10

## 2020-07-09 RX ORDER — FERROUS SULFATE 325(65) MG
325 TABLET ORAL DAILY
Status: DISCONTINUED | OUTPATIENT
Start: 2020-07-09 | End: 2020-07-11 | Stop reason: HOSPADM

## 2020-07-09 RX ORDER — MAGNESIUM SULFATE HEPTAHYDRATE 40 MG/ML
2 INJECTION, SOLUTION INTRAVENOUS ONCE
Status: COMPLETED | OUTPATIENT
Start: 2020-07-09 | End: 2020-07-09

## 2020-07-09 RX ORDER — LINAGLIPTIN 5 MG/1
5 TABLET, FILM COATED ORAL DAILY
COMMUNITY
End: 2022-10-03

## 2020-07-09 RX ORDER — PANTOPRAZOLE SODIUM 40 MG/1
40 TABLET, DELAYED RELEASE ORAL DAILY
Status: DISCONTINUED | OUTPATIENT
Start: 2020-07-09 | End: 2020-07-11 | Stop reason: HOSPADM

## 2020-07-09 RX ORDER — NAPROXEN 500 MG/1
500 TABLET ORAL 2 TIMES DAILY WITH MEALS
COMMUNITY
End: 2020-08-28

## 2020-07-09 RX ORDER — LISINOPRIL 5 MG/1
5 TABLET ORAL DAILY
Status: DISCONTINUED | OUTPATIENT
Start: 2020-07-09 | End: 2020-07-11 | Stop reason: HOSPADM

## 2020-07-09 RX ORDER — ATORVASTATIN CALCIUM 40 MG/1
40 TABLET, FILM COATED ORAL NIGHTLY
Status: DISCONTINUED | OUTPATIENT
Start: 2020-07-09 | End: 2020-07-11 | Stop reason: HOSPADM

## 2020-07-09 RX ORDER — METOPROLOL TARTRATE 25 MG/1
25 TABLET, FILM COATED ORAL 2 TIMES DAILY
Status: DISCONTINUED | OUTPATIENT
Start: 2020-07-09 | End: 2020-07-11

## 2020-07-09 RX ORDER — ASPIRIN 325 MG
325 TABLET ORAL DAILY
Status: DISCONTINUED | OUTPATIENT
Start: 2020-07-09 | End: 2020-07-11 | Stop reason: HOSPADM

## 2020-07-09 RX ORDER — ACETAMINOPHEN 325 MG/1
650 TABLET ORAL EVERY 8 HOURS PRN
Status: DISCONTINUED | OUTPATIENT
Start: 2020-07-09 | End: 2020-07-11 | Stop reason: HOSPADM

## 2020-07-09 RX ORDER — ONDANSETRON 2 MG/ML
4 INJECTION INTRAMUSCULAR; INTRAVENOUS EVERY 8 HOURS PRN
Status: DISCONTINUED | OUTPATIENT
Start: 2020-07-09 | End: 2020-07-11 | Stop reason: HOSPADM

## 2020-07-09 RX ORDER — LORAZEPAM 2 MG/ML
0.5 INJECTION INTRAMUSCULAR ONCE
Status: COMPLETED | OUTPATIENT
Start: 2020-07-09 | End: 2020-07-09

## 2020-07-09 RX ORDER — ACETAMINOPHEN 325 MG/1
650 TABLET ORAL EVERY 4 HOURS PRN
Status: DISCONTINUED | OUTPATIENT
Start: 2020-07-09 | End: 2020-07-11 | Stop reason: HOSPADM

## 2020-07-09 RX ORDER — ESCITALOPRAM OXALATE 10 MG/1
20 TABLET ORAL DAILY
Status: DISCONTINUED | OUTPATIENT
Start: 2020-07-09 | End: 2020-07-11 | Stop reason: HOSPADM

## 2020-07-09 RX ADMIN — MAGNESIUM SULFATE IN WATER 2 G: 40 INJECTION, SOLUTION INTRAVENOUS at 04:07

## 2020-07-09 RX ADMIN — ESCITALOPRAM OXALATE 20 MG: 10 TABLET ORAL at 04:07

## 2020-07-09 RX ADMIN — HUMAN INSULIN 2 UNITS: 100 INJECTION, SOLUTION SUBCUTANEOUS at 10:07

## 2020-07-09 RX ADMIN — ATORVASTATIN CALCIUM 40 MG: 40 TABLET, FILM COATED ORAL at 08:07

## 2020-07-09 RX ADMIN — PANTOPRAZOLE SODIUM 40 MG: 40 TABLET, DELAYED RELEASE ORAL at 04:07

## 2020-07-09 RX ADMIN — LORAZEPAM 0.5 MG: 2 INJECTION INTRAMUSCULAR; INTRAVENOUS at 04:07

## 2020-07-09 RX ADMIN — LISINOPRIL 5 MG: 5 TABLET ORAL at 04:07

## 2020-07-09 RX ADMIN — HUMAN INSULIN 10 UNITS: 100 INJECTION, SOLUTION SUBCUTANEOUS at 04:07

## 2020-07-09 RX ADMIN — INSULIN DETEMIR 12 UNITS: 100 INJECTION, SOLUTION SUBCUTANEOUS at 10:07

## 2020-07-09 RX ADMIN — LORAZEPAM 0.5 MG: 0.5 TABLET ORAL at 10:07

## 2020-07-09 RX ADMIN — ACETAMINOPHEN 650 MG: 325 TABLET ORAL at 04:07

## 2020-07-09 RX ADMIN — ACETAMINOPHEN 650 MG: 325 TABLET ORAL at 08:07

## 2020-07-09 RX ADMIN — METOPROLOL TARTRATE 25 MG: 25 TABLET, FILM COATED ORAL at 08:07

## 2020-07-09 RX ADMIN — ASPIRIN 325 MG ORAL TABLET 325 MG: 325 PILL ORAL at 04:07

## 2020-07-09 RX ADMIN — NORTRIPTYLINE HYDROCHLORIDE 100 MG: 25 CAPSULE ORAL at 08:07

## 2020-07-09 RX ADMIN — FERROUS SULFATE TAB 325 MG (65 MG ELEMENTAL FE) 325 MG: 325 (65 FE) TAB at 04:07

## 2020-07-09 NOTE — SUBJECTIVE & OBJECTIVE
Past Medical History:   Diagnosis Date    Anxiety     Bipolar disorder     Depression     Diabetes mellitus, type 2     HTN (hypertension)     Insomnia        Past Surgical History:   Procedure Laterality Date    ANGIOGRAM, CORONARY, WITH LEFT HEART CATHETERIZATION Left 2020    Procedure: Left heart cath;  Surgeon: Kannan Santana MD;  Location: [facility] CATH/EP LAB;  Service: Cardiology;  Laterality: Left;     SECTION      COLONOSCOPY N/A 2019    Procedure: COLONOSCOPY;  Surgeon: aD Diallo MD;  Location: [facility] ENDO;  Service: General;  Laterality: N/A;    CORONARY ARTERY BYPASS GRAFT (CABG) N/A 2020    Procedure: CORONARY ARTERY BYPASS GRAFT (CABG);  Surgeon: Anshul Oakes MD;  Location: [facility] OR;  Service: Cardiothoracic;  Laterality: N/A;    ENDOSCOPIC HARVEST OF VEIN Left 2020    Procedure: SURGICAL PROCUREMENT, VEIN, ENDOSCOPIC;  Surgeon: Anshul Oakes MD;  Location: [facility] OR;  Service: Cardiothoracic;  Laterality: Left;    EPIDURAL STEROID INJECTION N/A 2019    Procedure: Injection, Steroid, Epidural - L4/5 EPIDURAL STEROID INJECTION;  Surgeon: Sherri Gardiner MD;  Location: [facility] OR;  Service: Pain Management;  Laterality: N/A;    EPIDURAL STEROID INJECTION N/A 2019    Procedure: Injection, Steroid, Epidural - C7/T1 EPIDURAL STEROID INJECTION;  Surgeon: Sherri Gardiner MD;  Location: [facility] OR;  Service: Pain Management;  Laterality: N/A;    EPIDURAL STEROID INJECTION N/A 2019    Procedure: Injection, Steroid, Epidural - L4/5 EPIDURAL STEROID INJECTION;  Surgeon: Sherri Gardiner MD;  Location: [facility] OR;  Service: Pain Management;  Laterality: N/A;    EPIDURAL STEROID INJECTION N/A 10/21/2019    Procedure: Injection, Steroid, Epidural, CERVICAL C7/T1 SILVIA;  Surgeon: Sherri Gardiner MD;  Location: [facility] OR;  Service: Pain Management;  Laterality: N/A;  19    ESOPHAGOGASTRODUODENOSCOPY N/A 2019    Procedure:  ESOPHAGOGASTRODUODENOSCOPY (EGD);  Surgeon: Da Diallo MD;  Location: [facility] ENDO;  Service: General;  Laterality: N/A;    TONSILLECTOMY      TRIGGER POINT INJECTION N/A 7/8/2019    Procedure: INJECTION, TRIGGER POINT - CERVICAL REGION;  Surgeon: Sherri Gardiner MD;  Location: [facility] OR;  Service: Pain Management;  Laterality: N/A;    TRIGGER POINT INJECTION N/A 10/21/2019    Procedure: INJECTION, TRIGGER POINT;  Surgeon: Sherri Gardiner MD;  Location: [facility] OR;  Service: Pain Management;  Laterality: N/A;       Review of patient's allergies indicates:   Allergen Reactions    Hydrocodone Nausea And Vomiting       Current Facility-Administered Medications on File Prior to Encounter   Medication    0.9%  NaCl infusion     Current Outpatient Medications on File Prior to Encounter   Medication Sig    aspirin 325 MG tablet Take 1 tablet (325 mg total) by mouth once daily.    atorvastatin (LIPITOR) 40 MG tablet Take 1 tablet (40 mg total) by mouth every evening.    cyclobenzaprine (FLEXERIL) 10 MG tablet Take 1 tablet (10 mg total) by mouth 3 (three) times daily as needed for Muscle spasms.    docusate sodium (COLACE) 100 MG capsule Take 1 capsule (100 mg total) by mouth once daily.    escitalopram oxalate (LEXAPRO) 20 MG tablet Take 20 mg by mouth once daily.     ferrous sulfate (FEOSOL) 325 mg (65 mg iron) Tab tablet Take 1 tablet (325 mg total) by mouth once daily.    insulin glargine,hum.rec.anlog (LANTUS SOLOSTAR U-100 INSULIN SUBQ) Inject 20 Units into the skin nightly.    linaGLIPtin (TRADJENTA) 5 mg Tab tablet Take 5 mg by mouth once daily.    lisinopriL (PRINIVIL,ZESTRIL) 5 MG tablet Take 1 tablet (5 mg total) by mouth once daily.    metFORMIN (GLUCOPHAGE) 1000 MG tablet Take 1,000 mg by mouth 2 (two) times daily with meals.     metoprolol tartrate 37.5 mg Tab Take 37.5 mg by mouth 2 (two) times daily.    naproxen (NAPROSYN) 500 MG tablet Take 500 mg by mouth 2 (two) times daily with meals.     nortriptyline (PAMELOR) 50 MG capsule Take 2 capsules (100 mg total) by mouth every evening.    nystatin (MYCOSTATIN) 100,000 unit/mL suspension Take 5 mLs (500,000 Units total) by mouth 4 (four) times daily. for 10 days (Patient taking differently: Take 5 mLs by mouth 4 (four) times daily. )    pantoprazole (PROTONIX) 40 MG tablet Take 40 mg by mouth once daily.     albuterol (PROVENTIL/VENTOLIN HFA) 90 mcg/actuation inhaler Inhale 1 puff into the lungs every 4 (four) hours as needed.     [DISCONTINUED] insulin (BASAGLAR KWIKPEN U-100 INSULIN) glargine 100 units/mL (3mL) SubQ pen Inject 20 Units into the skin every evening.    [DISCONTINUED] LORazepam (ATIVAN) 1 MG tablet TAKE 1 TABLET BY MOUTH ONCE DAILY AS NEEDED AT BEDTIME    [DISCONTINUED] polyethylene glycol (GLYCOLAX) 17 gram PwPk Take 17 g by mouth once daily.     Family History     Problem Relation (Age of Onset)    Lung cancer Mother        Tobacco Use    Smoking status: Former Smoker     Packs/day: 1.00     Years: 0.00     Pack years: 0.00     Quit date: 2012     Years since quittin.8    Smokeless tobacco: Never Used   Substance and Sexual Activity    Alcohol use: Yes     Frequency: Monthly or less     Comment: occasional    Drug use: Yes     Types: Marijuana, Other-see comments    Sexual activity: Yes       Objective:     Vital Signs (Most Recent):  Temp: 98.3 °F (36.8 °C) (20 1204)  Pulse: 108 (20 1400)  Resp: 18 (20 1400)  BP: 124/72 (20 1400)  SpO2: 97 % (20 1400) Vital Signs (24h Range):  Temp:  [98.3 °F (36.8 °C)] 98.3 °F (36.8 °C)  Pulse:  [106-116] 108  Resp:  [18-23] 18  SpO2:  [96 %-97 %] 97 %  BP: (124-133)/(70-76) 124/72     Weight: 81.6 kg (180 lb)  Body mass index is 30.9 kg/m².    Physical Exam  Vitals signs and nursing note reviewed.   Constitutional:       General: She is not in acute distress.     Appearance: She is well-developed.   HENT:      Head: Normocephalic and atraumatic.       Mouth/Throat:      Mouth: Mucous membranes are moist.      Pharynx: Oropharynx is clear.   Eyes:      General: No scleral icterus.     Conjunctiva/sclera: Conjunctivae normal.   Neck:      Musculoskeletal: Neck supple.      Thyroid: No thyromegaly.   Cardiovascular:      Rate and Rhythm: Regular rhythm. Tachycardia present.      Pulses: Normal pulses.      Heart sounds: Normal heart sounds. No murmur.      Comments: Midline chest wall incision healing well  Pulmonary:      Effort: Pulmonary effort is normal. No respiratory distress.      Breath sounds: Normal breath sounds.   Abdominal:      General: Bowel sounds are normal. There is no distension.      Palpations: Abdomen is soft.      Tenderness: There is no abdominal tenderness.   Musculoskeletal:         General: No deformity.      Right lower leg: No edema.      Left lower leg: No edema.   Skin:     General: Skin is warm and dry.      Capillary Refill: Capillary refill takes less than 2 seconds.      Findings: No erythema.   Neurological:      General: No focal deficit present.      Mental Status: She is alert and oriented to person, place, and time. Mental status is at baseline.   Psychiatric:         Mood and Affect: Mood is anxious.         Behavior: Behavior normal.             Significant Labs:   CBC:   Recent Labs   Lab 07/09/20  1241   WBC 12.74*   HGB 11.1*   HCT 33.5*   *     CMP:   Recent Labs   Lab 07/09/20  1241   *   K 4.3   CL 98   CO2 21*   *   BUN 17   CREATININE 0.8   CALCIUM 9.3   PROT 7.2   ALBUMIN 3.8   BILITOT 0.6   ALKPHOS 79   AST 21   ALT 26   ANIONGAP 14   EGFRNONAA >60.0     Magnesium:   Recent Labs   Lab 07/09/20  1241   MG 1.3*     Troponin:   Recent Labs   Lab 07/09/20  1241   TROPONINI <0.030     TSH:   Recent Labs   Lab 07/09/20  1241   TSH 1.820     Urine Studies:   Recent Labs   Lab 07/09/20  1348   COLORU Yellow   APPEARANCEUA Clear   PHUR 6.0   SPECGRAV 1.025   PROTEINUA 1+*   GLUCUA 4+*   KETONESU Trace*    BILIRUBINUA Negative   OCCULTUA Negative   NITRITE Negative   UROBILINOGEN Negative   LEUKOCYTESUR 1+*   RBCUA 2   WBCUA 8*   BACTERIA Negative   SQUAMEPITHEL 9   HYALINECASTS 28*       Significant Imaging: I have reviewed all pertinent imaging results/findings within the past 24 hours.     Imaging Results          X-Ray Chest AP Portable (Final result)  Result time 07/09/20 12:32:40    Final result by Michael Ordonez MD (07/09/20 12:32:40)                 Narrative:    REASON: chest pain Chief Complaint; Palpitations?(X 2 WEEKS, S/P  CABG); Shortness of Breath?    FINDINGS:    Portable chest radiograph with comparison chest x-ray June 28, 2020.  The cardiomediastinal silhouette is within normal limits in size.  Median sternotomy wires and surgical clips noted. The pulmonary  vascular structures are within normal limits. Small linear opacity in  the left perihilar region likely reflect subsegmental atelectasis  versus scarring, otherwise the lungs are clear bilaterally. No acute  osseous abnormality.    IMPRESSION:    Left perihilar linear opacity likely reflects subsegmental atelectasis  versus scarring, otherwise no acute cardiopulmonary process.    Electronically Signed by Michael Ordonez on 7/9/2020 12:36 PM                              Results for orders placed or performed during the hospital encounter of 07/09/20   EKG 12-lead    Collection Time: 07/09/20  3:40 PM    Narrative    Test Reason : R07.9,    Vent. Rate : 111 BPM     Atrial Rate : 111 BPM     P-R Int : 164 ms          QRS Dur : 088 ms      QT Int : 352 ms       P-R-T Axes : 049 031 103 degrees     QTc Int : 478 ms    Sinus tachycardia  Low voltage QRS  T wave abnormality, consider anterolateral ischemia  Abnormal ECG  When compared with ECG of 09-JUL-2020 12:05,  QRS duration has increased  ST no longer elevated in Inferior leads  Nonspecific T wave abnormality now evident in Inferior leads  T wave inversion now evident in Anterior-lateral  leads    Referred By: [email or web address]   SELF           Confirmed By:

## 2020-07-09 NOTE — H&P
[facility]  History & Physical    Patient Name: Nicole Harris  MRN: [id number]  Admission Date: 7/9/2020  Attending Physician: Rohit Huertas MD  Primary Care Provider: Vira Conn NP         Patient information was obtained from patient, past medical records and ER records.     Subjective:     Principal Problem:Dyspnea on exertion    Chief Complaint:   Chief Complaint   Patient presents with    Palpitations     X 2 WEEKS, S/P CABG    Shortness of Breath        HPI: Patient is a 50-year-old  female with recent CABG 2 weeks ago, known type 2 diabetes mellitus and hypertension presents to the ED with chief complaint of shortness of breath.    Reports presence of shortness of breath since the time of her discharge last week with gradual worsening.  She was scheduled to see her cardiologist Dr. Kannan Santana today however presented here secondary to shortness of breath.  It is worse with exertion and better with rest.  It is associated with intermittent chest pain which she describes as spasms.  No correlation of chest pain with activity.  No specific alleviating factors.  She denies wheezing or lower extremity edema.  She admits to palpitations as well as nausea.  She skipped morning dose of metoprolol has her heart rate would drop to 50s occasionally.  Reports chest wall incision to be healing well.  She denies fever or chills but admits to fatigue.  Compliant with all her medications as prescribed.    In the ED:  Hemodynamically stable but noted to be in sinus tachycardia.  EKG revealed sinus tachycardia with T-wave inversions in anterolateral leads which are new.  Labs with marginal leukocytosis, hypomagnesemia and elevated blood glucose.  Initial troponin, BNP, CPK and TSH within normal limits.  Urinalysis positive for yeast in the urine.  Chest x-ray revealed atelectasis but no focal infiltrate.    Rest of the 10 point review of systems is negative except as  mentioned above.    Past Medical History:   Diagnosis Date    Anxiety     Bipolar disorder     Depression     Diabetes mellitus, type 2     HTN (hypertension)     Insomnia        Past Surgical History:   Procedure Laterality Date    ANGIOGRAM, CORONARY, WITH LEFT HEART CATHETERIZATION Left 2020    Procedure: Left heart cath;  Surgeon: Kannan Santana MD;  Location: [facility] CATH/EP LAB;  Service: Cardiology;  Laterality: Left;     SECTION      COLONOSCOPY N/A 2019    Procedure: COLONOSCOPY;  Surgeon: Da Diallo MD;  Location: [facility] ENDO;  Service: General;  Laterality: N/A;    CORONARY ARTERY BYPASS GRAFT (CABG) N/A 2020    Procedure: CORONARY ARTERY BYPASS GRAFT (CABG);  Surgeon: Anshul Oakes MD;  Location: [facility] OR;  Service: Cardiothoracic;  Laterality: N/A;    ENDOSCOPIC HARVEST OF VEIN Left 2020    Procedure: SURGICAL PROCUREMENT, VEIN, ENDOSCOPIC;  Surgeon: Anshul Oakes MD;  Location: [facility] OR;  Service: Cardiothoracic;  Laterality: Left;    EPIDURAL STEROID INJECTION N/A 2019    Procedure: Injection, Steroid, Epidural - L4/5 EPIDURAL STEROID INJECTION;  Surgeon: Sherri Gardiner MD;  Location: [facility] OR;  Service: Pain Management;  Laterality: N/A;    EPIDURAL STEROID INJECTION N/A 2019    Procedure: Injection, Steroid, Epidural - C7/T1 EPIDURAL STEROID INJECTION;  Surgeon: Sherri Gardiner MD;  Location: [facility] OR;  Service: Pain Management;  Laterality: N/A;    EPIDURAL STEROID INJECTION N/A 2019    Procedure: Injection, Steroid, Epidural - L4/5 EPIDURAL STEROID INJECTION;  Surgeon: Sherri Gardiner MD;  Location: [facility] OR;  Service: Pain Management;  Laterality: N/A;    EPIDURAL STEROID INJECTION N/A 10/21/2019    Procedure: Injection, Steroid, Epidural, CERVICAL C7/T1 SILVIA;  Surgeon: Sherri Gardiner MD;  Location: [facility] OR;  Service: Pain Management;  Laterality: N/A;  19    ESOPHAGOGASTRODUODENOSCOPY N/A 2019    Procedure:  ESOPHAGOGASTRODUODENOSCOPY (EGD);  Surgeon: Da Diallo MD;  Location: [facility] ENDO;  Service: General;  Laterality: N/A;    TONSILLECTOMY      TRIGGER POINT INJECTION N/A 7/8/2019    Procedure: INJECTION, TRIGGER POINT - CERVICAL REGION;  Surgeon: Sherri Gardiner MD;  Location: [facility] OR;  Service: Pain Management;  Laterality: N/A;    TRIGGER POINT INJECTION N/A 10/21/2019    Procedure: INJECTION, TRIGGER POINT;  Surgeon: Sherri Gardiner MD;  Location: [facility] OR;  Service: Pain Management;  Laterality: N/A;       Review of patient's allergies indicates:   Allergen Reactions    Hydrocodone Nausea And Vomiting       Current Facility-Administered Medications on File Prior to Encounter   Medication    0.9%  NaCl infusion     Current Outpatient Medications on File Prior to Encounter   Medication Sig    aspirin 325 MG tablet Take 1 tablet (325 mg total) by mouth once daily.    atorvastatin (LIPITOR) 40 MG tablet Take 1 tablet (40 mg total) by mouth every evening.    cyclobenzaprine (FLEXERIL) 10 MG tablet Take 1 tablet (10 mg total) by mouth 3 (three) times daily as needed for Muscle spasms.    docusate sodium (COLACE) 100 MG capsule Take 1 capsule (100 mg total) by mouth once daily.    escitalopram oxalate (LEXAPRO) 20 MG tablet Take 20 mg by mouth once daily.     ferrous sulfate (FEOSOL) 325 mg (65 mg iron) Tab tablet Take 1 tablet (325 mg total) by mouth once daily.    insulin glargine,hum.rec.anlog (LANTUS SOLOSTAR U-100 INSULIN SUBQ) Inject 20 Units into the skin nightly.    linaGLIPtin (TRADJENTA) 5 mg Tab tablet Take 5 mg by mouth once daily.    lisinopriL (PRINIVIL,ZESTRIL) 5 MG tablet Take 1 tablet (5 mg total) by mouth once daily.    metFORMIN (GLUCOPHAGE) 1000 MG tablet Take 1,000 mg by mouth 2 (two) times daily with meals.     metoprolol tartrate 37.5 mg Tab Take 37.5 mg by mouth 2 (two) times daily.    naproxen (NAPROSYN) 500 MG tablet Take 500 mg by mouth 2 (two) times daily with meals.     nortriptyline (PAMELOR) 50 MG capsule Take 2 capsules (100 mg total) by mouth every evening.    nystatin (MYCOSTATIN) 100,000 unit/mL suspension Take 5 mLs (500,000 Units total) by mouth 4 (four) times daily. for 10 days (Patient taking differently: Take 5 mLs by mouth 4 (four) times daily. )    pantoprazole (PROTONIX) 40 MG tablet Take 40 mg by mouth once daily.     albuterol (PROVENTIL/VENTOLIN HFA) 90 mcg/actuation inhaler Inhale 1 puff into the lungs every 4 (four) hours as needed.     [DISCONTINUED] insulin (BASAGLAR KWIKPEN U-100 INSULIN) glargine 100 units/mL (3mL) SubQ pen Inject 20 Units into the skin every evening.    [DISCONTINUED] LORazepam (ATIVAN) 1 MG tablet TAKE 1 TABLET BY MOUTH ONCE DAILY AS NEEDED AT BEDTIME    [DISCONTINUED] polyethylene glycol (GLYCOLAX) 17 gram PwPk Take 17 g by mouth once daily.     Family History     Problem Relation (Age of Onset)    Lung cancer Mother        Tobacco Use    Smoking status: Former Smoker     Packs/day: 1.00     Years: 0.00     Pack years: 0.00     Quit date: 2012     Years since quittin.8    Smokeless tobacco: Never Used   Substance and Sexual Activity    Alcohol use: Yes     Frequency: Monthly or less     Comment: occasional    Drug use: Yes     Types: Marijuana, Other-see comments    Sexual activity: Yes       Objective:     Vital Signs (Most Recent):  Temp: 98.3 °F (36.8 °C) (20 1204)  Pulse: 108 (20 1400)  Resp: 18 (20 1400)  BP: 124/72 (20 1400)  SpO2: 97 % (20 1400) Vital Signs (24h Range):  Temp:  [98.3 °F (36.8 °C)] 98.3 °F (36.8 °C)  Pulse:  [106-116] 108  Resp:  [18-23] 18  SpO2:  [96 %-97 %] 97 %  BP: (124-133)/(70-76) 124/72     Weight: 81.6 kg (180 lb)  Body mass index is 30.9 kg/m².    Physical Exam  Vitals signs and nursing note reviewed.   Constitutional:       General: She is not in acute distress.     Appearance: She is well-developed.   HENT:      Head: Normocephalic and atraumatic.       Mouth/Throat:      Mouth: Mucous membranes are moist.      Pharynx: Oropharynx is clear.   Eyes:      General: No scleral icterus.     Conjunctiva/sclera: Conjunctivae normal.   Neck:      Musculoskeletal: Neck supple.      Thyroid: No thyromegaly.   Cardiovascular:      Rate and Rhythm: Regular rhythm. Tachycardia present.      Pulses: Normal pulses.      Heart sounds: Normal heart sounds. No murmur.      Comments: Midline chest wall incision healing well  Pulmonary:      Effort: Pulmonary effort is normal. No respiratory distress.      Breath sounds: Normal breath sounds.   Abdominal:      General: Bowel sounds are normal. There is no distension.      Palpations: Abdomen is soft.      Tenderness: There is no abdominal tenderness.   Musculoskeletal:         General: No deformity.      Right lower leg: No edema.      Left lower leg: No edema.   Skin:     General: Skin is warm and dry.      Capillary Refill: Capillary refill takes less than 2 seconds.      Findings: No erythema.   Neurological:      General: No focal deficit present.      Mental Status: She is alert and oriented to person, place, and time. Mental status is at baseline.   Psychiatric:         Mood and Affect: Mood is anxious.         Behavior: Behavior normal.             Significant Labs:   CBC:   Recent Labs   Lab 07/09/20  1241   WBC 12.74*   HGB 11.1*   HCT 33.5*   *     CMP:   Recent Labs   Lab 07/09/20  1241   *   K 4.3   CL 98   CO2 21*   *   BUN 17   CREATININE 0.8   CALCIUM 9.3   PROT 7.2   ALBUMIN 3.8   BILITOT 0.6   ALKPHOS 79   AST 21   ALT 26   ANIONGAP 14   EGFRNONAA >60.0     Magnesium:   Recent Labs   Lab 07/09/20  1241   MG 1.3*     Troponin:   Recent Labs   Lab 07/09/20  1241   TROPONINI <0.030     TSH:   Recent Labs   Lab 07/09/20  1241   TSH 1.820     Urine Studies:   Recent Labs   Lab 07/09/20  1348   COLORU Yellow   APPEARANCEUA Clear   PHUR 6.0   SPECGRAV 1.025   PROTEINUA 1+*   GLUCUA 4+*   KETONESU Trace*    BILIRUBINUA Negative   OCCULTUA Negative   NITRITE Negative   UROBILINOGEN Negative   LEUKOCYTESUR 1+*   RBCUA 2   WBCUA 8*   BACTERIA Negative   SQUAMEPITHEL 9   HYALINECASTS 28*       Significant Imaging: I have reviewed all pertinent imaging results/findings within the past 24 hours.     Imaging Results          X-Ray Chest AP Portable (Final result)  Result time 07/09/20 12:32:40    Final result by Michael Ordonez MD (07/09/20 12:32:40)                 Narrative:    REASON: chest pain Chief Complaint; Palpitations?(X 2 WEEKS, S/P  CABG); Shortness of Breath?    FINDINGS:    Portable chest radiograph with comparison chest x-ray June 28, 2020.  The cardiomediastinal silhouette is within normal limits in size.  Median sternotomy wires and surgical clips noted. The pulmonary  vascular structures are within normal limits. Small linear opacity in  the left perihilar region likely reflect subsegmental atelectasis  versus scarring, otherwise the lungs are clear bilaterally. No acute  osseous abnormality.    IMPRESSION:    Left perihilar linear opacity likely reflects subsegmental atelectasis  versus scarring, otherwise no acute cardiopulmonary process.    Electronically Signed by Michael Ordonez on 7/9/2020 12:36 PM                              Results for orders placed or performed during the hospital encounter of 07/09/20   EKG 12-lead    Collection Time: 07/09/20  3:40 PM    Narrative    Test Reason : R07.9,    Vent. Rate : 111 BPM     Atrial Rate : 111 BPM     P-R Int : 164 ms          QRS Dur : 088 ms      QT Int : 352 ms       P-R-T Axes : 049 031 103 degrees     QTc Int : 478 ms    Sinus tachycardia  Low voltage QRS  T wave abnormality, consider anterolateral ischemia  Abnormal ECG  When compared with ECG of 09-JUL-2020 12:05,  QRS duration has increased  ST no longer elevated in Inferior leads  Nonspecific T wave abnormality now evident in Inferior leads  T wave inversion now evident in Anterior-lateral  leads    Referred By: [email or web address]   SELF           Confirmed By:          Assessment/Plan:     50-year-old  female with recent 3 vessel CABG about 10 days ago presents with shortness of breath which is exertional as well as palpitations.    Active Hospital Problems    Diagnosis  POA    *Dyspnea on exertion [R06.09]  Yes    Sinus tachycardia [R00.0]  Yes    Shortness of breath [R06.02]  Yes    Hypomagnesemia [E83.42]  Yes    Anemia [D64.9]  Yes    S/P CABG x 3 [Z95.1]  Not Applicable    Essential hypertension [I10]  Yes    Type 2 diabetes mellitus, without long-term current use of insulin [E11.9]  Yes      Resolved Hospital Problems   No resolved problems to display.       Plan:  Admit to telemetry unit for cardiac monitoring  Concern for underlying arrhythmia secondary to palpitations - ?Postoperative atrial fibrillation. Other differential could be panic attacks from anxiety   Serial troponins. Consult Cardiology for further evaluation; Dr. Kannan Santana  Restart metoprolol tartrate at lower dose of 25 mg b.i.d..  Continue other cardioprotective medications  Normal LVEF noted on cardiac catheterization 06/25.  Will obtain formal echocardiogram  Replete magnesium; target greater than 2 mg/dL  Basal insulin with moderate dose insulin sliding scale      VTE Risk Mitigation (From admission, onward)         Ordered     IP VTE HIGH RISK PATIENT  Once      07/09/20 1448     Place sequential compression device  Until discontinued      07/09/20 1448                   Rohit Huertas MD  Department of Hospital Medicine   [facility]

## 2020-07-09 NOTE — ED PROVIDER NOTES
Encounter Date: 2020       History     Chief Complaint   Patient presents with    Palpitations     X 2 WEEKS, S/P CABG    Shortness of Breath     50-year-old female presents complaining of palpitations and chest discomfort as well as is dorsal shortness of breath patient reports she is 2 weeks status post CABG, patient denies fever or cough patient denies nausea or vomiting patient denies abdominal pain patient reports no current chest discomfort she has no other complaints at this time.  She reports that she did not take her metoprolol today because she when she takes it she notices her heart rate dropped to the 50s.        Review of patient's allergies indicates:   Allergen Reactions    Hydrocodone Nausea And Vomiting     Past Medical History:   Diagnosis Date    Anxiety     Bipolar disorder     Depression     Diabetes mellitus, type 2     HTN (hypertension)     Insomnia      Past Surgical History:   Procedure Laterality Date    ANGIOGRAM, CORONARY, WITH LEFT HEART CATHETERIZATION Left 2020    Procedure: Left heart cath;  Surgeon: Kannan Santana MD;  Location: [facility] CATH/EP LAB;  Service: Cardiology;  Laterality: Left;     SECTION      COLONOSCOPY N/A 2019    Procedure: COLONOSCOPY;  Surgeon: Da Diallo MD;  Location: [facility] ENDO;  Service: General;  Laterality: N/A;    CORONARY ARTERY BYPASS GRAFT (CABG) N/A 2020    Procedure: CORONARY ARTERY BYPASS GRAFT (CABG);  Surgeon: Anshul Oakes MD;  Location: [facility] OR;  Service: Cardiothoracic;  Laterality: N/A;    ENDOSCOPIC HARVEST OF VEIN Left 2020    Procedure: SURGICAL PROCUREMENT, VEIN, ENDOSCOPIC;  Surgeon: Anshul Oakes MD;  Location: [facility] OR;  Service: Cardiothoracic;  Laterality: Left;    EPIDURAL STEROID INJECTION N/A 2019    Procedure: Injection, Steroid, Epidural - L4/5 EPIDURAL STEROID INJECTION;  Surgeon: Sherri Gardiner MD;  Location: [facility] OR;  Service: Pain Management;  Laterality:  N/A;    EPIDURAL STEROID INJECTION N/A 2019    Procedure: Injection, Steroid, Epidural - C7/T1 EPIDURAL STEROID INJECTION;  Surgeon: Sherri Gardiner MD;  Location: [facility] OR;  Service: Pain Management;  Laterality: N/A;    EPIDURAL STEROID INJECTION N/A 2019    Procedure: Injection, Steroid, Epidural - L4/5 EPIDURAL STEROID INJECTION;  Surgeon: Sherri Gardiner MD;  Location: [facility] OR;  Service: Pain Management;  Laterality: N/A;    EPIDURAL STEROID INJECTION N/A 10/21/2019    Procedure: Injection, Steroid, Epidural, CERVICAL C7/T1 SILVIA;  Surgeon: Sherri Gardiner MD;  Location: [facility] OR;  Service: Pain Management;  Laterality: N/A;  19    ESOPHAGOGASTRODUODENOSCOPY N/A 2019    Procedure: ESOPHAGOGASTRODUODENOSCOPY (EGD);  Surgeon: Da Diallo MD;  Location: [facility] ENDO;  Service: General;  Laterality: N/A;    TONSILLECTOMY      TRIGGER POINT INJECTION N/A 2019    Procedure: INJECTION, TRIGGER POINT - CERVICAL REGION;  Surgeon: Sherri aGrdiner MD;  Location: [facility] OR;  Service: Pain Management;  Laterality: N/A;    TRIGGER POINT INJECTION N/A 10/21/2019    Procedure: INJECTION, TRIGGER POINT;  Surgeon: Sherri Gardiner MD;  Location: [facility] OR;  Service: Pain Management;  Laterality: N/A;     Family History   Problem Relation Age of Onset    Lung cancer Mother     Breast cancer Neg Hx      Social History     Tobacco Use    Smoking status: Former Smoker     Packs/day: 1.00     Years: 0.00     Pack years: 0.00     Quit date: 2012     Years since quittin.8    Smokeless tobacco: Never Used   Substance Use Topics    Alcohol use: Yes     Frequency: Monthly or less     Comment: occasional    Drug use: Yes     Types: Marijuana, Other-see comments     Review of Systems   Constitutional: Negative for fever.   HENT: Negative for congestion, rhinorrhea, sore throat and trouble swallowing.    Eyes: Negative for visual disturbance.   Respiratory: Positive for shortness of breath.  Negative for cough, chest tightness and wheezing.    Cardiovascular: Positive for chest pain and palpitations. Negative for leg swelling.   Gastrointestinal: Negative for abdominal distention, abdominal pain, constipation, diarrhea, nausea and vomiting.   Genitourinary: Negative for difficulty urinating, dysuria, flank pain and frequency.   Musculoskeletal: Negative for arthralgias, back pain, joint swelling and neck pain.   Skin: Negative for color change and rash.   Neurological: Negative for dizziness, syncope, speech difficulty, weakness, numbness and headaches.   All other systems reviewed and are negative.      Physical Exam     Initial Vitals [07/09/20 1204]   BP Pulse Resp Temp SpO2   124/76 (!) 116 20 98.3 °F (36.8 °C) 97 %      MAP       --         Physical Exam    Nursing note and vitals reviewed.  Constitutional: She appears well-developed and well-nourished. She is not diaphoretic. No distress.   HENT:   Head: Normocephalic and atraumatic.   Right Ear: External ear normal.   Left Ear: External ear normal.   Nose: Nose normal.   Mouth/Throat: Oropharynx is clear and moist. No oropharyngeal exudate.   Eyes: Conjunctivae and EOM are normal. Pupils are equal, round, and reactive to light. Right eye exhibits no discharge. Left eye exhibits no discharge. No scleral icterus.   Neck: Normal range of motion. Neck supple. No thyromegaly present. No tracheal deviation present. No JVD present.   Cardiovascular: Normal rate, regular rhythm, normal heart sounds and intact distal pulses. Exam reveals no gallop and no friction rub.    No murmur heard.  Pulmonary/Chest: No stridor. No respiratory distress. She has no wheezes. She has no rhonchi. She has no rales. She exhibits no tenderness.   Mild coarse breath sounds bilaterally   Abdominal: Soft. Bowel sounds are normal. She exhibits no distension and no mass. There is no abdominal tenderness. There is no rebound and no guarding.   Musculoskeletal: Normal range of  motion. No tenderness or edema.   Lymphadenopathy:     She has no cervical adenopathy.   Neurological: She is alert and oriented to person, place, and time. She has normal strength. She displays normal reflexes. No cranial nerve deficit or sensory deficit.   Skin: Skin is warm and dry. No rash and no abscess noted. No erythema. No pallor.         ED Course   Procedures  Labs Reviewed   CBC W/ AUTO DIFFERENTIAL - Abnormal; Notable for the following components:       Result Value    WBC 12.74 (*)     RBC 3.81 (*)     Hemoglobin 11.1 (*)     Hematocrit 33.5 (*)     Platelets 564 (*)     Immature Granulocytes 0.7 (*)     Gran # (ANC) 9.3 (*)     Immature Grans (Abs) 0.09 (*)     Gran% 73.3 (*)     All other components within normal limits   COMPREHENSIVE METABOLIC PANEL - Abnormal; Notable for the following components:    Sodium 133 (*)     CO2 21 (*)     Glucose 317 (*)     All other components within normal limits   MAGNESIUM - Abnormal; Notable for the following components:    Magnesium 1.3 (*)     All other components within normal limits   URINALYSIS - Abnormal; Notable for the following components:    Protein, UA 1+ (*)     Glucose, UA 4+ (*)     Ketones, UA Trace (*)     Leukocytes, UA 1+ (*)     All other components within normal limits   URINALYSIS MICROSCOPIC - Abnormal; Notable for the following components:    WBC, UA 8 (*)     Yeast, UA Occasional (*)     Hyaline Casts, UA 28 (*)     All other components within normal limits   POCT GLUCOSE - Abnormal; Notable for the following components:    POC Glucose 175 (*)     All other components within normal limits   B-TYPE NATRIURETIC PEPTIDE   PROTIME-INR   TROPONIN I   TSH   SARS-COV-2 RNA AMPLIFICATION, QUAL   PROCALCITONIN   CK   CK   PROCALCITONIN   TROPONIN I   POCT GLUCOSE MONITORING CONTINUOUS        ECG Results          EKG 12-lead (In process)  Result time 07/09/20 15:51:20    In process by Interface, Lab In [facility] (07/09/20 15:51:20)                  Narrative:    Test Reason : R07.9,    Vent. Rate : 111 BPM     Atrial Rate : 111 BPM     P-R Int : 164 ms          QRS Dur : 088 ms      QT Int : 352 ms       P-R-T Axes : 049 031 103 degrees     QTc Int : 478 ms    Sinus tachycardia  Low voltage QRS  T wave abnormality, consider anterolateral ischemia  Abnormal ECG  When compared with ECG of 09-JUL-2020 12:05,  QRS duration has increased  ST no longer elevated in Inferior leads  Nonspecific T wave abnormality now evident in Inferior leads  T wave inversion now evident in Anterior-lateral leads    Referred By: [email or web address]   SELF           Confirmed By:                   In process by Interface, Lab In [facility] (07/09/20 15:51:02)                 Narrative:    Test Reason : R07.9,    Vent. Rate : 111 BPM     Atrial Rate : 111 BPM     P-R Int : 164 ms          QRS Dur : 088 ms      QT Int : 352 ms       P-R-T Axes : 049 031 103 degrees     QTc Int : 478 ms    Sinus tachycardia  Low voltage QRS  T wave abnormality, consider anterolateral ischemia  Abnormal ECG  When compared with ECG of 09-JUL-2020 12:05,  QRS duration has increased  ST no longer elevated in Inferior leads  Nonspecific T wave abnormality now evident in Inferior leads  T wave inversion now evident in Anterior-lateral leads    Referred By: [email or web address]   SELF           Confirmed By:                              EKG 12-lead (In process)  Result time 07/09/20 12:13:48    In process by Interface, Lab In [facility] (07/09/20 12:13:48)                 Narrative:    Test Reason : R00.2,    Vent. Rate : 115 BPM     Atrial Rate : 115 BPM     P-R Int : 136 ms          QRS Dur : 064 ms      QT Int : 436 ms       P-R-T Axes : 000 072 083 degrees     QTc Int : 603 ms    Sinus tachycardia  Prolonged QT  Abnormal ECG  When compared with ECG of 27-JUN-2020 05:13,  Right bundle branch block is no longer Present    Referred By: [email or web address]   SELF           Confirmed By:                             Imaging Results          X-Ray  Chest AP Portable (Final result)  Result time 07/09/20 12:32:40    Final result by Michael Ordonez MD (07/09/20 12:32:40)                 Narrative:    REASON: chest pain Chief Complaint; Palpitations?(X 2 WEEKS, S/P  CABG); Shortness of Breath?    FINDINGS:    Portable chest radiograph with comparison chest x-ray June 28, 2020.  The cardiomediastinal silhouette is within normal limits in size.  Median sternotomy wires and surgical clips noted. The pulmonary  vascular structures are within normal limits. Small linear opacity in  the left perihilar region likely reflect subsegmental atelectasis  versus scarring, otherwise the lungs are clear bilaterally. No acute  osseous abnormality.    IMPRESSION:    Left perihilar linear opacity likely reflects subsegmental atelectasis  versus scarring, otherwise no acute cardiopulmonary process.    Electronically Signed by Michael Ordonez on 7/9/2020 12:36 PM                               Medical Decision Making:   History:   Old Medical Records: I decided to obtain old medical records.  Initial Assessment:   Emergent evaluation of a 50-year-old female presenting 2 weeks status post CABG with chest discomfort and shortness of breath differential diagnosis includes fluid overload, postprocedural complication, infection, ACS, electrolyte abnormality              Attending Attestation:             Attending ED Notes:   Patient will be admitted to Internal Medicine for further evaluation and management with Cardiology to follow.                        Clinical Impression:       ICD-10-CM ICD-9-CM   1. Chest pain, unspecified type  R07.9 786.50   2. Palpitations  R00.2 785.1   3. Chest pain  R07.9 786.50   4. Shortness of breath  R06.02 786.05             ED Disposition Condition    Observation                           Arie Erwin MD  07/09/20 [id number]

## 2020-07-09 NOTE — HPI
Patient is a 50-year-old  female with recent CABG 2 weeks ago, known type 2 diabetes mellitus and hypertension presents to the ED with chief complaint of shortness of breath.    Reports presence of shortness of breath since the time of her discharge last week with gradual worsening.  She was scheduled to see her cardiologist Dr. Kannan Santana today however presented here secondary to shortness of breath.  It is worse with exertion and better with rest.  It is associated with intermittent chest pain which she describes as spasms.  No correlation of chest pain with activity.  No specific alleviating factors.  She denies wheezing or lower extremity edema.  She admits to palpitations as well as nausea.  She skipped morning dose of metoprolol has her heart rate would drop to 50s occasionally.  Reports chest wall incision to be healing well.  She denies fever or chills but admits to fatigue.  Compliant with all her medications as prescribed.    In the ED:  Hemodynamically stable but noted to be in sinus tachycardia.  EKG revealed sinus tachycardia with T-wave inversions in anterolateral leads which are new.  Labs with marginal leukocytosis, hypomagnesemia and elevated blood glucose.  Initial troponin, BNP, CPK and TSH within normal limits.  Urinalysis positive for yeast in the urine.  Chest x-ray revealed atelectasis but no focal infiltrate.    Rest of the 10 point review of systems is negative except as mentioned above.

## 2020-07-10 ENCOUNTER — CLINICAL SUPPORT (OUTPATIENT)
Dept: CARDIOLOGY | Facility: HOSPITAL | Age: 50
End: 2020-07-10
Attending: INTERNAL MEDICINE
Payer: MEDICAID

## 2020-07-10 VITALS — WEIGHT: 180.31 LBS | HEIGHT: 64 IN | BODY MASS INDEX: 30.78 KG/M2

## 2020-07-10 LAB
ANION GAP SERPL CALC-SCNC: 13 MMOL/L (ref 8–16)
AORTIC ROOT ANNULUS: 2.95 CM
AORTIC VALVE CUSP SEPERATION: 2.08 CM
AV INDEX (PROSTH): 0.89
AV MEAN GRADIENT: 2 MMHG
AV PEAK GRADIENT: 4 MMHG
AV VALVE AREA: 3.62 CM2
AV VELOCITY RATIO: 78.87
BASOPHILS # BLD AUTO: 0.08 K/UL (ref 0–0.2)
BASOPHILS NFR BLD: 0.7 % (ref 0–1.9)
BSA FOR ECHO PROCEDURE: 1.92 M2
BUN SERPL-MCNC: 13 MG/DL (ref 6–20)
CALCIUM SERPL-MCNC: 9 MG/DL (ref 8.7–10.5)
CHLORIDE SERPL-SCNC: 100 MMOL/L (ref 95–110)
CO2 SERPL-SCNC: 24 MMOL/L (ref 23–29)
CREAT SERPL-MCNC: 0.7 MG/DL (ref 0.5–1.4)
CV ECHO LV RWT: 0.48 CM
DIFFERENTIAL METHOD: ABNORMAL
DOP CALC AO PEAK VEL: 1.06 M/S
DOP CALC AO VTI: 15.99 CM
DOP CALC LVOT AREA: 4 CM2
DOP CALC LVOT DIAMETER: 2.27 CM
DOP CALC LVOT PEAK VEL: 83.6 M/S
DOP CALC LVOT STROKE VOLUME: 57.88 CM3
DOP CALCLVOT PEAK VEL VTI: 14.31 CM
E WAVE DECELERATION TIME: 161.61 MSEC
E/A RATIO: 1.12
E/E' RATIO: 9.06 M/S
ECHO LV POSTERIOR WALL: 1.08 CM (ref 0.6–1.1)
EOSINOPHIL # BLD AUTO: 0.4 K/UL (ref 0–0.5)
EOSINOPHIL NFR BLD: 3.3 % (ref 0–8)
ERYTHROCYTE [DISTWIDTH] IN BLOOD BY AUTOMATED COUNT: 13.7 % (ref 11.5–14.5)
EST. GFR  (AFRICAN AMERICAN): >60 ML/MIN/1.73 M^2
EST. GFR  (NON AFRICAN AMERICAN): >60 ML/MIN/1.73 M^2
FRACTIONAL SHORTENING: 18 % (ref 28–44)
GLUCOSE SERPL-MCNC: 180 MG/DL (ref 70–110)
GLUCOSE SERPL-MCNC: 201 MG/DL (ref 70–110)
GLUCOSE SERPL-MCNC: 205 MG/DL (ref 70–110)
GLUCOSE SERPL-MCNC: 237 MG/DL (ref 70–110)
GLUCOSE SERPL-MCNC: 249 MG/DL (ref 70–110)
HCT VFR BLD AUTO: 34 % (ref 37–48.5)
HGB BLD-MCNC: 11.1 G/DL (ref 12–16)
IMM GRANULOCYTES # BLD AUTO: 0.1 K/UL (ref 0–0.04)
IMM GRANULOCYTES NFR BLD AUTO: 0.9 % (ref 0–0.5)
INTERVENTRICULAR SEPTUM: 1.09 CM (ref 0.6–1.1)
IVRT: 74.59 MSEC
LEFT ATRIUM SIZE: 3.78 CM
LEFT INTERNAL DIMENSION IN SYSTOLE: 3.68 CM (ref 2.1–4)
LEFT VENTRICLE MASS INDEX: 91 G/M2
LEFT VENTRICULAR INTERNAL DIMENSION IN DIASTOLE: 4.47 CM (ref 3.5–6)
LEFT VENTRICULAR MASS: 169.86 G
LV LATERAL E/E' RATIO: 7.7 M/S
LV SEPTAL E/E' RATIO: 11 M/S
LYMPHOCYTES # BLD AUTO: 3.7 K/UL (ref 1–4.8)
LYMPHOCYTES NFR BLD: 33 % (ref 18–48)
MAGNESIUM SERPL-MCNC: 2 MG/DL (ref 1.6–2.6)
MCH RBC QN AUTO: 29.1 PG (ref 27–31)
MCHC RBC AUTO-ENTMCNC: 32.6 G/DL (ref 32–36)
MCV RBC AUTO: 89 FL (ref 82–98)
MONOCYTES # BLD AUTO: 0.9 K/UL (ref 0.3–1)
MONOCYTES NFR BLD: 7.8 % (ref 4–15)
MV PEAK A VEL: 0.69 M/S
MV PEAK E VEL: 0.77 M/S
NEUTROPHILS # BLD AUTO: 6.1 K/UL (ref 1.8–7.7)
NEUTROPHILS NFR BLD: 54.3 % (ref 38–73)
NRBC BLD-RTO: 0 /100 WBC
PISA TR MAX VEL: 2.03 M/S
PLATELET # BLD AUTO: 563 K/UL (ref 150–350)
PMV BLD AUTO: 9.4 FL (ref 9.2–12.9)
POTASSIUM SERPL-SCNC: 3.9 MMOL/L (ref 3.5–5.1)
PV PEAK VELOCITY: 72.52 CM/S
RA PRESSURE: 3 MMHG
RBC # BLD AUTO: 3.81 M/UL (ref 4–5.4)
RIGHT VENTRICULAR END-DIASTOLIC DIMENSION: 190 CM
SODIUM SERPL-SCNC: 137 MMOL/L (ref 136–145)
TDI LATERAL: 0.1 M/S
TDI SEPTAL: 0.07 M/S
TDI: 0.09 M/S
TR MAX PG: 16 MMHG
TROPONIN I SERPL DL<=0.01 NG/ML-MCNC: <0.03 NG/ML
TV REST PULMONARY ARTERY PRESSURE: 19 MMHG
WBC # BLD AUTO: 11.28 K/UL (ref 3.9–12.7)

## 2020-07-10 PROCEDURE — 94640 AIRWAY INHALATION TREATMENT: CPT

## 2020-07-10 PROCEDURE — 82962 GLUCOSE BLOOD TEST: CPT

## 2020-07-10 PROCEDURE — 25000242 PHARM REV CODE 250 ALT 637 W/ HCPCS: Performed by: INTERNAL MEDICINE

## 2020-07-10 PROCEDURE — 25000003 PHARM REV CODE 250: Performed by: INTERNAL MEDICINE

## 2020-07-10 PROCEDURE — 80048 BASIC METABOLIC PNL TOTAL CA: CPT

## 2020-07-10 PROCEDURE — 96372 THER/PROPH/DIAG INJ SC/IM: CPT | Mod: 59

## 2020-07-10 PROCEDURE — 93306 TTE W/DOPPLER COMPLETE: CPT

## 2020-07-10 PROCEDURE — 36415 COLL VENOUS BLD VENIPUNCTURE: CPT

## 2020-07-10 PROCEDURE — 94761 N-INVAS EAR/PLS OXIMETRY MLT: CPT

## 2020-07-10 PROCEDURE — 63600175 PHARM REV CODE 636 W HCPCS: Performed by: INTERNAL MEDICINE

## 2020-07-10 PROCEDURE — 99900035 HC TECH TIME PER 15 MIN (STAT)

## 2020-07-10 PROCEDURE — 85025 COMPLETE CBC W/AUTO DIFF WBC: CPT

## 2020-07-10 PROCEDURE — G0378 HOSPITAL OBSERVATION PER HR: HCPCS

## 2020-07-10 PROCEDURE — 83735 ASSAY OF MAGNESIUM: CPT

## 2020-07-10 RX ORDER — GUAIFENESIN 100 MG/5ML
200 SOLUTION ORAL EVERY 4 HOURS PRN
Status: DISCONTINUED | OUTPATIENT
Start: 2020-07-10 | End: 2020-07-11 | Stop reason: HOSPADM

## 2020-07-10 RX ORDER — LEVALBUTEROL INHALATION SOLUTION 0.63 MG/3ML
0.63 SOLUTION RESPIRATORY (INHALATION) EVERY 6 HOURS PRN
Status: DISCONTINUED | OUTPATIENT
Start: 2020-07-10 | End: 2020-07-11 | Stop reason: HOSPADM

## 2020-07-10 RX ADMIN — LORAZEPAM 0.5 MG: 0.5 TABLET ORAL at 09:07

## 2020-07-10 RX ADMIN — FERROUS SULFATE TAB 325 MG (65 MG ELEMENTAL FE) 325 MG: 325 (65 FE) TAB at 09:07

## 2020-07-10 RX ADMIN — ESCITALOPRAM OXALATE 20 MG: 10 TABLET ORAL at 09:07

## 2020-07-10 RX ADMIN — LISINOPRIL 5 MG: 5 TABLET ORAL at 09:07

## 2020-07-10 RX ADMIN — LEVALBUTEROL HYDROCHLORIDE 0.63 MG: 0.63 SOLUTION RESPIRATORY (INHALATION) at 10:07

## 2020-07-10 RX ADMIN — HUMAN INSULIN 4 UNITS: 100 INJECTION, SOLUTION SUBCUTANEOUS at 12:07

## 2020-07-10 RX ADMIN — METOPROLOL TARTRATE 25 MG: 25 TABLET, FILM COATED ORAL at 09:07

## 2020-07-10 RX ADMIN — NORTRIPTYLINE HYDROCHLORIDE 100 MG: 25 CAPSULE ORAL at 08:07

## 2020-07-10 RX ADMIN — PANTOPRAZOLE SODIUM 40 MG: 40 TABLET, DELAYED RELEASE ORAL at 09:07

## 2020-07-10 RX ADMIN — METOPROLOL TARTRATE 25 MG: 25 TABLET, FILM COATED ORAL at 08:07

## 2020-07-10 RX ADMIN — LORAZEPAM 0.5 MG: 0.5 TABLET ORAL at 10:07

## 2020-07-10 RX ADMIN — ASPIRIN 325 MG ORAL TABLET 325 MG: 325 PILL ORAL at 09:07

## 2020-07-10 RX ADMIN — HUMAN INSULIN 4 UNITS: 100 INJECTION, SOLUTION SUBCUTANEOUS at 08:07

## 2020-07-10 RX ADMIN — HUMAN INSULIN 4 UNITS: 100 INJECTION, SOLUTION SUBCUTANEOUS at 05:07

## 2020-07-10 RX ADMIN — ATORVASTATIN CALCIUM 40 MG: 40 TABLET, FILM COATED ORAL at 08:07

## 2020-07-10 RX ADMIN — ACETAMINOPHEN 650 MG: 325 TABLET ORAL at 06:07

## 2020-07-10 NOTE — PLAN OF CARE
07/10/20 0956   Discharge Assessment   Assessment Type Discharge Planning Assessment   Confirmed/corrected address and phone number on facesheet? Yes   Assessment information obtained from? Patient   Communicated expected length of stay with patient/caregiver no   Prior to hospitilization cognitive status: Alert/Oriented   Prior to hospitalization functional status: Needs Assistance   Current cognitive status: Alert/Oriented   Current Functional Status: Needs Assistance   Facility Arrived From: home   Lives With alone;child(aileen), adult  (staying wiht dtr and her  during this time)   Able to Return to Prior Arrangements yes   Is patient able to care for self after discharge? Unable to determine at this time (comments)   Who are your caregiver(s) and their phone number(s)? Nina Miller  [phone]   Patient's perception of discharge disposition home or selfcare   Readmission Within the Last 30 Days previous discharge plan unsuccessful   If yes, most recent facility name: [facility]   Patient currently being followed by outpatient case management? No   Patient currently receives any other outside agency services? No   Equipment Currently Used at Home none   Do you have any problems affording any of your prescribed medications? No   Is the patient taking medications as prescribed? yes   Does the patient have transportation home? Yes   Transportation Anticipated family or friend will provide   Dialysis Name and Scheduled days n/a   Does the patient receive services at the [facility]? No   Discharge Plan A Home with family   Discharge Plan B Home with family   DME Needed Upon Discharge  none   Patient/Family in Agreement with Plan yes   Readmission Questionnaire   At the time of your discharge, did someone talk to you about what your health problems were? Yes   At the time of discharge, did someone talk to you about what to watch out for regarding worsening of your health problem? Yes   At the time of  discharge, did someone talk to you about what to do if you experienced worsening of your health problem? Yes   At the time of discharge, did someone talk to you about which medication to take when you left the hospital and which ones to stop taking? Yes   At the time of discharge, did someone talk to you about when and where to follow up with a doctor after you left the hospital? Yes   What do you believe caused you to be sick enough to be re-admitted? need medication adjusted, it was not right   How often do you need to have someone help you when you read instructions, pamphlets, or other written material from your doctor or pharmacy? Never   Do you have problems taking your medications as prescribed? No   Do you have any problems affording any of  your prescribed medications? No   Do you have problems obtaining/receiving your medications? No   Does the patient have transportation to healthcare appointments? Yes   Does the patient have family/friends to help with healtcare needs after discharge? yes   Does your caregiver provide all the help you need? Yes   Are you currently feeling confused? No   Are you currently having problems thinking? No   Are you currently having memory problems? No   Have you felt down, depressed, or hopeless? 1   Have you felt little interest or pleasure in doing things? 1   In the last 7 days, my sleep quality was: poor   Introduced self to patient asked if ok to take a few min of their time for short interview for D/C planning and ok with patient

## 2020-07-10 NOTE — CONSULTS
[facility]  Department of Cardiology  Consult Note      PATIENT NAME: Nicole Harris  MRN: [id number]  TODAY'S DATE: 07/10/2020  ADMIT DATE: 7/9/2020                          CONSULT REQUESTED BY: Dede Hidalgo MD    SUBJECTIVE     PRINCIPAL PROBLEM: Dyspnea on exertion      REASON FOR CONSULT: shortness of breath    From H&P:   Patient is a 50-year-old  female with recent CABG 2 weeks ago, known type 2 diabetes mellitus and hypertension presents to the ED with chief complaint of shortness of breath.     Reports presence of shortness of breath since the time of her discharge last week with gradual worsening.  She was scheduled to see her cardiologist Dr. Kannan Santana today however presented here secondary to shortness of breath.  It is worse with exertion and better with rest.  It is associated with intermittent chest pain which she describes as spasms.  No correlation of chest pain with activity.  No specific alleviating factors.  She denies wheezing or lower extremity edema.  She admits to palpitations as well as nausea.  She skipped morning dose of metoprolol has her heart rate would drop to 50s occasionally.  Reports chest wall incision to be healing well.  She denies fever or chills but admits to fatigue.  Compliant with all her medications as prescribed.     In the ED:  Hemodynamically stable but noted to be in sinus tachycardia.  EKG revealed sinus tachycardia with T-wave inversions in anterolateral leads which are new.  Labs with marginal leukocytosis, hypomagnesemia and elevated blood glucose.  Initial troponin, BNP, CPK and TSH within normal limits.  Urinalysis positive for yeast in the urine.  Chest x-ray revealed atelectasis but no focal infiltrate.     Rest of the 10 point review of systems is negative except as mentioned above.      HPI:  Ms. harris is a 50 year old female who presented to the ER with complaints of shortness of breath and fatigue. She reports feeling  poorly since the day of discharge and suspected it may be related to medications. She also reports palpitations and nausea.     She is currently on a metoprolol tartrate 25 mg po BID with HR in the 110s in SR. However she is feeling better overall.         Review of patient's allergies indicates:   Allergen Reactions    Hydrocodone Nausea And Vomiting       Past Medical History:   Diagnosis Date    Anxiety     Bipolar disorder     Depression     Diabetes mellitus, type 2     Fibromyalgia     HTN (hypertension)     Insomnia     Migraine      Past Surgical History:   Procedure Laterality Date    ANGIOGRAM, CORONARY, WITH LEFT HEART CATHETERIZATION Left 2020    Procedure: Left heart cath;  Surgeon: Kannan Santana MD;  Location: [facility] CATH/EP LAB;  Service: Cardiology;  Laterality: Left;     SECTION      COLONOSCOPY N/A 2019    Procedure: COLONOSCOPY;  Surgeon: Da Diallo MD;  Location: [facility] ENDO;  Service: General;  Laterality: N/A;    CORONARY ARTERY BYPASS GRAFT (CABG) N/A 2020    Procedure: CORONARY ARTERY BYPASS GRAFT (CABG);  Surgeon: Anshul Oakes MD;  Location: [facility] OR;  Service: Cardiothoracic;  Laterality: N/A;    ENDOSCOPIC HARVEST OF VEIN Left 2020    Procedure: SURGICAL PROCUREMENT, VEIN, ENDOSCOPIC;  Surgeon: Anshul Oakes MD;  Location: [facility] OR;  Service: Cardiothoracic;  Laterality: Left;    EPIDURAL STEROID INJECTION N/A 2019    Procedure: Injection, Steroid, Epidural - L4/5 EPIDURAL STEROID INJECTION;  Surgeon: Sherri Gardiner MD;  Location: [facility] OR;  Service: Pain Management;  Laterality: N/A;    EPIDURAL STEROID INJECTION N/A 2019    Procedure: Injection, Steroid, Epidural - C7/T1 EPIDURAL STEROID INJECTION;  Surgeon: Sherri Gardiner MD;  Location: [facility] OR;  Service: Pain Management;  Laterality: N/A;    EPIDURAL STEROID INJECTION N/A 2019    Procedure: Injection, Steroid, Epidural - L4/5 EPIDURAL STEROID INJECTION;   Surgeon: Sherri Gardiner MD;  Location: [facility] OR;  Service: Pain Management;  Laterality: N/A;    EPIDURAL STEROID INJECTION N/A 10/21/2019    Procedure: Injection, Steroid, Epidural, CERVICAL C7/T1 SILVIA;  Surgeon: Sherri Gardiner MD;  Location: [facility] OR;  Service: Pain Management;  Laterality: N/A;  19    ESOPHAGOGASTRODUODENOSCOPY N/A 2019    Procedure: ESOPHAGOGASTRODUODENOSCOPY (EGD);  Surgeon: Da Diallo MD;  Location: [facility] ENDO;  Service: General;  Laterality: N/A;    TONSILLECTOMY      TRIGGER POINT INJECTION N/A 2019    Procedure: INJECTION, TRIGGER POINT - CERVICAL REGION;  Surgeon: hSerri Gardiner MD;  Location: [facility] OR;  Service: Pain Management;  Laterality: N/A;    TRIGGER POINT INJECTION N/A 10/21/2019    Procedure: INJECTION, TRIGGER POINT;  Surgeon: Sherri Gardiner MD;  Location: [facility] OR;  Service: Pain Management;  Laterality: N/A;     Social History     Tobacco Use    Smoking status: Former Smoker     Packs/day: 1.00     Years: 0.00     Pack years: 0.00     Quit date: 2012     Years since quittin.8    Smokeless tobacco: Never Used   Substance Use Topics    Alcohol use: Yes     Frequency: Monthly or less     Comment: occasional    Drug use: Yes     Types: Marijuana, Other-see comments        REVIEW OF SYSTEMS  CONSTITUTIONAL: Negative for chills and fever. Positive for fatigue  EYES: No double vision, No blurred vision  NEURO: No headaches, No dizziness  RESPIRATORY: Negative for cough, shortness of breath and wheezing.    CARDIOVASCULAR: Negative for chest pain. Positive for palpitations    GI: Negative for abdominal pain, No melena, diarrhea, nausea and vomiting.   : Negative for dysuria and frequency, Negative for hematuria  SKIN: Negative for bruising, Negative for edema or discoloration noted.   ENDOCRINE: Negative for polyphagia, Negative for heat intolerance, Negative for cold intolerance  PSYCHIATRIC: Negative for depression, Negative for anxiety,  Negative for memory loss  MUSCULOSKELETAL: Negative for neck pain, Negative for muscle weakness, Negative for back pain     OBJECTIVE     VITAL SIGNS (Most Recent)  Temp: 98.2 °F (36.8 °C) (07/10/20 1100)  Pulse: 96 (07/10/20 1100)  Resp: 20 (07/10/20 1100)  BP: 108/69 (07/10/20 1100)  SpO2: 96 % (07/10/20 1100)    VENTILATION STATUS  Resp: 20 (07/10/20 1100)  SpO2: 96 % (07/10/20 1100)       I & O (Last 24H):    Intake/Output Summary (Last 24 hours) at 7/10/2020 1751  Last data filed at 7/10/2020 1700  Gross per 24 hour   Intake 720 ml   Output --   Net 720 ml       WEIGHTS  Wt Readings from Last 1 Encounters:   07/09/20 2005 81.8 kg (180 lb 5.4 oz)   07/09/20 1736 81.7 kg (180 lb 0.1 oz)   07/09/20 1638 81.7 kg (180 lb 0.1 oz)   07/09/20 1204 81.6 kg (180 lb)       PHYSICAL EXAM  GENERAL: well built, well nourished, well-developed in no apparent distress alert and oriented.   HEENT: Normocephalic. Pupils normal and conjunctivae normal.  Mucous membranes normal, no cyanosis or icterus, trachea central,no pallor or icterus is noted..   NECK: No JVD. No bruit..   THYROID: Thyroid not enlarged. No nodules present..   CARDIAC: Regular rate and rhythm. S1 is normal.S2 is normal.No gallops, clicks or murmurs noted at this time.  CHEST ANATOMY: normal.   LUNGS: Clear to auscultation. No wheezing or rhonchi..   ABDOMEN: Soft no masses or organomegaly.  No abdomen pulsations or bruits.  Normal bowel sounds. No pulsations and no masses felt, No guarding or rebound.   URINARY: No jules catheter   EXTREMITIES: No cyanosis, clubbing or edema noted at this time., no calf tenderness bilaterally.   PERIPHERAL VASCULAR SYSTEM: Good palpable distal pulses.   CENTRAL NERVOUS SYSTEM: No focal motor or sensory deficits noted.   SKIN: Skin without lesions, moist, well perfused.   MUSCLE STRENGTH & TONE: No noteable weakness, atrophy or abnormal movement.     HOME MEDICATIONS:  Current Facility-Administered Medications on File Prior to  Encounter   Medication Dose Route Frequency Provider Last Rate Last Dose    0.9%  NaCl infusion   Intravenous Continuous Sherri Gardiner MD 20 mL/hr at 04/29/19 1227       Current Outpatient Medications on File Prior to Encounter   Medication Sig Dispense Refill    aspirin 325 MG tablet Take 1 tablet (325 mg total) by mouth once daily. 30 tablet 0    atorvastatin (LIPITOR) 40 MG tablet Take 1 tablet (40 mg total) by mouth every evening. 30 tablet 0    cyclobenzaprine (FLEXERIL) 10 MG tablet Take 1 tablet (10 mg total) by mouth 3 (three) times daily as needed for Muscle spasms. 90 tablet 2    docusate sodium (COLACE) 100 MG capsule Take 1 capsule (100 mg total) by mouth once daily. 30 capsule 0    escitalopram oxalate (LEXAPRO) 20 MG tablet Take 20 mg by mouth once daily.   3    ferrous sulfate (FEOSOL) 325 mg (65 mg iron) Tab tablet Take 1 tablet (325 mg total) by mouth once daily. 30 tablet 0    insulin glargine,hum.rec.anlog (LANTUS SOLOSTAR U-100 INSULIN SUBQ) Inject 20 Units into the skin nightly.      linaGLIPtin (TRADJENTA) 5 mg Tab tablet Take 5 mg by mouth once daily.      lisinopriL (PRINIVIL,ZESTRIL) 5 MG tablet Take 1 tablet (5 mg total) by mouth once daily. 30 tablet 0    metFORMIN (GLUCOPHAGE) 1000 MG tablet Take 1,000 mg by mouth 2 (two) times daily with meals.       metoprolol tartrate 37.5 mg Tab Take 37.5 mg by mouth 2 (two) times daily. 60 tablet 0    naproxen (NAPROSYN) 500 MG tablet Take 500 mg by mouth 2 (two) times daily with meals.      nortriptyline (PAMELOR) 50 MG capsule Take 2 capsules (100 mg total) by mouth every evening. 60 capsule 11    nystatin (MYCOSTATIN) 100,000 unit/mL suspension Take 5 mLs (500,000 Units total) by mouth 4 (four) times daily. for 10 days (Patient taking differently: Take 5 mLs by mouth 4 (four) times daily. ) 200 mL 0    pantoprazole (PROTONIX) 40 MG tablet Take 40 mg by mouth once daily.       albuterol (PROVENTIL/VENTOLIN HFA) 90 mcg/actuation  inhaler Inhale 1 puff into the lungs every 4 (four) hours as needed.   0       SCHEDULED MEDS:   aspirin  325 mg Oral Daily    atorvastatin  40 mg Oral QHS    escitalopram oxalate  20 mg Oral Daily    ferrous sulfate  325 mg Oral Daily    insulin detemir U-100  12 Units Subcutaneous QHS    lisinopriL  5 mg Oral Daily    metoprolol tartrate  25 mg Oral BID    nortriptyline  100 mg Oral QHS    pantoprazole  40 mg Oral Daily       CONTINUOUS INFUSIONS:    PRN MEDS:acetaminophen, acetaminophen, albuterol, dextrose 50%, dextrose 50%, insulin regular, LORazepam, ondansetron    LABS AND DIAGNOSTICS     CBC LAST 3 DAYS  Recent Labs   Lab 07/09/20  1241 07/10/20  0434   WBC 12.74* 11.28   RBC 3.81* 3.81*   HGB 11.1* 11.1*   HCT 33.5* 34.0*   MCV 88 89   MCH 29.1 29.1   MCHC 33.1 32.6   RDW 13.5 13.7   * 563*   MPV 9.6 9.4   GRAN 73.3*  9.3* 54.3  6.1   LYMPH 18.4  2.3 33.0  3.7   MONO 5.3  0.7 7.8  0.9   BASO 0.08 0.08   NRBC 0 0       COAGULATION LAST 3 DAYS  Recent Labs   Lab 07/09/20  1241   LABPT 13.1   INR 1.0       CHEMISTRY LAST 3 DAYS  Recent Labs   Lab 07/09/20  1241 07/10/20  0434   * 137   K 4.3 3.9   CL 98 100   CO2 21* 24   ANIONGAP 14 13   BUN 17 13   CREATININE 0.8 0.7   * 180*   CALCIUM 9.3 9.0   MG 1.3* 2.0   ALBUMIN 3.8  --    PROT 7.2  --    ALKPHOS 79  --    ALT 26  --    AST 21  --    BILITOT 0.6  --        CARDIAC PROFILE LAST 3 DAYS  Recent Labs   Lab 07/09/20  1241 07/09/20  1801 07/09/20  2335   BNP 92  --   --    CPK 24  --   --    TROPONINI <0.030 <0.030 <0.030       ENDOCRINE LAST 3 DAYS  Recent Labs   Lab 07/09/20  1241   TSH 1.820   PROCAL <0.05       LAST ARTERIAL BLOOD GAS  ABG  No results for input(s): PH, PO2, PCO2, HCO3, BE in the last 168 hours.    LAST 7 DAYS MICROBIOLOGY   Microbiology Results (last 7 days)     ** No results found for the last 168 hours. **          MOST RECENT IMAGING  X-Ray Chest AP Portable  REASON: chest pain Chief Complaint;  Palpitations?(X 2 WEEKS, S/P  CABG); Shortness of Breath?    FINDINGS:    Portable chest radiograph with comparison chest x-ray June 28, 2020.  The cardiomediastinal silhouette is within normal limits in size.  Median sternotomy wires and surgical clips noted. The pulmonary  vascular structures are within normal limits. Small linear opacity in  the left perihilar region likely reflect subsegmental atelectasis  versus scarring, otherwise the lungs are clear bilaterally. No acute  osseous abnormality.    IMPRESSION:    Left perihilar linear opacity likely reflects subsegmental atelectasis  versus scarring, otherwise no acute cardiopulmonary process.    Electronically Signed by Michael Ordonez on 7/9/2020 12:36 PM      ECHOCARDIOGRAM RESULTS (last 5)  No results found for this or any previous visit.    CURRENT/PREVIOUS VISIT EKG  Results for orders placed or performed during the hospital encounter of 07/09/20   EKG 12-lead    Collection Time: 07/09/20  3:40 PM    Narrative    Test Reason : R07.9,    Vent. Rate : 111 BPM     Atrial Rate : 111 BPM     P-R Int : 164 ms          QRS Dur : 088 ms      QT Int : 352 ms       P-R-T Axes : 049 031 103 degrees     QTc Int : 478 ms    Sinus tachycardia  Low voltage QRS  T wave abnormality, consider anterolateral ischemia  Abnormal ECG  When compared with ECG of 09-JUL-2020 12:05,  QRS duration has increased  ST no longer elevated in Inferior leads  Nonspecific T wave abnormality now evident in Inferior leads  T wave inversion now evident in Anterior-lateral leads    Referred By: [email or web address]   SELF           Confirmed By:            ASSESSMENT/PLAN:     Active Hospital Problems    Diagnosis    *Dyspnea on exertion    Sinus tachycardia    Shortness of breath    Hypomagnesemia    Anemia    S/P CABG x 3    Essential hypertension    Type 2 diabetes mellitus, without long-term current use of insulin       ASSESSMENT & PLAN:     1. Shortness of breath  2. Fatigue  3. Sinus  tachycardia  4. Severe hypomagnesemia   5. S/p CABG   6. Anemia  7. Essential HTN  8. Type II DM       RECOMMENDATIONS:    1. Patient reports she is feeling better overall. She has been taking metoprolol tartrate 37.5 mg po BID at home which was decreased to 25 mg po BID. Suspect betablocker therapy may have contributed to symptoms.   2. Also her magnesium was severely depleted. Would add mag ox 200 mg po daily.   3. DC metoprolol and start Bystolic 2.5 mg po QHS.   4. Continue to monitor overnight. Anticipate dc home soon.   Thank you for the consultation.         Odette Leroy NP  [facility]  Department of Cardiology  Date of Service: 07/10/2020  5:51 PM

## 2020-07-10 NOTE — RESPIRATORY THERAPY
07/09/20 1934   Patient Assessment/Suction   Level of Consciousness (AVPU) alert   Respiratory Effort Unlabored   Expansion/Accessory Muscles/Retractions expansion symmetric;no retractions;no use of accessory muscles   All Lung Fields Breath Sounds clear   Rhythm/Pattern, Respiratory no shortness of breath reported;pattern regular;unlabored   Inhaler   $ Inhaler Charges PRN treatment not required   Respiratory Interventions   Cough And Deep Breathing done with encouragement   Breathing Techniques/Airway Clearance deep/controlled cough encouraged;diaphragmatic breathing promoted   Respiratory Evaluation   $ Care Plan Tech Time 15 min   Evaluation For   (CARE PLAN)        07/09/20 1934   Patient Assessment/Suction   Level of Consciousness (AVPU) alert   Respiratory Effort Unlabored   Expansion/Accessory Muscles/Retractions expansion symmetric;no retractions;no use of accessory muscles   All Lung Fields Breath Sounds clear   Rhythm/Pattern, Respiratory no shortness of breath reported;pattern regular;unlabored   Inhaler   $ Inhaler Charges PRN treatment not required   Respiratory Interventions   Cough And Deep Breathing done with encouragement   Breathing Techniques/Airway Clearance deep/controlled cough encouraged;diaphragmatic breathing promoted   Respiratory Evaluation   $ Care Plan Tech Time 15 min   Evaluation For   (CARE PLAN)        07/09/20 1934   Patient Assessment/Suction   Level of Consciousness (AVPU) alert   Respiratory Effort Unlabored   Expansion/Accessory Muscles/Retractions expansion symmetric;no retractions;no use of accessory muscles   All Lung Fields Breath Sounds clear   Rhythm/Pattern, Respiratory no shortness of breath reported;pattern regular;unlabored   Inhaler   $ Inhaler Charges PRN treatment not required   Respiratory Interventions   Cough And Deep Breathing done with encouragement   Breathing Techniques/Airway Clearance deep/controlled cough encouraged;diaphragmatic breathing promoted    Respiratory Evaluation   $ Care Plan Tech Time 15 min   Evaluation For   (CARE PLAN)        07/09/20 1934   Patient Assessment/Suction   Level of Consciousness (AVPU) alert   Respiratory Effort Unlabored   Expansion/Accessory Muscles/Retractions expansion symmetric;no retractions;no use of accessory muscles   All Lung Fields Breath Sounds clear   Rhythm/Pattern, Respiratory no shortness of breath reported;pattern regular;unlabored   Inhaler   $ Inhaler Charges PRN treatment not required   Respiratory Interventions   Cough And Deep Breathing done with encouragement   Breathing Techniques/Airway Clearance deep/controlled cough encouraged;diaphragmatic breathing promoted   Respiratory Evaluation   $ Care Plan Tech Time 15 min   Evaluation For   (CARE PLAN)

## 2020-07-10 NOTE — PLAN OF CARE
07/10/20 1100   Patient Assessment/Suction   Level of Consciousness (AVPU) alert   Respiratory Effort Unlabored;Normal   Expansion/Accessory Muscles/Retractions expansion symmetric   All Lung Fields Breath Sounds clear   Rhythm/Pattern, Respiratory no shortness of breath reported   Cough Frequency no cough   PRE-TX-O2   O2 Device (Oxygen Therapy) room air   SpO2 96 %   Pulse Oximetry Type Intermittent   $ Pulse Oximetry - Multiple Charge Pulse Oximetry - Multiple   Pulse 96   Resp 20   Inhaler   $ Inhaler Charges PRN treatment not required   Respiratory Treatment Status (Inhaler) PRN treatment not required   Respiratory Evaluation   $ Care Plan Tech Time 15 min

## 2020-07-11 ENCOUNTER — HOSPITAL ENCOUNTER (OUTPATIENT)
Dept: RADIOLOGY | Facility: HOSPITAL | Age: 50
Discharge: HOME | End: 2020-07-11
Attending: INTERNAL MEDICINE
Payer: MEDICAID

## 2020-07-11 VITALS
HEART RATE: 88 BPM | TEMPERATURE: 98 F | RESPIRATION RATE: 17 BRPM | OXYGEN SATURATION: 95 % | SYSTOLIC BLOOD PRESSURE: 93 MMHG | WEIGHT: 180.31 LBS | BODY MASS INDEX: 30.78 KG/M2 | DIASTOLIC BLOOD PRESSURE: 66 MMHG | HEIGHT: 64 IN

## 2020-07-11 LAB
ANION GAP SERPL CALC-SCNC: 10 MMOL/L (ref 8–16)
BASOPHILS # BLD AUTO: 0.08 K/UL (ref 0–0.2)
BASOPHILS NFR BLD: 0.9 % (ref 0–1.9)
BUN SERPL-MCNC: 15 MG/DL (ref 6–20)
CALCIUM SERPL-MCNC: 8.9 MG/DL (ref 8.7–10.5)
CHLORIDE SERPL-SCNC: 101 MMOL/L (ref 95–110)
CO2 SERPL-SCNC: 26 MMOL/L (ref 23–29)
CREAT SERPL-MCNC: 0.8 MG/DL (ref 0.5–1.4)
DIFFERENTIAL METHOD: ABNORMAL
EOSINOPHIL # BLD AUTO: 0.3 K/UL (ref 0–0.5)
EOSINOPHIL NFR BLD: 3.4 % (ref 0–8)
ERYTHROCYTE [DISTWIDTH] IN BLOOD BY AUTOMATED COUNT: 13.7 % (ref 11.5–14.5)
EST. GFR  (AFRICAN AMERICAN): >60 ML/MIN/1.73 M^2
EST. GFR  (NON AFRICAN AMERICAN): >60 ML/MIN/1.73 M^2
GLUCOSE SERPL-MCNC: 197 MG/DL (ref 70–110)
GLUCOSE SERPL-MCNC: 206 MG/DL (ref 70–110)
GLUCOSE SERPL-MCNC: 219 MG/DL (ref 70–110)
GLUCOSE SERPL-MCNC: 258 MG/DL (ref 70–110)
HCT VFR BLD AUTO: 33.5 % (ref 37–48.5)
HGB BLD-MCNC: 10.9 G/DL (ref 12–16)
IMM GRANULOCYTES # BLD AUTO: 0.06 K/UL (ref 0–0.04)
IMM GRANULOCYTES NFR BLD AUTO: 0.7 % (ref 0–0.5)
LYMPHOCYTES # BLD AUTO: 3.2 K/UL (ref 1–4.8)
LYMPHOCYTES NFR BLD: 35.8 % (ref 18–48)
MAGNESIUM SERPL-MCNC: 1.8 MG/DL (ref 1.6–2.6)
MCH RBC QN AUTO: 28.5 PG (ref 27–31)
MCHC RBC AUTO-ENTMCNC: 32.5 G/DL (ref 32–36)
MCV RBC AUTO: 88 FL (ref 82–98)
MONOCYTES # BLD AUTO: 0.7 K/UL (ref 0.3–1)
MONOCYTES NFR BLD: 8 % (ref 4–15)
NEUTROPHILS # BLD AUTO: 4.6 K/UL (ref 1.8–7.7)
NEUTROPHILS NFR BLD: 51.2 % (ref 38–73)
NRBC BLD-RTO: 0 /100 WBC
PLATELET # BLD AUTO: 558 K/UL (ref 150–350)
PMV BLD AUTO: 9.2 FL (ref 9.2–12.9)
POTASSIUM SERPL-SCNC: 4.3 MMOL/L (ref 3.5–5.1)
RBC # BLD AUTO: 3.82 M/UL (ref 4–5.4)
SODIUM SERPL-SCNC: 137 MMOL/L (ref 136–145)
WBC # BLD AUTO: 8.91 K/UL (ref 3.9–12.7)

## 2020-07-11 PROCEDURE — 83735 ASSAY OF MAGNESIUM: CPT

## 2020-07-11 PROCEDURE — 94761 N-INVAS EAR/PLS OXIMETRY MLT: CPT

## 2020-07-11 PROCEDURE — 36415 COLL VENOUS BLD VENIPUNCTURE: CPT

## 2020-07-11 PROCEDURE — 96376 TX/PRO/DX INJ SAME DRUG ADON: CPT

## 2020-07-11 PROCEDURE — 25000003 PHARM REV CODE 250: Performed by: INTERNAL MEDICINE

## 2020-07-11 PROCEDURE — G0378 HOSPITAL OBSERVATION PER HR: HCPCS

## 2020-07-11 PROCEDURE — A9540 TC99M MAA: HCPCS

## 2020-07-11 PROCEDURE — 99900035 HC TECH TIME PER 15 MIN (STAT)

## 2020-07-11 PROCEDURE — 80048 BASIC METABOLIC PNL TOTAL CA: CPT

## 2020-07-11 PROCEDURE — 63600175 PHARM REV CODE 636 W HCPCS: Performed by: INTERNAL MEDICINE

## 2020-07-11 PROCEDURE — 85025 COMPLETE CBC W/AUTO DIFF WBC: CPT

## 2020-07-11 RX ORDER — POLYETHYLENE GLYCOL 3350 17 G/17G
17 POWDER, FOR SOLUTION ORAL DAILY
Qty: 100 EACH | Refills: 0 | Status: SHIPPED | OUTPATIENT
Start: 2020-07-11 | End: 2020-07-28

## 2020-07-11 RX ORDER — NEBIVOLOL 5 MG/1
5 TABLET ORAL ONCE
Status: DISCONTINUED | OUTPATIENT
Start: 2020-07-11 | End: 2020-07-11

## 2020-07-11 RX ORDER — NEBIVOLOL 5 MG/1
5 TABLET ORAL NIGHTLY
Status: DISCONTINUED | OUTPATIENT
Start: 2020-07-11 | End: 2020-07-11 | Stop reason: HOSPADM

## 2020-07-11 RX ORDER — NEBIVOLOL 2.5 MG/1
2.5 TABLET ORAL NIGHTLY
Status: DISCONTINUED | OUTPATIENT
Start: 2020-07-11 | End: 2020-07-11

## 2020-07-11 RX ORDER — MAGNESIUM SULFATE 1 G/100ML
1 INJECTION INTRAVENOUS ONCE
Status: COMPLETED | OUTPATIENT
Start: 2020-07-11 | End: 2020-07-11

## 2020-07-11 RX ORDER — MAGNESIUM SULFATE 1 G/100ML
1 INJECTION INTRAVENOUS ONCE
Status: DISCONTINUED | OUTPATIENT
Start: 2020-07-11 | End: 2020-07-11 | Stop reason: HOSPADM

## 2020-07-11 RX ORDER — VITS A,C,E/LUTEIN/MINERALS 300MCG-200
100 TABLET ORAL DAILY
Qty: 30 TABLET | Refills: 3 | Status: SHIPPED | OUTPATIENT
Start: 2020-07-11 | End: 2022-10-11

## 2020-07-11 RX ORDER — NEBIVOLOL 5 MG/1
5 TABLET ORAL DAILY
Status: DISCONTINUED | OUTPATIENT
Start: 2020-07-11 | End: 2020-07-11

## 2020-07-11 RX ORDER — NEBIVOLOL 2.5 MG/1
2.5 TABLET ORAL 2 TIMES DAILY
Qty: 60 TABLET | Refills: 3 | Status: SHIPPED | OUTPATIENT
Start: 2020-07-11 | End: 2020-11-03

## 2020-07-11 RX ADMIN — LORAZEPAM 0.5 MG: 0.5 TABLET ORAL at 06:07

## 2020-07-11 RX ADMIN — MAGNESIUM SULFATE 1 G: 1 INJECTION INTRAVENOUS at 09:07

## 2020-07-11 RX ADMIN — LISINOPRIL 5 MG: 5 TABLET ORAL at 09:07

## 2020-07-11 RX ADMIN — PANTOPRAZOLE SODIUM 40 MG: 40 TABLET, DELAYED RELEASE ORAL at 09:07

## 2020-07-11 RX ADMIN — ASPIRIN 325 MG ORAL TABLET 325 MG: 325 PILL ORAL at 09:07

## 2020-07-11 RX ADMIN — METOPROLOL TARTRATE 25 MG: 25 TABLET, FILM COATED ORAL at 09:07

## 2020-07-11 RX ADMIN — LORAZEPAM 0.5 MG: 0.5 TABLET ORAL at 09:07

## 2020-07-11 RX ADMIN — ESCITALOPRAM OXALATE 20 MG: 10 TABLET ORAL at 09:07

## 2020-07-11 RX ADMIN — FERROUS SULFATE TAB 325 MG (65 MG ELEMENTAL FE) 325 MG: 325 (65 FE) TAB at 09:07

## 2020-07-11 NOTE — HOSPITAL COURSE
Patient was admitted for evaluation fatigue and short of breath, post CABG hospital course was unremarkable for arrhythmia on tele monitoring, ischemic cardiomyopathy with EF 47% noted on echo, patient also noted to have tachycardia and fatigue nurse initially attributed to beta-blocker which was  switched to Bystolic underwent v/q scan to r/o PE which was ruled out.  Hypo magnesemia noted on admission replaced IV magnesium sulfate, will discharge with p.o. supplements.  Advised to pt to f/u OP Cardiology clinic.  Informed her to start with 2.5mg bystolic and increased to bid if she toletates ,if BP holds UP.    Instructions provided to follow up with primary care physician as outpatient. Patient verbalized understanding and is aware to contact primary care physician or return to ED if new or worsening symptoms.    Physical exam on the day of discharge:  General: Patient resting comfortably in no acute distress.  Lungs: CTA. Good air entry.  Cor: Regular rate and rhythm. No murmurs. No pedal edema.  Abd: Soft. Nontender. Non-distended.  Neuro: A&O x3. Moving all 4 extremities equally  Ext: No clubbing. No cyanosis.

## 2020-07-11 NOTE — CARE UPDATE
07/11/20 0758   Patient Assessment/Suction   Level of Consciousness (AVPU) alert   Respiratory Effort Normal;Unlabored   Expansion/Accessory Muscles/Retractions no use of accessory muscles   All Lung Fields Breath Sounds clear;equal bilaterally   Rhythm/Pattern, Respiratory unlabored;pattern regular;depth regular;no shortness of breath reported   PRE-TX-O2   O2 Device (Oxygen Therapy) room air   SpO2 95 %   Pulse Oximetry Type Intermittent   $ Pulse Oximetry - Multiple Charge Pulse Oximetry - Multiple   Pulse (!) 111   Resp 16   Aerosol Therapy   $ Aerosol Therapy Charges PRN treatment not required   Respiratory Evaluation   $ Care Plan Tech Time 15 min

## 2020-07-11 NOTE — PLAN OF CARE
07/10/20 1952   Patient Assessment/Suction   Level of Consciousness (AVPU) alert   Respiratory Effort Unlabored;Normal   Rhythm/Pattern, Respiratory no shortness of breath reported   PRE-TX-O2   O2 Device (Oxygen Therapy) room air   SpO2 99 %   Pulse Oximetry Type Intermittent   $ Pulse Oximetry - Multiple Charge Pulse Oximetry - Multiple   Pulse (!) 116   Resp 20   Positioning   Body Position positioned/repositioned independently   Inhaler   $ Inhaler Charges PRN treatment not required   Respiratory Treatment Status (Inhaler) PRN treatment not required   Respiratory Evaluation   $ Care Plan Tech Time 15 min

## 2020-07-11 NOTE — DISCHARGE SUMMARY
[facility]  Discharge Summary      Patient Name: Nicole Harris  MRN: [id number]  Admission Date: 7/9/2020  Hospital Length of Stay: 0 days  Discharge Date and Time:  07/11/2020 5:09 PM  Attending Physician: Dede Hidalgo MD   Discharging Provider: Dede Hidalgo MD  Primary Care Provider: Vira Conn NP      HPI:   Patient is a 50-year-old  female with recent CABG 2 weeks ago, known type 2 diabetes mellitus and hypertension presents to the ED with chief complaint of shortness of breath.    Reports presence of shortness of breath since the time of her discharge last week with gradual worsening.  She was scheduled to see her cardiologist Dr. Kannan Santana today however presented here secondary to shortness of breath.  It is worse with exertion and better with rest.  It is associated with intermittent chest pain which she describes as spasms.  No correlation of chest pain with activity.  No specific alleviating factors.  She denies wheezing or lower extremity edema.  She admits to palpitations as well as nausea.  She skipped morning dose of metoprolol has her heart rate would drop to 50s occasionally.  Reports chest wall incision to be healing well.  She denies fever or chills but admits to fatigue.  Compliant with all her medications as prescribed.    In the ED:  Hemodynamically stable but noted to be in sinus tachycardia.  EKG revealed sinus tachycardia with T-wave inversions in anterolateral leads which are new.  Labs with marginal leukocytosis, hypomagnesemia and elevated blood glucose.  Initial troponin, BNP, CPK and TSH within normal limits.  Urinalysis positive for yeast in the urine.  Chest x-ray revealed atelectasis but no focal infiltrate.    Rest of the 10 point review of systems is negative except as mentioned above.    * No surgery found *      Hospital Course:   Patient was admitted for evaluation fatigue and short of breath, post CABG hospital  course was unremarkable for arrhythmia on tele monitoring, ischemic cardiomyopathy with EF 47% noted on echo, patient also noted to have tachycardia and fatigue nurse initially attributed to beta-blocker which was  switched to Bystolic underwent v/q scan to r/o PE which was ruled out.  Hypo magnesemia noted on admission replaced IV magnesium sulfate, will discharge with p.o. supplements.  Advised to pt to f/u OP Cardiology clinic.  Informed her to start with 2.5mg bystolic and increased to bid if she toletates ,if BP holds UP.    Instructions provided to follow up with primary care physician as outpatient. Patient verbalized understanding and is aware to contact primary care physician or return to ED if new or worsening symptoms.    Physical exam on the day of discharge:  General: Patient resting comfortably in no acute distress.  Lungs: CTA. Good air entry.  Cor: Regular rate and rhythm. No murmurs. No pedal edema.  Abd: Soft. Nontender. Non-distended.  Neuro: A&O x3. Moving all 4 extremities equally  Ext: No clubbing. No cyanosis.      Consults:   Consults (From admission, onward)        Status Ordering Provider     Inpatient consult to Cardiology  Once     Provider:  Joan Malone MD    Completed NAZARIO DE LEÓN     Inpatient consult to Internal Medicine  Once     Provider:  Nazario De León MD    Acknowledged NAZARIO DE LEÓN          No new Assessment & Plan notes have been filed under this hospital service since the last note was generated.  Service: Hospital Medicine    Final Active Diagnoses:    Diagnosis Date Noted POA    PRINCIPAL PROBLEM:  Dyspnea on exertion [R06.09] 07/09/2020 Yes    Sinus tachycardia [R00.0] 07/09/2020 Yes    Shortness of breath [R06.02] 07/09/2020 Yes    Hypomagnesemia [E83.42] 07/09/2020 Yes    Anemia [D64.9] 06/29/2020 Yes    S/P CABG x 3 [Z95.1]  Not Applicable    Essential hypertension [I10] 04/01/2019 Yes    Type 2 diabetes mellitus, without long-term current use  of insulin [E11.9] 02/19/2015 Yes      Problems Resolved During this Admission:       Discharged Condition: stable    Disposition:     Follow Up:    Patient Instructions:   No discharge procedures on file.    Significant Diagnostic Studies: Labs:   BMP:   Recent Labs   Lab 07/10/20  0434 07/11/20  0625   * 206*    137   K 3.9 4.3    101   CO2 24 26   BUN 13 15   CREATININE 0.7 0.8   CALCIUM 9.0 8.9   MG 2.0 1.8    and CBC   Recent Labs   Lab 07/10/20  0434 07/11/20  0625   WBC 11.28 8.91   HGB 11.1* 10.9*   HCT 34.0* 33.5*   * 558*       Pending Diagnostic Studies:     None         Medications:  Reconciled Home Medications:      Medication List      START taking these medications    magnesium oxide 200 mg magnesium Tab  Take 100 mg by mouth once daily.     nebivoloL 2.5 MG Tab  Commonly known as: BYSTOLIC  Take 1 tablet (2.5 mg total) by mouth 2 (two) times a day.        CHANGE how you take these medications    LANTUS SOLOSTAR U-100 INSULIN SUBQ  Inject 20 Units into the skin nightly.  What changed: Another medication with the same name was removed. Continue taking this medication, and follow the directions you see here.        CONTINUE taking these medications    albuterol 90 mcg/actuation inhaler  Commonly known as: PROVENTIL/VENTOLIN HFA  Inhale 1 puff into the lungs every 4 (four) hours as needed.     aspirin 325 MG tablet  Take 1 tablet (325 mg total) by mouth once daily.     atorvastatin 40 MG tablet  Commonly known as: LIPITOR  Take 1 tablet (40 mg total) by mouth every evening.     cyclobenzaprine 10 MG tablet  Commonly known as: FLEXERIL  Take 1 tablet (10 mg total) by mouth 3 (three) times daily as needed for Muscle spasms.     docusate sodium 100 MG capsule  Commonly known as: COLACE  Take 1 capsule (100 mg total) by mouth once daily.     escitalopram oxalate 20 MG tablet  Commonly known as: LEXAPRO  Take 20 mg by mouth once daily.     ferrous sulfate 325 mg (65 mg iron) Tab  tablet  Commonly known as: FEOSOL  Take 1 tablet (325 mg total) by mouth once daily.     lisinopriL 5 MG tablet  Commonly known as: PRINIVIL,ZESTRIL  Take 1 tablet (5 mg total) by mouth once daily.     metFORMIN 1000 MG tablet  Commonly known as: GLUCOPHAGE  Take 1,000 mg by mouth 2 (two) times daily with meals.     naproxen 500 MG tablet  Commonly known as: NAPROSYN  Take 500 mg by mouth 2 (two) times daily with meals.     nortriptyline 50 MG capsule  Commonly known as: PAMELOR  Take 2 capsules (100 mg total) by mouth every evening.     nystatin 100,000 unit/mL suspension  Commonly known as: MYCOSTATIN  Take 5 mLs (500,000 Units total) by mouth 4 (four) times daily. for 10 days     polyethylene glycol 17 gram Pwpk  Commonly known as: GLYCOLAX  Take 17 g by mouth once daily.     PROTONIX 40 MG tablet  Generic drug: pantoprazole  Take 40 mg by mouth once daily.     TRADJENTA 5 mg Tab tablet  Generic drug: linaGLIPtin  Take 5 mg by mouth once daily.        STOP taking these medications    LORazepam 1 MG tablet  Commonly known as: ATIVAN     metoprolol tartrate 37.5 mg Tab            Indwelling Lines/Drains at time of discharge:   Lines/Drains/Airways     None                 Time spent on the discharge of patient: 33 minutes  Patient was seen and examined on the date of discharge and determined to be suitable for discharge.         Dede Hidalgo MD  Department of Hospital Medicine  [facility]

## 2020-07-11 NOTE — PROGRESS NOTES
[facility]  Department of Cardiology  Progress Note    PATIENT NAME: Nicole Harris  MRN: [id number]  TODAY'S DATE: 07/11/2020  ADMIT DATE: 7/9/2020    SUBJECTIVE     PRINCIPLE PROBLEM: Dyspnea on exertion    INTERVAL HISTORY:    7/11/2020  Ms. Harris is resting in bed with no acute distress noted. She reports she is feeling better and has been up walking around.     Review of patient's allergies indicates:   Allergen Reactions    Hydrocodone Nausea And Vomiting       REVIEW OF SYSTEMS  CARDIOVASCULAR: No recent chest pain, palpitations, arm, neck, or jaw pain  RESPIRATORY: No recent fever, cough chills, SOB or congestion  : No blood in the urine  GI: No Nausea, vomiting, constipation, diarrhea, blood, or reflux.  MUSCULOSKELETAL: No myalgias  NEURO: No lightheadedness or dizziness  EYES: No Double vision, blurry, vision or headache     OBJECTIVE     VITAL SIGNS (Most Recent)  Temp: 98.9 °F (37.2 °C) (07/11/20 1111)  Pulse: 93 (07/11/20 1111)  Resp: 16 (07/11/20 0758)  BP: 100/68 (07/11/20 1111)  SpO2: 98 % (07/11/20 1111)    VENTILATION STATUS  Resp: 16 (07/11/20 0758)  SpO2: 98 % (07/11/20 1111)       I & O (Last 24H):    Intake/Output Summary (Last 24 hours) at 7/11/2020 1223  Last data filed at 7/10/2020 1700  Gross per 24 hour   Intake 240 ml   Output --   Net 240 ml       WEIGHTS  Wt Readings from Last 1 Encounters:   07/09/20 2005 81.8 kg (180 lb 5.4 oz)   07/09/20 1736 81.7 kg (180 lb 0.1 oz)   07/09/20 1638 81.7 kg (180 lb 0.1 oz)   07/09/20 1204 81.6 kg (180 lb)       PHYSICAL EXAM  CONSTITUTIONAL: Well built, well nourished in no apparent distress  NECK: no carotid bruit, no JVD  LUNGS: CTA  CHEST WALL: + tenderness  HEART: regular rhythm, tachycardic   ABDOMEN: soft, non-tender; bowel sounds normal; no masses,  no organomegaly  EXTREMITIES: Extremities normal, no edema  NEURO: AAO X 3    SCHEDULED MEDS:   aspirin  325 mg Oral Daily    atorvastatin  40 mg Oral QHS    escitalopram  oxalate  20 mg Oral Daily    ferrous sulfate  325 mg Oral Daily    insulin detemir U-100  16 Units Subcutaneous QHS    lisinopriL  5 mg Oral Daily    nebivoloL  2.5 mg Oral Nightly    nortriptyline  100 mg Oral QHS    pantoprazole  40 mg Oral Daily       CONTINUOUS INFUSIONS:    PRN MEDS:acetaminophen, acetaminophen, dextrose 50%, dextrose 50%, guaifenesin 100 mg/5 ml, insulin regular, levalbuterol, LORazepam, ondansetron    LABS AND DIAGNOSTICS     CBC LAST 3 DAYS  Recent Labs   Lab 07/09/20  1241 07/10/20  0434 07/11/20  0625   WBC 12.74* 11.28 8.91   RBC 3.81* 3.81* 3.82*   HGB 11.1* 11.1* 10.9*   HCT 33.5* 34.0* 33.5*   MCV 88 89 88   MCH 29.1 29.1 28.5   MCHC 33.1 32.6 32.5   RDW 13.5 13.7 13.7   * 563* 558*   MPV 9.6 9.4 9.2   GRAN 73.3*  9.3* 54.3  6.1 51.2  4.6   LYMPH 18.4  2.3 33.0  3.7 35.8  3.2   MONO 5.3  0.7 7.8  0.9 8.0  0.7   BASO 0.08 0.08 0.08   NRBC 0 0 0       COAGULATION LAST 3 DAYS  Recent Labs   Lab 07/09/20  1241   LABPT 13.1   INR 1.0       CHEMISTRY LAST 3 DAYS  Recent Labs   Lab 07/09/20  1241 07/10/20  0434 07/11/20  0625   * 137 137   K 4.3 3.9 4.3   CL 98 100 101   CO2 21* 24 26   ANIONGAP 14 13 10   BUN 17 13 15   CREATININE 0.8 0.7 0.8   * 180* 206*   CALCIUM 9.3 9.0 8.9   MG 1.3* 2.0 1.8   ALBUMIN 3.8  --   --    PROT 7.2  --   --    ALKPHOS 79  --   --    ALT 26  --   --    AST 21  --   --    BILITOT 0.6  --   --        CARDIAC PROFILE LAST 3 DAYS  Recent Labs   Lab 07/09/20  1241 07/09/20  1801 07/09/20  2335   BNP 92  --   --    CPK 24  --   --    TROPONINI <0.030 <0.030 <0.030       ENDOCRINE LAST 3 DAYS  Recent Labs   Lab 07/09/20  1241   TSH 1.820   PROCAL <0.05       LAST ARTERIAL BLOOD GAS  ABG  No results for input(s): PH, PO2, PCO2, HCO3, BE in the last 168 hours.    LAST 7 DAYS MICROBIOLOGY   Microbiology Results (last 7 days)     ** No results found for the last 168 hours. **          MOST RECENT IMAGING  Echo Color Flow Doppler? Yes  ·  The estimated PA systolic pressure is 19 mmHg.  · Concentric left ventricular remodeling.  · Mildly decreased left ventricular systolic function. The estimated   ejection fraction is 47%.  · Grade I (mild) left ventricular diastolic dysfunction consistent with   impaired relaxation.  · Mild right ventricular enlargement.  · Mild left atrial enlargement.  · No pulmonary hypertension present.         LASTECHO  Results for orders placed during the hospital encounter of 07/09/20   Echo Color Flow Doppler? Yes    Narrative · The estimated PA systolic pressure is 19 mmHg.  · Concentric left ventricular remodeling.  · Mildly decreased left ventricular systolic function. The estimated   ejection fraction is 47%.  · Grade I (mild) left ventricular diastolic dysfunction consistent with   impaired relaxation.  · Mild right ventricular enlargement.  · Mild left atrial enlargement.  · No pulmonary hypertension present.          CURRENT/PREVIOUS VISIT EKG  Results for orders placed or performed during the hospital encounter of 07/09/20   EKG 12-lead    Collection Time: 07/09/20  3:40 PM    Narrative    Test Reason : R07.9,    Vent. Rate : 111 BPM     Atrial Rate : 111 BPM     P-R Int : 164 ms          QRS Dur : 088 ms      QT Int : 352 ms       P-R-T Axes : 049 031 103 degrees     QTc Int : 478 ms    Sinus tachycardia  Low voltage QRS  T wave abnormality, consider anterolateral ischemia  Abnormal ECG  When compared with ECG of 09-JUL-2020 12:05,  QRS duration has increased  ST no longer elevated in Inferior leads  Nonspecific T wave abnormality now evident in Inferior leads  T wave inversion now evident in Anterior-lateral leads  Confirmed by Joan Malone MD ([id number]) on 7/10/2020 6:26:42 PM    Referred By: [email or web address]   SELF           Confirmed By:Joan Malone MD       ASSESSMENT/PLAN:     Active Hospital Problems    Diagnosis    *Dyspnea on exertion    Sinus tachycardia    Shortness of breath    Hypomagnesemia     Anemia    S/P CABG x 3    Essential hypertension    Type 2 diabetes mellitus, without long-term current use of insulin       ASSESSMENT & PLAN:   1. Shortness of breath  2. Fatigue  3. Sinus tachycardia  4. Severe hypomagnesemia   5. S/p CABG   6. Anemia  7. Essential HTN  8. Type II DM   9. Thrombocytosis       RECOMMENDATIONS:    1. Patient is feeling better overall. She remains a bit tachycardic on metoprolol.   Will switch her to Bystolic 2.5 mg po QHS. May increase to BID if tolerated.   2. Echocardiogram reviewed. EF is 47% with no valvular issues noted.   3. Magnesium down to 1.8 today. Would like this replaced with Mag sulfate 2 grams. Then she may start mag ox 100 mg po daily.  4. Would also do a VQ scan to rule out PE.   Anticipate dc home today if she remains stable.         Odette Leroy NP  [facility]  Department of Cardiology  Date of Service: 07/11/2020  12:23 PM

## 2020-07-11 NOTE — PROGRESS NOTES
Lung Ventilation Perfusion in progress at 13:20  -Inhalation of Xenon for Vent portion of VQ at 13:26  - Injection of Tc99m MAA R arm IV at 13:34 for Perfusion portion of VQ    Lung Ventilation Perfusion Completed at 13:44    MBeatriz Patel [facility]

## 2020-07-11 NOTE — RESPIRATORY THERAPY
07/10/20 2223   Patient Assessment/Suction   Level of Consciousness (AVPU) alert   Respiratory Effort Normal;Unlabored   Expansion/Accessory Muscles/Retractions expansion symmetric;no retractions;no use of accessory muscles   All Lung Fields Breath Sounds clear   Rhythm/Pattern, Respiratory no shortness of breath reported;pattern regular;unlabored   Cough Frequency frequent   Cough Type dry;good;nonproductive   PRE-TX-O2   SpO2 98 %   Pulse 107   Resp 14   Aerosol Therapy   $ Aerosol Therapy Charges Aerosol Treatment   Daily Review of Necessity (SVN) completed   Respiratory Treatment Status (SVN) given   Treatment Route (SVN) mouthpiece;oxygen   Patient Position (SVN) Tinoco's   Post Treatment Assessment (SVN) breath sounds unchanged   Signs of Intolerance (SVN) none   Breath Sounds Post-Respiratory Treatment   Throughout All Fields Post-Treatment All Fields   Throughout All Fields Post-Treatment no change   Post-treatment Heart Rate (beats/min) 107   Post-treatment Resp Rate (breaths/min) 16   Respiratory Interventions   Cough And Deep Breathing done with encouragement   Breathing Techniques/Airway Clearance deep/controlled cough encouraged;diaphragmatic breathing promoted   Respiratory Evaluation   $ Care Plan Tech Time 15 min   Evaluation For   (CARE PLAN)   Pt asked for PRN tx due to coughing.

## 2020-07-11 NOTE — PROGRESS NOTES
[facility]  Progress Note    Patient name: Nicole Harris  MRN: [id number]  Admit Date: 7/9/2020   LOS: 0 days     SUBJECTIVE:     Principal problem: Dyspnea on exertion    Interval History:    Pt reports fatigue and SOB since d/c   No arrythmia noted in telemetry  Possibly 2/2 SE of BB  Switched to bystolic,Mag replaced  Cardiology consult appreciated  LEVEMIR dose increased  Echo EF 47%,Grade 1 DDF,procal negative    Scheduled Meds:   aspirin  325 mg Oral Daily    atorvastatin  40 mg Oral QHS    escitalopram oxalate  20 mg Oral Daily    ferrous sulfate  325 mg Oral Daily    insulin detemir U-100  12 Units Subcutaneous QHS    lisinopriL  5 mg Oral Daily    metoprolol tartrate  25 mg Oral BID    nortriptyline  100 mg Oral QHS    pantoprazole  40 mg Oral Daily     Continuous Infusions:  PRN Meds:acetaminophen, acetaminophen, albuterol, dextrose 50%, dextrose 50%, insulin regular, LORazepam, ondansetron    Review of patient's allergies indicates:   Allergen Reactions    Hydrocodone Nausea And Vomiting       Review of Systems: As per interval history    OBJECTIVE:     Vital Signs (Most Recent)  Temp: 98.4 °F (36.9 °C) (07/10/20 1700)  Pulse: 104 (07/10/20 1700)  Resp: 20 (07/10/20 1700)  BP: 94/60 (07/10/20 1700)  SpO2: 97 % (07/10/20 1700)    Vital Signs Range (Last 24H):  Temp:  [98.1 °F (36.7 °C)-98.7 °F (37.1 °C)]   Pulse:  []   Resp:  [16-20]   BP: ()/(60-75)   SpO2:  [95 %-98 %]     I & O (Last 24H):    Intake/Output Summary (Last 24 hours) at 7/10/2020 2021  Last data filed at 7/10/2020 1700  Gross per 24 hour   Intake 720 ml   Output --   Net 720 ml       Physical Exam:  General: Patient resting comfortably in no acute distress. Appears as stated age. Calm  Eyes: No conjunctival injection. No scleral icterus.  ENT: Hearing grossly intact. No discharge from ears. No nasal discharge.   Neck: Supple, trachea midline. No JVD  CVS: RRR. No LE edema BL  Lungs:  CTA BL, no wheezing or crackles. Good breath sounds. No accessory muscle use. No acute respiratory distress  Abdomen:  Soft, nontender and nondistended.  No organomegaly  Neuro: AOx3. Moves all extremities. Follows commands. Responds appropriately   Skin:  No rash or erythema noted    Laboratory:  CBC:   Recent Labs   Lab 07/10/20  0434   WBC 11.28   RBC 3.81*   HGB 11.1*   HCT 34.0*   *   MCV 89   MCH 29.1   MCHC 32.6     CMP:   Recent Labs   Lab 07/09/20  1241 07/10/20  0434   * 180*   CALCIUM 9.3 9.0   ALBUMIN 3.8  --    PROT 7.2  --    * 137   K 4.3 3.9   CO2 21* 24   CL 98 100   BUN 17 13   CREATININE 0.8 0.7   ALKPHOS 79  --    ALT 26  --    AST 21  --    BILITOT 0.6  --      Cardiac markers:   Recent Labs   Lab 07/09/20  2335   TROPONINI <0.030           ASSESSMENT/PLAN:       Active Hospital Problems    Diagnosis  POA    *Dyspnea on exertion [R06.09]  Yes    Sinus tachycardia [R00.0]  Yes    Shortness of breath [R06.02]  Yes    Hypomagnesemia [E83.42]  Yes    Anemia [D64.9]  Yes    S/P CABG x 3 [Z95.1]  Not Applicable    Essential hypertension [I10]  Yes    Type 2 diabetes mellitus, without long-term current use of insulin [E11.9]  Yes      Resolved Hospital Problems   No resolved problems to display.         Plan:   no arrhythmia reported, possibly 2/2 BB SE VS ischemic cardiomyopathy vs anxiety  Serial troponins. Consult Cardiology appreciated- Dr. Kannan Santana  C/w joe  .  Continue other cardioprotective medications  Normal LVEF noted on cardiac catheterization 06/25.  which was decreased nw  Replete magnesium; target greater than 2 mg/dL  Basal insulin with moderate dose insulin sliding scale    VTE Risk Mitigation (From admission, onward)         Ordered     IP VTE HIGH RISK PATIENT  Once      07/09/20 1448     Place sequential compression device  Until discontinued      07/09/20 1448                  Department Hospital Medicine  [facility]  MD [place]  Date of service: 07/10/2020

## 2020-07-12 NOTE — PLAN OF CARE
07/12/20 0822   Final Note   Assessment Type Final Discharge Note   Anticipated Discharge Disposition Home   Pt discharged home 7/11/20 with no cm needs.

## 2020-07-21 ENCOUNTER — TELEPHONE (OUTPATIENT)
Dept: VASCULAR SURGERY | Facility: CLINIC | Age: 50
End: 2020-07-21

## 2020-07-21 NOTE — TELEPHONE ENCOUNTER
----- Message from Elizabeth Rivera sent at 7/21/2020  9:44 AM CDT -----  Regarding: Therapy  Pt is calling to speak with Staff regarding when she will start therapy.  She is requesting a returned call to [phone].    Thank you.

## 2020-07-28 ENCOUNTER — OFFICE VISIT (OUTPATIENT)
Dept: VASCULAR SURGERY | Facility: CLINIC | Age: 50
End: 2020-07-28
Payer: MEDICAID

## 2020-07-28 VITALS
DIASTOLIC BLOOD PRESSURE: 69 MMHG | BODY MASS INDEX: 30.95 KG/M2 | HEART RATE: 96 BPM | HEIGHT: 64 IN | SYSTOLIC BLOOD PRESSURE: 110 MMHG

## 2020-07-28 DIAGNOSIS — Z95.1 S/P CABG X 3: Primary | ICD-10-CM

## 2020-07-28 PROCEDURE — 99024 PR POST-OP FOLLOW-UP VISIT: ICD-10-PCS | Mod: ,,, | Performed by: THORACIC SURGERY (CARDIOTHORACIC VASCULAR SURGERY)

## 2020-07-28 PROCEDURE — 99999 PR PBB SHADOW E&M-EST. PATIENT-LVL IV: CPT | Mod: PBBFAC,,, | Performed by: THORACIC SURGERY (CARDIOTHORACIC VASCULAR SURGERY)

## 2020-07-28 PROCEDURE — 99214 OFFICE O/P EST MOD 30 MIN: CPT | Mod: PBBFAC,PO | Performed by: THORACIC SURGERY (CARDIOTHORACIC VASCULAR SURGERY)

## 2020-07-28 PROCEDURE — 99999 PR PBB SHADOW E&M-EST. PATIENT-LVL IV: ICD-10-PCS | Mod: PBBFAC,,, | Performed by: THORACIC SURGERY (CARDIOTHORACIC VASCULAR SURGERY)

## 2020-07-28 PROCEDURE — 99024 POSTOP FOLLOW-UP VISIT: CPT | Mod: ,,, | Performed by: THORACIC SURGERY (CARDIOTHORACIC VASCULAR SURGERY)

## 2020-07-28 RX ORDER — FLUTICASONE PROPIONATE 50 MCG
2 SPRAY, SUSPENSION (ML) NASAL
Status: ON HOLD | COMMUNITY
Start: 2020-03-03

## 2020-07-28 NOTE — PROGRESS NOTES
"Subjective:       Nicole Harris presents to the clinic after undergoing coronary artery bypass x3 with left internal thoracic artery to left anterior descending and saphenous vein graft to diagonal 1 and an obtuse marginal branch had Count includes the [facility] on 06/26/2020.  She initially did quite well from surgery.  After being at home for a short time, she began to develop myalgias and easy fatigability.  Some of this has improved with changing her Bystolic to half of its dose.  She has developed some myalgias as well.  She also has a history of chronic pain, fibromyalgia, and migraine headaches.  She has no angina.       Objective:      /69   Pulse 96   Ht 5' 4" (1.626 m)   BMI 30.95 kg/m²     Objective  General:  She appears well.  Chest:  Sternum is stable.  Heart:  Regular rate and rhythm with no murmurs or rubs.  Lungs:  Clear bilaterally  Extremities:  Left leg incisions are healing well.  There is no edema       Assessment:      Doing well postoperatively.  She still has increasing fatigue which is slightly improved with half of her dose of Bystolic.  She also has more myalgias than she did prior to surgery.        Plan:      1.  Discontinue atorvastatin for now.  If her myalgias improve after 3 weeks, continue all other current medications.  If her fatigue does not improve after 3 weeks, discontinue Bystolic.  2. Wound care discussed.  3. Pt is to increase activities as tolerated.  4. Follow up: 6 weeks.  5.  She should return to her pain management physicians and is stable for any treatments that they would outline.       "

## 2020-08-12 ENCOUNTER — OFFICE VISIT (OUTPATIENT)
Dept: DERMATOLOGY | Facility: CLINIC | Age: 50
End: 2020-08-12
Payer: MEDICAID

## 2020-08-12 ENCOUNTER — OFFICE VISIT (OUTPATIENT)
Dept: OBSTETRICS AND GYNECOLOGY | Facility: CLINIC | Age: 50
End: 2020-08-12
Payer: MEDICAID

## 2020-08-12 VITALS
DIASTOLIC BLOOD PRESSURE: 70 MMHG | SYSTOLIC BLOOD PRESSURE: 116 MMHG | HEIGHT: 65 IN | WEIGHT: 195.31 LBS | BODY MASS INDEX: 32.54 KG/M2

## 2020-08-12 VITALS — HEIGHT: 64 IN | WEIGHT: 180 LBS | BODY MASS INDEX: 30.73 KG/M2

## 2020-08-12 DIAGNOSIS — L91.8 SKIN TAG: ICD-10-CM

## 2020-08-12 DIAGNOSIS — L81.4 LENTIGO: ICD-10-CM

## 2020-08-12 DIAGNOSIS — Z12.83 SKIN CANCER SCREENING: ICD-10-CM

## 2020-08-12 DIAGNOSIS — L82.1 SK (SEBORRHEIC KERATOSIS): ICD-10-CM

## 2020-08-12 DIAGNOSIS — L40.9 PSORIASIS OF SCALP: Primary | ICD-10-CM

## 2020-08-12 DIAGNOSIS — D21.9 FIBROIDS: ICD-10-CM

## 2020-08-12 DIAGNOSIS — R10.2 PELVIC PAIN IN FEMALE: Primary | ICD-10-CM

## 2020-08-12 PROCEDURE — 99203 PR OFFICE/OUTPT VISIT, NEW, LEVL III, 30-44 MIN: ICD-10-PCS | Mod: S$PBB,,, | Performed by: DERMATOLOGY

## 2020-08-12 PROCEDURE — 99204 OFFICE O/P NEW MOD 45 MIN: CPT | Mod: S$PBB,,, | Performed by: SPECIALIST

## 2020-08-12 PROCEDURE — 99212 OFFICE O/P EST SF 10 MIN: CPT | Mod: PBBFAC,PO | Performed by: DERMATOLOGY

## 2020-08-12 PROCEDURE — 99214 OFFICE O/P EST MOD 30 MIN: CPT | Mod: PBBFAC,27,PN | Performed by: SPECIALIST

## 2020-08-12 PROCEDURE — 99999 PR PBB SHADOW E&M-EST. PATIENT-LVL II: CPT | Mod: PBBFAC,,, | Performed by: DERMATOLOGY

## 2020-08-12 PROCEDURE — 99999 PR PBB SHADOW E&M-EST. PATIENT-LVL II: ICD-10-PCS | Mod: PBBFAC,,, | Performed by: DERMATOLOGY

## 2020-08-12 PROCEDURE — 99999 PR PBB SHADOW E&M-EST. PATIENT-LVL IV: CPT | Mod: PBBFAC,,, | Performed by: SPECIALIST

## 2020-08-12 PROCEDURE — 99204 PR OFFICE/OUTPT VISIT, NEW, LEVL IV, 45-59 MIN: ICD-10-PCS | Mod: S$PBB,,, | Performed by: SPECIALIST

## 2020-08-12 PROCEDURE — 99203 OFFICE O/P NEW LOW 30 MIN: CPT | Mod: S$PBB,,, | Performed by: DERMATOLOGY

## 2020-08-12 PROCEDURE — 99999 PR PBB SHADOW E&M-EST. PATIENT-LVL IV: ICD-10-PCS | Mod: PBBFAC,,, | Performed by: SPECIALIST

## 2020-08-12 RX ORDER — FLUOCINONIDE TOPICAL SOLUTION USP, 0.05% 0.5 MG/ML
SOLUTION TOPICAL 2 TIMES DAILY
Qty: 20 ML | Refills: 1 | Status: SHIPPED | OUTPATIENT
Start: 2020-08-12 | End: 2020-08-28

## 2020-08-12 NOTE — PROGRESS NOTES
Subjective:       Patient ID:  Nicole Harris is a 50 y.o. female who presents for   Chief Complaint   Patient presents with    Skin Check     Would like a skin check, has a few moles that she is concerned about.    Has itchy scalp for months on and off.  Also had it as child and got better with listerine.      Review of Systems   Constitutional: Negative for fever and chills.   Respiratory: Negative for cough and shortness of breath.    Cardiovascular: Negative for chest pain and palpitations.   Gastrointestinal: Negative for nausea and vomiting.   Skin: Positive for rash and dry skin. Negative for itching, daily sunscreen use, activity-related sunscreen use and wears hat.   Hematologic/Lymphatic: Bruises/bleeds easily.        Objective:    Physical Exam   Constitutional: She appears well-developed and well-nourished.   HENT:   Mouth/Throat: Lips normal.    Eyes: No conjunctival no injection.   Cardiovascular: There is no local extremity swelling and no dependent edema.     Neurological: She is alert and oriented to person, place, and time.   Psychiatric: She has a normal mood and affect.   Skin:   Areas Examined (abnormalities noted in diagram):   Scalp / Hair Palpated and Inspected  Head / Face Inspection Performed  Neck Inspection Performed  Chest / Axilla Inspection Performed  Abdomen Inspection Performed  Back Inspection Performed  RUE Inspected  LUE Inspection Performed  RLE Inspected  LLE Inspection Performed                   Diagram Legend     Erythematous scaling macule/papule c/w actinic keratosis       Vascular papule c/w angioma      Pigmented verrucoid papule/plaque c/w seborrheic keratosis      Yellow umbilicated papule c/w sebaceous hyperplasia      Irregularly shaped tan macule c/w lentigo     1-2 mm smooth white papules consistent with Milia      Movable subcutaneous cyst with punctum c/w epidermal inclusion cyst      Subcutaneous movable cyst c/w pilar cyst      Firm pink to brown papule  c/w dermatofibroma      Pedunculated fleshy papule(s) c/w skin tag(s)      Evenly pigmented macule c/w junctional nevus     Mildly variegated pigmented, slightly irregular-bordered macule c/w mildly atypical nevus      Flesh colored to evenly pigmented papule c/w intradermal nevus       Pink pearly papule/plaque c/w basal cell carcinoma      Erythematous hyperkeratotic cursted plaque c/w SCC      Surgical scar with no sign of skin cancer recurrence      Open and closed comedones      Inflammatory papules and pustules      Verrucoid papule consistent consistent with wart     Erythematous eczematous patches and plaques     Dystrophic onycholytic nail with subungual debris c/w onychomycosis     Umbilicated papule    Erythematous-base heme-crusted tan verrucoid plaque consistent with inflamed seborrheic keratosis     Erythematous Silvery Scaling Plaque c/w Psoriasis     See annotation      Assessment / Plan:        Psoriasis of scalp  -     fluocinonide (LIDEX) 0.05 % external solution; Apply topically 2 (two) times daily. Prn rash scalp.  Dispense: 20 mL; Refill: 1  Discussed with patient the etiology and pathogenesis of the disease or skin lesion(s) and possible treatments and aggravators.    Reviewed with patient different treatment options and associated risks.  Discussed all of the following:  Psoriasis is an inflammatory condition that affects the skin and nails. You may have patches of thick, red skin (plaques) covered with silvery scales. These often appear on the elbows, knees, legs, lower back, and scalp.  The plaques itch and can be painful. People with this condition are more likely to have emotional stress and depression.  Psoriasis is not contagious. It cant spread to someone else who touches it. But it can be inherited. It is an autoimmune skin disease. This means that the immune system has an abnormal reaction. It treats healthy skin like it is a foreign substance. This causes skin cells to grow faster  than normal and to stack up in raised red patches. Psoriasis is a long-term (chronic) disease. You will have flare-ups that come and go over time.  Proper application of medications and or care for affected area(s) and condition(s) reviewed.  Chronic nature of this condition discussed with patient.  Patient to start 5% crude tar shampoo to be used as scalp soaks for at least 3 minutes, longer if possible, per their regular shampooing schedule.  No hot water bathing reviewed.    Skin tag  Discussed with patient the benign nature of these lesions and that no treatment is indicated.    If eyelid one gets bigger, can consider cosmetic tx later.    Lentigo  Discussed with patient the benign nature of these lesions and that no treatment is indicated.      Skin cancer screening  No other seriously suspicious lesions noted for body areas examined today.  Patient to inform of us if they notice any dark or changing or suspicious spots in areas not examined today.  Follow up for routine monitoring recommended to patient.    Instructed patient to watch out for dark spots, bleeding spots, crusty spots, sores that break out repeatedly in the same spot.  These characteristics are risk factors for skin cancer, and patient is to notify us if they experience any of these symptoms.    SK (seborrheic keratosis)  Discussed with patient the benign nature of these lesions and that no treatment is indicated.               Follow up in about 1 year (around 8/12/2021).

## 2020-08-13 NOTE — PROGRESS NOTES
49 yo obese WF with recent CABG presents for second opinion regarding uterine fibroids. Pt states she recently underwent pelvic imaging which revealed multiple uterine fibroids.( no records)  Pt also states she underwent EMB due to irregulatr bleeding, which has abated and biopsy was negative for pathology.  Pt currently denies menorrhagia or intolerable PP.  Past Medical History:   Diagnosis Date    Anxiety     Bipolar disorder     Depression     Diabetes mellitus, type 2     Fibromyalgia     HTN (hypertension)     Insomnia     Migraine        Past Surgical History:   Procedure Laterality Date    ANGIOGRAM, CORONARY, WITH LEFT HEART CATHETERIZATION Left 2020    Procedure: Left heart cath;  Surgeon: Kannan Santana MD;  Location: [facility] CATH/EP LAB;  Service: Cardiology;  Laterality: Left;    CARDIAC CATHETERIZATION       SECTION      COLONOSCOPY N/A 2019    Procedure: COLONOSCOPY;  Surgeon: Da Diallo MD;  Location: [facility] ENDO;  Service: General;  Laterality: N/A;    CORONARY ARTERY BYPASS GRAFT (CABG) N/A 2020    Procedure: CORONARY ARTERY BYPASS GRAFT (CABG);  Surgeon: Anshul Oakes MD;  Location: [facility] OR;  Service: Cardiothoracic;  Laterality: N/A;    ENDOSCOPIC HARVEST OF VEIN Left 2020    Procedure: SURGICAL PROCUREMENT, VEIN, ENDOSCOPIC;  Surgeon: Anshul Oakes MD;  Location: [facility] OR;  Service: Cardiothoracic;  Laterality: Left;    EPIDURAL STEROID INJECTION N/A 2019    Procedure: Injection, Steroid, Epidural - L4/5 EPIDURAL STEROID INJECTION;  Surgeon: Sherri Gardiner MD;  Location: [facility] OR;  Service: Pain Management;  Laterality: N/A;    EPIDURAL STEROID INJECTION N/A 2019    Procedure: Injection, Steroid, Epidural - C7/T1 EPIDURAL STEROID INJECTION;  Surgeon: Sherri Gardiner MD;  Location: [facility] OR;  Service: Pain Management;  Laterality: N/A;    EPIDURAL STEROID INJECTION N/A 2019    Procedure: Injection, Steroid, Epidural - L4/5  EPIDURAL STEROID INJECTION;  Surgeon: Sherri Gardiner MD;  Location: [facility] OR;  Service: Pain Management;  Laterality: N/A;    EPIDURAL STEROID INJECTION N/A 10/21/2019    Procedure: Injection, Steroid, Epidural, CERVICAL C7/T1 SILVIA;  Surgeon: Sherri Gardiner MD;  Location: [facility] OR;  Service: Pain Management;  Laterality: N/A;  19    ESOPHAGOGASTRODUODENOSCOPY N/A 2019    Procedure: ESOPHAGOGASTRODUODENOSCOPY (EGD);  Surgeon: Da Diallo MD;  Location: [facility] ENDO;  Service: General;  Laterality: N/A;    TONSILLECTOMY      TRIGGER POINT INJECTION N/A 2019    Procedure: INJECTION, TRIGGER POINT - CERVICAL REGION;  Surgeon: Sherri Gardiner MD;  Location: [facility] OR;  Service: Pain Management;  Laterality: N/A;    TRIGGER POINT INJECTION N/A 10/21/2019    Procedure: INJECTION, TRIGGER POINT;  Surgeon: Sherri Gardiner MD;  Location: [facility] OR;  Service: Pain Management;  Laterality: N/A;    triple bypass         Family History   Problem Relation Age of Onset    Lung cancer Mother     Breast cancer Neg Hx        Social History     Socioeconomic History    Marital status:      Spouse name: Not on file    Number of children: Not on file    Years of education: Not on file    Highest education level: Not on file   Occupational History    Not on file   Social Needs    Financial resource strain: Not on file    Food insecurity     Worry: Not on file     Inability: Not on file    Transportation needs     Medical: Not on file     Non-medical: Not on file   Tobacco Use    Smoking status: Former Smoker     Packs/day: 1.00     Years: 0.00     Pack years: 0.00     Quit date: 2012     Years since quittin.9    Smokeless tobacco: Never Used   Substance and Sexual Activity    Alcohol use: Yes     Frequency: Monthly or less     Comment: occasional    Drug use: Yes     Types: Marijuana, Other-see comments    Sexual activity: Yes   Lifestyle    Physical activity     Days per week: Not on  file     Minutes per session: Not on file    Stress: Not on file   Relationships    Social connections     Talks on phone: Not on file     Gets together: Not on file     Attends Anabaptism service: Not on file     Active member of club or organization: Not on file     Attends meetings of clubs or organizations: Not on file     Relationship status: Not on file   Other Topics Concern    Not on file   Social History Narrative    Not on file       Current Outpatient Medications   Medication Sig Dispense Refill    albuterol (PROVENTIL/VENTOLIN HFA) 90 mcg/actuation inhaler Inhale 1 puff into the lungs every 4 (four) hours as needed.   0    aspirin 325 MG tablet Take 1 tablet (325 mg total) by mouth once daily. 30 tablet 0    escitalopram oxalate (LEXAPRO) 20 MG tablet Take 20 mg by mouth once daily.   3    ferrous sulfate (FEOSOL) 325 mg (65 mg iron) Tab tablet Take 1 tablet (325 mg total) by mouth once daily. 30 tablet 0    fluticasone propionate (FLONASE) 50 mcg/actuation nasal spray 2 sprays by Nasal route.      insulin glargine,hum.rec.anlog (LANTUS SOLOSTAR U-100 INSULIN SUBQ) Inject 20 Units into the skin nightly.      linaGLIPtin (TRADJENTA) 5 mg Tab tablet Take 5 mg by mouth once daily.      lisinopriL (PRINIVIL,ZESTRIL) 5 MG tablet Take 1 tablet (5 mg total) by mouth once daily. 30 tablet 0    magnesium oxide 200 mg magnesium Tab Take 100 mg by mouth once daily. 30 tablet 3    metFORMIN (GLUCOPHAGE) 1000 MG tablet Take 1,000 mg by mouth 2 (two) times daily with meals.       naproxen (NAPROSYN) 500 MG tablet Take 500 mg by mouth 2 (two) times daily with meals.      nebivoloL (BYSTOLIC) 2.5 MG Tab Take 1 tablet (2.5 mg total) by mouth 2 (two) times a day. 60 tablet 3    nortriptyline (PAMELOR) 50 MG capsule Take 2 capsules (100 mg total) by mouth every evening. 60 capsule 11    pantoprazole (PROTONIX) 40 MG tablet Take 40 mg by mouth once daily.       atorvastatin (LIPITOR) 40 MG tablet Take 1  tablet (40 mg total) by mouth every evening. (Patient not taking: Reported on 8/12/2020) 30 tablet 0    cyclobenzaprine (FLEXERIL) 10 MG tablet Take 1 tablet (10 mg total) by mouth 3 (three) times daily as needed for Muscle spasms. (Patient not taking: Reported on 8/12/2020) 90 tablet 2    fluocinonide (LIDEX) 0.05 % external solution Apply topically 2 (two) times daily. Prn rash scalp. (Patient not taking: Reported on 8/12/2020) 20 mL 1     No current facility-administered medications for this visit.      Facility-Administered Medications Ordered in Other Visits   Medication Dose Route Frequency Provider Last Rate Last Dose    0.9%  NaCl infusion   Intravenous Continuous Sherri Gardiner MD 20 mL/hr at 04/29/19 1227         Review of patient's allergies indicates:   Allergen Reactions    Hydrocodone Nausea And Vomiting       Review of System:   General: no chills, fever, night sweats, weight gain or weight loss  Psychological: no depression or suicidal ideation  Breasts: no new or changing breast lumps, nipple discharge or masses.  Respiratory: no cough, shortness of breath, or wheezing  Cardiovascular: no chest pain or dyspnea on exertion  Gastrointestinal: no abdominal pain, change in bowel habits, or black or bloody stools  Genito-Urinary: no incontinence, urinary frequency/urgency or vulvar/vaginal symptoms, pelvic pain or abnormal vaginal bleeding.  Musculoskeletal: no gait disturbance or muscular weakness                                              General Appearance    A and O x 4, Cooperative, no distress   Breasts    Abdomen   Deferred  Soft, non-tender, bowel sounds active all four quadrants,  no masses, no organomegaly    Genitourinary:   External rectal exam shows no thrombosed external hemorrhoids.   Pelvic exam was performed with patient supine.  No labial fusion.  There is no rash, lesion or injury on the right labia.  There is no rash, lesion or injury on the left labia.  No bleeding and no  signs of injury around the vaginal introitus, urethra is without lesions and well supported. The cervix is visualized with no discharge, lesions or friability.  No vaginal discharge found.  No significant Cystocele, Enterocele or rectocele, and uterus well supported.  Bimanual exam:Limited due to pt habitus  The urethra is normal to palpation and there are no palpable vaginal wall masses.  Uterus is not deviated, not enlarged, not fixed, normal shape and not tender.  Cervix exhibits no motion tenderness.   Right adnexum displays no mass and no tenderness.  Left adnexum displays no mass and no tenderness.   Extremities: Extremities normal, atraumatic, no cyanosis or edema                     I discussed uterine fibroids and explained no overt indication for hyst if pt not experiencing pain, mmenorrhagia , urinary sequelae, etc.  I did rec a baseline pelvic u/s to assess uterine size and pending that result, possible surgical therapy  Pt does not desire hyst and pt is also increased surgical risk  I answered all questions and will await results of pelvic u/s and follow  I reviewed pt's past medical history, past and current meds, family history, allergies and reviewed problem list  I spent 20 min with pt with approx half in consultation

## 2020-08-27 ENCOUNTER — TELEPHONE (OUTPATIENT)
Dept: PAIN MEDICINE | Facility: CLINIC | Age: 50
End: 2020-08-27

## 2020-08-27 NOTE — TELEPHONE ENCOUNTER
Informed pt Dr. José is gone for the day and I will have to reschedule her for tomorrow to see Isadora Martin. Pt requested a virtual appt. Scheduled virtual appt and pt verbalized understanding of appt date and time.

## 2020-08-27 NOTE — TELEPHONE ENCOUNTER
----- Message from Laine Tavera sent at 8/27/2020 10:24 AM CDT -----  Contact: patient  Type: Needs Medical Advice  Who Called:  patient  Symptoms (please be specific):    How long has patient had these symptoms:    Pharmacy name and phone #:    Best Call Back Number: [phone]  Additional Information: requesting a call back for virtual visit stated no one called

## 2020-08-28 ENCOUNTER — OFFICE VISIT (OUTPATIENT)
Dept: PAIN MEDICINE | Facility: CLINIC | Age: 50
End: 2020-08-28
Payer: MEDICAID

## 2020-08-28 DIAGNOSIS — M79.18 MYOFASCIAL PAIN SYNDROME: Primary | ICD-10-CM

## 2020-08-28 DIAGNOSIS — M50.30 DEGENERATIVE DISC DISEASE, CERVICAL: ICD-10-CM

## 2020-08-28 DIAGNOSIS — M54.12 CERVICAL RADICULOPATHY: ICD-10-CM

## 2020-08-28 DIAGNOSIS — M47.816 LUMBAR SPONDYLOSIS: ICD-10-CM

## 2020-08-28 DIAGNOSIS — M51.36 DDD (DEGENERATIVE DISC DISEASE), LUMBAR: ICD-10-CM

## 2020-08-28 PROCEDURE — 99214 PR OFFICE/OUTPT VISIT, EST, LEVL IV, 30-39 MIN: ICD-10-PCS | Mod: GT,,, | Performed by: NURSE PRACTITIONER

## 2020-08-28 PROCEDURE — 99214 OFFICE O/P EST MOD 30 MIN: CPT | Mod: GT,,, | Performed by: NURSE PRACTITIONER

## 2020-08-28 RX ORDER — TIZANIDINE 4 MG/1
4 TABLET ORAL EVERY 6 HOURS PRN
Qty: 120 TABLET | Refills: 0 | Status: SHIPPED | OUTPATIENT
Start: 2020-08-28 | End: 2020-12-08

## 2020-08-28 RX ORDER — NAPROXEN SODIUM 550 MG/1
550 TABLET ORAL 2 TIMES DAILY WITH MEALS
Qty: 60 TABLET | Refills: 2 | Status: ON HOLD | OUTPATIENT
Start: 2020-08-28

## 2020-08-28 NOTE — PROGRESS NOTES
Chronic Pain-Tele-Medicine-Established Note (Follow up visit)    The patient location is: home  The chief complaint leading to consultation is: lower back pain  Visit type: Virtual visit with synchronous audio and video  Total time spent with patient: 15 minutes  Each patient to whom he or she provides medical services by telemedicine is: (1) informed of the relationship between the physician and patient and the respective role of any other health care provider with respect to management of the patient; and (2) notified that he or she may decline to receive medical services by telemedicine and may withdraw from such care at any time.    INITIAL HISTORY OF PRESENT ILLNESS: Nicole Harris is a 49 y.o. female who presents today with low back and thoracic spine pain. Her low back pain is the worse or she would like to focus today.  She reports that this pain began in 2004 as a result of multiple injuries and stressors.  She also reports that this pain got dramatically worse in 2010 when she was doing heavy lifting and yard work. The pain kept her out of work for 3 months.  She went to Pain Management with Dr. Puma Craig for her neck.  She reports bilateral low back pain that radiates into L>R leg as a shooting pain into her vagina and down the back of legs to the outside of her feet.  This makes is impossible for her to have sex.  She reports associated muscle spasms in her L>R knees with tremors.  She also has constipation that she treats with Miralax or suppositories once monthly. This pain is described in detail below.     Of note, her neck pain makes it difficult to drive.     Aggravating factors:  Almost any activity, including sitting, standing, walking, bending/twisting, lifting, lying down, exercise, and activity, stopping/sneezing, touching, flexion, extension, getting up out of bed/chair.  The pain is worse with both heat and cold and also worse with weather change.     Mitigating factors:  Is better with  "rest, lying down, medications.  It is also sometimes better with cold.     Previously seeing: Dr. Puma Craig for her neck.     Physical Therapy: Not tried though she does try to stretch at home.  She was previously doing Maurizio three times weekly in 2015, but she had to quit this due to pain.        Nicole Harris is a 50 year old female with PMH significant for carpal tunnel syndrome, DM (not on insulin), fibromyalgia (self-reported), depression, and significant deconditioning presents as a telemedicine visit for the continued management of back pain.  Since her last visit, she did have triple bypass surgery. She denies being on any blood thinner besides Aspirin at this time. Her back and neck pain have worsened since her surgery due to "I don't do anything but sit on my butt all day". She is not participating in Physical Therapy. Her SILVIA had to be cancelled for July 7 due to CABG, so she would like to reschedule this procedure. Her neck pain is worse than her back pain per patient. She has pain in the cervical spine and throughout her shoulders. She describes the pain as aching all the time and sharp "when I move". She does not get relief from naproxen or flexeril per patient. She does not do stretches or exercises for her neck or back. Patient denies of any urinary/fecal incontinence, saddle anesthesia, or weakness.       Pain Medications:  Nortriptyline 100 mg QHS  Naproxen 500 mg twice daily  Voltaren gel  Lexapro 20 mg daily     report: N/A    Pain Procedures:   10/21/2019: C7/T1 Epidural Steroid Injection and thoracic TPIs via Dr. Gardiner  7/8/2019: L4/5 Interlaminar Epidural Steroid Injection and cervical trigger point injections via Dr. Gardiner  5/27/2019: C7/T1 Epidural Steroid Injection via Dr. Gardiner  4/29/2019: L4/5 Interlaminar Epidural Steroid Injection    Imaging: No new imaging to review    Past Medical History:   Diagnosis Date    Anxiety     Bipolar disorder     Depression     Diabetes " mellitus, type 2     Fibromyalgia     HTN (hypertension)     Insomnia     Migraine      Past Surgical History:   Procedure Laterality Date    ANGIOGRAM, CORONARY, WITH LEFT HEART CATHETERIZATION Left 2020    Procedure: Left heart cath;  Surgeon: Kannan Santana MD;  Location: [facility] CATH/EP LAB;  Service: Cardiology;  Laterality: Left;    CARDIAC CATHETERIZATION       SECTION      COLONOSCOPY N/A 2019    Procedure: COLONOSCOPY;  Surgeon: Da Diallo MD;  Location: [facility] ENDO;  Service: General;  Laterality: N/A;    CORONARY ARTERY BYPASS GRAFT (CABG) N/A 2020    Procedure: CORONARY ARTERY BYPASS GRAFT (CABG);  Surgeon: Anshul Oakes MD;  Location: [facility] OR;  Service: Cardiothoracic;  Laterality: N/A;    ENDOSCOPIC HARVEST OF VEIN Left 2020    Procedure: SURGICAL PROCUREMENT, VEIN, ENDOSCOPIC;  Surgeon: Anshul Oakes MD;  Location: [facility] OR;  Service: Cardiothoracic;  Laterality: Left;    EPIDURAL STEROID INJECTION N/A 2019    Procedure: Injection, Steroid, Epidural - L4/5 EPIDURAL STEROID INJECTION;  Surgeon: Sherri Gardiner MD;  Location: [facility] OR;  Service: Pain Management;  Laterality: N/A;    EPIDURAL STEROID INJECTION N/A 2019    Procedure: Injection, Steroid, Epidural - C7/T1 EPIDURAL STEROID INJECTION;  Surgeon: Sherri Gardiner MD;  Location: [facility] OR;  Service: Pain Management;  Laterality: N/A;    EPIDURAL STEROID INJECTION N/A 2019    Procedure: Injection, Steroid, Epidural - L4/5 EPIDURAL STEROID INJECTION;  Surgeon: Sherri Gardiner MD;  Location: [facility] OR;  Service: Pain Management;  Laterality: N/A;    EPIDURAL STEROID INJECTION N/A 10/21/2019    Procedure: Injection, Steroid, Epidural, CERVICAL C7/T1 SILVIA;  Surgeon: Sherri Gardiner MD;  Location: [facility] OR;  Service: Pain Management;  Laterality: N/A;  19    ESOPHAGOGASTRODUODENOSCOPY N/A 2019    Procedure: ESOPHAGOGASTRODUODENOSCOPY (EGD);  Surgeon: Da Diallo MD;   Location: [facility] ENDO;  Service: General;  Laterality: N/A;    TONSILLECTOMY      TRIGGER POINT INJECTION N/A 2019    Procedure: INJECTION, TRIGGER POINT - CERVICAL REGION;  Surgeon: Sherri Gardiner MD;  Location: [facility] OR;  Service: Pain Management;  Laterality: N/A;    TRIGGER POINT INJECTION N/A 10/21/2019    Procedure: INJECTION, TRIGGER POINT;  Surgeon: Sherri Gardiner MD;  Location: [facility] OR;  Service: Pain Management;  Laterality: N/A;    triple bypass       Social History     Socioeconomic History    Marital status:      Spouse name: Not on file    Number of children: Not on file    Years of education: Not on file    Highest education level: Not on file   Occupational History    Not on file   Social Needs    Financial resource strain: Not on file    Food insecurity     Worry: Not on file     Inability: Not on file    Transportation needs     Medical: Not on file     Non-medical: Not on file   Tobacco Use    Smoking status: Former Smoker     Packs/day: 1.00     Years: 0.00     Pack years: 0.00     Quit date: 2012     Years since quittin.9    Smokeless tobacco: Never Used   Substance and Sexual Activity    Alcohol use: Yes     Frequency: Monthly or less     Comment: occasional    Drug use: Yes     Types: Marijuana, Other-see comments    Sexual activity: Yes   Lifestyle    Physical activity     Days per week: Not on file     Minutes per session: Not on file    Stress: Not on file   Relationships    Social connections     Talks on phone: Not on file     Gets together: Not on file     Attends Zoroastrian service: Not on file     Active member of club or organization: Not on file     Attends meetings of clubs or organizations: Not on file     Relationship status: Not on file   Other Topics Concern    Not on file   Social History Narrative    Not on file     Family History   Problem Relation Age of Onset    Lung cancer Mother     Breast cancer Neg Hx      Review of patient's  allergies indicates:   Allergen Reactions    Hydrocodone Nausea And Vomiting     Current Outpatient Medications   Medication Sig    albuterol (PROVENTIL/VENTOLIN HFA) 90 mcg/actuation inhaler Inhale 1 puff into the lungs every 4 (four) hours as needed.     aspirin 325 MG tablet Take 1 tablet (325 mg total) by mouth once daily.    escitalopram oxalate (LEXAPRO) 20 MG tablet Take 20 mg by mouth once daily.     ferrous sulfate (FEOSOL) 325 mg (65 mg iron) Tab tablet Take 1 tablet (325 mg total) by mouth once daily.    fluticasone propionate (FLONASE) 50 mcg/actuation nasal spray 2 sprays by Nasal route.    insulin glargine,hum.rec.anlog (LANTUS SOLOSTAR U-100 INSULIN SUBQ) Inject 20 Units into the skin nightly.    linaGLIPtin (TRADJENTA) 5 mg Tab tablet Take 5 mg by mouth once daily.    lisinopriL (PRINIVIL,ZESTRIL) 5 MG tablet Take 1 tablet (5 mg total) by mouth once daily.    magnesium oxide 200 mg magnesium Tab Take 100 mg by mouth once daily.    metFORMIN (GLUCOPHAGE) 1000 MG tablet Take 1,000 mg by mouth 2 (two) times daily with meals.     naproxen sodium (ANAPROX) 550 MG tablet Take 1 tablet (550 mg total) by mouth 2 (two) times daily with meals.    nebivoloL (BYSTOLIC) 2.5 MG Tab Take 1 tablet (2.5 mg total) by mouth 2 (two) times a day.    nortriptyline (PAMELOR) 50 MG capsule Take 2 capsules (100 mg total) by mouth every evening.    pantoprazole (PROTONIX) 40 MG tablet Take 40 mg by mouth once daily.     tiZANidine (ZANAFLEX) 4 MG tablet Take 1 tablet (4 mg total) by mouth every 6 (six) hours as needed.     No current facility-administered medications for this visit.      Facility-Administered Medications Ordered in Other Visits   Medication    0.9%  NaCl infusion       REVIEW OF SYSTEMS:  GENERAL: No weight loss, malaise or fevers.  HEENT: No recent changes in vision or hearing  NECK: Negative for lumps, no difficulty with swallowing.  RESPIRATORY: Negative for cough, wheezing or shortness of  "breath, patient denies any recent URI.  CARDIOVASCULAR: Negative for chest pain, leg swelling or palpitations.  GI: Negative for abdominal discomfort, blood in stools or black stools or change in bowel habits.  MUSCULOSKELETAL: See HPI.  SKIN: Negative for lesions, rash, and itching.  PSYCH: No mood disorder or recent psychosocial stressors. Patients sleep is not disturbed secondary to pain.  HEMATOLOGY/LYMPHOLOGY: Negative for prolonged bleeding, bruising easily or swollen nodes. Patient is not currently taking any anti-coagulants  NEURO: No history of headaches, syncope, paralysis, seizures or tremors.    All other reviewed and negative other than HPI.    OBJECTIVE: limited physical exam secondary to telemedicine consultation  GEN: No acute distress; patient is alert and oriented to person, place, and time  Head: Normocephalic and atraumatic.   Eyes: Conjunctivae and EOM are normal.   Cardiovascular: Unable to assess  Pulmonary/Chest: Effort normal. No respiratory distress.   Neurological: She is alert and oriented to person, place, and time.   Skin: Skin is dry. She is not diaphoretic.   Psychiatric: Mood, memory, affect and judgment normal.    ASSESSMENT: Nicole Harris is a 50 year old female with PMH significant for carpal tunnel syndrome, DM (not on insulin), fibromyalgia (self-reported), depression, and significant deconditioning presents as a telemedicine visit for the continued management of back pain. She cancelled her previous SILVIA that was scheduled in July. She reports continued neck and back pain that is "the same no matter what I do". The patient reports that she is not doing any physical activity due to having heart surgery in . We discussed that she needs to be participating in some type of physical therapy or cardiac rehab program to improve her overall health. We discussed that since her neck pain is worse than her back pain, we will proceed with the followin. Myofascial pain syndrome  " tiZANidine (ZANAFLEX) 4 MG tablet    naproxen sodium (ANAPROX) 550 MG tablet   2. DDD (degenerative disc disease), lumbar  tiZANidine (ZANAFLEX) 4 MG tablet    naproxen sodium (ANAPROX) 550 MG tablet   3. Degenerative disc disease, cervical  tiZANidine (ZANAFLEX) 4 MG tablet    naproxen sodium (ANAPROX) 550 MG tablet   4. Lumbar spondylosis  tiZANidine (ZANAFLEX) 4 MG tablet    naproxen sodium (ANAPROX) 550 MG tablet   5. Cervical radiculopathy  tiZANidine (ZANAFLEX) 4 MG tablet    naproxen sodium (ANAPROX) 550 MG tablet       PLAN:  1. Schedule Cervical SILVIA C7 - T1 as she reports that this has helped in the past  2. Stop Flexeril and start tizanidine 4 mg PO every 6 hours as needed for myofascial pain  3. Refill Naproxen 550 mg PO BID   4. I believe her low back and neck pain maybe due to facet arthropathy and have recommended lumbar and cervical medial branch blocks as a diagnostic procedure. Patient to consider.  5. Pt is not participating in Physical Therapy. I have stressed the importance of physical activity and a home exercise plan to help with chronic pain and improve health. Discussed her sedentary lifestyle has a major affect on her health and if she is debilitated and weak, it will increase her spinal pain and problems will progress quicker.   6. I discussed with the patient potential secondary side effects of medication prescribed and urged the patient to read all FDA approved materials that the pharmacy provides with the prescription.      The above plan and management options were discussed at length with patient. Patient is in agreement with the above and verbalized understanding. It will be communicated with the referring physician via electronic record, fax, or mail.    Isadora Martin  08/28/2020

## 2020-08-31 ENCOUNTER — TELEPHONE (OUTPATIENT)
Dept: PAIN MEDICINE | Facility: CLINIC | Age: 50
End: 2020-08-31

## 2020-08-31 NOTE — TELEPHONE ENCOUNTER
Pt calling to scheduled procedure. Pt states she is having a problem with her insurance and was told she no longer has medicaid and is without insurance. Told pt to call the financial  Ms. Duque to see if she can get back on the financial assistance. Pt states she has her number and will call her. Instructed pt to call me back as soon as she is approved for FA and we can get her scheduled. Pt verbalized understanding.

## 2020-08-31 NOTE — TELEPHONE ENCOUNTER
----- Message from Joann Barfield sent at 8/31/2020  2:58 PM CDT -----  Type: Needs Medical Advice  Who Called:  Patient  Best Call Back Number: [phone] (home)   Additional Information: the patient said that she needs to be seen asap and she is in a lots of pain and would like a call back today before you leave the office

## 2020-09-01 ENCOUNTER — TELEPHONE (OUTPATIENT)
Dept: PAIN MEDICINE | Facility: CLINIC | Age: 50
End: 2020-09-01

## 2020-09-01 ENCOUNTER — HOSPITAL ENCOUNTER (EMERGENCY)
Facility: HOSPITAL | Age: 50
Discharge: HOME | End: 2020-09-01
Attending: FAMILY MEDICINE
Payer: MEDICAID

## 2020-09-01 VITALS
HEART RATE: 96 BPM | WEIGHT: 190 LBS | DIASTOLIC BLOOD PRESSURE: 75 MMHG | HEIGHT: 64 IN | SYSTOLIC BLOOD PRESSURE: 120 MMHG | TEMPERATURE: 99 F | BODY MASS INDEX: 32.44 KG/M2 | RESPIRATION RATE: 14 BRPM | OXYGEN SATURATION: 99 %

## 2020-09-01 DIAGNOSIS — M50.30 DDD (DEGENERATIVE DISC DISEASE), CERVICAL: Primary | ICD-10-CM

## 2020-09-01 DIAGNOSIS — Z01.818 PRE-OP TESTING: ICD-10-CM

## 2020-09-01 DIAGNOSIS — K21.00 GASTROESOPHAGEAL REFLUX DISEASE WITH ESOPHAGITIS: Primary | ICD-10-CM

## 2020-09-01 DIAGNOSIS — R07.9 CHEST PAIN: ICD-10-CM

## 2020-09-01 LAB
ALBUMIN SERPL BCP-MCNC: 4.1 G/DL (ref 3.5–5.2)
ALP SERPL-CCNC: 72 U/L (ref 55–135)
ALT SERPL W/O P-5'-P-CCNC: 25 U/L (ref 10–44)
ANION GAP SERPL CALC-SCNC: 12 MMOL/L (ref 8–16)
AST SERPL-CCNC: 20 U/L (ref 10–40)
BASOPHILS # BLD AUTO: 0.06 K/UL (ref 0–0.2)
BASOPHILS NFR BLD: 0.6 % (ref 0–1.9)
BILIRUB SERPL-MCNC: 0.5 MG/DL (ref 0.1–1)
BUN SERPL-MCNC: 14 MG/DL (ref 6–20)
CALCIUM SERPL-MCNC: 8.7 MG/DL (ref 8.7–10.5)
CHLORIDE SERPL-SCNC: 100 MMOL/L (ref 95–110)
CO2 SERPL-SCNC: 23 MMOL/L (ref 23–29)
CREAT SERPL-MCNC: 0.9 MG/DL (ref 0.5–1.4)
DIFFERENTIAL METHOD: ABNORMAL
EOSINOPHIL # BLD AUTO: 0.1 K/UL (ref 0–0.5)
EOSINOPHIL NFR BLD: 1.1 % (ref 0–8)
ERYTHROCYTE [DISTWIDTH] IN BLOOD BY AUTOMATED COUNT: 13.6 % (ref 11.5–14.5)
EST. GFR  (AFRICAN AMERICAN): >60 ML/MIN/1.73 M^2
EST. GFR  (NON AFRICAN AMERICAN): >60 ML/MIN/1.73 M^2
GLUCOSE SERPL-MCNC: 292 MG/DL (ref 70–110)
HCT VFR BLD AUTO: 36.8 % (ref 37–48.5)
HGB BLD-MCNC: 11.9 G/DL (ref 12–16)
IMM GRANULOCYTES # BLD AUTO: 0.07 K/UL (ref 0–0.04)
IMM GRANULOCYTES NFR BLD AUTO: 0.7 % (ref 0–0.5)
LYMPHOCYTES # BLD AUTO: 2.2 K/UL (ref 1–4.8)
LYMPHOCYTES NFR BLD: 23.3 % (ref 18–48)
MCH RBC QN AUTO: 28.1 PG (ref 27–31)
MCHC RBC AUTO-ENTMCNC: 32.3 G/DL (ref 32–36)
MCV RBC AUTO: 87 FL (ref 82–98)
MONOCYTES # BLD AUTO: 0.6 K/UL (ref 0.3–1)
MONOCYTES NFR BLD: 6 % (ref 4–15)
NEUTROPHILS # BLD AUTO: 6.5 K/UL (ref 1.8–7.7)
NEUTROPHILS NFR BLD: 68.3 % (ref 38–73)
NRBC BLD-RTO: 0 /100 WBC
PLATELET # BLD AUTO: 394 K/UL (ref 150–350)
PMV BLD AUTO: 10 FL (ref 9.2–12.9)
POCT GLUCOSE: 285 MG/DL (ref 70–110)
POTASSIUM SERPL-SCNC: 4.2 MMOL/L (ref 3.5–5.1)
PROT SERPL-MCNC: 7.5 G/DL (ref 6–8.4)
RBC # BLD AUTO: 4.23 M/UL (ref 4–5.4)
SODIUM SERPL-SCNC: 135 MMOL/L (ref 136–145)
TROPONIN I SERPL DL<=0.01 NG/ML-MCNC: <0.01 NG/ML (ref 0.02–0.5)
WBC # BLD AUTO: 9.46 K/UL (ref 3.9–12.7)

## 2020-09-01 PROCEDURE — 80053 COMPREHEN METABOLIC PANEL: CPT

## 2020-09-01 PROCEDURE — 99285 EMERGENCY DEPT VISIT HI MDM: CPT | Mod: 25

## 2020-09-01 PROCEDURE — 25000003 PHARM REV CODE 250: Performed by: FAMILY MEDICINE

## 2020-09-01 PROCEDURE — 84484 ASSAY OF TROPONIN QUANT: CPT

## 2020-09-01 PROCEDURE — 93005 ELECTROCARDIOGRAM TRACING: CPT

## 2020-09-01 PROCEDURE — 82962 GLUCOSE BLOOD TEST: CPT

## 2020-09-01 PROCEDURE — 71045 XR CHEST AP PORTABLE: ICD-10-PCS | Mod: 26,,, | Performed by: RADIOLOGY

## 2020-09-01 PROCEDURE — 85025 COMPLETE CBC W/AUTO DIFF WBC: CPT

## 2020-09-01 PROCEDURE — 36000 PLACE NEEDLE IN VEIN: CPT

## 2020-09-01 PROCEDURE — 71045 X-RAY EXAM CHEST 1 VIEW: CPT | Mod: 26,,, | Performed by: RADIOLOGY

## 2020-09-01 PROCEDURE — 71045 X-RAY EXAM CHEST 1 VIEW: CPT | Mod: TC,FY

## 2020-09-01 RX ADMIN — LIDOCAINE HYDROCHLORIDE: 20 SOLUTION ORAL; TOPICAL at 02:09

## 2020-09-01 NOTE — ED PROVIDER NOTES
Encounter Date: 2020       History     Chief Complaint   Patient presents with    Chest Pain    Jaw Pain     50-year-old female presents to the ED complaining of dull substernal chest discomfort there is nose associated tightness or neck pain patient is 3 months status post CABG for coronary artery disease she has a history of anxiety bipolar disorder depression diabetes, she states that she did eat a meal with red sauce yesterday this usually does not set me off,        Review of patient's allergies indicates:   Allergen Reactions    Hydrocodone Nausea And Vomiting     Past Medical History:   Diagnosis Date    Anxiety     Bipolar disorder     Depression     Diabetes mellitus, type 2     Fibromyalgia     HTN (hypertension)     Insomnia     Migraine      Past Surgical History:   Procedure Laterality Date    ANGIOGRAM, CORONARY, WITH LEFT HEART CATHETERIZATION Left 2020    Procedure: Left heart cath;  Surgeon: Kannan Santana MD;  Location: [facility] CATH/EP LAB;  Service: Cardiology;  Laterality: Left;    CARDIAC CATHETERIZATION       SECTION      COLONOSCOPY N/A 2019    Procedure: COLONOSCOPY;  Surgeon: Da Diallo MD;  Location: [facility] ENDO;  Service: General;  Laterality: N/A;    CORONARY ARTERY BYPASS GRAFT (CABG) N/A 2020    Procedure: CORONARY ARTERY BYPASS GRAFT (CABG);  Surgeon: Anshul Oakes MD;  Location: [facility] OR;  Service: Cardiothoracic;  Laterality: N/A;    ENDOSCOPIC HARVEST OF VEIN Left 2020    Procedure: SURGICAL PROCUREMENT, VEIN, ENDOSCOPIC;  Surgeon: Anshul Oakes MD;  Location: [facility] OR;  Service: Cardiothoracic;  Laterality: Left;    EPIDURAL STEROID INJECTION N/A 2019    Procedure: Injection, Steroid, Epidural - L4/5 EPIDURAL STEROID INJECTION;  Surgeon: Sherri Gardiner MD;  Location: [facility] OR;  Service: Pain Management;  Laterality: N/A;    EPIDURAL STEROID INJECTION N/A 2019    Procedure: Injection, Steroid, Epidural -  C7/T1 EPIDURAL STEROID INJECTION;  Surgeon: Sherri Gardiner MD;  Location: [facility] OR;  Service: Pain Management;  Laterality: N/A;    EPIDURAL STEROID INJECTION N/A 2019    Procedure: Injection, Steroid, Epidural - L4/5 EPIDURAL STEROID INJECTION;  Surgeon: Sherri Gardiner MD;  Location: [facility] OR;  Service: Pain Management;  Laterality: N/A;    EPIDURAL STEROID INJECTION N/A 10/21/2019    Procedure: Injection, Steroid, Epidural, CERVICAL C7/T1 SILVIA;  Surgeon: Sherri Gardiner MD;  Location: [facility] OR;  Service: Pain Management;  Laterality: N/A;  19    ESOPHAGOGASTRODUODENOSCOPY N/A 2019    Procedure: ESOPHAGOGASTRODUODENOSCOPY (EGD);  Surgeon: Da Diallo MD;  Location: [facility] ENDO;  Service: General;  Laterality: N/A;    TONSILLECTOMY      TRIGGER POINT INJECTION N/A 2019    Procedure: INJECTION, TRIGGER POINT - CERVICAL REGION;  Surgeon: Sherri Gardiner MD;  Location: [facility] OR;  Service: Pain Management;  Laterality: N/A;    TRIGGER POINT INJECTION N/A 10/21/2019    Procedure: INJECTION, TRIGGER POINT;  Surgeon: Sherri Gardiner MD;  Location: [facility] OR;  Service: Pain Management;  Laterality: N/A;    triple bypass       Family History   Problem Relation Age of Onset    Lung cancer Mother     Breast cancer Neg Hx      Social History     Tobacco Use    Smoking status: Former Smoker     Packs/day: 1.00     Years: 0.00     Pack years: 0.00     Quit date: 2012     Years since quittin.9    Smokeless tobacco: Never Used   Substance Use Topics    Alcohol use: Yes     Frequency: Monthly or less     Comment: occasional    Drug use: Yes     Types: Marijuana, Other-see comments     Review of Systems   Constitutional: Negative for fever.   HENT: Negative for sore throat.    Respiratory: Negative for shortness of breath.    Cardiovascular: Negative for chest pain.   Gastrointestinal: Negative for nausea.   Genitourinary: Negative for dysuria.   Musculoskeletal: Negative for back pain.    Skin: Negative for rash.   Neurological: Negative for weakness.   Hematological: Does not bruise/bleed easily.       Physical Exam     Initial Vitals [09/01/20 1341]   BP Pulse Resp Temp SpO2   (!) 140/80 104 18 98.6 °F (37 °C) 97 %      MAP       --         Physical Exam    Nursing note and vitals reviewed.  Constitutional: She appears well-developed and well-nourished. She is not diaphoretic. No distress.   HENT:   Head: Normocephalic and atraumatic.   Right Ear: External ear normal.   Left Ear: External ear normal.   Eyes: Pupils are equal, round, and reactive to light. Right eye exhibits no discharge. Left eye exhibits no discharge.   Neck: No tracheal deviation present. No JVD present.   Cardiovascular: Exam reveals no friction rub.    No murmur heard.  Pulmonary/Chest: No stridor. No respiratory distress. She has no wheezes. She has no rales.   Abdominal: Bowel sounds are normal. She exhibits no distension.   Musculoskeletal: Normal range of motion.   Neurological: She is alert.   Skin: Skin is warm.   Psychiatric: She has a normal mood and affect.         ED Course   Procedures  Labs Reviewed   CBC W/ AUTO DIFFERENTIAL - Abnormal; Notable for the following components:       Result Value    Hemoglobin 11.9 (*)     Hematocrit 36.8 (*)     Platelets 394 (*)     Immature Granulocytes 0.7 (*)     Immature Grans (Abs) 0.07 (*)     All other components within normal limits   COMPREHENSIVE METABOLIC PANEL - Abnormal; Notable for the following components:    Sodium 135 (*)     Glucose 292 (*)     All other components within normal limits   TROPONIN I - Abnormal; Notable for the following components:    Troponin I <0.01 (*)     All other components within normal limits   POCT GLUCOSE - Abnormal; Notable for the following components:    POCT Glucose 285 (*)     All other components within normal limits     EKG Readings: (Independently Interpreted)   Initial Reading: No STEMI. Previous EKG: Compared with most recent  EKG Previous EKG Date: july 2020. Rhythm: Normal Sinus Rhythm. Heart Rate: 98. Ectopy: No Ectopy. Conduction: Normal. ST Segments: Non-Specific ST Segment Depression. ST Segment Depression: V3, V4, V2 and V5. T Waves: Normal.       Imaging Results          X-Ray Chest AP Portable (In process)                                                  Clinical Impression:       ICD-10-CM ICD-9-CM   1. Gastroesophageal reflux disease with esophagitis  K21.0 530.11   2. Chest pain  R07.9 786.50                                Van Hernandez MD  09/01/20 1611       Van Hernandez MD  09/10/20 0554

## 2020-09-01 NOTE — TELEPHONE ENCOUNTER
Pt states her medicaid is active. Scheduled ppts procedure for 910/1 and COVID test for 9/28. Pt verbalized undertsanding

## 2020-09-01 NOTE — TELEPHONE ENCOUNTER
----- Message from Lorraine Ashford MA sent at 9/1/2020 11:21 AM CDT -----  Regarding: Call back  PT is requesting a call back. The nature of the call was not stated   Best Call Back # [phone]

## 2020-09-08 ENCOUNTER — OFFICE VISIT (OUTPATIENT)
Dept: VASCULAR SURGERY | Facility: CLINIC | Age: 50
End: 2020-09-08
Payer: MEDICAID

## 2020-09-08 VITALS
HEIGHT: 64 IN | HEART RATE: 93 BPM | SYSTOLIC BLOOD PRESSURE: 123 MMHG | WEIGHT: 196 LBS | BODY MASS INDEX: 33.46 KG/M2 | DIASTOLIC BLOOD PRESSURE: 85 MMHG

## 2020-09-08 DIAGNOSIS — Z95.1 S/P CABG X 3: Primary | ICD-10-CM

## 2020-09-08 PROCEDURE — 99024 PR POST-OP FOLLOW-UP VISIT: ICD-10-PCS | Mod: ,,, | Performed by: THORACIC SURGERY (CARDIOTHORACIC VASCULAR SURGERY)

## 2020-09-08 PROCEDURE — 99024 POSTOP FOLLOW-UP VISIT: CPT | Mod: ,,, | Performed by: THORACIC SURGERY (CARDIOTHORACIC VASCULAR SURGERY)

## 2020-09-08 PROCEDURE — 99999 PR PBB SHADOW E&M-EST. PATIENT-LVL IV: CPT | Mod: PBBFAC,,, | Performed by: THORACIC SURGERY (CARDIOTHORACIC VASCULAR SURGERY)

## 2020-09-08 PROCEDURE — 99214 OFFICE O/P EST MOD 30 MIN: CPT | Mod: PBBFAC,PO | Performed by: THORACIC SURGERY (CARDIOTHORACIC VASCULAR SURGERY)

## 2020-09-08 PROCEDURE — 99999 PR PBB SHADOW E&M-EST. PATIENT-LVL IV: ICD-10-PCS | Mod: PBBFAC,,, | Performed by: THORACIC SURGERY (CARDIOTHORACIC VASCULAR SURGERY)

## 2020-09-08 RX ORDER — FLUOCINONIDE TOPICAL SOLUTION USP, 0.05% 0.5 MG/ML
SOLUTION TOPICAL
COMMUNITY
Start: 2020-09-05 | End: 2022-10-11

## 2020-09-08 NOTE — PROGRESS NOTES
"Subjective:       Nicole Harris presents to the clinic after undergoing coronary artery bypass x3 on 06/26/2020.  She has changed her medications and is feeling better.  She has less fatigue and less shortness of breath.  She has no angina.       Objective:      /85 (BP Location: Left arm, Patient Position: Sitting)   Pulse 93   Ht 5' 4" (1.626 m)   Wt 88.9 kg (195 lb 15.8 oz)   BMI 33.64 kg/m²     Objective  General:  Patient looks comfortable.  Chest:  Sternum is stable.  Sternotomy incisions are healed.  Lungs:  Clear bilaterally with good full breath sounds.  Heart:  Regular rate and rhythm.  No murmurs or rubs.  Extremities:  No edema       Assessment:      Doing well postoperatively.      Plan:      1. Continue any current medications.  2. Wound care discussed.  3. Pt is to increase activities as tolerated.  She may begin lifting more than 15 lb in 2-3 weeks.  4. Follow up:  As needed      "

## 2020-09-11 ENCOUNTER — TELEPHONE (OUTPATIENT)
Dept: PAIN MEDICINE | Facility: CLINIC | Age: 50
End: 2020-09-11

## 2020-09-11 NOTE — TELEPHONE ENCOUNTER
Informed pt that we may be able to open up some sooner procedure spots so I wanted to let her know not to plan anything the week of 9/24 just incase we are able to move her up. I will call her as soon as I know more. Pt verbalized understanding.

## 2020-09-11 NOTE — TELEPHONE ENCOUNTER
----- Message from Franchesca Fernando MA sent at 9/11/2020  4:44 PM CDT -----  Type:  Patient Returning Call    Who Called:  Nicole  Who Left Message for Patient:  Malou  Does the patient know what this is regarding?:  epidural  Best Call Back Number:  [phone]  Additional Information:

## 2020-09-21 ENCOUNTER — TELEPHONE (OUTPATIENT)
Dept: PAIN MEDICINE | Facility: CLINIC | Age: 50
End: 2020-09-21

## 2020-09-21 ENCOUNTER — PATIENT MESSAGE (OUTPATIENT)
Dept: SURGERY | Facility: HOSPITAL | Age: 50
End: 2020-09-21

## 2020-09-21 NOTE — TELEPHONE ENCOUNTER
Explained to pt when I spoke with her I told her I'd call her if we had some sooner spots on 9/24. We do not as of now. Will still be on for next Thursday 10/1. Pt verbalized understanding.

## 2020-09-28 ENCOUNTER — TELEPHONE (OUTPATIENT)
Dept: PAIN MEDICINE | Facility: CLINIC | Age: 50
End: 2020-09-28

## 2020-09-28 ENCOUNTER — LAB VISIT (OUTPATIENT)
Dept: FAMILY MEDICINE | Facility: CLINIC | Age: 50
End: 2020-09-28
Payer: MEDICAID

## 2020-09-28 DIAGNOSIS — Z01.818 PRE-OP TESTING: ICD-10-CM

## 2020-09-28 LAB — SARS-COV-2 RNA RESP QL NAA+PROBE: NOT DETECTED

## 2020-09-28 PROCEDURE — U0003 INFECTIOUS AGENT DETECTION BY NUCLEIC ACID (DNA OR RNA); SEVERE ACUTE RESPIRATORY SYNDROME CORONAVIRUS 2 (SARS-COV-2) (CORONAVIRUS DISEASE [COVID-19]), AMPLIFIED PROBE TECHNIQUE, MAKING USE OF HIGH THROUGHPUT TECHNOLOGIES AS DESCRIBED BY CMS-2020-01-R: HCPCS

## 2020-09-28 NOTE — TELEPHONE ENCOUNTER
----- Message from Ruslan Multani sent at 9/28/2020 10:07 AM CDT -----  Regarding: injection  Type: Needs Medical Advice    Who Called:  pt    Best Call Back Number: [phone]   Additional Information: pt pulled lower back and is looking for injection. Please call to discuss.

## 2020-09-28 NOTE — TELEPHONE ENCOUNTER
"Pt states she hurt her back this weekend. She heard a "crack". Informed pt she has an injection scheduled for Thursday and Dr. José does not have any openings until Wednesday. If the pain is severe she needs to proceed to ER. She may need further imaging. Pt verbalized understanding.   "

## 2020-10-01 ENCOUNTER — HOSPITAL ENCOUNTER (OUTPATIENT)
Facility: HOSPITAL | Age: 50
Discharge: HOME | End: 2020-10-01
Attending: ANESTHESIOLOGY | Admitting: ANESTHESIOLOGY
Payer: MEDICAID

## 2020-10-01 VITALS
TEMPERATURE: 98 F | HEART RATE: 96 BPM | DIASTOLIC BLOOD PRESSURE: 66 MMHG | OXYGEN SATURATION: 100 % | WEIGHT: 200 LBS | HEIGHT: 64 IN | SYSTOLIC BLOOD PRESSURE: 106 MMHG | RESPIRATION RATE: 16 BRPM | BODY MASS INDEX: 34.15 KG/M2

## 2020-10-01 DIAGNOSIS — M50.30 DDD (DEGENERATIVE DISC DISEASE), CERVICAL: Primary | ICD-10-CM

## 2020-10-01 DIAGNOSIS — M50.30 DEGENERATIVE DISC DISEASE, CERVICAL: ICD-10-CM

## 2020-10-01 PROCEDURE — 25500020 PHARM REV CODE 255: Performed by: ANESTHESIOLOGY

## 2020-10-01 PROCEDURE — 20553 NJX 1/MLT TRIGGER POINTS 3/>: CPT | Mod: 59,,, | Performed by: ANESTHESIOLOGY

## 2020-10-01 PROCEDURE — 25000003 PHARM REV CODE 250: Performed by: ANESTHESIOLOGY

## 2020-10-01 PROCEDURE — 20553 PR INJECT TRIGGER POINTS, > 3: ICD-10-PCS | Mod: 59,,, | Performed by: ANESTHESIOLOGY

## 2020-10-01 PROCEDURE — 62321 PR INJ CERV/THORAC, W/GUIDANCE: ICD-10-PCS | Mod: ,,, | Performed by: ANESTHESIOLOGY

## 2020-10-01 PROCEDURE — 62321 NJX INTERLAMINAR CRV/THRC: CPT | Performed by: ANESTHESIOLOGY

## 2020-10-01 PROCEDURE — 63600175 PHARM REV CODE 636 W HCPCS: Performed by: ANESTHESIOLOGY

## 2020-10-01 PROCEDURE — 62321 NJX INTERLAMINAR CRV/THRC: CPT | Mod: ,,, | Performed by: ANESTHESIOLOGY

## 2020-10-01 RX ORDER — METHYLPREDNISOLONE ACETATE 40 MG/ML
INJECTION, SUSPENSION INTRA-ARTICULAR; INTRALESIONAL; INTRAMUSCULAR; SOFT TISSUE
Status: DISCONTINUED
Start: 2020-10-01 | End: 2020-10-01 | Stop reason: HOSPADM

## 2020-10-01 RX ORDER — METHYLPREDNISOLONE ACETATE 80 MG/ML
INJECTION, SUSPENSION INTRA-ARTICULAR; INTRALESIONAL; INTRAMUSCULAR; SOFT TISSUE
Status: DISCONTINUED | OUTPATIENT
Start: 2020-10-01 | End: 2020-10-01 | Stop reason: HOSPADM

## 2020-10-01 RX ORDER — FENTANYL CITRATE 50 UG/ML
INJECTION, SOLUTION INTRAMUSCULAR; INTRAVENOUS
Status: DISCONTINUED | OUTPATIENT
Start: 2020-10-01 | End: 2020-10-01 | Stop reason: HOSPADM

## 2020-10-01 RX ORDER — SODIUM CHLORIDE, SODIUM LACTATE, POTASSIUM CHLORIDE, CALCIUM CHLORIDE 600; 310; 30; 20 MG/100ML; MG/100ML; MG/100ML; MG/100ML
INJECTION, SOLUTION INTRAVENOUS CONTINUOUS
Status: DISCONTINUED | OUTPATIENT
Start: 2020-10-01 | End: 2020-10-01 | Stop reason: HOSPADM

## 2020-10-01 RX ORDER — LIDOCAINE HYDROCHLORIDE 10 MG/ML
INJECTION INFILTRATION; PERINEURAL
Status: DISCONTINUED | OUTPATIENT
Start: 2020-10-01 | End: 2020-10-01 | Stop reason: HOSPADM

## 2020-10-01 RX ORDER — MIDAZOLAM HYDROCHLORIDE 5 MG/ML
INJECTION INTRAMUSCULAR; INTRAVENOUS
Status: DISCONTINUED | OUTPATIENT
Start: 2020-10-01 | End: 2020-10-01 | Stop reason: HOSPADM

## 2020-10-01 NOTE — PLAN OF CARE
PACU monitors removed. Personal items returned. Changing clothing at bedside. Denies need for assistance. Phoned pts ride to update on POC.

## 2020-10-01 NOTE — PLAN OF CARE
To PACU via stretcher. Connected to monitors-alarms on. IV site intact-no redness or signs of infiltration observed.  IVF infusing via gravity. VSS. Will monitor.

## 2020-10-01 NOTE — OP NOTE
PROCEDURE DATE: 10/1/2020    PROCEDURE: C7-T1 cervical interlaminar epidural steroid injection under utilizing fluoroscopy.    DIAGNOSIS: Cervical Degenerative Disc Diease; Cervical Radiculitis  POSTOP DIAGNOSIS: SAME    PHYSICIAN: Emily José MD    MEDICATIONS INJECTED:  Depo-medrol 80 mg followed by a slow injection of 2 mL sterile, preservative-free normal saline.    LOCAL ANESTHETIC USED: Lidocaine 1%, 3 ml.    SEDATION MEDICATIONS: RN IV sedation    COMPLICATIONS:  none    ESTIMATED BLOOD LOSS: none    TECHNIQUE:  A time-out was taken to identify patient and procedure prior to starting the procedure.  With the patient laying in a prone position with the neck in a mid-flexed forward position, the area was prepped and draped in the usual sterile fashion using ChloraPrep and a fenestrated drape.  The area was determined under AP fluoroscopic guidance.  Local anesthetic was given using a 25-gauge 1.5 inch needle by raising a wheal and then infiltrating ventrally.  A 3.5 inch 18-gauge Touhy needle was introduced under fluoroscopic guidance to meet the lamina of C7.  The needle was then hinged under the lamina then advanced using loss of resistance technique.  Once the tip of the needle was in the desired position, the 2 mL contrast dye Omnipaque was injected to determine placement and no uptake.  The steroid was then injected slowly followed by a slow injection of 2 mL of the sterile preservative-free normal saline.  The patient tolerated the procedure well.    Trigger point injection   Pre-procedure diagnosis: Myofascial trigger points in lumbar and thoracic paraspinous muscles  Post-procedure diagnosis: Same    Timeout performed.  Upon examination, 8 trigger points were noted in the above noted regions.     After the procedure was described and informed consent obtained, the skin over the trigger points was cleaned with isopropyl alcohol. Using a 25-gauge needle, injection of 1ml of 1% lidocaine and 5 mg of  methylprednisone was injected into each trigger point. The patient noted concordant radiating pain upon injection of her trigger points. The skin was then cleaned and bandages were applied to sites as necessary. Blood loss was <2ml. We observed the patient for 10 minutes after the procedure for any complications, and as there were none noted, the patient was then discharged home with instructions. We advised the patient that during the duration of the local anesthetic effect, this would be the optimal time to perform stretching exercises for the muscles which contain the trigger points. We also advised the patient to continue these exercises daily as this would likely give the best chance of resolution of the trigger points.    The patient was monitored after the procedure and was given post-procedure and discharge instructions to follow at home. The patient was discharged in a stable condition.

## 2020-10-01 NOTE — DISCHARGE SUMMARY
[facility]  Discharge Note  Short Stay    Admit Date: 10/1/2020    Discharge Date and Time: 10/1/2020    Attending Physician: Emily José MD     Discharge Provider: Emily José    Diagnoses:  Active Hospital Problems    Diagnosis  POA    *Degenerative disc disease, cervical [M50.30]  Yes      Resolved Hospital Problems   No resolved problems to display.       Hospital Course: Cervical interlaminar epidural steroid injection & trigger point injections     Discharged Condition: Good    Final Diagnoses:   Active Hospital Problems    Diagnosis  POA    *Degenerative disc disease, cervical [M50.30]  Yes      Resolved Hospital Problems   No resolved problems to display.       Disposition: Home or Self Care    Follow up/Patient Instructions:    Medications:  Reconciled Home Medications:      Medication List      CONTINUE taking these medications    albuterol 90 mcg/actuation inhaler  Commonly known as: PROVENTIL/VENTOLIN HFA  Inhale 1 puff into the lungs every 4 (four) hours as needed.     aspirin 325 MG tablet  Take 1 tablet (325 mg total) by mouth once daily.     escitalopram oxalate 20 MG tablet  Commonly known as: LEXAPRO  Take 20 mg by mouth once daily.     ferrous sulfate 325 mg (65 mg iron) Tab tablet  Commonly known as: FEOSOL  Take 1 tablet (325 mg total) by mouth once daily.     fluocinonide 0.05 % external solution  Commonly known as: LIDEX  APPLY SOLUTION TOPICALLY TO AFFECTED AREA TWICE DAILY AS NEEDED FOR SCALP RASH     fluticasone propionate 50 mcg/actuation nasal spray  Commonly known as: FLONASE  2 sprays by Nasal route.     LANTUS SOLOSTAR U-100 INSULIN SUBQ  Inject 20 Units into the skin nightly.     lisinopriL 5 MG tablet  Commonly known as: PRINIVIL,ZESTRIL  Take 1 tablet (5 mg total) by mouth once daily.     magnesium oxide 200 mg magnesium Tab  Take 100 mg by mouth once daily.     metFORMIN 1000 MG tablet  Commonly known as: GLUCOPHAGE  Take 1,000 mg by mouth 2 (two) times daily  with meals.     naproxen sodium 550 MG tablet  Commonly known as: ANAPROX  Take 1 tablet (550 mg total) by mouth 2 (two) times daily with meals.     nebivoloL 2.5 MG Tab  Commonly known as: BYSTOLIC  Take 1 tablet (2.5 mg total) by mouth 2 (two) times a day.     nortriptyline 50 MG capsule  Commonly known as: PAMELOR  Take 2 capsules (100 mg total) by mouth every evening.     PROTONIX 40 MG tablet  Generic drug: pantoprazole  Take 40 mg by mouth once daily.     tiZANidine 4 MG tablet  Commonly known as: ZANAFLEX  Take 1 tablet (4 mg total) by mouth every 6 (six) hours as needed.     TRADJENTA 5 mg Tab tablet  Generic drug: linaGLIPtin  Take 5 mg by mouth once daily.          Discharge Procedure Orders   Diet general     Call MD for:  temperature >100.4     Call MD for:  persistent nausea and vomiting     Call MD for:  severe uncontrolled pain     Call MD for:  difficulty breathing, headache or visual disturbances     Call MD for:  redness, tenderness, or signs of infection (pain, swelling, redness, odor or green/yellow discharge around incision site)     Call MD for:  hives     Call MD for:  persistent dizziness or light-headedness     Call MD for:  extreme fatigue        Follow up with MD in 2-3 weeks    Discharge Procedure Orders (must include Diet, Follow-up, Activity):   Discharge Procedure Orders (must include Diet, Follow-up, Activity)   Diet general     Call MD for:  temperature >100.4     Call MD for:  persistent nausea and vomiting     Call MD for:  severe uncontrolled pain     Call MD for:  difficulty breathing, headache or visual disturbances     Call MD for:  redness, tenderness, or signs of infection (pain, swelling, redness, odor or green/yellow discharge around incision site)     Call MD for:  hives     Call MD for:  persistent dizziness or light-headedness     Call MD for:  extreme fatigue

## 2020-10-01 NOTE — PLAN OF CARE
"Has met unit/department guidelines for discharge from each phase of the post procedure continuum. Leaving floor per w/c. AAO x3. Resp even and unlabored room air. No distress noted. C/o "sore neck". No other needs/complaints voiced. Denies need for intervention. All personal belongings returned to pt.  "

## 2020-10-01 NOTE — H&P
"FOLLOW UP NOTE:     CHIEF COMPLAINT: neck and low back pain    INTERVAL HISTORY OF PRESENT ILLNESS: Nicole Harris is a 50 y.o. female who presents for C7-T1 cervical interlaminar epidural steroid injection and lumbar trigger point injections. The patient denies of any significant changes in her health since her last appointment. The patient also denies of any changes in the character of her pain since her last appointment.     ROS:  Review of Systems   Constitutional: Negative for chills and fever.   HENT: Negative for sore throat.    Eyes: Negative for visual disturbance.   Respiratory: Negative for shortness of breath.    Cardiovascular: Negative for chest pain.   Gastrointestinal: Negative for nausea and vomiting.   Genitourinary: Negative for difficulty urinating.   Musculoskeletal: Positive for back pain and neck pain.   Skin: Negative for rash.   Allergic/Immunologic: Negative for immunocompromised state.   Neurological: Negative for syncope.   Hematological: Does not bruise/bleed easily.   Psychiatric/Behavioral: Negative for suicidal ideas.        MEDICAL, SURGICAL, FAMILY, SOCIAL HX: reviewed    MEDICATIONS/ALLERGIES: reviewed    PHYSICAL EXAM:    VITALS: Vitals reviewed.   Vitals:    10/01/20 1055   BP: 113/68   Pulse: 87   Resp: 16   SpO2: 99%   Weight: 90.7 kg (200 lb)   Height: 5' 4" (1.626 m)       Physical Exam   Constitutional: She is oriented to person, place, and time and well-developed, well-nourished, and in no distress.   HENT:   Head: Normocephalic and atraumatic.   Eyes: Conjunctivae and EOM are normal. Right eye exhibits no discharge. Left eye exhibits no discharge.   Cardiovascular: Normal rate.   Pulmonary/Chest: Effort normal and breath sounds normal. No respiratory distress.   Abdominal: Soft.   Neurological: She is alert and oriented to person, place, and time.   Skin: Skin is warm and dry. No rash noted. She is not diaphoretic.   Psychiatric: Mood, memory, affect and judgment " normal.   Nursing note and vitals reviewed.       EXTREMITIES:    Gen: No cyanosis, edema, varicosities, or tenderness to palpation BLE   Skin: Warm, pink, dry, no rashes, no lesions BLE   Strength: 5/5 motor strength BLE   ROM: hips, knees and ankles without pain or instability.     NEUROLOGICAL:    Gen: No clonus or spasticity.   Gait: Normal without antalgic lean   DTR's: 2+ in bilateral patellar, and ankle   BABINSKI: Absent bilaterally  Sensory: Intact to light touch and proprioception BLE    ASSESSMENT: Nicole Harris is a 50 y.o. female who presents for C7-T1 cervical interlaminar epidural steroid injection and lumbar trigger point injections.     PLAN:  1. Proceed with C7-T1 cervical interlaminar epidural steroid injection and lumbar trigger point injections as previously discussed.    This patient has been cleared for surgery in an ambulatory surgical facility    ASA 3,  Mallampatti Score 3  No history of anesthetic complications  Plan for RN IV sedation    Emily José MD  Pain Management

## 2020-11-02 ENCOUNTER — TELEPHONE (OUTPATIENT)
Dept: PAIN MEDICINE | Facility: CLINIC | Age: 50
End: 2020-11-02

## 2020-11-02 NOTE — TELEPHONE ENCOUNTER
----- Message from Desiree Topete sent at 11/2/2020 10:44 AM CST -----  Regarding: speak to nurse  Contact: patient  Type: Needs Medical Advice  Who Called:  patient  Symptoms (please be specific):  na  How long has patient had these symptoms:  starr  Pharmacy name and phone #:  [facility]  Best Call Back Number: [phone]  Additional Information: Patient is waiting for nurse to call her to reschedule apt. From Friday.  Please call to advise and schedule.  Thanks!

## 2020-11-03 ENCOUNTER — PATIENT MESSAGE (OUTPATIENT)
Dept: PAIN MEDICINE | Facility: CLINIC | Age: 50
End: 2020-11-03

## 2020-11-03 ENCOUNTER — OFFICE VISIT (OUTPATIENT)
Dept: PAIN MEDICINE | Facility: CLINIC | Age: 50
End: 2020-11-03
Payer: MEDICAID

## 2020-11-03 VITALS
HEART RATE: 98 BPM | BODY MASS INDEX: 33.46 KG/M2 | TEMPERATURE: 98 F | RESPIRATION RATE: 17 BRPM | WEIGHT: 200.81 LBS | SYSTOLIC BLOOD PRESSURE: 146 MMHG | HEIGHT: 65 IN | DIASTOLIC BLOOD PRESSURE: 88 MMHG

## 2020-11-03 DIAGNOSIS — M79.18 MYOFASCIAL PAIN: ICD-10-CM

## 2020-11-03 DIAGNOSIS — M51.36 DDD (DEGENERATIVE DISC DISEASE), LUMBAR: Primary | ICD-10-CM

## 2020-11-03 DIAGNOSIS — M54.12 CERVICAL RADICULOPATHY: ICD-10-CM

## 2020-11-03 DIAGNOSIS — M50.30 DEGENERATIVE DISC DISEASE, CERVICAL: ICD-10-CM

## 2020-11-03 PROCEDURE — 99999 PR PBB SHADOW E&M-EST. PATIENT-LVL IV: ICD-10-PCS | Mod: PBBFAC,,, | Performed by: NURSE PRACTITIONER

## 2020-11-03 PROCEDURE — 20553 NJX 1/MLT TRIGGER POINTS 3/>: CPT | Mod: S$PBB,,, | Performed by: NURSE PRACTITIONER

## 2020-11-03 PROCEDURE — 20553 PR INJECT TRIGGER POINTS, > 3: ICD-10-PCS | Mod: S$PBB,,, | Performed by: NURSE PRACTITIONER

## 2020-11-03 PROCEDURE — 20553 NJX 1/MLT TRIGGER POINTS 3/>: CPT | Mod: PBBFAC,PO | Performed by: NURSE PRACTITIONER

## 2020-11-03 PROCEDURE — 99999 PR PBB SHADOW E&M-EST. PATIENT-LVL IV: CPT | Mod: PBBFAC,,, | Performed by: NURSE PRACTITIONER

## 2020-11-03 PROCEDURE — 99214 OFFICE O/P EST MOD 30 MIN: CPT | Mod: PBBFAC,PO,25 | Performed by: NURSE PRACTITIONER

## 2020-11-03 PROCEDURE — 99214 PR OFFICE/OUTPT VISIT, EST, LEVL IV, 30-39 MIN: ICD-10-PCS | Mod: S$PBB,25,, | Performed by: NURSE PRACTITIONER

## 2020-11-03 PROCEDURE — 99214 OFFICE O/P EST MOD 30 MIN: CPT | Mod: S$PBB,25,, | Performed by: NURSE PRACTITIONER

## 2020-11-03 RX ORDER — METHYLPREDNISOLONE ACETATE 40 MG/ML
40 INJECTION, SUSPENSION INTRA-ARTICULAR; INTRALESIONAL; INTRAMUSCULAR; SOFT TISSUE
Status: COMPLETED | OUTPATIENT
Start: 2020-11-03 | End: 2020-11-03

## 2020-11-03 RX ORDER — PREGABALIN 75 MG/1
75 CAPSULE ORAL 2 TIMES DAILY
Qty: 60 CAPSULE | Refills: 6 | Status: SHIPPED | OUTPATIENT
Start: 2020-11-03 | End: 2021-06-17

## 2020-11-03 RX ADMIN — METHYLPREDNISOLONE ACETATE 40 MG: 40 INJECTION, SUSPENSION INTRA-ARTICULAR; INTRALESIONAL; INTRAMUSCULAR; SOFT TISSUE at 12:11

## 2020-11-03 NOTE — PROGRESS NOTES
FOLLOW UP NOTE:     CHIEF COMPLAINT: SILVIA follow up    INITIAL HISTORY OF PRESENT ILLNESS: Nicole Harris is a 49 y.o. female who presents today with low back and thoracic spine pain. Her low back pain is the worse or she would like to focus today.  She reports that this pain began in 2004 as a result of multiple injuries and stressors.  She also reports that this pain got dramatically worse in 2010 when she was doing heavy lifting and yard work. The pain kept her out of work for 3 months.  She went to Pain Management with Dr. Puma Craig for her neck.  She reports bilateral low back pain that radiates into L>R leg as a shooting pain into her vagina and down the back of legs to the outside of her feet.  This makes is impossible for her to have sex.  She reports associated muscle spasms in her L>R knees with tremors.  She also has constipation that she treats with Miralax or suppositories once monthly. This pain is described in detail below.     Of note, her neck pain makes it difficult to drive.     Aggravating factors:  Almost any activity, including sitting, standing, walking, bending/twisting, lifting, lying down, exercise, and activity, stopping/sneezing, touching, flexion, extension, getting up out of bed/chair.  The pain is worse with both heat and cold and also worse with weather change.     Mitigating factors:  Is better with rest, lying down, medications.  It is also sometimes better with cold.     Previously seeing: Dr. Puma Craig for her neck.     Physical Therapy: Not tried though she does try to stretch at home.  She was previously doing Maurizio three times weekly in 2015, but she had to quit this due to pain.        INTERVAL HISTORY OF PRESENT ILLNESS: Nicole Harris is a 50 y.o. female with PMH significant for carpal tunnel syndrome, DM (not on insulin), fibromyalgia (self-reported), depression, and significant deconditioning presents for the continued management of back pain and SILVIA follow-up.  She  reports that she got about 80% relief in her neck and radiculopathy but only for about 2 weeks.  She reports that she is frustrated because she does not seem to be getting relief from injections and is questioning if she should see neuro surgery for an evaluation.  Her worst pain today is in her lower back that radiates into both hips down both legs and into both feet all the way to the toes.  She is unable to describe if the pain is in the front or back of her legs she describes the pain as sharp and intermittent.  She denies numbness or tingling.  She reports that she does not do much activity due to have a heart surgery in June and reports that the pain keeps her from doing anything.  She reports that her pain is 8/10 today.  She is requesting trigger point injections today.  Patient denies of any urinary/fecal incontinence, saddle anesthesia, or weakness.     INTERVENTIONAL PAIN HISTORY:  10/1/2020: SILVIA C7 - T1 (Dr. José) - 80% relief for about 2 weeks    10/21/2019: C7/T1 Epidural Steroid Injection and thoracic TPIs via Dr. Gardiner  7/8/2019: L4/5 Interlaminar Epidural Steroid Injection and cervical trigger point injections via Dr. Gardiner  5/27/2019: C7/T1 Epidural Steroid Injection via Dr. Gardiner  4/29/2019: L4/5 Interlaminar Epidural Steroid Injection    CURRENT PAIN MEDICATIONS:   Nortriptyline 100 mg QHS  Naproxen 500 mg twice daily  Voltaren gel  Lexapro 20 mg daily  Tizanidine 4 mg every 6 hours as needed    :      IMAGING:  There is no new imaging to review    ROS:  Review of Systems   Constitutional: Positive for fatigue. Negative for chills and fever.   HENT: Negative for sore throat.    Eyes: Negative for visual disturbance.   Respiratory: Negative for shortness of breath.    Cardiovascular: Negative for chest pain.   Gastrointestinal: Negative for nausea and vomiting.   Genitourinary: Negative for difficulty urinating.   Musculoskeletal: Positive for back pain, myalgias and neck pain.   Skin: Negative  "for rash.   Allergic/Immunologic: Negative for immunocompromised state.   Neurological: Negative for syncope.   Hematological: Does not bruise/bleed easily.   Psychiatric/Behavioral: Negative for suicidal ideas.        MEDICAL, SURGICAL, FAMILY, SOCIAL HX: reviewed    MEDICATIONS/ALLERGIES: reviewed    PHYSICAL EXAM:    VITALS: Vitals reviewed.   Vitals:    11/03/20 1137   BP: (!) 146/88   Pulse: 98   Resp: 17   Temp: 97.5 °F (36.4 °C)   TempSrc: Temporal   Weight: 91.1 kg (200 lb 13.4 oz)   Height: 5' 4.5" (1.638 m)   PainSc:   8   PainLoc: Back       Physical Exam   Constitutional: She is oriented to person, place, and time and well-developed, well-nourished, and in no distress.   HENT:   Head: Normocephalic and atraumatic.   Eyes: Conjunctivae and EOM are normal. Right eye exhibits no discharge. Left eye exhibits no discharge.   Cardiovascular: Normal rate.   Pulmonary/Chest: Effort normal and breath sounds normal. No respiratory distress.   Abdominal: Soft.   Neurological: She is alert and oriented to person, place, and time.   Skin: Skin is warm and dry. No rash noted. She is not diaphoretic.   Psychiatric: Mood, memory, affect and judgment normal.   Nursing note and vitals reviewed.        ASSESSMENT: Nicole Harris is a 50 y.o. female  with PMH significant for carpal tunnel syndrome, DM (not on insulin), fibromyalgia (self-reported), depression, and significant deconditioning presents for the continued management of back pain an SILVIA follow-up.  She reports that nothing that she does seems to help her pain, and she is frustrated with having injections.  She is requesting a neurosurgery evaluation.  We discussed due to lack of physical activity she has more debilitated than normal and needs to be participating in some type of physical therapy or rehab program to improve her health and well-being.  She has been ordered physical therapy on several occasions and does not follow-up or go to the appointment.  The " "patient states, I need some type of pain medication."  We discussed chronic opioid therapy was not recommended and that we need to work on overall conditioning.We discussed the assessment and recommendations.  All available images were reviewed. We discussed the disease process, prognosis, treatment plan, and risks and benefits. The patient is aware of the risks and benefits of the medications being prescribed, common side effects, and proper usage. The following is the plan we agreed on:         1. DDD (degenerative disc disease), lumbar  pregabalin (LYRICA) 75 MG capsule    Ambulatory referral/consult to Neurosurgery   2. Degenerative disc disease, cervical  pregabalin (LYRICA) 75 MG capsule    Ambulatory referral/consult to Neurosurgery   3. Cervical radiculopathy  pregabalin (LYRICA) 75 MG capsule    Ambulatory referral/consult to Neurosurgery   4. Myofascial pain  methylPREDNISolone acetate injection 40 mg       PLAN:  1. Referral to Neurosurgery for evaluation for continued pain and patient request  2. ContinueNortriptyline 100 mg QHS, Naproxen 500 mg twice daily,Voltaren gel, Lexapro 20 mg daily, and Tizanidine 4 mg every 6 hours as needed  3. Trigger Point injections in the office today  4. Schedule for L4 - L5 SILVIA   5. Start Lyrica 75 mg PO BID for pain  6. I have stressed the importance of physical activity and a home exercise plan to help with chronic pain and improve health.  7. Discussed safety in and around the home to prevent falls and injury. Discussed any environmental changes that can be made to help with safety.   8. I discussed with the patient potential secondary side effects of medication prescribed and urged the patient to read all FDA approved materials that the pharmacy provides with the prescription.  9. RTC after procedure to follow up eval.    Trigger point injection   Pre-procedure diagnosis: Myofascial trigger points in cervical paraspinous/trapezius muscles  Post-procedure diagnosis: " Same    Timeout performed.  Upon examination, 4 trigger points were noted in the above noted regions.   After the procedure was described and informed consent obtained, the skin over the trigger points was cleaned with isopropyl alcohol. Using a 25-gauge needle, injection of 1ml of 0.25%bupvicaine and 10mg of methylprednisone was injected into each trigger point. The patient noted concordant radiating pain upon injection of her trigger points. The skin was then cleaned and bandages were applied to sites as necessary. Blood loss was <2ml. We observed the patient for 10 minutes after the procedure for any complications, and as there were none noted, the patient was then discharged home with instructions. We advised the patient that during the duration of the local anesthetic effect, this would be the optimal time to perform stretching exercises for the muscles which contain the trigger points. We also advised the patient to continue these exercises daily as this would likely give the best chance of resolution of the trigger points.      Isadora Martin, CNP  Pain Management

## 2020-11-05 DIAGNOSIS — Z01.818 PRE-OP TESTING: ICD-10-CM

## 2020-11-05 DIAGNOSIS — M51.36 DDD (DEGENERATIVE DISC DISEASE), LUMBAR: Primary | ICD-10-CM

## 2020-11-06 ENCOUNTER — PATIENT MESSAGE (OUTPATIENT)
Dept: RESEARCH | Facility: OTHER | Age: 50
End: 2020-11-06

## 2020-11-10 ENCOUNTER — TELEPHONE (OUTPATIENT)
Dept: PAIN MEDICINE | Facility: CLINIC | Age: 50
End: 2020-11-10

## 2020-11-10 NOTE — TELEPHONE ENCOUNTER
----- Message from Desiree Topete sent at 11/10/2020  2:34 PM CST -----  Contact: patient  Type: Needs Medical Advice  Who Called:  patient  Symptoms (please be specific):  na  How long has patient had these symptoms:  starr  Pharmacy name and phone #:  [facility]  Best Call Back Number: [phone]  Additional Information: patient would like to change covid apt.  Please call to advise.  Thanks!

## 2020-11-10 NOTE — TELEPHONE ENCOUNTER
Pt would like to come earlier in the day on 11/16 for her COVID test. Rescheduled for 8am. Pt verbalized understanding.

## 2020-11-16 ENCOUNTER — LAB VISIT (OUTPATIENT)
Dept: FAMILY MEDICINE | Facility: CLINIC | Age: 50
End: 2020-11-16
Payer: MEDICAID

## 2020-11-16 DIAGNOSIS — Z01.818 PRE-OP TESTING: ICD-10-CM

## 2020-11-16 PROCEDURE — U0003 INFECTIOUS AGENT DETECTION BY NUCLEIC ACID (DNA OR RNA); SEVERE ACUTE RESPIRATORY SYNDROME CORONAVIRUS 2 (SARS-COV-2) (CORONAVIRUS DISEASE [COVID-19]), AMPLIFIED PROBE TECHNIQUE, MAKING USE OF HIGH THROUGHPUT TECHNOLOGIES AS DESCRIBED BY CMS-2020-01-R: HCPCS

## 2020-11-17 ENCOUNTER — TELEPHONE (OUTPATIENT)
Dept: PAIN MEDICINE | Facility: CLINIC | Age: 50
End: 2020-11-17

## 2020-11-17 LAB — SARS-COV-2 RNA RESP QL NAA+PROBE: NOT DETECTED

## 2020-11-17 NOTE — TELEPHONE ENCOUNTER
----- Message from Emily José MD sent at 11/17/2020  8:00 AM CST -----  Contact: [facility]  Okay for patient to take both lexapro and notriptyline. She has been on that combination since she saw Dr. Gardiner. I did not make changes to her dose and she has tolerated that regimen for a while now without adverse side effects.   ----- Message -----  From: Monica Shukla LPN  Sent: 11/16/2020   4:18 PM CST  To: Emily José MD      ----- Message -----  From: Duong Ramos  Sent: 11/16/2020   4:11 PM CST  To: Estefanía Diaz Staff    Type:  Pharmacy Calling to Clarify an RX    Name of Caller:  Alice  Pharmacy Name:    [facility] [address] [place] [id number] [address] [address]  [id number] [address] [place] [address]  Phone: [phone] Fax: [phone]    Prescription Name:  nortryptiline, lexipro  What do they need to clarify?:  Pharmacy wants to know if patient should be taking both at the same time  Best Call Back Number:  [phone]  Additional Information:

## 2020-11-19 ENCOUNTER — TELEPHONE (OUTPATIENT)
Dept: PAIN MEDICINE | Facility: CLINIC | Age: 50
End: 2020-11-19

## 2020-11-19 NOTE — TELEPHONE ENCOUNTER
Spoke with pt and informed her that her insurance requires that she completes 8 weeks of physical therapy before they will cover the procedure. Pt states she is in too much pain to do therapy. Would like to know what Dr. José suggests for her to do. Will send a message to Dr. José.

## 2020-11-19 NOTE — TELEPHONE ENCOUNTER
Explained providers note to pt. She stated that she has an appt with neurosurgery scheduled but they couldn't get her in until June. Pt does not want to do physical therapy due to pain. Pt states she will call back if she changes her mind

## 2020-11-19 NOTE — TELEPHONE ENCOUNTER
----- Message from Sue Moreira sent at 11/19/2020  9:57 AM CST -----  Regarding: Miya  Type: Needs Medical Advice  Who Called:  pt  Symptoms (please be specific):    How long has patient had these symptoms:    Pharmacy name and phone #:    Best Call Back Number: [phone] (home)     Additional Information: pt would like to explain to Alejandro why she was not able to get her injection that was schedule for today would like a call back  she also would like to reschedule thanks

## 2020-11-24 ENCOUNTER — TELEPHONE (OUTPATIENT)
Dept: PAIN MEDICINE | Facility: CLINIC | Age: 50
End: 2020-11-24

## 2020-11-24 NOTE — TELEPHONE ENCOUNTER
----- Message from Linn Huston sent at 11/24/2020  1:57 PM CST -----  Patient is calling regarding a past appt.  Medicaid said they need additional information.  She said the need a letter from you.  Please call her at [phone]. Thank you!

## 2020-11-24 NOTE — TELEPHONE ENCOUNTER
Pt wants us to submit her past physcial therapy to her insurance. Explained to pt that it had to be within the last 3 mo per insurance denial letter. Her PT was done in June of 2020. Pt verblaized understanding and asked for an appt with Dr. José to discuss options. Scheduled pt for 12/2

## 2020-12-02 ENCOUNTER — PATIENT MESSAGE (OUTPATIENT)
Dept: PAIN MEDICINE | Facility: CLINIC | Age: 50
End: 2020-12-02

## 2020-12-02 ENCOUNTER — TELEPHONE (OUTPATIENT)
Dept: PAIN MEDICINE | Facility: CLINIC | Age: 50
End: 2020-12-02

## 2020-12-02 ENCOUNTER — OFFICE VISIT (OUTPATIENT)
Dept: PAIN MEDICINE | Facility: CLINIC | Age: 50
End: 2020-12-02
Payer: MEDICAID

## 2020-12-02 VITALS
RESPIRATION RATE: 18 BRPM | BODY MASS INDEX: 33.32 KG/M2 | OXYGEN SATURATION: 97 % | DIASTOLIC BLOOD PRESSURE: 88 MMHG | HEIGHT: 65 IN | WEIGHT: 200 LBS | HEART RATE: 106 BPM | TEMPERATURE: 99 F | SYSTOLIC BLOOD PRESSURE: 135 MMHG

## 2020-12-02 DIAGNOSIS — M51.36 DDD (DEGENERATIVE DISC DISEASE), LUMBAR: ICD-10-CM

## 2020-12-02 DIAGNOSIS — M54.12 CERVICAL RADICULOPATHY: ICD-10-CM

## 2020-12-02 DIAGNOSIS — M50.30 DDD (DEGENERATIVE DISC DISEASE), CERVICAL: Primary | ICD-10-CM

## 2020-12-02 PROCEDURE — 99215 OFFICE O/P EST HI 40 MIN: CPT | Mod: PBBFAC,PO | Performed by: ANESTHESIOLOGY

## 2020-12-02 PROCEDURE — 99214 PR OFFICE/OUTPT VISIT, EST, LEVL IV, 30-39 MIN: ICD-10-PCS | Mod: S$PBB,,, | Performed by: ANESTHESIOLOGY

## 2020-12-02 PROCEDURE — 99214 OFFICE O/P EST MOD 30 MIN: CPT | Mod: S$PBB,,, | Performed by: ANESTHESIOLOGY

## 2020-12-02 PROCEDURE — 99999 PR PBB SHADOW E&M-EST. PATIENT-LVL V: CPT | Mod: PBBFAC,,, | Performed by: ANESTHESIOLOGY

## 2020-12-02 PROCEDURE — 99999 PR PBB SHADOW E&M-EST. PATIENT-LVL V: ICD-10-PCS | Mod: PBBFAC,,, | Performed by: ANESTHESIOLOGY

## 2020-12-02 RX ORDER — ACETAMINOPHEN AND CODEINE PHOSPHATE 300; 30 MG/1; MG/1
1 TABLET ORAL EVERY 8 HOURS PRN
Qty: 90 TABLET | Refills: 0 | Status: SHIPPED | OUTPATIENT
Start: 2020-12-02 | End: 2021-01-01
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
## 2020-12-02 NOTE — TELEPHONE ENCOUNTER
----- Message from Sue Moreira sent at 12/2/2020  3:58 PM CST -----  Regarding: Van with [facility]  pharmacy need dx code and last appt pls call to advise  Type: Needs Medical Advice  Who Called:  [facility]   Symptoms (please be specific):    How long has patient had these symptoms:    Pharmacy name and phone #:    Best Call Back Number: [phone] (home)     Additional Information: [facility] with [facility]  pharmacy need dx code and last appt for medicationacetaminophen-codeine 300-30mg (TYLENOL #3) 300-30 mg Tab and the pharmacist would also like to make the provider aware pt is getting medication  lyrica pls call to advise

## 2020-12-03 ENCOUNTER — PATIENT MESSAGE (OUTPATIENT)
Dept: PAIN MEDICINE | Facility: CLINIC | Age: 50
End: 2020-12-03

## 2020-12-04 ENCOUNTER — PATIENT MESSAGE (OUTPATIENT)
Dept: PAIN MEDICINE | Facility: CLINIC | Age: 50
End: 2020-12-04

## 2020-12-04 ENCOUNTER — TELEPHONE (OUTPATIENT)
Dept: PAIN MEDICINE | Facility: CLINIC | Age: 50
End: 2020-12-04

## 2020-12-04 NOTE — TELEPHONE ENCOUNTER
Explained to pt that PA was submitted. The insurance company will notify her Pharmacy with the response.

## 2020-12-04 NOTE — TELEPHONE ENCOUNTER
----- Message from Marsha Merino sent at 12/4/2020  3:32 PM CST -----  Regarding: Advice  Contact: patient  Type: Needs Medical Advice    Who Called:  patient  Symptoms (please be specific):  n/a  How long has patient had these symptoms:  n/a  Pharmacy name and phone #:  n/a  Best Call Back Number: [phone] (home)     Additional Information: checking on prescription. Please call to advise

## 2020-12-07 ENCOUNTER — PATIENT MESSAGE (OUTPATIENT)
Dept: PAIN MEDICINE | Facility: CLINIC | Age: 50
End: 2020-12-07

## 2020-12-07 ENCOUNTER — TELEPHONE (OUTPATIENT)
Dept: PAIN MEDICINE | Facility: CLINIC | Age: 50
End: 2020-12-07

## 2020-12-07 DIAGNOSIS — M50.30 DEGENERATIVE DISC DISEASE, CERVICAL: Primary | ICD-10-CM

## 2020-12-07 DIAGNOSIS — Z01.818 PRE-OP TESTING: ICD-10-CM

## 2020-12-07 DIAGNOSIS — M51.36 DDD (DEGENERATIVE DISC DISEASE), LUMBAR: ICD-10-CM

## 2020-12-07 NOTE — TELEPHONE ENCOUNTER
Pt would like to reschedule her SILVIA. Rescheduled for 12/31. COVID test 12/28. Pt verbalized understanding.

## 2020-12-08 ENCOUNTER — PATIENT MESSAGE (OUTPATIENT)
Dept: PAIN MEDICINE | Facility: CLINIC | Age: 50
End: 2020-12-08

## 2020-12-08 DIAGNOSIS — M54.12 CERVICAL RADICULOPATHY: ICD-10-CM

## 2020-12-08 DIAGNOSIS — M51.36 DDD (DEGENERATIVE DISC DISEASE), LUMBAR: ICD-10-CM

## 2020-12-08 DIAGNOSIS — M50.30 DEGENERATIVE DISC DISEASE, CERVICAL: ICD-10-CM

## 2020-12-08 DIAGNOSIS — M47.816 LUMBAR SPONDYLOSIS: ICD-10-CM

## 2020-12-08 DIAGNOSIS — M79.18 MYOFASCIAL PAIN SYNDROME: ICD-10-CM

## 2020-12-08 RX ORDER — TIZANIDINE 4 MG/1
TABLET ORAL
Qty: 120 TABLET | Refills: 0 | Status: SHIPPED | OUTPATIENT
Start: 2020-12-08 | End: 2021-01-06

## 2020-12-16 ENCOUNTER — PATIENT MESSAGE (OUTPATIENT)
Dept: SURGERY | Facility: HOSPITAL | Age: 50
End: 2020-12-16

## 2020-12-21 ENCOUNTER — PATIENT MESSAGE (OUTPATIENT)
Dept: SURGERY | Facility: HOSPITAL | Age: 50
End: 2020-12-21

## 2021-01-04 ENCOUNTER — LAB VISIT (OUTPATIENT)
Dept: FAMILY MEDICINE | Facility: CLINIC | Age: 51
End: 2021-01-04
Payer: MEDICAID

## 2021-01-04 DIAGNOSIS — Z01.818 PRE-OP TESTING: ICD-10-CM

## 2021-01-04 LAB — SARS-COV-2 RNA RESP QL NAA+PROBE: NOT DETECTED

## 2021-01-04 PROCEDURE — U0003 INFECTIOUS AGENT DETECTION BY NUCLEIC ACID (DNA OR RNA); SEVERE ACUTE RESPIRATORY SYNDROME CORONAVIRUS 2 (SARS-COV-2) (CORONAVIRUS DISEASE [COVID-19]), AMPLIFIED PROBE TECHNIQUE, MAKING USE OF HIGH THROUGHPUT TECHNOLOGIES AS DESCRIBED BY CMS-2020-01-R: HCPCS

## 2021-01-07 ENCOUNTER — HOSPITAL ENCOUNTER (OUTPATIENT)
Facility: HOSPITAL | Age: 51
Discharge: HOME | End: 2021-01-07
Attending: ANESTHESIOLOGY | Admitting: ANESTHESIOLOGY
Payer: MEDICAID

## 2021-01-07 VITALS — SYSTOLIC BLOOD PRESSURE: 121 MMHG | OXYGEN SATURATION: 97 % | DIASTOLIC BLOOD PRESSURE: 74 MMHG | HEART RATE: 90 BPM

## 2021-01-07 DIAGNOSIS — M51.36 DEGENERATIVE DISC DISEASE, LUMBAR: ICD-10-CM

## 2021-01-07 DIAGNOSIS — M51.36 LUMBAR DEGENERATIVE DISC DISEASE: Primary | ICD-10-CM

## 2021-01-07 PROBLEM — M51.369 DEGENERATIVE DISC DISEASE, LUMBAR: Status: ACTIVE | Noted: 2021-01-07

## 2021-01-07 PROCEDURE — 62323 NJX INTERLAMINAR LMBR/SAC: CPT | Mod: ,,, | Performed by: ANESTHESIOLOGY

## 2021-01-07 PROCEDURE — 63600175 PHARM REV CODE 636 W HCPCS: Performed by: ANESTHESIOLOGY

## 2021-01-07 PROCEDURE — 62323 NJX INTERLAMINAR LMBR/SAC: CPT | Performed by: ANESTHESIOLOGY

## 2021-01-07 PROCEDURE — 62323 PR INJ LUMBAR/SACRAL, W/IMAGING GUIDANCE: ICD-10-PCS | Mod: ,,, | Performed by: ANESTHESIOLOGY

## 2021-01-07 PROCEDURE — 25000003 PHARM REV CODE 250: Performed by: ANESTHESIOLOGY

## 2021-01-07 PROCEDURE — 25500020 PHARM REV CODE 255: Performed by: ANESTHESIOLOGY

## 2021-01-07 RX ORDER — ALPRAZOLAM 0.5 MG/1
1 TABLET, ORALLY DISINTEGRATING ORAL
Status: COMPLETED | OUTPATIENT
Start: 2021-01-07 | End: 2021-01-07

## 2021-01-07 RX ORDER — METHYLPREDNISOLONE ACETATE 80 MG/ML
INJECTION, SUSPENSION INTRA-ARTICULAR; INTRALESIONAL; INTRAMUSCULAR; SOFT TISSUE
Status: DISCONTINUED
Start: 2021-01-07 | End: 2021-01-07 | Stop reason: HOSPADM

## 2021-01-07 RX ORDER — SODIUM CHLORIDE, SODIUM LACTATE, POTASSIUM CHLORIDE, CALCIUM CHLORIDE 600; 310; 30; 20 MG/100ML; MG/100ML; MG/100ML; MG/100ML
INJECTION, SOLUTION INTRAVENOUS ONCE AS NEEDED
Status: ACTIVE | OUTPATIENT
Start: 2021-01-07 | End: 2032-06-05

## 2021-01-07 RX ORDER — LIDOCAINE HYDROCHLORIDE 10 MG/ML
INJECTION INFILTRATION; PERINEURAL
Status: DISCONTINUED
Start: 2021-01-07 | End: 2021-01-07 | Stop reason: HOSPADM

## 2021-01-07 RX ORDER — LIDOCAINE HYDROCHLORIDE 10 MG/ML
INJECTION INFILTRATION; PERINEURAL
Status: DISCONTINUED | OUTPATIENT
Start: 2021-01-07 | End: 2021-01-07 | Stop reason: HOSPADM

## 2021-01-07 RX ORDER — METHYLPREDNISOLONE ACETATE 80 MG/ML
INJECTION, SUSPENSION INTRA-ARTICULAR; INTRALESIONAL; INTRAMUSCULAR; SOFT TISSUE
Status: DISCONTINUED | OUTPATIENT
Start: 2021-01-07 | End: 2021-01-07 | Stop reason: HOSPADM

## 2021-01-07 RX ADMIN — ALPRAZOLAM 0.5 MG: 0.5 TABLET, ORALLY DISINTEGRATING ORAL at 09:01

## 2021-01-13 NOTE — TELEPHONE ENCOUNTER
Patient contacted and procedure rescheduled to 10/14/19.  
Patient had a colonoscopy on 9/18/2019.  Please reschedule injection to at least 2 weeks after this, which would be October 7 or later.     Thanks!  LW  
None

## 2021-02-03 DIAGNOSIS — R59.1 LYMPHADENOPATHY: Primary | ICD-10-CM

## 2021-02-06 ENCOUNTER — PATIENT MESSAGE (OUTPATIENT)
Dept: PAIN MEDICINE | Facility: CLINIC | Age: 51
End: 2021-02-06

## 2021-02-08 ENCOUNTER — PATIENT MESSAGE (OUTPATIENT)
Dept: PAIN MEDICINE | Facility: CLINIC | Age: 51
End: 2021-02-08

## 2021-02-09 ENCOUNTER — HOSPITAL ENCOUNTER (OUTPATIENT)
Dept: RADIOLOGY | Facility: HOSPITAL | Age: 51
Discharge: HOME | End: 2021-02-09
Attending: NURSE PRACTITIONER
Payer: MEDICAID

## 2021-02-09 ENCOUNTER — OFFICE VISIT (OUTPATIENT)
Dept: PAIN MEDICINE | Facility: CLINIC | Age: 51
End: 2021-02-09
Payer: MEDICAID

## 2021-02-09 VITALS
TEMPERATURE: 98 F | BODY MASS INDEX: 33.83 KG/M2 | OXYGEN SATURATION: 97 % | HEIGHT: 65 IN | RESPIRATION RATE: 16 BRPM | DIASTOLIC BLOOD PRESSURE: 86 MMHG | WEIGHT: 203.06 LBS | HEART RATE: 107 BPM | SYSTOLIC BLOOD PRESSURE: 130 MMHG

## 2021-02-09 DIAGNOSIS — Z79.891 OPIOID USE AGREEMENT EXISTS: ICD-10-CM

## 2021-02-09 DIAGNOSIS — M54.12 CERVICAL RADICULOPATHY: ICD-10-CM

## 2021-02-09 DIAGNOSIS — M47.812 CERVICAL SPONDYLOSIS: ICD-10-CM

## 2021-02-09 DIAGNOSIS — M50.30 DEGENERATIVE DISC DISEASE, CERVICAL: ICD-10-CM

## 2021-02-09 DIAGNOSIS — R59.1 LYMPHADENOPATHY: ICD-10-CM

## 2021-02-09 DIAGNOSIS — M79.18 MYOFASCIAL PAIN: ICD-10-CM

## 2021-02-09 DIAGNOSIS — M51.36 DDD (DEGENERATIVE DISC DISEASE), LUMBAR: ICD-10-CM

## 2021-02-09 DIAGNOSIS — M54.16 LUMBAR RADICULOPATHY: Primary | ICD-10-CM

## 2021-02-09 DIAGNOSIS — M47.816 LUMBAR SPONDYLOSIS: ICD-10-CM

## 2021-02-09 PROCEDURE — 99215 OFFICE O/P EST HI 40 MIN: CPT | Mod: PBBFAC,25,PO | Performed by: NURSE PRACTITIONER

## 2021-02-09 PROCEDURE — 96372 THER/PROPH/DIAG INJ SC/IM: CPT | Mod: ,,, | Performed by: NURSE PRACTITIONER

## 2021-02-09 PROCEDURE — 99999 PR PBB SHADOW E&M-EST. PATIENT-LVL V: ICD-10-PCS | Mod: PBBFAC,,, | Performed by: NURSE PRACTITIONER

## 2021-02-09 PROCEDURE — 99214 OFFICE O/P EST MOD 30 MIN: CPT | Mod: S$PBB,25,, | Performed by: NURSE PRACTITIONER

## 2021-02-09 PROCEDURE — 96372 PR INJECTION,THERAP/PROPH/DIAG2ST, IM OR SUBCUT: ICD-10-PCS | Mod: ,,, | Performed by: NURSE PRACTITIONER

## 2021-02-09 PROCEDURE — 99999 PR PBB SHADOW E&M-EST. PATIENT-LVL V: CPT | Mod: PBBFAC,,, | Performed by: NURSE PRACTITIONER

## 2021-02-09 PROCEDURE — 76536 US EXAM OF HEAD AND NECK: CPT | Mod: TC

## 2021-02-09 PROCEDURE — 76536 US EXAM OF HEAD AND NECK: CPT | Mod: 26,,, | Performed by: RADIOLOGY

## 2021-02-09 PROCEDURE — 76536 US SOFT TISSUE HEAD NECK THYROID: ICD-10-PCS | Mod: 26,,, | Performed by: RADIOLOGY

## 2021-02-09 PROCEDURE — 99214 PR OFFICE/OUTPT VISIT, EST, LEVL IV, 30-39 MIN: ICD-10-PCS | Mod: S$PBB,25,, | Performed by: NURSE PRACTITIONER

## 2021-02-09 RX ORDER — KETOROLAC TROMETHAMINE 30 MG/ML
60 INJECTION, SOLUTION INTRAMUSCULAR; INTRAVENOUS
Status: COMPLETED | OUTPATIENT
Start: 2021-02-09 | End: 2021-02-09

## 2021-02-09 RX ORDER — DEXAMETHASONE SODIUM PHOSPHATE 100 MG/10ML
10 INJECTION INTRAMUSCULAR; INTRAVENOUS
Status: COMPLETED | OUTPATIENT
Start: 2021-02-09 | End: 2021-02-09

## 2021-02-09 RX ADMIN — DEXAMETHASONE SODIUM PHOSPHATE 10 MG: 10 INJECTION, SOLUTION INTRAMUSCULAR; INTRAVENOUS at 12:02

## 2021-02-09 RX ADMIN — KETOROLAC TROMETHAMINE 60 MG: 30 INJECTION, SOLUTION INTRAMUSCULAR; INTRAVENOUS at 12:02

## 2021-02-10 DIAGNOSIS — R59.1 LYMPHADENOPATHY: Primary | ICD-10-CM

## 2021-02-12 ENCOUNTER — HOSPITAL ENCOUNTER (OUTPATIENT)
Dept: RADIOLOGY | Facility: HOSPITAL | Age: 51
Discharge: HOME | End: 2021-02-12
Attending: NURSE PRACTITIONER
Payer: MEDICAID

## 2021-02-12 DIAGNOSIS — R59.1 LYMPHADENOPATHY: ICD-10-CM

## 2021-02-12 PROCEDURE — 25500020 PHARM REV CODE 255

## 2021-02-12 PROCEDURE — 70491 CT SOFT TISSUE NECK WITH CONTRAST: ICD-10-PCS | Mod: 26,,, | Performed by: RADIOLOGY

## 2021-02-12 PROCEDURE — 70491 CT SOFT TISSUE NECK W/DYE: CPT | Mod: TC

## 2021-02-12 PROCEDURE — 70491 CT SOFT TISSUE NECK W/DYE: CPT | Mod: 26,,, | Performed by: RADIOLOGY

## 2021-02-12 RX ADMIN — IOHEXOL 75 ML: 350 INJECTION, SOLUTION INTRAVENOUS at 11:02

## 2021-02-22 ENCOUNTER — PATIENT MESSAGE (OUTPATIENT)
Dept: SURGERY | Facility: HOSPITAL | Age: 51
End: 2021-02-22

## 2021-02-23 ENCOUNTER — PATIENT MESSAGE (OUTPATIENT)
Dept: PAIN MEDICINE | Facility: CLINIC | Age: 51
End: 2021-02-23

## 2021-02-23 DIAGNOSIS — M51.36 DDD (DEGENERATIVE DISC DISEASE), LUMBAR: Primary | ICD-10-CM

## 2021-02-26 ENCOUNTER — PATIENT MESSAGE (OUTPATIENT)
Dept: PAIN MEDICINE | Facility: CLINIC | Age: 51
End: 2021-02-26

## 2021-03-01 ENCOUNTER — TELEPHONE (OUTPATIENT)
Dept: PAIN MEDICINE | Facility: CLINIC | Age: 51
End: 2021-03-01

## 2021-03-04 ENCOUNTER — OFFICE VISIT (OUTPATIENT)
Dept: PAIN MEDICINE | Facility: CLINIC | Age: 51
End: 2021-03-04
Payer: MEDICAID

## 2021-03-04 VITALS
TEMPERATURE: 99 F | HEART RATE: 103 BPM | BODY MASS INDEX: 34.48 KG/M2 | HEIGHT: 64 IN | SYSTOLIC BLOOD PRESSURE: 127 MMHG | WEIGHT: 201.94 LBS | OXYGEN SATURATION: 97 % | DIASTOLIC BLOOD PRESSURE: 85 MMHG | RESPIRATION RATE: 16 BRPM

## 2021-03-04 DIAGNOSIS — M51.36 DDD (DEGENERATIVE DISC DISEASE), LUMBAR: Primary | ICD-10-CM

## 2021-03-04 DIAGNOSIS — M50.30 DEGENERATIVE DISC DISEASE, CERVICAL: ICD-10-CM

## 2021-03-04 DIAGNOSIS — M54.16 LUMBAR RADICULOPATHY: ICD-10-CM

## 2021-03-04 DIAGNOSIS — M47.816 LUMBAR SPONDYLOSIS: ICD-10-CM

## 2021-03-04 DIAGNOSIS — M54.12 CERVICAL RADICULOPATHY: ICD-10-CM

## 2021-03-04 DIAGNOSIS — M47.812 CERVICAL SPONDYLOSIS: ICD-10-CM

## 2021-03-04 PROCEDURE — 99213 OFFICE O/P EST LOW 20 MIN: CPT | Mod: PBBFAC,PO | Performed by: NURSE PRACTITIONER

## 2021-03-04 PROCEDURE — 99999 PR PBB SHADOW E&M-EST. PATIENT-LVL III: ICD-10-PCS | Mod: PBBFAC,,, | Performed by: NURSE PRACTITIONER

## 2021-03-04 PROCEDURE — 96372 PR INJECTION,THERAP/PROPH/DIAG2ST, IM OR SUBCUT: ICD-10-PCS | Mod: ,,, | Performed by: NURSE PRACTITIONER

## 2021-03-04 PROCEDURE — 99214 OFFICE O/P EST MOD 30 MIN: CPT | Mod: S$PBB,25,, | Performed by: NURSE PRACTITIONER

## 2021-03-04 PROCEDURE — 99999 PR PBB SHADOW E&M-EST. PATIENT-LVL III: CPT | Mod: PBBFAC,,, | Performed by: NURSE PRACTITIONER

## 2021-03-04 PROCEDURE — 99214 PR OFFICE/OUTPT VISIT, EST, LEVL IV, 30-39 MIN: ICD-10-PCS | Mod: S$PBB,25,, | Performed by: NURSE PRACTITIONER

## 2021-03-04 PROCEDURE — 96372 THER/PROPH/DIAG INJ SC/IM: CPT | Mod: ,,, | Performed by: NURSE PRACTITIONER

## 2021-03-04 RX ORDER — KETOROLAC TROMETHAMINE 30 MG/ML
30 INJECTION, SOLUTION INTRAMUSCULAR; INTRAVENOUS ONCE
Status: COMPLETED | OUTPATIENT
Start: 2021-03-04 | End: 2021-03-04

## 2021-03-04 RX ADMIN — KETOROLAC TROMETHAMINE 30 MG: 30 INJECTION, SOLUTION INTRAMUSCULAR; INTRAVENOUS at 11:03

## 2021-03-05 ENCOUNTER — PATIENT MESSAGE (OUTPATIENT)
Dept: PAIN MEDICINE | Facility: CLINIC | Age: 51
End: 2021-03-05

## 2021-03-05 DIAGNOSIS — M50.30 DEGENERATIVE DISC DISEASE, CERVICAL: ICD-10-CM

## 2021-03-05 DIAGNOSIS — M51.36 DDD (DEGENERATIVE DISC DISEASE), LUMBAR: Primary | ICD-10-CM

## 2021-03-05 DIAGNOSIS — M54.12 CERVICAL RADICULOPATHY: ICD-10-CM

## 2021-03-08 ENCOUNTER — TELEPHONE (OUTPATIENT)
Dept: PAIN MEDICINE | Facility: CLINIC | Age: 51
End: 2021-03-08

## 2021-03-08 ENCOUNTER — PATIENT MESSAGE (OUTPATIENT)
Dept: PAIN MEDICINE | Facility: CLINIC | Age: 51
End: 2021-03-08

## 2021-03-08 DIAGNOSIS — M79.18 MYOFASCIAL PAIN: Primary | ICD-10-CM

## 2021-03-15 ENCOUNTER — CLINICAL SUPPORT (OUTPATIENT)
Dept: REHABILITATION | Facility: HOSPITAL | Age: 51
End: 2021-03-15
Payer: MEDICAID

## 2021-03-15 DIAGNOSIS — M50.30 DEGENERATIVE DISC DISEASE, CERVICAL: ICD-10-CM

## 2021-03-15 DIAGNOSIS — M51.36 DDD (DEGENERATIVE DISC DISEASE), LUMBAR: ICD-10-CM

## 2021-03-15 DIAGNOSIS — M54.12 CERVICAL RADICULOPATHY: ICD-10-CM

## 2021-03-15 PROCEDURE — 97162 PT EVAL MOD COMPLEX 30 MIN: CPT | Mod: PN

## 2021-03-19 ENCOUNTER — CLINICAL SUPPORT (OUTPATIENT)
Dept: REHABILITATION | Facility: HOSPITAL | Age: 51
End: 2021-03-19
Payer: MEDICAID

## 2021-03-19 ENCOUNTER — TELEPHONE (OUTPATIENT)
Dept: OBSTETRICS AND GYNECOLOGY | Facility: CLINIC | Age: 51
End: 2021-03-19

## 2021-03-19 DIAGNOSIS — G89.29 CHRONIC LEFT-SIDED LOW BACK PAIN WITH LEFT-SIDED SCIATICA: ICD-10-CM

## 2021-03-19 DIAGNOSIS — M54.42 CHRONIC LEFT-SIDED LOW BACK PAIN WITH LEFT-SIDED SCIATICA: ICD-10-CM

## 2021-03-19 DIAGNOSIS — M62.81 MUSCLE WEAKNESS (GENERALIZED): ICD-10-CM

## 2021-03-19 DIAGNOSIS — M50.30 DEGENERATIVE DISC DISEASE, CERVICAL: ICD-10-CM

## 2021-03-19 DIAGNOSIS — M79.7 FIBROMYALGIA: Primary | ICD-10-CM

## 2021-03-19 PROCEDURE — 97110 THERAPEUTIC EXERCISES: CPT | Mod: PN

## 2021-03-22 ENCOUNTER — TELEPHONE (OUTPATIENT)
Dept: PAIN MEDICINE | Facility: CLINIC | Age: 51
End: 2021-03-22

## 2021-03-22 ENCOUNTER — CLINICAL SUPPORT (OUTPATIENT)
Dept: REHABILITATION | Facility: HOSPITAL | Age: 51
End: 2021-03-22
Payer: MEDICAID

## 2021-03-22 DIAGNOSIS — M62.81 MUSCLE WEAKNESS (GENERALIZED): ICD-10-CM

## 2021-03-22 DIAGNOSIS — G89.29 CHRONIC LEFT-SIDED LOW BACK PAIN WITH LEFT-SIDED SCIATICA: ICD-10-CM

## 2021-03-22 DIAGNOSIS — R53.81 PHYSICAL DECONDITIONING: ICD-10-CM

## 2021-03-22 DIAGNOSIS — M79.7 FIBROMYALGIA: ICD-10-CM

## 2021-03-22 DIAGNOSIS — M50.30 DEGENERATIVE DISC DISEASE, CERVICAL: ICD-10-CM

## 2021-03-22 DIAGNOSIS — M54.42 CHRONIC LEFT-SIDED LOW BACK PAIN WITH LEFT-SIDED SCIATICA: ICD-10-CM

## 2021-03-22 PROCEDURE — 97110 THERAPEUTIC EXERCISES: CPT | Mod: PN

## 2021-03-22 PROCEDURE — 97140 MANUAL THERAPY 1/> REGIONS: CPT | Mod: PN

## 2021-03-23 ENCOUNTER — PATIENT MESSAGE (OUTPATIENT)
Dept: SURGERY | Facility: HOSPITAL | Age: 51
End: 2021-03-23

## 2021-03-25 ENCOUNTER — PATIENT MESSAGE (OUTPATIENT)
Dept: SURGERY | Facility: HOSPITAL | Age: 51
End: 2021-03-25

## 2021-03-29 ENCOUNTER — LAB VISIT (OUTPATIENT)
Dept: FAMILY MEDICINE | Facility: CLINIC | Age: 51
End: 2021-03-29
Payer: MEDICAID

## 2021-03-29 DIAGNOSIS — M47.816 LUMBAR SPONDYLOSIS: ICD-10-CM

## 2021-03-29 PROCEDURE — U0005 INFEC AGEN DETEC AMPLI PROBE: HCPCS | Performed by: NURSE PRACTITIONER

## 2021-03-29 PROCEDURE — U0003 INFECTIOUS AGENT DETECTION BY NUCLEIC ACID (DNA OR RNA); SEVERE ACUTE RESPIRATORY SYNDROME CORONAVIRUS 2 (SARS-COV-2) (CORONAVIRUS DISEASE [COVID-19]), AMPLIFIED PROBE TECHNIQUE, MAKING USE OF HIGH THROUGHPUT TECHNOLOGIES AS DESCRIBED BY CMS-2020-01-R: HCPCS | Performed by: NURSE PRACTITIONER

## 2021-03-30 LAB — SARS-COV-2 RNA RESP QL NAA+PROBE: NOT DETECTED

## 2021-04-01 ENCOUNTER — HOSPITAL ENCOUNTER (OUTPATIENT)
Facility: HOSPITAL | Age: 51
Discharge: HOME | End: 2021-04-01
Attending: ANESTHESIOLOGY | Admitting: ANESTHESIOLOGY
Payer: MEDICAID

## 2021-04-01 VITALS
OXYGEN SATURATION: 98 % | HEART RATE: 93 BPM | RESPIRATION RATE: 18 BRPM | HEIGHT: 64 IN | DIASTOLIC BLOOD PRESSURE: 66 MMHG | TEMPERATURE: 98 F | BODY MASS INDEX: 33.29 KG/M2 | WEIGHT: 195 LBS | SYSTOLIC BLOOD PRESSURE: 112 MMHG

## 2021-04-01 DIAGNOSIS — M47.896 OTHER SPONDYLOSIS, LUMBAR REGION: ICD-10-CM

## 2021-04-01 DIAGNOSIS — M51.36 DEGENERATIVE DISC DISEASE, LUMBAR: Primary | ICD-10-CM

## 2021-04-01 LAB — POCT GLUCOSE: 216 MG/DL (ref 70–110)

## 2021-04-01 PROCEDURE — 64493 PR INJ DX/THER AGNT PARAVERT FACET JOINT,IMG GUIDE,LUMBAR/SAC,1ST LVL: ICD-10-PCS | Mod: 50,,, | Performed by: ANESTHESIOLOGY

## 2021-04-01 PROCEDURE — 63600175 PHARM REV CODE 636 W HCPCS: Performed by: ANESTHESIOLOGY

## 2021-04-01 PROCEDURE — 64493 INJ PARAVERT F JNT L/S 1 LEV: CPT | Mod: 50,,, | Performed by: ANESTHESIOLOGY

## 2021-04-01 PROCEDURE — 25000003 PHARM REV CODE 250: Performed by: ANESTHESIOLOGY

## 2021-04-01 PROCEDURE — 64493 INJ PARAVERT F JNT L/S 1 LEV: CPT | Mod: 50 | Performed by: ANESTHESIOLOGY

## 2021-04-01 PROCEDURE — 64494 PR INJ DX/THER AGNT PARAVERT FACET JOINT,IMG GUIDE,LUMBAR/SAC, 2ND LEVEL: ICD-10-PCS | Mod: 50,,, | Performed by: ANESTHESIOLOGY

## 2021-04-01 PROCEDURE — 64494 INJ PARAVERT F JNT L/S 2 LEV: CPT | Mod: 50,,, | Performed by: ANESTHESIOLOGY

## 2021-04-01 PROCEDURE — 64494 INJ PARAVERT F JNT L/S 2 LEV: CPT | Mod: 50 | Performed by: ANESTHESIOLOGY

## 2021-04-01 RX ORDER — SODIUM CHLORIDE, SODIUM LACTATE, POTASSIUM CHLORIDE, CALCIUM CHLORIDE 600; 310; 30; 20 MG/100ML; MG/100ML; MG/100ML; MG/100ML
INJECTION, SOLUTION INTRAVENOUS ONCE AS NEEDED
Status: COMPLETED | OUTPATIENT
Start: 2021-04-01 | End: 2021-04-01

## 2021-04-01 RX ORDER — LIDOCAINE HYDROCHLORIDE 10 MG/ML
INJECTION INFILTRATION; PERINEURAL
Status: DISCONTINUED | OUTPATIENT
Start: 2021-04-01 | End: 2021-04-01 | Stop reason: HOSPADM

## 2021-04-01 RX ORDER — MIDAZOLAM HYDROCHLORIDE 1 MG/ML
INJECTION INTRAMUSCULAR; INTRAVENOUS
Status: DISCONTINUED | OUTPATIENT
Start: 2021-04-01 | End: 2021-04-01 | Stop reason: HOSPADM

## 2021-04-01 RX ORDER — BUPIVACAINE HYDROCHLORIDE 5 MG/ML
INJECTION, SOLUTION EPIDURAL; INTRACAUDAL
Status: DISCONTINUED | OUTPATIENT
Start: 2021-04-01 | End: 2021-04-01 | Stop reason: HOSPADM

## 2021-04-01 RX ADMIN — SODIUM CHLORIDE, SODIUM LACTATE, POTASSIUM CHLORIDE, AND CALCIUM CHLORIDE: .6; .31; .03; .02 INJECTION, SOLUTION INTRAVENOUS at 06:04

## 2021-04-05 ENCOUNTER — CLINICAL SUPPORT (OUTPATIENT)
Dept: REHABILITATION | Facility: HOSPITAL | Age: 51
End: 2021-04-05
Payer: MEDICAID

## 2021-04-05 DIAGNOSIS — G89.29 CHRONIC LEFT-SIDED LOW BACK PAIN WITH LEFT-SIDED SCIATICA: ICD-10-CM

## 2021-04-05 DIAGNOSIS — M62.81 MUSCLE WEAKNESS (GENERALIZED): ICD-10-CM

## 2021-04-05 DIAGNOSIS — M50.30 DEGENERATIVE DISC DISEASE, CERVICAL: ICD-10-CM

## 2021-04-05 DIAGNOSIS — M54.42 CHRONIC LEFT-SIDED LOW BACK PAIN WITH LEFT-SIDED SCIATICA: ICD-10-CM

## 2021-04-05 DIAGNOSIS — M79.7 FIBROMYALGIA: ICD-10-CM

## 2021-04-05 PROCEDURE — 97110 THERAPEUTIC EXERCISES: CPT | Mod: PN

## 2021-04-05 PROCEDURE — 97140 MANUAL THERAPY 1/> REGIONS: CPT | Mod: PN

## 2021-04-08 ENCOUNTER — OFFICE VISIT (OUTPATIENT)
Dept: OBSTETRICS AND GYNECOLOGY | Facility: CLINIC | Age: 51
End: 2021-04-08
Payer: MEDICAID

## 2021-04-08 VITALS
HEIGHT: 65 IN | WEIGHT: 201 LBS | BODY MASS INDEX: 33.49 KG/M2 | DIASTOLIC BLOOD PRESSURE: 64 MMHG | SYSTOLIC BLOOD PRESSURE: 102 MMHG

## 2021-04-08 DIAGNOSIS — Z12.31 BREAST CANCER SCREENING BY MAMMOGRAM: Primary | ICD-10-CM

## 2021-04-08 DIAGNOSIS — B37.31 YEAST VAGINITIS: ICD-10-CM

## 2021-04-08 PROCEDURE — 99386 PREV VISIT NEW AGE 40-64: CPT | Mod: S$GLB,,, | Performed by: OBSTETRICS & GYNECOLOGY

## 2021-04-08 PROCEDURE — 99386 PR PREVENTIVE VISIT,NEW,40-64: ICD-10-PCS | Mod: S$GLB,,, | Performed by: OBSTETRICS & GYNECOLOGY

## 2021-04-20 ENCOUNTER — CLINICAL SUPPORT (OUTPATIENT)
Dept: REHABILITATION | Facility: HOSPITAL | Age: 51
End: 2021-04-20
Payer: MEDICAID

## 2021-04-20 DIAGNOSIS — G89.29 CHRONIC LEFT-SIDED LOW BACK PAIN WITH LEFT-SIDED SCIATICA: Primary | ICD-10-CM

## 2021-04-20 DIAGNOSIS — M54.42 CHRONIC LEFT-SIDED LOW BACK PAIN WITH LEFT-SIDED SCIATICA: Primary | ICD-10-CM

## 2021-04-20 PROCEDURE — 97110 THERAPEUTIC EXERCISES: CPT | Mod: PN,CQ

## 2021-04-22 ENCOUNTER — CLINICAL SUPPORT (OUTPATIENT)
Dept: REHABILITATION | Facility: HOSPITAL | Age: 51
End: 2021-04-22
Payer: MEDICAID

## 2021-04-22 DIAGNOSIS — M54.42 CHRONIC LEFT-SIDED LOW BACK PAIN WITH LEFT-SIDED SCIATICA: Primary | ICD-10-CM

## 2021-04-22 DIAGNOSIS — G89.29 CHRONIC LEFT-SIDED LOW BACK PAIN WITH LEFT-SIDED SCIATICA: Primary | ICD-10-CM

## 2021-04-22 PROCEDURE — 97110 THERAPEUTIC EXERCISES: CPT | Mod: PN,CQ

## 2021-04-26 ENCOUNTER — CLINICAL SUPPORT (OUTPATIENT)
Dept: REHABILITATION | Facility: HOSPITAL | Age: 51
End: 2021-04-26
Payer: MEDICAID

## 2021-04-26 DIAGNOSIS — M54.12 CERVICAL RADICULOPATHY: Primary | ICD-10-CM

## 2021-04-26 DIAGNOSIS — Z12.31 ENCOUNTER FOR SCREENING MAMMOGRAM FOR MALIGNANT NEOPLASM OF BREAST: Primary | ICD-10-CM

## 2021-04-26 DIAGNOSIS — M51.36 DDD (DEGENERATIVE DISC DISEASE), LUMBAR: ICD-10-CM

## 2021-04-26 PROBLEM — M54.42 CHRONIC LEFT-SIDED LOW BACK PAIN WITH LEFT-SIDED SCIATICA: Status: RESOLVED | Noted: 2020-01-08 | Resolved: 2021-04-26

## 2021-04-26 PROBLEM — R53.81 PHYSICAL DECONDITIONING: Status: RESOLVED | Noted: 2019-09-05 | Resolved: 2021-04-26

## 2021-04-26 PROBLEM — M62.81 MUSCLE WEAKNESS (GENERALIZED): Status: RESOLVED | Noted: 2019-09-05 | Resolved: 2021-04-26

## 2021-04-26 PROBLEM — G89.29 CHRONIC LEFT-SIDED LOW BACK PAIN WITH LEFT-SIDED SCIATICA: Status: RESOLVED | Noted: 2020-01-08 | Resolved: 2021-04-26

## 2021-04-26 PROCEDURE — 97110 THERAPEUTIC EXERCISES: CPT | Mod: PN

## 2021-04-26 PROCEDURE — 97140 MANUAL THERAPY 1/> REGIONS: CPT | Mod: PN

## 2021-04-28 ENCOUNTER — CLINICAL SUPPORT (OUTPATIENT)
Dept: REHABILITATION | Facility: HOSPITAL | Age: 51
End: 2021-04-28
Payer: MEDICAID

## 2021-04-28 ENCOUNTER — TELEPHONE (OUTPATIENT)
Dept: OBSTETRICS AND GYNECOLOGY | Facility: CLINIC | Age: 51
End: 2021-04-28

## 2021-04-28 DIAGNOSIS — M54.2 NECK PAIN: Primary | ICD-10-CM

## 2021-04-28 DIAGNOSIS — M54.50 LUMBAR PAIN: ICD-10-CM

## 2021-04-28 PROBLEM — M51.36 LUMBAR DEGENERATIVE DISC DISEASE: Status: RESOLVED | Noted: 2019-04-29 | Resolved: 2021-04-28

## 2021-04-28 PROBLEM — M50.30 DDD (DEGENERATIVE DISC DISEASE), CERVICAL: Status: RESOLVED | Noted: 2019-05-27 | Resolved: 2021-04-28

## 2021-04-28 PROBLEM — M51.369 LUMBAR DEGENERATIVE DISC DISEASE: Status: RESOLVED | Noted: 2019-04-29 | Resolved: 2021-04-28

## 2021-04-28 PROBLEM — G89.29 OTHER CHRONIC PAIN: Status: RESOLVED | Noted: 2019-08-27 | Resolved: 2021-04-28

## 2021-04-28 PROCEDURE — 97110 THERAPEUTIC EXERCISES: CPT | Mod: PN,CQ

## 2021-04-28 PROCEDURE — 97140 MANUAL THERAPY 1/> REGIONS: CPT | Mod: PN,CQ

## 2021-05-11 ENCOUNTER — TELEPHONE (OUTPATIENT)
Dept: OBSTETRICS AND GYNECOLOGY | Facility: CLINIC | Age: 51
End: 2021-05-11

## 2021-05-11 ENCOUNTER — PATIENT MESSAGE (OUTPATIENT)
Dept: OBSTETRICS AND GYNECOLOGY | Facility: CLINIC | Age: 51
End: 2021-05-11

## 2021-05-11 RX ORDER — FLUCONAZOLE 150 MG/1
150 TABLET ORAL ONCE
Qty: 2 TABLET | Refills: 3 | Status: SHIPPED | OUTPATIENT
Start: 2021-05-11 | End: 2021-05-11

## 2021-05-11 RX ORDER — FLUCONAZOLE 150 MG/1
TABLET ORAL
COMMUNITY
Start: 2021-04-13 | End: 2021-05-11 | Stop reason: SDUPTHER

## 2021-05-13 ENCOUNTER — TELEPHONE (OUTPATIENT)
Dept: OBSTETRICS AND GYNECOLOGY | Facility: CLINIC | Age: 51
End: 2021-05-13

## 2021-05-13 RX ORDER — FLUCONAZOLE 100 MG/1
100 TABLET ORAL DAILY
Qty: 10 TABLET | Refills: 2 | Status: SHIPPED | OUTPATIENT
Start: 2021-05-13 | End: 2021-05-23

## 2021-05-17 ENCOUNTER — DOCUMENTATION ONLY (OUTPATIENT)
Dept: REHABILITATION | Facility: HOSPITAL | Age: 51
End: 2021-05-17

## 2021-05-26 ENCOUNTER — CLINICAL SUPPORT (OUTPATIENT)
Dept: REHABILITATION | Facility: HOSPITAL | Age: 51
End: 2021-05-26
Payer: MEDICAID

## 2021-05-26 DIAGNOSIS — M47.896 OTHER SPONDYLOSIS, LUMBAR REGION: ICD-10-CM

## 2021-05-26 DIAGNOSIS — M51.36 DEGENERATIVE DISC DISEASE, LUMBAR: ICD-10-CM

## 2021-05-26 DIAGNOSIS — M50.30 DEGENERATIVE DISC DISEASE, CERVICAL: ICD-10-CM

## 2021-05-26 DIAGNOSIS — M79.18 MYOFASCIAL PAIN: ICD-10-CM

## 2021-05-26 PROCEDURE — 97014 ELECTRIC STIMULATION THERAPY: CPT | Mod: PN

## 2021-05-26 PROCEDURE — 97110 THERAPEUTIC EXERCISES: CPT | Mod: PN

## 2021-05-26 PROCEDURE — 97140 MANUAL THERAPY 1/> REGIONS: CPT | Mod: PN

## 2021-05-31 ENCOUNTER — CLINICAL SUPPORT (OUTPATIENT)
Dept: REHABILITATION | Facility: HOSPITAL | Age: 51
End: 2021-05-31
Payer: MEDICAID

## 2021-05-31 DIAGNOSIS — M51.36 DEGENERATIVE DISC DISEASE, LUMBAR: ICD-10-CM

## 2021-05-31 DIAGNOSIS — M50.30 DEGENERATIVE DISC DISEASE, CERVICAL: Primary | ICD-10-CM

## 2021-05-31 PROCEDURE — 97110 THERAPEUTIC EXERCISES: CPT | Mod: PN

## 2021-05-31 PROCEDURE — 97140 MANUAL THERAPY 1/> REGIONS: CPT | Mod: PN

## 2021-06-02 ENCOUNTER — CLINICAL SUPPORT (OUTPATIENT)
Dept: REHABILITATION | Facility: HOSPITAL | Age: 51
End: 2021-06-02
Payer: MEDICAID

## 2021-06-02 DIAGNOSIS — M54.10 RADICULAR PAIN OF UPPER EXTREMITY: ICD-10-CM

## 2021-06-02 DIAGNOSIS — M51.36 DEGENERATIVE DISC DISEASE, LUMBAR: ICD-10-CM

## 2021-06-02 DIAGNOSIS — M50.30 DEGENERATIVE DISC DISEASE, CERVICAL: Primary | ICD-10-CM

## 2021-06-02 DIAGNOSIS — M47.896 OTHER SPONDYLOSIS, LUMBAR REGION: ICD-10-CM

## 2021-06-02 PROCEDURE — 97110 THERAPEUTIC EXERCISES: CPT | Mod: PN

## 2021-06-02 PROCEDURE — 97140 MANUAL THERAPY 1/> REGIONS: CPT | Mod: PN

## 2021-06-03 ENCOUNTER — PATIENT MESSAGE (OUTPATIENT)
Dept: PAIN MEDICINE | Facility: CLINIC | Age: 51
End: 2021-06-03

## 2021-06-04 ENCOUNTER — TELEPHONE (OUTPATIENT)
Dept: PAIN MEDICINE | Facility: CLINIC | Age: 51
End: 2021-06-04

## 2021-06-14 ENCOUNTER — CLINICAL SUPPORT (OUTPATIENT)
Dept: REHABILITATION | Facility: HOSPITAL | Age: 51
End: 2021-06-14
Payer: MEDICAID

## 2021-06-14 DIAGNOSIS — M51.36 DEGENERATIVE DISC DISEASE, LUMBAR: ICD-10-CM

## 2021-06-14 DIAGNOSIS — M50.30 DEGENERATIVE DISC DISEASE, CERVICAL: Primary | ICD-10-CM

## 2021-06-14 PROCEDURE — 97110 THERAPEUTIC EXERCISES: CPT | Mod: PN,CQ

## 2021-06-14 PROCEDURE — 97140 MANUAL THERAPY 1/> REGIONS: CPT | Mod: PN,CQ

## 2021-06-17 ENCOUNTER — OFFICE VISIT (OUTPATIENT)
Dept: PAIN MEDICINE | Facility: CLINIC | Age: 51
End: 2021-06-17
Payer: MEDICAID

## 2021-06-17 VITALS
OXYGEN SATURATION: 96 % | WEIGHT: 201 LBS | HEART RATE: 94 BPM | TEMPERATURE: 98 F | BODY MASS INDEX: 33.49 KG/M2 | SYSTOLIC BLOOD PRESSURE: 112 MMHG | DIASTOLIC BLOOD PRESSURE: 72 MMHG | HEIGHT: 65 IN

## 2021-06-17 DIAGNOSIS — M47.812 CERVICAL SPONDYLOSIS: ICD-10-CM

## 2021-06-17 DIAGNOSIS — M47.816 LUMBAR SPONDYLOSIS: ICD-10-CM

## 2021-06-17 DIAGNOSIS — M54.12 CERVICAL RADICULOPATHY: ICD-10-CM

## 2021-06-17 DIAGNOSIS — M51.36 DDD (DEGENERATIVE DISC DISEASE), LUMBAR: ICD-10-CM

## 2021-06-17 DIAGNOSIS — M62.838 CERVICAL PARASPINAL MUSCLE SPASM: ICD-10-CM

## 2021-06-17 DIAGNOSIS — M50.30 DEGENERATIVE DISC DISEASE, CERVICAL: Primary | ICD-10-CM

## 2021-06-17 DIAGNOSIS — M79.18 MYOFASCIAL PAIN: ICD-10-CM

## 2021-06-17 DIAGNOSIS — M51.36 DDD (DEGENERATIVE DISC DISEASE), LUMBAR: Primary | ICD-10-CM

## 2021-06-17 DIAGNOSIS — M54.16 LUMBAR RADICULOPATHY: ICD-10-CM

## 2021-06-17 PROCEDURE — 99214 PR OFFICE/OUTPT VISIT, EST, LEVL IV, 30-39 MIN: ICD-10-PCS | Mod: S$PBB,,, | Performed by: NURSE PRACTITIONER

## 2021-06-17 PROCEDURE — 99999 PR PBB SHADOW E&M-EST. PATIENT-LVL IV: CPT | Mod: PBBFAC,,, | Performed by: NURSE PRACTITIONER

## 2021-06-17 PROCEDURE — 99999 PR PBB SHADOW E&M-EST. PATIENT-LVL IV: ICD-10-PCS | Mod: PBBFAC,,, | Performed by: NURSE PRACTITIONER

## 2021-06-17 PROCEDURE — 99214 OFFICE O/P EST MOD 30 MIN: CPT | Mod: PBBFAC,PO | Performed by: NURSE PRACTITIONER

## 2021-06-17 PROCEDURE — 99214 OFFICE O/P EST MOD 30 MIN: CPT | Mod: S$PBB,,, | Performed by: NURSE PRACTITIONER

## 2021-06-28 ENCOUNTER — CLINICAL SUPPORT (OUTPATIENT)
Dept: REHABILITATION | Facility: HOSPITAL | Age: 51
End: 2021-06-28
Payer: MEDICAID

## 2021-06-28 DIAGNOSIS — M47.896 OTHER SPONDYLOSIS, LUMBAR REGION: ICD-10-CM

## 2021-06-28 DIAGNOSIS — M79.7 FIBROMYALGIA: ICD-10-CM

## 2021-06-28 DIAGNOSIS — M50.30 DEGENERATIVE DISC DISEASE, CERVICAL: ICD-10-CM

## 2021-06-28 PROCEDURE — 97140 MANUAL THERAPY 1/> REGIONS: CPT | Mod: PN

## 2021-06-28 PROCEDURE — 97110 THERAPEUTIC EXERCISES: CPT | Mod: PN

## 2021-06-28 PROCEDURE — 97014 ELECTRIC STIMULATION THERAPY: CPT | Mod: PN

## 2021-07-02 ENCOUNTER — CLINICAL SUPPORT (OUTPATIENT)
Dept: REHABILITATION | Facility: HOSPITAL | Age: 51
End: 2021-07-02
Payer: MEDICAID

## 2021-07-02 DIAGNOSIS — M51.36 DEGENERATIVE DISC DISEASE, LUMBAR: ICD-10-CM

## 2021-07-02 DIAGNOSIS — M79.7 FIBROMYALGIA: Primary | ICD-10-CM

## 2021-07-02 DIAGNOSIS — M50.30 DEGENERATIVE DISC DISEASE, CERVICAL: ICD-10-CM

## 2021-07-02 PROCEDURE — 97110 THERAPEUTIC EXERCISES: CPT | Mod: PN,CQ

## 2021-07-14 ENCOUNTER — PATIENT MESSAGE (OUTPATIENT)
Dept: SURGERY | Facility: HOSPITAL | Age: 51
End: 2021-07-14

## 2021-07-27 ENCOUNTER — HOSPITAL ENCOUNTER (OUTPATIENT)
Dept: RADIOLOGY | Facility: HOSPITAL | Age: 51
Discharge: HOME | End: 2021-07-27
Attending: NURSE PRACTITIONER
Payer: MEDICAID

## 2021-07-27 VITALS — BODY MASS INDEX: 33.49 KG/M2 | WEIGHT: 201 LBS | HEIGHT: 65 IN

## 2021-07-27 DIAGNOSIS — Z12.31 ENCOUNTER FOR SCREENING MAMMOGRAM FOR MALIGNANT NEOPLASM OF BREAST: ICD-10-CM

## 2021-07-27 PROCEDURE — 77063 BREAST TOMOSYNTHESIS BI: CPT | Mod: 26,,, | Performed by: RADIOLOGY

## 2021-07-27 PROCEDURE — 77067 MAMMO DIGITAL SCREENING BILAT WITH TOMO: ICD-10-PCS | Mod: 26,,, | Performed by: RADIOLOGY

## 2021-07-27 PROCEDURE — 77067 SCR MAMMO BI INCL CAD: CPT | Mod: 26,,, | Performed by: RADIOLOGY

## 2021-07-27 PROCEDURE — 77063 MAMMO DIGITAL SCREENING BILAT WITH TOMO: ICD-10-PCS | Mod: 26,,, | Performed by: RADIOLOGY

## 2021-07-27 PROCEDURE — 77067 SCR MAMMO BI INCL CAD: CPT | Mod: TC

## 2021-08-04 DIAGNOSIS — Z01.818 PRE-OP TESTING: Primary | ICD-10-CM

## 2021-08-11 ENCOUNTER — PATIENT MESSAGE (OUTPATIENT)
Dept: SURGERY | Facility: HOSPITAL | Age: 51
End: 2021-08-11

## 2021-08-12 ENCOUNTER — PATIENT MESSAGE (OUTPATIENT)
Dept: PAIN MEDICINE | Facility: CLINIC | Age: 51
End: 2021-08-12

## 2021-09-13 ENCOUNTER — HOSPITAL ENCOUNTER (OUTPATIENT)
Dept: RADIOLOGY | Facility: HOSPITAL | Age: 51
Discharge: HOME | End: 2021-09-13
Attending: NURSE PRACTITIONER
Payer: MEDICAID

## 2021-09-13 DIAGNOSIS — R05.9 COUGH: ICD-10-CM

## 2021-09-13 PROCEDURE — 71046 X-RAY EXAM CHEST 2 VIEWS: CPT | Mod: TC,PN

## 2021-09-13 PROCEDURE — 71046 X-RAY EXAM CHEST 2 VIEWS: CPT | Mod: 26,,, | Performed by: RADIOLOGY

## 2021-09-13 PROCEDURE — 71046 XR CHEST PA AND LATERAL: ICD-10-PCS | Mod: 26,,, | Performed by: RADIOLOGY

## 2021-09-15 ENCOUNTER — HOSPITAL ENCOUNTER (OUTPATIENT)
Facility: HOSPITAL | Age: 51
Discharge: HOME | End: 2021-09-15
Attending: ANESTHESIOLOGY | Admitting: ANESTHESIOLOGY
Payer: MEDICAID

## 2021-09-15 VITALS
DIASTOLIC BLOOD PRESSURE: 55 MMHG | RESPIRATION RATE: 16 BRPM | OXYGEN SATURATION: 100 % | SYSTOLIC BLOOD PRESSURE: 101 MMHG | TEMPERATURE: 98 F | HEART RATE: 80 BPM

## 2021-09-15 DIAGNOSIS — M50.30 DEGENERATIVE DISC DISEASE, CERVICAL: Primary | ICD-10-CM

## 2021-09-15 LAB — SARS-COV-2 RDRP RESP QL NAA+PROBE: NEGATIVE

## 2021-09-15 PROCEDURE — 25500020 PHARM REV CODE 255: Performed by: ANESTHESIOLOGY

## 2021-09-15 PROCEDURE — 25000003 PHARM REV CODE 250: Performed by: ANESTHESIOLOGY

## 2021-09-15 PROCEDURE — 62321 NJX INTERLAMINAR CRV/THRC: CPT | Mod: ,,, | Performed by: ANESTHESIOLOGY

## 2021-09-15 PROCEDURE — U0002 COVID-19 LAB TEST NON-CDC: HCPCS | Performed by: ANESTHESIOLOGY

## 2021-09-15 PROCEDURE — 62321 NJX INTERLAMINAR CRV/THRC: CPT | Performed by: ANESTHESIOLOGY

## 2021-09-15 PROCEDURE — 63600175 PHARM REV CODE 636 W HCPCS: Performed by: ANESTHESIOLOGY

## 2021-09-15 PROCEDURE — 62321 PR INJ CERV/THORAC, W/GUIDANCE: ICD-10-PCS | Mod: ,,, | Performed by: ANESTHESIOLOGY

## 2021-09-15 RX ORDER — SODIUM CHLORIDE, SODIUM LACTATE, POTASSIUM CHLORIDE, CALCIUM CHLORIDE 600; 310; 30; 20 MG/100ML; MG/100ML; MG/100ML; MG/100ML
INJECTION, SOLUTION INTRAVENOUS CONTINUOUS
Status: DISCONTINUED | OUTPATIENT
Start: 2021-09-15 | End: 2021-09-15 | Stop reason: HOSPADM

## 2021-09-15 RX ORDER — LIDOCAINE HYDROCHLORIDE 10 MG/ML
INJECTION INFILTRATION; PERINEURAL
Status: DISCONTINUED | OUTPATIENT
Start: 2021-09-15 | End: 2021-09-15 | Stop reason: HOSPADM

## 2021-09-15 RX ORDER — MIDAZOLAM HYDROCHLORIDE 1 MG/ML
INJECTION INTRAMUSCULAR; INTRAVENOUS
Status: DISCONTINUED | OUTPATIENT
Start: 2021-09-15 | End: 2021-09-15 | Stop reason: HOSPADM

## 2021-09-15 RX ORDER — METHYLPREDNISOLONE ACETATE 80 MG/ML
INJECTION, SUSPENSION INTRA-ARTICULAR; INTRALESIONAL; INTRAMUSCULAR; SOFT TISSUE
Status: DISCONTINUED | OUTPATIENT
Start: 2021-09-15 | End: 2021-09-15 | Stop reason: HOSPADM

## 2021-09-15 RX ORDER — FENTANYL CITRATE 50 UG/ML
INJECTION, SOLUTION INTRAMUSCULAR; INTRAVENOUS
Status: DISCONTINUED | OUTPATIENT
Start: 2021-09-15 | End: 2021-09-15 | Stop reason: HOSPADM

## 2021-09-16 LAB — POCT GLUCOSE: 133 MG/DL (ref 70–110)

## 2021-10-28 ENCOUNTER — PATIENT MESSAGE (OUTPATIENT)
Dept: PAIN MEDICINE | Facility: CLINIC | Age: 51
End: 2021-10-28
Payer: MEDICAID

## 2021-10-28 ENCOUNTER — TELEPHONE (OUTPATIENT)
Dept: PAIN MEDICINE | Facility: CLINIC | Age: 51
End: 2021-10-28
Payer: MEDICAID

## 2021-10-28 NOTE — TELEPHONE ENCOUNTER
Pt repeating \"hey, amrit cakes. Hey sugar plum fairy\" while resting on cot.    Spoke with pt and she states that she was sent by ambulance to [facility] and will be having open heart surgery in the next day or two. Informed pt that I will cancel her procedure and let Dr. José know what is going on. Also told pt to call once everything is settled and her Cardiologist says its okay to continue with her SILVIA. Pt verbalized understanding.

## 2021-11-04 ENCOUNTER — HOSPITAL ENCOUNTER (OUTPATIENT)
Dept: RADIOLOGY | Facility: HOSPITAL | Age: 51
Discharge: HOME | End: 2021-11-04
Attending: NURSE PRACTITIONER
Payer: MEDICAID

## 2021-11-04 DIAGNOSIS — R11.2 NAUSEA WITH VOMITING: ICD-10-CM

## 2021-11-04 DIAGNOSIS — K21.9 ESOPHAGEAL REFLUX: ICD-10-CM

## 2021-11-04 DIAGNOSIS — R10.10 ACUTE UPPER ABDOMINAL PAIN: ICD-10-CM

## 2021-11-04 PROCEDURE — 78226 NM HEPATOBILIARY IMAGING INC GB (HIDA): ICD-10-PCS | Mod: 26,,, | Performed by: RADIOLOGY

## 2021-11-04 PROCEDURE — 78226 HEPATOBILIARY SYSTEM IMAGING: CPT | Mod: 26,,, | Performed by: RADIOLOGY

## 2021-11-04 PROCEDURE — 78226 HEPATOBILIARY SYSTEM IMAGING: CPT | Mod: TC

## 2021-11-04 PROCEDURE — A9537 TC99M MEBROFENIN: HCPCS

## 2021-11-16 ENCOUNTER — PATIENT MESSAGE (OUTPATIENT)
Dept: PAIN MEDICINE | Facility: CLINIC | Age: 51
End: 2021-11-16
Payer: MEDICAID

## 2021-11-18 ENCOUNTER — PATIENT MESSAGE (OUTPATIENT)
Dept: PAIN MEDICINE | Facility: CLINIC | Age: 51
End: 2021-11-18
Payer: MEDICAID

## 2021-11-19 ENCOUNTER — TELEPHONE (OUTPATIENT)
Dept: PAIN MEDICINE | Facility: CLINIC | Age: 51
End: 2021-11-19
Payer: MEDICAID

## 2021-11-29 ENCOUNTER — PATIENT MESSAGE (OUTPATIENT)
Dept: PAIN MEDICINE | Facility: CLINIC | Age: 51
End: 2021-11-29
Payer: MEDICAID

## 2021-12-07 ENCOUNTER — HOSPITAL ENCOUNTER (OUTPATIENT)
Dept: RADIOLOGY | Facility: HOSPITAL | Age: 51
Discharge: HOME | End: 2021-12-07
Attending: NURSE PRACTITIONER
Payer: MEDICAID

## 2021-12-07 DIAGNOSIS — R10.13 EPIGASTRIC PAIN: ICD-10-CM

## 2021-12-07 PROCEDURE — 76700 US ABDOMEN COMPLETE: ICD-10-PCS | Mod: 26,,, | Performed by: RADIOLOGY

## 2021-12-07 PROCEDURE — 76700 US EXAM ABDOM COMPLETE: CPT | Mod: TC

## 2021-12-07 PROCEDURE — 76700 US EXAM ABDOM COMPLETE: CPT | Mod: 26,,, | Performed by: RADIOLOGY

## 2021-12-08 ENCOUNTER — TELEPHONE (OUTPATIENT)
Dept: SURGERY | Facility: CLINIC | Age: 51
End: 2021-12-08
Payer: MEDICAID

## 2021-12-14 ENCOUNTER — PATIENT MESSAGE (OUTPATIENT)
Dept: PAIN MEDICINE | Facility: CLINIC | Age: 51
End: 2021-12-14
Payer: MEDICAID

## 2022-02-17 ENCOUNTER — OFFICE VISIT (OUTPATIENT)
Dept: PAIN MEDICINE | Facility: CLINIC | Age: 52
End: 2022-02-17
Payer: MEDICAID

## 2022-02-17 ENCOUNTER — PATIENT MESSAGE (OUTPATIENT)
Dept: ADMINISTRATIVE | Facility: OTHER | Age: 52
End: 2022-02-17
Payer: MEDICAID

## 2022-02-17 ENCOUNTER — PATIENT MESSAGE (OUTPATIENT)
Dept: PAIN MEDICINE | Facility: CLINIC | Age: 52
End: 2022-02-17
Payer: MEDICAID

## 2022-02-17 ENCOUNTER — TELEPHONE (OUTPATIENT)
Dept: PAIN MEDICINE | Facility: CLINIC | Age: 52
End: 2022-02-17
Payer: MEDICAID

## 2022-02-17 VITALS
HEIGHT: 65 IN | BODY MASS INDEX: 33.49 KG/M2 | TEMPERATURE: 98 F | HEART RATE: 101 BPM | WEIGHT: 201 LBS | SYSTOLIC BLOOD PRESSURE: 121 MMHG | DIASTOLIC BLOOD PRESSURE: 79 MMHG | OXYGEN SATURATION: 100 %

## 2022-02-17 DIAGNOSIS — M54.12 CERVICAL RADICULOPATHY: ICD-10-CM

## 2022-02-17 DIAGNOSIS — M50.30 DEGENERATIVE DISC DISEASE, CERVICAL: Primary | ICD-10-CM

## 2022-02-17 DIAGNOSIS — Z01.818 PREOP TESTING: ICD-10-CM

## 2022-02-17 DIAGNOSIS — M47.812 CERVICAL SPONDYLOSIS: ICD-10-CM

## 2022-02-17 PROCEDURE — 3008F BODY MASS INDEX DOCD: CPT | Mod: CPTII,,, | Performed by: ANESTHESIOLOGY

## 2022-02-17 PROCEDURE — 3008F PR BODY MASS INDEX (BMI) DOCUMENTED: ICD-10-PCS | Mod: CPTII,,, | Performed by: ANESTHESIOLOGY

## 2022-02-17 PROCEDURE — 99999 PR PBB SHADOW E&M-EST. PATIENT-LVL IV: CPT | Mod: PBBFAC,,, | Performed by: ANESTHESIOLOGY

## 2022-02-17 PROCEDURE — 3074F SYST BP LT 130 MM HG: CPT | Mod: CPTII,,, | Performed by: ANESTHESIOLOGY

## 2022-02-17 PROCEDURE — 99214 PR OFFICE/OUTPT VISIT, EST, LEVL IV, 30-39 MIN: ICD-10-PCS | Mod: S$PBB,CS,, | Performed by: ANESTHESIOLOGY

## 2022-02-17 PROCEDURE — 3078F DIAST BP <80 MM HG: CPT | Mod: CPTII,,, | Performed by: ANESTHESIOLOGY

## 2022-02-17 PROCEDURE — 99999 PR PBB SHADOW E&M-EST. PATIENT-LVL IV: ICD-10-PCS | Mod: PBBFAC,,, | Performed by: ANESTHESIOLOGY

## 2022-02-17 PROCEDURE — 4010F ACE/ARB THERAPY RXD/TAKEN: CPT | Mod: CPTII,,, | Performed by: ANESTHESIOLOGY

## 2022-02-17 PROCEDURE — 1159F PR MEDICATION LIST DOCUMENTED IN MEDICAL RECORD: ICD-10-PCS | Mod: CPTII,,, | Performed by: ANESTHESIOLOGY

## 2022-02-17 PROCEDURE — 99214 OFFICE O/P EST MOD 30 MIN: CPT | Mod: S$PBB,CS,, | Performed by: ANESTHESIOLOGY

## 2022-02-17 PROCEDURE — 3074F PR MOST RECENT SYSTOLIC BLOOD PRESSURE < 130 MM HG: ICD-10-PCS | Mod: CPTII,,, | Performed by: ANESTHESIOLOGY

## 2022-02-17 PROCEDURE — 4010F PR ACE/ARB THEARPY RXD/TAKEN: ICD-10-PCS | Mod: CPTII,,, | Performed by: ANESTHESIOLOGY

## 2022-02-17 PROCEDURE — 1159F MED LIST DOCD IN RCRD: CPT | Mod: CPTII,,, | Performed by: ANESTHESIOLOGY

## 2022-02-17 PROCEDURE — 3078F PR MOST RECENT DIASTOLIC BLOOD PRESSURE < 80 MM HG: ICD-10-PCS | Mod: CPTII,,, | Performed by: ANESTHESIOLOGY

## 2022-02-17 PROCEDURE — 99214 OFFICE O/P EST MOD 30 MIN: CPT | Mod: PBBFAC,PO | Performed by: ANESTHESIOLOGY

## 2022-02-17 RX ORDER — OXYCODONE AND ACETAMINOPHEN 5; 325 MG/1; MG/1
1 TABLET ORAL EVERY 6 HOURS PRN
Qty: 30 TABLET | Refills: 0 | Status: SHIPPED | OUTPATIENT
Start: 2022-02-17 | End: 2022-10-03 | Stop reason: SDUPTHER

## 2022-02-17 RX ORDER — OXYCODONE AND ACETAMINOPHEN 5; 325 MG/1; MG/1
TABLET ORAL
COMMUNITY
Start: 2021-12-30 | End: 2022-02-17

## 2022-02-17 RX ORDER — CETIRIZINE HYDROCHLORIDE 10 MG/1
10 TABLET ORAL DAILY PRN
Status: ON HOLD | COMMUNITY
Start: 2021-11-09

## 2022-02-17 RX ORDER — FLUCONAZOLE 150 MG/1
TABLET ORAL
Status: ON HOLD | COMMUNITY
Start: 2021-12-07

## 2022-02-17 RX ORDER — LEVOCETIRIZINE DIHYDROCHLORIDE 5 MG/1
TABLET, FILM COATED ORAL
COMMUNITY
Start: 2021-11-24 | End: 2022-02-17

## 2022-02-17 RX ORDER — ATORVASTATIN CALCIUM 40 MG/1
TABLET, FILM COATED ORAL
Status: ON HOLD | COMMUNITY
Start: 2021-12-29

## 2022-02-17 RX ORDER — CALCIUM CITRATE/VITAMIN D3 200MG-6.25
TABLET ORAL 3 TIMES DAILY
Status: ON HOLD | COMMUNITY
Start: 2021-10-05

## 2022-02-17 RX ORDER — NAPROXEN 500 MG/1
500 TABLET ORAL 2 TIMES DAILY PRN
COMMUNITY
Start: 2022-01-18 | End: 2022-10-03 | Stop reason: SDUPTHER
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
## 2022-02-27 ENCOUNTER — LAB VISIT (OUTPATIENT)
Dept: FAMILY MEDICINE | Facility: CLINIC | Age: 52
End: 2022-02-27
Payer: MEDICAID

## 2022-02-27 DIAGNOSIS — Z01.818 PREOP TESTING: ICD-10-CM

## 2022-02-27 PROCEDURE — U0003 INFECTIOUS AGENT DETECTION BY NUCLEIC ACID (DNA OR RNA); SEVERE ACUTE RESPIRATORY SYNDROME CORONAVIRUS 2 (SARS-COV-2) (CORONAVIRUS DISEASE [COVID-19]), AMPLIFIED PROBE TECHNIQUE, MAKING USE OF HIGH THROUGHPUT TECHNOLOGIES AS DESCRIBED BY CMS-2020-01-R: HCPCS | Performed by: ANESTHESIOLOGY

## 2022-02-27 PROCEDURE — U0005 INFEC AGEN DETEC AMPLI PROBE: HCPCS | Performed by: ANESTHESIOLOGY

## 2022-02-27 NOTE — PROGRESS NOTES
Nicole Harris presented to clinic for COVID-19 swab.   Nicole Harris verified x2, name and .   Nicole Harris instructed on what will be completed, asked if ever had COVID-19 swab.   Explained procedure to Nicole Harris.   Specimen obtained.   No questions or concerns voiced further at this time.   Nicole Harris left in satisfactory condition.

## 2022-02-28 LAB
SARS-COV-2 RNA RESP QL NAA+PROBE: NOT DETECTED
SARS-COV-2- CYCLE NUMBER: NORMAL

## 2022-03-02 ENCOUNTER — HOSPITAL ENCOUNTER (OUTPATIENT)
Facility: HOSPITAL | Age: 52
Discharge: HOME | End: 2022-03-02
Attending: ANESTHESIOLOGY | Admitting: ANESTHESIOLOGY
Payer: MEDICAID

## 2022-03-02 VITALS
RESPIRATION RATE: 15 BRPM | DIASTOLIC BLOOD PRESSURE: 76 MMHG | BODY MASS INDEX: 33.49 KG/M2 | TEMPERATURE: 98 F | OXYGEN SATURATION: 98 % | HEIGHT: 65 IN | SYSTOLIC BLOOD PRESSURE: 117 MMHG | WEIGHT: 201 LBS | HEART RATE: 96 BPM

## 2022-03-02 DIAGNOSIS — M50.30 DEGENERATIVE DISC DISEASE, CERVICAL: Primary | ICD-10-CM

## 2022-03-02 PROCEDURE — 25000003 PHARM REV CODE 250: Performed by: ANESTHESIOLOGY

## 2022-03-02 PROCEDURE — 62321 NJX INTERLAMINAR CRV/THRC: CPT | Mod: ,,, | Performed by: ANESTHESIOLOGY

## 2022-03-02 PROCEDURE — 63600175 PHARM REV CODE 636 W HCPCS: Performed by: ANESTHESIOLOGY

## 2022-03-02 PROCEDURE — 62321 NJX INTERLAMINAR CRV/THRC: CPT | Performed by: ANESTHESIOLOGY

## 2022-03-02 PROCEDURE — 25500020 PHARM REV CODE 255: Performed by: ANESTHESIOLOGY

## 2022-03-02 PROCEDURE — 62321 PR INJ CERV/THORAC, W/GUIDANCE: ICD-10-PCS | Mod: ,,, | Performed by: ANESTHESIOLOGY

## 2022-03-02 RX ORDER — LIDOCAINE HYDROCHLORIDE 20 MG/ML
INJECTION, SOLUTION INFILTRATION; PERINEURAL
Status: DISCONTINUED | OUTPATIENT
Start: 2022-03-02 | End: 2022-03-02 | Stop reason: HOSPADM

## 2022-03-02 RX ORDER — MIDAZOLAM HYDROCHLORIDE 5 MG/ML
INJECTION INTRAMUSCULAR; INTRAVENOUS
Status: DISCONTINUED | OUTPATIENT
Start: 2022-03-02 | End: 2022-03-02 | Stop reason: HOSPADM

## 2022-03-02 RX ORDER — METHYLPREDNISOLONE ACETATE 80 MG/ML
INJECTION, SUSPENSION INTRA-ARTICULAR; INTRALESIONAL; INTRAMUSCULAR; SOFT TISSUE
Status: DISCONTINUED | OUTPATIENT
Start: 2022-03-02 | End: 2022-03-02 | Stop reason: HOSPADM

## 2022-03-02 RX ORDER — FENTANYL CITRATE 50 UG/ML
INJECTION, SOLUTION INTRAMUSCULAR; INTRAVENOUS
Status: DISCONTINUED | OUTPATIENT
Start: 2022-03-02 | End: 2022-03-02 | Stop reason: HOSPADM

## 2022-03-02 RX ORDER — SODIUM CHLORIDE, SODIUM LACTATE, POTASSIUM CHLORIDE, CALCIUM CHLORIDE 600; 310; 30; 20 MG/100ML; MG/100ML; MG/100ML; MG/100ML
INJECTION, SOLUTION INTRAVENOUS CONTINUOUS
Status: DISCONTINUED | OUTPATIENT
Start: 2022-03-02 | End: 2022-03-02 | Stop reason: HOSPADM

## 2022-03-02 NOTE — DISCHARGE SUMMARY
[facility] Surgery  Discharge Note  Short Stay    Procedure(s) (LRB):  Injection-steroid-epidural-cervical C5-C6 (N/A)    OUTCOME: Patient tolerated treatment/procedure well without complication and is now ready for discharge.    DISPOSITION: Home or Self Care    FINAL DIAGNOSIS:  Degenerative disc disease, cervical    FOLLOWUP: In clinic    DISCHARGE INSTRUCTIONS:    Discharge Procedure Orders   Diet general     Call MD for:  temperature >100.4     Call MD for:  persistent nausea and vomiting     Call MD for:  severe uncontrolled pain     Call MD for:  difficulty breathing, headache or visual disturbances     Call MD for:  redness, tenderness, or signs of infection (pain, swelling, redness, odor or green/yellow discharge around incision site)     Call MD for:  hives     Call MD for:  persistent dizziness or light-headedness     Call MD for:  extreme fatigue        TIME SPENT ON DISCHARGE: 30 minutes

## 2022-03-02 NOTE — OP NOTE
PROCEDURE DATE: 3/2/2022    PROCEDURE: C5-C6 cervical interlaminar epidural steroid injection under utilizing fluoroscopy.    DIAGNOSIS: Cervical Degenerative Disc Diease; Cervical Radiculitis  POSTOP DIAGNOSIS: SAME    PHYSICIAN: Emily José MD    MEDICATIONS INJECTED:  Depo-medrol 80 mg followed by a slow injection of 4 mL sterile, preservative-free normal saline.    LOCAL ANESTHETIC USED: Lidocaine 1%, 4 ml.    SEDATION MEDICATIONS: RN IV sedation    COMPLICATIONS:  none    ESTIMATED BLOOD LOSS: none    TECHNIQUE:  A time-out was taken to identify patient and procedure prior to starting the procedure.  With the patient laying in a prone position with the neck in a mid-flexed forward position, the area was prepped and draped in the usual sterile fashion using ChloraPrep and a fenestrated drape.  The area was determined under AP fluoroscopic guidance.  Local anesthetic was given using a 25-gauge 1.5 inch needle by raising a wheal and then infiltrating ventrally.  A 3.5 inch 20-gauge Touhy needle was introduced under fluoroscopic guidance to meet the lamina of C5.  The needle was then hinged under the lamina then advanced using loss of resistance technique.  Once the tip of the needle was in the desired position, the 2 mL contrast dye Omnipaque was injected to determine placement and no uptake.  The steroid was then injected slowly followed by a slow injection of 4 mL of the sterile preservative-free normal saline.  The patient tolerated the procedure well.    The patient was monitored after the procedure and was given post-procedure and discharge instructions to follow at home. The patient was discharged in a stable condition.

## 2022-04-03 ENCOUNTER — PATIENT MESSAGE (OUTPATIENT)
Dept: PAIN MEDICINE | Facility: CLINIC | Age: 52
End: 2022-04-03
Payer: MEDICAID

## 2022-05-17 ENCOUNTER — PATIENT MESSAGE (OUTPATIENT)
Dept: PAIN MEDICINE | Facility: CLINIC | Age: 52
End: 2022-05-17
Payer: MEDICAID

## 2022-05-17 DIAGNOSIS — M47.812 CERVICAL SPONDYLOSIS: Primary | ICD-10-CM

## 2022-06-08 ENCOUNTER — PATIENT MESSAGE (OUTPATIENT)
Dept: SURGERY | Facility: HOSPITAL | Age: 52
End: 2022-06-08
Payer: MEDICAID

## 2022-06-23 ENCOUNTER — PATIENT MESSAGE (OUTPATIENT)
Dept: SURGERY | Facility: HOSPITAL | Age: 52
End: 2022-06-23
Payer: MEDICAID

## 2022-06-29 ENCOUNTER — HOSPITAL ENCOUNTER (OUTPATIENT)
Facility: HOSPITAL | Age: 52
Discharge: HOME | End: 2022-06-29
Attending: ANESTHESIOLOGY | Admitting: ANESTHESIOLOGY
Payer: MEDICAID

## 2022-06-29 VITALS
HEIGHT: 65 IN | OXYGEN SATURATION: 96 % | TEMPERATURE: 98 F | BODY MASS INDEX: 33.49 KG/M2 | SYSTOLIC BLOOD PRESSURE: 110 MMHG | WEIGHT: 201 LBS | DIASTOLIC BLOOD PRESSURE: 65 MMHG | HEART RATE: 95 BPM | RESPIRATION RATE: 20 BRPM

## 2022-06-29 DIAGNOSIS — M50.30 DEGENERATIVE DISC DISEASE, CERVICAL: Primary | ICD-10-CM

## 2022-06-29 PROCEDURE — 62321 NJX INTERLAMINAR CRV/THRC: CPT | Mod: ,,, | Performed by: ANESTHESIOLOGY

## 2022-06-29 PROCEDURE — 25500020 PHARM REV CODE 255: Performed by: ANESTHESIOLOGY

## 2022-06-29 PROCEDURE — 62321 NJX INTERLAMINAR CRV/THRC: CPT | Performed by: ANESTHESIOLOGY

## 2022-06-29 PROCEDURE — 25000003 PHARM REV CODE 250: Performed by: ANESTHESIOLOGY

## 2022-06-29 PROCEDURE — 62321 PR INJ CERV/THORAC, W/GUIDANCE: ICD-10-PCS | Mod: ,,, | Performed by: ANESTHESIOLOGY

## 2022-06-29 PROCEDURE — 63600175 PHARM REV CODE 636 W HCPCS: Performed by: ANESTHESIOLOGY

## 2022-06-29 RX ORDER — MIDAZOLAM HYDROCHLORIDE 1 MG/ML
INJECTION, SOLUTION INTRAMUSCULAR; INTRAVENOUS
Status: DISCONTINUED | OUTPATIENT
Start: 2022-06-29 | End: 2022-06-29 | Stop reason: HOSPADM

## 2022-06-29 RX ORDER — LIDOCAINE HYDROCHLORIDE 10 MG/ML
INJECTION INFILTRATION; PERINEURAL
Status: DISCONTINUED | OUTPATIENT
Start: 2022-06-29 | End: 2022-06-29 | Stop reason: HOSPADM

## 2022-06-29 RX ORDER — METHYLPREDNISOLONE ACETATE 80 MG/ML
INJECTION, SUSPENSION INTRA-ARTICULAR; INTRALESIONAL; INTRAMUSCULAR; SOFT TISSUE
Status: DISCONTINUED | OUTPATIENT
Start: 2022-06-29 | End: 2022-06-29 | Stop reason: HOSPADM

## 2022-06-29 RX ORDER — OXYCODONE AND ACETAMINOPHEN 5; 325 MG/1; MG/1
1 TABLET ORAL EVERY 6 HOURS PRN
Qty: 30 TABLET | Refills: 0 | Status: SHIPPED | OUTPATIENT
Start: 2022-06-29 | End: 2022-10-07

## 2022-06-29 RX ORDER — SODIUM CHLORIDE, SODIUM LACTATE, POTASSIUM CHLORIDE, CALCIUM CHLORIDE 600; 310; 30; 20 MG/100ML; MG/100ML; MG/100ML; MG/100ML
INJECTION, SOLUTION INTRAVENOUS CONTINUOUS
Status: ACTIVE | OUTPATIENT
Start: 2022-06-29

## 2022-06-29 RX ORDER — FENTANYL CITRATE 50 UG/ML
INJECTION, SOLUTION INTRAMUSCULAR; INTRAVENOUS
Status: DISCONTINUED | OUTPATIENT
Start: 2022-06-29 | End: 2022-06-29 | Stop reason: HOSPADM

## 2022-06-29 RX ADMIN — SODIUM CHLORIDE, SODIUM LACTATE, POTASSIUM CHLORIDE, AND CALCIUM CHLORIDE: .6; .31; .03; .02 INJECTION, SOLUTION INTRAVENOUS at 12:06

## 2022-06-29 NOTE — H&P
"FOLLOW UP NOTE:     CHIEF COMPLAINT: neck pain    INTERVAL HISTORY OF PRESENT ILLNESS: Nicole Harris is a 52 y.o. female who presents for C5-C6 cervical interlaminar epidural steroid injection. The patient denies of any significant changes in her health since her last appointment. The patient also denies of any changes in the character of her pain since her last appointment.     ROS:  Review of Systems   Constitutional: Negative for chills and fever.   HENT: Negative for sore throat.    Eyes: Negative for visual disturbance.   Respiratory: Negative for shortness of breath.    Cardiovascular: Negative for chest pain.   Gastrointestinal: Negative for nausea and vomiting.   Genitourinary: Negative for difficulty urinating.   Musculoskeletal: Positive for neck pain.   Skin: Negative for rash.   Allergic/Immunologic: Negative for immunocompromised state.   Neurological: Negative for syncope.   Hematological: Does not bruise/bleed easily.   Psychiatric/Behavioral: Negative for suicidal ideas.        MEDICAL, SURGICAL, FAMILY, SOCIAL HX: reviewed    MEDICATIONS/ALLERGIES: reviewed    PHYSICAL EXAM:    VITALS: Vitals reviewed.   Vitals:    06/29/22 1240 06/29/22 1241   BP: 117/84 117/84   Pulse: 93    Resp: 14    Temp: 98.1 °F (36.7 °C)    TempSrc: Oral    SpO2: 98%    Weight: 91.2 kg (201 lb)    Height: 5' 5" (1.651 m)        Physical Exam  Vitals and nursing note reviewed.   Constitutional:       Appearance: She is not diaphoretic.   HENT:      Head: Normocephalic and atraumatic.   Eyes:      General:         Right eye: No discharge.         Left eye: No discharge.      Conjunctiva/sclera: Conjunctivae normal.   Cardiovascular:      Rate and Rhythm: Normal rate.   Pulmonary:      Effort: Pulmonary effort is normal. No respiratory distress.      Breath sounds: Normal breath sounds.   Abdominal:      Palpations: Abdomen is soft.   Skin:     General: Skin is warm and dry.      Findings: No rash.   Neurological:      " Mental Status: She is alert and oriented to person, place, and time.   Psychiatric:         Mood and Affect: Mood and affect normal.         Cognition and Memory: Memory normal.         Judgment: Judgment normal.          EXTREMITIES:    Gen: No cyanosis, edema, varicosities, or tenderness to palpation BLE   Skin: Warm, pink, dry, no rashes, no lesions BLE   Strength: 5/5 motor strength BLE   ROM: hips, knees and ankles without pain or instability.     NEUROLOGICAL:    Gen: No clonus or spasticity.   Gait: Normal without antalgic lean   DTR's: 2+ in bilateral patellar, and ankle   BABINSKI: Absent bilaterally  Sensory: Intact to light touch and proprioception BLE    ASSESSMENT: Nicole Harris is a 52 y.o. female who presents for C5-C6 cervical interlaminar epidural steroid injection.     PLAN:  1. Proceed with C5-C6 cervical interlaminar epidural steroid injection as previously discussed.    This patient has been cleared for surgery in an ambulatory surgical facility    ASA 3,  Mallampatti Score 3  No history of anesthetic complications  Plan for RN IV sedation    Emily José MD  Pain Management

## 2022-06-29 NOTE — DISCHARGE INSTRUCTIONS
[phone]  [facility]   [phone]  [facility]      [phone]    Managing nausea    Some people have an upset stomach after surgery. This is often because of anesthesia, pain, or pain medicine, or the stress of surgery. These tips will help you handle nausea and eat healthy foods as you get better. If you were on a special food plan before surgery, ask your healthcare provider if you should follow it while you get better. These tips may help:  Do not push yourself to eat. Your body will tell you when to eat and how much.  Start off with clear liquids and soup. They are easier to digest.  Next try semi-solid foods, such as mashed potatoes, applesauce, and gelatin, as you feel ready.  Slowly move to solid foods. Dont eat fatty, rich, or spicy foods at first.  Do not force yourself to have 3 large meals a day. Instead eat smaller amounts more often.  Take pain medicines with a small amount of solid food, such as crackers or toast, to avoid nausea.

## 2022-06-29 NOTE — DISCHARGE SUMMARY
[facility]  Discharge Note  Short Stay    Procedure(s) (LRB):  Injection, Steroid, Epidural MEHRAN C5-C6 (N/A)    OUTCOME: Patient tolerated treatment/procedure well without complication and is now ready for discharge.    DISPOSITION: Home or Self Care    FINAL DIAGNOSIS:  Degenerative disc disease, cervical    FOLLOWUP: In clinic    DISCHARGE INSTRUCTIONS:    Discharge Procedure Orders   Diet general     Call MD for:  temperature >100.4     Call MD for:  persistent nausea and vomiting     Call MD for:  severe uncontrolled pain     Call MD for:  difficulty breathing, headache or visual disturbances     Call MD for:  redness, tenderness, or signs of infection (pain, swelling, redness, odor or green/yellow discharge around incision site)     Call MD for:  hives     Call MD for:  persistent dizziness or light-headedness     Call MD for:  extreme fatigue        TIME SPENT ON DISCHARGE: 30 minutes

## 2022-06-29 NOTE — OP NOTE
PROCEDURE DATE: 6/29/2022    PROCEDURE: C5-C6 cervical interlaminar epidural steroid injection under utilizing fluoroscopy.    DIAGNOSIS: Cervical Degenerative Disc Diease; Cervical Radiculitis  POSTOP DIAGNOSIS: SAME    PHYSICIAN: Emily José MD    MEDICATIONS INJECTED:  Depo-medrol 80 mg followed by a slow injection of 4 mL sterile, preservative-free normal saline.    LOCAL ANESTHETIC USED: Lidocaine 1%, 4 ml.    SEDATION MEDICATIONS: RN IV sedation    COMPLICATIONS:  none    ESTIMATED BLOOD LOSS: none    TECHNIQUE:  A time-out was taken to identify patient and procedure prior to starting the procedure.  With the patient laying in a prone position with the neck in a mid-flexed forward position, the area was prepped and draped in the usual sterile fashion using ChloraPrep and a fenestrated drape.  The area was determined under AP fluoroscopic guidance.  Local anesthetic was given using a 25-gauge 1.5 inch needle by raising a wheal and then infiltrating ventrally.  A 3.5 inch 20-gauge Touhy needle was introduced under fluoroscopic guidance to meet the lamina of C5.  The needle was then hinged under the lamina then advanced using loss of resistance technique.  Once the tip of the needle was in the desired position, the 2 mL contrast dye Omnipaque was injected to determine placement and no uptake.  The steroid was then injected slowly followed by a slow injection of 4 mL of the sterile preservative-free normal saline.  The patient tolerated the procedure well.    The patient was monitored after the procedure and was given post-procedure and discharge instructions to follow at home. The patient was discharged in a stable condition.

## 2022-06-30 ENCOUNTER — TELEPHONE (OUTPATIENT)
Dept: PAIN MEDICINE | Facility: CLINIC | Age: 52
End: 2022-06-30
Payer: MEDICAID

## 2022-06-30 NOTE — TELEPHONE ENCOUNTER
----- Message from Nicole Wilson sent at 6/30/2022 12:34 PM CDT -----  Contact: [phone]  Type:  Pharmacy Calling to Clarify an RX    Name of Caller: Minerva Wilkinson  Pharmacy Name:    Prescription Name:  oxyCODONE-acetaminophen (PERCOCET) 5-325 mg per tablet  What do they need to clarify?:    Best Call Back Number:  [phone]  Additional Information:        Pls send Last appt date and diagnosis code. Also can only fill 7 tablets for accut pain. Not 10.

## 2022-06-30 NOTE — TELEPHONE ENCOUNTER
Spoke to Britt at the pharmacy. Codes given to provide full Rx was M47.892 and M50.30. Also informed them that she had a procedure yesterday and the prescription was for post-op pain management. She stated they will fill the prescription as indicated.

## 2022-07-07 ENCOUNTER — PATIENT MESSAGE (OUTPATIENT)
Dept: PAIN MEDICINE | Facility: CLINIC | Age: 52
End: 2022-07-07
Payer: MEDICAID

## 2022-07-10 ENCOUNTER — PATIENT MESSAGE (OUTPATIENT)
Dept: PAIN MEDICINE | Facility: CLINIC | Age: 52
End: 2022-07-10
Payer: MEDICAID

## 2022-07-19 ENCOUNTER — PATIENT MESSAGE (OUTPATIENT)
Dept: PAIN MEDICINE | Facility: CLINIC | Age: 52
End: 2022-07-19
Payer: MEDICAID

## 2022-07-19 DIAGNOSIS — M50.30 DEGENERATIVE DISC DISEASE, CERVICAL: Primary | ICD-10-CM

## 2022-07-19 DIAGNOSIS — M54.12 CERVICAL RADICULOPATHY: ICD-10-CM

## 2022-07-19 DIAGNOSIS — M47.812 CERVICAL SPONDYLOSIS: ICD-10-CM

## 2022-07-21 ENCOUNTER — PATIENT MESSAGE (OUTPATIENT)
Dept: PAIN MEDICINE | Facility: CLINIC | Age: 52
End: 2022-07-21
Payer: MEDICAID

## 2022-09-12 ENCOUNTER — PATIENT MESSAGE (OUTPATIENT)
Dept: PAIN MEDICINE | Facility: CLINIC | Age: 52
End: 2022-09-12
Payer: MEDICAID

## 2022-09-22 ENCOUNTER — TELEPHONE (OUTPATIENT)
Dept: NEUROSURGERY | Facility: CLINIC | Age: 52
End: 2022-09-22
Payer: MEDICAID

## 2022-09-22 NOTE — TELEPHONE ENCOUNTER
CHUYITA staff at [facility] regarding pt referral to NS.  Pt scheduled w/Skye 07.27.2022.  Referral deferred.

## 2022-09-29 ENCOUNTER — TELEPHONE (OUTPATIENT)
Dept: PODIATRY | Facility: CLINIC | Age: 52
End: 2022-09-29
Payer: MEDICAID

## 2022-09-30 ENCOUNTER — PATIENT MESSAGE (OUTPATIENT)
Dept: PAIN MEDICINE | Facility: CLINIC | Age: 52
End: 2022-09-30
Payer: MEDICAID

## 2022-09-30 ENCOUNTER — TELEPHONE (OUTPATIENT)
Dept: PAIN MEDICINE | Facility: CLINIC | Age: 52
End: 2022-09-30
Payer: MEDICAID

## 2022-09-30 NOTE — TELEPHONE ENCOUNTER
Dr Estefanía BAIRES for when you come back this pts MRI will be under [facility]ica in her chart. Please advise. In the meantime I will inform the patient that you are out of the office until 10/04. Thank you

## 2022-09-30 NOTE — TELEPHONE ENCOUNTER
----- Message from Emily José MD sent at 9/30/2022 11:35 AM CDT -----  I saw a MRI of the cervical spine which I reviewed. She can be offered a follow-up with either Brii or myself to further discuss, re-evaluate, and then discuss treatment options at that time. Thank you.

## 2022-10-03 ENCOUNTER — OFFICE VISIT (OUTPATIENT)
Dept: PAIN MEDICINE | Facility: CLINIC | Age: 52
End: 2022-10-03
Payer: MEDICAID

## 2022-10-03 VITALS — HEIGHT: 65 IN | BODY MASS INDEX: 29.49 KG/M2 | WEIGHT: 177 LBS

## 2022-10-03 DIAGNOSIS — M47.812 FACET ARTHROPATHY, CERVICAL: ICD-10-CM

## 2022-10-03 DIAGNOSIS — M54.12 CERVICAL RADICULOPATHY: Primary | ICD-10-CM

## 2022-10-03 DIAGNOSIS — M50.30 DEGENERATIVE DISC DISEASE, CERVICAL: ICD-10-CM

## 2022-10-03 PROCEDURE — 1160F PR REVIEW ALL MEDS BY PRESCRIBER/CLIN PHARMACIST DOCUMENTED: ICD-10-PCS | Mod: CPTII,,,

## 2022-10-03 PROCEDURE — 99214 OFFICE O/P EST MOD 30 MIN: CPT | Mod: S$PBB,,,

## 2022-10-03 PROCEDURE — 99213 OFFICE O/P EST LOW 20 MIN: CPT | Mod: PBBFAC,PO

## 2022-10-03 PROCEDURE — 99214 PR OFFICE/OUTPT VISIT, EST, LEVL IV, 30-39 MIN: ICD-10-PCS | Mod: S$PBB,,,

## 2022-10-03 PROCEDURE — 3008F PR BODY MASS INDEX (BMI) DOCUMENTED: ICD-10-PCS | Mod: CPTII,,,

## 2022-10-03 PROCEDURE — 99999 PR PBB SHADOW E&M-EST. PATIENT-LVL III: CPT | Mod: PBBFAC,,,

## 2022-10-03 PROCEDURE — 4010F PR ACE/ARB THEARPY RXD/TAKEN: ICD-10-PCS | Mod: CPTII,,,

## 2022-10-03 PROCEDURE — 3008F BODY MASS INDEX DOCD: CPT | Mod: CPTII,,,

## 2022-10-03 PROCEDURE — 4010F ACE/ARB THERAPY RXD/TAKEN: CPT | Mod: CPTII,,,

## 2022-10-03 PROCEDURE — 1160F RVW MEDS BY RX/DR IN RCRD: CPT | Mod: CPTII,,,

## 2022-10-03 PROCEDURE — 99999 PR PBB SHADOW E&M-EST. PATIENT-LVL III: ICD-10-PCS | Mod: PBBFAC,,,

## 2022-10-03 PROCEDURE — 1159F MED LIST DOCD IN RCRD: CPT | Mod: CPTII,,,

## 2022-10-03 PROCEDURE — 1159F PR MEDICATION LIST DOCUMENTED IN MEDICAL RECORD: ICD-10-PCS | Mod: CPTII,,,

## 2022-10-03 RX ORDER — AZELASTINE 1 MG/ML
SPRAY, METERED NASAL
Status: ON HOLD | COMMUNITY

## 2022-10-03 RX ORDER — LEVOCETIRIZINE DIHYDROCHLORIDE 5 MG/1
TABLET, FILM COATED ORAL
Status: ON HOLD | COMMUNITY
Start: 2022-08-26

## 2022-10-03 RX ORDER — SEMAGLUTIDE 1.34 MG/ML
INJECTION, SOLUTION SUBCUTANEOUS
COMMUNITY
Start: 2022-09-30 | End: 2022-10-27

## 2022-10-03 RX ORDER — AMOXICILLIN 500 MG/1
500 CAPSULE ORAL EVERY 8 HOURS
COMMUNITY
Start: 2022-09-29 | End: 2023-01-10

## 2022-10-03 RX ORDER — MONTELUKAST SODIUM 10 MG/1
TABLET ORAL
Status: ON HOLD | COMMUNITY
Start: 2022-04-14

## 2022-10-03 RX ORDER — CYCLOBENZAPRINE HCL 10 MG
10 TABLET ORAL NIGHTLY PRN
Status: ON HOLD | COMMUNITY
Start: 2022-09-29
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
## 2022-10-07 ENCOUNTER — PATIENT MESSAGE (OUTPATIENT)
Dept: SURGERY | Facility: HOSPITAL | Age: 52
End: 2022-10-07
Payer: MEDICAID

## 2022-10-07 DIAGNOSIS — M50.30 DEGENERATIVE DISC DISEASE, CERVICAL: Primary | ICD-10-CM

## 2022-10-07 DIAGNOSIS — M51.36 DEGENERATIVE DISC DISEASE, LUMBAR: ICD-10-CM

## 2022-10-07 DIAGNOSIS — M47.896 OTHER SPONDYLOSIS, LUMBAR REGION: ICD-10-CM

## 2022-10-07 RX ORDER — OXYCODONE AND ACETAMINOPHEN 5; 325 MG/1; MG/1
1 TABLET ORAL EVERY 6 HOURS PRN
Qty: 30 TABLET | Refills: 0 | Status: SHIPPED | OUTPATIENT
Start: 2022-10-07 | End: 2023-01-10

## 2022-10-07 RX ORDER — OXYCODONE AND ACETAMINOPHEN 5; 325 MG/1; MG/1
1 TABLET ORAL EVERY 6 HOURS PRN
Qty: 30 TABLET | Refills: 0 | Status: SHIPPED | OUTPATIENT
Start: 2022-10-07 | End: 2022-10-07

## 2022-10-07 NOTE — TELEPHONE ENCOUNTER
Pt did see Brii on 10/03 and does have an appointment for injection scheduled for 11/02. Please order if appropriate.

## 2022-10-07 NOTE — TELEPHONE ENCOUNTER
----- Message from Arnulfo Shipman sent at 10/7/2022 11:06 AM CDT -----  Contact: pt at [phone]    [id number] - [place], [place] - [address]  [id number]  [place] [address]  Phone: [phone] Fax: [phone]    The presribtion that was sent over cant get filled because she does not fill with them please call to advise the pt at [phone]

## 2022-10-11 ENCOUNTER — OFFICE VISIT (OUTPATIENT)
Dept: PODIATRY | Facility: CLINIC | Age: 52
End: 2022-10-11
Payer: MEDICAID

## 2022-10-11 ENCOUNTER — PATIENT MESSAGE (OUTPATIENT)
Dept: SURGERY | Facility: HOSPITAL | Age: 52
End: 2022-10-11
Payer: MEDICAID

## 2022-10-11 VITALS
HEART RATE: 96 BPM | HEIGHT: 65 IN | WEIGHT: 177 LBS | SYSTOLIC BLOOD PRESSURE: 124 MMHG | RESPIRATION RATE: 18 BRPM | BODY MASS INDEX: 29.49 KG/M2 | DIASTOLIC BLOOD PRESSURE: 80 MMHG

## 2022-10-11 DIAGNOSIS — G57.92 NEURITIS OF LEFT FOOT: ICD-10-CM

## 2022-10-11 DIAGNOSIS — E11.65 UNCONTROLLED TYPE 2 DIABETES MELLITUS WITH HYPERGLYCEMIA: ICD-10-CM

## 2022-10-11 DIAGNOSIS — M79.672 BILATERAL FOOT PAIN: ICD-10-CM

## 2022-10-11 DIAGNOSIS — E11.49 DIABETIC NEUROPATHY WITH NEUROLOGIC COMPLICATION: Primary | ICD-10-CM

## 2022-10-11 DIAGNOSIS — G57.91 NEURITIS OF RIGHT FOOT: ICD-10-CM

## 2022-10-11 DIAGNOSIS — E11.40 DIABETIC NEUROPATHY WITH NEUROLOGIC COMPLICATION: Primary | ICD-10-CM

## 2022-10-11 DIAGNOSIS — M79.671 BILATERAL FOOT PAIN: ICD-10-CM

## 2022-10-11 DIAGNOSIS — M79.676 PAIN AROUND TOENAIL: ICD-10-CM

## 2022-10-11 PROCEDURE — 99203 PR OFFICE/OUTPT VISIT, NEW, LEVL III, 30-44 MIN: ICD-10-PCS | Mod: S$PBB,,, | Performed by: PODIATRIST

## 2022-10-11 PROCEDURE — 99999 PR PBB SHADOW E&M-EST. PATIENT-LVL V: ICD-10-PCS | Mod: PBBFAC,,, | Performed by: PODIATRIST

## 2022-10-11 PROCEDURE — 4010F ACE/ARB THERAPY RXD/TAKEN: CPT | Mod: CPTII,,, | Performed by: PODIATRIST

## 2022-10-11 PROCEDURE — 3079F DIAST BP 80-89 MM HG: CPT | Mod: CPTII,,, | Performed by: PODIATRIST

## 2022-10-11 PROCEDURE — 4010F PR ACE/ARB THEARPY RXD/TAKEN: ICD-10-PCS | Mod: CPTII,,, | Performed by: PODIATRIST

## 2022-10-11 PROCEDURE — 99203 OFFICE O/P NEW LOW 30 MIN: CPT | Mod: S$PBB,,, | Performed by: PODIATRIST

## 2022-10-11 PROCEDURE — 99215 OFFICE O/P EST HI 40 MIN: CPT | Mod: PBBFAC | Performed by: PODIATRIST

## 2022-10-11 PROCEDURE — 3074F SYST BP LT 130 MM HG: CPT | Mod: CPTII,,, | Performed by: PODIATRIST

## 2022-10-11 PROCEDURE — 99999 PR PBB SHADOW E&M-EST. PATIENT-LVL V: CPT | Mod: PBBFAC,,, | Performed by: PODIATRIST

## 2022-10-11 PROCEDURE — 3074F PR MOST RECENT SYSTOLIC BLOOD PRESSURE < 130 MM HG: ICD-10-PCS | Mod: CPTII,,, | Performed by: PODIATRIST

## 2022-10-11 PROCEDURE — 3079F PR MOST RECENT DIASTOLIC BLOOD PRESSURE 80-89 MM HG: ICD-10-PCS | Mod: CPTII,,, | Performed by: PODIATRIST

## 2022-10-11 RX ORDER — METFORMIN HYDROCHLORIDE 500 MG/1
500 TABLET ORAL
COMMUNITY
Start: 2022-07-27 | End: 2022-10-11

## 2022-10-12 ENCOUNTER — OFFICE VISIT (OUTPATIENT)
Dept: PAIN MEDICINE | Facility: CLINIC | Age: 52
End: 2022-10-12
Payer: MEDICAID

## 2022-10-12 DIAGNOSIS — M79.18 MYOFASCIAL PAIN: ICD-10-CM

## 2022-10-12 DIAGNOSIS — M46.1 SI (SACROILIAC) JOINT INFLAMMATION: Primary | ICD-10-CM

## 2022-10-12 DIAGNOSIS — M54.42 ACUTE BILATERAL LOW BACK PAIN WITH LEFT-SIDED SCIATICA: ICD-10-CM

## 2022-10-12 PROCEDURE — 1160F PR REVIEW ALL MEDS BY PRESCRIBER/CLIN PHARMACIST DOCUMENTED: ICD-10-PCS | Mod: CPTII,,,

## 2022-10-12 PROCEDURE — 4010F ACE/ARB THERAPY RXD/TAKEN: CPT | Mod: CPTII,,,

## 2022-10-12 PROCEDURE — 1160F RVW MEDS BY RX/DR IN RCRD: CPT | Mod: CPTII,,,

## 2022-10-12 PROCEDURE — 96372 THER/PROPH/DIAG INJ SC/IM: CPT | Mod: PBBFAC,PO

## 2022-10-12 PROCEDURE — 99214 OFFICE O/P EST MOD 30 MIN: CPT | Mod: PBBFAC,25,PO

## 2022-10-12 PROCEDURE — 99999 PR PBB SHADOW E&M-EST. PATIENT-LVL IV: ICD-10-PCS | Mod: PBBFAC,,,

## 2022-10-12 PROCEDURE — 4010F PR ACE/ARB THEARPY RXD/TAKEN: ICD-10-PCS | Mod: CPTII,,,

## 2022-10-12 PROCEDURE — 99214 PR OFFICE/OUTPT VISIT, EST, LEVL IV, 30-39 MIN: ICD-10-PCS | Mod: S$PBB,,,

## 2022-10-12 PROCEDURE — 1159F MED LIST DOCD IN RCRD: CPT | Mod: CPTII,,,

## 2022-10-12 PROCEDURE — 1159F PR MEDICATION LIST DOCUMENTED IN MEDICAL RECORD: ICD-10-PCS | Mod: CPTII,,,

## 2022-10-12 PROCEDURE — 99214 OFFICE O/P EST MOD 30 MIN: CPT | Mod: S$PBB,,,

## 2022-10-12 PROCEDURE — 99999 PR PBB SHADOW E&M-EST. PATIENT-LVL IV: CPT | Mod: PBBFAC,,,

## 2022-10-12 RX ORDER — METHYLPREDNISOLONE ACETATE 40 MG/ML
40 INJECTION, SUSPENSION INTRA-ARTICULAR; INTRALESIONAL; INTRAMUSCULAR; SOFT TISSUE ONCE
Status: COMPLETED | OUTPATIENT
Start: 2022-10-12 | End: 2022-10-12

## 2022-10-12 RX ORDER — KETOROLAC TROMETHAMINE 30 MG/ML
30 INJECTION, SOLUTION INTRAMUSCULAR; INTRAVENOUS ONCE
Status: COMPLETED | OUTPATIENT
Start: 2022-10-12 | End: 2022-10-12

## 2022-10-12 RX ADMIN — KETOROLAC TROMETHAMINE 30 MG: 30 INJECTION, SOLUTION INTRAMUSCULAR at 11:10

## 2022-10-12 RX ADMIN — METHYLPREDNISOLONE ACETATE 40 MG: 40 INJECTION, SUSPENSION INTRA-ARTICULAR; INTRALESIONAL; INTRAMUSCULAR; INTRASYNOVIAL; SOFT TISSUE at 11:10
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
## 2022-10-16 NOTE — PROGRESS NOTES
Subjective:       Patient ID: Nicole Harris is a 52 y.o. female.    Chief Complaint: Ingrown Toenail, Diabetes Mellitus, and Foot Problem  Patient presents with complaint of tingling and numbness in her toes, special leading notable under toenails.  She feels both of her big toenails are constantly ingrown.  Denies any infection or previous procedures on toenails.  Does relate a history of fibromyalgia, on medication.  Neck and back problems with multiple epidurals.  Diabetes times 10 years, relates it is not well controlled in glucose was 177 this morning..  Pain level feet 8/10      Past Medical History:   Diagnosis Date    Anxiety     Bipolar disorder     Depression     Diabetes mellitus, type 2     Fibromyalgia     HTN (hypertension)     Insomnia     Migraine      Past Surgical History:   Procedure Laterality Date    ANGIOGRAM, CORONARY, WITH LEFT HEART CATHETERIZATION Left 2020    Procedure: Left heart cath;  Surgeon: Kannan Santana MD;  Location: [facility] CATH/EP LAB;  Service: Cardiology;  Laterality: Left;    CARDIAC CATHETERIZATION       SECTION      COLONOSCOPY N/A 2019    Procedure: COLONOSCOPY;  Surgeon: Da Diallo MD;  Location: [facility] ENDO;  Service: General;  Laterality: N/A;    CORONARY ARTERY BYPASS GRAFT (CABG) N/A 2020    Procedure: CORONARY ARTERY BYPASS GRAFT (CABG);  Surgeon: Anshul Oakes MD;  Location: [facility] OR;  Service: Cardiothoracic;  Laterality: N/A;    ENDOSCOPIC HARVEST OF VEIN Left 2020    Procedure: SURGICAL PROCUREMENT, VEIN, ENDOSCOPIC;  Surgeon: Anshul Oakes MD;  Location: [facility] OR;  Service: Cardiothoracic;  Laterality: Left;    EPIDURAL STEROID INJECTION N/A 2019    Procedure: Injection, Steroid, Epidural - L4/5 EPIDURAL STEROID INJECTION;  Surgeon: Sherri Gardiner MD;  Location: [facility] OR;  Service: Pain Management;  Laterality: N/A;    EPIDURAL STEROID INJECTION N/A 2019    Procedure: Injection, Steroid, Epidural - C7/T1  EPIDURAL STEROID INJECTION;  Surgeon: Sherri Gardiner MD;  Location: [facility] OR;  Service: Pain Management;  Laterality: N/A;    EPIDURAL STEROID INJECTION N/A 7/8/2019    Procedure: Injection, Steroid, Epidural - L4/5 EPIDURAL STEROID INJECTION;  Surgeon: Sherri Gardiner MD;  Location: [facility] OR;  Service: Pain Management;  Laterality: N/A;    EPIDURAL STEROID INJECTION N/A 10/21/2019    Procedure: Injection, Steroid, Epidural, CERVICAL C7/T1 SILVIA;  Surgeon: Sehrri Gardiner MD;  Location: [facility] OR;  Service: Pain Management;  Laterality: N/A;  09-23-19    EPIDURAL STEROID INJECTION N/A 10/1/2020    Procedure: Cervical SILVIA C7-T1;  Surgeon: Emily José MD;  Location: [facility] OR;  Service: Pain Management;  Laterality: N/A;    EPIDURAL STEROID INJECTION N/A 1/7/2021    Procedure: Lumbar SILVIA L4-L5;  Surgeon: Emily José MD;  Location: [facility] OR;  Service: Pain Management;  Laterality: N/A;    EPIDURAL STEROID INJECTION N/A 9/15/2021    Procedure: Injection, Steroid, Epidural, Cervical. C5 - C6;  Surgeon: Emily José MD;  Location: [facility] OR;  Service: Pain Management;  Laterality: N/A;    EPIDURAL STEROID INJECTION N/A 6/29/2022    Procedure: Injection, Steroid, Epidural MEHRAN C5-C6;  Surgeon: Emily José MD;  Location: [facility] OR;  Service: Pain Management;  Laterality: N/A;    EPIDURAL STEROID INJECTION INTO CERVICAL SPINE N/A 3/2/2022    Procedure: Injection-steroid-epidural-cervical C5-C6;  Surgeon: Emily José MD;  Location: [facility] OR;  Service: Pain Management;  Laterality: N/A;    ESOPHAGOGASTRODUODENOSCOPY N/A 9/18/2019    Procedure: ESOPHAGOGASTRODUODENOSCOPY (EGD);  Surgeon: Da Diallo MD;  Location: [facility] ENDO;  Service: General;  Laterality: N/A;    INJECTION OF ANESTHETIC AGENT AROUND NERVE Bilateral 4/1/2021    Procedure: Block, Nerve L3, L4, L5;  Surgeon: Emily José MD;  Location: [facility] OR;  Service: Pain Management;  Laterality: Bilateral;    TONSILLECTOMY      TRIGGER POINT INJECTION  N/A 7/8/2019    Procedure: INJECTION, TRIGGER POINT - CERVICAL REGION;  Surgeon: Sherri Gardiner MD;  Location: [facility] OR;  Service: Pain Management;  Laterality: N/A;    TRIGGER POINT INJECTION N/A 10/21/2019    Procedure: INJECTION, TRIGGER POINT;  Surgeon: Sherri Gardiner MD;  Location: [facility] OR;  Service: Pain Management;  Laterality: N/A;    TRIGGER POINT INJECTION N/A 10/1/2020    Procedure: INJECTION, TRIGGER POINT;  Surgeon: Emily José MD;  Location: [facility] OR;  Service: Pain Management;  Laterality: N/A;  back    triple bypass       Family History   Problem Relation Age of Onset    Lung cancer Mother     Brain cancer Sister     Pancreatic cancer Maternal Aunt     Pancreatic cancer Paternal Aunt      Social History     Socioeconomic History    Marital status:    Tobacco Use    Smoking status: Former     Packs/day: 1.00     Years: 0.00     Pack years: 0.00     Types: Cigarettes     Quit date: 9/18/2012     Years since quitting: 10.0    Smokeless tobacco: Never   Substance and Sexual Activity    Alcohol use: Yes     Comment: occasional    Drug use: Not Currently     Types: Marijuana, Other-see comments    Sexual activity: Yes     Social Determinants of Health     Financial Resource Strain: Unknown    Difficulty of Paying Living Expenses: Patient refused   Food Insecurity: Unknown    Worried About Running Out of Food in the Last Year: Patient refused    Ran Out of Food in the Last Year: Patient refused   Transportation Needs: Unknown    Lack of Transportation (Medical): Patient refused    Lack of Transportation (Non-Medical): Patient refused   Physical Activity: Unknown    Days of Exercise per Week: Patient refused   Stress: Unknown    Feeling of Stress : Patient refused   Social Connections: Unknown    Frequency of Communication with Friends and Family: Patient refused    Frequency of Social Gatherings with Friends and Family: Patient refused    Active Member of Clubs or Organizations: Patient  refused    Attends Club or Organization Meetings: Patient refused    Marital Status: Patient refused   Housing Stability: Unknown    Unable to Pay for Housing in the Last Year: Patient refused    Unstable Housing in the Last Year: Patient refused       Current Outpatient Medications   Medication Sig Dispense Refill    albuterol (PROVENTIL/VENTOLIN HFA) 90 mcg/actuation inhaler Inhale 1 puff into the lungs every 4 (four) hours as needed.   0    amoxicillin (AMOXIL) 500 MG capsule Take 500 mg by mouth every 8 (eight) hours.      atorvastatin (LIPITOR) 40 MG tablet   0 Refill(s), Maintenance      azelastine (ASTELIN) 137 mcg (0.1 %) nasal spray spray 2 spray by intranasal route 2 times every day in each nostril      cetirizine (ZYRTEC) 10 MG tablet Take 10 mg by mouth daily as needed.      cyclobenzaprine (FLEXERIL) 10 MG tablet Take 10 mg by mouth nightly as needed.      empagliflozin (JARDIANCE) 25 mg tablet   0 Refill(s), Maintenance      escitalopram oxalate (LEXAPRO) 20 MG tablet Take 20 mg by mouth once daily.   3    fluconazole (DIFLUCAN) 150 MG Tab Take by mouth.      fluticasone propionate (FLONASE) 50 mcg/actuation nasal spray 2 sprays by Nasal route.      insulin glargine,hum.rec.anlog (LANTUS SOLOSTAR U-100 INSULIN SUBQ) Inject 20 Units into the skin nightly.      levocetirizine (XYZAL) 5 MG tablet SMARTSI Tablet(s) By Mouth Every Evening PRN      metFORMIN (GLUCOPHAGE) 1000 MG tablet Take 1,000 mg by mouth 2 (two) times daily with meals.       montelukast (SINGULAIR) 10 mg tablet Take by mouth.      naproxen sodium (ANAPROX) 550 MG tablet Take 1 tablet (550 mg total) by mouth 2 (two) times daily with meals. 60 tablet 2    oxyCODONE-acetaminophen (PERCOCET) 5-325 mg per tablet Take 1 tablet by mouth every 6 (six) hours as needed for Pain. 30 tablet 0    pantoprazole (PROTONIX) 40 MG tablet Take 40 mg by mouth once daily.       TRUE METRIX GLUCOSE TEST STRIP Strp 3 (three) times daily.      aspirin 325 MG  "tablet Take 1 tablet (325 mg total) by mouth once daily. 30 tablet 0    lisinopriL (PRINIVIL,ZESTRIL) 5 MG tablet Take 1 tablet (5 mg total) by mouth once daily. 30 tablet 0    nortriptyline (PAMELOR) 50 MG capsule Take 2 capsules (100 mg total) by mouth every evening. 60 capsule 11    semaglutide (OZEMPIC) 0.25 mg or 0.5 mg(2 mg/1.5 mL) pen injector Inject into the skin.       No current facility-administered medications for this visit.     Facility-Administered Medications Ordered in Other Visits   Medication Dose Route Frequency Provider Last Rate Last Admin    0.9%  NaCl infusion   Intravenous Continuous Sherri Gardiner MD 20 mL/hr at 04/29/19 1227 New Bag at 04/29/19 1227    lactated ringers infusion   Intravenous Once PRN Emily José MD        lactated ringers infusion   Intravenous Continuous Emily José MD 25 mL/hr at 06/29/22 1246 New Bag at 06/29/22 1246     Review of patient's allergies indicates:   Allergen Reactions    Doxycycline     Hydrocodone Nausea And Vomiting    Vaccine adjuvant system, as01b liposomal     Varicella-zoster ge-as01b (pf)      Other reaction(s): Memory problems    Varicella-zoster virus glycoprotein e, recombinant     Benadryl [diphenhydramine hcl] Nausea Only       Review of Systems   HENT:  Negative for congestion.    Respiratory:  Negative for cough and shortness of breath.    All other systems reviewed and are negative.    Objective:      Vitals:    10/11/22 1435   BP: 124/80   Pulse: 96   Resp: 18   Weight: 80.3 kg (177 lb)   Height: 5' 5" (1.651 m)     Physical Exam  Vitals and nursing note reviewed.   Constitutional:       General: She is not in acute distress.  Cardiovascular:      Pulses:           Dorsalis pedis pulses are 2+ on the right side and 2+ on the left side.        Posterior tibial pulses are 2+ on the right side and 2+ on the left side.   Pulmonary:      Effort: Pulmonary effort is normal.   Musculoskeletal:         General: Tenderness present.      " Right foot: No deformity.      Left foot: No deformity.   Feet:      Right foot:      Skin integrity: Skin integrity normal. No erythema.      Left foot:      Skin integrity: Skin integrity normal. No erythema.   Skin:     Capillary Refill: Capillary refill takes less than 2 seconds.   Neurological:      Mental Status: She is alert.      Comments: Pain, paresthesias consistent with neuropathy bilateral feet.  There is some underlying neuritis is well specially the dorsal cutaneous nerves bilateral feet   Psychiatric:         Mood and Affect: Mood normal.         Behavior: Behavior normal.                          Assessment:       1. Diabetic neuropathy with neurologic complication    2. Neuritis of right foot    3. Uncontrolled type 2 diabetes mellitus with hyperglycemia    4. Neuritis of left foot    5. Bilateral foot pain    6. Pain around toenail        Plan:         Had a lengthy discussion with patient regarding evidence of both read neuropathy and neuritis and we discussed the difference between these 2 conditions.  Advised patient anti-inflammatories, naproxen/Advil/Aleve, Naprosyn, ibuprofen/Motrin are all oral anti-inflammatories that reduce inflammation and in turn reduce pain.  If these medications are helpful for her feet there is definitely an inflammatory component.  Explained these medications do not help neuropathy.  To differentiate between these 2 conditions would recommend oral medication above of her choice as directed for 2 weeks.  Along with that daily stretching morning, afternoon, evening.  Along with topical treatment.  Discussed multiple topical treatments including ice/cool therapy in frequency this should be performed.  Reviewed over-the-counter Voltaren gel as directed.  Reviewed soaking warm water and Epson salt.  Reviewed other topicals including Tiger balm, CBD cream.  These treatments work best with appropriate shoes.  Discussed wide appropriate tennis shoes with thick sole for  shock absorption in need to be worn indoors as well.  Stretch in the morning, right into tennis shoes, no flat shoes or walking barefoot.  Advised patient depending on the outcome of utilizing these treatments for inflammation neuropathy medication may be considered.  We can review that on a follow-up depending on results or she can discuss this with her interventional pain management physician.  Advised patient neuropathic pain can be due to both a combination of neck, back and diabetes.  Advised patient a 10 year history of uncontrolled diabetes significantly contributes to neuropathic pain of the feet.  We reviewed benefit of tight control of daily glucose/A1c regarding neuropathic pain.  We reviewed supplements and again how important appropriate shoes are in treatment of these conditions  Advised patient both big toenails are not ingrown, it is very common in patients with neuropathy and/or neuritis experience tingling and numbness in this is worse in the big toes giving them the feeling of a chronic ingrown nail which is not ingrown.  Advised treatments to help with this condition include all the above conservative treatments along with topicals to soften the nail and prevent the heavy pressure of the nail on the underlying skin.  Advised the most common treatments to treat the nail are Vicks vapor rub, tea tree oil, any type of lotion or cream which is effective in hydrating the nail such as Cera Ve or Lubriderm.  Treatment needs to be at least once daily, works best twice daily in combination with warm water and Epson salt  Advised patient would recommend all conservative treatments are exhausted before taking neuropathy medication or having any procedure done to the ingrown nails especially since she has had no previous history of infection or nail procedure  Patient was in understanding and agreement with treatment plan.  I counseled the patient on their conditions, implications and medical  management.  Instructed patient/family to contact the office with any changes, questions, concerns, worsening of symptoms.   Total face to face time, exam, assessment, treatment, discussion, documentation 30 minutes, more than half this time spent on consultation and coordination of care.   Follow up as needed, recommended annual diabetic foot exam      This note was created using M*Object Matrix voice recognition software that occasionally misinterpreted phrases or words.

## 2022-10-28 ENCOUNTER — PATIENT MESSAGE (OUTPATIENT)
Dept: SURGERY | Facility: HOSPITAL | Age: 52
End: 2022-10-28
Payer: MEDICAID

## 2022-11-02 ENCOUNTER — HOSPITAL ENCOUNTER (OUTPATIENT)
Facility: HOSPITAL | Age: 52
Discharge: HOME | End: 2022-11-02
Attending: ANESTHESIOLOGY | Admitting: ANESTHESIOLOGY
Payer: MEDICAID

## 2022-11-02 VITALS
OXYGEN SATURATION: 97 % | RESPIRATION RATE: 16 BRPM | TEMPERATURE: 98 F | HEART RATE: 77 BPM | SYSTOLIC BLOOD PRESSURE: 119 MMHG | DIASTOLIC BLOOD PRESSURE: 78 MMHG

## 2022-11-02 DIAGNOSIS — M50.30 DEGENERATIVE DISC DISEASE, CERVICAL: Primary | ICD-10-CM

## 2022-11-02 PROCEDURE — 63600175 PHARM REV CODE 636 W HCPCS: Performed by: ANESTHESIOLOGY

## 2022-11-02 PROCEDURE — 99152 MOD SED SAME PHYS/QHP 5/>YRS: CPT | Performed by: ANESTHESIOLOGY

## 2022-11-02 PROCEDURE — 25500020 PHARM REV CODE 255: Performed by: ANESTHESIOLOGY

## 2022-11-02 PROCEDURE — 62321 NJX INTERLAMINAR CRV/THRC: CPT | Performed by: ANESTHESIOLOGY

## 2022-11-02 PROCEDURE — 25000003 PHARM REV CODE 250: Performed by: ANESTHESIOLOGY

## 2022-11-02 PROCEDURE — 62321 PR INJ CERV/THORAC, W/GUIDANCE: ICD-10-PCS | Mod: ,,, | Performed by: ANESTHESIOLOGY

## 2022-11-02 PROCEDURE — 62321 NJX INTERLAMINAR CRV/THRC: CPT | Mod: ,,, | Performed by: ANESTHESIOLOGY

## 2022-11-02 RX ORDER — MIDAZOLAM HYDROCHLORIDE 1 MG/ML
INJECTION, SOLUTION INTRAMUSCULAR; INTRAVENOUS
Status: DISCONTINUED | OUTPATIENT
Start: 2022-11-02 | End: 2022-11-02 | Stop reason: HOSPADM

## 2022-11-02 RX ORDER — METHYLPREDNISOLONE ACETATE 80 MG/ML
INJECTION, SUSPENSION INTRA-ARTICULAR; INTRALESIONAL; INTRAMUSCULAR; SOFT TISSUE
Status: DISCONTINUED | OUTPATIENT
Start: 2022-11-02 | End: 2022-11-02 | Stop reason: HOSPADM

## 2022-11-02 RX ORDER — LIDOCAINE HYDROCHLORIDE 10 MG/ML
INJECTION INFILTRATION; PERINEURAL
Status: DISCONTINUED | OUTPATIENT
Start: 2022-11-02 | End: 2022-11-02 | Stop reason: HOSPADM

## 2022-11-02 RX ORDER — FENTANYL CITRATE 50 UG/ML
INJECTION, SOLUTION INTRAMUSCULAR; INTRAVENOUS
Status: DISCONTINUED | OUTPATIENT
Start: 2022-11-02 | End: 2022-11-02 | Stop reason: HOSPADM

## 2022-11-02 RX ORDER — SODIUM CHLORIDE, SODIUM LACTATE, POTASSIUM CHLORIDE, CALCIUM CHLORIDE 600; 310; 30; 20 MG/100ML; MG/100ML; MG/100ML; MG/100ML
INJECTION, SOLUTION INTRAVENOUS CONTINUOUS
Status: ACTIVE | OUTPATIENT
Start: 2022-11-02

## 2022-11-02 RX ADMIN — SODIUM CHLORIDE, SODIUM LACTATE, POTASSIUM CHLORIDE, AND CALCIUM CHLORIDE: .6; .31; .03; .02 INJECTION, SOLUTION INTRAVENOUS at 09:11

## 2022-11-02 NOTE — DISCHARGE SUMMARY
[facility] Surgery  Discharge Note  Short Stay    Procedure(s) (LRB):  Injection, Steroid, Epidural  MEHRAN C5-C6 (N/A)      OUTCOME: Patient tolerated treatment/procedure well without complication and is now ready for discharge.    DISPOSITION: Home or Self Care    FINAL DIAGNOSIS:  Degenerative disc disease, cervical     FOLLOWUP: In clinic    DISCHARGE INSTRUCTIONS:    Discharge Procedure Orders   Diet general     Call MD for:  temperature >100.4     Call MD for:  persistent nausea and vomiting     Call MD for:  severe uncontrolled pain     Call MD for:  difficulty breathing, headache or visual disturbances     Call MD for:  redness, tenderness, or signs of infection (pain, swelling, redness, odor or green/yellow discharge around incision site)     Call MD for:  hives     Call MD for:  persistent dizziness or light-headedness     Call MD for:  extreme fatigue        TIME SPENT ON DISCHARGE: 30 minutes

## 2022-11-02 NOTE — OP NOTE
PROCEDURE DATE: 11/2/2022    PROCEDURE: C5-C6 cervical interlaminar epidural steroid injection under utilizing fluoroscopy.    DIAGNOSIS: Cervical Degenerative Disc Diease; Cervical Radiculitis  POSTOP DIAGNOSIS: SAME    PHYSICIAN: Emily José MD    MEDICATIONS INJECTED:  Depo-medrol 80 mg followed by a slow injection of 4 mL sterile, preservative-free normal saline.    LOCAL ANESTHETIC USED: Lidocaine 1%, 4 ml.    SEDATION MEDICATIONS: RN IV sedation    COMPLICATIONS:  none    ESTIMATED BLOOD LOSS: none    TECHNIQUE:  A time-out was taken to identify patient and procedure prior to starting the procedure.  With the patient laying in a prone position with the neck in a mid-flexed forward position, the area was prepped and draped in the usual sterile fashion using ChloraPrep and a fenestrated drape.  The area was determined under AP fluoroscopic guidance.  Local anesthetic was given using a 25-gauge 1.5 inch needle by raising a wheal and then infiltrating ventrally.  A 3.5 inch 20-gauge Touhy needle was introduced under fluoroscopic guidance to meet the lamina of C5.  The needle was then hinged under the lamina then advanced using loss of resistance technique.  Once the tip of the needle was in the desired position, the 2 mL contrast dye Omnipaque was injected to determine placement and no uptake.  The steroid was then injected slowly followed by a slow injection of 4 mL of the sterile preservative-free normal saline.  The patient tolerated the procedure well.    The patient was monitored after the procedure and was given post-procedure and discharge instructions to follow at home. The patient was discharged in a stable condition.

## 2022-11-11 DIAGNOSIS — M25.531 RIGHT WRIST PAIN: Primary | ICD-10-CM

## 2022-11-18 ENCOUNTER — DOCUMENTATION ONLY (OUTPATIENT)
Dept: ORTHOPEDICS | Facility: CLINIC | Age: 52
End: 2022-11-18

## 2022-11-18 ENCOUNTER — HOSPITAL ENCOUNTER (OUTPATIENT)
Dept: RADIOLOGY | Facility: HOSPITAL | Age: 52
Discharge: HOME | End: 2022-11-18
Attending: ORTHOPAEDIC SURGERY
Payer: MEDICAID

## 2022-11-18 ENCOUNTER — OFFICE VISIT (OUTPATIENT)
Dept: ORTHOPEDICS | Facility: CLINIC | Age: 52
End: 2022-11-18
Payer: MEDICAID

## 2022-11-18 VITALS — BODY MASS INDEX: 29.49 KG/M2 | HEIGHT: 65 IN | WEIGHT: 177 LBS | RESPIRATION RATE: 16 BRPM

## 2022-11-18 DIAGNOSIS — R20.2 NUMBNESS AND TINGLING IN BOTH HANDS: ICD-10-CM

## 2022-11-18 DIAGNOSIS — R20.0 NUMBNESS AND TINGLING IN BOTH HANDS: ICD-10-CM

## 2022-11-18 DIAGNOSIS — G56.03 BILATERAL CARPAL TUNNEL SYNDROME: Primary | ICD-10-CM

## 2022-11-18 DIAGNOSIS — G56.23 CUBITAL TUNNEL SYNDROME OF BOTH UPPER EXTREMITIES: ICD-10-CM

## 2022-11-18 DIAGNOSIS — M25.531 RIGHT WRIST PAIN: ICD-10-CM

## 2022-11-18 PROCEDURE — 73110 X-RAY EXAM OF WRIST: CPT | Mod: 26,RT,, | Performed by: RADIOLOGY

## 2022-11-18 PROCEDURE — 4010F ACE/ARB THERAPY RXD/TAKEN: CPT | Mod: CPTII,,, | Performed by: ORTHOPAEDIC SURGERY

## 2022-11-18 PROCEDURE — 99213 OFFICE O/P EST LOW 20 MIN: CPT | Mod: PBBFAC,PN | Performed by: ORTHOPAEDIC SURGERY

## 2022-11-18 PROCEDURE — 99214 PR OFFICE/OUTPT VISIT, EST, LEVL IV, 30-39 MIN: ICD-10-PCS | Mod: S$PBB,,, | Performed by: ORTHOPAEDIC SURGERY

## 2022-11-18 PROCEDURE — 1159F PR MEDICATION LIST DOCUMENTED IN MEDICAL RECORD: ICD-10-PCS | Mod: CPTII,,, | Performed by: ORTHOPAEDIC SURGERY

## 2022-11-18 PROCEDURE — 4010F PR ACE/ARB THEARPY RXD/TAKEN: ICD-10-PCS | Mod: CPTII,,, | Performed by: ORTHOPAEDIC SURGERY

## 2022-11-18 PROCEDURE — 99214 OFFICE O/P EST MOD 30 MIN: CPT | Mod: S$PBB,,, | Performed by: ORTHOPAEDIC SURGERY

## 2022-11-18 PROCEDURE — 99999 PR PBB SHADOW E&M-EST. PATIENT-LVL III: CPT | Mod: PBBFAC,,, | Performed by: ORTHOPAEDIC SURGERY

## 2022-11-18 PROCEDURE — 3008F BODY MASS INDEX DOCD: CPT | Mod: CPTII,,, | Performed by: ORTHOPAEDIC SURGERY

## 2022-11-18 PROCEDURE — 1159F MED LIST DOCD IN RCRD: CPT | Mod: CPTII,,, | Performed by: ORTHOPAEDIC SURGERY

## 2022-11-18 PROCEDURE — 99999 PR PBB SHADOW E&M-EST. PATIENT-LVL III: ICD-10-PCS | Mod: PBBFAC,,, | Performed by: ORTHOPAEDIC SURGERY

## 2022-11-18 PROCEDURE — 73110 X-RAY EXAM OF WRIST: CPT | Mod: TC,PN,RT

## 2022-11-18 PROCEDURE — 73110 XR WRIST COMPLETE 3 VIEWS RIGHT: ICD-10-PCS | Mod: 26,RT,, | Performed by: RADIOLOGY

## 2022-11-18 PROCEDURE — 3008F PR BODY MASS INDEX (BMI) DOCUMENTED: ICD-10-PCS | Mod: CPTII,,, | Performed by: ORTHOPAEDIC SURGERY

## 2022-11-18 RX ORDER — SEMAGLUTIDE 1.34 MG/ML
INJECTION, SOLUTION SUBCUTANEOUS
COMMUNITY
Start: 2022-10-27 | End: 2022-12-13

## 2022-11-18 NOTE — PROGRESS NOTES
The EMG  with ultrasound and nerve conduction test for 2 extremities was successfully faxed to [facility] at [phone].

## 2022-11-18 NOTE — PROGRESS NOTES
Subjective:      Patient ID: Nicole Harris is a 52 y.o. female.    Chief Complaint: Pain and Numbness of the Right Wrist and Pain and Numbness of the Right Elbow      HPI:  Ms. Harris returned today with complaints of persistent pain with numbness and tingling in both of her hands, her right is worse than her left.  She has been symptomatic for approximately 12 years.  At her last visit on 12/13/2019 she appeared to have a radicular component to some of her upper extremity symptoms so she was forwarded to Spine surgery.  She stated she has seen spine surgery and at this point no further spinal treatment can help her.  She stated, it is hard to hold things and I drop things .  Her symptoms awaken her at night.  She has taken NSAIDs with help.  Intermittently she will wear a splint which helps.  She has not done physical therapy but has had injections which helped.    ROS:  New diagnosis/surgery/prescriptions since last office visit on 12/13/2019:  Evaluation by spine surgery  Constitution: Negative for chills and fever.   HENT: Negative for congestion.    Eyes: Negative for blurred vision.   Cardiovascular: Negative for chest pain.   Respiratory: Negative for cough.    Endocrine: Negative for polydipsia.   Hematologic/Lymphatic: Negative for adenopathy.   Skin: Negative for flushing and itching.   Musculoskeletal: Negative for gout.   Gastrointestinal: Positive for heartburn.   Genitourinary: Negative for nocturia.   Neurological: Positive for headaches. Negative for seizures.   Psychiatric/Behavioral: Positive for depression. The patient is nervous/anxious.    Allergic/Immunologic: Positive for environmental allergies.       Objective:      Physical Exam:   General: AAOx3.  No acute distress  Vascular:  Pulses intact and equal bilaterally.  Capillary refill less than 3 seconds and equal bilaterally  Neurologic:  Pinprick and soft touch intact and equal bilaterally.  Tinel's positive both wrist.  Tinel's  positive both elbows.  Phalen's positive bilaterally.  Integment:  No ecchymosis, no errythema  Extremity:  Wrist:  Pronation/supination equal bilaterally 90/85 degrees. Dorsiflexion/volar flexion equal bilaterally 80/75 degrees. Nontender in the anatomic snuffbox bilaterally. Nontender at the 1st dorsal compartment bilaterally.  No swelling at the 1st dorsal compartment bilaterally.  Finkelstein's negative bilaterally.  Nontender at the scapholunate interval bilaterally. Aguero's test negative bilaterally. Nontender at the DRUJ/TFCC bilaterally. Nontender with forced ulnar deviation bilaterally.  strength equal bilaterally. Thenar atrophy both hands right greater than.  Wartenberg sign mildly positive both hands.  Durkan's test negative both hands.                      Elbow:  Pronation/supination equal bilaterally 90/85 degrees. Extension/flexion equal bilaterally 0/130 degrees. Nontender at the epicondyles bilaterally.  Radial/ulna stressing equal bilaterally with endpoint.  Nontender over the radial head bilaterally.  Nontender over the olecranon and the triceps insertion both elbows.  Triceps tendon intact throughout full distribution.  Radiography:  Personally reviewed x-rays of the right wrist completed on 11/18/2022 showed mild scapholunate interval widening no fracture dislocation mild arthritic changes.      Assessment:       Impression:     1. Bilateral carpal tunnel syndrome    2. Cubital tunnel syndrome of both upper extremities    3. Numbness and tingling in both hands          Plan:       1.  Discussed physical examination and radiographic findings with the patient. Nicole understands that she has bilateral carpal tunnel and cubital tunnel syndrome.  Treatment alternatives and outcomes were discussed with the patient she understands she could be treated conservatively with observation, activity modification, NSAIDs, bracing, physical therapy, injections, or she could consider surgical intervention  such as carpal tunnel release and ulnar nerve decompression.  Before any surgery can be recommended a nerve conduction study is warranted.    2. Refer for a nerve conduction study of both upper extremities.  3. Final recommendations after nerve conduction study is completed.    4. Cock-up splint wear was discussed with the patient.  5. Continue with NSAIDs as tolerated allowed by PCM.    6. Follow up after nerve conduction study is completed.

## 2022-11-23 ENCOUNTER — PATIENT MESSAGE (OUTPATIENT)
Dept: PAIN MEDICINE | Facility: CLINIC | Age: 52
End: 2022-11-23
Payer: MEDICAID

## 2022-11-28 ENCOUNTER — CLINICAL SUPPORT (OUTPATIENT)
Dept: REHABILITATION | Facility: HOSPITAL | Age: 52
End: 2022-11-28
Payer: MEDICAID

## 2022-11-28 DIAGNOSIS — Z74.09 IMPAIRED FUNCTIONAL MOBILITY AND ACTIVITY TOLERANCE: ICD-10-CM

## 2022-11-28 DIAGNOSIS — M53.3 SACROILIAC JOINT DYSFUNCTION OF BOTH SIDES: ICD-10-CM

## 2022-11-28 PROCEDURE — 97162 PT EVAL MOD COMPLEX 30 MIN: CPT | Mod: PN

## 2022-11-28 NOTE — PLAN OF CARE
[facility]  Physical Therapy Initial Evaluation    Name: Nicole Harris  Clinic Number: [id number]    Therapy Diagnosis:   Encounter Diagnoses   Name Primary?    Impaired functional mobility and activity tolerance     Sacroiliac joint dysfunction of both sides      Physician: Brii Mcneil, NP    Physician Orders: PT Eval and Treat   Medical Diagnosis from Referral:   M46.1 (ICD-10-CM) - SI (sacroiliac) joint inflammation   M54.42 (ICD-10-CM) - Acute bilateral low back pain with left-sided sciatica       Evaluation Date: 11/28/2022  Authorization Period Expiration: 11/28/2023  Plan of Care Expiration: 02/28/2023  Visit # / Visits authorized: 1/ 1  FOTO Visit #:  1/3    Time In: 10:45 am   Time Out: 11:35 am   Total Appointment Time (timed & untimed codes): 50 minutes    Precautions: Standard, Diabetes, and Fall    Subjective   Date of onset: Chronic for years   History of current condition - Nicole reports:   Nicole Harris is a 52 y.o. female with PMH significant for carpal tunnel syndrome, DM (on insulin), fibromyalgia (self-reported), depression,  CABG X 3 (6/2020), and significant deconditioning presents today s/p a chiropractor visit she had back in October where she received an adjustment of her lower back and the patient reports pain in her lower back ever since. Nicole canceled her appointment in PT that was scheduled in October due to illness. Nicole localizes her lower back pain to the center of her lower back with radiation into her buttocks and down the lateral and posterior aspects of her BLE to her knees. R>L.  The patient reports the pain is worse when going from sitting to standing positions. Worse with walking anything more than 10-15 mins; Nicole has lost a good bit of weight since last seen a couple of years ago.  She has disuse atrophy in LE's; poor muscle tone in core mm. She has chronic pain, has had multiple pain management interventions - some with success,  some not.  She said that she recently went to see a neurosurgeon about her neck - he told her that there was nothing he could do for her, surgery would not make her any better.      Medical History:   Past Medical History:   Diagnosis Date    Anxiety     Bipolar disorder     Depression     Diabetes mellitus, type 2     Fibromyalgia     HTN (hypertension)     Insomnia     Migraine        Surgical History:   Nicole Harris  has a past surgical history that includes  section; Tonsillectomy; Epidural steroid injection (N/A, 2019); Epidural steroid injection (N/A, 2019); Epidural steroid injection (N/A, 2019); Trigger point injection (N/A, 2019); Colonoscopy (N/A, 2019); Esophagogastroduodenoscopy (N/A, 2019); Epidural steroid injection (N/A, 10/21/2019); Trigger point injection (N/A, 10/21/2019); ANGIOGRAM, CORONARY, WITH LEFT HEART CATHETERIZATION (Left, 2020); Coronary Artery Bypass Graft (CABG) (N/A, 2020); Endoscopic harvest of vein (Left, 2020); Cardiac catheterization; triple bypass; Epidural steroid injection (N/A, 10/1/2020); Trigger point injection (N/A, 10/1/2020); Epidural steroid injection (N/A, 2021); Injection of anesthetic agent around nerve (Bilateral, 2021); Epidural steroid injection (N/A, 9/15/2021); Epidural steroid injection into cervical spine (N/A, 3/2/2022); Epidural steroid injection (N/A, 2022); and Epidural steroid injection (N/A, 2022).    Medications:   Nicole has a current medication list which includes the following prescription(s): albuterol, amoxicillin, aspirin, atorvastatin, azelastine, cetirizine, cyclobenzaprine, empagliflozin, escitalopram oxalate, fluconazole, fluticasone propionate, insulin glargine,hum.rec.anlog, levocetirizine, lisinopril, metformin, montelukast, naproxen sodium, nortriptyline, oxycodone-acetaminophen, pantoprazole, ozempic, and true metrix glucose test strip, and the following  Facility-Administered Medications: sodium chloride 0.9%, lactated ringers, lactated ringers, and lactated ringers.    Allergies:   Review of patient's allergies indicates:   Allergen Reactions    Doxycycline     Hydrocodone Nausea And Vomiting    Vaccine adjuvant system, as01b liposomal     Varicella-zoster ge-as01b (pf)      Other reaction(s): Memory problems    Varicella-zoster virus glycoprotein e, recombinant     Benadryl [diphenhydramine hcl] Nausea Only        Imaging, MRI studies: nothing since 2019   IMPRESSION:      1. Minimal levoscoliosis.  2. Degenerative disc disease at L2-L3 resulting in mild central spinal canal stenosis.  3. Multilevel degenerative disc disease at L3-L4 resulting in mild central spinal canal stenosis with mild bilateral neural foraminal narrowing.  4. Degenerative disc disease at L4-L5 with a small central partially extruded focal disc herniation resulting in moderate central spinal canal stenosis with moderate bilateral neural foraminal narrowing.        Prior Therapy: Nicole has been to our clinic in the past for treatment of neck and back pain.   Social History: lives along, handicapped home; stays with her daughter and sone in-law often;  no steps, walk in shower.   Occupation: disabled   Prior Level of Function: Independent at home, since CABG she has been afraid to do much of anything; She did not participate in cardiac rehab; she is driving; she is able to care for herself; but stated that she doesn't really do much of anything else. This is from evaluation on 3/15/2021 for treatment of cervical pain.      Current Level of Function: limited functional mobility; has a walker/cane to use if needed; walking tolerance only 10-15 mins then she has to sit; standing tolerance < 5 mins; sits to do the laundry; trouble getting clothes from the washer to the dryer; trouble getting her shoes/socks on - difficult to bend over;     Pain:  Current 5/10, worst 10/10, best 5/10   Location:   lower back into bilateral LE's distally to ankles.   Description: Aching, Burning, Grabbing, Tight, Tingling, and Shooting  Aggravating Factors:everything makes my pain worse, constant, always present; if I had to pick something, I'd say Sleeping is less painful.   Easing Factors: pain medication, hot bath, and rest    Pts goals: To get some kind of pain relief     Objective     Observation: Nicole ambulated into clinic without use of any AD; She is alert/oriented x 4; She appears in no acute distress, stated that she missed her appointment for a NCV test for her carpal tunnel this morning; Now has to get this rescheduled. She will more than likely be scheduled for surgery in the coming month.     Posture:    -       Rounded shoulders  -       Forward head  -       Posterior pelvic tilt  -       Kyphosis    Gait: antalgic gait pattern, minimal foot clearance noted, decreased arm swing     Lumbar Range of Motion:     Degrees Pain   Flexion 20    + pain at end range          Extension 10    + end range pain          Left Side Bending 20 ok         Right Side Bending 20 ok         Left rotation    44 End range discomfort          Right Rotation    35 End range discomfort               Shoulder Range of Motion:   Left:     WFL in all planes   Right:   WFL in all planes      Lower Extremity Range of Motion:   Left:  WFL at hip, knee and ankle - LAKISHA +  Right: WFL at hip knee and ankle  - LAKISHA +        Lower Extremity Strength   Left Right   Knee extension: 4/5 4/5   Knee flexion: 4-/5 4-/5   Hip flexion: 4/5 4/5   Hip extension:  4-/5 4-/5   Hip abduction: 4-/5 4-/5   Hip adduction: 4/5 4/5   Ankle dorsiflexion: 4/5 4/5   Ankle plantarflexion: 3/5 3+/5   Upper abdominals 3-/5    Lower abdominals 3-/5    Back extensors 3/5      Special Tests:    Repeated Flexion Positive   Repeated Extension Negative   Prone Instability Negative   Straight Leg Raise Negative   Slump Positive   Quadrant Negative   Femoral nerve test  "Negative       DTR:   Left Right Comment   Patellar (L3-4) 1+ 1+    Achilles (S1) 1+ 1+        Joint Mobility:   Lumbar: restricted segmental motion in lumbar spine into flexion and extension; little change in lumbar curve noted with movement;     Thoracic: restricted extension and rotation secondary to decreased overall mobility and flexibility.     Sacral Evaluation    Standing Forward Flexion: Left  PSIS moves first (stuck side) - tender to palpation at both PSIS, but more so on left   Standing Stork: Left  PSIS with no posterior rotation (stuck side)    Functional Mobility:   30 sec sit <>stand: 4 reps   Walking: able to complete 3 min walk test, but no more   Step up/down 4" step - able to perform leading with each leg without use of UE assist   SLS: can pick-up opposite foot, but cannot sustain balance   Tandem Stance: able to bring foot in front, but not heel to toe and maintain balance.   Squat: reduced hip hinging noted, decreased core mm strength to support trunk   Picking up object from floor: slow, careful not to fall forward - difficulty leaning over LE's.       Palpation: moderate tenderness to palpation at bilateral SI jt's; with springing, tenderness along lumbar paraspinals, quadratus, piriformis mm;     Sensation: diminished to light touch bilateral lower legs and feet; patient has diagnosis of peripheral neuropathy;     Flexibility:     90/90 SLR = R moderate restriction, L moderate restriction   Ely's test: R no restriction, L no restriction   Jason's test: R no restriction, L no restriction   Ruslan test: R minimal restriction, L minimal restriction    PT Evaluation Completed: Yes  Discussed Plan of Care with patient: Yes       Limitation/Restriction for FOTO Lumbar  Survey    Therapist reviewed FOTO scores for Nicole Harris on 11/28/2022.   FOTO documents entered into [employer] - see Media section.    Limitation Score: 78 %         Home Exercises and Patient Education Provided    Education " provided:   - Need to increase her activity level - use walker or cane is she needs to so that she can increase her distance.     Written Home Exercises Provided: will be given a HEP on subsequent visits. Nicole stated that she is still performing the H/S and piriformis stretches that we taught her last time.   Nicole demonstrated good  understanding of the education provided.     Discharge Plan:   Expectation is for patient to progress toward goals stated by the end of the 8 weeks. Explained expectations to patient regarding attendance and participation in a HEP. As treatment progresses, patient will be given exercises to perform at home. The HEP will be a fluid document based on the ongoing needs of the patient.        Assessment   Nicole is a 52 y.o. female referred to outpatient Physical Therapy with a medical diagnosis of bilateral SI jt pain and dysfunction, lumbar radicular pain with BLE involvement. . Pt presents with impaired activity tolerance, decreased functional mobility, endurance, strength and balance.     Pt prognosis is Fair.   Pt will benefit from skilled outpatient Physical Therapy to address the deficits stated above and in the chart below, provide pt/family education, and to maximize pt's level of independence.     Plan of care discussed with patient: Yes  Pt's spiritual, cultural and educational needs considered and patient is agreeable to the plan of care and goals as stated below:     Anticipated Barriers for therapy: none     Medical Necessity is demonstrated by the following  History  Co-morbidities and personal factors that may impact the plan of care Co-morbidities:   anxiety, CAD, coping style/mechanism, depression, diabetes, HTN, and fibromyalgia, bipolar     Personal Factors:   coping style  lifestyle     high   Examination  Body Structures and Functions, activity limitations and participation restrictions that may impact the plan of care Body Regions:   back  lower  extremities  trunk    Body Systems:    gross symmetry  ROM  strength  gross coordinated movement  balance  gait  transfers    Participation Restrictions:   none    Activity limitations:   Learning and applying knowledge  no deficits    General Tasks and Commands  no deficits    Communication  no deficits    Mobility  lifting and carrying objects  walking  driving (bike, car, motorcycle)    Self care  washing oneself (bathing, drying, washing hands)  dressing    Domestic Life  shopping  cooking  doing house work (cleaning house, washing dishes, laundry)    Interactions/Relationships  no deficits    Life Areas  no deficits    Community and Social Life  community life  recreation and leisure         high   Clinical Presentation stable and uncomplicated low   Decision Making/ Complexity Score: moderate       Short Term Goals: 4 weeks  Pain: Decrease pain to less than 6 /10 on a daily basis to allow for improved ability to perform ADL's.   Walking: Able to walk 5 mins in clinic without increased pain    Transfers: Able to complete 6 reps sit to  30 secs      Long Term Goals: 8 weeks     Pain: Decrease pain to no more than 4/10 to allow for improved ability to perform ADL's   Strength: Improve strength in core muscles by 1/2 grade  for improved trunk stability  Functional scale: Improve limitation score on FOTO   to 64 % limitations   Lifting: Lift 10 lbs to waist level, 5 lbs to shoulder level, 3 lbs to overhead without pain or compensation  Postures: Increase sitting and/or standing duration to 10 mins without pain   Walking: Able to complete a 6 min walk test in outdoor environment.   Transfers: Perform sit to stand transfers without increased pain or limitation - can complete 8 reps in 30 secs   Exercise: demonstrate independence with home exercise program to maintain gains made in therapy.         Plan   Plan of care Certification: 11/28/2022 to 02/28/2022 .    Outpatient Physical Therapy 2 times weekly for 8  weeks to include the following interventions: Treatment will begin 12/5/2022 and continue through 02/17/2023. Treatment sessions will be 45 minutes long - to include 2-3 units of Therapeutic Exercise (33329); 1-2 units of Therapeutic Activity (01436) 1-2 units of Neuromuscular Re-education (28135); 1-3 units of Manual Therapy (63908) . Treatment decisions will be made based on the patient problems on the day of treatment or on progression of deficits found in the evaluation.     Quita Holliday, PT

## 2022-12-05 ENCOUNTER — CLINICAL SUPPORT (OUTPATIENT)
Dept: REHABILITATION | Facility: HOSPITAL | Age: 52
End: 2022-12-05
Payer: MEDICARE

## 2022-12-05 DIAGNOSIS — M53.3 SACROILIAC JOINT DYSFUNCTION OF BOTH SIDES: ICD-10-CM

## 2022-12-05 DIAGNOSIS — Z74.09 IMPAIRED FUNCTIONAL MOBILITY AND ACTIVITY TOLERANCE: Primary | ICD-10-CM

## 2022-12-05 PROCEDURE — 97140 MANUAL THERAPY 1/> REGIONS: CPT | Mod: PN

## 2022-12-05 NOTE — PROGRESS NOTES
"[facility]   Physical Therapy Treatment Note     Name: Nicole BONILLA [facility]  Clinic Number: [id number]    Therapy Diagnosis:   Encounter Diagnoses   Name Primary?    Impaired functional mobility and activity tolerance Yes    Sacroiliac joint dysfunction of both sides      Physician: Brii Mcneil NP    Visit Date: 12/5/2022    Evaluation Date: 11/28/2022  Authorization Period Expiration: 11/28/2023  Plan of Care Expiration: 02/28/2023  Visit # / Visits authorized: 1/ 1  FOTO Visit #:  1/3    PTA Visit #: 0/5     Time In: 1:45 pm   Time Out: 2:30 pm   Total Billable Time: 45 minutes    SUBJECTIVE     Pt reports: "I'm hurting today" .Had her NCV test done this am  for carpal tunnel.   She was not compliant with home exercise program.  Response to previous treatment: none  Functional change: none    Pain: 6/10  Location: bilateral SI joints      OBJECTIVE     Objective Measures updated at progress report unless specified.     Treatment     Nicole received the treatments listed below:      therapeutic exercises to develop  for  minutes including:      manual therapy techniques: Soft tissue Mobilization / Dry Needling were applied to the: Lumbar spine, bilateral SI joints  for 40 minutes, including: Patient positioned into prone, pillow under chest at patient request; no bolster wanted for ankles - neeraj them off end of mat.   IASTM to lumbar paraspinals moving caudally into SI joint area and bilateral piriformis; LF-ES per protocol for lumbar radiculopathy with inclusion of SI joints and piriformis on right and left. Using 60 mm and 50 mm needles.  Winding of needles after insertion for tissue grasp. Adjusted intensity of stimulation to patient tolerance. Able to leave needles in place x 20 mins. Removed needles, offered moist heat or ice - patient deferred. Stated that she would do this when she got home.  Reviewed what to expect following the Dry Needling with patient. Encourage increased " water intake over the next 24 / 48 hours.     neuromuscular re-education activities to improve:  for  minutes. The following activities were included:      therapeutic activities to improve functional performance for   minutes, including:        Patient Education and Home Exercises     Home Exercises Provided and Patient Education Provided     Education provided:   - what to expect following dry needling     Written Home Exercises Provided:  has not been issued one at this time.  .   Nicole demonstrated good  understanding of the education provided. See EMR under Patient Instructions for exercises provided during therapy sessions    ASSESSMENT     Nicole demonstrated good tolerance of dry Needling today; Able to leave needles in for full 20 mins. Good rhythmical contraction of mm tissue noted with stimulation. Will progress her exercise /activity after a few sessions of the dry needling to see if we can make some headway in her pain levels.     Nicole Is progressing well towards her goals.   Pt prognosis is Fair.     Pt will continue to benefit from skilled outpatient physical therapy to address the deficits listed in the problem list box on initial evaluation, provide pt/family education and to maximize pt's level of independence in the home and community environment.     Pt's spiritual, cultural and educational needs considered and pt agreeable to plan of care and goals.     Anticipated barriers to physical therapy: none     Goals:   Short Term Goals: 4 weeks  Pain: Decrease pain to less than 6 /10 on a daily basis to allow for improved ability to perform ADL's.   Walking: Able to walk 5 mins in clinic without increased pain    Transfers: Able to complete 6 reps sit to  30 secs      Long Term Goals: 8 weeks     Pain: Decrease pain to no more than 4/10 to allow for improved ability to perform ADL's   Strength: Improve strength in core muscles by 1/2 grade  for improved trunk stability  Functional scale:  Improve limitation score on FOTO   to 64 % limitations   Lifting: Lift 10 lbs to waist level, 5 lbs to shoulder level, 3 lbs to overhead without pain or compensation  Postures: Increase sitting and/or standing duration to 10 mins without pain   Walking: Able to complete a 6 min walk test in outdoor environment.   Transfers: Perform sit to stand transfers without increased pain or limitation - can complete 8 reps in 30 secs   Exercise: demonstrate independence with home exercise program to maintain gains made in therapy.         PLAN     Continue 2x/week x 8 weeks - Dry Needling, manual tx, therapeutic exercise/activities.     Quita Holliday, PT

## 2022-12-06 ENCOUNTER — PATIENT MESSAGE (OUTPATIENT)
Dept: PAIN MEDICINE | Facility: CLINIC | Age: 52
End: 2022-12-06
Payer: MEDICAID

## 2022-12-09 ENCOUNTER — CLINICAL SUPPORT (OUTPATIENT)
Dept: REHABILITATION | Facility: HOSPITAL | Age: 52
End: 2022-12-09
Payer: MEDICAID

## 2022-12-09 DIAGNOSIS — Z74.09 IMPAIRED FUNCTIONAL MOBILITY AND ACTIVITY TOLERANCE: Primary | ICD-10-CM

## 2022-12-09 DIAGNOSIS — M53.3 SACROILIAC JOINT DYSFUNCTION OF BOTH SIDES: ICD-10-CM

## 2022-12-09 PROCEDURE — 97140 MANUAL THERAPY 1/> REGIONS: CPT | Mod: PN

## 2022-12-09 NOTE — PROGRESS NOTES
[facility]   Physical Therapy Treatment Note     Name: Nicole BONILLA [facility] Number: [id number]    Therapy Diagnosis:   Encounter Diagnoses   Name Primary?    Impaired functional mobility and activity tolerance Yes    Sacroiliac joint dysfunction of both sides      Physician: Brii Mcneil NP    Visit Date: 12/9/2022    Evaluation Date: 11/28/2022  Authorization Period Expiration: 11/28/2023  Plan of Care Expiration: 02/28/2023  Visit # / Visits authorized: 2/16   FOTO Visit #:  1/3    PTA Visit #: 0/5     Time In: 9:40 am   Time Out: 10: 30 am   Total Billable Time: 45 minutes    SUBJECTIVE     Pt reports: I felt better for a couple of days after the dry needling - it kind of felt numbish    She was not compliant with home exercise program.  Response to previous treatment: less pain reported for couple of days  Functional change: was able to go to [employer] and walk around while pushing the cart     Pain: 5/10  Location: bilateral SI joints      OBJECTIVE     Objective Measures updated at progress report unless specified.     Treatment     Nicole received the treatments listed below:      therapeutic exercises to develop  for  minutes including:      manual therapy techniques: Soft tissue Mobilization / Dry Needling were applied to the: Lumbar spine, bilateral SI joints  for 40 minutes, including: Patient positioned into prone, pillow under chest at patient request; no bolster wanted for ankles - neeraj them off end of mat.   IASTM to lumbar paraspinals moving caudally into SI joint area and bilateral piriformis; LF-ES per protocol for lumbar radiculopathy with inclusion of SI joints and piriformis on right and left. Using 60 mm and 50 mm needles.  Winding of needles after insertion for tissue grasp. Adjusted intensity of stimulation to patient tolerance. Able to leave needles in place x 20 mins. Removed needles, offered moist heat or ice - patient deferred. Stated that she would do this  when she got home.  Reviewed what to expect following the Dry Needling with patient. Encourage increased water intake over the next 24 / 48 hours.     neuromuscular re-education activities to improve:  for  minutes. The following activities were included:      therapeutic activities to improve functional performance for   minutes, including:        Patient Education and Home Exercises     Home Exercises Provided and Patient Education Provided     Education provided:   - what to expect following dry needling     Written Home Exercises Provided:  has not been issued one at this time.  .   Nicole demonstrated good  understanding of the education provided. See EMR under Patient Instructions for exercises provided during therapy sessions    ASSESSMENT     Nicole demonstrated good tolerance of dry Needling today; Able to leave needles in for full 20 mins. Good rhythmical contraction of mm tissue noted with stimulation. Will progress her exercise /activity after a few sessions of the dry needling to see if we can make some headway in her pain levels.     Nicole Is progressing well towards her goals.   Pt prognosis is Fair.     Pt will continue to benefit from skilled outpatient physical therapy to address the deficits listed in the problem list box on initial evaluation, provide pt/family education and to maximize pt's level of independence in the home and community environment.     Pt's spiritual, cultural and educational needs considered and pt agreeable to plan of care and goals.     Anticipated barriers to physical therapy: none     Goals:   Short Term Goals: 4 weeks  Pain: Decrease pain to less than 6 /10 on a daily basis to allow for improved ability to perform ADL's.   Walking: Able to walk 5 mins in clinic without increased pain    Transfers: Able to complete 6 reps sit to  30 secs      Long Term Goals: 8 weeks     Pain: Decrease pain to no more than 4/10 to allow for improved ability to perform ADL's    Strength: Improve strength in core muscles by 1/2 grade  for improved trunk stability  Functional scale: Improve limitation score on FOTO   to 64 % limitations   Lifting: Lift 10 lbs to waist level, 5 lbs to shoulder level, 3 lbs to overhead without pain or compensation  Postures: Increase sitting and/or standing duration to 10 mins without pain   Walking: Able to complete a 6 min walk test in outdoor environment.   Transfers: Perform sit to stand transfers without increased pain or limitation - can complete 8 reps in 30 secs   Exercise: demonstrate independence with home exercise program to maintain gains made in therapy.         PLAN     Continue 2x/week x 8 weeks - Dry Needling, manual tx, therapeutic exercise/activities.     Quita Holliday, PT

## 2022-12-12 ENCOUNTER — CLINICAL SUPPORT (OUTPATIENT)
Dept: REHABILITATION | Facility: HOSPITAL | Age: 52
End: 2022-12-12
Payer: MEDICARE

## 2022-12-12 DIAGNOSIS — Z74.09 IMPAIRED FUNCTIONAL MOBILITY AND ACTIVITY TOLERANCE: Primary | ICD-10-CM

## 2022-12-12 DIAGNOSIS — M53.3 SACROILIAC JOINT DYSFUNCTION OF BOTH SIDES: ICD-10-CM

## 2022-12-12 PROCEDURE — 97140 MANUAL THERAPY 1/> REGIONS: CPT | Mod: PN

## 2022-12-13 ENCOUNTER — PATIENT MESSAGE (OUTPATIENT)
Dept: PAIN MEDICINE | Facility: CLINIC | Age: 52
End: 2022-12-13
Payer: MEDICAID

## 2022-12-13 ENCOUNTER — OFFICE VISIT (OUTPATIENT)
Dept: ORTHOPEDICS | Facility: CLINIC | Age: 52
End: 2022-12-13
Payer: MEDICARE

## 2022-12-13 ENCOUNTER — TELEPHONE (OUTPATIENT)
Dept: PAIN MEDICINE | Facility: CLINIC | Age: 52
End: 2022-12-13
Payer: MEDICAID

## 2022-12-13 VITALS — RESPIRATION RATE: 19 BRPM | WEIGHT: 177 LBS | BODY MASS INDEX: 29.49 KG/M2 | HEIGHT: 65 IN

## 2022-12-13 DIAGNOSIS — G56.23 CUBITAL TUNNEL SYNDROME OF BOTH UPPER EXTREMITIES: ICD-10-CM

## 2022-12-13 DIAGNOSIS — Z01.818 PRE-OP TESTING: ICD-10-CM

## 2022-12-13 DIAGNOSIS — M54.10 RADICULAR PAIN OF UPPER EXTREMITY: ICD-10-CM

## 2022-12-13 DIAGNOSIS — G56.03 BILATERAL CARPAL TUNNEL SYNDROME: Primary | ICD-10-CM

## 2022-12-13 DIAGNOSIS — R20.0 NUMBNESS AND TINGLING IN BOTH HANDS: ICD-10-CM

## 2022-12-13 DIAGNOSIS — R20.2 NUMBNESS AND TINGLING IN BOTH HANDS: ICD-10-CM

## 2022-12-13 PROCEDURE — 99999 PR PBB SHADOW E&M-EST. PATIENT-LVL III: CPT | Mod: PBBFAC,,, | Performed by: ORTHOPAEDIC SURGERY

## 2022-12-13 PROCEDURE — 99214 OFFICE O/P EST MOD 30 MIN: CPT | Mod: S$PBB,,, | Performed by: ORTHOPAEDIC SURGERY

## 2022-12-13 PROCEDURE — 99213 OFFICE O/P EST LOW 20 MIN: CPT | Mod: PBBFAC,PN | Performed by: ORTHOPAEDIC SURGERY

## 2022-12-13 PROCEDURE — 99214 PR OFFICE/OUTPT VISIT, EST, LEVL IV, 30-39 MIN: ICD-10-PCS | Mod: S$PBB,,, | Performed by: ORTHOPAEDIC SURGERY

## 2022-12-13 PROCEDURE — 99999 PR PBB SHADOW E&M-EST. PATIENT-LVL III: ICD-10-PCS | Mod: PBBFAC,,, | Performed by: ORTHOPAEDIC SURGERY

## 2022-12-13 NOTE — H&P
Chief Complaint:  Numbness and tingling both hands     HPI:  Ms. Harris is a 52-year-old right-hand-dominant lady who has persistent pain with numbness and tingling in both of her hands, her right is worse than her left.  She has been symptomatic for approximately 12 years.   She stated, it is hard to hold things and I drop things .  Her symptoms awaken her at night.  She has taken NSAIDs with help.  Intermittently she will wear a splint which helps.  She has not done physical therapy.  She had injections at her last visit on 2022 which did not resolve her symptoms.  Intermittently she will have to flick her hands to get the feeling in them  At her last visit she was forwarded to neurology for a nerve conduction study.  She has also seen a spine surgeon in the past which has offered her C-spine fusion of which she does not want to proceed currently.           Past Medical History:   Diagnosis Date    Anxiety      Bipolar disorder      Depression      Diabetes mellitus, type 2      HTN (hypertension)       *  *  *  *  *  *  *  *  *  * Insomnia  Hyperlipidemia  Seasonal allergies  Anxiety  GERD  IBS  Hiatal hernia  Fibromyalgia  Pelvic inflammatory disease  Migraines  Pain management              Past Surgical History:   Procedure Laterality Date     SECTION        COLONOSCOPY N/A 2019     Procedure: COLONOSCOPY;  Surgeon: Da Diallo MD;  Location: [facility] ENDO;  Service: General;  Laterality: N/A;    EPIDURAL STEROID INJECTION N/A 2019     Procedure: Injection, Steroid, Epidural - L4/5 EPIDURAL STEROID INJECTION;  Surgeon: Sherri Gardiner MD;  Location: [facility] OR;  Service: Pain Management;  Laterality: N/A;    EPIDURAL STEROID INJECTION N/A 2019     Procedure: Injection, Steroid, Epidural - C7/T1 EPIDURAL STEROID INJECTION;  Surgeon: Sherri Gardiner MD;  Location: [facility] OR;  Service: Pain Management;  Laterality: N/A;    EPIDURAL STEROID INJECTION N/A 2019     Procedure:  Injection, Steroid, Epidural - L4/5 EPIDURAL STEROID INJECTION;  Surgeon: Sherri Gardiner MD;  Location: [facility] OR;  Service: Pain Management;  Laterality: N/A;    EPIDURAL STEROID INJECTION N/A 10/21/2019     Procedure: Injection, Steroid, Epidural, CERVICAL C7/T1 SILVIA;  Surgeon: hSerri Gardiner MD;  Location: [facility] OR;  Service: Pain Management;  Laterality: N/A;  19    ESOPHAGOGASTRODUODENOSCOPY N/A 2019     Procedure: ESOPHAGOGASTRODUODENOSCOPY (EGD);  Surgeon: Da Diallo MD;  Location: [facility] ENDO;  Service: General;  Laterality: N/A;    TONSILLECTOMY        TRIGGER POINT INJECTION N/A 2019     Procedure: INJECTION, TRIGGER POINT - CERVICAL REGION;  Surgeon: Sherri Gardiner MD;  Location: [facility] OR;  Service: Pain Management;  Laterality: N/A;    TRIGGER POINT INJECTION N/A 10/21/2019         * Procedure: INJECTION, TRIGGER POINT;  Surgeon: Sherri Gardiner MD;  Location: [facility] OR;  Service: Pain Management;  Laterality: N/A;  COLONOSCOPY              Review of patient's allergies indicates:   Allergen Reactions    Hydrocodone Nausea And Vomiting         Social History            Occupational History    Retired    Tobacco Use    Smoking status: Former Smoker       Packs/day: 1.00       Years: 0.00       Pack years: 0.00       Last attempt to quit: 2012       Years since quittin.1    Smokeless tobacco: Never Used   Substance and Sexual Activity    Alcohol use: Yes       Frequency: Monthly or less       Comment: occasional    Drug use: Yes       Types: Marijuana, Other-see comments    Sexual activity: Yes      Family history:  Father:  Alive, CHF.  Mother:  , cancer.  Sister:  1, , cancer.  Daughter:  1, alive, chronic plantar fasciitis.     Previous Hospitalizations:  Childbirth, pelvic inflammatory disease.     ROS:  No new diagnosis/surgery/prescriptions since last office visit on 2022  Constitution: Negative for chills and fever.   HENT: Negative for  congestion.    Eyes: Negative for blurred vision.   Cardiovascular: Negative for chest pain.   Respiratory: Negative for cough.    Endocrine: Negative for polydipsia.   Hematologic/Lymphatic: Negative for adenopathy.   Skin: Negative for flushing and itching.   Musculoskeletal: Negative for gout.   Gastrointestinal: Positive for heartburn.   Genitourinary: Negative for nocturia.   Neurological: Positive for headaches. Negative for seizures.   Psychiatric/Behavioral: Positive for depression. The patient is nervous/anxious.    Allergic/Immunologic: Positive for environmental allergies.             Objective:   Physical Exam:   General: AAOx3.  No acute distress  HEENT: Normocephalic, PEARLA EOMI.  Fair dentition.  Neck: Supple, No JVD  Chest: Symetric, equal excursion on inspiration  Abdomen: Soft NTND  Vascular:  Pulses intact and equal bilaterally.  Capillary refill less than 3 seconds and equal bilaterally  Neurologic:  Pinprick and soft touch intact and equal bilaterally. Tinel's both wrists negative. Tinel's positive right elbow.  Phalen's negative both wrists.  Elbow flexion test positive both elbows.  Spurling's positive.  Integment:  No ecchymosis, no errythema  Extremity:  Neurologic:  Pinprick and soft touch intact and equal bilaterally.  Tinel's positive both wrist.  Tinel's positive both elbows.  Phalen's positive bilaterally.  Integment:  No ecchymosis, no errythema  Extremity:  Wrist:  Pronation/supination equal bilaterally 90/85 degrees. Dorsiflexion/volar flexion equal bilaterally 80/75 degrees. Nontender in the anatomic snuffbox bilaterally. Nontender at the 1st dorsal compartment bilaterally.  No swelling at the 1st dorsal compartment bilaterally.  Finkelstein's negative bilaterally.  Nontender at the scapholunate interval bilaterally. Aguero's test negative bilaterally. Nontender at the DRUJ/TFCC bilaterally. Nontender with forced ulnar deviation bilaterally.  strength equal bilaterally. Thenar  atrophy both hands right greater than.  Wartenberg sign mildly positive both hands.  Durkan's test negative both hands.                      Elbow:  Pronation/supination equal bilaterally 90/85 degrees. Extension/flexion equal bilaterally 0/130 degrees. Nontender at the epicondyles bilaterally.  Radial/ulna stressing equal bilaterally with endpoint.  Nontender over the radial head bilaterally.  Nontender over the olecranon and the triceps insertion both elbows.  Triceps tendon intact throughout full distribution.  Radiography:  Previous x-rays of the right wrist completed on 11/18/2022 showed mild scapholunate interval widening no fracture dislocation mild arthritic changes.  Neurodiagnostic studies:  Nerve conduction study of both upper extremities completed through [facility] on 12/05/2022 showed moderate bilateral carpal tunnel syndrome, mild bilateral cubital tunnel syndrome, and bilateral C5/C6 radiculopathy      Assessment:       Impression:       1. Carpal tunnel syndrome, both wrist   2. Clinical cubital tunnel syndrome, both elbows   3.   4.  Numbness and tingling both hands.    Radicular bilateral upper extremity          Plan:       1.  Discussed physical examination and neurodiagnostic findings with the patient. Nicole understands that she has a multifaceted problem of which it appears that she has some radicular problems causing her hand issues along with neurodiagnostic proven carpal tunnel syndrome/cubital tunnel syndrome.  She could continue with conservative management such as observation, activity modification, NSAIDs, bracing, physical therapy, injections, or she could consider surgical intervention such as carpal tunnel and cubital tunnel release.  Also discussed in detail with the patient that she appears to have a radicular component which may be the primary nidus of her bilateral upper extremity issues she stated she has already seen a spine surgeon and they feel  that although her spine is a problem that her cubital and carpal tunnel syndrome is a bigger issue.  She understands this office does not treat spine so she should follow-up with her spine surgeon to discuss the results of the nerve conduction study showing she has radicular upper extremity problems at C5/C6.  She stated she would like to proceed with surgery and would like to have her right arm addressed 1st as it is the most symptomatic.  2. Possible complications of surgery to include bleeding, infection, scarring, nerve/blood vessel/tendon damage, need for further surgery, failed surgery, failure to improve, possible persistent pain, possible arthrofibrosis, possible need to convert to an open procedure, and possible recurrence were discussed with the patient.  The patient was permitted to ask questions and all concerns were addressed to her satisfaction.    3. Consent for endoscopic versus open right carpal tunnel release and ulnar nerve decompression right elbow.  4. Tentatively schedule surgery for 12/22/2022.  5. The patient understands she needs to discuss postoperative pain management with her pain management physician to decide whether she can get 1 prescription from this office or whether all of her pain meds will come from her pain management doctor.  She stated she would call them today.    6. Continue with NSAIDs as tolerated and allowed by PCM.    7. Continue with cock-up wrist splints.    8. Follow up 10-12 days postop.

## 2022-12-13 NOTE — TELEPHONE ENCOUNTER
Surgeon needs to give one time Rx for pain pills after CTR , pt wants to verify that this is acceptable. Surgery is 12/22/2022

## 2022-12-13 NOTE — PROGRESS NOTES
[facility]   Physical Therapy Treatment Note     Name: Nicole BONILLA [facility] Number: [id number]    Therapy Diagnosis:   Encounter Diagnoses   Name Primary?    Impaired functional mobility and activity tolerance Yes    Sacroiliac joint dysfunction of both sides      Physician: Brii Mcneil NP    Visit Date: 12/12/2022    Evaluation Date: 11/28/2022  Authorization Period Expiration: 11/28/2023  Plan of Care Expiration: 02/28/2023  Visit # / Visits authorized: 3 /16   FOTO Visit #:  1/3    PTA Visit #: 0/5     Time In: 1:10 pm    Time Out: 2:10 pm   Total Billable Time: 45 minutes    SUBJECTIVE     Pt reports: I had a little more mm discomfort for a couple of days after my last visit; but I'm ready to do this again.   She was not compliant with home exercise program.  Response to previous treatment: just a little more mm pain   Functional change: was able to walk up/down steps at hockey game this weekend, first time she has been able to do this in awhile.     Pain: 5/10  Location: bilateral SI joints      OBJECTIVE     Objective Measures updated at progress report unless specified.     Treatment     Nicole received the treatments listed below:      therapeutic exercises to develop  for  minutes including:      manual therapy techniques: Soft tissue Mobilization / Dry Needling were applied to the: Lumbar spine, bilateral SI joints  for 40 minutes, including: Patient positioned into prone, pillow under chest at patient request; no bolster wanted for ankles - neeraj them off end of mat.   IASTM to lumbar paraspinals moving caudally into SI joint area and bilateral piriformis; LF-ES per protocol for lumbar radiculopathy with inclusion of SI joints and piriformis on right and left. Using 60 mm and 50 mm needles.  Winding of needles after insertion for tissue grasp. Adjusted intensity of stimulation to patient tolerance. Able to leave needles in place x 20 mins. Removed needles,     Applied  ice to lower back after removing needles x 10 mins        neuromuscular re-education activities to improve:  for  minutes. The following activities were included:      therapeutic activities to improve functional performance for   minutes, including:        Patient Education and Home Exercises     Home Exercises Provided and Patient Education Provided     Education provided:   - what to expect following dry needling     Written Home Exercises Provided:  has not been issued one at this time.  .   Nicole demonstrated good  understanding of the education provided. See EMR under Patient Instructions for exercises provided during therapy sessions    ASSESSMENT     Nicole demonstrated good tolerance of dry Needling today; No adverse effects noted, did have a little more mm soreness after last visit.  Got her to stay for some ice after the needling today - hopefully this will help with her mm soreness.   Will progress her exercise /activity after a few sessions of the dry needling to see if we can make some headway in her pain levels.     Nicole Is progressing well towards her goals.   Pt prognosis is Fair.     Pt will continue to benefit from skilled outpatient physical therapy to address the deficits listed in the problem list box on initial evaluation, provide pt/family education and to maximize pt's level of independence in the home and community environment.     Pt's spiritual, cultural and educational needs considered and pt agreeable to plan of care and goals.     Anticipated barriers to physical therapy: none     Goals:   Short Term Goals: 4 weeks  Pain: Decrease pain to less than 6 /10 on a daily basis to allow for improved ability to perform ADL's.   Walking: Able to walk 5 mins in clinic without increased pain    Transfers: Able to complete 6 reps sit to  30 secs      Long Term Goals: 8 weeks     Pain: Decrease pain to no more than 4/10 to allow for improved ability to perform ADL's   Strength: Improve  strength in core muscles by 1/2 grade  for improved trunk stability  Functional scale: Improve limitation score on FOTO   to 64 % limitations   Lifting: Lift 10 lbs to waist level, 5 lbs to shoulder level, 3 lbs to overhead without pain or compensation  Postures: Increase sitting and/or standing duration to 10 mins without pain   Walking: Able to complete a 6 min walk test in outdoor environment.   Transfers: Perform sit to stand transfers without increased pain or limitation - can complete 8 reps in 30 secs   Exercise: demonstrate independence with home exercise program to maintain gains made in therapy.         PLAN     Continue 2x/week x 8 weeks - Dry Needling, manual tx, therapeutic exercise/activities.     Quita Holliday, PT

## 2022-12-13 NOTE — PROGRESS NOTES
Patient ID: Nicole Harris is a 49 y.o. female.     Chief Complaint:  Numbness and tingling both hands     Referring Provider: Angela Velasquez Pa-c  [address]  [address]  [place] [address]     HPI:  Ms. Harris is a 52-year-old right-hand-dominant lady who has persistent pain with numbness and tingling in both of her hands, her right is worse than her left.  She has been symptomatic for approximately 12 years.   She stated, it is hard to hold things and I drop things .  Her symptoms awaken her at night.  She has taken NSAIDs with help.  Intermittently she will wear a splint which helps.  She has not done physical therapy.  She had injections at her last visit on 2022 which did not resolve her symptoms.  Intermittently she will have to flick her hands to get the feeling in them  At her last visit she was forwarded to neurology for a nerve conduction study.  She has also seen a spine surgeon in the past which has offered her C-spine fusion of which she does not want to proceed currently.           Past Medical History:   Diagnosis Date    Anxiety      Bipolar disorder      Depression      Diabetes mellitus, type 2      HTN (hypertension)       *  *  *  *  *  *  *  *  *  * Insomnia  Hyperlipidemia  Seasonal allergies  Anxiety  GERD  IBS  Hiatal hernia  Fibromyalgia  Pelvic inflammatory disease  Migraines  Pain management              Past Surgical History:   Procedure Laterality Date     SECTION        COLONOSCOPY N/A 2019     Procedure: COLONOSCOPY;  Surgeon: Da Diallo MD;  Location: [facility] ENDO;  Service: General;  Laterality: N/A;    EPIDURAL STEROID INJECTION N/A 2019     Procedure: Injection, Steroid, Epidural - L4/5 EPIDURAL STEROID INJECTION;  Surgeon: Sherri Gardiner MD;  Location: [facility] OR;  Service: Pain Management;  Laterality: N/A;    EPIDURAL STEROID INJECTION N/A 2019     Procedure: Injection, Steroid, Epidural - C7/T1 EPIDURAL STEROID INJECTION;   Surgeon: Sherri Gardiner MD;  Location: [facility] OR;  Service: Pain Management;  Laterality: N/A;    EPIDURAL STEROID INJECTION N/A 2019     Procedure: Injection, Steroid, Epidural - L4/5 EPIDURAL STEROID INJECTION;  Surgeon: Sherri Gardiner MD;  Location: [facility] OR;  Service: Pain Management;  Laterality: N/A;    EPIDURAL STEROID INJECTION N/A 10/21/2019     Procedure: Injection, Steroid, Epidural, CERVICAL C7/T1 SILVIA;  Surgeon: Sherri Gardiner MD;  Location: [facility] OR;  Service: Pain Management;  Laterality: N/A;  19    ESOPHAGOGASTRODUODENOSCOPY N/A 2019     Procedure: ESOPHAGOGASTRODUODENOSCOPY (EGD);  Surgeon: Da Diallo MD;  Location: [facility] ENDO;  Service: General;  Laterality: N/A;    TONSILLECTOMY        TRIGGER POINT INJECTION N/A 2019     Procedure: INJECTION, TRIGGER POINT - CERVICAL REGION;  Surgeon: Sherri Gardiner MD;  Location: [facility] OR;  Service: Pain Management;  Laterality: N/A;    TRIGGER POINT INJECTION N/A 10/21/2019         * Procedure: INJECTION, TRIGGER POINT;  Surgeon: Sherri Gardiner MD;  Location: [facility] OR;  Service: Pain Management;  Laterality: N/A;  COLONOSCOPY              Review of patient's allergies indicates:   Allergen Reactions    Hydrocodone Nausea And Vomiting         Social History            Occupational History    Retired    Tobacco Use    Smoking status: Former Smoker       Packs/day: 1.00       Years: 0.00       Pack years: 0.00       Last attempt to quit: 2012       Years since quittin.1    Smokeless tobacco: Never Used   Substance and Sexual Activity    Alcohol use: Yes       Frequency: Monthly or less       Comment: occasional    Drug use: Yes       Types: Marijuana, Other-see comments    Sexual activity: Yes      Family history:  Father:  Alive, CHF.  Mother:  , cancer.  Sister:  1, , cancer.  Daughter:  1, alive, chronic plantar fasciitis.     Previous Hospitalizations:  Childbirth, pelvic inflammatory disease.      ROS:  No new diagnosis/surgery/prescriptions since last office visit on 11/18/2022  Constitution: Negative for chills and fever.   HENT: Negative for congestion.    Eyes: Negative for blurred vision.   Cardiovascular: Negative for chest pain.   Respiratory: Negative for cough.    Endocrine: Negative for polydipsia.   Hematologic/Lymphatic: Negative for adenopathy.   Skin: Negative for flushing and itching.   Musculoskeletal: Negative for gout.   Gastrointestinal: Positive for heartburn.   Genitourinary: Negative for nocturia.   Neurological: Positive for headaches. Negative for seizures.   Psychiatric/Behavioral: Positive for depression. The patient is nervous/anxious.    Allergic/Immunologic: Positive for environmental allergies.             Objective:   Physical Exam:   General: AAOx3.  No acute distress  HEENT: Normocephalic, PEARLA EOMI.  Fair dentition.  Neck: Supple, No JVD  Chest: Symetric, equal excursion on inspiration  Abdomen: Soft NTND  Vascular:  Pulses intact and equal bilaterally.  Capillary refill less than 3 seconds and equal bilaterally  Neurologic:  Pinprick and soft touch intact and equal bilaterally. Tinel's both wrists negative. Tinel's positive right elbow.  Phalen's negative both wrists.  Elbow flexion test positive both elbows.  Spurling's positive.  Integment:  No ecchymosis, no errythema  Extremity:  Neurologic:  Pinprick and soft touch intact and equal bilaterally.  Tinel's positive both wrist.  Tinel's positive both elbows.  Phalen's positive bilaterally.  Integment:  No ecchymosis, no errythema  Extremity:  Wrist:  Pronation/supination equal bilaterally 90/85 degrees. Dorsiflexion/volar flexion equal bilaterally 80/75 degrees. Nontender in the anatomic snuffbox bilaterally. Nontender at the 1st dorsal compartment bilaterally.  No swelling at the 1st dorsal compartment bilaterally.  Finkelstein's negative bilaterally.  Nontender at the scapholunate interval bilaterally. Aguero's test  negative bilaterally. Nontender at the DRUJ/TFCC bilaterally. Nontender with forced ulnar deviation bilaterally.  strength equal bilaterally. Thenar atrophy both hands right greater than.  Wartenberg sign mildly positive both hands.  Durkan's test negative both hands.                      Elbow:  Pronation/supination equal bilaterally 90/85 degrees. Extension/flexion equal bilaterally 0/130 degrees. Nontender at the epicondyles bilaterally.  Radial/ulna stressing equal bilaterally with endpoint.  Nontender over the radial head bilaterally.  Nontender over the olecranon and the triceps insertion both elbows.  Triceps tendon intact throughout full distribution.  Radiography:  Previous x-rays of the right wrist completed on 11/18/2022 showed mild scapholunate interval widening no fracture dislocation mild arthritic changes.  Neurodiagnostic studies:  Nerve conduction study of both upper extremities completed through [facility] on 12/05/2022 showed moderate bilateral carpal tunnel syndrome, mild bilateral cubital tunnel syndrome, and bilateral C5/C6 radiculopathy      Assessment:       Impression:       1. Carpal tunnel syndrome, both wrist   2. Clinical cubital tunnel syndrome, both elbows   3.   4.  Numbness and tingling both hands.    Radicular bilateral upper extremity          Plan:       1.  Discussed physical examination and neurodiagnostic findings with the patient. Nicole understands that she has a multifaceted problem of which it appears that she has some radicular problems causing her hand issues along with neurodiagnostic proven carpal tunnel syndrome/cubital tunnel syndrome.  She could continue with conservative management such as observation, activity modification, NSAIDs, bracing, physical therapy, injections, or she could consider surgical intervention such as carpal tunnel and cubital tunnel release.  Also discussed in detail with the patient that she appears to have a  radicular component which may be the primary nidus of her bilateral upper extremity issues she stated she has already seen a spine surgeon and they feel that although her spine is a problem that her cubital and carpal tunnel syndrome is a bigger issue.  She understands this office does not treat spine so she should follow-up with her spine surgeon to discuss the results of the nerve conduction study showing she has radicular upper extremity problems at C5/C6.  She stated she would like to proceed with surgery and would like to have her right arm addressed 1st as it is the most symptomatic.  2. Possible complications of surgery to include bleeding, infection, scarring, nerve/blood vessel/tendon damage, need for further surgery, failed surgery, failure to improve, possible persistent pain, possible arthrofibrosis, possible need to convert to an open procedure, and possible recurrence were discussed with the patient.  The patient was permitted to ask questions and all concerns were addressed to her satisfaction.    3. Consent for endoscopic versus open right carpal tunnel release and ulnar nerve decompression right elbow.  4. Tentatively schedule surgery for 12/22/2022.  5. The patient understands she needs to discuss postoperative pain management with her pain management physician to decide whether she can get 1 prescription from this office or whether all of her pain meds will come from her pain management doctor.  She stated she would call them today.    6. Continue with NSAIDs as tolerated and allowed by PCM.    7. Continue with cock-up wrist splints.    8. Follow up 10-12 days postop.

## 2022-12-13 NOTE — H&P (VIEW-ONLY)
Chief Complaint:  Numbness and tingling both hands     HPI:  Ms. Harris is a 52-year-old right-hand-dominant lady who has persistent pain with numbness and tingling in both of her hands, her right is worse than her left.  She has been symptomatic for approximately 12 years.   She stated, it is hard to hold things and I drop things .  Her symptoms awaken her at night.  She has taken NSAIDs with help.  Intermittently she will wear a splint which helps.  She has not done physical therapy.  She had injections at her last visit on 2022 which did not resolve her symptoms.  Intermittently she will have to flick her hands to get the feeling in them  At her last visit she was forwarded to neurology for a nerve conduction study.  She has also seen a spine surgeon in the past which has offered her C-spine fusion of which she does not want to proceed currently.           Past Medical History:   Diagnosis Date    Anxiety      Bipolar disorder      Depression      Diabetes mellitus, type 2      HTN (hypertension)       *  *  *  *  *  *  *  *  *  * Insomnia  Hyperlipidemia  Seasonal allergies  Anxiety  GERD  IBS  Hiatal hernia  Fibromyalgia  Pelvic inflammatory disease  Migraines  Pain management              Past Surgical History:   Procedure Laterality Date     SECTION        COLONOSCOPY N/A 2019     Procedure: COLONOSCOPY;  Surgeon: Da Diallo MD;  Location: [facility] ENDO;  Service: General;  Laterality: N/A;    EPIDURAL STEROID INJECTION N/A 2019     Procedure: Injection, Steroid, Epidural - L4/5 EPIDURAL STEROID INJECTION;  Surgeon: Sherri Gardiner MD;  Location: [facility] OR;  Service: Pain Management;  Laterality: N/A;    EPIDURAL STEROID INJECTION N/A 2019     Procedure: Injection, Steroid, Epidural - C7/T1 EPIDURAL STEROID INJECTION;  Surgeon: Sherri Gardiner MD;  Location: [facility] OR;  Service: Pain Management;  Laterality: N/A;    EPIDURAL STEROID INJECTION N/A 2019     Procedure:  Injection, Steroid, Epidural - L4/5 EPIDURAL STEROID INJECTION;  Surgeon: Sherri Gardiner MD;  Location: [facility] OR;  Service: Pain Management;  Laterality: N/A;    EPIDURAL STEROID INJECTION N/A 10/21/2019     Procedure: Injection, Steroid, Epidural, CERVICAL C7/T1 SILVIA;  Surgeon: Sherri Gardiner MD;  Location: [facility] OR;  Service: Pain Management;  Laterality: N/A;  19    ESOPHAGOGASTRODUODENOSCOPY N/A 2019     Procedure: ESOPHAGOGASTRODUODENOSCOPY (EGD);  Surgeon: Da Diallo MD;  Location: [facility] ENDO;  Service: General;  Laterality: N/A;    TONSILLECTOMY        TRIGGER POINT INJECTION N/A 2019     Procedure: INJECTION, TRIGGER POINT - CERVICAL REGION;  Surgeon: Sherri Gardiner MD;  Location: [facility] OR;  Service: Pain Management;  Laterality: N/A;    TRIGGER POINT INJECTION N/A 10/21/2019         * Procedure: INJECTION, TRIGGER POINT;  Surgeon: Sherri Gardiner MD;  Location: [facility] OR;  Service: Pain Management;  Laterality: N/A;  COLONOSCOPY              Review of patient's allergies indicates:   Allergen Reactions    Hydrocodone Nausea And Vomiting         Social History            Occupational History    Retired    Tobacco Use    Smoking status: Former Smoker       Packs/day: 1.00       Years: 0.00       Pack years: 0.00       Last attempt to quit: 2012       Years since quittin.1    Smokeless tobacco: Never Used   Substance and Sexual Activity    Alcohol use: Yes       Frequency: Monthly or less       Comment: occasional    Drug use: Yes       Types: Marijuana, Other-see comments    Sexual activity: Yes      Family history:  Father:  Alive, CHF.  Mother:  , cancer.  Sister:  1, , cancer.  Daughter:  1, alive, chronic plantar fasciitis.     Previous Hospitalizations:  Childbirth, pelvic inflammatory disease.     ROS:  No new diagnosis/surgery/prescriptions since last office visit on 2022  Constitution: Negative for chills and fever.   HENT: Negative for  congestion.    Eyes: Negative for blurred vision.   Cardiovascular: Negative for chest pain.   Respiratory: Negative for cough.    Endocrine: Negative for polydipsia.   Hematologic/Lymphatic: Negative for adenopathy.   Skin: Negative for flushing and itching.   Musculoskeletal: Negative for gout.   Gastrointestinal: Positive for heartburn.   Genitourinary: Negative for nocturia.   Neurological: Positive for headaches. Negative for seizures.   Psychiatric/Behavioral: Positive for depression. The patient is nervous/anxious.    Allergic/Immunologic: Positive for environmental allergies.             Objective:   Physical Exam:   General: AAOx3.  No acute distress  HEENT: Normocephalic, PEARLA EOMI.  Fair dentition.  Neck: Supple, No JVD  Chest: Symetric, equal excursion on inspiration  Abdomen: Soft NTND  Vascular:  Pulses intact and equal bilaterally.  Capillary refill less than 3 seconds and equal bilaterally  Neurologic:  Pinprick and soft touch intact and equal bilaterally. Tinel's both wrists negative. Tinel's positive right elbow.  Phalen's negative both wrists.  Elbow flexion test positive both elbows.  Spurling's positive.  Integment:  No ecchymosis, no errythema  Extremity:  Neurologic:  Pinprick and soft touch intact and equal bilaterally.  Tinel's positive both wrist.  Tinel's positive both elbows.  Phalen's positive bilaterally.  Integment:  No ecchymosis, no errythema  Extremity:  Wrist:  Pronation/supination equal bilaterally 90/85 degrees. Dorsiflexion/volar flexion equal bilaterally 80/75 degrees. Nontender in the anatomic snuffbox bilaterally. Nontender at the 1st dorsal compartment bilaterally.  No swelling at the 1st dorsal compartment bilaterally.  Finkelstein's negative bilaterally.  Nontender at the scapholunate interval bilaterally. Aguero's test negative bilaterally. Nontender at the DRUJ/TFCC bilaterally. Nontender with forced ulnar deviation bilaterally.  strength equal bilaterally. Thenar  atrophy both hands right greater than.  Wartenberg sign mildly positive both hands.  Durkan's test negative both hands.                      Elbow:  Pronation/supination equal bilaterally 90/85 degrees. Extension/flexion equal bilaterally 0/130 degrees. Nontender at the epicondyles bilaterally.  Radial/ulna stressing equal bilaterally with endpoint.  Nontender over the radial head bilaterally.  Nontender over the olecranon and the triceps insertion both elbows.  Triceps tendon intact throughout full distribution.  Radiography:  Previous x-rays of the right wrist completed on 11/18/2022 showed mild scapholunate interval widening no fracture dislocation mild arthritic changes.  Neurodiagnostic studies:  Nerve conduction study of both upper extremities completed through [facility] on 12/05/2022 showed moderate bilateral carpal tunnel syndrome, mild bilateral cubital tunnel syndrome, and bilateral C5/C6 radiculopathy      Assessment:       Impression:       1. Carpal tunnel syndrome, both wrist   2. Clinical cubital tunnel syndrome, both elbows   3.   4.  Numbness and tingling both hands.    Radicular bilateral upper extremity          Plan:       1.  Discussed physical examination and neurodiagnostic findings with the patient. Nicole understands that she has a multifaceted problem of which it appears that she has some radicular problems causing her hand issues along with neurodiagnostic proven carpal tunnel syndrome/cubital tunnel syndrome.  She could continue with conservative management such as observation, activity modification, NSAIDs, bracing, physical therapy, injections, or she could consider surgical intervention such as carpal tunnel and cubital tunnel release.  Also discussed in detail with the patient that she appears to have a radicular component which may be the primary nidus of her bilateral upper extremity issues she stated she has already seen a spine surgeon and they feel  that although her spine is a problem that her cubital and carpal tunnel syndrome is a bigger issue.  She understands this office does not treat spine so she should follow-up with her spine surgeon to discuss the results of the nerve conduction study showing she has radicular upper extremity problems at C5/C6.  She stated she would like to proceed with surgery and would like to have her right arm addressed 1st as it is the most symptomatic.  2. Possible complications of surgery to include bleeding, infection, scarring, nerve/blood vessel/tendon damage, need for further surgery, failed surgery, failure to improve, possible persistent pain, possible arthrofibrosis, possible need to convert to an open procedure, and possible recurrence were discussed with the patient.  The patient was permitted to ask questions and all concerns were addressed to her satisfaction.    3. Consent for endoscopic versus open right carpal tunnel release and ulnar nerve decompression right elbow.  4. Tentatively schedule surgery for 12/22/2022.  5. The patient understands she needs to discuss postoperative pain management with her pain management physician to decide whether she can get 1 prescription from this office or whether all of her pain meds will come from her pain management doctor.  She stated she would call them today.    6. Continue with NSAIDs as tolerated and allowed by PCM.    7. Continue with cock-up wrist splints.    8. Follow up 10-12 days postop.

## 2022-12-19 ENCOUNTER — LAB VISIT (OUTPATIENT)
Dept: LAB | Facility: HOSPITAL | Age: 52
End: 2022-12-19
Attending: ORTHOPAEDIC SURGERY
Payer: MEDICARE

## 2022-12-19 DIAGNOSIS — Z01.818 PRE-OP TESTING: ICD-10-CM

## 2022-12-19 LAB
ANION GAP SERPL CALC-SCNC: 13 MMOL/L (ref 8–16)
BASOPHILS # BLD AUTO: 0.05 K/UL (ref 0–0.2)
BASOPHILS NFR BLD: 0.6 % (ref 0–1.9)
BUN SERPL-MCNC: 14 MG/DL (ref 6–20)
CALCIUM SERPL-MCNC: 9.8 MG/DL (ref 8.7–10.5)
CHLORIDE SERPL-SCNC: 101 MMOL/L (ref 95–110)
CO2 SERPL-SCNC: 24 MMOL/L (ref 23–29)
CREAT SERPL-MCNC: 0.9 MG/DL (ref 0.5–1.4)
DIFFERENTIAL METHOD: ABNORMAL
EOSINOPHIL # BLD AUTO: 0.1 K/UL (ref 0–0.5)
EOSINOPHIL NFR BLD: 0.7 % (ref 0–8)
ERYTHROCYTE [DISTWIDTH] IN BLOOD BY AUTOMATED COUNT: 13.1 % (ref 11.5–14.5)
EST. GFR  (NO RACE VARIABLE): >60 ML/MIN/1.73 M^2
GLUCOSE SERPL-MCNC: 290 MG/DL (ref 70–110)
HCG INTACT+B SERPL-ACNC: <1.2 MIU/ML
HCT VFR BLD AUTO: 43.2 % (ref 37–48.5)
HGB BLD-MCNC: 14.3 G/DL (ref 12–16)
IMM GRANULOCYTES # BLD AUTO: 0.05 K/UL (ref 0–0.04)
IMM GRANULOCYTES NFR BLD AUTO: 0.6 % (ref 0–0.5)
INR PPP: 1 (ref 0.8–1.2)
LYMPHOCYTES # BLD AUTO: 2.9 K/UL (ref 1–4.8)
LYMPHOCYTES NFR BLD: 32.3 % (ref 18–48)
MCH RBC QN AUTO: 29.9 PG (ref 27–31)
MCHC RBC AUTO-ENTMCNC: 33.1 G/DL (ref 32–36)
MCV RBC AUTO: 90 FL (ref 82–98)
MONOCYTES # BLD AUTO: 0.9 K/UL (ref 0.3–1)
MONOCYTES NFR BLD: 9.6 % (ref 4–15)
NEUTROPHILS # BLD AUTO: 5 K/UL (ref 1.8–7.7)
NEUTROPHILS NFR BLD: 56.2 % (ref 38–73)
NRBC BLD-RTO: 0 /100 WBC
PLATELET # BLD AUTO: 370 K/UL (ref 150–450)
PMV BLD AUTO: 9.4 FL (ref 9.2–12.9)
POTASSIUM SERPL-SCNC: 4.4 MMOL/L (ref 3.5–5.1)
PROTHROMBIN TIME: 10.2 SEC (ref 9–12.5)
RBC # BLD AUTO: 4.78 M/UL (ref 4–5.4)
SODIUM SERPL-SCNC: 138 MMOL/L (ref 136–145)
WBC # BLD AUTO: 8.93 K/UL (ref 3.9–12.7)

## 2022-12-19 PROCEDURE — 85025 COMPLETE CBC W/AUTO DIFF WBC: CPT | Performed by: ORTHOPAEDIC SURGERY

## 2022-12-19 PROCEDURE — 84702 CHORIONIC GONADOTROPIN TEST: CPT | Performed by: ORTHOPAEDIC SURGERY

## 2022-12-19 PROCEDURE — 85610 PROTHROMBIN TIME: CPT | Performed by: ORTHOPAEDIC SURGERY

## 2022-12-19 PROCEDURE — 36415 COLL VENOUS BLD VENIPUNCTURE: CPT | Performed by: ORTHOPAEDIC SURGERY

## 2022-12-19 PROCEDURE — 80048 BASIC METABOLIC PNL TOTAL CA: CPT | Performed by: ORTHOPAEDIC SURGERY

## 2022-12-21 ENCOUNTER — TELEPHONE (OUTPATIENT)
Dept: ORTHOPEDICS | Facility: CLINIC | Age: 52
End: 2022-12-21
Payer: MEDICAID

## 2022-12-21 NOTE — TELEPHONE ENCOUNTER
Returned call. The patient stated she is not feeling well and would like to post pone her surgery to 1/5/23. I stated understanding and the operating room staff was notified of the changes. The patient's post operative appointment was rescheduled for 1/3/22 to 1/17/22. The patient was notified.     ----- Message from Umang Romero sent at 12/21/2022 11:39 AM CST -----  Regarding: needs to cancel Sx call pt   Contact: pt   needs to cancel Sx call pt

## 2022-12-31 ENCOUNTER — PATIENT MESSAGE (OUTPATIENT)
Dept: PAIN MEDICINE | Facility: CLINIC | Age: 52
End: 2022-12-31
Payer: MEDICARE

## 2023-01-05 ENCOUNTER — HOSPITAL ENCOUNTER (OUTPATIENT)
Facility: HOSPITAL | Age: 53
Discharge: HOME | End: 2023-01-05
Attending: ORTHOPAEDIC SURGERY | Admitting: ORTHOPAEDIC SURGERY
Payer: MEDICARE

## 2023-01-05 ENCOUNTER — PATIENT MESSAGE (OUTPATIENT)
Dept: PAIN MEDICINE | Facility: CLINIC | Age: 53
End: 2023-01-05
Payer: MEDICAID

## 2023-01-05 ENCOUNTER — ANESTHESIA EVENT (OUTPATIENT)
Dept: SURGERY | Facility: HOSPITAL | Age: 53
End: 2023-01-05
Payer: MEDICARE

## 2023-01-05 ENCOUNTER — ANESTHESIA (OUTPATIENT)
Dept: SURGERY | Facility: HOSPITAL | Age: 53
End: 2023-01-05
Payer: MEDICARE

## 2023-01-05 VITALS
HEIGHT: 65 IN | RESPIRATION RATE: 18 BRPM | TEMPERATURE: 97 F | HEART RATE: 92 BPM | DIASTOLIC BLOOD PRESSURE: 64 MMHG | OXYGEN SATURATION: 96 % | BODY MASS INDEX: 29.49 KG/M2 | WEIGHT: 177 LBS | SYSTOLIC BLOOD PRESSURE: 108 MMHG

## 2023-01-05 DIAGNOSIS — G56.03 BILATERAL CARPAL TUNNEL SYNDROME: Primary | ICD-10-CM

## 2023-01-05 DIAGNOSIS — G56.23 CUBITAL TUNNEL SYNDROME OF BOTH UPPER EXTREMITIES: ICD-10-CM

## 2023-01-05 PROCEDURE — 37000009 HC ANESTHESIA EA ADD 15 MINS: Performed by: ORTHOPAEDIC SURGERY

## 2023-01-05 PROCEDURE — 71000039 HC RECOVERY, EACH ADD'L HOUR: Performed by: ORTHOPAEDIC SURGERY

## 2023-01-05 PROCEDURE — 63600175 PHARM REV CODE 636 W HCPCS: Performed by: ANESTHESIOLOGY

## 2023-01-05 PROCEDURE — 64718 PR REVISE ULNAR NERVE AT ELBOW: ICD-10-PCS | Mod: RT,,, | Performed by: ORTHOPAEDIC SURGERY

## 2023-01-05 PROCEDURE — 36000707: Performed by: ORTHOPAEDIC SURGERY

## 2023-01-05 PROCEDURE — 71000015 HC POSTOP RECOV 1ST HR: Performed by: ORTHOPAEDIC SURGERY

## 2023-01-05 PROCEDURE — 36000706: Performed by: ORTHOPAEDIC SURGERY

## 2023-01-05 PROCEDURE — D9220A PRA ANESTHESIA: Mod: ANES,,, | Performed by: ANESTHESIOLOGY

## 2023-01-05 PROCEDURE — 63600175 PHARM REV CODE 636 W HCPCS: Performed by: ORTHOPAEDIC SURGERY

## 2023-01-05 PROCEDURE — 25000003 PHARM REV CODE 250: Performed by: NURSE ANESTHETIST, CERTIFIED REGISTERED

## 2023-01-05 PROCEDURE — D9220A PRA ANESTHESIA: Mod: CRNA,,, | Performed by: NURSE ANESTHETIST, CERTIFIED REGISTERED

## 2023-01-05 PROCEDURE — 29848 WRIST ENDOSCOPY/SURGERY: CPT | Mod: 51,RT,, | Performed by: ORTHOPAEDIC SURGERY

## 2023-01-05 PROCEDURE — D9220A PRA ANESTHESIA: ICD-10-PCS | Mod: CRNA,,, | Performed by: NURSE ANESTHETIST, CERTIFIED REGISTERED

## 2023-01-05 PROCEDURE — 27201423 OPTIME MED/SURG SUP & DEVICES STERILE SUPPLY: Performed by: ORTHOPAEDIC SURGERY

## 2023-01-05 PROCEDURE — 29848 PR WRIST ARTHROSCOP,RELEASE XVERS LIG: ICD-10-PCS | Mod: 51,RT,, | Performed by: ORTHOPAEDIC SURGERY

## 2023-01-05 PROCEDURE — 63600175 PHARM REV CODE 636 W HCPCS: Performed by: NURSE ANESTHETIST, CERTIFIED REGISTERED

## 2023-01-05 PROCEDURE — 37000008 HC ANESTHESIA 1ST 15 MINUTES: Performed by: ORTHOPAEDIC SURGERY

## 2023-01-05 PROCEDURE — 25000003 PHARM REV CODE 250: Performed by: ORTHOPAEDIC SURGERY

## 2023-01-05 PROCEDURE — 64718 REVISE ULNAR NERVE AT ELBOW: CPT | Mod: RT,,, | Performed by: ORTHOPAEDIC SURGERY

## 2023-01-05 PROCEDURE — 71000033 HC RECOVERY, INTIAL HOUR: Performed by: ORTHOPAEDIC SURGERY

## 2023-01-05 PROCEDURE — D9220A PRA ANESTHESIA: ICD-10-PCS | Mod: ANES,,, | Performed by: ANESTHESIOLOGY

## 2023-01-05 RX ORDER — SODIUM CHLORIDE, SODIUM LACTATE, POTASSIUM CHLORIDE, CALCIUM CHLORIDE 600; 310; 30; 20 MG/100ML; MG/100ML; MG/100ML; MG/100ML
125 INJECTION, SOLUTION INTRAVENOUS CONTINUOUS
Status: DISCONTINUED | OUTPATIENT
Start: 2023-01-05 | End: 2023-01-05 | Stop reason: HOSPADM

## 2023-01-05 RX ORDER — MORPHINE SULFATE 4 MG/ML
2 INJECTION, SOLUTION INTRAMUSCULAR; INTRAVENOUS EVERY 5 MIN PRN
Status: DISCONTINUED | OUTPATIENT
Start: 2023-01-05 | End: 2023-01-05 | Stop reason: HOSPADM

## 2023-01-05 RX ORDER — SODIUM CHLORIDE, SODIUM LACTATE, POTASSIUM CHLORIDE, CALCIUM CHLORIDE 600; 310; 30; 20 MG/100ML; MG/100ML; MG/100ML; MG/100ML
INJECTION, SOLUTION INTRAVENOUS CONTINUOUS
Status: DISCONTINUED | OUTPATIENT
Start: 2023-01-05 | End: 2023-01-05 | Stop reason: HOSPADM

## 2023-01-05 RX ORDER — MIDAZOLAM HYDROCHLORIDE 1 MG/ML
INJECTION INTRAMUSCULAR; INTRAVENOUS
Status: DISCONTINUED | OUTPATIENT
Start: 2023-01-05 | End: 2023-01-05

## 2023-01-05 RX ORDER — ACETAMINOPHEN 10 MG/ML
INJECTION, SOLUTION INTRAVENOUS
Status: DISCONTINUED | OUTPATIENT
Start: 2023-01-05 | End: 2023-01-05

## 2023-01-05 RX ORDER — DEXAMETHASONE SODIUM PHOSPHATE 4 MG/ML
INJECTION, SOLUTION INTRA-ARTICULAR; INTRALESIONAL; INTRAMUSCULAR; INTRAVENOUS; SOFT TISSUE
Status: DISCONTINUED | OUTPATIENT
Start: 2023-01-05 | End: 2023-01-05

## 2023-01-05 RX ORDER — EPHEDRINE SULFATE 50 MG/ML
INJECTION, SOLUTION INTRAVENOUS
Status: DISCONTINUED | OUTPATIENT
Start: 2023-01-05 | End: 2023-01-05

## 2023-01-05 RX ORDER — OXYCODONE AND ACETAMINOPHEN 5; 325 MG/1; MG/1
1 TABLET ORAL
Status: DISCONTINUED | OUTPATIENT
Start: 2023-01-05 | End: 2023-01-05 | Stop reason: HOSPADM

## 2023-01-05 RX ORDER — METOCLOPRAMIDE HYDROCHLORIDE 5 MG/ML
10 INJECTION INTRAMUSCULAR; INTRAVENOUS ONCE
Status: COMPLETED | OUTPATIENT
Start: 2023-01-05 | End: 2023-01-05

## 2023-01-05 RX ORDER — PHENYLEPHRINE HYDROCHLORIDE 10 MG/ML
INJECTION INTRAVENOUS
Status: DISCONTINUED | OUTPATIENT
Start: 2023-01-05 | End: 2023-01-05

## 2023-01-05 RX ORDER — BUPIVACAINE HYDROCHLORIDE 5 MG/ML
INJECTION, SOLUTION EPIDURAL; INTRACAUDAL
Status: DISCONTINUED | OUTPATIENT
Start: 2023-01-05 | End: 2023-01-05 | Stop reason: HOSPADM

## 2023-01-05 RX ORDER — PROPOFOL 10 MG/ML
VIAL (ML) INTRAVENOUS
Status: DISCONTINUED | OUTPATIENT
Start: 2023-01-05 | End: 2023-01-05

## 2023-01-05 RX ORDER — ONDANSETRON 2 MG/ML
4 INJECTION INTRAMUSCULAR; INTRAVENOUS DAILY PRN
Status: DISCONTINUED | OUTPATIENT
Start: 2023-01-05 | End: 2023-01-05 | Stop reason: HOSPADM

## 2023-01-05 RX ORDER — CEFAZOLIN SODIUM 2 G/50ML
2 SOLUTION INTRAVENOUS ONCE
Status: COMPLETED | OUTPATIENT
Start: 2023-01-05 | End: 2023-01-05

## 2023-01-05 RX ORDER — ONDANSETRON 2 MG/ML
INJECTION INTRAMUSCULAR; INTRAVENOUS
Status: DISCONTINUED | OUTPATIENT
Start: 2023-01-05 | End: 2023-01-05

## 2023-01-05 RX ORDER — LIDOCAINE HYDROCHLORIDE 10 MG/ML
1 INJECTION, SOLUTION EPIDURAL; INFILTRATION; INTRACAUDAL; PERINEURAL ONCE
Status: DISCONTINUED | OUTPATIENT
Start: 2023-01-05 | End: 2023-01-05 | Stop reason: HOSPADM

## 2023-01-05 RX ORDER — FENTANYL CITRATE 50 UG/ML
INJECTION, SOLUTION INTRAMUSCULAR; INTRAVENOUS
Status: DISCONTINUED | OUTPATIENT
Start: 2023-01-05 | End: 2023-01-05

## 2023-01-05 RX ORDER — LIDOCAINE HYDROCHLORIDE 20 MG/ML
INJECTION, SOLUTION EPIDURAL; INFILTRATION; INTRACAUDAL; PERINEURAL
Status: DISCONTINUED | OUTPATIENT
Start: 2023-01-05 | End: 2023-01-05

## 2023-01-05 RX ORDER — MIDAZOLAM HYDROCHLORIDE 1 MG/ML
2 INJECTION INTRAMUSCULAR; INTRAVENOUS ONCE
Status: DISCONTINUED | OUTPATIENT
Start: 2023-01-05 | End: 2023-01-05 | Stop reason: HOSPADM

## 2023-01-05 RX ADMIN — MORPHINE SULFATE 2 MG: 4 INJECTION, SOLUTION INTRAMUSCULAR; INTRAVENOUS at 11:01

## 2023-01-05 RX ADMIN — ACETAMINOPHEN 1000 MG: 10 INJECTION INTRAVENOUS at 08:01

## 2023-01-05 RX ADMIN — PHENYLEPHRINE HYDROCHLORIDE 50 MCG: 10 INJECTION INTRAVENOUS at 09:01

## 2023-01-05 RX ADMIN — EPHEDRINE SULFATE 15 MG: 50 INJECTION INTRAVENOUS at 08:01

## 2023-01-05 RX ADMIN — PHENYLEPHRINE HYDROCHLORIDE 50 MCG: 10 INJECTION INTRAVENOUS at 08:01

## 2023-01-05 RX ADMIN — MIDAZOLAM HYDROCHLORIDE 2 MG: 1 INJECTION, SOLUTION INTRAMUSCULAR; INTRAVENOUS at 08:01

## 2023-01-05 RX ADMIN — EPHEDRINE SULFATE 10 MG: 50 INJECTION INTRAVENOUS at 08:01

## 2023-01-05 RX ADMIN — LIDOCAINE HYDROCHLORIDE 80 MG: 20 INJECTION, SOLUTION EPIDURAL; INFILTRATION; INTRACAUDAL; PERINEURAL at 08:01

## 2023-01-05 RX ADMIN — SODIUM CHLORIDE, POTASSIUM CHLORIDE, SODIUM LACTATE AND CALCIUM CHLORIDE: 600; 310; 30; 20 INJECTION, SOLUTION INTRAVENOUS at 09:01

## 2023-01-05 RX ADMIN — DEXAMETHASONE SODIUM PHOSPHATE 4 MG: 4 INJECTION, SOLUTION INTRAMUSCULAR; INTRAVENOUS at 08:01

## 2023-01-05 RX ADMIN — FENTANYL CITRATE 100 MCG: 50 INJECTION, SOLUTION INTRAMUSCULAR; INTRAVENOUS at 08:01

## 2023-01-05 RX ADMIN — PROPOFOL 200 MG: 10 INJECTION, EMULSION INTRAVENOUS at 08:01

## 2023-01-05 RX ADMIN — EPHEDRINE SULFATE 25 MG: 50 INJECTION INTRAVENOUS at 08:01

## 2023-01-05 RX ADMIN — METOCLOPRAMIDE 10 MG: 5 INJECTION, SOLUTION INTRAMUSCULAR; INTRAVENOUS at 08:01

## 2023-01-05 RX ADMIN — PHENYLEPHRINE HYDROCHLORIDE 100 MCG: 10 INJECTION INTRAVENOUS at 09:01

## 2023-01-05 RX ADMIN — ONDANSETRON 4 MG: 2 INJECTION INTRAMUSCULAR; INTRAVENOUS at 08:01

## 2023-01-05 RX ADMIN — SODIUM CHLORIDE, POTASSIUM CHLORIDE, SODIUM LACTATE AND CALCIUM CHLORIDE: 600; 310; 30; 20 INJECTION, SOLUTION INTRAVENOUS at 08:01

## 2023-01-05 RX ADMIN — CEFAZOLIN SODIUM 2 G: 2 SOLUTION INTRAVENOUS at 08:01

## 2023-01-05 NOTE — DISCHARGE SUMMARY
[facility] Surgery  Discharge Note  Short Stay    Procedure(s) (LRB):  Endoscopic VS. Open Carpal Tunnel Release (Right)  DECOMPRESSION, NERVE, ULNAR (Right)      OUTCOME: Patient tolerated treatment/procedure well without complication and is now ready for discharge.    DISPOSITION: Home or Self Care    FINAL DIAGNOSIS:    1. Carpal tunnel syndrome, right wrist.    2. Cubital tunnel syndrome, right elbow.    FOLLOWUP: In clinic    DISCHARGE INSTRUCTIONS:    Discharge Procedure Orders   Diet Adult Regular     Keep surgical extremity elevated     Ice to affected area     Lifting restrictions   Order Comments: One-handed activities with the left hand only no lifting/pushing/pulling/climbing requiring the use of the right hand.  No activities on ladders/scaffolding/stairs.     Other restrictions (specify):   Order Comments: 1. Elevate and ice right upper extremity.  2. Do not remove dressing, keep dressing clean and dry.  3. One-handed activities with the left hand only no lifting/pushing/pulling/climbing requiring the use of the right hand.  No activities on ladders/scaffolding/stairs.     Notify your health care provider if you experience any of the following:  temperature >100.4     Leave dressing on - Keep it clean, dry, and intact until clinic visit   Order Comments: Do not remove dressing, keep dressing clean and dry.        TIME SPENT ON DISCHARGE: 20 minutes

## 2023-01-05 NOTE — PROGRESS NOTES
Pt awake and alert upon discharge, ambulatory to restroom with steady gait, right arm splint remains intact and right arm sling in place, pt able to move all fingers on right hand , capillary refill WNL

## 2023-01-05 NOTE — ANESTHESIA POSTPROCEDURE EVALUATION
Anesthesia Post Evaluation    Patient: Nicole Harris    Procedure(s) Performed: Procedure(s) (LRB):  Endoscopic VS. Open Carpal Tunnel Release (Right)  DECOMPRESSION, NERVE, ULNAR (Right)    Final Anesthesia Type: general      Patient location during evaluation: PACU  Patient participation: Yes- Able to Participate  Level of consciousness: awake and awake and alert  Post-procedure vital signs: reviewed and stable  Pain management: adequate  Airway patency: patent    PONV status at discharge: No PONV  Anesthetic complications: no      Cardiovascular status: blood pressure returned to baseline  Respiratory status: unassisted and spontaneous ventilation  Hydration status: euvolemic  Follow-up not needed.          Vitals Value Taken Time   /64 01/05/23 1158   Temp 36.2 °C (97.2 °F) 01/05/23 1002   Pulse 94 01/05/23 1159   Resp 15 01/05/23 1159   SpO2 91 % 01/05/23 1159   Vitals shown include unvalidated device data.      Event Time   Out of Recovery 11:45:00         Pain/Alyssia Score: Pain Rating Prior to Med Admin: 7 (1/5/2023 11:33 AM)  Pain Rating Post Med Admin: 4 (1/5/2023 11:58 AM)  Alyssia Score: 10 (1/5/2023 11:25 AM)  Modified Alyssia Score: 19 (1/5/2023 11:58 AM)

## 2023-01-05 NOTE — ANESTHESIA PREPROCEDURE EVALUATION
01/05/2023  Nicole Harris is a 52 y.o., female.      Pre-op Assessment    I have reviewed the Patient Summary Reports.     I have reviewed the Nursing Notes.    I have reviewed the Medications.     Review of Systems  Anesthesia Hx:  No problems with previous Anesthesia  Neg history of prior surgery. Denies Family Hx of Anesthesia complications.   Denies Personal Hx of Anesthesia complications.   Social:  Former Smoker    Hematology/Oncology:  Hematology Normal   Oncology Normal     EENT/Dental:EENT/Dental Normal   Cardiovascular:   Hypertension, well controlled CAD asymptomatic CABG/stent  Angina    Pulmonary:   COPD Shortness of breath    Renal/:  Renal/ Normal     Hepatic/GI:  Hepatic/GI Normal    Musculoskeletal:   Arthritis     Neurological:   Neuromuscular Disease, Headaches    Endocrine:   Diabetes, well controlled, type 2    Dermatological:  Skin Normal    Psych:   Psychiatric History depression Bipolar1         Physical Exam  General: Alert    Airway:  Mallampati: III   Mouth Opening: Normal  TM Distance: Normal  Neck ROM: Normal ROM    Dental:  Intact    Chest/Lungs:  Clear to auscultation, Normal Respiratory Rate    Heart:  Rate: Normal    Abdomen:  Normal        Anesthesia Plan  Type of Anesthesia, risks & benefits discussed:    Anesthesia Type: Gen ETT  Post Op Pain Control Plan: multimodal analgesia  Airway Plan: Direct, Post-Induction  Informed Consent: Informed consent signed with the Patient and all parties understand the risks and agree with anesthesia plan.  All questions answered. Patient consented to blood products? No  ASA Score: 3  Day of Surgery Review of History & Physical: H&P Update referred to the surgeon/provider.    Ready For Surgery From Anesthesia Perspective.     .

## 2023-01-05 NOTE — ANESTHESIA PROCEDURE NOTES
Intubation    Date/Time: 1/5/2023 8:44 AM  Performed by: Xena Porter CRNA  Authorized by: Schuyler Murphy MD     Intubation:     Induction:  Intravenous    Intubated:  Postinduction    Mask Ventilation:  Easy mask    Attempts:  1    Attempted By:  CRNA    Difficult Airway Encountered?: No      Airway Device:  Supraglottic airway/LMA    Airway Device Size:  3.5    Style/Cuff Inflation:  Cuffed (inflated to minimal occlusive pressure)    Secured at:  The lips    Placement Verified By:  Capnometry    Complicating Factors:  None    Findings Post-Intubation:  BS equal bilateral and atraumatic/condition of teeth unchanged

## 2023-01-05 NOTE — TRANSFER OF CARE
"Anesthesia Transfer of Care Note    Patient: Nicole Harris    Procedure(s) Performed: Procedure(s) (LRB):  Endoscopic VS. Open Carpal Tunnel Release (Right)  DECOMPRESSION, NERVE, ULNAR (Right)    Patient location: PACU    Anesthesia Type: general    Transport from OR: Transported from OR on room air with adequate spontaneous ventilation    Post pain: adequate analgesia    Post assessment: no apparent anesthetic complications and tolerated procedure well    Post vital signs: stable    Level of consciousness: sedated and responds to stimulation    Nausea/Vomiting: no nausea/vomiting    Complications: none    Transfer of care protocol was followed      Last vitals:   Visit Vitals  /76 (BP Location: Left arm, Patient Position: Sitting)   Pulse 93   Temp 36.6 °C (97.9 °F) (Oral)   Resp 16   Ht 5' 5" (1.651 m)   Wt 80.3 kg (177 lb 0.5 oz)   LMP  (LMP Unknown)   SpO2 95%   Breastfeeding No   BMI 29.46 kg/m²     "

## 2023-01-05 NOTE — OP NOTE
[facility]  Orthopedic Surgery     01/05/2023     Nicole Harris  [id number]        PREOPERATIVE DIAGNOSIS:   1. Right carpal tunnel syndrome   2. Cubital tunnel syndrome, right elbow     POSTOPERATIVE DIAGNOSIS:    1. Carpal tunnel syndrome, right wrist.    2. Cubital tunnel syndrome, right elbow.     PROCEDURE:  (Please bill the following procedures with a 59 modifier as they were separate distinct procedures performed in separate locations.)  1. Endoscopic carpal tunnel release, right wrist.  2. Open ulnar nerve decompression, right elbow,  3. Long-arm posterior mold plaster splint, right upper extremity     SURGEON: Alcides Yancey D.O.     ASSISTANT: Orton Grinnell, CFA     ANESTHESIA:  General.     BLOOD LOSS:  Less than 5 cc.     TOURNIQUET:  20 minutes     DRAINS:  None.     PATHOLOGY:  None.     COMPLICATION:  None.     INDICATIONS FOR PROCEDURE:   Ms. Harris is a 52-year-old right-hand-dominant lady who has persistent pain with numbness and tingling in both of her hands, her right is worse than her left.  She has been symptomatic for approximately 12 years.   She stated, It is hard to hold things and I drop things .  Her symptoms awaken her at night.  She has taken NSAIDs with help.  Intermittently she will wear a splint which helps.  She has not done physical therapy.  She had injections which did not resolve her symptoms.  Intermittently she will have to flick her hands to get the feeling in them.  She elected to proceed with surgery after he failed conservative management and complications to include bleeding, infection, scarring, nerve/blood vessel/tendon damage, need for further surgery, failed surgery, failure to improve, stiffness, skin slough, and possible recurrence were discussed.  She signed a consent.     PROCEDURE IN DETAIL:  The patient was brought to the operating room and was transferred to the operating bed where all bony prominences were well padded.  General anesthesia  was then administered by the Anesthesiology Department.  After general anesthesia was administered a tourniquet was applied to the upper part of the patient's right upper extremity.  The patient's right arm was then prepped with chlorhexidine solution and draped in the normal sterile fashion.  After prepping and draping bony and soft tissue landmarks were identified and a longitudinal curvilinear incision was drawn at the medial aspect of the patient's elbow juxtaposed to the medial epicondyle.  A 2nd transverse incision at the volar ulnar aspect of the patient's wrist just proximal to the glabrous skin was drawn.  The patient's arm was then elevated, exsanguinated, and the tourniquet was inflated.         Sharp incision was then made at the patient's right elbow with a #15 blade followed by dissection to the level of the ulnar nerve while protecting vital structures.  The ulnar nerve was then freed from investing tissues with tenotomty scissors freeing the arcades.  After the nerve was freed it was inspected, it was found to be somewhat hourglassed and hyperreactive.  The incision was then copiously irrigated and then packed with a moist Ray-Omar.         Attention was then placed on the patient's wrist where sharp incision was made with a #15 blade followed by dissection to the level of the brachioradial fascia.  A longitudinal incision was then made in the brachioradial fascia at the more radial side of the wrist incision with a #15 blade a spatula was then placed in the incision and the tenosynovium was freed from the undersurface of the brachioradial fascia.  A 2nd more ulna incision was made in the brachioradial fascia approximately 0.8 cm from the more radial incision with a #15 blade.  The 2 incisions were then connected making a window.  The brachioradial fascia was then freed at the proximal aspect of the wrist incision and then released with tenotomy scissors.  Attention was then placed on the carpal canal  where the spatula was placed in the carpal canal and the tenosynovium was freed from the undersurface of the transcarpal ligament feeling the washboard effect .  Progressive hamate finders were placed followed by the co-equal.  The camera/knife assembly was then placed after removal of the co-equal.  The distal extent of the transcarpal ligament was then identified and the knife blade was raised and a half release was completed.  The knife blade was retracted and the camera/knife assembly was then redirected distally and then the knife blade was raised again and a full release was completed.  The camera/knife assembly was then removed from the patient's wrist.  The window was then removed with tenotomy scissors.  A Ragnell was then placed in the incision and the carpal tunnel release was checked and a full release was ensured.  The incision was then copiously irrigated.       The patient's arm was then elevated and then the tourniquet was released.  Full hemostasis was ensured at the wrist incision.  The wrist incision was then closed with nylon suture in a horizontal mattress type of fashion.  The incision was then dressed with Adaptic, sterile gauze and sterile cast padding.       Attention was then placed at the elbow incision where the previously placed Ray-Omar was removed.  The incision was then copiously irrigated.  Full hemostasis was ensured.  The elbow incision was then closed in layers with Vicryl suture with final plastic closure.  The elbow incision was then dressed with Mastisol, Steri-Strips, Adaptic, sterile gauze and sterile cast padding.  Further cast padding was placed followed by a posterior mold plaster splint and an Ace wrap.  The patient was then awakened by anesthesia and transferred from the operating room to the recovery room in stable condition.  She tolerated the procedures well without complication.

## 2023-01-10 ENCOUNTER — OFFICE VISIT (OUTPATIENT)
Dept: PAIN MEDICINE | Facility: CLINIC | Age: 53
End: 2023-01-10
Payer: MEDICARE

## 2023-01-10 DIAGNOSIS — M54.12 CERVICAL RADICULOPATHY: ICD-10-CM

## 2023-01-10 DIAGNOSIS — M47.812 FACET ARTHROPATHY, CERVICAL: ICD-10-CM

## 2023-01-10 DIAGNOSIS — M50.30 DEGENERATIVE DISC DISEASE, CERVICAL: Primary | ICD-10-CM

## 2023-01-10 PROCEDURE — 99999 PR PBB SHADOW E&M-EST. PATIENT-LVL III: CPT | Mod: PBBFAC,,,

## 2023-01-10 PROCEDURE — 99213 OFFICE O/P EST LOW 20 MIN: CPT | Mod: PBBFAC,PO

## 2023-01-10 PROCEDURE — 99213 PR OFFICE/OUTPT VISIT, EST, LEVL III, 20-29 MIN: ICD-10-PCS | Mod: S$PBB,,,

## 2023-01-10 PROCEDURE — 99213 OFFICE O/P EST LOW 20 MIN: CPT | Mod: S$PBB,,,

## 2023-01-10 PROCEDURE — 99999 PR PBB SHADOW E&M-EST. PATIENT-LVL III: ICD-10-PCS | Mod: PBBFAC,,,

## 2023-01-10 RX ORDER — OXYCODONE AND ACETAMINOPHEN 7.5; 325 MG/1; MG/1
1 TABLET ORAL EVERY 6 HOURS PRN
COMMUNITY
Start: 2023-01-06 | End: 2023-04-03

## 2023-01-10 RX ORDER — NORTRIPTYLINE HYDROCHLORIDE 50 MG/1
CAPSULE ORAL
Status: ON HOLD | COMMUNITY
Start: 2022-11-07

## 2023-01-10 RX ORDER — SEMAGLUTIDE 1.34 MG/ML
INJECTION, SOLUTION SUBCUTANEOUS
Status: ON HOLD | COMMUNITY
Start: 2023-01-03 | End: 2024-03-27 | Stop reason: HOSPADM
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
## 2023-01-17 ENCOUNTER — PATIENT MESSAGE (OUTPATIENT)
Dept: ORTHOPEDICS | Facility: CLINIC | Age: 53
End: 2023-01-17

## 2023-01-17 ENCOUNTER — OFFICE VISIT (OUTPATIENT)
Dept: ORTHOPEDICS | Facility: CLINIC | Age: 53
End: 2023-01-17
Payer: MEDICARE

## 2023-01-17 VITALS — RESPIRATION RATE: 19 BRPM | BODY MASS INDEX: 29.49 KG/M2 | WEIGHT: 177 LBS | HEIGHT: 65 IN

## 2023-01-17 DIAGNOSIS — Z98.890 S/P ENDOSCOPIC CARPAL TUNNEL RELEASE: Primary | ICD-10-CM

## 2023-01-17 DIAGNOSIS — Z98.890 S/P DECOMPRESSION OF ULNAR NERVE AT ELBOW: ICD-10-CM

## 2023-01-17 DIAGNOSIS — Z48.02 VISIT FOR SUTURE REMOVAL: ICD-10-CM

## 2023-01-17 PROCEDURE — 99999 PR PBB SHADOW E&M-EST. PATIENT-LVL IV: CPT | Mod: PBBFAC,,, | Performed by: ORTHOPAEDIC SURGERY

## 2023-01-17 PROCEDURE — 99024 POSTOP FOLLOW-UP VISIT: CPT | Mod: POP,,, | Performed by: ORTHOPAEDIC SURGERY

## 2023-01-17 PROCEDURE — 99024 PR POST-OP FOLLOW-UP VISIT: ICD-10-PCS | Mod: POP,,, | Performed by: ORTHOPAEDIC SURGERY

## 2023-01-17 PROCEDURE — 99999 PR PBB SHADOW E&M-EST. PATIENT-LVL IV: ICD-10-PCS | Mod: PBBFAC,,, | Performed by: ORTHOPAEDIC SURGERY

## 2023-01-17 PROCEDURE — 99214 OFFICE O/P EST MOD 30 MIN: CPT | Mod: PBBFAC,PN | Performed by: ORTHOPAEDIC SURGERY

## 2023-01-17 NOTE — PROGRESS NOTES
Subjective:      Patient ID: Nicole Harris is a 52 y.o. female.    Chief Complaint: Post-op Evaluation of the Right Wrist and Post-op Evaluation of the Right Elbow      HPI:  Ms. Harris returns today for her 1st postop visit after an endoscopic right carpal tunnel release and ulnar nerve decompression of her right elbow.  Her date of surgery 01/05/2023.  She states she is doing well and the numbness and tingling in her hand is improving.  She still has some unusual feeling in her right ring finger.      ROS:  New diagnosis/surgery/prescriptions since last office visit on 12/13/2022: Endoscopic right carpal tunnel release and ulnar nerve decompression right elbow  Constitution: Negative for chills and fever.   HENT: Negative for congestion.    Eyes: Negative for blurred vision.   Cardiovascular: Negative for chest pain.   Respiratory: Negative for cough.    Endocrine: Negative for polydipsia.   Hematologic/Lymphatic: Negative for adenopathy.   Skin: Negative for flushing and itching.   Musculoskeletal: Negative for gout.   Gastrointestinal: Positive for heartburn.   Genitourinary: Negative for nocturia.   Neurological: Positive for headaches. Negative for seizures.   Psychiatric/Behavioral: Positive for depression. The patient is nervous/anxious.    Allergic/Immunologic: Positive for environmental allergies.          Objective:      Physical Exam:   General: AAOx3.  No acute distress  Vascular:  Pulses intact and equal bilaterally.  Capillary refill less than 3 seconds and equal bilaterally  Neurologic:  Pinprick and soft touch intact and equal bilaterally  Integment:  Incisions well approximated with sutures in place.  Extremity:  Wrist/hand:  Pronation/supination equal bilaterally.  Dorsiflexion/volar flexion equal bilaterally.  No swelling.  Good finger motion with relatively no pain.  Able to adduct thumb.                        Elbow: Pronation/supination equal bilaterally.  Extension/flexion equal  bilaterally.  No swelling.  Nontender with elbow motion.  Nontender with elbow palpation.  Radiography:  No new x-rays done today.      Assessment:       Impression:     1. S/P endoscopic carpal tunnel release    2. S/P decompression of ulnar nerve at elbow              Plan:       1.  Discussed physical examination with the patient. Nicole understands that she had an endoscopic carpal tunnel release of her right wrist and an ulnar nerve decompression.    2. Refer to occupational therapy to start motion exercises and strengthening.  3. Start a home exercise program with Thera ball, a Thera ball was given to the patient.  4. Remove sutures and place Steri-Strips.    5. Any pain can be treated with over-the-counter medications dosed per box instructions.    6.  Follow-up in 6 weeks

## 2023-01-25 ENCOUNTER — CLINICAL SUPPORT (OUTPATIENT)
Dept: REHABILITATION | Facility: HOSPITAL | Age: 53
End: 2023-01-25
Attending: ORTHOPAEDIC SURGERY
Payer: MEDICARE

## 2023-01-25 DIAGNOSIS — Z74.09 IMPAIRED FUNCTIONAL MOBILITY AND ACTIVITY TOLERANCE: Primary | ICD-10-CM

## 2023-01-25 DIAGNOSIS — M53.3 SACROILIAC JOINT DYSFUNCTION OF BOTH SIDES: ICD-10-CM

## 2023-01-25 PROCEDURE — 97014 ELECTRIC STIMULATION THERAPY: CPT | Mod: PN

## 2023-01-25 PROCEDURE — 97140 MANUAL THERAPY 1/> REGIONS: CPT | Mod: PN

## 2023-01-25 NOTE — PROGRESS NOTES
[facility]   Physical Therapy Treatment Note     Name: Nicole BONILLA [facility] Number: [id number]    Therapy Diagnosis:   Encounter Diagnoses   Name Primary?    Impaired functional mobility and activity tolerance Yes    Sacroiliac joint dysfunction of both sides      Physician: Brii Mcneil NP    Visit Date: 1/25/2023    Evaluation Date: 11/28/2022  Authorization Period Expiration: 11/28/2023  Plan of Care Expiration: 02/28/2023  Visit # / Visits authorized: 4 /16   FOTO Visit #:  1/3    PTA Visit #: 0/5     Time In: 10:15 am    Time Out: 11:20 am   Total Billable Time: 45 minutes    SUBJECTIVE     Pt reports: Nicole has not been to PT since 12/12/2022 - had right carpal tunnel and ulnar nerve release on 12/5.  Will be starting O.T. later this week. She reports that she continues to have lower back pain, but her neck has been more bothersome lately.  Will contact referring NP for referral for cervical spine.   She was not compliant with home exercise program.  Response to previous treatment:  n/a - over 30 days since she has been here.   Functional change: Walking without too much lower back pain; RUE has been more of her issue lately.     Pain: 5/10  Location: bilateral SI joints      OBJECTIVE     Objective Measures updated at progress report unless specified.     Treatment     Nicole received the treatments listed below:      therapeutic exercises to develop  for  minutes including:      manual therapy techniques: Soft tissue Mobilization / Dry Needling were applied to the: Lumbar spine, bilateral SI joints  for 40 minutes, including: Patient positioned into prone, pillow under chest at patient request; no bolster wanted for ankles - neeraj them off end of mat.   IASTM to lumbar paraspinals moving caudally into SI joint area and bilateral piriformis; LF-ES per protocol for lumbar radiculopathy with inclusion of SI joints and piriformis on right and left. Using 60 mm and 50 mm  needles.  Winding of needles after insertion for tissue grasp. Adjusted intensity of stimulation to patient tolerance. Able to leave needles in place x 20 mins. Removed needles,         Patient Education and Home Exercises     Home Exercises Provided and Patient Education Provided     Education provided:   - what to expect following dry needling     Written Home Exercises Provided:  has not been issued one at this time.  .   Nicole demonstrated good  understanding of the education provided. See EMR under Patient Instructions for exercises provided during therapy sessions    ASSESSMENT     Nicole demonstrated good tolerance of dry Needling today; No adverse effects noted, She will be starting O.T for her RUE later this week.  Will contact Brii about addition of cervical spine to treatment plan. Will progress her exercise /activity after a few sessions of the dry needling to see if we can make some headway in her pain levels.     Nicole Is progressing well towards her goals.   Pt prognosis is Fair.     Pt will continue to benefit from skilled outpatient physical therapy to address the deficits listed in the problem list box on initial evaluation, provide pt/family education and to maximize pt's level of independence in the home and community environment.     Pt's spiritual, cultural and educational needs considered and pt agreeable to plan of care and goals.     Anticipated barriers to physical therapy: none     Goals:   Short Term Goals: 4 weeks  Pain: Decrease pain to less than 6 /10 on a daily basis to allow for improved ability to perform ADL's.   Walking: Able to walk 5 mins in clinic without increased pain    Transfers: Able to complete 6 reps sit to  30 secs      Long Term Goals: 8 weeks     Pain: Decrease pain to no more than 4/10 to allow for improved ability to perform ADL's   Strength: Improve strength in core muscles by 1/2 grade  for improved trunk stability  Functional scale: Improve limitation  score on FOTO   to 64 % limitations   Lifting: Lift 10 lbs to waist level, 5 lbs to shoulder level, 3 lbs to overhead without pain or compensation  Postures: Increase sitting and/or standing duration to 10 mins without pain   Walking: Able to complete a 6 min walk test in outdoor environment.   Transfers: Perform sit to stand transfers without increased pain or limitation - can complete 8 reps in 30 secs   Exercise: demonstrate independence with home exercise program to maintain gains made in therapy.         PLAN     Continue 2x/week x 8 weeks - Dry Needling, manual tx, therapeutic exercise/activities.     Quita Holliday, PT

## 2023-01-27 ENCOUNTER — CLINICAL SUPPORT (OUTPATIENT)
Dept: REHABILITATION | Facility: HOSPITAL | Age: 53
End: 2023-01-27
Attending: ORTHOPAEDIC SURGERY
Payer: MEDICARE

## 2023-01-27 DIAGNOSIS — Z74.09 IMPAIRED FUNCTIONAL MOBILITY AND ACTIVITY TOLERANCE: Primary | ICD-10-CM

## 2023-01-27 DIAGNOSIS — Z98.890 S/P CUBITAL TUNNEL RELEASE: ICD-10-CM

## 2023-01-27 DIAGNOSIS — Z98.890 S/P DECOMPRESSION OF ULNAR NERVE AT ELBOW: ICD-10-CM

## 2023-01-27 DIAGNOSIS — M53.3 SACROILIAC JOINT DYSFUNCTION OF BOTH SIDES: ICD-10-CM

## 2023-01-27 DIAGNOSIS — Z98.890 S/P ENDOSCOPIC CARPAL TUNNEL RELEASE: ICD-10-CM

## 2023-01-27 DIAGNOSIS — Z98.890 S/P CARPAL TUNNEL RELEASE: ICD-10-CM

## 2023-01-27 PROCEDURE — 97014 ELECTRIC STIMULATION THERAPY: CPT | Mod: PN

## 2023-01-27 PROCEDURE — 97140 MANUAL THERAPY 1/> REGIONS: CPT | Mod: PN

## 2023-01-27 PROCEDURE — 97166 OT EVAL MOD COMPLEX 45 MIN: CPT | Mod: PN

## 2023-01-27 NOTE — PROGRESS NOTES
[facility]  Occupational Therapy Initial Evaluation    Date: 1/27/2023  Name: Nicole Harris  Clinic Number: [id number]    Therapy Diagnosis:   Encounter Diagnoses   Name Primary?    S/P endoscopic carpal tunnel release     S/P decompression of ulnar nerve at elbow      Physician: Alcides Yancey DO    Physician Orders: OT evaluate and treat  Medical Diagnosis: s/p R carpal/cubital tunnel release  Surgical Date: 1/5/2023  Evaluation Date: 1/27/2023  Insurance Authorization Period Expiration: 12/31/2023  Plan of Care Certification Period: 3/15/2023  Progress Note Due: 2/27/2023   Date of Return to MD: not a set date   Visit # / Visits authorized: 1 / 12  FOTO: 1/3    Precautions:  Standard (fall)    Time In:9: 30  Time Out: 10: 10  Total Appointment Time (timed & untimed codes): 40 minutes    SUBJECTIVE     Date of Onset: 12 years of symptoms (1/5/2023 is the date of surgery)    History of Current Condition/Mechanism of Injury: Nicole reports: that she has been struggling with numbness and tingling in the R arm/hand along with FM weakness for approximately 12 years. Pt tried splinting,  and NSAIDs with little relief. Pt reports that she is struggling with simple ACTIVITIES OF DAILY LIVING and household tasks including : brushing her hair /teeth, turning a key, washing dishes. Pt does have some atrophy present in the R hypothenar muscles along with sensitivity at the R elbow/incision site (OT will be using desensitization techniques to combat this).  Pt also has similar symptoms in the L UE but is holding off for another surgery for a few months.     Falls: yes, pt seeing PT for gait training /back/neck     Involved Side: R  Dominant Side: Right  Imaging: see epic  Prior Therapy: pt in PT currently  Occupation:  disability   Working presently: disability    Functional Limitations/Social History:    Previous functional status includes: Independent with all ADLs.     Current Functional  Status   Home/Living environment: lives with their family; one story home      Limitation of Functional Status as follows:   ADLs/IADLs:     - Feeding: pt is independent (some difficulty picking up small utensils)    - Bathing: pt is independent     - Dressing/Grooming: pt is independent  (issues with FM components)    Pain:  Functional Pain Scale Rating 0-10: Current 3/10  Location: R elbow and R anterior wrist (R triceps area)  Description: Tingling and Numb  Aggravating Factors: Lifting, turning keys , sleeping   Easing Factors: ice    Patient's Goals for Therapy: pt would like to get use back in the arm     Medical History:   Past Medical History:   Diagnosis Date    Anxiety     Bipolar disorder     Depression     Diabetes mellitus, type 2     Fibromyalgia     HTN (hypertension)     Insomnia     Migraine        Surgical History:    has a past surgical history that includes  section; Tonsillectomy; Epidural steroid injection (N/A, 2019); Epidural steroid injection (N/A, 2019); Epidural steroid injection (N/A, 2019); Trigger point injection (N/A, 2019); Colonoscopy (N/A, 2019); Esophagogastroduodenoscopy (N/A, 2019); Epidural steroid injection (N/A, 10/21/2019); Trigger point injection (N/A, 10/21/2019); ANGIOGRAM, CORONARY, WITH LEFT HEART CATHETERIZATION (Left, 2020); Coronary Artery Bypass Graft (CABG) (N/A, 2020); Endoscopic harvest of vein (Left, 2020); Cardiac catheterization; triple bypass; Epidural steroid injection (N/A, 10/1/2020); Trigger point injection (N/A, 10/1/2020); Epidural steroid injection (N/A, 2021); Injection of anesthetic agent around nerve (Bilateral, 2021); Epidural steroid injection (N/A, 9/15/2021); Epidural steroid injection into cervical spine (N/A, 3/2/2022); Epidural steroid injection (N/A, 2022); Epidural steroid injection (N/A, 2022); Carpal tunnel release (Right, 2023); and decompression, nerve, ulnar  (Right, 1/5/2023).    Medications:   has a current medication list which includes the following prescription(s): albuterol, aspirin, atorvastatin, azelastine, cetirizine, cyclobenzaprine, empagliflozin, escitalopram oxalate, fluconazole, fluticasone propionate, insulin glargine,hum.rec.anlog, levocetirizine, lisinopril, metformin, montelukast, naproxen sodium, nortriptyline, oxycodone-acetaminophen, ozempic, pantoprazole, and true metrix glucose test strip, and the following Facility-Administered Medications: sodium chloride 0.9%, lactated ringers, lactated ringers, and lactated ringers.    Allergies:   Review of patient's allergies indicates:   Allergen Reactions    Doxycycline     Hydrocodone Nausea And Vomiting    Vaccine adjuvant system, as01b liposomal     Varicella-zoster ge-as01b (pf)      Other reaction(s): Memory problems    Varicella-zoster virus glycoprotein e, recombinant     Benadryl [diphenhydramine hcl] Nausea Only          OBJECTIVE    strength:  10 lbs L hand  18 lbs R hand    Lateral pinch:  0 lbs R hand  1 lb L hand    R wrist AROM:   32 flexion   35 extension     +Sensation intact, pt reporting residual numbness and tingling.*    Limitation/Restriction for FOTO Wrist Survey    Therapist reviewed FOTO scores for Nicole Harris on 1/27/2023.   FOTO documents entered into [employer] - see Media section.    Limitation Score: 61%         Treatment   Total Treatment time (time-based codes) separate from Evaluation: 0 minutes    Patient Education and Home Exercises      Education provided:   - Pt was educated on OT plan of care, avoiding propping elbows, HEP training, avoiding lifting heavy items, importance of using the hand (light use), stretching     Written Home Exercises Provided: yes.  Exercises were reviewed and Nicole was able to demonstrate them prior to the end of the session.  Nicole demonstrated good  understanding of the education provided. See EMR under Patient Instructions for exercises  provided during therapy sessions.     Pt was advised to perform these exercises free of pain, and to stop performing them if pain occurs.    Patient/Family Education: role of OT, goals for OT, scheduling/cancellations - pt verbalized understanding. Discussed insurance limitations with patient.      ASSESSMENT     Nicole Harris is a 53 y.o. female referred to outpatient occupational therapy and presents with a medical diagnosis of s/p R carpal tunnel and R cubital tunnel release.  Patient presents with the following therapy deficits: Decreased ROM, Decreased  strength, Decreased pinch strength, Decreased muscle strength, Decreased functional hand use, Increased pain, and Diminished/Impaired Sensation and demonstrates limitations as described in the chart below. Following medical record review it is determined that pt will benefit from occupational therapy services in order to maximize pain free and/or functional use of right elbow and wrist. The following goals were discussed with the patient and patient is in agreement with them as to be addressed in the treatment plan. The patient's rehab potential is Good.     Anticipated barriers to occupational therapy: none  Pt has no cultural, educational or language barriers to learning provided.    Profile and History Assessment of Occupational Performance Level of Clinical Decision Making Complexity Score   Occupational Profile:   Nicole Harris is a 53 y.o. female who lives with their family and is on disability Nicole Harris has difficulty with  ADLs and IADLs as listed previously, which  Affecting herdaily functional abilities.      Comorbidities:    has a past medical history of Anxiety, Bipolar disorder, Depression, Diabetes mellitus, type 2, Fibromyalgia, HTN (hypertension), Insomnia, and Migraine.    Medical and Therapy History Review:   Med hx reviewed               Performance Deficits    Physical:  Joint Mobility  Joint Stability  Muscle  Power/Strength  Muscle Endurance  Skin Integrity/Scar Formation   Strength  Pinch Strength  Fine Motor Coordination  Pain    Cognitive:  No Deficits    Psychosocial:    No Deficits     Clinical Decision Making:  moderate    Assessment Process:  Problem-Focused Assessments    Modification/Need for Assistance:  Not Necessary    Intervention Selection:  Several Treatment Options       moderate  Based on PMHX, co morbidities , data from assessments and functional level of assistance required with task and clinical presentation directly impacting function.       The following goals were discussed with the patient and patient is in agreement with them as to be addressed in the treatment plan.     Goals:   Short term:  Pt will report R UE FOTO limitation of 50% by 4 weeks  2. Pt will improve R hand functional  strength by 3 lbs in 3 weeks   3. Pt will improve R wrist extension by 5 degrees in 4 weeks    Long term:   Pt will report R UE FOTO limitation of 45% by dc  2. Pt will improve R hand functional  strength by 6 lbs by dc  3. Pt will improve R wrist extension by 10 degrees by dc  4 Pt will report 100% compliance with HEP prior to dc    PLAN   Plan of Care Certification: 1/27/2023 to 3/15/2023.     Outpatient Occupational Therapy 2 times weekly for 6 weeks to include the following interventions: Paraffin, Manual therapy/joint mobilizations, Modalities for pain management, Therapeutic exercises/activities., Strengthening, Orthotic Fabrication/Fit/Training, and Electrical Modalities.      Alice Eddy OT      I CERTIFY THE NEED FOR THESE SERVICES FURNISHED UNDER THIS PLAN OF TREATMENT AND WHILE UNDER MY CARE  Physician's comments:      Physician's Signature: ___________________________________________________

## 2023-01-29 NOTE — PROGRESS NOTES
[facility]   Physical Therapy Treatment Note     Name: Nicole BONILLA [facility] Number: [id number]    Therapy Diagnosis:   Encounter Diagnoses   Name Primary?    Impaired functional mobility and activity tolerance Yes    Sacroiliac joint dysfunction of both sides      Physician: Brii Mcneil NP    Visit Date: 1/27/2023    Evaluation Date: 11/28/2022  Authorization Period Expiration: 11/28/2023  Plan of Care Expiration: 02/28/2023  Visit # / Visits authorized: 5 /16   FOTO Visit #:  1/3    PTA Visit #: 0/5     Time In: 10:45  am    Time Out: 11:35 am   Total Billable Time: 45 minutes    SUBJECTIVE     Pt reports: Felt a little better after tx last visit.    She was not compliant with home exercise program.  Response to previous treatment:   reduction in pain to around 5/10    Functional change: Walking without too much lower back pain; RUE has been more of her issue lately.     Pain: 5/10  Location: bilateral SI joints      OBJECTIVE     Objective Measures updated at progress report unless specified.     Treatment     Nicole received the treatments listed below:      therapeutic exercises to develop  for  minutes including:      manual therapy techniques: Soft tissue Mobilization / Dry Needling were applied to the: Lumbar spine, bilateral SI joints  for 40 minutes, including: Patient positioned into prone, pillow under chest at patient request; no bolster wanted for ankles - neeraj them off end of mat.   IASTM to lumbar paraspinals moving caudally into SI joint area and bilateral piriformis; LF-ES per protocol for lumbar radiculopathy with inclusion of SI joints and piriformis on right and left. Using 60 mm and 50 mm needles.  Winding of needles after insertion for tissue grasp. Adjusted intensity of stimulation to patient tolerance. Able to leave needles in place x 20 mins. Removed needles,         Patient Education and Home Exercises     Home Exercises Provided and Patient Education  Provided     Education provided:   - what to expect following dry needling     Written Home Exercises Provided:  has not been issued one at this time.  .   Nicole demonstrated good  understanding of the education provided. See EMR under Patient Instructions for exercises provided during therapy sessions    ASSESSMENT     Nicole demonstrated good tolerance of dry Needling today; No adverse effects noted,  Will contact Brii about addition of cervical spine to treatment plan. Will progress her exercise /activity after a few sessions of the dry needling to see if we can make some headway in her pain levels.     Nicole Is progressing well towards her goals.   Pt prognosis is Fair.     Pt will continue to benefit from skilled outpatient physical therapy to address the deficits listed in the problem list box on initial evaluation, provide pt/family education and to maximize pt's level of independence in the home and community environment.     Pt's spiritual, cultural and educational needs considered and pt agreeable to plan of care and goals.     Anticipated barriers to physical therapy: none     Goals:   Short Term Goals: 4 weeks  Pain: Decrease pain to less than 6 /10 on a daily basis to allow for improved ability to perform ADL's.   Walking: Able to walk 5 mins in clinic without increased pain    Transfers: Able to complete 6 reps sit to  30 secs      Long Term Goals: 8 weeks     Pain: Decrease pain to no more than 4/10 to allow for improved ability to perform ADL's   Strength: Improve strength in core muscles by 1/2 grade  for improved trunk stability  Functional scale: Improve limitation score on FOTO   to 64 % limitations   Lifting: Lift 10 lbs to waist level, 5 lbs to shoulder level, 3 lbs to overhead without pain or compensation  Postures: Increase sitting and/or standing duration to 10 mins without pain   Walking: Able to complete a 6 min walk test in outdoor environment.   Transfers: Perform sit to stand  transfers without increased pain or limitation - can complete 8 reps in 30 secs   Exercise: demonstrate independence with home exercise program to maintain gains made in therapy.         PLAN     Continue 2x/week x 8 weeks - Dry Needling, manual tx, therapeutic exercise/activities.     Quita Holliday, PT

## 2023-01-30 ENCOUNTER — CLINICAL SUPPORT (OUTPATIENT)
Dept: REHABILITATION | Facility: HOSPITAL | Age: 53
End: 2023-01-30
Attending: ORTHOPAEDIC SURGERY
Payer: MEDICARE

## 2023-01-30 DIAGNOSIS — Z74.09 IMPAIRED FUNCTIONAL MOBILITY AND ACTIVITY TOLERANCE: Primary | ICD-10-CM

## 2023-01-30 DIAGNOSIS — M53.3 SACROILIAC JOINT DYSFUNCTION OF BOTH SIDES: ICD-10-CM

## 2023-01-30 DIAGNOSIS — Z98.890 S/P CUBITAL TUNNEL RELEASE: ICD-10-CM

## 2023-01-30 DIAGNOSIS — Z98.890 S/P CARPAL TUNNEL RELEASE: Primary | ICD-10-CM

## 2023-01-30 PROCEDURE — 97110 THERAPEUTIC EXERCISES: CPT | Mod: PN

## 2023-01-30 PROCEDURE — 97110 THERAPEUTIC EXERCISES: CPT | Mod: PN,CQ

## 2023-01-30 PROCEDURE — 97140 MANUAL THERAPY 1/> REGIONS: CPT | Mod: PN,CQ

## 2023-01-30 PROCEDURE — 97018 PARAFFIN BATH THERAPY: CPT | Mod: 59,PN

## 2023-01-30 NOTE — PROGRESS NOTES
[facility]   Physical Therapy Treatment Note     Name: Nicole BONILLA [facility] Number: [id number]    Therapy Diagnosis:   Encounter Diagnoses   Name Primary?    Impaired functional mobility and activity tolerance Yes    Sacroiliac joint dysfunction of both sides      Physician: Brii Mcneil NP    Visit Date: 1/30/2023    Evaluation Date: 11/28/2022  Authorization Period Expiration: 11/28/2023  Plan of Care Expiration: 02/28/2023  Visit # / Visits authorized: 6 / 16   FOTO Visit #:  1/3    PTA Visit #: 1/5     Time In: 2:00 PM   Time Out: 2:45 PM   Total Billable Time: 45 minutes    SUBJECTIVE     Pt reports: I am hurting today.   She was not compliant with home exercise program.  Response to previous treatment:   reduction in pain to around 5/10    Functional change: Walking without too much lower back pain; RUE has been more of her issue lately.     Pain: 10/10  Location: Neck all the way down to low back    OBJECTIVE     Objective Measures updated at progress report unless specified.     Treatment     Nicole received the treatments listed below:      therapeutic exercises to develop  for 15 minutes including:  Nustep level 0 x 3 min  Pelvic Tilts x 10  Bridges x 10   Supine Knee fallouts x 10   LTR x 10      manual therapy techniques: STM to lumbar paraspinals moving caudally into SI joint area and bilateral piriformis x 15 min        Patient Education and Home Exercises     Home Exercises Provided and Patient Education Provided     Education provided:   - what to expect following dry needling     Written Home Exercises Provided:  has not been issued one at this time.  .   Nicole demonstrated good  understanding of the education provided. See EMR under Patient Instructions for exercises provided during therapy sessions    ASSESSMENT     Nicole demonstrated very limited tolerance of exercise today; Will progress her exercise /activity after a few sessions of the dry needling to see  if we can make some headway in her pain levels.     Nicole Is progressing well towards her goals.   Pt prognosis is Fair.     Pt will continue to benefit from skilled outpatient physical therapy to address the deficits listed in the problem list box on initial evaluation, provide pt/family education and to maximize pt's level of independence in the home and community environment.     Pt's spiritual, cultural and educational needs considered and pt agreeable to plan of care and goals.     Anticipated barriers to physical therapy: none     Goals:   Short Term Goals: 4 weeks  Pain: Decrease pain to less than 6 /10 on a daily basis to allow for improved ability to perform ADL's.   Walking: Able to walk 5 mins in clinic without increased pain    Transfers: Able to complete 6 reps sit to  30 secs      Long Term Goals: 8 weeks     Pain: Decrease pain to no more than 4/10 to allow for improved ability to perform ADL's   Strength: Improve strength in core muscles by 1/2 grade  for improved trunk stability  Functional scale: Improve limitation score on FOTO   to 64 % limitations   Lifting: Lift 10 lbs to waist level, 5 lbs to shoulder level, 3 lbs to overhead without pain or compensation  Postures: Increase sitting and/or standing duration to 10 mins without pain   Walking: Able to complete a 6 min walk test in outdoor environment.   Transfers: Perform sit to stand transfers without increased pain or limitation - can complete 8 reps in 30 secs   Exercise: demonstrate independence with home exercise program to maintain gains made in therapy.         PLAN     Continue 2x/week x 8 weeks - Dry Needling, manual tx, therapeutic exercise/activities.     Jonathan Favre, PTA

## 2023-01-30 NOTE — PROGRESS NOTES
Occupational Therapy Daily Treatment Note   Name: Nicole Harris 1970  MRN: [id number]    Visit Date: 1/30/2023  Visit #: 1 / 12  Authorization period Expiration: 12/31/2023    Plan of Care Expiration: 3/15/2023  Precautions: standard    Time In: 2: 45  Time Out: 3: 25  Total 1:1 Treatment Time: 40 min    Treatment Diagnosis:   Encounter Diagnoses   Name Primary?    S/P carpal tunnel release Yes    S/P cubital tunnel release      Physician: Alcides Yancey DO    Subjective   Pt reports: she is in a lot of pain today In the back   She was compliant with home exercise program.     Pain Scale:  2/10 on VAS currently  Pain Location: right wrist    Objective   Nicole received therapeutic exercises to develop strength, endurance, and ROM for 15 minutes including:  B hand strengthening using red theraputty   B wrist strengthening using green flex bar   R wrist curls using 1 # wt for 3 x 10 reps  R forearm pronation/supination using 2 # wt for 3 x 10 reps    Nicole participated in dynamic functional therapeutic activities to improve functional performance for 10  minutes, including:  FM activity, placing 1 inch pegs into respective holes. Pt presented with mild FM weakness when using two point pincer grasp and performed task with 70% accuracy      Nicole received the following direct contact modalities after being cleared for contraindications: Paraffin was applied to the R hand/wrist prior to mobilization in roder     Home Exercises and Education Provided     Education provided re:   - progress towards goals   - role of therapy in multi - disciplinary team, goals for therapy  Pt educated on condition, POC, and expectations in therapy.  No spiritual or educational barriers to learning provided    Home exercises:  Pt will be provided HEP during course of treatment with progressions as appropriate. Pt was advised to perform these exercises free of pain, and to stop performing them if pain  occurs.   Nicole demonstrated good  understanding of the education provided.     Assessment   Nicole is progressing well towards her goals and no updates to goals at this time. 1 min 27 seconds     Pt prognosis is Good. Pt will continue to benefit from skilled outpatient physical therapy to address the deficits listed in the problem list chart on initial evaluation, provide pt/family education and to maximize pt's level of independence in the home and community environment.     Medical necessity is demonstrated by the impairments and functional limitations listed on the Initial Evaluation.     Anticipated barriers to occupational therapy: none  Pt's spiritual, cultural and educational needs considered and pt agreeable to plan of care and goals.    Goals   Short term:  Pt will report R UE FOTO limitation of 50% by 4 weeks  2. Pt will improve R hand functional  strength by 3 lbs in 3 weeks   3. Pt will improve R wrist extension by 5 degrees in 4 weeks     Long term:   Pt will report R UE FOTO limitation of 45% by dc  2. Pt will improve R hand functional  strength by 6 lbs by dc  3. Pt will improve R wrist extension by 10 degrees by dc  4 Pt will report 100% compliance with HEP prior to dc      Plan   Continue with established Plan of Care towards Occupational Therapy goals.   Discussed Plan of Care with patient: Yes    Alice Eddy OT  1/30/2023

## 2023-02-07 ENCOUNTER — CLINICAL SUPPORT (OUTPATIENT)
Dept: REHABILITATION | Facility: HOSPITAL | Age: 53
End: 2023-02-07
Attending: ORTHOPAEDIC SURGERY
Payer: MEDICARE

## 2023-02-07 DIAGNOSIS — M53.3 SACROILIAC JOINT DYSFUNCTION OF BOTH SIDES: ICD-10-CM

## 2023-02-07 DIAGNOSIS — Z74.09 IMPAIRED FUNCTIONAL MOBILITY AND ACTIVITY TOLERANCE: Primary | ICD-10-CM

## 2023-02-07 PROCEDURE — 97014 ELECTRIC STIMULATION THERAPY: CPT | Mod: PN

## 2023-02-07 PROCEDURE — 97140 MANUAL THERAPY 1/> REGIONS: CPT | Mod: PN

## 2023-02-07 NOTE — PROGRESS NOTES
[facility]   Physical Therapy Treatment Note     Name: Nicole BONILLA [facility] Number: [id number]    Therapy Diagnosis:   Encounter Diagnoses   Name Primary?    Impaired functional mobility and activity tolerance Yes    Sacroiliac joint dysfunction of both sides      Physician: Brii Mcneil NP    Visit Date: 2/7/2023    Evaluation Date: 11/28/2022  Authorization Period Expiration: 11/28/2023  Plan of Care Expiration: 02/28/2023  Visit # / Visits authorized: 7/ 16   FOTO Visit #:  1/3    PTA Visit #: 0 /5     Time In: 12:15 PM   Time Out: 1:15  PM   Total Billable Time: 45 minutes    SUBJECTIVE     Pt reports: I am hurting today.  Everything seems to hurt.   She was not compliant with home exercise program.  Response to previous treatment:   reduction in pain to around 5/10    Functional change: Walking without too much lower back pain; RUE/LUE has been more of her issue lately - tingling/numbness noted in hands     Pain: 8/10  Location: Neck all the way down to low back    OBJECTIVE     Objective Measures updated at progress report unless specified.     Treatment     Nicole received the treatments listed below:      therapeutic exercises to develop  for 0 minutes including:  Nustep level 0 x 3 min  Pelvic Tilts x 10  Bridges x 10   Supine Knee fallouts x 10   LTR x 10      manual therapy techniques:   Soft tissue Mobilization / Dry Needling were applied to the: Lumbar spine, bilateral SI joints  for 40 minutes, including: Patient positioned into prone, pillow under chest at patient request; no bolster wanted for ankles - neeraj them off end of mat.   IASTM to lumbar paraspinals moving caudally into SI joint area and bilateral piriformis; LF-ES per protocol for lumbar radiculopathy with inclusion of SI joints and piriformis on right and left. Using 60 mm and 50 mm needles.  Winding of needles after insertion for tissue grasp. Adjusted intensity of stimulation to patient tolerance. Able  to leave needles in place x 20 mins. Removed needles,             Patient Education and Home Exercises     Home Exercises Provided and Patient Education Provided     Education provided:   - what to expect following dry needling     Written Home Exercises Provided:  has not been issued one at this time.  .   Nicole demonstrated good  understanding of the education provided. See EMR under Patient Instructions for exercises provided during therapy sessions    ASSESSMENT     Nicole demonstrated very limited tolerance of exercise today; Will progress her exercise /activity after a few sessions of the dry needling to see if we can make some headway in her pain levels.     Nicole Is progressing well towards her goals.   Pt prognosis is Fair.     Pt will continue to benefit from skilled outpatient physical therapy to address the deficits listed in the problem list box on initial evaluation, provide pt/family education and to maximize pt's level of independence in the home and community environment.     Pt's spiritual, cultural and educational needs considered and pt agreeable to plan of care and goals.     Anticipated barriers to physical therapy: none     Goals:   Short Term Goals: 4 weeks  Pain: Decrease pain to less than 6 /10 on a daily basis to allow for improved ability to perform ADL's.   Walking: Able to walk 5 mins in clinic without increased pain    Transfers: Able to complete 6 reps sit to  30 secs      Long Term Goals: 8 weeks     Pain: Decrease pain to no more than 4/10 to allow for improved ability to perform ADL's   Strength: Improve strength in core muscles by 1/2 grade  for improved trunk stability  Functional scale: Improve limitation score on FOTO   to 64 % limitations   Lifting: Lift 10 lbs to waist level, 5 lbs to shoulder level, 3 lbs to overhead without pain or compensation  Postures: Increase sitting and/or standing duration to 10 mins without pain   Walking: Able to complete a 6 min walk test  in outdoor environment.   Transfers: Perform sit to stand transfers without increased pain or limitation - can complete 8 reps in 30 secs   Exercise: demonstrate independence with home exercise program to maintain gains made in therapy.         PLAN     Continue 2x/week x 8 weeks - Dry Needling, manual tx, therapeutic exercise/activities.     Quita Holliday, PT

## 2023-02-10 ENCOUNTER — CLINICAL SUPPORT (OUTPATIENT)
Dept: REHABILITATION | Facility: HOSPITAL | Age: 53
End: 2023-02-10
Attending: ORTHOPAEDIC SURGERY
Payer: MEDICARE

## 2023-02-10 DIAGNOSIS — Z98.890 S/P CARPAL TUNNEL RELEASE: Primary | ICD-10-CM

## 2023-02-10 DIAGNOSIS — Z98.890 S/P CUBITAL TUNNEL RELEASE: ICD-10-CM

## 2023-02-10 DIAGNOSIS — M53.3 SACROILIAC JOINT DYSFUNCTION OF BOTH SIDES: ICD-10-CM

## 2023-02-10 DIAGNOSIS — Z74.09 IMPAIRED FUNCTIONAL MOBILITY AND ACTIVITY TOLERANCE: Primary | ICD-10-CM

## 2023-02-10 PROCEDURE — 97140 MANUAL THERAPY 1/> REGIONS: CPT | Mod: PN

## 2023-02-10 PROCEDURE — 97014 ELECTRIC STIMULATION THERAPY: CPT | Mod: PN

## 2023-02-10 PROCEDURE — 97018 PARAFFIN BATH THERAPY: CPT | Mod: PN

## 2023-02-10 NOTE — PROGRESS NOTES
Occupational Therapy Daily Treatment Note   Name: Nicole Harris 1970  MRN: [id number]    Visit Date: 2/10/2023  Visit #: 3 / 12  Authorization period Expiration: 12/31/2023    Plan of Care Expiration: 3/15/2023  Precautions: standard    Time In: 2: 00  Time Out: 2: 37  Total 1:1 Treatment Time: 37 min    Treatment Diagnosis:   Encounter Diagnoses   Name Primary?    S/P carpal tunnel release Yes    S/P cubital tunnel release      Physician: Alcides Yancey DO    Subjective   Pt reports: she is hurting in the back  She was compliant with home exercise program.     Pain Scale:  2/10 on VAS currently  Pain Location: right wrist    Objective     Nicole received the following manual therapy techniques : for 25 mins  Soft tissue mobilization along with cross friction massage along the R carpal tunnel in order to decrease edema and break up scar tissue.     Nicole received the following direct contact modalities after being cleared for contraindications: Paraffin was applied to the R hand/wrist prior to mobilization     Home Exercises and Education Provided     Education provided re:   - progress towards goals   - role of therapy in multi - disciplinary team, goals for therapy  Pt educated on condition, POC, and expectations in therapy.  No spiritual or educational barriers to learning provided    Home exercises:  Pt will be provided HEP during course of treatment with progressions as appropriate. Pt was advised to perform these exercises free of pain, and to stop performing them if pain occurs.   Nicole demonstrated good  understanding of the education provided.     Assessment   Nicole is progressing well towards her goals and no updates to goals at this time. 1 min 27 seconds     Pt prognosis is Good. Pt will continue to benefit from skilled outpatient physical therapy to address the deficits listed in the problem list chart on initial evaluation, provide pt/family education and to  maximize pt's level of independence in the home and community environment.     Medical necessity is demonstrated by the impairments and functional limitations listed on the Initial Evaluation.     Anticipated barriers to occupational therapy: none  Pt's spiritual, cultural and educational needs considered and pt agreeable to plan of care and goals.    Goals   Short term:  Pt will report R UE FOTO limitation of 50% by 4 weeks  2. Pt will improve R hand functional  strength by 3 lbs in 3 weeks   3. Pt will improve R wrist extension by 5 degrees in 4 weeks     Long term:   Pt will report R UE FOTO limitation of 45% by dc  2. Pt will improve R hand functional  strength by 6 lbs by dc  3. Pt will improve R wrist extension by 10 degrees by dc  4 Pt will report 100% compliance with HEP prior to dc      Plan   Continue with established Plan of Care towards Occupational Therapy goals.   Discussed Plan of Care with patient: Nakia Eddy OT  2/10/2023

## 2023-02-10 NOTE — PROGRESS NOTES
[facility]   Physical Therapy Treatment Note     Name: Nicole BONILLA [facility] Number: [id number]    Therapy Diagnosis:   Encounter Diagnoses   Name Primary?    Impaired functional mobility and activity tolerance Yes    Sacroiliac joint dysfunction of both sides      Physician: Brii Mcneil NP    Visit Date: 2/10/2023    Evaluation Date: 11/28/2022  Authorization Period Expiration: 11/28/2023  Plan of Care Expiration: 02/28/2023  Visit # / Visits authorized: 8 / 16   FOTO Visit #:  1/3    PTA Visit #: 0 /5     Time In: 1:00 PM   Time Out: 1:55  PM   Total Billable Time: 45 minutes    SUBJECTIVE     Pt reports:  Less overall pain today;   She was not compliant with home exercise program.  Response to previous treatment:   reduction in pain to around 5/10    Functional change: Walking without too much lower back pain; RUE/LUE has been more of her issue lately - tingling/numbness noted in hands     Pain: 5/10  Location: Neck all the way down to low back    OBJECTIVE     Objective Measures updated at progress report unless specified.     Treatment     Nicole received the treatments listed below:      therapeutic exercises to develop  for 0 minutes including:  Nustep level 0 x 3 min  Pelvic Tilts x 10  Bridges x 10   Supine Knee fallouts x 10   LTR x 10      manual therapy techniques:   Soft tissue Mobilization / Dry Needling were applied to the: Lumbar spine, bilateral SI joints  for 40 minutes, including: Patient positioned into prone, pillow under chest at patient request; no bolster wanted for ankles - neeraj them off end of mat.   IASTM to lumbar paraspinals moving caudally into SI joint area and bilateral piriformis; LF-ES per protocol for lumbar radiculopathy with inclusion of SI joints and piriformis on right and left. Using 60 mm and 50 mm needles.  Winding of needles after insertion for tissue grasp. Adjusted intensity of stimulation to patient tolerance. Able to leave needles in  place x 20 mins. Removed needles,             Patient Education and Home Exercises     Home Exercises Provided and Patient Education Provided     Education provided:   - what to expect following dry needling     Written Home Exercises Provided:  has not been issued one at this time.  .   Nicole demonstrated good  understanding of the education provided. See EMR under Patient Instructions for exercises provided during therapy sessions    ASSESSMENT     Nicole demonstrated very limited tolerance of exercise today; Will progress her exercise /activity after a few sessions of the dry needling to see if we can make some headway in her pain levels.     Nicole Is progressing well towards her goals.   Pt prognosis is Fair.     Pt will continue to benefit from skilled outpatient physical therapy to address the deficits listed in the problem list box on initial evaluation, provide pt/family education and to maximize pt's level of independence in the home and community environment.     Pt's spiritual, cultural and educational needs considered and pt agreeable to plan of care and goals.     Anticipated barriers to physical therapy: none     Goals:   Short Term Goals: 4 weeks  Pain: Decrease pain to less than 6 /10 on a daily basis to allow for improved ability to perform ADL's.   Walking: Able to walk 5 mins in clinic without increased pain    Transfers: Able to complete 6 reps sit to  30 secs      Long Term Goals: 8 weeks     Pain: Decrease pain to no more than 4/10 to allow for improved ability to perform ADL's   Strength: Improve strength in core muscles by 1/2 grade  for improved trunk stability  Functional scale: Improve limitation score on FOTO   to 64 % limitations   Lifting: Lift 10 lbs to waist level, 5 lbs to shoulder level, 3 lbs to overhead without pain or compensation  Postures: Increase sitting and/or standing duration to 10 mins without pain   Walking: Able to complete a 6 min walk test in outdoor  environment.   Transfers: Perform sit to stand transfers without increased pain or limitation - can complete 8 reps in 30 secs   Exercise: demonstrate independence with home exercise program to maintain gains made in therapy.         PLAN     Continue 2x/week x 8 weeks - Dry Needling, manual tx, therapeutic exercise/activities.     Quita Holliday, PT

## 2023-02-13 ENCOUNTER — CLINICAL SUPPORT (OUTPATIENT)
Dept: REHABILITATION | Facility: HOSPITAL | Age: 53
End: 2023-02-13
Attending: ORTHOPAEDIC SURGERY
Payer: MEDICARE

## 2023-02-13 DIAGNOSIS — M53.3 SACROILIAC JOINT DYSFUNCTION OF BOTH SIDES: ICD-10-CM

## 2023-02-13 DIAGNOSIS — Z74.09 IMPAIRED FUNCTIONAL MOBILITY AND ACTIVITY TOLERANCE: Primary | ICD-10-CM

## 2023-02-13 DIAGNOSIS — Z98.890 S/P CUBITAL TUNNEL RELEASE: ICD-10-CM

## 2023-02-13 DIAGNOSIS — Z98.890 S/P CARPAL TUNNEL RELEASE: Primary | ICD-10-CM

## 2023-02-13 PROCEDURE — 97140 MANUAL THERAPY 1/> REGIONS: CPT | Mod: PN

## 2023-02-13 PROCEDURE — 97110 THERAPEUTIC EXERCISES: CPT | Mod: PN

## 2023-02-13 PROCEDURE — 97018 PARAFFIN BATH THERAPY: CPT | Mod: PN

## 2023-02-13 PROCEDURE — 97014 ELECTRIC STIMULATION THERAPY: CPT | Mod: PN

## 2023-02-13 NOTE — PROGRESS NOTES
Occupational Therapy Daily Treatment Note   Name: Nicole Harris 1970  MRN: [id number]    Visit Date: 2/13/2023  Visit #: 4 / 12  Authorization period Expiration: 12/31/2023    Plan of Care Expiration: 3/15/2023  Precautions: standard    Time In: 1:13  Time Out: 1: 58  Total 1:1 Treatment Time: 45 min    Treatment Diagnosis:   No diagnosis found.    Physician: Alcides Yancey DO    Subjective   Pt reports: she is hurting in the hand pretty bad today   She was compliant with home exercise program.     Pain Scale:  2/10 on VAS currently  Pain Location: right wrist    Objective     Nicole received the following manual therapy techniques : for 20 mins  Soft tissue mobilization along with cross friction massage along the R carpal tunnel in order to decrease edema and break up scar tissue.     Nicole received the following direct contact modalities after being cleared for contraindications: Paraffin was applied to the R hand/wrist prior to mobilization for 10 mins    Nicole performed the following therapeutic exercises: 15 mins  -R wrist extension/flexion 3 x 10 reps using 2# wt  -R forearm pronation/supination 3 x 10 reps using 2 # wt  -Chest presses using 3 # dowel amando 3 x 10 reps\  -UBE for 8 mins on light resistance    Home Exercises and Education Provided     Education provided re:   - progress towards goals   - role of therapy in multi - disciplinary team, goals for therapy  Pt educated on condition, POC, and expectations in therapy.  No spiritual or educational barriers to learning provided    Home exercises:  Pt will be provided HEP during course of treatment with progressions as appropriate. Pt was advised to perform these exercises free of pain, and to stop performing them if pain occurs.   Nicole demonstrated good  understanding of the education provided.     Assessment   Nicole is progressing well towards her goals and no updates to goals at this time. 1 min 27 seconds     Pt  prognosis is Good. Pt will continue to benefit from skilled outpatient physical therapy to address the deficits listed in the problem list chart on initial evaluation, provide pt/family education and to maximize pt's level of independence in the home and community environment.     Medical necessity is demonstrated by the impairments and functional limitations listed on the Initial Evaluation.     Anticipated barriers to occupational therapy: none  Pt's spiritual, cultural and educational needs considered and pt agreeable to plan of care and goals.    Goals   Short term:  Pt will report R UE FOTO limitation of 50% by 4 weeks  2. Pt will improve R hand functional  strength by 3 lbs in 3 weeks   3. Pt will improve R wrist extension by 5 degrees in 4 weeks     Long term:   Pt will report R UE FOTO limitation of 45% by dc  2. Pt will improve R hand functional  strength by 6 lbs by dc  3. Pt will improve R wrist extension by 10 degrees by dc  4 Pt will report 100% compliance with HEP prior to dc      Plan   Continue with established Plan of Care towards Occupational Therapy goals.   Discussed Plan of Care with patient: Yes    Alice Eddy OT  2/13/2023

## 2023-02-13 NOTE — PROGRESS NOTES
[facility]   Physical Therapy Treatment Note     Name: Nicole BONILLA [facility] Number: [id number]    Therapy Diagnosis:   Encounter Diagnoses   Name Primary?    Impaired functional mobility and activity tolerance Yes    Sacroiliac joint dysfunction of both sides      Physician: Brii Mcneil NP    Visit Date: 2/13/2023    Evaluation Date: 11/28/2022  Authorization Period Expiration: 11/28/2023  Plan of Care Expiration: 02/28/2023  Visit # / Visits authorized: 9 / 16   FOTO Visit #:  1/3    PTA Visit #: 0 /5     Time In: 2:30 PM   Time Out: 3:20   PM   Total Billable Time: 45 minutes    SUBJECTIVE     Pt reports:  Less overall pain today;   She was not compliant with home exercise program.  Response to previous treatment:   reduction in pain to around 5/10    Functional change: Walking without too much lower back pain; RUE/LUE has been more of her issue lately - tingling/numbness noted in hands     Pain: 5/10  Location: Neck all the way down to low back    OBJECTIVE     Objective Measures updated at progress report unless specified.     Treatment     Nicole received the treatments listed below:      therapeutic exercises to develop  for 0 minutes including:  Nustep level 0 x 3 min  Pelvic Tilts x 10  Bridges x 10   Supine Knee fallouts x 10   LTR x 10      manual therapy techniques:   Soft tissue Mobilization / Dry Needling were applied to the: Lumbar spine, bilateral SI joints  for 40 minutes, including: Patient positioned into prone, pillow under chest at patient request; no bolster wanted for ankles - neeraj them off end of mat.   IASTM to lumbar paraspinals moving caudally into SI joint area and bilateral piriformis; LF-ES per protocol for lumbar radiculopathy with inclusion of SI joints and piriformis on right and left. Using 60 mm and 50 mm needles.  Winding of needles after insertion for tissue grasp. Adjusted intensity of stimulation to patient tolerance. Able to leave needles in  place x 20 mins. Removed needles,             Patient Education and Home Exercises     Home Exercises Provided and Patient Education Provided     Education provided:   - what to expect following dry needling     Written Home Exercises Provided:  has not been issued one at this time.  .   Nicole demonstrated good  understanding of the education provided. See EMR under Patient Instructions for exercises provided during therapy sessions    ASSESSMENT     Nicole demonstrated very limited tolerance of exercise today; Will progress her exercise /activity after a few sessions of the dry needling to see if we can make some headway in her pain levels.     Nicole Is progressing well towards her goals.   Pt prognosis is Fair.     Pt will continue to benefit from skilled outpatient physical therapy to address the deficits listed in the problem list box on initial evaluation, provide pt/family education and to maximize pt's level of independence in the home and community environment.     Pt's spiritual, cultural and educational needs considered and pt agreeable to plan of care and goals.     Anticipated barriers to physical therapy: none     Goals:   Short Term Goals: 4 weeks  Pain: Decrease pain to less than 6 /10 on a daily basis to allow for improved ability to perform ADL's.   Walking: Able to walk 5 mins in clinic without increased pain    Transfers: Able to complete 6 reps sit to  30 secs      Long Term Goals: 8 weeks     Pain: Decrease pain to no more than 4/10 to allow for improved ability to perform ADL's   Strength: Improve strength in core muscles by 1/2 grade  for improved trunk stability  Functional scale: Improve limitation score on FOTO   to 64 % limitations   Lifting: Lift 10 lbs to waist level, 5 lbs to shoulder level, 3 lbs to overhead without pain or compensation  Postures: Increase sitting and/or standing duration to 10 mins without pain   Walking: Able to complete a 6 min walk test in outdoor  environment.   Transfers: Perform sit to stand transfers without increased pain or limitation - can complete 8 reps in 30 secs   Exercise: demonstrate independence with home exercise program to maintain gains made in therapy.         PLAN     Continue 2x/week x 8 weeks - Dry Needling, manual tx, therapeutic exercise/activities.     Quita Holliday, PT

## 2023-02-15 ENCOUNTER — CLINICAL SUPPORT (OUTPATIENT)
Dept: REHABILITATION | Facility: HOSPITAL | Age: 53
End: 2023-02-15
Attending: ORTHOPAEDIC SURGERY
Payer: MEDICARE

## 2023-02-15 DIAGNOSIS — Z74.09 IMPAIRED FUNCTIONAL MOBILITY AND ACTIVITY TOLERANCE: Primary | ICD-10-CM

## 2023-02-15 DIAGNOSIS — M53.3 SACROILIAC JOINT DYSFUNCTION OF BOTH SIDES: ICD-10-CM

## 2023-02-15 PROCEDURE — 97140 MANUAL THERAPY 1/> REGIONS: CPT | Mod: PN

## 2023-02-15 PROCEDURE — 97014 ELECTRIC STIMULATION THERAPY: CPT | Mod: PN

## 2023-02-15 NOTE — PROGRESS NOTES
[facility]   Physical Therapy Treatment Note     Name: Nicole BONILLA [facility] Number: [id number]    Therapy Diagnosis:   Encounter Diagnoses   Name Primary?    Impaired functional mobility and activity tolerance Yes    Sacroiliac joint dysfunction of both sides      Physician: Brii Mcneil NP    Visit Date: 2/15/2023    Evaluation Date: 11/28/2022  Authorization Period Expiration: 11/28/2023  Plan of Care Expiration: 02/28/2023  Visit # / Visits authorized: 10 / 16   FOTO Visit #:  1/3    PTA Visit #: 0 /5     Time In: 1:00 PM   Time Out: 2:00   PM   Total Billable Time: 45 minutes    SUBJECTIVE     Pt reports:  I feel like the needling is helping, I have less pain   She was not compliant with home exercise program.  Response to previous treatment:   reduction in pain to around 4-5 /10    Functional change: Walking without too much lower back pain;    Pain: 4/10  Location: Neck all the way down to low back    OBJECTIVE     Objective Measures updated at progress report unless specified.     Treatment     Nicole received the treatments listed below:      therapeutic exercises to develop  for 0 minutes including:  Nustep level 0 x 3 min  Pelvic Tilts x 10  Bridges x 10   Supine Knee fallouts x 10   LTR x 10      manual therapy techniques:   Soft tissue Mobilization / Dry Needling were applied to the: Lumbar spine, bilateral SI joints  for 40 minutes, including: Patient positioned into prone, pillow under chest at patient request; no bolster wanted for ankles - neeraj them off end of mat.   IASTM to lumbar paraspinals moving caudally into SI joint area and bilateral piriformis; LF-ES per protocol for lumbar radiculopathy with inclusion of SI joints and piriformis on right and left. Using 60 mm and 50 mm needles.  Winding of needles after insertion for tissue grasp. Adjusted intensity of stimulation to patient tolerance. Able to leave needles in place x 20 mins. Removed needles,              Patient Education and Home Exercises     Home Exercises Provided and Patient Education Provided     Education provided:   - what to expect following dry needling     Written Home Exercises Provided:  has not been issued one at this time.  .   Nicole demonstrated good  understanding of the education provided. See EMR under Patient Instructions for exercises provided during therapy sessions    ASSESSMENT     Nicole demonstrated slight improved tolerance of exercise today; She has been compliant with appointments this time around. Reporting less pain since we began the Dry Needling.     Nicole Is progressing well towards her goals.   Pt prognosis is Fair.     Pt will continue to benefit from skilled outpatient physical therapy to address the deficits listed in the problem list box on initial evaluation, provide pt/family education and to maximize pt's level of independence in the home and community environment.     Pt's spiritual, cultural and educational needs considered and pt agreeable to plan of care and goals.     Anticipated barriers to physical therapy: none     Goals:   Short Term Goals: 4 weeks  Pain: Decrease pain to less than 6 /10 on a daily basis to allow for improved ability to perform ADL's.   Walking: Able to walk 5 mins in clinic without increased pain    Transfers: Able to complete 6 reps sit to  30 secs      Long Term Goals: 8 weeks     Pain: Decrease pain to no more than 4/10 to allow for improved ability to perform ADL's   Strength: Improve strength in core muscles by 1/2 grade  for improved trunk stability  Functional scale: Improve limitation score on FOTO   to 64 % limitations   Lifting: Lift 10 lbs to waist level, 5 lbs to shoulder level, 3 lbs to overhead without pain or compensation  Postures: Increase sitting and/or standing duration to 10 mins without pain   Walking: Able to complete a 6 min walk test in outdoor environment.   Transfers: Perform sit to stand transfers without  increased pain or limitation - can complete 8 reps in 30 secs   Exercise: demonstrate independence with home exercise program to maintain gains made in therapy.         PLAN     Continue 2x/week x 8 weeks - Dry Needling, manual tx, therapeutic exercise/activities.     Quita Holliday, PT

## 2023-02-17 ENCOUNTER — CLINICAL SUPPORT (OUTPATIENT)
Dept: REHABILITATION | Facility: HOSPITAL | Age: 53
End: 2023-02-17
Attending: ORTHOPAEDIC SURGERY
Payer: MEDICARE

## 2023-02-17 DIAGNOSIS — Z98.890 S/P CUBITAL TUNNEL RELEASE: ICD-10-CM

## 2023-02-17 DIAGNOSIS — M53.3 SACROILIAC JOINT DYSFUNCTION OF BOTH SIDES: ICD-10-CM

## 2023-02-17 DIAGNOSIS — Z98.890 S/P CARPAL TUNNEL RELEASE: Primary | ICD-10-CM

## 2023-02-17 DIAGNOSIS — Z74.09 IMPAIRED FUNCTIONAL MOBILITY AND ACTIVITY TOLERANCE: Primary | ICD-10-CM

## 2023-02-17 PROCEDURE — 97110 THERAPEUTIC EXERCISES: CPT | Mod: PN

## 2023-02-17 PROCEDURE — 97018 PARAFFIN BATH THERAPY: CPT | Mod: PN,59

## 2023-02-17 PROCEDURE — 97140 MANUAL THERAPY 1/> REGIONS: CPT | Mod: PN

## 2023-02-17 PROCEDURE — 97014 ELECTRIC STIMULATION THERAPY: CPT | Mod: PN

## 2023-02-17 NOTE — PROGRESS NOTES
[facility]   Physical Therapy Treatment Note     Name: Nicole BONILLA [facility] Number: [id number]    Therapy Diagnosis:   Encounter Diagnoses   Name Primary?    Impaired functional mobility and activity tolerance Yes    Sacroiliac joint dysfunction of both sides      Physician: Brii Mcneil NP    Visit Date: 2/17/2023    Evaluation Date: 11/28/2022  Authorization Period Expiration: 11/28/2023  Plan of Care Expiration: 02/28/2023  Visit # / Visits authorized: 10 / 16   FOTO Visit #:  1/3    PTA Visit #: 0 /5     Time In: 1:10 PM   Time Out: 2:00   PM   Total Billable Time: 45 minutes    SUBJECTIVE     Pt reports:  I feel like the needling is helping, I have less pain   She was not compliant with home exercise program.  Response to previous treatment:   reduction in pain to around 4-5 /10    Functional change: Walking without too much lower back pain;    Pain: 4/10  Location: Neck all the way down to low back    OBJECTIVE     Objective Measures updated at progress report unless specified.     Treatment     Nicole received the treatments listed below:      therapeutic exercises to develop  for 0 minutes including:  Nustep level 0 x 3 min  Pelvic Tilts x 10  Bridges x 10   Supine Knee fallouts x 10   LTR x 10      manual therapy techniques:   Soft tissue Mobilization / Dry Needling were applied to the: Cervical/Lumbar spine, bilateral SI joints  for 40 minutes, including: Supine - cervical manual txn with end range hold; sub-occipital inhibition; Patient then positioned into prone, pillow under chest at patient request; no bolster wanted for ankles - neeraj them off end of mat.   IASTM to lumbar paraspinals moving caudally into SI joint area and bilateral piriformis; LF-ES per protocol for lumbar radiculopathy with inclusion of SI joints and piriformis on right and left. Using 60 mm and 50 mm needles.  Winding of needles after insertion for tissue grasp. Adjusted intensity of stimulation  to patient tolerance. Able to leave needles in place x 20 mins. Removed needles,             Patient Education and Home Exercises     Home Exercises Provided and Patient Education Provided     Education provided:   - what to expect following dry needling     Written Home Exercises Provided:  has not been issued one at this time.  .   Nicole demonstrated good  understanding of the education provided. See EMR under Patient Instructions for exercises provided during therapy sessions    ASSESSMENT     Nicole demonstrated slight improved tolerance of exercise today; She has been compliant with appointments this time around. Reporting less pain since we began the Dry Needling. Needs to increase activity level for cardio and lumbar support.     Nicole Is progressing well towards her goals.   Pt prognosis is Fair.     Pt will continue to benefit from skilled outpatient physical therapy to address the deficits listed in the problem list box on initial evaluation, provide pt/family education and to maximize pt's level of independence in the home and community environment.     Pt's spiritual, cultural and educational needs considered and pt agreeable to plan of care and goals.     Anticipated barriers to physical therapy: none     Goals:   Short Term Goals: 4 weeks  Pain: Decrease pain to less than 6 /10 on a daily basis to allow for improved ability to perform ADL's.   Walking: Able to walk 5 mins in clinic without increased pain    Transfers: Able to complete 6 reps sit to  30 secs      Long Term Goals: 8 weeks     Pain: Decrease pain to no more than 4/10 to allow for improved ability to perform ADL's   Strength: Improve strength in core muscles by 1/2 grade  for improved trunk stability  Functional scale: Improve limitation score on FOTO   to 64 % limitations   Lifting: Lift 10 lbs to waist level, 5 lbs to shoulder level, 3 lbs to overhead without pain or compensation  Postures: Increase sitting and/or standing  duration to 10 mins without pain   Walking: Able to complete a 6 min walk test in outdoor environment.   Transfers: Perform sit to stand transfers without increased pain or limitation - can complete 8 reps in 30 secs   Exercise: demonstrate independence with home exercise program to maintain gains made in therapy.         PLAN     Continue 2x/week x 8 weeks - Dry Needling, manual tx, therapeutic exercise/activities.     Quita Holliday, PT

## 2023-02-17 NOTE — PROGRESS NOTES
Occupational Therapy Daily Treatment Note   Name: Nicole Harris 1970  MRN: [id number]    Visit Date: 2/17/2023  Visit #: 5 / 12  Authorization period Expiration: 12/31/2023    Plan of Care Expiration: 3/15/2023  Precautions: standard    Time In: 2: 00  Time Out: 2: 45  Total 1:1 Treatment Time: 45 min    Treatment Diagnosis:   Encounter Diagnoses   Name Primary?    S/P carpal tunnel release Yes    S/P cubital tunnel release        Physician: Alcides Yancey DO    Subjective   Pt reports: she is hurting in the hand pretty bad today   She was compliant with home exercise program.     Pain Scale:  2/10 on VAS currently  Pain Location: right wrist    Objective     Nicole received the following manual therapy techniques : for 20 mins  Soft tissue mobilization along with cross friction massage along the R carpal tunnel in order to decrease edema and break up scar tissue.     Nicole received the following direct contact modalities after being cleared for contraindications: Paraffin was applied to the R hand/wrist prior to mobilization for 10 mins    Nicole performed the following therapeutic exercises: 15 mins  -R hand  strengthening x 20 reps  -R hand extension using blue resistance x 15 reps  -R wrist extension/flexion 3 x 10 reps using 2# wt  -R forearm pronation/supination 3 x 10 reps using 2 # wt  -Chest presses using 3 # dowel amando 3 x 10 reps\  -UBE for 8 mins on light resistance    Home Exercises and Education Provided     Education provided re:   - progress towards goals   - role of therapy in multi - disciplinary team, goals for therapy  Pt educated on condition, POC, and expectations in therapy.  No spiritual or educational barriers to learning provided    Home exercises:  Pt will be provided HEP during course of treatment with progressions as appropriate. Pt was advised to perform these exercises free of pain, and to stop performing them if pain occurs.   Nicole demonstrated  good  understanding of the education provided.     Assessment   Nicole is progressing well towards her goals and no updates to goals at this time. 1 min 27 seconds     Pt prognosis is Good. Pt will continue to benefit from skilled outpatient physical therapy to address the deficits listed in the problem list chart on initial evaluation, provide pt/family education and to maximize pt's level of independence in the home and community environment.     Medical necessity is demonstrated by the impairments and functional limitations listed on the Initial Evaluation.     Anticipated barriers to occupational therapy: none  Pt's spiritual, cultural and educational needs considered and pt agreeable to plan of care and goals.    Goals   Short term:  Pt will report R UE FOTO limitation of 50% by 4 weeks  2. Pt will improve R hand functional  strength by 3 lbs in 3 weeks   3. Pt will improve R wrist extension by 5 degrees in 4 weeks     Long term:   Pt will report R UE FOTO limitation of 45% by dc  2. Pt will improve R hand functional  strength by 6 lbs by dc  3. Pt will improve R wrist extension by 10 degrees by dc  4 Pt will report 100% compliance with HEP prior to dc      Plan   Continue with established Plan of Care towards Occupational Therapy goals.   Discussed Plan of Care with patient: Yes    Alice Eddy OT  2/17/2023

## 2023-02-22 ENCOUNTER — CLINICAL SUPPORT (OUTPATIENT)
Dept: REHABILITATION | Facility: HOSPITAL | Age: 53
End: 2023-02-22
Attending: ORTHOPAEDIC SURGERY
Payer: MEDICARE

## 2023-02-22 DIAGNOSIS — G56.23 CUBITAL TUNNEL SYNDROME OF BOTH UPPER EXTREMITIES: ICD-10-CM

## 2023-02-22 DIAGNOSIS — Z98.890 S/P CUBITAL TUNNEL RELEASE: ICD-10-CM

## 2023-02-22 DIAGNOSIS — G56.03 BILATERAL CARPAL TUNNEL SYNDROME: ICD-10-CM

## 2023-02-22 DIAGNOSIS — Z98.890 S/P CARPAL TUNNEL RELEASE: Primary | ICD-10-CM

## 2023-02-22 PROCEDURE — 97110 THERAPEUTIC EXERCISES: CPT | Mod: PN

## 2023-02-22 PROCEDURE — 97140 MANUAL THERAPY 1/> REGIONS: CPT | Mod: PN

## 2023-02-22 PROCEDURE — 97018 PARAFFIN BATH THERAPY: CPT | Mod: 59,PN

## 2023-02-22 NOTE — PROGRESS NOTES
Occupational Therapy Daily Treatment Note   Name: Nicole Harris 1970  MRN: [id number]    Visit Date: 2/22/2023  Visit #: 6 / 12  Authorization period Expiration: 12/31/2023    Plan of Care Expiration: 3/15/2023  Precautions: standard    Time In: 10: 35  Time Out: 11: 35  Total 1:1 Treatment Time: 60 min    Treatment Diagnosis:   Encounter Diagnoses   Name Primary?    S/P carpal tunnel release Yes    S/P cubital tunnel release     Bilateral carpal tunnel syndrome     Cubital tunnel syndrome of both upper extremities        Physician: Alcides Yancey DO    Subjective   Pt reports: she is hurting in the hand pretty bad today   She was compliant with home exercise program.     Pain Scale:  2/10 on VAS currently  Pain Location: right wrist    Objective     Niocle received the following manual therapy techniques : for 20 mins  Soft tissue mobilization along with cross friction massage along the R carpal tunnel and cubital tunnel  in order to decrease edema, desensitize, and break up scar tissue.     Nicole received the following direct contact modalities after being cleared for contraindications: Paraffin was applied to the R hand/wrist prior to mobilization for 10 mins    Nicole performed the following therapeutic exercises: 30 mins  -R wrist extension using 3 # wt for 3 x 10 reps  -R forearm pronation/supination using weighted handle 3 x 10 reps  -R wrist circles using weighted handle 3 x 10 reps  -R wrist radial/ulnar deviation using weighted handle 2 x 10 reps  -R forearm pronation/supination 3 x 10 reps using red flex bar      Home Exercises and Education Provided     Education provided re:   - progress towards goals   - role of therapy in multi - disciplinary team, goals for therapy  Pt educated on condition, POC, and expectations in therapy.  No spiritual or educational barriers to learning provided    Home exercises:  Pt will be provided HEP during course of treatment with  progressions as appropriate. Pt was advised to perform these exercises free of pain, and to stop performing them if pain occurs.   Nicole demonstrated good  understanding of the education provided.     Assessment   Nicole is progressing well towards her goals and no updates to goals at this time. 1 min 27 seconds     Pt prognosis is Good. Pt will continue to benefit from skilled outpatient physical therapy to address the deficits listed in the problem list chart on initial evaluation, provide pt/family education and to maximize pt's level of independence in the home and community environment.     Medical necessity is demonstrated by the impairments and functional limitations listed on the Initial Evaluation.     Anticipated barriers to occupational therapy: none  Pt's spiritual, cultural and educational needs considered and pt agreeable to plan of care and goals.    Goals   Short term:  Pt will report R UE FOTO limitation of 50% by 4 weeks  2. Pt will improve R hand functional  strength by 3 lbs in 3 weeks   3. Pt will improve R wrist extension by 5 degrees in 4 weeks     Long term:   Pt will report R UE FOTO limitation of 45% by dc  2. Pt will improve R hand functional  strength by 6 lbs by dc  3. Pt will improve R wrist extension by 10 degrees by dc  4 Pt will report 100% compliance with HEP prior to dc      Plan   Continue with established Plan of Care towards Occupational Therapy goals.   Discussed Plan of Care with patient: Yes    Alice Eddy OT  2/22/2023

## 2023-02-24 ENCOUNTER — CLINICAL SUPPORT (OUTPATIENT)
Dept: REHABILITATION | Facility: HOSPITAL | Age: 53
End: 2023-02-24
Attending: ORTHOPAEDIC SURGERY
Payer: MEDICARE

## 2023-02-24 DIAGNOSIS — Z98.890 S/P CARPAL TUNNEL RELEASE: Primary | ICD-10-CM

## 2023-02-24 DIAGNOSIS — Z74.09 IMPAIRED FUNCTIONAL MOBILITY AND ACTIVITY TOLERANCE: Primary | ICD-10-CM

## 2023-02-24 DIAGNOSIS — Z98.890 S/P CUBITAL TUNNEL RELEASE: ICD-10-CM

## 2023-02-24 DIAGNOSIS — G56.03 BILATERAL CARPAL TUNNEL SYNDROME: ICD-10-CM

## 2023-02-24 DIAGNOSIS — G56.23 CUBITAL TUNNEL SYNDROME OF BOTH UPPER EXTREMITIES: ICD-10-CM

## 2023-02-24 PROCEDURE — 97140 MANUAL THERAPY 1/> REGIONS: CPT | Mod: PN,CQ

## 2023-02-24 PROCEDURE — 97018 PARAFFIN BATH THERAPY: CPT | Mod: PN,59

## 2023-02-24 PROCEDURE — 97110 THERAPEUTIC EXERCISES: CPT | Mod: PN

## 2023-02-24 PROCEDURE — 97140 MANUAL THERAPY 1/> REGIONS: CPT | Mod: PN

## 2023-02-24 NOTE — PROGRESS NOTES
Occupational Therapy Daily Treatment Note   Name: Nicole Harris 1970  MRN: [id number]    Visit Date: 2/24/2023  Visit #: 7 / 12  Authorization period Expiration: 12/31/2023    Plan of Care Expiration: 3/15/2023  Precautions: standard    Time In: 11: 05  Time Out: 12: 00  Total 1:1 Treatment Time: 55 min    Treatment Diagnosis:   Encounter Diagnoses   Name Primary?    S/P carpal tunnel release Yes    S/P cubital tunnel release     Bilateral carpal tunnel syndrome     Cubital tunnel syndrome of both upper extremities        Physician: Alcides Yancey DO    Subjective   Pt reports: she is sore on her bottom   She was compliant with home exercise program.     Pain Scale:  2/10 on VAS currently  Pain Location: right wrist    Objective     Niocle received the following manual therapy techniques : for 25 mins  Soft tissue mobilization along with cross friction massage along the R carpal tunnel and cubital tunnel  in order to decrease edema, desensitize, and break up scar tissue.     Nicole received the following direct contact modalities after being cleared for contraindications: Paraffin was applied to the R hand/wrist prior to mobilization for 10 mins    Nicole performed the following therapeutic exercises: 20 mins  -R wrist extension using 3 # wt for 3 x 10 reps  -R forearm pronation/supination using weighted handle 3 x 10 reps  -R wrist radial/ulnar deviation using weighted handle 2 x 10 reps  -R hand  strengthening using 15 lb gripper for 20 reps    Home Exercises and Education Provided     Education provided re:   - progress towards goals   - role of therapy in multi - disciplinary team, goals for therapy  Pt educated on condition, POC, and expectations in therapy.  No spiritual or educational barriers to learning provided    Home exercises:  Pt will be provided HEP during course of treatment with progressions as appropriate. Pt was advised to perform these exercises free of  pain, and to stop performing them if pain occurs.   Nicole demonstrated good  understanding of the education provided.     Assessment   Nicole is progressing well towards her goals and no updates to goals at this time. 1 min 27 seconds     Pt prognosis is Good. Pt will continue to benefit from skilled outpatient physical therapy to address the deficits listed in the problem list chart on initial evaluation, provide pt/family education and to maximize pt's level of independence in the home and community environment.     Medical necessity is demonstrated by the impairments and functional limitations listed on the Initial Evaluation.     Anticipated barriers to occupational therapy: none  Pt's spiritual, cultural and educational needs considered and pt agreeable to plan of care and goals.    Goals   Short term:  Pt will report R UE FOTO limitation of 50% by 4 weeks  2. Pt will improve R hand functional  strength by 3 lbs in 3 weeks   3. Pt will improve R wrist extension by 5 degrees in 4 weeks     Long term:   Pt will report R UE FOTO limitation of 45% by dc  2. Pt will improve R hand functional  strength by 6 lbs by dc  3. Pt will improve R wrist extension by 10 degrees by dc  4 Pt will report 100% compliance with HEP prior to dc      Plan   Continue with established Plan of Care towards Occupational Therapy goals.   Discussed Plan of Care with patient: Yes    Alice Eddy OT  2/24/2023

## 2023-02-24 NOTE — PROGRESS NOTES
[facility]   Physical Therapy Treatment Note     Name: Nicole BONILLA [facility] Number: [id number]    Therapy Diagnosis:   Encounter Diagnosis   Name Primary?    Impaired functional mobility and activity tolerance Yes     Physician: Brii Mcneil NP    Visit Date: 2/24/2023    Evaluation Date: 11/28/2022  Authorization Period Expiration: 11/28/2023  Plan of Care Expiration: 02/28/2023  Visit # / Visits authorized: 13 / 16   FOTO Visit #:  1/3    PTA Visit #: 1 /5     Time In: 10:00 am  Time Out: 10:30 am  Total Billable Time: 30 minutes    SUBJECTIVE     Pt reports:  dry needling helps.  She only wants to work her neck today.    She was not compliant with home exercise program.  Response to previous treatment:   reduction in pain to around 4-5 /10    Functional change: Walking without too much lower back pain;    Pain: 6/10  Location: Neck all the way down to low back    OBJECTIVE     Objective Measures updated at progress report unless specified.     Treatment     Nicole received the treatments listed below:      therapeutic exercises to develop  for 0 minutes including:  Nustep level 0 x 3 min  Pelvic Tilts x 10  Bridges x 10   Supine Knee fallouts x 10   LTR x 10      manual therapy techniques: x 30 min  Myofascial Release   Bilateral pterygoids   Sub occipitals   Upper trap scalenes      DNP  Soft tissue Mobilization / Dry Needling were applied to the: Cervical/Lumbar spine, bilateral SI joints  for 40 minutes, including: Supine - cervical manual txn with end range hold; sub-occipital inhibition; Patient then positioned into prone, pillow under chest at patient request; no bolster wanted for ankles - neeraj them off end of mat.   IASTM to lumbar paraspinals moving caudally into SI joint area and bilateral piriformis; LF-ES per protocol for lumbar radiculopathy with inclusion of SI joints and piriformis on right and left. Using 60 mm and 50 mm needles.  Winding of needles after  insertion for tissue grasp. Adjusted intensity of stimulation to patient tolerance. Able to leave needles in place x 20 mins. Removed needles,             Patient Education and Home Exercises     Home Exercises Provided and Patient Education Provided     Education provided:   - what to expect following dry needling     Written Home Exercises Provided:  has not been issued one at this time.  .   Nicole demonstrated good  understanding of the education provided. See EMR under Patient Instructions for exercises provided during therapy sessions    ASSESSMENT     Nicole has moderately tight pterygoid muscles (left greater then right).  Patient couldn't feel any change in neck pain after treatment.    Nicole Is progressing well towards her goals.   Pt prognosis is Fair.     Pt will continue to benefit from skilled outpatient physical therapy to address the deficits listed in the problem list box on initial evaluation, provide pt/family education and to maximize pt's level of independence in the home and community environment.     Pt's spiritual, cultural and educational needs considered and pt agreeable to plan of care and goals.     Anticipated barriers to physical therapy: none     Goals:   Short Term Goals: 4 weeks  Pain: Decrease pain to less than 6 /10 on a daily basis to allow for improved ability to perform ADL's.   Walking: Able to walk 5 mins in clinic without increased pain    Transfers: Able to complete 6 reps sit to  30 secs      Long Term Goals: 8 weeks     Pain: Decrease pain to no more than 4/10 to allow for improved ability to perform ADL's   Strength: Improve strength in core muscles by 1/2 grade  for improved trunk stability  Functional scale: Improve limitation score on FOTO   to 64 % limitations   Lifting: Lift 10 lbs to waist level, 5 lbs to shoulder level, 3 lbs to overhead without pain or compensation  Postures: Increase sitting and/or standing duration to 10 mins without pain   Walking: Able  to complete a 6 min walk test in outdoor environment.   Transfers: Perform sit to stand transfers without increased pain or limitation - can complete 8 reps in 30 secs   Exercise: demonstrate independence with home exercise program to maintain gains made in therapy.         PLAN     Continue 2x/week x 8 weeks - Dry Needling, manual tx, therapeutic exercise/activities.     Medhat Teran, PTA

## 2023-02-24 NOTE — PROGRESS NOTES
[facility]   Physical Therapy Treatment Note     Name: Nicole BONILLA [facility] Number: [id number]    Therapy Diagnosis:   Encounter Diagnosis   Name Primary?    Impaired functional mobility and activity tolerance Yes     Physician: Brii Mcneil NP    Visit Date: 2/24/2023    Evaluation Date: 11/28/2022  Authorization Period Expiration: 11/28/2023  Plan of Care Expiration: 02/28/2023  Visit # / Visits authorized: 10 / 16   FOTO Visit #:  1/3    PTA Visit #: 0 /5     Time In: *** AM  Time Out: *** AM  Total Billable Time: *** minutes    SUBJECTIVE     Pt reports:  ***I feel like the needling is helping, I have less pain     She was not compliant with home exercise program.  Response to previous treatment:   reduction in pain to around 4-5 /10    Functional change: Walking without too much lower back pain;    Pain: ***/10  Location: Neck all the way down to low back    OBJECTIVE     Objective Measures updated at progress report unless specified.     Treatment     Nicole received the treatments listed below:      therapeutic exercises to develop  for *** minutes including:  Nustep level 0 x 3 min  Pelvic Tilts x 10  Bridges x 10   Supine Knee fallouts x 10   LTR x 10      manual therapy techniques:   Soft tissue Mobilization / Dry Needling were applied to the: Cervical/Lumbar spine, bilateral SI joints  for *** minutes, including: Supine - cervical manual txn with end range hold; sub-occipital inhibition; Patient then positioned into prone, pillow under chest at patient request; no bolster wanted for ankles - neeraj them off end of mat.   IASTM to lumbar paraspinals moving caudally into SI joint area and bilateral piriformis; LF-ES per protocol for lumbar radiculopathy with inclusion of SI joints and piriformis on right and left. Using 60 mm and 50 mm needles.  Winding of needles after insertion for tissue grasp. Adjusted intensity of stimulation to patient tolerance. Able to leave needles  in place x 20 mins. Removed needles,          Patient Education and Home Exercises     Home Exercises Provided and Patient Education Provided     Education provided:   - what to expect following dry needling     Written Home Exercises Provided:  has not been issued one at this time.  .   Nicole demonstrated good  understanding of the education provided. See EMR under Patient Instructions for exercises provided during therapy sessions    ASSESSMENT     Nicole demonstrated slight improved tolerance of exercise today; She has been compliant with appointments this time around. Reporting less pain since we began the Dry Needling. Needs to increase activity level for cardio and lumbar support.     Nicole Is progressing well towards her goals.   Pt prognosis is Fair.     Pt will continue to benefit from skilled outpatient physical therapy to address the deficits listed in the problem list box on initial evaluation, provide pt/family education and to maximize pt's level of independence in the home and community environment.     Pt's spiritual, cultural and educational needs considered and pt agreeable to plan of care and goals.     Anticipated barriers to physical therapy: none     Goals:   Short Term Goals: 4 weeks  Pain: Decrease pain to less than 6 /10 on a daily basis to allow for improved ability to perform ADL's.   Walking: Able to walk 5 mins in clinic without increased pain    Transfers: Able to complete 6 reps sit to  30 secs      Long Term Goals: 8 weeks     Pain: Decrease pain to no more than 4/10 to allow for improved ability to perform ADL's   Strength: Improve strength in core muscles by 1/2 grade  for improved trunk stability  Functional scale: Improve limitation score on FOTO   to 64 % limitations   Lifting: Lift 10 lbs to waist level, 5 lbs to shoulder level, 3 lbs to overhead without pain or compensation  Postures: Increase sitting and/or standing duration to 10 mins without pain   Walking: Able to  complete a 6 min walk test in outdoor environment.   Transfers: Perform sit to stand transfers without increased pain or limitation - can complete 8 reps in 30 secs   Exercise: demonstrate independence with home exercise program to maintain gains made in therapy.         PLAN     Continue 2x/week x 8 weeks - Dry Needling, manual tx, therapeutic exercise/activities.     Mar Brody, PT

## 2023-02-26 NOTE — TELEPHONE ENCOUNTER
----- Message from Emily José MD sent at 12/13/2022  3:09 PM CST -----  Yes, okay for surgeon to manage post-operative pain. Thank you.      3-5x/week

## 2023-02-28 ENCOUNTER — CLINICAL SUPPORT (OUTPATIENT)
Dept: REHABILITATION | Facility: HOSPITAL | Age: 53
End: 2023-02-28
Attending: ORTHOPAEDIC SURGERY
Payer: MEDICARE

## 2023-02-28 DIAGNOSIS — M53.3 SACROILIAC JOINT DYSFUNCTION OF BOTH SIDES: ICD-10-CM

## 2023-02-28 DIAGNOSIS — Z74.09 IMPAIRED FUNCTIONAL MOBILITY AND ACTIVITY TOLERANCE: Primary | ICD-10-CM

## 2023-02-28 DIAGNOSIS — Z98.890 S/P CUBITAL TUNNEL RELEASE: ICD-10-CM

## 2023-02-28 DIAGNOSIS — Z98.890 S/P CARPAL TUNNEL RELEASE: Primary | ICD-10-CM

## 2023-02-28 PROCEDURE — 97140 MANUAL THERAPY 1/> REGIONS: CPT | Mod: PN,CQ

## 2023-02-28 PROCEDURE — 97110 THERAPEUTIC EXERCISES: CPT | Mod: PN,CQ

## 2023-02-28 PROCEDURE — 97014 ELECTRIC STIMULATION THERAPY: CPT | Mod: PN,CQ

## 2023-02-28 PROCEDURE — 97018 PARAFFIN BATH THERAPY: CPT | Mod: PN,59

## 2023-02-28 PROCEDURE — 97140 MANUAL THERAPY 1/> REGIONS: CPT | Mod: PN

## 2023-02-28 PROCEDURE — 97110 THERAPEUTIC EXERCISES: CPT | Mod: PN

## 2023-02-28 NOTE — PROGRESS NOTES
[facility]   Physical Therapy Treatment Note     Name: Nicole BONILLA [facility] Number: [id number]    Therapy Diagnosis:   Encounter Diagnoses   Name Primary?    Impaired functional mobility and activity tolerance Yes    Sacroiliac joint dysfunction of both sides      Physician: Brii Mcneil NP    Visit Date: 2/28/2023    Evaluation Date: 11/28/2022  Authorization Period Expiration: 11/28/2023  Plan of Care Expiration: 02/28/2023  Visit # / Visits authorized: 14 / 16   FOTO Visit #:  1/3    PTA Visit #: 2 /5     Time In: 2:30 pm  Time Out: 3:25 pm  Total Billable Time: 55 minutes    SUBJECTIVE     Pt reports:  no change after last therapy.    She was not compliant with home exercise program.  Response to previous treatment:  no change   Functional change: none    Pain: 6/10  Location: Neck all the way down to low back    OBJECTIVE     Objective Measures updated at progress report unless specified.     Treatment     Nicole received the treatments listed below:      therapeutic exercises to develop  for 15 minutes including:  Nustep level 0 x 3 min dnp  Pelvic Tilts x 10  Bridges x 10   Supine Knee fallouts x 10   LTR x 10      manual therapy techniques: x 20 min  Myofascial Release   Right piriformis    Light multifidus right side glide    IFC x 20 min to lumbar area in sitting position      DNP  Soft tissue Mobilization / Dry Needling were applied to the: Cervical/Lumbar spine, bilateral SI joints  for 40 minutes, including: Supine - cervical manual txn with end range hold; sub-occipital inhibition; Patient then positioned into prone, pillow under chest at patient request; no bolster wanted for ankles - neeraj them off end of mat.   IASTM to lumbar paraspinals moving caudally into SI joint area and bilateral piriformis; LF-ES per protocol for lumbar radiculopathy with inclusion of SI joints and piriformis on right and left. Using 60 mm and 50 mm needles.  Winding of needles after  insertion for tissue grasp. Adjusted intensity of stimulation to patient tolerance. Able to leave needles in place x 20 mins. Removed needles,             Patient Education and Home Exercises     Home Exercises Provided and Patient Education Provided     Education provided:   - what to expect following dry needling     Written Home Exercises Provided:  has not been issued one at this time.  .   Nicole demonstrated good  understanding of the education provided. See EMR under Patient Instructions for exercises provided during therapy sessions    ASSESSMENT     Nicole had no improvement from cranial sacral approach to therapy.    Performed right piriformis release and noted severe tightness leading to her tail bone.  She felt ok after treatment and we will re assess on the next visit.  Added IFC because patient has a history of this helping in the past.    Nicole Is progressing well towards her goals.   Pt prognosis is Fair.     Pt will continue to benefit from skilled outpatient physical therapy to address the deficits listed in the problem list box on initial evaluation, provide pt/family education and to maximize pt's level of independence in the home and community environment.     Pt's spiritual, cultural and educational needs considered and pt agreeable to plan of care and goals.     Anticipated barriers to physical therapy: none     Goals:   Short Term Goals: 4 weeks  Pain: Decrease pain to less than 6 /10 on a daily basis to allow for improved ability to perform ADL's.   Walking: Able to walk 5 mins in clinic without increased pain    Transfers: Able to complete 6 reps sit to  30 secs      Long Term Goals: 8 weeks     Pain: Decrease pain to no more than 4/10 to allow for improved ability to perform ADL's   Strength: Improve strength in core muscles by 1/2 grade  for improved trunk stability  Functional scale: Improve limitation score on FOTO   to 64 % limitations   Lifting: Lift 10 lbs to waist level, 5 lbs  to shoulder level, 3 lbs to overhead without pain or compensation  Postures: Increase sitting and/or standing duration to 10 mins without pain   Walking: Able to complete a 6 min walk test in outdoor environment.   Transfers: Perform sit to stand transfers without increased pain or limitation - can complete 8 reps in 30 secs   Exercise: demonstrate independence with home exercise program to maintain gains made in therapy.         PLAN     Continue 2x/week x 8 weeks - Dry Needling, manual tx, therapeutic exercise/activities.     Medhat Teran, PTA

## 2023-03-01 NOTE — PROGRESS NOTES
Occupational Therapy Daily Treatment Note   Name: Nicole Harris 1970  MRN: [id number]    Visit Date: 2/28/2023  Visit #: 8 / 12  Authorization period Expiration: 12/31/2023    Plan of Care Expiration: 3/15/2023  Precautions: standard    Time In: 3: 20  Time Out: 4: 15  Total 1:1 Treatment Time: 55 min    Treatment Diagnosis:   Encounter Diagnoses   Name Primary?    S/P carpal tunnel release Yes    S/P cubital tunnel release        Physician: Alcides Yancey DO    Subjective   Pt reports: she did not sleep well last night  She was compliant with home exercise program.     Pain Scale:  2/10 on VAS currently  Pain Location: right wrist    Objective     Nicole received the following manual therapy techniques : for 25 mins  Soft tissue mobilization along with cross friction massage along the R carpal tunnel and cubital tunnel  in order to decrease edema, desensitize, and break up scar tissue.     Nicole received the following direct contact modalities after being cleared for contraindications: Paraffin was applied to the R hand/wrist prior to mobilization for 10 mins    Nicole performed the following therapeutic exercises: 20 mins  -UBE x 6 mins   -R wrist extension using 3# dowel amando for 3 x 10 reps  -R wrist radial /ulnar deviation using yellow theraband for 3 x 10 reps  -R wrist pronation/supination using yellow theraband for 3 x 10 reps  -R wrist extension using yellow theraband for 3 x 10 reps      Home Exercises and Education Provided     Education provided re:   - progress towards goals   - role of therapy in multi - disciplinary team, goals for therapy  Pt educated on condition, POC, and expectations in therapy.  No spiritual or educational barriers to learning provided    Home exercises:  Pt will be provided HEP during course of treatment with progressions as appropriate. Pt was advised to perform these exercises free of pain, and to stop performing them if pain occurs.   Nicole  demonstrated good  understanding of the education provided.     Assessment   Nicole is progressing well towards her goals and no updates to goals at this time. 1 min 27 seconds     Pt prognosis is Good. Pt will continue to benefit from skilled outpatient physical therapy to address the deficits listed in the problem list chart on initial evaluation, provide pt/family education and to maximize pt's level of independence in the home and community environment.     Medical necessity is demonstrated by the impairments and functional limitations listed on the Initial Evaluation.     Anticipated barriers to occupational therapy: none  Pt's spiritual, cultural and educational needs considered and pt agreeable to plan of care and goals.    Goals   Short term:  Pt will report R UE FOTO limitation of 50% by 4 weeks  2. Pt will improve R hand functional  strength by 3 lbs in 3 weeks   3. Pt will improve R wrist extension by 5 degrees in 4 weeks     Long term:   Pt will report R UE FOTO limitation of 45% by dc  2. Pt will improve R hand functional  strength by 6 lbs by dc  3. Pt will improve R wrist extension by 10 degrees by dc  4 Pt will report 100% compliance with HEP prior to dc      Plan   Continue with established Plan of Care towards Occupational Therapy goals.   Discussed Plan of Care with patient: Yes    Alice Eddy OT  3/1/2023

## 2023-03-03 ENCOUNTER — CLINICAL SUPPORT (OUTPATIENT)
Dept: REHABILITATION | Facility: HOSPITAL | Age: 53
End: 2023-03-03
Attending: ORTHOPAEDIC SURGERY
Payer: COMMERCIAL

## 2023-03-03 DIAGNOSIS — Z74.09 IMPAIRED FUNCTIONAL MOBILITY AND ACTIVITY TOLERANCE: Primary | ICD-10-CM

## 2023-03-03 DIAGNOSIS — Z98.890 S/P CARPAL TUNNEL RELEASE: Primary | ICD-10-CM

## 2023-03-03 DIAGNOSIS — Z98.890 S/P CUBITAL TUNNEL RELEASE: ICD-10-CM

## 2023-03-03 DIAGNOSIS — M53.3 SACROILIAC JOINT DYSFUNCTION OF BOTH SIDES: ICD-10-CM

## 2023-03-03 PROCEDURE — 97110 THERAPEUTIC EXERCISES: CPT | Mod: PN,CQ

## 2023-03-03 PROCEDURE — 97018 PARAFFIN BATH THERAPY: CPT | Mod: PN

## 2023-03-03 PROCEDURE — 97140 MANUAL THERAPY 1/> REGIONS: CPT | Mod: PN,CQ

## 2023-03-03 PROCEDURE — 97140 MANUAL THERAPY 1/> REGIONS: CPT | Mod: PN

## 2023-03-03 NOTE — PROGRESS NOTES
Occupational Therapy Daily Treatment Note   Name: Nicole Harris 1970  MRN: [id number]    Visit Date: 3/3/2023  Visit #: 9 / 12  Authorization period Expiration: 12/31/2023    Plan of Care Expiration: 3/15/2023  Precautions: standard    Time In: 2: 55  Time Out: 3: 20  Total 1:1 Treatment Time: 25 min    Treatment Diagnosis:   Encounter Diagnoses   Name Primary?    S/P carpal tunnel release Yes    S/P cubital tunnel release        Physician: Alcides Yancey DO    Subjective   Pt reports: she did not sleep well last night  She was compliant with home exercise program.     Pain Scale:  2/10 on VAS currently  Pain Location: right wrist    Objective     Nicole received the following manual therapy techniques : for 15 mins  Soft tissue mobilization along with cross friction massage along the R carpal tunnel and cubital tunnel  in order to decrease edema, desensitize, and break up scar tissue.     Nicole received the following direct contact modalities after being cleared for contraindications: Paraffin was applied to the R hand/wrist prior to mobilization for 10 mins        Home Exercises and Education Provided     Education provided re:   - progress towards goals   - role of therapy in multi - disciplinary team, goals for therapy  Pt educated on condition, POC, and expectations in therapy.  No spiritual or educational barriers to learning provided    Home exercises:  Pt will be provided HEP during course of treatment with progressions as appropriate. Pt was advised to perform these exercises free of pain, and to stop performing them if pain occurs.   Nicole demonstrated good  understanding of the education provided.     Assessment   Nicole is progressing well towards her goals and no updates to goals at this time. 1 min 27 seconds     Pt prognosis is Good. Pt will continue to benefit from skilled outpatient physical therapy to address the deficits listed in the problem list chart on  initial evaluation, provide pt/family education and to maximize pt's level of independence in the home and community environment.     Medical necessity is demonstrated by the impairments and functional limitations listed on the Initial Evaluation.     Anticipated barriers to occupational therapy: none  Pt's spiritual, cultural and educational needs considered and pt agreeable to plan of care and goals.    Goals   Short term:  Pt will report R UE FOTO limitation of 50% by 4 weeks  2. Pt will improve R hand functional  strength by 3 lbs in 3 weeks   3. Pt will improve R wrist extension by 5 degrees in 4 weeks     Long term:   Pt will report R UE FOTO limitation of 45% by dc  2. Pt will improve R hand functional  strength by 6 lbs by dc  3. Pt will improve R wrist extension by 10 degrees by dc  4 Pt will report 100% compliance with HEP prior to dc      Plan   Continue with established Plan of Care towards Occupational Therapy goals.   Discussed Plan of Care with patient: Nakia Eddy OT  3/3/2023

## 2023-03-03 NOTE — PROGRESS NOTES
[facility]   Physical Therapy Treatment Note     Name: Nicole BONILLA [facility] Number: [id number]    Therapy Diagnosis:   Encounter Diagnoses   Name Primary?    Impaired functional mobility and activity tolerance Yes    Sacroiliac joint dysfunction of both sides      Physician: Brii Mcneil NP    Visit Date: 3/3/2023    Evaluation Date: 11/28/2022  Authorization Period Expiration: 11/28/2023  Plan of Care Expiration: 02/28/2023  Visit # / Visits authorized: 15 / 16   FOTO Visit #:  1/3    PTA Visit #: 3 /5     Time In: 2:30 pm  Time Out: 2:55 pm  Total Billable Time: 25 minutes    SUBJECTIVE     Pt reports:  no increase in pain from the release and willing to try the other one.  She was compliant with home exercise program.  Response to previous treatment:  no change   Functional change: none    Pain: 6/10  Location: Neck all the way down to low back    OBJECTIVE     Objective Measures updated at progress report unless specified.     Treatment     Nicole received the treatments listed below:      therapeutic exercises to develop  for 10 minutes including:  Nustep level 0 x 3 min dnp    SKC 1 x 1 min each  Supine piriformis stretch 3 x 30 sec    Pelvic Tilts x 10  Bridges x 10   Supine Knee fallouts x 10   LTR x 10      manual therapy techniques: x 15 min  Myofascial Release   Bilateral  piriformis       DNP  Soft tissue Mobilization / Dry Needling were applied to the: Cervical/Lumbar spine, bilateral SI joints  for 40 minutes, including: Supine - cervical manual txn with end range hold; sub-occipital inhibition; Patient then positioned into prone, pillow under chest at patient request; no bolster wanted for ankles - neeraj them off end of mat.   IASTM to lumbar paraspinals moving caudally into SI joint area and bilateral piriformis; LF-ES per protocol for lumbar radiculopathy with inclusion of SI joints and piriformis on right and left. Using 60 mm and 50 mm needles.  Winding of  needles after insertion for tissue grasp. Adjusted intensity of stimulation to patient tolerance. Able to leave needles in place x 20 mins. Removed needles,             Patient Education and Home Exercises     Home Exercises Provided and Patient Education Provided     Education provided:   - what to expect following dry needling     Written Home Exercises Provided:  has not been issued one at this time.  .   Nicole demonstrated good  understanding of the education provided. See EMR under Patient Instructions for exercises provided during therapy sessions    ASSESSMENT     Nicole  was confused about her appointment time and so, we split the hour with OT(30 min each).  Performed bilateral  piriformis release and noted severe tightness leading to her tail bone.  She felt ok after treatment and we will re assess on the next visit.      Nicole Is progressing well towards her goals.   Pt prognosis is Fair.     Pt will continue to benefit from skilled outpatient physical therapy to address the deficits listed in the problem list box on initial evaluation, provide pt/family education and to maximize pt's level of independence in the home and community environment.     Pt's spiritual, cultural and educational needs considered and pt agreeable to plan of care and goals.     Anticipated barriers to physical therapy: none     Goals:   Short Term Goals: 4 weeks  Pain: Decrease pain to less than 6 /10 on a daily basis to allow for improved ability to perform ADL's.   Walking: Able to walk 5 mins in clinic without increased pain    Transfers: Able to complete 6 reps sit to  30 secs      Long Term Goals: 8 weeks     Pain: Decrease pain to no more than 4/10 to allow for improved ability to perform ADL's   Strength: Improve strength in core muscles by 1/2 grade  for improved trunk stability  Functional scale: Improve limitation score on FOTO   to 64 % limitations   Lifting: Lift 10 lbs to waist level, 5 lbs to shoulder level, 3  lbs to overhead without pain or compensation  Postures: Increase sitting and/or standing duration to 10 mins without pain   Walking: Able to complete a 6 min walk test in outdoor environment.   Transfers: Perform sit to stand transfers without increased pain or limitation - can complete 8 reps in 30 secs   Exercise: demonstrate independence with home exercise program to maintain gains made in therapy.         PLAN     Continue 2x/week x 8 weeks - Dry Needling, manual tx, therapeutic exercise/activities.     Medhat Teran, PTA

## 2023-03-09 ENCOUNTER — CLINICAL SUPPORT (OUTPATIENT)
Dept: REHABILITATION | Facility: HOSPITAL | Age: 53
End: 2023-03-09
Attending: ORTHOPAEDIC SURGERY
Payer: COMMERCIAL

## 2023-03-09 ENCOUNTER — PATIENT MESSAGE (OUTPATIENT)
Dept: PAIN MEDICINE | Facility: CLINIC | Age: 53
End: 2023-03-09
Payer: COMMERCIAL

## 2023-03-09 DIAGNOSIS — Z98.890 S/P CUBITAL TUNNEL RELEASE: ICD-10-CM

## 2023-03-09 DIAGNOSIS — Z98.890 S/P CARPAL TUNNEL RELEASE: Primary | ICD-10-CM

## 2023-03-09 PROCEDURE — 97140 MANUAL THERAPY 1/> REGIONS: CPT | Mod: PN

## 2023-03-09 PROCEDURE — 97110 THERAPEUTIC EXERCISES: CPT | Mod: PN

## 2023-03-09 PROCEDURE — 97018 PARAFFIN BATH THERAPY: CPT | Mod: PN

## 2023-03-09 NOTE — PROGRESS NOTES
Occupational Therapy Daily Treatment Note   Name: Nicole Harris 1970  MRN: [id number]    Visit Date: 3/9/2023  Visit #: 10 / 12  Authorization period Expiration: 12/31/2023    Plan of Care Expiration: 3/15/2023  Precautions: standard    Time In: 2: 00  Time Out: 2: 55  Total 1:1 Treatment Time: 55 min    Treatment Diagnosis:   Encounter Diagnoses   Name Primary?    S/P carpal tunnel release Yes    S/P cubital tunnel release        Physician: Alcides Yancey DO    Subjective   Pt reports: she is hurting in the R hand   She was compliant with home exercise program.     Pain Scale:  2/10 on VAS currently  Pain Location: right wrist    Objective     Nicole received the following manual therapy techniques : for 30 mins  Soft tissue mobilization along with cross friction massage along the R carpal tunnel and cubital tunnel  in order to decrease edema, desensitize, and break up scar tissue.     Nicole received the following direct contact modalities after being cleared for contraindications: Paraffin was applied to the R hand/wrist prior to mobilization for 15 mins    Nicole performed the following there ex: 10 mins   -R hand  strengthening using yellow gripper   -UBE x 5 mins  -R hand adductor strengthening using putty    Home Exercises and Education Provided     Education provided re:   - progress towards goals   - role of therapy in multi - disciplinary team, goals for therapy  Pt educated on condition, POC, and expectations in therapy.  No spiritual or educational barriers to learning provided    Home exercises:  Pt will be provided HEP during course of treatment with progressions as appropriate. Pt was advised to perform these exercises free of pain, and to stop performing them if pain occurs.   Nicole demonstrated good  understanding of the education provided.     Assessment   Nicole is progressing well towards her goals and no updates to goals at this time. 1 min 27 seconds      Pt prognosis is Good. Pt will continue to benefit from skilled outpatient physical therapy to address the deficits listed in the problem list chart on initial evaluation, provide pt/family education and to maximize pt's level of independence in the home and community environment.     Medical necessity is demonstrated by the impairments and functional limitations listed on the Initial Evaluation.     Anticipated barriers to occupational therapy: none  Pt's spiritual, cultural and educational needs considered and pt agreeable to plan of care and goals.    Goals   Short term:  Pt will report R UE FOTO limitation of 50% by 4 weeks  2. Pt will improve R hand functional  strength by 3 lbs in 3 weeks   3. Pt will improve R wrist extension by 5 degrees in 4 weeks     Long term:   Pt will report R UE FOTO limitation of 45% by dc  2. Pt will improve R hand functional  strength by 6 lbs by dc  3. Pt will improve R wrist extension by 10 degrees by dc  4 Pt will report 100% compliance with HEP prior to dc      Plan   Continue with established Plan of Care towards Occupational Therapy goals.   Discussed Plan of Care with patient: Yes    Alice Eddy OT  3/9/2023

## 2023-03-10 ENCOUNTER — CLINICAL SUPPORT (OUTPATIENT)
Dept: REHABILITATION | Facility: HOSPITAL | Age: 53
End: 2023-03-10
Attending: ORTHOPAEDIC SURGERY
Payer: COMMERCIAL

## 2023-03-10 DIAGNOSIS — Z98.890 S/P CARPAL TUNNEL RELEASE: Primary | ICD-10-CM

## 2023-03-10 DIAGNOSIS — Z98.890 S/P CUBITAL TUNNEL RELEASE: ICD-10-CM

## 2023-03-10 DIAGNOSIS — M53.3 SACROILIAC JOINT DYSFUNCTION OF BOTH SIDES: ICD-10-CM

## 2023-03-10 DIAGNOSIS — Z74.09 IMPAIRED FUNCTIONAL MOBILITY AND ACTIVITY TOLERANCE: Primary | ICD-10-CM

## 2023-03-10 PROCEDURE — 97140 MANUAL THERAPY 1/> REGIONS: CPT | Mod: PN,CQ

## 2023-03-10 PROCEDURE — 97032 APPL MODALITY 1+ESTIM EA 15: CPT | Mod: 59,PN

## 2023-03-10 PROCEDURE — 97110 THERAPEUTIC EXERCISES: CPT | Mod: PN

## 2023-03-10 PROCEDURE — 97014 ELECTRIC STIMULATION THERAPY: CPT | Mod: PN,CQ

## 2023-03-10 PROCEDURE — 97140 MANUAL THERAPY 1/> REGIONS: CPT | Mod: PN

## 2023-03-10 NOTE — PROGRESS NOTES
Occupational Therapy Daily Treatment Note   Name: Nicole Harris 1970  MRN: [id number]    Visit Date: 3/10/2023  Visit #: 11 / 12  Authorization period Expiration: 12/31/2023    Plan of Care Expiration: 3/15/2023  Precautions: standard    Time In: 1: 45  Time Out: 2: 30  Total 1:1 Treatment Time: 45 min    Treatment Diagnosis:   Encounter Diagnoses   Name Primary?    S/P carpal tunnel release Yes    S/P cubital tunnel release        Physician: Alcides Yancey DO    Subjective   Pt reports: R digit adduction is improving  She was compliant with home exercise program.     Pain Scale:  2/10 on VAS currently  Pain Location: right wrist    Objective     Nicole received the following manual therapy techniques : for 30 mins  Soft tissue mobilization along with cross friction massage along the R carpal tunnel and cubital tunnel  in order to decrease edema, desensitize, and break up scar tissue.     Nicole received the following direct contact modalities after being cleared for contraindications: Paraffin was applied to the R hand/wrist prior to mobilization for 15 mins    Nicole performed the following supervised modalities:  IFC was applied to the R elbow in order to improve blood flow to the impacted area and alleviate pain levels for 15 mins.       Home Exercises and Education Provided     Education provided re:   - progress towards goals   - role of therapy in multi - disciplinary team, goals for therapy  Pt educated on condition, POC, and expectations in therapy.  No spiritual or educational barriers to learning provided    Home exercises:  Pt will be provided HEP during course of treatment with progressions as appropriate. Pt was advised to perform these exercises free of pain, and to stop performing them if pain occurs.   Nicole demonstrated good  understanding of the education provided.     Assessment   Nicole is progressing well towards her goals and no updates to goals at this time.  1 min 27 seconds     Pt prognosis is Good. Pt will continue to benefit from skilled outpatient physical therapy to address the deficits listed in the problem list chart on initial evaluation, provide pt/family education and to maximize pt's level of independence in the home and community environment.     Medical necessity is demonstrated by the impairments and functional limitations listed on the Initial Evaluation.     Anticipated barriers to occupational therapy: none  Pt's spiritual, cultural and educational needs considered and pt agreeable to plan of care and goals.    Goals   Short term:  Pt will report R UE FOTO limitation of 50% by 4 weeks  2. Pt will improve R hand functional  strength by 3 lbs in 3 weeks   3. Pt will improve R wrist extension by 5 degrees in 4 weeks     Long term:   Pt will report R UE FOTO limitation of 45% by dc  2. Pt will improve R hand functional  strength by 6 lbs by dc  3. Pt will improve R wrist extension by 10 degrees by dc  4 Pt will report 100% compliance with HEP prior to dc      Plan   Continue with established Plan of Care towards Occupational Therapy goals.   Discussed Plan of Care with patient: Yes    Alice Eddy OT  3/10/2023

## 2023-03-10 NOTE — PROGRESS NOTES
[facility]   Physical Therapy Treatment Note     Name: Nicole BONILLA [facility] Number: [id number]    Therapy Diagnosis:   Encounter Diagnoses   Name Primary?    Impaired functional mobility and activity tolerance Yes    Sacroiliac joint dysfunction of both sides      Physician: Brii Mcneil NP    Visit Date: 3/10/2023    Evaluation Date: 11/28/2022  Authorization Period Expiration: 11/28/2023  Plan of Care Expiration: 02/28/2023  Visit # / Visits authorized: 16 / 16   FOTO Visit #:  2/3    PTA Visit #: 4 /5     Time In: 2:30 pm  Time Out: 2:15 pm  Total Billable Time: 45 minutes    SUBJECTIVE     Pt reports:  no change.  She was compliant with home exercise program.  Response to previous treatment:  no change   Functional change: none    Pain: 6/10  Location: Neck all the way down to low back    OBJECTIVE     Objective Measures updated at progress report unless specified.     Treatment     Nicole received the treatments listed below:      therapeutic exercises to develop  for 10 minutes including:  Nustep level 0 x 3 min dnp    SKC 1 x 1 min each  Supine piriformis stretch 3 x 30 sec    Pelvic Tilts x 10  Bridges x 10   Supine Knee fallouts x 10   LTR x 10      manual therapy techniques: x 25 min  Myofascial Release   Multifidus         IFC to lumbar to reduce inflammation and pain x 20 min      DNP  Soft tissue Mobilization / Dry Needling were applied to the: Cervical/Lumbar spine, bilateral SI joints  for 40 minutes, including: Supine - cervical manual txn with end range hold; sub-occipital inhibition; Patient then positioned into prone, pillow under chest at patient request; no bolster wanted for ankles - neeraj them off end of mat.   IASTM to lumbar paraspinals moving caudally into SI joint area and bilateral piriformis; LF-ES per protocol for lumbar radiculopathy with inclusion of SI joints and piriformis on right and left. Using 60 mm and 50 mm needles.  Winding of needles after  insertion for tissue grasp. Adjusted intensity of stimulation to patient tolerance. Able to leave needles in place x 20 mins. Removed needles,             Patient Education and Home Exercises     Home Exercises Provided and Patient Education Provided     Education provided:   - what to expect following dry needling     Written Home Exercises Provided:  has not been issued one at this time.  .   Nicole demonstrated good  understanding of the education provided. See EMR under Patient Instructions for exercises provided during therapy sessions    ASSESSMENT   Patient is still having problems with her back and feels the exercises make it worse.  The releases have not aggravated it but no great improvement noted either.    Nicole Is progressing well towards her goals.   Pt prognosis is Fair.     Pt will continue to benefit from skilled outpatient physical therapy to address the deficits listed in the problem list box on initial evaluation, provide pt/family education and to maximize pt's level of independence in the home and community environment.     Pt's spiritual, cultural and educational needs considered and pt agreeable to plan of care and goals.     Anticipated barriers to physical therapy: none     Goals:   Short Term Goals: 4 weeks  Pain: Decrease pain to less than 6 /10 on a daily basis to allow for improved ability to perform ADL's.   Walking: Able to walk 5 mins in clinic without increased pain    Transfers: Able to complete 6 reps sit to  30 secs      Long Term Goals: 8 weeks     Pain: Decrease pain to no more than 4/10 to allow for improved ability to perform ADL's   Strength: Improve strength in core muscles by 1/2 grade  for improved trunk stability  Functional scale: Improve limitation score on FOTO   to 64 % limitations   Lifting: Lift 10 lbs to waist level, 5 lbs to shoulder level, 3 lbs to overhead without pain or compensation  Postures: Increase sitting and/or standing duration to 10 mins  without pain   Walking: Able to complete a 6 min walk test in outdoor environment.   Transfers: Perform sit to stand transfers without increased pain or limitation - can complete 8 reps in 30 secs   Exercise: demonstrate independence with home exercise program to maintain gains made in therapy.         PLAN     Continue 2x/week x 8 weeks - Dry Needling, manual tx, therapeutic exercise/activities.     Medhat Teran, PTA

## 2023-03-14 ENCOUNTER — CLINICAL SUPPORT (OUTPATIENT)
Dept: REHABILITATION | Facility: HOSPITAL | Age: 53
End: 2023-03-14
Attending: ORTHOPAEDIC SURGERY
Payer: COMMERCIAL

## 2023-03-14 DIAGNOSIS — M51.36 DDD (DEGENERATIVE DISC DISEASE), LUMBAR: ICD-10-CM

## 2023-03-14 DIAGNOSIS — Z98.890 S/P CARPAL TUNNEL RELEASE: Primary | ICD-10-CM

## 2023-03-14 DIAGNOSIS — Z98.890 S/P CUBITAL TUNNEL RELEASE: ICD-10-CM

## 2023-03-14 DIAGNOSIS — M50.30 DEGENERATIVE DISC DISEASE, CERVICAL: Primary | ICD-10-CM

## 2023-03-14 DIAGNOSIS — G56.03 BILATERAL CARPAL TUNNEL SYNDROME: ICD-10-CM

## 2023-03-14 DIAGNOSIS — M47.816 LUMBAR SPONDYLOSIS: ICD-10-CM

## 2023-03-14 PROCEDURE — 97110 THERAPEUTIC EXERCISES: CPT | Mod: PN

## 2023-03-14 PROCEDURE — 97018 PARAFFIN BATH THERAPY: CPT | Mod: 59,PN

## 2023-03-14 PROCEDURE — 97140 MANUAL THERAPY 1/> REGIONS: CPT | Mod: PN

## 2023-03-14 PROCEDURE — 97032 APPL MODALITY 1+ESTIM EA 15: CPT | Mod: PN

## 2023-03-14 NOTE — PROGRESS NOTES
Occupational Therapy Daily Treatment Note   Name: Nicole Harris 1970  MRN: [id number]    Visit Date: 3/14/2023  Visit #: 12 / 20  Authorization period Expiration: 12/31/2023    Plan of Care Expiration: 4/15/2023  Precautions: standard    Time In: 12: 20  Time Out: 1: 20  Total 1:1 Treatment Time: 60 min    Treatment Diagnosis:   Encounter Diagnoses   Name Primary?    S/P carpal tunnel release Yes    S/P cubital tunnel release     Bilateral carpal tunnel syndrome        Physician: Alcides Yancey DO    Subjective   Pt reports: The E-stim is helping with her R digit adduction /gripping tasks  She was compliant with home exercise program.     Pain Scale:  2/10 on VAS currently  Pain Location: right wrist    Objective     Nicole received the following manual therapy techniques : for 20 mins  Soft tissue mobilization along with cross friction massage along the R carpal tunnel and cubital tunnel  in order to decrease edema, desensitize, and break up scar tissue.     Nicole received the following direct contact modalities after being cleared for contraindications: Paraffin was applied to the R hand/wrist prior to mobilization for 10 mins    Nicole performed the following supervised modalities:  IFC was applied to the R elbow in order to improve blood flow to the impacted area and alleviate pain levels for 20 mins.     Therapeutic exercises:  Pt performed UBE exercise x 12 mins to improve overall UE strengthening and mobility     Home Exercises and Education Provided     Education provided re:   - progress towards goals   - role of therapy in multi - disciplinary team, goals for therapy  Pt educated on condition, POC, and expectations in therapy.  No spiritual or educational barriers to learning provided    Home exercises:  Pt will be provided HEP during course of treatment with progressions as appropriate. Pt was advised to perform these exercises free of pain, and to stop performing them  if pain occurs.   Nicole demonstrated good  understanding of the education provided.     Assessment   Nicole is progressing well towards her goals and no updates to goals at this time.Pt is showing signs of improvement in her R hand during grasping tasks, pt reporting the E-stim is making a difference along with improvement of AROM of the R hand /digit adduction .    Pt prognosis is Good. Pt will continue to benefit from skilled outpatient occupational therapy to address the deficits listed in the problem list chart on initial evaluation, provide pt/family education and to maximize pt's level of independence in the home and community environment.     Medical necessity is demonstrated by the impairments and functional limitations listed on the Initial Evaluation.     Anticipated barriers to occupational therapy: none  Pt's spiritual, cultural and educational needs considered and pt agreeable to plan of care and goals.    Goals   Short term:  Pt will report R UE FOTO limitation of 50% by 4 weeks partial met  2. Pt will improve R hand functional  strength by 3 lbs in 3 weeks   3. Pt will improve R wrist extension by 5 degrees in 4 weeks     Long term:   Pt will report R UE FOTO limitation of 45% by dc  2. Pt will improve R hand functional  strength by 6 lbs by dc  3. Pt will improve R wrist extension by 10 degrees by dc met  4 Pt will report 100% compliance with HEP prior to dc met      Plan   OT EXTENDING PLAN OF CARE TODAY TO 4/15/2023 FOR AN ADDITIONAL 8 VISITS IN ORDER TO CONTINUE ESTABLISHED PLAN OF CARE AND WORK TOWARDS FUNCTIONAL GOALS.    Alice Eddy, OT  3/14/2023

## 2023-03-23 ENCOUNTER — TELEPHONE (OUTPATIENT)
Dept: ORTHOPEDICS | Facility: CLINIC | Age: 53
End: 2023-03-23
Payer: COMMERCIAL

## 2023-03-23 NOTE — TELEPHONE ENCOUNTER
Returned call. The patient was offered an appointment on 3/29/23 in [facility] and declined. The patient was agreeable to an appointment on 4/4/23 in [place].    ----- Message from Gideon Crow sent at 3/23/2023  3:43 PM CDT -----      Name of Who is Calling:PT          What is the request in detail:PT is requesting a call back to discuss a follow up appointment from her recent surgery. Please be Advised!          Can the clinic reply by [facility]:no          What Number to Call Back if not in [phone]

## 2023-03-30 ENCOUNTER — CLINICAL SUPPORT (OUTPATIENT)
Dept: REHABILITATION | Facility: HOSPITAL | Age: 53
End: 2023-03-30
Attending: ORTHOPAEDIC SURGERY
Payer: MEDICAID

## 2023-03-30 DIAGNOSIS — M79.18 MYOFASCIAL PAIN SYNDROME, CERVICAL: ICD-10-CM

## 2023-03-30 DIAGNOSIS — Z98.890 S/P CARPAL TUNNEL RELEASE: Primary | ICD-10-CM

## 2023-03-30 DIAGNOSIS — M50.30 DEGENERATIVE DISC DISEASE, CERVICAL: Primary | ICD-10-CM

## 2023-03-30 DIAGNOSIS — Z98.890 S/P CUBITAL TUNNEL RELEASE: ICD-10-CM

## 2023-03-30 PROCEDURE — 97014 ELECTRIC STIMULATION THERAPY: CPT | Mod: PN

## 2023-03-30 PROCEDURE — 97018 PARAFFIN BATH THERAPY: CPT | Mod: PN

## 2023-03-30 PROCEDURE — 97140 MANUAL THERAPY 1/> REGIONS: CPT | Mod: PN

## 2023-03-30 PROCEDURE — 97032 APPL MODALITY 1+ESTIM EA 15: CPT | Mod: PN

## 2023-03-30 PROCEDURE — 97161 PT EVAL LOW COMPLEX 20 MIN: CPT | Mod: PN

## 2023-03-30 NOTE — PLAN OF CARE
[facility]  Physical Therapy Initial Evaluation    Name: Nicole Harris  Clinic Number: [id number]    Therapy Diagnosis:   Encounter Diagnoses   Name Primary?    Degenerative disc disease, cervical Yes    Myofascial pain syndrome, cervical      Physician: Brii Mcneil NP    Physician Orders: PT Eval and Treat   Medical Diagnosis from Referral: Degenerative Disc Disease, cervical; DDD Lumbar; myofascial pain syndrome - cervical/lumbar   Evaluation Date: 3/30/2023  Authorization Period Expiration: 3/30/2024  Plan of Care Expiration: 2023  Visit # / Visits authorized:   FOTO Visit #:  1/ 3    Time In: 10:00 am   Time Out: 11:15 am   Total Appointment Time (timed & untimed codes): 60  minutes    Precautions: Standard and Diabetes    Subjective   Date of onset: chronic   History of current condition - Nicole reports: long history of neck and lower back pain; last seen in therapy for SI/Lumbar pain; Nicole is reporting pain with turning her head from side/side; gets pain into both of her shoulders; hurts to raise her arms; has numbness in left UE - distally to hand. Referral to PT for dry needling and manual tx of symptoms.  Patient is alos receiving O.T. for post-op CTS and ulnar nerve decompression on the right.      Medical History:   Past Medical History:   Diagnosis Date    Anxiety     Bipolar disorder     Depression     Diabetes mellitus, type 2     Fibromyalgia     HTN (hypertension)     Insomnia     Migraine        Surgical History:   Nicole Harris  has a past surgical history that includes  section; Tonsillectomy; Epidural steroid injection (N/A, 2019); Epidural steroid injection (N/A, 2019); Epidural steroid injection (N/A, 2019); Trigger point injection (N/A, 2019); Colonoscopy (N/A, 2019); Esophagogastroduodenoscopy (N/A, 2019); Epidural steroid injection (N/A, 10/21/2019); Trigger point injection (N/A, 10/21/2019); ANGIOGRAM,  CORONARY, WITH LEFT HEART CATHETERIZATION (Left, 6/24/2020); Coronary Artery Bypass Graft (CABG) (N/A, 6/26/2020); Endoscopic harvest of vein (Left, 6/26/2020); Cardiac catheterization; triple bypass; Epidural steroid injection (N/A, 10/1/2020); Trigger point injection (N/A, 10/1/2020); Epidural steroid injection (N/A, 1/7/2021); Injection of anesthetic agent around nerve (Bilateral, 4/1/2021); Epidural steroid injection (N/A, 9/15/2021); Epidural steroid injection into cervical spine (N/A, 3/2/2022); Epidural steroid injection (N/A, 6/29/2022); Epidural steroid injection (N/A, 11/2/2022); Carpal tunnel release (Right, 1/5/2023); and decompression, nerve, ulnar (Right, 1/5/2023).    Medications:   Nicole has a current medication list which includes the following prescription(s): albuterol, aspirin, atorvastatin, azelastine, cetirizine, cyclobenzaprine, empagliflozin, escitalopram oxalate, fluconazole, fluticasone propionate, insulin glargine,hum.rec.anlog, levocetirizine, lisinopril, metformin, montelukast, naproxen sodium, nortriptyline, oxycodone-acetaminophen, ozempic, pantoprazole, and true metrix glucose test strip, and the following Facility-Administered Medications: sodium chloride 0.9%, lactated ringers, lactated ringers, and lactated ringers.    Allergies:   Review of patient's allergies indicates:   Allergen Reactions    Doxycycline     Hydrocodone Nausea And Vomiting    Vaccine adjuvant system, as01b liposomal     Varicella-zoster ge-as01b (pf)      Other reaction(s): Memory problems    Varicella-zoster virus glycoprotein e, recombinant     Benadryl [diphenhydramine hcl] Nausea Only        Imaging, : nothing current     Prior Therapy: yes - multiple episodes over the past 3-4 years.   Social History: lives alone;    Occupation: disabled   Prior Level of Function: independent, but sedentary; has good/bad days, bad days she just stays in the house, in bed/watching TV;   Current Level of Function: As above;  Reports that she has been able to get out of her home more now; has a boyfriend, daughter has a new baby that keeps her engaged.     Pain:  Current 7/10, worst 10/10, best 4/10   Location: bilateral neck and shoulders   Description: Aching, Throbbing, Tight, and Numb  Aggravating Factors: standing, sitting for long periods of time, turning her head, lifting   Easing Factors: use of ice, lying down.     Pts goals: To relieve her pain     Objective       Observation: Nicole is alert/oriented x 4; Appears in no acute distress at this time.     Posture:     -       Rounded shoulders  -       Forward head  -       Posterior pelvic tilt    Cervical Range of Motion:    Degrees Pain   Flexion 60    Extension 30    Left Rotation 50    Right Rotation 62    Left Side Bending 40    Right Side Bending 28       Shoulder Range of Motion:   Left:  WFL - decreased quality of motion into IR/ER, but function  - IR T6 ER T2  Right: WFL into flexion/abduction/extension; IR - hand to L4; ER - HBH to C7     Strength:   Left Right   DNF Weak - hold position less than 15 sec     Upper trap 4-/5 4-/5   Mid trap 3+/5 4-/5   Lower trap 3/5 3/5   Rhomboids 3/5 3/5    Functional       Upper Extremity Strength   Left Right   Shoulder flexion: 4-/5 4-/5   Shoulder Abduction: 3+/5 4-/5   Shoulder ER 3+/5 3+/5   Shoulder IR 4-/5 4-/5   Elbow flexion: 3+/5 3+/5   Elbow extension: 4-/5 3/5   Wrist flexion: 4/5 3/5   Wrist extension: 4/5 3/5       Special Tests:  Distraction Negative   Compression Negative   Spurlings Negative   Sharp-Lizz Negative   VA test Negative   Lateral Flexion Alar Ligament Negative       Joint Mobility: OA restriction in to extension, also noted at C7/T1; mid-cervical segment restriction movement to right /for left rotation of neck; also has facet restriction into Side bending at mid level segments on right and left ,         Thoracic mobility: postural restriction into extension at upper segments;     Palpation:  moderate tenderness to palpation at occipital, cervical paraspinals into bilateral upper traps/levator to medial border of scapula. Fascia restriction noted at Tp's C3, 4, 5, on right.     Sensation: intact to light touch BUE's     Flexibility:     Upper Trap = R moderate restriction, L moderate restriction   Scalenes: R moderate restriction, L moderate restriction   SCM: R minimal restriction, L minimal restriction   Levator Scap: R moderate restriction, L moderate restriction      PT Evaluation Completed? Yes  Discussed Plan of Care with patient: Yes       Limitation/Restriction for FOTO cervical  Survey    Therapist reviewed FOTO scores for Nicole Harris on 3/30/2023.   FOTO documents entered into [employer] - see Media section.    Limitation Score: %         TREATMENT   Treatment Time In: 10:45 am   Treatment Time Out: 11:10 am   Total Treatment time (time-based codes) separate from Evaluation: 20 minutes    Nicole received the treatments listed below:      MANUAL THERAPY TECHNIQUES including Soft tissue Mobilization/ dry needling  were applied to cervical/upper trap/levator  for 20 minutes.  STM/Sub-occipital inhibition followed by dry needling to posterior cervical/bilateral upper trap/levator following protocol for same; LF-ES at 8hz x 20 mins; Utilized 30, 40 and 50 mm needles 1.5 fb lateral to SP at C2, 3, 4, 5,; mm belly and MTJ of levator and MM belly of upper trap bilateral. Patient is prone position with appropriate support.    Good rhythmical contraction of tissue noted; needles left in-situ x 15 mins.   Removed without adverse effects.     Home Exercises and Patient Education Provided    Education provided:   - posture, need for more exercise     Written Home Exercises Provided: will be provided over next several visits.   Nicole demonstrated good  understanding of the education provided.     Assessment   Nicole is a 53 y.o. female referred to outpatient Physical Therapy with a medical diagnosis of  cervical and lumbar myofascial pain syndrome, cervical and lumbar DDD. . Pt presents with chronic pain, chronic fascial restrictions in cervical/lumbar area; decreased activity tolerance, decreased functional mobility.     Pt prognosis is Fair.   Pt will benefit from skilled outpatient Physical Therapy to address the deficits stated above and in the chart below, provide pt/family education, and to maximize pt's level of independence.     Plan of care discussed with patient: Yes  Pt's spiritual, cultural and educational needs considered and patient is agreeable to the plan of care and goals as stated below:     Anticipated Barriers for therapy:        Medical Necessity is demonstrated by the following  History  Co-morbidities and personal factors that may impact the plan of care Co-morbidities:   COPD/asthma, depression, diabetes, and Bipolar disorder     Personal Factors:   coping style     low   Examination  Body Structures and Functions, activity limitations and participation restrictions that may impact the plan of care Body Regions:   neck  back  upper extremities  trunk    Body Systems:    gross symmetry  ROM  strength  gross coordinated movement    Participation Restrictions:   none    Activity limitations:   Learning and applying knowledge  no deficits    General Tasks and Commands  no deficits    Communication  no deficits    Mobility  lifting and carrying objects  fine hand use (grasping/picking up)  walking    Self care  no deficits    Domestic Life  shopping  cooking  doing house work (cleaning house, washing dishes, laundry)    Interactions/Relationships  no deficits    Life Areas  no deficits    Community and Social Life  community life  recreation and leisure         moderate   Clinical Presentation stable and uncomplicated low   Decision Making/ Complexity Score: low     Goals:  Short Term Goals: 3 weeks   Reduction in daily pain levels to no more than 6/10 for improved ability to perform daily tasks.    Compliant with HEP     Long Term Goals: 6 weeks   Reduction in daily pain levels to no more than 4/10 for improved ability to perform daily tasks.  Cervical AROM performed without increase in pain  Able to reach overhead shelf without increased pain.   FOTO limitation score improved to   Independent with HEP for continued improvement in function     Plan   Plan of care Certification: 3/30/2023 to 6/30/2023.    Outpatient Physical Therapy 2 times weekly for 6 weeks to include the following interventions: Manual Therapy, Moist Heat/ Ice, Neuromuscular Re-ed, Patient Education, Therapeutic Activities, and Therapeutic Exercise.     Quita Holliday, PT

## 2023-03-30 NOTE — PROGRESS NOTES
Occupational Therapy Daily Treatment Note   Name: Nicole Harris 1970  MRN: [id number]    Visit Date: 3/30/2023  Visit #: 12 / 20  Authorization period Expiration: 12/31/2023    Plan of Care Expiration: 4/15/2023  Precautions: standard    Time In: 11: 15  Time Out: 11: 51  Total 1:1 Treatment Time: 36 min    Treatment Diagnosis:   Encounter Diagnoses   Name Primary?    S/P carpal tunnel release Yes    S/P cubital tunnel release        Physician: Alcides Yancey DO    Subjective   Pt reports: The E-stim is helping with her R digit adduction /gripping tasks  She was compliant with home exercise program.     Pain Scale:  2/10 on VAS currently  Pain Location: right wrist    Objective     Nicole received the following direct contact modalities after being cleared for contraindications:   -Paraffin was applied to the R hand/wrist prior to mobilization for 8  mins  -Ultrasound was applied to the R anterior wrist over the incision site to help break up scar tissue for 8 mins @1.2 w/cm2    Nicole performed the following supervised modalities:  IFC was applied to the R elbow in order to improve blood flow to the impacted area and alleviate pain levels for 20 mins.     Home Exercises and Education Provided     Education provided re:   - progress towards goals   - role of therapy in multi - disciplinary team, goals for therapy  Pt educated on condition, POC, and expectations in therapy.  No spiritual or educational barriers to learning provided    Home exercises:  Pt will be provided HEP during course of treatment with progressions as appropriate. Pt was advised to perform these exercises free of pain, and to stop performing them if pain occurs.   Nicole demonstrated good  understanding of the education provided.     Assessment   Nicole is progressing well towards her goals and no updates to goals at this time.Pt is showing signs of improvement in her R hand during grasping tasks, pt reporting  the E-stim is making a difference along with improvement of AROM of the R hand /digit adduction .    Pt prognosis is Good. Pt will continue to benefit from skilled outpatient occupational therapy to address the deficits listed in the problem list chart on initial evaluation, provide pt/family education and to maximize pt's level of independence in the home and community environment.     Medical necessity is demonstrated by the impairments and functional limitations listed on the Initial Evaluation.     Anticipated barriers to occupational therapy: none  Pt's spiritual, cultural and educational needs considered and pt agreeable to plan of care and goals.    Goals   Short term:  Pt will report R UE FOTO limitation of 50% by 4 weeks partial met  2. Pt will improve R hand functional  strength by 3 lbs in 3 weeks   3. Pt will improve R wrist extension by 5 degrees in 4 weeks     Long term:   Pt will report R UE FOTO limitation of 45% by dc  2. Pt will improve R hand functional  strength by 6 lbs by dc  3. Pt will improve R wrist extension by 10 degrees by dc met  4 Pt will report 100% compliance with HEP prior to dc met      Plan   OT EXTENDING PLAN OF CARE TODAY TO 4/15/2023 FOR AN ADDITIONAL 8 VISITS IN ORDER TO CONTINUE ESTABLISHED PLAN OF CARE AND WORK TOWARDS FUNCTIONAL GOALS.    Alice Eddy, OT  3/30/2023

## 2023-04-03 ENCOUNTER — CLINICAL SUPPORT (OUTPATIENT)
Dept: REHABILITATION | Facility: HOSPITAL | Age: 53
End: 2023-04-03
Attending: ORTHOPAEDIC SURGERY
Payer: MEDICAID

## 2023-04-03 ENCOUNTER — OFFICE VISIT (OUTPATIENT)
Dept: PAIN MEDICINE | Facility: CLINIC | Age: 53
End: 2023-04-03
Payer: COMMERCIAL

## 2023-04-03 DIAGNOSIS — M79.18 MYOFASCIAL PAIN SYNDROME, CERVICAL: Primary | ICD-10-CM

## 2023-04-03 DIAGNOSIS — M50.30 DEGENERATIVE DISC DISEASE, CERVICAL: Primary | ICD-10-CM

## 2023-04-03 DIAGNOSIS — M47.812 CERVICAL SPONDYLOSIS: ICD-10-CM

## 2023-04-03 DIAGNOSIS — Z98.890 S/P CUBITAL TUNNEL RELEASE: ICD-10-CM

## 2023-04-03 DIAGNOSIS — M54.12 CERVICAL RADICULOPATHY: ICD-10-CM

## 2023-04-03 DIAGNOSIS — Z98.890 S/P CARPAL TUNNEL RELEASE: Primary | ICD-10-CM

## 2023-04-03 PROCEDURE — 97014 ELECTRIC STIMULATION THERAPY: CPT | Mod: PN

## 2023-04-03 PROCEDURE — 97140 MANUAL THERAPY 1/> REGIONS: CPT | Mod: PN

## 2023-04-03 PROCEDURE — 97032 APPL MODALITY 1+ESTIM EA 15: CPT | Mod: PN

## 2023-04-03 PROCEDURE — 99999 PR PBB SHADOW E&M-EST. PATIENT-LVL III: CPT | Mod: PBBFAC,,,

## 2023-04-03 PROCEDURE — 97110 THERAPEUTIC EXERCISES: CPT | Mod: PN

## 2023-04-03 PROCEDURE — 99999 PR PBB SHADOW E&M-EST. PATIENT-LVL III: ICD-10-PCS | Mod: PBBFAC,,,

## 2023-04-03 PROCEDURE — 99213 PR OFFICE/OUTPT VISIT, EST, LEVL III, 20-29 MIN: ICD-10-PCS | Mod: S$GLB,,,

## 2023-04-03 PROCEDURE — 99213 OFFICE O/P EST LOW 20 MIN: CPT | Mod: S$GLB,,,

## 2023-04-03 PROCEDURE — 99213 OFFICE O/P EST LOW 20 MIN: CPT | Mod: PBBFAC,PO

## 2023-04-03 NOTE — PROGRESS NOTES
[facility]   Physical Therapy Treatment Note     Name: Nicole BONILLA [facility] Number: [id number]    Therapy Diagnosis:   Encounter Diagnosis   Name Primary?    Myofascial pain syndrome, cervical Yes     Physician: Brii Mcneil NP    Visit Date: 4/3/2023    Medical Diagnosis from Referral: Degenerative Disc Disease, cervical; DDD Lumbar; myofascial pain syndrome - cervical/lumbar   Evaluation Date: 3/30/2023  Authorization Period Expiration: 3/30/2024  Plan of Care Expiration: 6/30/2023  Visit # / Visits authorized: 2/12   FOTO Visit #:  1/ 3    PTA Visit #: 0/5     Time In: 8:55 am   Time Out: 9:50 am   Total Billable Time: 55 minutes    SUBJECTIVE     Pt reports: I just feel stiff; shoulders are stiff and some days I find it really hard to lift them up.   She is somewhat compliant with home exercise program. - does them about 2-3x/week - all that she can handle at this time.   Response to previous treatment: decreased neck pain.   Functional change: none     Pain: 4/10  Location: bilateral neck and upper traps, rhomboids      OBJECTIVE     Objective Measures updated at progress report unless specified.     Treatment     Nicole received the treatments listed below:      therapeutic exercises to develop ROM and posture for 10 minutes including:  Wall Slides with end range stretch of shoulders x 2 mins   Neck retraction ex in sitting and supine     manual therapy techniques: Soft tissue Mobilization and dry needling  were applied to the: cervical,upper traps,scapular for 45  minutes, including:  Supine: cervical sub-occipital inhibition to address forward head posture and reduce tension; MET for upper trap/levator stretch; STM rhomboids; Patient positioned in prone, bolster under ankles, towel roll for forehead; LF-ES at 6hz per protocol for cervical radiculopathy with inclusion of rhomboids at medial borders of scapula and upper traps. 1.5 fb lateral to C2, 3, 4, 5,6, mm bellies of  upper traps at trigger points; 3 points for rhomboids at medial border of scapula.  Needle twist for tissue grasp. Intensity of stimulation adjusted to patient tolerance;  Needles left in-situ x 20 mins. Removed needles, pressure applied to c-spine for several seconds to ensure no bleeding, inflammation.       neuromuscular re-education activities to improve:  for  minutes. The following activities were included:      therapeutic activities to improve functional performance for   minutes, including:      Patient Education and Home Exercises     Home Exercises Provided and Patient Education Provided     Education provided:   - Encouraged more exercise when able     Written Home Exercises Provided: yes. Exercises were reviewed and Nicole was able to demonstrate them prior to the end of the session.  Nicole demonstrated fair  understanding of the education provided. See EMR under Patient Instructions for exercises provided during therapy sessions    ASSESSMENT     Nicole demonstrating good pain relief and tolerance for manual treatment/dry needling.  Encouraged patient to move more at home.     Nicole Is progressing well towards her goals.   Pt prognosis is Fair.     Pt will continue to benefit from skilled outpatient physical therapy to address the deficits listed in the problem list box on initial evaluation, provide pt/family education and to maximize pt's level of independence in the home and community environment.     Pt's spiritual, cultural and educational needs considered and pt agreeable to plan of care and goals.     Anticipated barriers to physical therapy: none     Goals:  Short Term Goals: 3 weeks   Reduction in daily pain levels to no more than 6/10 for improved ability to perform daily tasks.   Compliant with HEP      Long Term Goals: 6 weeks   Reduction in daily pain levels to no more than 4/10 for improved ability to perform daily tasks.  Cervical AROM performed without increase in pain  Able to reach  overhead shelf without increased pain.   FOTO limitation score improved to   Independent with HEP for continued improvement in function     PLAN     Continue with Skilled physical therapy per POC.     Quita Holliday, PT
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
## 2023-04-03 NOTE — PROGRESS NOTES
Occupational Therapy Daily Treatment Note   Name: Nicole Harris 1970  MRN: [id number]    Visit Date: 4/3/2023  Visit #: 12 / 20  Authorization period Expiration: 12/31/2023    Plan of Care Expiration: 4/15/2023  Precautions: standard    Time In: 10: 40  Time Out: 11: 30  Total 1:1 Treatment Time: 50 min    Treatment Diagnosis:   Encounter Diagnoses   Name Primary?    S/P carpal tunnel release Yes    S/P cubital tunnel release        Physician: Alcides Yancey DO    Subjective   Pt reports: The E-stim is helping with her R digit adduction /gripping tasks  She was compliant with home exercise program.     Pain Scale:  2/10 on VAS currently  Pain Location: right wrist    Objective     Nicole performed the following therapeutic exercises for 20 mins:  -Pt performed ulnar flossing exercises for 5 reps each  -Pt performed scapular pinches: T's, Y's for 5 reps each  -Pt performed L wrist extensor stretch for 10 reps  UBE x 10 mins     Nicole performed the following supervised modalities:  IFC was applied to the R elbow @ 10.5 volts in order to improve blood flow to the impacted area and alleviate pain levels for 15 mins.     Nicole received the following manual therapy techniques:  -Pt received PROM to the L shoulder, elbow, wrist for 15 mins in order to improve flexibility and prevent joint stiffness.     Home Exercises and Education Provided     Education provided re:   - progress towards goals   - role of therapy in multi - disciplinary team, goals for therapy  Pt educated on condition, POC, and expectations in therapy.  No spiritual or educational barriers to learning provided    Home exercises:  Pt will be provided HEP during course of treatment with progressions as appropriate. Pt was advised to perform these exercises free of pain, and to stop performing them if pain occurs.   Nicole demonstrated good  understanding of the education provided.     Assessment   Nicole is progressing  well towards her goals and no updates to goals at this time.Pt is showing signs of improvement in her R hand during grasping tasks, pt reporting the E-stim is making a difference along with improvement of AROM of the R hand /digit adduction .    Pt prognosis is Good. Pt will continue to benefit from skilled outpatient occupational therapy to address the deficits listed in the problem list chart on initial evaluation, provide pt/family education and to maximize pt's level of independence in the home and community environment.     Medical necessity is demonstrated by the impairments and functional limitations listed on the Initial Evaluation.     Anticipated barriers to occupational therapy: none  Pt's spiritual, cultural and educational needs considered and pt agreeable to plan of care and goals.    Goals   Short term:  Pt will report R UE FOTO limitation of 50% by 4 weeks partial met  2. Pt will improve R hand functional  strength by 3 lbs in 3 weeks   3. Pt will improve R wrist extension by 5 degrees in 4 weeks     Long term:   Pt will report R UE FOTO limitation of 45% by dc  2. Pt will improve R hand functional  strength by 6 lbs by dc  3. Pt will improve R wrist extension by 10 degrees by dc met  4 Pt will report 100% compliance with HEP prior to dc met      Plan   OT EXTENDING PLAN OF CARE TODAY TO 4/15/2023 FOR AN ADDITIONAL 8 VISITS IN ORDER TO CONTINUE ESTABLISHED PLAN OF CARE AND WORK TOWARDS FUNCTIONAL GOALS.    Alice Eddy, OT  4/3/2023

## 2023-04-04 ENCOUNTER — OFFICE VISIT (OUTPATIENT)
Dept: ORTHOPEDICS | Facility: CLINIC | Age: 53
End: 2023-04-04
Payer: MEDICARE

## 2023-04-04 VITALS — WEIGHT: 177 LBS | BODY MASS INDEX: 29.49 KG/M2 | RESPIRATION RATE: 15 BRPM | HEIGHT: 65 IN

## 2023-04-04 DIAGNOSIS — Z98.890 S/P DECOMPRESSION OF ULNAR NERVE AT ELBOW: ICD-10-CM

## 2023-04-04 DIAGNOSIS — Z98.890 S/P ENDOSCOPIC CARPAL TUNNEL RELEASE: Primary | ICD-10-CM

## 2023-04-04 PROCEDURE — 99999 PR PBB SHADOW E&M-EST. PATIENT-LVL III: ICD-10-PCS | Mod: PBBFAC,,, | Performed by: ORTHOPAEDIC SURGERY

## 2023-04-04 PROCEDURE — 99999 PR PBB SHADOW E&M-EST. PATIENT-LVL III: CPT | Mod: PBBFAC,,, | Performed by: ORTHOPAEDIC SURGERY

## 2023-04-04 PROCEDURE — 99024 PR POST-OP FOLLOW-UP VISIT: ICD-10-PCS | Mod: POP,,, | Performed by: ORTHOPAEDIC SURGERY

## 2023-04-04 PROCEDURE — 99213 OFFICE O/P EST LOW 20 MIN: CPT | Mod: PBBFAC,PN | Performed by: ORTHOPAEDIC SURGERY

## 2023-04-04 PROCEDURE — 99024 POSTOP FOLLOW-UP VISIT: CPT | Mod: POP,,, | Performed by: ORTHOPAEDIC SURGERY

## 2023-04-04 NOTE — PROGRESS NOTES
Patient ID: Nicole Harris is a 52 y.o. female.     Chief Complaint: Post-op Evaluation of the Right Wrist and Post-op Evaluation of the Right Elbow        HPI:  Ms. Harris returned today for a 6 week follow-up visit after an endoscopic right carpal tunnel release and ulnar nerve decompression of her right elbow.  Her date of surgery 01/05/2023.  She has been going to occupational therapy and also doing TENs unit which is improving her hand strength.  She still can not fully adduct the small finger of her hand but she has noticed when she does slight flexion she can abduct it fully.  Her  is improving.     ROS:  No new diagnosis/surgery/prescriptions since last office visit on 01/17/2023  Constitution: Negative for chills and fever.   HENT: Negative for congestion.    Eyes: Negative for blurred vision.   Cardiovascular: Negative for chest pain.   Respiratory: Negative for cough.    Endocrine: Negative for polydipsia.   Hematologic/Lymphatic: Negative for adenopathy.   Skin: Negative for flushing and itching.   Musculoskeletal: Negative for gout.   Gastrointestinal: Positive for heartburn.   Genitourinary: Negative for nocturia.   Neurological: Positive for headaches. Negative for seizures.   Psychiatric/Behavioral: Positive for depression. The patient is nervous/anxious.    Allergic/Immunologic: Positive for environmental allergies.          Objective:   Physical Exam:   General: AAOx3.  No acute distress  Vascular:  Pulses intact and equal bilaterally.  Capillary refill less than 3 seconds and equal bilaterally  Neurologic:  Pinprick and soft touch intact and equal bilaterally.  Tinel's resolved right upper extremity.  Phalen's resolved right upper extremity.  Integment:  Incisions well approximated and well healed  Extremity:  Wrist/hand:  Pronation/supination equal bilaterally.  Dorsiflexion/volar flexion equal bilaterally.  No swelling.  Good finger motion with relatively no pain.  Able to adduct  thumb.  Pronounced thenar atrophy right hand mild left hand.  Mild Wartenberg right hand.                        Elbow: Pronation/supination equal bilaterally.  Extension/flexion equal bilaterally.  No swelling.  Nontender with elbow motion.  Nontender with elbow palpation.  Radiography:  No new x-rays done today.      Assessment:       Impression:      1. S/P endoscopic carpal tunnel release    2. S/P decompression of ulnar nerve at elbow                 Plan:       1.  Discussed physical examination with the patient. Nicole understands that she she is improving after the carpal tunnel release and ulnar nerve decompression of her right upper extremity.  2. Continue with current occupational therapy prescription and then when completed discharge to [facility]  3. Continue doing home exercises such as Thera ball exercises.  A new Thera ball was given to the patient.  4. Any pain can be treated with over-the-counter medications dosed per box instructions.  5. Follow up p.r.n..

## 2023-04-05 ENCOUNTER — CLINICAL SUPPORT (OUTPATIENT)
Dept: REHABILITATION | Facility: HOSPITAL | Age: 53
End: 2023-04-05
Attending: ORTHOPAEDIC SURGERY
Payer: MEDICAID

## 2023-04-05 DIAGNOSIS — Z98.890 S/P CUBITAL TUNNEL RELEASE: ICD-10-CM

## 2023-04-05 DIAGNOSIS — Z98.890 S/P CARPAL TUNNEL RELEASE: Primary | ICD-10-CM

## 2023-04-05 DIAGNOSIS — M79.18 MYOFASCIAL PAIN SYNDROME, CERVICAL: Primary | ICD-10-CM

## 2023-04-05 PROCEDURE — 97014 ELECTRIC STIMULATION THERAPY: CPT | Mod: PN,CQ

## 2023-04-05 PROCEDURE — 97110 THERAPEUTIC EXERCISES: CPT | Mod: PN

## 2023-04-05 PROCEDURE — 97140 MANUAL THERAPY 1/> REGIONS: CPT | Mod: PN

## 2023-04-05 PROCEDURE — 97140 MANUAL THERAPY 1/> REGIONS: CPT | Mod: PN,CQ

## 2023-04-05 PROCEDURE — 97018 PARAFFIN BATH THERAPY: CPT | Mod: 59,PN

## 2023-04-05 PROCEDURE — 97032 APPL MODALITY 1+ESTIM EA 15: CPT | Mod: 59,PN

## 2023-04-05 NOTE — PROGRESS NOTES
[facility]   Physical Therapy Treatment Note     Name: Nicole BONILLA [facility] Number: [id number]    Therapy Diagnosis:   Encounter Diagnosis   Name Primary?    Myofascial pain syndrome, cervical Yes     Physician: Brii Mcneil NP    Visit Date: 4/5/2023    Medical Diagnosis from Referral: Degenerative Disc Disease, cervical; DDD Lumbar; myofascial pain syndrome - cervical/lumbar   Evaluation Date: 3/30/2023  Authorization Period Expiration: 3/30/2024  Plan of Care Expiration: 6/30/2023  Visit # / Visits authorized: 2/12 (not including eval)  FOTO Visit #:  1/ 3    PTA Visit #: 1/5     Time In: 9:35 am   Time Out: 10:30 am   Total Billable Time: 50 minutes    SUBJECTIVE     Pt reports feeling extra tight in her mid back ( R>L).  She is somewhat compliant with home exercise program. - does them about 2-3x/week - all that she can handle at this time.   Response to previous treatment: decreased neck pain.   Functional change: none     Pain: 6/10  Location: bilateral neck and upper traps, rhomboids      OBJECTIVE     Objective Measures updated at progress report unless specified.     Treatment     Nicole received the treatments listed below:      therapeutic exercises to develop ROM and posture for 10 minutes including:  Wall Slides with end range stretch of shoulders x 2 mins   Neck retraction ex in sitting and supine     manual therapy techniques: x 30 min  Myofascial release/STM   Rhomboids   Mid /upper traps    Modalities:  Unattended IFC with ice pack to mid traps x 20 min    DNP  Soft tissue Mobilization and dry needling  were applied to the: cervical,upper traps,scapular for 45  minutes, including:  Supine: cervical sub-occipital inhibition to address forward head posture and reduce tension; MET for upper trap/levator stretch; STM rhomboids; Patient positioned in prone, bolster under ankles, towel roll for forehead; LF-ES at 6hz per protocol for cervical radiculopathy with  inclusion of rhomboids at medial borders of scapula and upper traps. 1.5 fb lateral to C2, 3, 4, 5,6, mm bellies of upper traps at trigger points; 3 points for rhomboids at medial border of scapula.  Needle twist for tissue grasp. Intensity of stimulation adjusted to patient tolerance;  Needles left in-situ x 20 mins. Removed needles, pressure applied to c-spine for several seconds to ensure no bleeding, inflammation.       neuromuscular re-education activities to improve:  for  minutes. The following activities were included:      therapeutic activities to improve functional performance for   minutes, including:      Patient Education and Home Exercises     Home Exercises Provided and Patient Education Provided     Education provided:   - Encouraged more exercise when able     Written Home Exercises Provided: yes. Exercises were reviewed and Nicole was able to demonstrate them prior to the end of the session.  Nicole demonstrated fair  understanding of the education provided. See EMR under Patient Instructions for exercises provided during therapy sessions    ASSESSMENT     Nicole declined therapeutic exercise this morning anticipating an increase in pain.  She is very tight on her right side mid traps.     Nicole Is progressing well towards her goals.   Pt prognosis is Fair.     Pt will continue to benefit from skilled outpatient physical therapy to address the deficits listed in the problem list box on initial evaluation, provide pt/family education and to maximize pt's level of independence in the home and community environment.     Pt's spiritual, cultural and educational needs considered and pt agreeable to plan of care and goals.     Anticipated barriers to physical therapy: none     Goals:  Short Term Goals: 3 weeks   Reduction in daily pain levels to no more than 6/10 for improved ability to perform daily tasks.   Compliant with HEP      Long Term Goals: 6 weeks   Reduction in daily pain levels to no more than  4/10 for improved ability to perform daily tasks.  Cervical AROM performed without increase in pain  Able to reach overhead shelf without increased pain.   FOTO limitation score improved to   Independent with HEP for continued improvement in function     PLAN     Continue with Skilled physical therapy per POC.     Medhat Teran, PTA

## 2023-04-12 ENCOUNTER — CLINICAL SUPPORT (OUTPATIENT)
Dept: REHABILITATION | Facility: HOSPITAL | Age: 53
End: 2023-04-12
Attending: ORTHOPAEDIC SURGERY
Payer: COMMERCIAL

## 2023-04-12 DIAGNOSIS — Z98.890 S/P CARPAL TUNNEL RELEASE: Primary | ICD-10-CM

## 2023-04-12 DIAGNOSIS — Z98.890 S/P CUBITAL TUNNEL RELEASE: ICD-10-CM

## 2023-04-12 PROCEDURE — 97110 THERAPEUTIC EXERCISES: CPT | Mod: PN

## 2023-04-12 PROCEDURE — 97140 MANUAL THERAPY 1/> REGIONS: CPT | Mod: PN

## 2023-04-12 PROCEDURE — 97032 APPL MODALITY 1+ESTIM EA 15: CPT | Mod: PN

## 2023-04-12 PROCEDURE — 97018 PARAFFIN BATH THERAPY: CPT | Mod: PN

## 2023-04-12 NOTE — PROGRESS NOTES
Occupational Therapy Daily Treatment Note   Name: Nicole Harris 1970  MRN: [id number]    Visit Date: 4/12/2023  Visit #: 14 / 20  Authorization period Expiration: 12/31/2023    Plan of Care Expiration: 4/15/2023  Precautions: standard    Time In: 10: 45  Time Out: 11: 40  Total 1:1 Treatment Time: 55 min    Treatment Diagnosis:   Encounter Diagnoses   Name Primary?    S/P carpal tunnel release Yes    S/P cubital tunnel release        Physician: Alcides Yancey DO    Subjective   Pt reports: she spoke with the doctor and he states to continue with therapy plan  She was compliant with home exercise program.     Pain Scale:  2/10 on VAS currently  Pain Location: right wrist    Objective     Nicole performed the following therapeutic exercises for 20 mins:  -Pt performed AROM exercises of the R hand, along with strengthening ex using resistance clip  -UBE x 10 mins     Nicole performed the following supervised modalities:  IFC was applied to the R elbow @ 10.5 volts in order to improve blood flow to the impacted area and alleviate pain levels for 15 mins.     Nicole received the following manual therapy techniques:  -Pt received PROM to the L hand/ wrist for 25 mins in order to improve flexibility and prevent joint stiffness.    Home Exercises and Education Provided     Education provided re:   - progress towards goals   - role of therapy in multi - disciplinary team, goals for therapy  Pt educated on condition, POC, and expectations in therapy.  No spiritual or educational barriers to learning provided    Home exercises:  Pt will be provided HEP during course of treatment with progressions as appropriate. Pt was advised to perform these exercises free of pain, and to stop performing them if pain occurs.   Nicole demonstrated good  understanding of the education provided.     Assessment   Nicole is progressing well towards her goals and no updates to goals at this time.Pt is showing  signs of improvement in her R hand during grasping tasks, pt reporting the E-stim is making a difference along with improvement of AROM of the R hand /digit adduction .    Pt prognosis is Good. Pt will continue to benefit from skilled outpatient occupational therapy to address the deficits listed in the problem list chart on initial evaluation, provide pt/family education and to maximize pt's level of independence in the home and community environment.     Medical necessity is demonstrated by the impairments and functional limitations listed on the Initial Evaluation.     Anticipated barriers to occupational therapy: none  Pt's spiritual, cultural and educational needs considered and pt agreeable to plan of care and goals.    Goals   Short term:  Pt will report R UE FOTO limitation of 50% by 4 weeks partial met  2. Pt will improve R hand functional  strength by 3 lbs in 3 weeks   3. Pt will improve R wrist extension by 5 degrees in 4 weeks     Long term:   Pt will report R UE FOTO limitation of 45% by dc  2. Pt will improve R hand functional  strength by 6 lbs by dc  3. Pt will improve R wrist extension by 10 degrees by dc met  4 Pt will report 100% compliance with HEP prior to dc met      Plan   OT EXTENDING PLAN OF CARE TODAY TO 4/15/2023 FOR AN ADDITIONAL 8 VISITS IN ORDER TO CONTINUE ESTABLISHED PLAN OF CARE AND WORK TOWARDS FUNCTIONAL GOALS.    Alice Eddy, OT  4/12/2023

## 2023-04-13 ENCOUNTER — CLINICAL SUPPORT (OUTPATIENT)
Dept: REHABILITATION | Facility: HOSPITAL | Age: 53
End: 2023-04-13
Attending: ORTHOPAEDIC SURGERY
Payer: COMMERCIAL

## 2023-04-13 DIAGNOSIS — Z98.890 S/P CARPAL TUNNEL RELEASE: Primary | ICD-10-CM

## 2023-04-13 DIAGNOSIS — Z74.09 IMPAIRED FUNCTIONAL MOBILITY AND ACTIVITY TOLERANCE: ICD-10-CM

## 2023-04-13 DIAGNOSIS — Z98.890 S/P CUBITAL TUNNEL RELEASE: ICD-10-CM

## 2023-04-13 DIAGNOSIS — M79.18 MYOFASCIAL PAIN SYNDROME, CERVICAL: Primary | ICD-10-CM

## 2023-04-13 PROCEDURE — 97032 APPL MODALITY 1+ESTIM EA 15: CPT | Mod: PN

## 2023-04-13 PROCEDURE — 97035 APP MDLTY 1+ULTRASOUND EA 15: CPT | Mod: PN

## 2023-04-13 PROCEDURE — 97140 MANUAL THERAPY 1/> REGIONS: CPT | Mod: PN

## 2023-04-13 PROCEDURE — 97110 THERAPEUTIC EXERCISES: CPT | Mod: PN

## 2023-04-13 PROCEDURE — 97018 PARAFFIN BATH THERAPY: CPT | Mod: PN

## 2023-04-13 PROCEDURE — 97014 ELECTRIC STIMULATION THERAPY: CPT | Mod: PN

## 2023-04-13 NOTE — PROGRESS NOTES
[facility]   Physical Therapy Treatment Note     Name: Nicole BONILLA [facility] Number: [id number]    Therapy Diagnosis:   Encounter Diagnoses   Name Primary?    Myofascial pain syndrome, cervical Yes    Impaired functional mobility and activity tolerance      Physician: Brii Mcneil NP    Visit Date: 4/13/2023    Medical Diagnosis from Referral: Degenerative Disc Disease, cervical; DDD Lumbar; myofascial pain syndrome - cervical/lumbar   Evaluation Date: 3/30/2023  Authorization Period Expiration: 3/30/2024  Plan of Care Expiration: 6/30/2023  Visit # / Visits authorized: 4 /12 (not including eval)  FOTO Visit #:  1/ 3    PTA Visit #: 0 /5     Time In: 10:00 am   Time Out: 11:00 am    Total Billable Time: 53 minutes    SUBJECTIVE     Pt reports: neck has just  been sore, all of her joints felt sore a couple of days ago - wondered if it was the weather.   She is somewhat compliant with home exercise program. - does them about 2-3x/week - all that she can handle at this time.   Response to previous treatment: gets a couple of days relief from pain with the dry needling   Functional change: none     Pain: 6/10  Location: bilateral neck and upper traps, rhomboids      OBJECTIVE     Objective Measures updated at progress report unless specified.     Treatment     Nicole received the treatments listed below:      therapeutic exercises to develop ROM and posture for 15 minutes including:  Wall Slides with end range stretch of shoulders x 2 mins   Neck retraction ex in sitting and supine   Upper trap/levator stretching on each side x 30 sec x 2   Scapular retraction - hold tband between hands - squeeze scapular together x 15     manual therapy techniques:     Soft tissue Mobilization and dry needling  were applied to the: cervical,upper traps,scapular for 40 minutes, including:  Supine: cervical sub-occipital inhibition to address forward head posture and reduce tension; MET for upper  trap/levator stretch; STM rhomboids; Patient positioned in prone, bolster under ankles, towel roll for forehead; LF-ES at 6hz per protocol for cervical radiculopathy with inclusion of rhomboids at medial borders of scapula and upper traps. 1.5 fb lateral to C2, 3, 4, 5,6, mm bellies of upper traps at trigger points; 3 points for rhomboids at medial border of scapula.  Needle twist for tissue grasp. Intensity of stimulation adjusted to patient tolerance;  Needles left in-situ x 20 mins. Removed needles, pressure applied to c-spine for several seconds to ensure no bleeding, inflammation.       neuromuscular re-education activities to improve:  for  minutes. The following activities were included:      therapeutic activities to improve functional performance for   minutes, including:      Patient Education and Home Exercises     Home Exercises Provided and Patient Education Provided     Education provided:   - Encouraged more exercise when able     Written Home Exercises Provided: yes. Exercises were reviewed and Nicole was able to demonstrate them prior to the end of the session.  Nicole demonstrated fair  understanding of the education provided. See EMR under Patient Instructions for exercises provided during therapy sessions    ASSESSMENT     Nicole continues to note neck/shoulder pain but stated that she gets at least a couple of days relief from the manual treatment;  Inconsistent compliance with exercises.     Nicole Is progressing well towards her goals.   Pt prognosis is Fair.     Pt will continue to benefit from skilled outpatient physical therapy to address the deficits listed in the problem list box on initial evaluation, provide pt/family education and to maximize pt's level of independence in the home and community environment.     Pt's spiritual, cultural and educational needs considered and pt agreeable to plan of care and goals.     Anticipated barriers to physical therapy: none     Goals:  Short Term  Goals: 3 weeks   Reduction in daily pain levels to no more than 6/10 for improved ability to perform daily tasks.   Compliant with HEP      Long Term Goals: 6 weeks   Reduction in daily pain levels to no more than 4/10 for improved ability to perform daily tasks.  Cervical AROM performed without increase in pain  Able to reach overhead shelf without increased pain.   FOTO limitation score improved to   Independent with HEP for continued improvement in function     PLAN     Continue with Skilled physical therapy per POC.     Quita Holliday, PT

## 2023-04-13 NOTE — PROGRESS NOTES
Occupational Therapy Daily Treatment Note   Name: Nicole Harris 1970  MRN: [id number]    Visit Date: 4/13/2023  Visit #: 16 / 20  Authorization period Expiration: 12/31/2023    Plan of Care Expiration: 4/15/2023  Precautions: standard    Time In: 9: 00  Time Out: 10: 00  Total 1:1 Treatment Time: 60 min    Treatment Diagnosis:   Encounter Diagnoses   Name Primary?    S/P carpal tunnel release Yes    S/P cubital tunnel release        Physician: Alcides Yancey DO    Subjective   Pt reports: she is hurting in her neck today  She was compliant with home exercise program.     Pain Scale:  2/10 on VAS currently  Pain Location: right wrist    Objective     Nicole performed the following therapeutic exercises for 10 mins:  US applied to the R wrist     Nicole performed the following supervised modalities:  IFC was applied to the R elbow @ 13.5 volts in order to improve blood flow to the impacted area and alleviate pain levels for 15 mins.     Nicole received the following manual therapy techniques:  -Pt received PROM to the L hand/ wrist for 35 mins in order to improve flexibility and prevent joint stiffness.    Home Exercises and Education Provided     Education provided re:   - progress towards goals   - role of therapy in multi - disciplinary team, goals for therapy  Pt educated on condition, POC, and expectations in therapy.  No spiritual or educational barriers to learning provided    Home exercises:  Pt will be provided HEP during course of treatment with progressions as appropriate. Pt was advised to perform these exercises free of pain, and to stop performing them if pain occurs.   Nicole demonstrated good  understanding of the education provided.     Assessment   Nicole is progressing well towards her goals and no updates to goals at this time.Pt is showing signs of improvement in her R hand during grasping tasks, pt reporting the E-stim is making a difference along with  improvement of AROM of the R hand /digit adduction .    Pt prognosis is Good. Pt will continue to benefit from skilled outpatient occupational therapy to address the deficits listed in the problem list chart on initial evaluation, provide pt/family education and to maximize pt's level of independence in the home and community environment.     Medical necessity is demonstrated by the impairments and functional limitations listed on the Initial Evaluation.     Anticipated barriers to occupational therapy: none  Pt's spiritual, cultural and educational needs considered and pt agreeable to plan of care and goals.    Goals   Short term:  Pt will report R UE FOTO limitation of 50% by 4 weeks partial met  2. Pt will improve R hand functional  strength by 3 lbs in 3 weeks   3. Pt will improve R wrist extension by 5 degrees in 4 weeks     Long term:   Pt will report R UE FOTO limitation of 45% by dc  2. Pt will improve R hand functional  strength by 6 lbs by dc  3. Pt will improve R wrist extension by 10 degrees by dc met  4 Pt will report 100% compliance with HEP prior to dc met      Plan   OT EXTENDING PLAN OF CARE TODAY TO 4/15/2023 FOR AN ADDITIONAL 8 VISITS IN ORDER TO CONTINUE ESTABLISHED PLAN OF CARE AND WORK TOWARDS FUNCTIONAL GOALS.    Alice Eddy, OT  4/13/2023

## 2023-04-21 ENCOUNTER — CLINICAL SUPPORT (OUTPATIENT)
Dept: REHABILITATION | Facility: HOSPITAL | Age: 53
End: 2023-04-21
Attending: ORTHOPAEDIC SURGERY
Payer: COMMERCIAL

## 2023-04-21 DIAGNOSIS — Z98.890 S/P CARPAL TUNNEL RELEASE: Primary | ICD-10-CM

## 2023-04-21 DIAGNOSIS — M79.18 MYOFASCIAL PAIN SYNDROME, CERVICAL: Primary | ICD-10-CM

## 2023-04-21 DIAGNOSIS — Z98.890 S/P CUBITAL TUNNEL RELEASE: ICD-10-CM

## 2023-04-21 PROCEDURE — 97014 ELECTRIC STIMULATION THERAPY: CPT | Mod: PN

## 2023-04-21 PROCEDURE — 97140 MANUAL THERAPY 1/> REGIONS: CPT | Mod: PN

## 2023-04-21 PROCEDURE — 97018 PARAFFIN BATH THERAPY: CPT | Mod: PN

## 2023-04-21 PROCEDURE — 97032 APPL MODALITY 1+ESTIM EA 15: CPT | Mod: PN

## 2023-04-21 NOTE — PROGRESS NOTES
[facility]   Physical Therapy Treatment Note     Name: Nicole BONILLA [facility] Number: [id number]    Therapy Diagnosis:   Encounter Diagnosis   Name Primary?    Myofascial pain syndrome, cervical Yes     Physician: Brii Mcneil NP    Visit Date: 4/21/2023    Medical Diagnosis from Referral: Degenerative Disc Disease, cervical; DDD Lumbar; myofascial pain syndrome - cervical/lumbar   Evaluation Date: 3/30/2023  Authorization Period Expiration: 3/30/2024  Plan of Care Expiration: 6/30/2023  Visit # / Visits authorized: 5 /12 (not including eval)  FOTO Visit #:  1/ 3    PTA Visit #: 0 /5     Time In: 12:30 pm   Time Out: 1:30 pm    Total Billable Time: 53 minutes    SUBJECTIVE     Pt reports: Had to get antibiotics for sinus infection, I just ache all over.   She is somewhat compliant with home exercise program. - does them about 2-3x/week - all that she can handle at this time.   Response to previous treatment: gets a couple of days relief from pain with the dry needling   Functional change: none     Pain: 6/10  Location: bilateral neck and upper traps, rhomboids      OBJECTIVE     Objective Measures updated at progress report unless specified.     Treatment     Nicole received the treatments listed below:      therapeutic exercises to develop ROM and posture for 0 minutes including:  Wall Slides with end range stretch of shoulders x 2 mins   Neck retraction ex in sitting and supine   Upper trap/levator stretching on each side x 30 sec x 2   Scapular retraction - hold tband between hands - squeeze scapular together x 15     manual therapy techniques:     Soft tissue Mobilization and dry needling  were applied to the: cervical,upper traps,scapular for 40 minutes, including:  Supine: cervical sub-occipital inhibition to address forward head posture and reduce tension; MET for upper trap/levator stretch; STM rhomboids; Patient positioned in prone, bolster under ankles, towel roll for  forehead; LF-ES at 6hz per protocol for cervical radiculopathy with inclusion of rhomboids at medial borders of scapula and upper traps. 1.5 fb lateral to C2, 3, 4, 5,6, mm bellies of upper traps at trigger points; 3 points for rhomboids at medial border of scapula.  Needle twist for tissue grasp. Intensity of stimulation adjusted to patient tolerance;  Needles left in-situ x 20 mins. Removed needles, pressure applied to c-spine for several seconds to ensure no bleeding, inflammation.       neuromuscular re-education activities to improve:  for  minutes. The following activities were included:      therapeutic activities to improve functional performance for   minutes, including:      Patient Education and Home Exercises     Home Exercises Provided and Patient Education Provided     Education provided:   - Encouraged more exercise when able     Written Home Exercises Provided: yes. Exercises were reviewed and Nicole was able to demonstrate them prior to the end of the session.  iNcole demonstrated fair  understanding of the education provided. See EMR under Patient Instructions for exercises provided during therapy sessions    ASSESSMENT     Nicole continues to note neck/shoulder pain but stated that she gets at least a couple of days relief from the manual treatment;  Inconsistent compliance with exercises.     Nicole Is progressing well towards her goals.   Pt prognosis is Fair.     Pt will continue to benefit from skilled outpatient physical therapy to address the deficits listed in the problem list box on initial evaluation, provide pt/family education and to maximize pt's level of independence in the home and community environment.     Pt's spiritual, cultural and educational needs considered and pt agreeable to plan of care and goals.     Anticipated barriers to physical therapy: none     Goals:  Short Term Goals: 3 weeks   Reduction in daily pain levels to no more than 6/10 for improved ability to perform daily  tasks.   Compliant with HEP      Long Term Goals: 6 weeks   Reduction in daily pain levels to no more than 4/10 for improved ability to perform daily tasks.  Cervical AROM performed without increase in pain  Able to reach overhead shelf without increased pain.   FOTO limitation score improved to   Independent with HEP for continued improvement in function     PLAN     Continue with Skilled physical therapy per POC.     Quita Holliday, PT

## 2023-04-21 NOTE — PROGRESS NOTES
Occupational Therapy Daily Treatment Note   Name: Nicole Harris 1970  MRN: [id number]    Visit Date: 4/21/2023  Visit #: 17 / 20  Authorization period Expiration: 12/31/2023    Plan of Care Expiration: 4/15/2023  Precautions: standard    Time In: 1: 35  Time Out: 2: 30  Total 1:1 Treatment Time: 55 min    Treatment Diagnosis:   No diagnosis found.      Physician: Alcides Yancey DO    Subjective   Pt reports: she is tired today; sick with sinuses  She was compliant with home exercise program.     Pain Scale:  2/10 on VAS currently  Pain Location: right wrist    Objective   Paraffin bath applied to the R hand for 10 mins prior to PROM.    Nicole performed the following supervised modalities:  IFC was applied to the R elbow @ 13.5 volts in order to improve blood flow to the impacted area and alleviate pain levels for 15 mins.     Nicole received the following manual therapy techniques:  -Pt received PROM to the L hand/ wrist for 35 mins in order to improve flexibility and prevent joint stiffness.    Home Exercises and Education Provided     Education provided re:   - progress towards goals   - role of therapy in multi - disciplinary team, goals for therapy  Pt educated on condition, POC, and expectations in therapy.  No spiritual or educational barriers to learning provided    Home exercises:  Pt will be provided HEP during course of treatment with progressions as appropriate. Pt was advised to perform these exercises free of pain, and to stop performing them if pain occurs.   Nicole demonstrated good  understanding of the education provided.     Assessment   Nicole is progressing well towards her goals and no updates to goals at this time.Pt is showing signs of improvement in her R hand during grasping tasks, pt reporting the E-stim is making a difference along with improvement of AROM of the R hand /digit adduction .    Pt prognosis is Good. Pt will continue to benefit from skilled  outpatient occupational therapy to address the deficits listed in the problem list chart on initial evaluation, provide pt/family education and to maximize pt's level of independence in the home and community environment.     Medical necessity is demonstrated by the impairments and functional limitations listed on the Initial Evaluation.     Anticipated barriers to occupational therapy: none  Pt's spiritual, cultural and educational needs considered and pt agreeable to plan of care and goals.    Goals   Short term:  Pt will report R UE FOTO limitation of 50% by 4 weeks partial met  2. Pt will improve R hand functional  strength by 3 lbs in 3 weeks   3. Pt will improve R wrist extension by 5 degrees in 4 weeks     Long term:   Pt will report R UE FOTO limitation of 45% by dc  2. Pt will improve R hand functional  strength by 6 lbs by dc  3. Pt will improve R wrist extension by 10 degrees by dc met  4 Pt will report 100% compliance with HEP prior to dc met      Plan   OT EXTENDING PLAN OF CARE TODAY TO 4/15/2023 FOR AN ADDITIONAL 8 VISITS IN ORDER TO CONTINUE ESTABLISHED PLAN OF CARE AND WORK TOWARDS FUNCTIONAL GOALS.    Alice Eddy, OT  4/21/2023

## 2023-04-24 ENCOUNTER — TELEPHONE (OUTPATIENT)
Dept: ORTHOPEDICS | Facility: CLINIC | Age: 53
End: 2023-04-24
Payer: MEDICAID

## 2023-04-24 NOTE — TELEPHONE ENCOUNTER
Returned call. I stated I would speak with Dr. Yancey tomorrow, 4/25/23, in clinic to make sure a new therapy order is acceptable. The patient stated understanding.    ----- Message from Rachid Wright sent at 4/24/2023  3:02 PM CDT -----  Regarding: Return Call  Contact: Patient  Type:  Patient Returning Call    Who Called:Patient  Who Left Message for Patient:office staff please call patient  Does the patient know what this is regarding?:Please call  Would the patient rather a call back or a response via [facility]? call  Best Call Back Number:[phone]  Additional Information: Please call patient.

## 2023-04-24 NOTE — TELEPHONE ENCOUNTER
I attempted to contact the patient. I left a voice mail for her to call back to address her concerns about physical therapy.    ----- Message from Patricia Og sent at 4/21/2023  2:31 PM CDT -----  Pt came in office wants orders for physical therapy.

## 2023-04-25 ENCOUNTER — CLINICAL SUPPORT (OUTPATIENT)
Dept: REHABILITATION | Facility: HOSPITAL | Age: 53
End: 2023-04-25
Attending: ORTHOPAEDIC SURGERY
Payer: COMMERCIAL

## 2023-04-25 DIAGNOSIS — M79.18 MYOFASCIAL PAIN SYNDROME, CERVICAL: Primary | ICD-10-CM

## 2023-04-25 DIAGNOSIS — Z98.890 S/P DECOMPRESSION OF ULNAR NERVE AT ELBOW: ICD-10-CM

## 2023-04-25 DIAGNOSIS — Z98.890 S/P ENDOSCOPIC CARPAL TUNNEL RELEASE: Primary | ICD-10-CM

## 2023-04-25 DIAGNOSIS — Z98.890 S/P CUBITAL TUNNEL RELEASE: ICD-10-CM

## 2023-04-25 DIAGNOSIS — Z98.890 S/P CARPAL TUNNEL RELEASE: Primary | ICD-10-CM

## 2023-04-25 PROCEDURE — 97014 ELECTRIC STIMULATION THERAPY: CPT | Mod: PN,CQ

## 2023-04-25 PROCEDURE — 97032 APPL MODALITY 1+ESTIM EA 15: CPT | Mod: PN

## 2023-04-25 PROCEDURE — 97140 MANUAL THERAPY 1/> REGIONS: CPT | Mod: PN

## 2023-04-25 PROCEDURE — 97018 PARAFFIN BATH THERAPY: CPT | Mod: PN

## 2023-04-25 PROCEDURE — 97140 MANUAL THERAPY 1/> REGIONS: CPT | Mod: PN,CQ

## 2023-04-25 NOTE — PROGRESS NOTES
Occupational Therapy Daily Treatment Note   Name: Nicole Harris 1970  MRN: [id number]    Visit Date: 4/25/2023  Visit #: 18 / 20  Authorization period Expiration: 12/31/2023    Plan of Care Expiration: 4/15/2023  Precautions: standard    Time In: 1: 30  Time Out: 2: 20  Total 1:1 Treatment Time: 50 min    Treatment Diagnosis:   Encounter Diagnoses   Name Primary?    S/P carpal tunnel release Yes    S/P cubital tunnel release          Physician: Alcides Yancey DO    Subjective   Pt reports:   She was compliant with home exercise program.     Pain Scale:  2/10 on VAS currently  Pain Location: right wrist    Objective   Paraffin bath applied to the R hand for 10 mins prior to PROM.    Nicole performed the following supervised modalities:  IFC was applied to the R elbow @ 13.5 volts in order to improve blood flow to the impacted area and alleviate pain levels for 15 mins.     Nicole received the following manual therapy techniques:  -Pt received PROM to the L hand/ wrist for 35 mins in order to improve flexibility and prevent joint stiffness.    Home Exercises and Education Provided     Education provided re:   - progress towards goals   - role of therapy in multi - disciplinary team, goals for therapy  Pt educated on condition, POC, and expectations in therapy.  No spiritual or educational barriers to learning provided    Home exercises:  Pt will be provided HEP during course of treatment with progressions as appropriate. Pt was advised to perform these exercises free of pain, and to stop performing them if pain occurs.   Nicole demonstrated good  understanding of the education provided.     Assessment   Nicole is progressing well towards her goals and no updates to goals at this time.Pt is showing signs of improvement in her R hand during grasping tasks, pt reporting the E-stim is making a difference along with improvement of AROM of the R hand /digit adduction .    Pt prognosis is  Good. Pt will continue to benefit from skilled outpatient occupational therapy to address the deficits listed in the problem list chart on initial evaluation, provide pt/family education and to maximize pt's level of independence in the home and community environment.     Medical necessity is demonstrated by the impairments and functional limitations listed on the Initial Evaluation.     Anticipated barriers to occupational therapy: none  Pt's spiritual, cultural and educational needs considered and pt agreeable to plan of care and goals.    Goals   Short term:  Pt will report R UE FOTO limitation of 50% by 4 weeks partial met  2. Pt will improve R hand functional  strength by 3 lbs in 3 weeks   3. Pt will improve R wrist extension by 5 degrees in 4 weeks     Long term:   Pt will report R UE FOTO limitation of 45% by dc  2. Pt will improve R hand functional  strength by 6 lbs by dc  3. Pt will improve R wrist extension by 10 degrees by dc met  4 Pt will report 100% compliance with HEP prior to dc met      Plan   OT EXTENDING PLAN OF CARE TODAY TO 4/15/2023 FOR AN ADDITIONAL 8 VISITS IN ORDER TO CONTINUE ESTABLISHED PLAN OF CARE AND WORK TOWARDS FUNCTIONAL GOALS.    Alice Eddy, OT  4/25/2023

## 2023-04-25 NOTE — PROGRESS NOTES
[facility]   Physical Therapy Treatment Note     Name: Nicole BONILLA [facility] Number: [id number]    Therapy Diagnosis:   Encounter Diagnosis   Name Primary?    Myofascial pain syndrome, cervical Yes     Physician: Brii Mcneil NP    Visit Date: 4/25/2023    Medical Diagnosis from Referral: Degenerative Disc Disease, cervical; DDD Lumbar; myofascial pain syndrome - cervical/lumbar   Evaluation Date: 3/30/2023  Authorization Period Expiration: 3/30/2024  Plan of Care Expiration: 6/30/2023  Visit # / Visits authorized: 6 /12 (not including eval)  FOTO Visit #:  1/ 3    PTA Visit #: 1/5     Time In: 12:30 PM  Time Out: 1:30 PM   Total Billable Time: 50 minutes    SUBJECTIVE     Pt reports: No new c/o's.   She is somewhat compliant with home exercise program. - does them about 2-3x/week - all that she can handle at this time.   Response to previous treatment: gets a couple of days relief from pain with the dry needling   Functional change: none     Pain: 7/10  Location: bilateral neck and upper traps, rhomboids      OBJECTIVE     Objective Measures updated at progress report unless specified.     Treatment     Nicole received the treatments listed below:      therapeutic exercises to develop ROM and posture for 0 minutes including:  Wall Slides with end range stretch of shoulders x 2 mins   Neck retraction ex in sitting and supine   Upper trap/levator stretching on each side x 30 sec x 2   Scapular retraction - hold tband between hands - squeeze scapular together x 15     manual therapy techniques:     Soft tissue Mobilization was applied to the: cervical,upper traps,scapular for 30 minutes.    neuromuscular re-education activities to improve:  for  minutes. The following activities were included:    therapeutic activities to improve functional performance for   minutes, including:    IFC w/ CP x 20 minutes to right cervical/scapular/upper trap region in prone position.       Patient  Education and Home Exercises     Home Exercises Provided and Patient Education Provided     Education provided:   - Encouraged more exercise when able     Written Home Exercises Provided: yes. Exercises were reviewed and Nicole was able to demonstrate them prior to the end of the session.  Nicole demonstrated fair  understanding of the education provided. See EMR under Patient Instructions for exercises provided during therapy sessions    ASSESSMENT     Nicole continues to note neck/shoulder pain but stated that she gets at least a couple of days relief from the manual treatment;  Inconsistent compliance with exercises.     Nicole Is progressing well towards her goals.   Pt prognosis is Fair.     Pt will continue to benefit from skilled outpatient physical therapy to address the deficits listed in the problem list box on initial evaluation, provide pt/family education and to maximize pt's level of independence in the home and community environment.     Pt's spiritual, cultural and educational needs considered and pt agreeable to plan of care and goals.     Anticipated barriers to physical therapy: none     Goals:  Short Term Goals: 3 weeks   Reduction in daily pain levels to no more than 6/10 for improved ability to perform daily tasks.   Compliant with HEP      Long Term Goals: 6 weeks   Reduction in daily pain levels to no more than 4/10 for improved ability to perform daily tasks.  Cervical AROM performed without increase in pain  Able to reach overhead shelf without increased pain.   FOTO limitation score improved to   Independent with HEP for continued improvement in function     PLAN     Continue with Skilled physical therapy per POC.     Jonathan Favre, PTA

## 2023-04-26 ENCOUNTER — CLINICAL SUPPORT (OUTPATIENT)
Dept: REHABILITATION | Facility: HOSPITAL | Age: 53
End: 2023-04-26
Attending: ORTHOPAEDIC SURGERY
Payer: COMMERCIAL

## 2023-04-26 DIAGNOSIS — Z98.890 S/P CARPAL TUNNEL RELEASE: Primary | ICD-10-CM

## 2023-04-26 DIAGNOSIS — Z98.890 S/P CUBITAL TUNNEL RELEASE: ICD-10-CM

## 2023-04-26 PROCEDURE — 97110 THERAPEUTIC EXERCISES: CPT | Mod: PN

## 2023-04-26 PROCEDURE — 97018 PARAFFIN BATH THERAPY: CPT | Mod: PN

## 2023-04-26 PROCEDURE — 97032 APPL MODALITY 1+ESTIM EA 15: CPT | Mod: PN

## 2023-04-26 NOTE — PROGRESS NOTES
Occupational Therapy Daily Treatment Note   Name: Nicole Harris 1970  MRN: [id number]    Visit Date: 4/26/2023  Visit #: 18 / 20  Authorization period Expiration: 12/31/2023    Plan of Care Expiration: 4/15/2023  Precautions: standard    Time In: 1: 30  Time Out: 2: 20  Total 1:1 Treatment Time: 50 min    Treatment Diagnosis:   Encounter Diagnoses   Name Primary?    S/P carpal tunnel release Yes    S/P cubital tunnel release          Physician: Alcides Yancey DO    Subjective   Pt reports:   She was compliant with home exercise program.     Pain Scale:  2/10 on VAS currently  Pain Location: right wrist    Objective   Paraffin bath applied to the R hand for 10 mins prior to PROM.    Nicole performed the following supervised modalities:  IFC was applied to the R elbow @ 15.5 volts in order to improve blood flow to the impacted area and alleviate pain levels for 15 mins.     Nicole received the following therapeutic exercises:  -l hand and l wrist strengthening exercises using green flex bar, 3 # pulley wt, and theraputty    Home Exercises and Education Provided     Education provided re:   - progress towards goals   - role of therapy in multi - disciplinary team, goals for therapy  Pt educated on condition, POC, and expectations in therapy.  No spiritual or educational barriers to learning provided    Home exercises:  Pt will be provided HEP during course of treatment with progressions as appropriate. Pt was advised to perform these exercises free of pain, and to stop performing them if pain occurs.   Nicole demonstrated good  understanding of the education provided.     Assessment   Nicole is progressing well towards her goals and no updates to goals at this time.Pt is showing signs of improvement in her R hand during grasping tasks, pt reporting the E-stim is making a difference along with improvement of AROM of the R hand /digit adduction .    Pt prognosis is Good. Pt will  continue to benefit from skilled outpatient occupational therapy to address the deficits listed in the problem list chart on initial evaluation, provide pt/family education and to maximize pt's level of independence in the home and community environment.     Medical necessity is demonstrated by the impairments and functional limitations listed on the Initial Evaluation.     Anticipated barriers to occupational therapy: none  Pt's spiritual, cultural and educational needs considered and pt agreeable to plan of care and goals.    Goals   Short term:  Pt will report R UE FOTO limitation of 50% by 4 weeks partial met  2. Pt will improve R hand functional  strength by 3 lbs in 3 weeks   3. Pt will improve R wrist extension by 5 degrees in 4 weeks     Long term:   Pt will report R UE FOTO limitation of 45% by dc  2. Pt will improve R hand functional  strength by 6 lbs by dc  3. Pt will improve R wrist extension by 10 degrees by dc met  4 Pt will report 100% compliance with HEP prior to dc met      Plan   OT EXTENDING PLAN OF CARE TODAY TO 4/15/2023 FOR AN ADDITIONAL 8 VISITS IN ORDER TO CONTINUE ESTABLISHED PLAN OF CARE AND WORK TOWARDS FUNCTIONAL GOALS.    Alice Eddy, OT  4/26/2023

## 2023-04-28 ENCOUNTER — CLINICAL SUPPORT (OUTPATIENT)
Dept: REHABILITATION | Facility: HOSPITAL | Age: 53
End: 2023-04-28
Attending: ORTHOPAEDIC SURGERY
Payer: MEDICAID

## 2023-04-28 DIAGNOSIS — M79.18 MYOFASCIAL PAIN SYNDROME, CERVICAL: Primary | ICD-10-CM

## 2023-04-28 PROCEDURE — 97110 THERAPEUTIC EXERCISES: CPT | Mod: PN,CQ

## 2023-04-28 PROCEDURE — 97014 ELECTRIC STIMULATION THERAPY: CPT | Mod: PN,CQ

## 2023-04-28 NOTE — PROGRESS NOTES
[facility]   Physical Therapy Treatment Note     Name: Nicole BONILLA [facility] Number: [id number]    Therapy Diagnosis:   Encounter Diagnosis   Name Primary?    Myofascial pain syndrome, cervical Yes     Physician: Brii Mcneil NP    Visit Date: 4/28/2023    Medical Diagnosis from Referral: Degenerative Disc Disease, cervical; DDD Lumbar; myofascial pain syndrome - cervical/lumbar   Evaluation Date: 3/30/2023  Authorization Period Expiration: 3/30/2024  Plan of Care Expiration: 6/30/2023  Visit # / Visits authorized: 6 /12 (not including eval)  FOTO Visit #:  1/ 3    PTA Visit #: 1/5     Time In: 1:45 PM ( late)  Time Out: 2:30 PM   Total Billable Time: 35 minutes    SUBJECTIVE     Pt reports: No new c/o's.   Patient just finished getting a hour and half massage prior to treatment.  She wants to just do MFR and IFC today.  She is somewhat compliant with home exercise program. - does them about 2-3x/week - all that she can handle at this time.   Response to previous treatment: gets a couple of days relief from pain with the dry needling   Functional change: none     Pain: 7/10  Location: bilateral neck and upper traps, rhomboids      OBJECTIVE     Objective Measures updated at progress report unless specified.     Treatment     Nicole received the treatments listed below:      therapeutic exercises to develop ROM and posture for 0 minutes including:  Wall Slides with end range stretch of shoulders x 2 mins   Neck retraction ex in sitting and supine   Upper trap/levator stretching on each side x 30 sec x 2   Scapular retraction - hold tband between hands - squeeze scapular together x 15     manual therapy techniques:     Soft tissue Mobilization was applied to the: cervical,upper traps,scapular for 15 minutes.  Myofascial Release   Upper traps and scalenes    neuromuscular re-education activities to improve:  for  minutes. The following activities were included:    therapeutic  activities to improve functional performance for   minutes, including:    IFC  x 20 minutes to right cervical/scapular/upper trap region in prone position.       Patient Education and Home Exercises     Home Exercises Provided and Patient Education Provided     Education provided:   - Encouraged more exercise when able     Written Home Exercises Provided: yes. Exercises were reviewed and Nicole was able to demonstrate them prior to the end of the session.  Nicole demonstrated fair  understanding of the education provided. See EMR under Patient Instructions for exercises provided during therapy sessions    ASSESSMENT     Nicole continues to note neck/shoulder pain but stated that she gets at least a couple of days relief from the manual treatment;  Inconsistent compliance with exercises.     Nicole Is progressing well towards her goals.   Pt prognosis is Fair.     Pt will continue to benefit from skilled outpatient physical therapy to address the deficits listed in the problem list box on initial evaluation, provide pt/family education and to maximize pt's level of independence in the home and community environment.     Pt's spiritual, cultural and educational needs considered and pt agreeable to plan of care and goals.     Anticipated barriers to physical therapy: none     Goals:  Short Term Goals: 3 weeks   Reduction in daily pain levels to no more than 6/10 for improved ability to perform daily tasks.   Compliant with HEP      Long Term Goals: 6 weeks   Reduction in daily pain levels to no more than 4/10 for improved ability to perform daily tasks.  Cervical AROM performed without increase in pain  Able to reach overhead shelf without increased pain.   FOTO limitation score improved to   Independent with HEP for continued improvement in function     PLAN     Continue with Skilled physical therapy per POC.     Medhat Teran, PTA

## 2023-05-22 ENCOUNTER — CLINICAL SUPPORT (OUTPATIENT)
Dept: REHABILITATION | Facility: HOSPITAL | Age: 53
End: 2023-05-22
Attending: ORTHOPAEDIC SURGERY
Payer: COMMERCIAL

## 2023-05-22 ENCOUNTER — CLINICAL SUPPORT (OUTPATIENT)
Dept: REHABILITATION | Facility: HOSPITAL | Age: 53
End: 2023-05-22
Attending: ORTHOPAEDIC SURGERY
Payer: MEDICAID

## 2023-05-22 DIAGNOSIS — Z98.890 S/P CUBITAL TUNNEL RELEASE: ICD-10-CM

## 2023-05-22 DIAGNOSIS — Z98.890 S/P CARPAL TUNNEL RELEASE: Primary | ICD-10-CM

## 2023-05-22 DIAGNOSIS — M79.18 MYOFASCIAL PAIN SYNDROME, CERVICAL: Primary | ICD-10-CM

## 2023-05-22 PROCEDURE — 97018 PARAFFIN BATH THERAPY: CPT | Mod: PN,59

## 2023-05-22 PROCEDURE — 97140 MANUAL THERAPY 1/> REGIONS: CPT | Mod: PN

## 2023-05-22 PROCEDURE — 97014 ELECTRIC STIMULATION THERAPY: CPT | Mod: PN

## 2023-05-22 PROCEDURE — 97032 APPL MODALITY 1+ESTIM EA 15: CPT | Mod: PN

## 2023-05-22 PROCEDURE — 97110 THERAPEUTIC EXERCISES: CPT | Mod: PN

## 2023-05-22 NOTE — PROGRESS NOTES
Occupational Therapy Daily Treatment Note   Name: Nicole Harris 1970  MRN: [id number]    Visit Date: 5/22/2023  Visit #: 19 / 20  Authorization period Expiration: 12/31/2023    Plan of Care Expiration: 6/15/2023  Precautions: standard    Time In: 1: 30  Time Out: 2: 20  Total 1:1 Treatment Time: 50 min    Treatment Diagnosis:   No diagnosis found.        Physician: Alcides Yancey DO    Subjective   Pt reports: that she is hurting in the L hand today   She was compliant with home exercise program.     Pain Scale:  2/10 on VAS currently  Pain Location: right wrist    Objective   Paraffin bath applied to the R hand for 10 mins prior to PROM.    Nicole performed the following supervised modalities:  IFC was applied to the R elbow @ 15.5 volts in order to improve blood flow to the impacted area and alleviate pain levels for 20 mins.     Nicole received the following manual therapy techniques:  PROM and soft tissue mobilization applied to the R hand and wrist for 25 mins  Home Exercises and Education Provided     Education provided re:   - progress towards goals   - role of therapy in multi - disciplinary team, goals for therapy  Pt educated on condition, POC, and expectations in therapy.  No spiritual or educational barriers to learning provided    Home exercises:  Pt will be provided HEP during course of treatment with progressions as appropriate. Pt was advised to perform these exercises free of pain, and to stop performing them if pain occurs.   Nicole demonstrated good  understanding of the education provided.     Assessment   Nicole is progressing well towards her goals and no updates to goals at this time.Pt is showing signs of improvement in her R hand during grasping tasks, pt reporting the E-stim is making a difference along with improvement of AROM of the R hand /digit adduction .    Pt prognosis is Good. Pt will continue to benefit from skilled outpatient occupational therapy  to address the deficits listed in the problem list chart on initial evaluation, provide pt/family education and to maximize pt's level of independence in the home and community environment.     Medical necessity is demonstrated by the impairments and functional limitations listed on the Initial Evaluation.     Anticipated barriers to occupational therapy: none  Pt's spiritual, cultural and educational needs considered and pt agreeable to plan of care and goals.    Goals   Short term:  Pt will report R UE FOTO limitation of 50% by 4 weeks partial met  2. Pt will improve R hand functional  strength by 3 lbs in 3 weeks   3. Pt will improve R wrist extension by 5 degrees in 4 weeks     Long term:   Pt will report R UE FOTO limitation of 45% by dc  2. Pt will improve R hand functional  strength by 6 lbs by dc  3. Pt will improve R wrist extension by 10 degrees by dc met  4 Pt will report 100% compliance with HEP prior to dc met      Plan   OT EXTENDING PLAN OF CARE TODAY TO 6/15/2023 FOR AN ADDITIONAL 6 VISITS IN ORDER TO CONTINUE ESTABLISHED PLAN OF CARE AND WORK TOWARDS FUNCTIONAL GOALS.    Alice Eddy, OT  5/22/2023

## 2023-05-22 NOTE — PROGRESS NOTES
[facility]   Physical Therapy Treatment Note /PROGRESS NOTE    Name: Nicole BONILLA [facility]  Clinic Number: [id number]    Therapy Diagnosis:   Encounter Diagnosis   Name Primary?    Myofascial pain syndrome, cervical Yes     Physician: Brii Mcneil NP    Visit Date: 5/22/2023    Medical Diagnosis from Referral: Degenerative Disc Disease, cervical; DDD Lumbar; myofascial pain syndrome - cervical/lumbar   Evaluation Date: 3/30/2023  Authorization Period Expiration: 3/30/2024  Plan of Care Expiration: 6/30/2023  Visit # / Visits authorized: 8 /12 (not including eval)  FOTO Visit #:  2/ 3    PTA Visit #: 0/5     Time In:2:32 PM   Time Out: 3:30 PM   Total Billable Time: 51 minutes    SUBJECTIVE     Pt reports: No new c/o's.  Pt's reports that she does not really tolerate much activity well.  She is somewhat compliant with home exercise program. - does them about 2-3x/week - all that she can handle at this time.   Response to previous treatment: gets a couple of days relief from pain with the dry needling   Functional change: none   Pain:  Current 6-7/10, worst 10/10, best 3/10   Location: bilateral neck and upper traps, rhomboids    Description: Aching, Throbbing, Tight, and Numb  Aggravating Factors: standing, sitting for long periods of time, turning her head, lifting   Easing Factors: use of ice, lying down.       OBJECTIVE     Objective Measures updated at progress report unless specified.       Pts goals: To relieve her pain      Objective         Observation: Nicole is alert/oriented x 4; Appears in no acute distress at this time.      Posture:     -       Rounded shoulders  -       Forward head  -       Posterior pelvic tilt     Cervical Range of Motion:     Degrees Pain   Flexion 70     Extension 45     Left Rotation 50     Right Rotation 62     Left Side Bending 40     Right Side Bending 28        Shoulder Range of Motion:   Left:     WFL - decreased quality of motion into IR/ER, but  function  - IR T6 ER T2  Right:   WFL into flexion/abduction/extension; IR - hand to L4; ER - HBH to C7      Strength:    Left Right   DNF Weak - hold position less than 15 sec      Upper trap 4-/5 4-/5   Mid trap 3+/5 4-/5   Lower trap 3/5 3/5   Rhomboids 3/5 3/5    Functional         Upper Extremity Strength    Left Right   Shoulder flexion: 4-/5 4-/5   Shoulder Abduction: 3+/5 4-/5   Shoulder ER 3+/5 3+/5   Shoulder IR 4-/5 4-/5   Elbow flexion: 3+/5 3+/5   Elbow extension: 4-/5 3/5   Wrist flexion: 4/5 3/5   Wrist extension: 4/5 3/5            Joint Mobility: OA restriction in to extension, also noted at C7/T1; mid-cervical segment restriction movement to right /for left rotation of neck; also has facet restriction into Side bending at mid level segments on right and left ,          Thoracic mobility: postural restriction into extension at upper segments;      Palpation: moderate tenderness to palpation at occipital, cervical paraspinals into bilateral upper traps/levator to medial border of scapula. Fascia restriction noted.     Sensation: intact to light touch BUE's      Flexibility:                Upper Trap = R moderate restriction, L moderate restriction              Scalenes: R moderate restriction, L moderate restriction              SCM: R minimal restriction, L minimal restriction              Levator Scap: R moderate restriction, L moderate restriction  Treatment     Nicole received the treatments listed below:      therapeutic exercises to develop ROM and posture for 16 minutes including:  UBE x 10 mins  Wall Slides with end range stretch of shoulders x 2 mins   Neck retraction ex in supine x15  Upper trap/levator stretching on each side x 30 sec x 2   Scapular retraction - hold tband between hands - squeeze scapular together x 15     manual therapy techniques:     Soft tissue Mobilization was applied to the: cervical,upper traps,scapular for 15 minutes.  Myofascial Release   Upper traps and  abigail    neuromuscular re-education activities to improve:  for  minutes. The following activities were included:    therapeutic activities to improve functional performance for   minutes, including:    IFC  x 20 minutes to right cervical/scapular/upper trap region in prone position.       Patient Education and Home Exercises     Home Exercises Provided and Patient Education Provided     Education provided:   - Encouraged more exercise when able     Written Home Exercises Provided: yes. Exercises were reviewed and Nicole was able to demonstrate them prior to the end of the session.  Nicole demonstrated fair  understanding of the education provided. See EMR under Patient Instructions for exercises provided during therapy sessions    ASSESSMENT     Nicole continues to note neck/shoulder pain but stated that she gets at least a couple of days relief from the manual treatment;  Inconsistent compliance with exercises. Minimal tolerance to Therapeutic interventions. Progress note completed with slight increase in ROM . Minimal changes noted in strength and noted reduction in palpable TP.     Nicole Is progressing well towards her goals.   Pt prognosis is Fair.     Pt will continue to benefit from skilled outpatient physical therapy to address the deficits listed in the problem list box on initial evaluation, provide pt/family education and to maximize pt's level of independence in the home and community environment.     Pt's spiritual, cultural and educational needs considered and pt agreeable to plan of care and goals.     Anticipated barriers to physical therapy: none     Goals:  Short Term Goals: 3 weeks   Reduction in daily pain levels to no more than 6/10 for improved ability to perform daily tasks. > PROGRESSING  Compliant with HEP >PROGRESSING     Long Term Goals: 6 weeks   Reduction in daily pain levels to no more than 4/10 for improved ability to perform daily tasks.>PROGRESSING  Compliant with HEP  >PROGRESING  Cervical AROM performed without increase in pain>PROGRESSING  Compliant with HEP >PROGRESING  Able to reach overhead shelf without increased pain. >PROGRESSING  Compliant with HEP >PROGRESING  FOTO limitation score improved to >PROGRESSING  Compliant with HEP >PROGRESING  Independent with HEP for continued improvement in function >PROGRESSING  Compliant with HEP >PROGRESING    PLAN     Continue with Skilled physical therapy per POC.     Briana Brody, PT

## 2023-06-05 ENCOUNTER — CLINICAL SUPPORT (OUTPATIENT)
Dept: REHABILITATION | Facility: HOSPITAL | Age: 53
End: 2023-06-05
Attending: ORTHOPAEDIC SURGERY
Payer: MEDICAID

## 2023-06-05 DIAGNOSIS — Z98.890 S/P CUBITAL TUNNEL RELEASE: ICD-10-CM

## 2023-06-05 DIAGNOSIS — M79.18 MYOFASCIAL PAIN SYNDROME, CERVICAL: Primary | ICD-10-CM

## 2023-06-05 DIAGNOSIS — Z98.890 S/P CARPAL TUNNEL RELEASE: Primary | ICD-10-CM

## 2023-06-05 PROCEDURE — 97032 APPL MODALITY 1+ESTIM EA 15: CPT | Mod: PN

## 2023-06-05 PROCEDURE — 97140 MANUAL THERAPY 1/> REGIONS: CPT | Mod: PN,CQ

## 2023-06-05 PROCEDURE — 97110 THERAPEUTIC EXERCISES: CPT | Mod: PN

## 2023-06-05 PROCEDURE — 97124 MASSAGE THERAPY: CPT | Mod: PN

## 2023-06-05 PROCEDURE — 97014 ELECTRIC STIMULATION THERAPY: CPT | Mod: PN,CQ

## 2023-06-05 NOTE — PROGRESS NOTES
Occupational Therapy Daily Treatment Note   Name: Nicole Harris 1970  MRN: [id number]    Visit Date: 6/5/2023  Visit #: 21/  Authorization period Expiration: 12/31/2023    Plan of Care Expiration: 6/15/2023  Precautions: standard    Time In: 2:30  Time Out: 3:30  Total 1:1 Treatment Time: 60min    Treatment Diagnosis:   Encounter Diagnoses   Name Primary?    S/P carpal tunnel release Yes    S/P cubital tunnel release            Physician: Alcides Yancey DO    Subjective   Pt reports: states her pain is 4/10 .   She was compliant with home exercise program.     Pain Scale:  4/10 on VAS currently  Pain Location: right wrist    Objective   Paraffin bath applied to the R hand for 10 mins prior to PROM.    Nicole performed the following supervised modalities:  IFC was applied to the R elbow @ 15.5 volts in order to improve blood flow to the impacted area and alleviate pain levels for 20 mins.     Nicole received the following manual therapy techniques:  PROM and soft tissue mobilization applied to the R hand and wrist for 25 mins  Home Exercises and Education Provided     Education provided re:   - progress towards goals   - role of therapy in multi - disciplinary team, goals for therapy  Pt educated on condition, POC, and expectations in therapy.  No spiritual or educational barriers to learning provided    Home exercises:  Pt will be provided HEP during course of treatment with progressions as appropriate. Pt was advised to perform these exercises free of pain, and to stop performing them if pain occurs.   Nicole demonstrated good  understanding of the education provided.     Assessment   Nicole is progressing well towards her goals and no updates to goals at this time.Pt is showing signs of improvement in her R hand during grasping tasks, pt reporting the E-stim is making a difference along with improvement of AROM of the R hand /digit adduction .    Pt prognosis is Good. Pt will  continue to benefit from skilled outpatient occupational therapy to address the deficits listed in the problem list chart on initial evaluation, provide pt/family education and to maximize pt's level of independence in the home and community environment.     Medical necessity is demonstrated by the impairments and functional limitations listed on the Initial Evaluation.     Anticipated barriers to occupational therapy: none  Pt's spiritual, cultural and educational needs considered and pt agreeable to plan of care and goals.    Goals   Short term:  Pt will report R UE FOTO limitation of 50% by 4 weeks partial met  2. Pt will improve R hand functional  strength by 3 lbs in 3 weeks   3. Pt will improve R wrist extension by 5 degrees in 4 weeks     Long term:   Pt will report R UE FOTO limitation of 45% by dc  2. Pt will improve R hand functional  strength by 6 lbs by dc  3. Pt will improve R wrist extension by 10 degrees by dc met  4 Pt will report 100% compliance with HEP prior to dc met      Plan   OT EXTENDING PLAN OF CARE TODAY TO 6/15/2023 FOR AN ADDITIONAL 6 VISITS IN ORDER TO CONTINUE ESTABLISHED PLAN OF CARE AND WORK TOWARDS FUNCTIONAL GOALS.    Jacques Wolf, OT  6/5/2023

## 2023-06-05 NOTE — PROGRESS NOTES
[facility]   Physical Therapy Treatment Note   [facility] Number: [id number]    Therapy Diagnosis:   Encounter Diagnosis   Name Primary?    Myofascial pain syndrome, cervical Yes     Physician: Alcides Yancey DO    Visit Date: 6/5/2023    Medical Diagnosis from Referral: Degenerative Disc Disease, cervical; DDD Lumbar; myofascial pain syndrome - cervical/lumbar   Evaluation Date: 3/30/2023  Authorization Period Expiration: 3/30/2024  Plan of Care Expiration: 6/30/2023  Visit # / Visits authorized: 9 /12 (not including eval)  FOTO Visit #:  2/ 3    PTA Visit #: 1/5     Time In: 1:30 PM   Time Out: 2:30 PM   Total Billable Time: 45 minutes    SUBJECTIVE     Pt reports: I am hurting all over today.   She is somewhat compliant with home exercise program. - does them about 2-3x/week - all that she can handle at this time.   Response to previous treatment: gets a couple of days relief from pain with the dry needling   Functional change: none   Pain:  Current 10/10  Location: bilateral neck; upper traps; rhomboids; hips; low back   Description: Aching, Throbbing, Tight, and Numb  Aggravating Factors: standing, sitting for long periods of time, turning her head, lifting   Easing Factors: use of ice, lying down.       OBJECTIVE     Objective Measures updated at progress report unless specified.       Pts goals: To relieve her pain      Objective         Observation: Nicole is alert/oriented x 4; Appears in no acute distress at this time.      Posture:     -       Rounded shoulders  -       Forward head  -       Posterior pelvic tilt     Cervical Range of Motion:     Degrees Pain   Flexion 70     Extension 45     Left Rotation 50     Right Rotation 62     Left Side Bending 40     Right Side Bending 28        Shoulder Range of Motion:   Left:     WFL - decreased quality of motion into IR/ER, but function  - IR T6 ER T2  Right:   WFL into flexion/abduction/extension; IR - hand  to L4; ER - HBH to C7      Strength:    Left Right   DNF Weak - hold position less than 15 sec      Upper trap 4-/5 4-/5   Mid trap 3+/5 4-/5   Lower trap 3/5 3/5   Rhomboids 3/5 3/5    Functional         Upper Extremity Strength    Left Right   Shoulder flexion: 4-/5 4-/5   Shoulder Abduction: 3+/5 4-/5   Shoulder ER 3+/5 3+/5   Shoulder IR 4-/5 4-/5   Elbow flexion: 3+/5 3+/5   Elbow extension: 4-/5 3/5   Wrist flexion: 4/5 3/5   Wrist extension: 4/5 3/5            Joint Mobility: OA restriction in to extension, also noted at C7/T1; mid-cervical segment restriction movement to right /for left rotation of neck; also has facet restriction into Side bending at mid level segments on right and left ,          Thoracic mobility: postural restriction into extension at upper segments;      Palpation: moderate tenderness to palpation at occipital, cervical paraspinals into bilateral upper traps/levator to medial border of scapula. Fascia restriction noted.     Sensation: intact to light touch BUE's      Flexibility:                Upper Trap = R moderate restriction, L moderate restriction              Scalenes: R moderate restriction, L moderate restriction              SCM: R minimal restriction, L minimal restriction              Levator Scap: R moderate restriction, L moderate restriction  Treatment     Nicole received the treatments listed below:      therapeutic exercises to develop ROM and posture for 0 minutes including:  UBE x 10 mins  Wall Slides with end range stretch of shoulders x 2 mins   Neck retraction ex in supine x15  Upper trap/levator stretching on each side x 30 sec x 2   Scapular retraction - hold tband between hands - squeeze scapular together x 15     manual therapy techniques:     Soft tissue Mobilization was applied to the: cervical,upper traps,scapular for 25 minutes.      neuromuscular re-education activities to improve:  for  minutes. The following activities were included:    therapeutic  activities to improve functional performance for   minutes, including:    IFC w/ MHPx 20 minutes to right cervical/scapular/upper trap region in prone position.       Patient Education and Home Exercises     Home Exercises Provided and Patient Education Provided     Education provided:   - Encouraged more exercise when able     Written Home Exercises Provided: yes. Exercises were reviewed and Nicole was able to demonstrate them prior to the end of the session.  Nicole demonstrated fair  understanding of the education provided. See EMR under Patient Instructions for exercises provided during therapy sessions    ASSESSMENT     Nicole continues to note neck/shoulder pain but stated that she gets at least a couple of days relief from the manual treatment;  Inconsistent compliance with exercises. Minimal tolerance to Therapeutic interventions. Progress note completed with slight increase in ROM . Minimal changes noted in strength and noted reduction in palpable TP.     Nicole Is progressing well towards her goals.   Pt prognosis is Fair.     Pt will continue to benefit from skilled outpatient physical therapy to address the deficits listed in the problem list box on initial evaluation, provide pt/family education and to maximize pt's level of independence in the home and community environment.     Pt's spiritual, cultural and educational needs considered and pt agreeable to plan of care and goals.     Anticipated barriers to physical therapy: none     Goals:  Short Term Goals: 3 weeks   Reduction in daily pain levels to no more than 6/10 for improved ability to perform daily tasks. > PROGRESSING  Compliant with HEP >PROGRESSING     Long Term Goals: 6 weeks   Reduction in daily pain levels to no more than 4/10 for improved ability to perform daily tasks.>PROGRESSING  Compliant with HEP >PROGRESING  Cervical AROM performed without increase in pain>PROGRESSING  Compliant with HEP >PROGRESING  Able to reach overhead shelf  without increased pain. >PROGRESSING  Compliant with HEP >PROGRESING  FOTO limitation score improved to >PROGRESSING  Compliant with HEP >PROGRESING  Independent with HEP for continued improvement in function >PROGRESSING  Compliant with HEP >PROGRESING    PLAN     Continue with Skilled physical therapy per POC.     Jonathan Favre, PTA

## 2023-06-07 ENCOUNTER — CLINICAL SUPPORT (OUTPATIENT)
Dept: REHABILITATION | Facility: HOSPITAL | Age: 53
End: 2023-06-07
Attending: ORTHOPAEDIC SURGERY
Payer: COMMERCIAL

## 2023-06-07 DIAGNOSIS — M79.18 MYOFASCIAL PAIN SYNDROME, CERVICAL: Primary | ICD-10-CM

## 2023-06-07 PROCEDURE — 97014 ELECTRIC STIMULATION THERAPY: CPT | Mod: PN

## 2023-06-07 PROCEDURE — 97140 MANUAL THERAPY 1/> REGIONS: CPT | Mod: PN

## 2023-06-07 NOTE — PROGRESS NOTES
[facility]   Physical Therapy Treatment Note   Name: Nicole BONILLA [facility] Number: [id number]    Therapy Diagnosis:   Encounter Diagnosis   Name Primary?    Myofascial pain syndrome, cervical Yes     Physician: Brii Mcneil NP    Visit Date: 6/7/2023    Medical Diagnosis from Referral: Degenerative Disc Disease, cervical; DDD Lumbar; myofascial pain syndrome - cervical/lumbar   Evaluation Date: 3/30/2023  Authorization Period Expiration: 3/30/2024  Plan of Care Expiration: 6/30/2023  Visit # / Visits authorized: 9 /12 (not including eval)  FOTO Visit #:  2/ 3    PTA Visit #: 0/5     Time In: 1:00 PM   Time Out: 2:00 PM   Total Billable Time: 45 minutes    SUBJECTIVE     Pt reports: has been waiting for appointment for DN for several weeks; other therapy is good, but really feels like the DN is helpful.   She is somewhat compliant with home exercise program. - does them about 2-3x/week - all that she can handle at this time.   Response to previous treatment: gets a couple of days relief from pain with the dry needling   Functional change: none   Pain:  Current   Location: bilateral neck; upper traps; rhomboids; hips; low back   Description: Aching, Throbbing, Tight, and Numb  Aggravating Factors: standing, sitting for long periods of time, turning her head, lifting   Easing Factors: use of ice, lying down.       OBJECTIVE     Objective Measures updated at progress report unless specified.       Pts goals: To relieve her pain      Objective         Observation: Nicloe is alert/oriented x 4; Appears in no acute distress at this time.      Posture:     -       Rounded shoulders  -       Forward head  -       Posterior pelvic tilt     Cervical Range of Motion:     Degrees Pain   Flexion 70     Extension 45     Left Rotation 50     Right Rotation 62     Left Side Bending 40     Right Side Bending 28        Shoulder Range of Motion:   Left:     WFL - decreased quality of motion into  IR/ER, but function  - IR T6 ER T2  Right:   WFL into flexion/abduction/extension; IR - hand to L4; ER - HBH to C7      Strength:    Left Right   DNF Weak - hold position less than 15 sec      Upper trap 4-/5 4-/5   Mid trap 3+/5 4-/5   Lower trap 3/5 3/5   Rhomboids 3/5 3/5    Functional         Upper Extremity Strength    Left Right   Shoulder flexion: 4-/5 4-/5   Shoulder Abduction: 3+/5 4-/5   Shoulder ER 3+/5 3+/5   Shoulder IR 4-/5 4-/5   Elbow flexion: 3+/5 3+/5   Elbow extension: 4-/5 3/5   Wrist flexion: 4/5 3/5   Wrist extension: 4/5 3/5            Joint Mobility: OA restriction in to extension, also noted at C7/T1; mid-cervical segment restriction movement to right /for left rotation of neck; also has facet restriction into Side bending at mid level segments on right and left ,          Thoracic mobility: postural restriction into extension at upper segments;      Palpation: moderate tenderness to palpation at occipital, cervical paraspinals into bilateral upper traps/levator to medial border of scapula. Fascia restriction noted.     Sensation: intact to light touch BUE's      Flexibility:                Upper Trap = R moderate restriction, L moderate restriction              Scalenes: R moderate restriction, L moderate restriction              SCM: R minimal restriction, L minimal restriction              Levator Scap: R moderate restriction, L moderate restriction  Treatment     Nicole received the treatments listed below:      therapeutic exercises to develop ROM and posture for 0 minutes including:  UBE x 10 mins  Wall Slides with end range stretch of shoulders x 2 mins   Neck retraction ex in supine x15  Upper trap/levator stretching on each side x 30 sec x 2   Scapular retraction - hold tband between hands - squeeze scapular together x 15     manual therapy techniques:     Soft tissue Mobilization and dry needling  were applied to the: cervical,upper traps,scapular for 40 minutes,  including:  Supine: cervical sub-occipital inhibition to address forward head posture and reduce tension; MET for upper trap/levator stretch; STM rhomboids; Patient positioned in prone, bolster under ankles, towel roll for forehead; LF-ES at 6hz per protocol for cervical radiculopathy with inclusion of rhomboids at medial borders of scapula and upper traps. 1.5 fb lateral to C2, 3, 4, 5,6, mm bellies of upper traps at trigger points; 3 points for rhomboids at medial border of scapula.  Needle twist for tissue grasp. Intensity of stimulation adjusted to patient tolerance;  Needles left in-situ x 20 mins. Removed needles, pressure applied to c-spine for several seconds to ensure no bleeding, inflammation.            neuromuscular re-education activities to improve:  for  minutes. The following activities were included:    therapeutic activities to improve functional performance for   minutes, including:    IFC w/ MHPx 20 minutes to right cervical/scapular/upper trap region in prone position.       Patient Education and Home Exercises     Home Exercises Provided and Patient Education Provided     Education provided:   - Encouraged more exercise when able     Written Home Exercises Provided: yes. Exercises were reviewed and Nicole was able to demonstrate them prior to the end of the session.  Nicole demonstrated fair  understanding of the education provided. See EMR under Patient Instructions for exercises provided during therapy sessions    ASSESSMENT     Nicole continues to note neck/shoulder pain but stated that she gets at least a couple of days relief from the manual treatment;  Inconsistent compliance with exercises. Demonstrating some improvement in tolerance of exercises;     Nicole Is progressing well towards her goals.   Pt prognosis is Fair.     Pt will continue to benefit from skilled outpatient physical therapy to address the deficits listed in the problem list box on initial evaluation, provide pt/family  education and to maximize pt's level of independence in the home and community environment.     Pt's spiritual, cultural and educational needs considered and pt agreeable to plan of care and goals.     Anticipated barriers to physical therapy: none     Goals:  Short Term Goals: 3 weeks   Reduction in daily pain levels to no more than 6/10 for improved ability to perform daily tasks. > PROGRESSING  Compliant with HEP >PROGRESSING     Long Term Goals: 6 weeks   Reduction in daily pain levels to no more than 4/10 for improved ability to perform daily tasks.>PROGRESSING  Compliant with HEP >PROGRESING  Cervical AROM performed without increase in pain>PROGRESSING  Compliant with HEP >PROGRESING  Able to reach overhead shelf without increased pain. >PROGRESSING  Compliant with HEP >PROGRESING  FOTO limitation score improved to >PROGRESSING  Compliant with HEP >PROGRESING  Independent with HEP for continued improvement in function >PROGRESSING  Compliant with HEP >PROGRESING    PLAN     Continue with Skilled physical therapy per POC.     Quita Holliday, PT

## 2023-06-14 ENCOUNTER — CLINICAL SUPPORT (OUTPATIENT)
Dept: REHABILITATION | Facility: HOSPITAL | Age: 53
End: 2023-06-14
Attending: ORTHOPAEDIC SURGERY
Payer: COMMERCIAL

## 2023-06-14 DIAGNOSIS — M79.18 MYOFASCIAL PAIN SYNDROME, CERVICAL: Primary | ICD-10-CM

## 2023-06-14 DIAGNOSIS — Z98.890 S/P CUBITAL TUNNEL RELEASE: ICD-10-CM

## 2023-06-14 DIAGNOSIS — Z98.890 S/P CARPAL TUNNEL RELEASE: Primary | ICD-10-CM

## 2023-06-14 PROCEDURE — 97014 ELECTRIC STIMULATION THERAPY: CPT | Mod: PN,CQ

## 2023-06-14 PROCEDURE — 97032 APPL MODALITY 1+ESTIM EA 15: CPT | Mod: 59,PN

## 2023-06-14 PROCEDURE — 97018 PARAFFIN BATH THERAPY: CPT | Mod: PN

## 2023-06-14 PROCEDURE — 97110 THERAPEUTIC EXERCISES: CPT | Mod: PN

## 2023-06-14 PROCEDURE — 97530 THERAPEUTIC ACTIVITIES: CPT | Mod: PN

## 2023-06-14 PROCEDURE — 97140 MANUAL THERAPY 1/> REGIONS: CPT | Mod: PN,CQ

## 2023-06-14 NOTE — PROGRESS NOTES
Updated POC Occupational  Therapy Daily Treatment Note   Name: Nicole Harris 1970  MRN: [id number]    Visit Date: 6/14/2023  Visit #: 21/30  Authorization period Expiration: 12/31/2023    Plan of Care Expiration: 6/15/2023  Precautions: standard    Time In: 8:05  Time Out: 8:50  Total 1:1 Treatment Time: 45min    Treatment Diagnosis:   Encounter Diagnoses   Name Primary?    S/P carpal tunnel release Yes    S/P cubital tunnel release      Physician: Alcides Yancey DO    Subjective   Pt reports: states her pain is 4/10 .   She was compliant with home exercise program.     Pain Scale:  4/10 on VAS currently  Pain Location: right wrist    Objective   Paraffin bath applied to the R hand for 10 mins prior to PROM.    Nicole performed the following supervised modalities:  IFC was applied to the R elbow @ 15.5 volts in order to improve blood flow to the impacted area and alleviate pain levels for 20 mins.       Home Exercises and Education Provided     Education provided re:   - progress towards goals   - role of therapy in multi - disciplinary team, goals for therapy  Pt educated on condition, POC, and expectations in therapy.  No spiritual or educational barriers to learning provided    Home exercises:  Pt will be provided HEP during course of treatment with progressions as appropriate. Pt was advised to perform these exercises free of pain, and to stop performing them if pain occurs.   Nicole demonstrated good  understanding of the education provided.     Assessment   Nicole is progressing well towards her goals and no updates to goals at this time.Pt is showing signs of improvement in her R hand during grasping tasks, pt reporting the E-stim is making a difference along with improvement of AROM of the R hand /digit adduction .    Pt prognosis is Good. Pt will continue to benefit from skilled outpatient occupational therapy to address the deficits listed in the problem list chart on  initial evaluation, provide pt/family education and to maximize pt's level of independence in the home and community environment.     Medical necessity is demonstrated by the impairments and functional limitations listed on the Initial Evaluation.     Anticipated barriers to occupational therapy: none  Pt's spiritual, cultural and educational needs considered and pt agreeable to plan of care and goals.    Goals   Short term:  Pt will report R UE FOTO limitation of 50% by 4 weeks partial met  2. Pt will improve R hand functional  strength by 3 lbs in 3 weeks   3. Pt will improve R wrist extension by 5 degrees in 4 weeks     Long term:   Pt will report R UE FOTO limitation of 45% by dc  2. Pt will improve R hand functional  strength by 6 lbs by dc  3. Pt will improve R wrist extension by 10 degrees by dc met  4 Pt will report 100% compliance with HEP prior to dc met      Plan   OT EXTENDING PLAN OF CARE TODAY TO 6/15/2023 FOR AN ADDITIONAL 6 VISITS IN ORDER TO CONTINUE ESTABLISHED PLAN OF CARE AND WORK TOWARDS FUNCTIONAL GOALS.    Jacques Wolf, OT  6/14/2023

## 2023-06-14 NOTE — PROGRESS NOTES
[facility]   Physical Therapy Treatment Note   Name: Nicole BONILLA [facility] Number: [id number]    Therapy Diagnosis:   Encounter Diagnosis   Name Primary?    Myofascial pain syndrome, cervical Yes     Physician: Brii Mcneil NP    Visit Date: 6/14/2023    Medical Diagnosis from Referral: Degenerative Disc Disease, cervical; DDD Lumbar; myofascial pain syndrome - cervical/lumbar   Evaluation Date: 3/30/2023  Authorization Period Expiration: 3/30/2024  Plan of Care Expiration: 6/30/2023  Visit # / Visits authorized: 10 /12 (not including eval)  FOTO Visit #:  2/ 3    PTA Visit #: 1/5     Time In: 7:00 AM   Time Out: 8:00 AM   Total Billable Time: 45 minutes    SUBJECTIVE     Pt reports: I am hurting today.   She is somewhat compliant with home exercise program. - does them about 2-3x/week - all that she can handle at this time.   Response to previous treatment: gets a couple of days relief from pain with the dry needling   Functional change: none   Pain: 8 / 10  Location: bilateral neck; upper traps; rhomboids; hips; low back   Description: Aching, Throbbing, Tight, and Numb  Aggravating Factors: standing, sitting for long periods of time, turning her head, lifting   Easing Factors: use of ice, lying down.       OBJECTIVE     Objective Measures updated at progress report unless specified.       Pts goals: To relieve her pain      Objective         Observation: Nicole is alert/oriented x 4; Appears in no acute distress at this time.      Posture:     -       Rounded shoulders  -       Forward head  -       Posterior pelvic tilt     Cervical Range of Motion:     Degrees Pain   Flexion 70     Extension 45     Left Rotation 50     Right Rotation 62     Left Side Bending 40     Right Side Bending 28        Shoulder Range of Motion:   Left:     WFL - decreased quality of motion into IR/ER, but function  - IR T6 ER T2  Right:   WFL into flexion/abduction/extension; IR - hand to L4; ER - HBH  to C7      Strength:    Left Right   DNF Weak - hold position less than 15 sec      Upper trap 4-/5 4-/5   Mid trap 3+/5 4-/5   Lower trap 3/5 3/5   Rhomboids 3/5 3/5    Functional         Upper Extremity Strength    Left Right   Shoulder flexion: 4-/5 4-/5   Shoulder Abduction: 3+/5 4-/5   Shoulder ER 3+/5 3+/5   Shoulder IR 4-/5 4-/5   Elbow flexion: 3+/5 3+/5   Elbow extension: 4-/5 3/5   Wrist flexion: 4/5 3/5   Wrist extension: 4/5 3/5            Joint Mobility: OA restriction in to extension, also noted at C7/T1; mid-cervical segment restriction movement to right /for left rotation of neck; also has facet restriction into Side bending at mid level segments on right and left ,          Thoracic mobility: postural restriction into extension at upper segments;      Palpation: moderate tenderness to palpation at occipital, cervical paraspinals into bilateral upper traps/levator to medial border of scapula. Fascia restriction noted.     Sensation: intact to light touch BUE's      Flexibility:                Upper Trap = R moderate restriction, L moderate restriction              Scalenes: R moderate restriction, L moderate restriction              SCM: R minimal restriction, L minimal restriction              Levator Scap: R moderate restriction, L moderate restriction  Treatment     Nicole received the treatments listed below:      therapeutic exercises to develop ROM and posture for 0 minutes including:  UBE x 10 mins  Wall Slides with end range stretch of shoulders x 2 mins   Neck retraction ex in supine x15  Upper trap/levator stretching on each side x 30 sec x 2   Scapular retraction - hold tband between hands - squeeze scapular together x 15     manual therapy techniques:     Soft tissue Mobilization was applied to the: cervical,upper traps,scapular for 25 minutes.           neuromuscular re-education activities to improve:  for  minutes. The following activities were included:    therapeutic activities to  improve functional performance for   minutes, including:    IFC w/ MHPx 20 minutes to right cervical/scapular/upper trap region in prone position.       Patient Education and Home Exercises     Home Exercises Provided and Patient Education Provided     Education provided:   - Encouraged more exercise when able     Written Home Exercises Provided: yes. Exercises were reviewed and Nicole was able to demonstrate them prior to the end of the session.  Nicole demonstrated fair  understanding of the education provided. See EMR under Patient Instructions for exercises provided during therapy sessions    ASSESSMENT     Nicole continues to note neck/shoulder pain but stated that she gets at least a couple of days relief from the manual treatment;  Inconsistent compliance with exercises. Demonstrating some improvement in tolerance of exercises;     Nicole Is progressing well towards her goals.   Pt prognosis is Fair.     Pt will continue to benefit from skilled outpatient physical therapy to address the deficits listed in the problem list box on initial evaluation, provide pt/family education and to maximize pt's level of independence in the home and community environment.     Pt's spiritual, cultural and educational needs considered and pt agreeable to plan of care and goals.     Anticipated barriers to physical therapy: none     Goals:  Short Term Goals: 3 weeks   Reduction in daily pain levels to no more than 6/10 for improved ability to perform daily tasks. > PROGRESSING  Compliant with HEP >PROGRESSING     Long Term Goals: 6 weeks   Reduction in daily pain levels to no more than 4/10 for improved ability to perform daily tasks.>PROGRESSING  Compliant with HEP >PROGRESING  Cervical AROM performed without increase in pain>PROGRESSING  Compliant with HEP >PROGRESING  Able to reach overhead shelf without increased pain. >PROGRESSING  Compliant with HEP >PROGRESING  FOTO limitation score improved to >PROGRESSING  Compliant with  HEP >PROGRESING  Independent with HEP for continued improvement in function >PROGRESSING  Compliant with HEP >PROGRESING    PLAN     Continue with Skilled physical therapy per POC.     Jonathan Favre, PTA

## 2023-06-21 ENCOUNTER — CLINICAL SUPPORT (OUTPATIENT)
Dept: REHABILITATION | Facility: HOSPITAL | Age: 53
End: 2023-06-21
Attending: ORTHOPAEDIC SURGERY
Payer: COMMERCIAL

## 2023-06-21 DIAGNOSIS — M79.18 MYOFASCIAL PAIN SYNDROME, CERVICAL: Primary | ICD-10-CM

## 2023-06-21 PROCEDURE — 97140 MANUAL THERAPY 1/> REGIONS: CPT | Mod: PN

## 2023-06-21 PROCEDURE — 97014 ELECTRIC STIMULATION THERAPY: CPT | Mod: PN

## 2023-06-21 NOTE — PROGRESS NOTES
[facility]   Physical Therapy Treatment Note   Name: Nicole BONILLA [facility] Number: [id number]    Therapy Diagnosis:   Encounter Diagnosis   Name Primary?    Myofascial pain syndrome, cervical Yes     Physician: Brii Mcneil NP    Visit Date: 6/21/2023    Medical Diagnosis from Referral: Degenerative Disc Disease, cervical; DDD Lumbar; myofascial pain syndrome - cervical/lumbar   Evaluation Date: 3/30/2023  Authorization Period Expiration: 3/30/2024  Plan of Care Expiration: 6/30/2023  Visit # / Visits authorized: 11 /12 (not including eval)  FOTO Visit #:  2/ 3    PTA Visit #: 0/5     Time In: 11:00 am   Time Out: 11:50 pm   Total Billable Time: 45 minutes    SUBJECTIVE     Pt reports:  I have had a migraine H/A for about 5 days now. My appointments have been so spread out, my pain doesn't go away as easily as it did before.   She is somewhat compliant with home exercise program. - does them about 2-3x/week - all that she can handle at this time.   Response to previous treatment: gets a couple of days relief from pain with the dry needling   Functional change: none     Pain: 8 / 10  Location: bilateral neck; upper traps; rhomboids; hips; low back       OBJECTIVE     Objective Measures updated at progress report unless specified.        Treatment     Nicole received the treatments listed below:      therapeutic exercises to develop ROM and posture for 0 minutes including:  UBE x 10 mins  Wall Slides with end range stretch of shoulders x 2 mins   Neck retraction ex in supine x15  Upper trap/levator stretching on each side x 30 sec x 2   Scapular retraction - hold tband between hands - squeeze scapular together x 15       manual therapy techniques: Soft tissue Mobilization and dry needling  were applied to the: cervical,upper traps,scapular for 40 minutes, including:    Supine: cervical sub-occipital inhibition to address forward head posture and reduce tension; MET for upper  trap/levator stretch; STM rhomboids; Patient positioned in prone, bolster under ankles, towel roll for forehead; LF-ES at 6hz per protocol for cervical radiculopathy with inclusion of rhomboids at medial borders of scapula and upper traps. 1.5 fb lateral to C2, 3, 4, 5,6, mm bellies of upper traps at trigger points; 3 points for rhomboids at medial border of scapula.  Needle twist for tissue grasp. Intensity of stimulation adjusted to patient tolerance;  Needles left in-situ x 20 mins. Removed needles, pressure applied to c-spine for several seconds to ensure no bleeding, inflammation                 neuromuscular re-education activities to improve:  for  minutes. The following activities were included:    therapeutic activities to improve functional performance for   minutes, including:    IFC w/ MHPx 20 minutes to right cervical/scapular/upper trap region in prone position.       Patient Education and Home Exercises     Home Exercises Provided and Patient Education Provided     Education provided:   - Encouraged more exercise when able     Written Home Exercises Provided: yes. Exercises were reviewed and Nicole was able to demonstrate them prior to the end of the session.  Nicole demonstrated fair  understanding of the education provided. See EMR under Patient Instructions for exercises provided during therapy sessions    ASSESSMENT     Nicole continues to note neck/shoulder pain but stated that she gets at least a couple of days relief from the manual treatment;  Noted reduction in H/A symptoms today following the manual tx. Inconsistent compliance with exercises. Demonstrating some improvement in tolerance of exercises;     Nicole Is progressing well towards her goals.   Pt prognosis is Fair.     Pt will continue to benefit from skilled outpatient physical therapy to address the deficits listed in the problem list box on initial evaluation, provide pt/family education and to maximize pt's level of independence in  the home and community environment.     Pt's spiritual, cultural and educational needs considered and pt agreeable to plan of care and goals.     Anticipated barriers to physical therapy: none     Goals:  Short Term Goals: 3 weeks   Reduction in daily pain levels to no more than 6/10 for improved ability to perform daily tasks. > PROGRESSING  Compliant with HEP >PROGRESSING     Long Term Goals: 6 weeks   Reduction in daily pain levels to no more than 4/10 for improved ability to perform daily tasks.>PROGRESSING  Compliant with HEP >PROGRESING  Cervical AROM performed without increase in pain>PROGRESSING  Compliant with HEP >PROGRESING  Able to reach overhead shelf without increased pain. >PROGRESSING  Compliant with HEP >PROGRESING  FOTO limitation score improved to >PROGRESSING  Compliant with HEP >PROGRESING  Independent with HEP for continued improvement in function >PROGRESSING  Compliant with HEP >PROGRESING    PLAN     Continue with Skilled physical therapy per POC.     Quita Holliday, PT

## 2023-06-28 ENCOUNTER — CLINICAL SUPPORT (OUTPATIENT)
Dept: REHABILITATION | Facility: HOSPITAL | Age: 53
End: 2023-06-28
Attending: ORTHOPAEDIC SURGERY
Payer: MEDICAID

## 2023-06-28 DIAGNOSIS — Z98.890 S/P CARPAL TUNNEL RELEASE: Primary | ICD-10-CM

## 2023-06-28 DIAGNOSIS — Z98.890 S/P CUBITAL TUNNEL RELEASE: ICD-10-CM

## 2023-06-28 DIAGNOSIS — M79.18 MYOFASCIAL PAIN SYNDROME, CERVICAL: Primary | ICD-10-CM

## 2023-06-28 PROCEDURE — 97140 MANUAL THERAPY 1/> REGIONS: CPT | Mod: PN

## 2023-06-28 PROCEDURE — 97032 APPL MODALITY 1+ESTIM EA 15: CPT | Mod: 59,PN

## 2023-06-28 PROCEDURE — 97530 THERAPEUTIC ACTIVITIES: CPT | Mod: PN

## 2023-06-28 PROCEDURE — 97110 THERAPEUTIC EXERCISES: CPT | Mod: PN

## 2023-06-28 PROCEDURE — 97014 ELECTRIC STIMULATION THERAPY: CPT | Mod: PN

## 2023-06-28 NOTE — PROGRESS NOTES
[facility]   Physical Therapy Treatment Note   Name: Nicole BONILLA [facility] Number: [id number]    Therapy Diagnosis:   Encounter Diagnosis   Name Primary?    Myofascial pain syndrome, cervical Yes     Physician: Brii Mcneil NP    Visit Date: 6/28/2023    Medical Diagnosis from Referral: Degenerative Disc Disease, cervical; DDD Lumbar; myofascial pain syndrome - cervical/lumbar   Evaluation Date: 3/30/2023  Authorization Period Expiration: 3/30/2024  Plan of Care Expiration: 6/30/2023 - needs update of POC   Visit # / Visits authorized: 12 /12   FOTO Visit #:  2/ 3    PTA Visit #: 0/5     Time In: 10:00 am   Time Out:  11:00 am   Total Billable Time: 53  minutes    SUBJECTIVE     Pt reports:  I hurt all over today, probably because I've been trying to exercise more at home.    She is somewhat compliant with home exercise program. - does them about 2-3x/week - all that she can handle at this time.   Response to previous treatment: gets a couple of days relief from pain with the dry needling   Functional change: is trying to do more at home      Pain: 5 / 10  Location: bilateral neck; upper traps; rhomboids; hips; low back       OBJECTIVE     Objective Measures updated at progress report unless specified.        Treatment     Nicole received the treatments listed below:      therapeutic exercises to develop ROM and posture for 0 minutes including:  UBE x 10 mins  Wall Slides with end range stretch of shoulders x 2 mins   Neck retraction ex in supine x15  Upper trap/levator stretching on each side x 30 sec x 2   Scapular retraction - hold tband between hands - squeeze scapular together x 15       manual therapy techniques: Soft tissue Mobilization and dry needling  were applied to the: cervical,upper traps,scapular for 40 minutes, including:    Supine: cervical sub-occipital inhibition to address forward head posture and reduce tension; MET for upper trap/levator stretch; STM  rhomboids; Patient positioned in prone, bolster under ankles, towel roll for forehead; LF-ES at 6hz per protocol for cervical radiculopathy with inclusion of rhomboids at medial borders of scapula and upper traps. 1.5 fb lateral to C2, 3, 4, 5,6, mm bellies of upper traps at trigger points; 3 points for rhomboids at medial border of scapula.  Needle twist for tissue grasp. Intensity of stimulation adjusted to patient tolerance;  Needles left in-situ x 20 mins. Removed needles, pressure applied to c-spine for several seconds to ensure no bleeding, inflammation                 neuromuscular re-education activities to improve:  for  minutes. The following activities were included:    therapeutic activities to improve functional performance for   minutes, including:          Patient Education and Home Exercises     Home Exercises Provided and Patient Education Provided     Education provided:   - Encouraged more exercise when able     Written Home Exercises Provided: yes. Exercises were reviewed and Nicole was able to demonstrate them prior to the end of the session.  Nicole demonstrated fair  understanding of the education provided. See EMR under Patient Instructions for exercises provided during therapy sessions    ASSESSMENT     Nicole continues to note neck/shoulder pain but stated that she gets at least a couple of days relief from the manual treatment;  Noted reduction in H/A symptoms today following the manual tx. Inconsistent compliance with exercises, but is making an effort to increase her activity.     Nicole Is progressing well towards her goals.   Pt prognosis is Fair.     Pt will continue to benefit from skilled outpatient physical therapy to address the deficits listed in the problem list box on initial evaluation, provide pt/family education and to maximize pt's level of independence in the home and community environment.     Pt's spiritual, cultural and educational needs considered and pt agreeable to plan  of care and goals.     Anticipated barriers to physical therapy: none     Goals:  Short Term Goals: 3 weeks   Reduction in daily pain levels to no more than 6/10 for improved ability to perform daily tasks. > PROGRESSING  Compliant with HEP >PROGRESSING     Long Term Goals: 6 weeks   Reduction in daily pain levels to no more than 4/10 for improved ability to perform daily tasks.>PROGRESSING  Compliant with HEP >PROGRESING  Cervical AROM performed without increase in pain>PROGRESSING  Compliant with HEP >PROGRESING  Able to reach overhead shelf without increased pain. >PROGRESSING  Compliant with HEP >PROGRESING  FOTO limitation score improved to >PROGRESSING  Compliant with HEP >PROGRESING  Independent with HEP for continued improvement in function >PROGRESSING  Compliant with HEP >PROGRESING    PLAN     Continue with Skilled physical therapy per POC.  Update POC     Quita Holliday, PT

## 2023-06-28 NOTE — PROGRESS NOTES
Updated POC Occupational  Therapy Daily Treatment Note   Name: Nicole Harris 1970  MRN: [id number]    Visit Date: 6/28/2023  Visit #: 21/30  Authorization period Expiration: 12/31/2023    Plan of Care Expiration: 6/15/2023  Precautions: standard    Time In: 10:00  Time Out: :55  Total 1:1 Treatment Time: 55 min    Treatment Diagnosis:   Encounter Diagnoses   Name Primary?    S/P carpal tunnel release Yes    S/P cubital tunnel release      Physician: Alcides Yancey DO    Subjective   Pt reports: states her pain is /10 .   She was compliant with home exercise program.     Pain Scale:  3/10 on VAS currently  Pain Location: right wrist    Objective   Paraffin bath applied to the R hand for 10 mins prior to PROM.    Nicole performed the following supervised modalities:  IFC was applied to the R elbow @ 15.5 volts in order to improve blood flow to the impacted area and alleviate pain levels for 20 mins.      strengthening w hawk  activity.  Wrist ext/flexion arom w/ use of wrist exerciser . Patient performed fine motor strengthening w/ yellow theraputty. Patient performed wrist strengthening w/ use of wrist weight.   Home Exercises and Education Provided     Education provided re:   - progress towards goals   - role of therapy in multi - disciplinary team, goals for therapy  Pt educated on condition, POC, and expectations in therapy.  No spiritual or educational barriers to learning provided    Home exercises:  Pt will be provided HEP during course of treatment with progressions as appropriate. Pt was advised to perform these exercises free of pain, and to stop performing them if pain occurs.   Nicole demonstrated good  understanding of the education provided.     Assessment   Nicole is progressing well towards her goals and no updates to goals at this time.Pt is showing signs of improvement in her R hand during grasping tasks, pt reporting the E-stim is making a difference along  with improvement of AROM of the R hand /digit adduction .    Pt prognosis is Good. Pt will continue to benefit from skilled outpatient occupational therapy to address the deficits listed in the problem list chart on initial evaluation, provide pt/family education and to maximize pt's level of independence in the home and community environment.     Medical necessity is demonstrated by the impairments and functional limitations listed on the Initial Evaluation.     Anticipated barriers to occupational therapy: none  Pt's spiritual, cultural and educational needs considered and pt agreeable to plan of care and goals.    Goals   Short term:  Pt will report R UE FOTO limitation of 50% by 4 weeks partial met  2. Pt will improve R hand functional  strength by 3 lbs in 3 weeks   3. Pt will improve R wrist extension by 5 degrees in 4 weeks     Long term:   Pt will report R UE FOTO limitation of 45% by dc  2. Pt will improve R hand functional  strength by 6 lbs by dc  3. Pt will improve R wrist extension by 10 degrees by dc met  4 Pt will report 100% compliance with HEP prior to dc met      Plan   OT EXTENDING PLAN OF CARE TODAY TO 6/15/2023 FOR AN ADDITIONAL 6 VISITS IN ORDER TO CONTINUE ESTABLISHED PLAN OF CARE AND WORK TOWARDS FUNCTIONAL GOALS.    Jacques Wolf, OT  6/28/2023

## 2023-07-02 NOTE — PLAN OF CARE
[facility] [place] [facility]  Physical Therapy Plan of Care Note     Name: iNcole Harris  Clinic Number: [id number]    Therapy Diagnosis:   Encounter Diagnosis   Name Primary?    Myofascial pain syndrome, cervical Yes     Physician: Brii Mcneil NP    Visit Date: 6/28/2023    Physician Orders: Eval and Treat   Medical Diagnosis from Referral: Degenerative Disc Disease lumbar/cervical; Myofascial pain syndrome cervical/lumbar   Evaluation Date: 3/30/2023  Authorization Period Expiration: 3/30/2024   Plan of Care Expiration: 6/30/2023   Visit # / Visits authorized: 12/ 12  FOTO: 2/3    Precautions: Standard and Diabetes  Functional Level Prior to Evaluation:  Independent, but sedentary; has good/bad days; bad days she just stays in the house, in bed, watching TV     Subjective     Update: Subjective pain remains around 5-7/10 in neck, upper traps/rhomboids and hips/lower back; Nicole has been trying to exercise more at home - about 2-3x/week.  Stated that this is all she can handle at this time.     Objective      Update:   Cervical Range of Motion:     Degrees Pain   Flexion 70     Extension 45     Left Rotation 50     Right Rotation 62     Left Side Bending 40     Right Side Bending 28        Shoulder Range of Motion:   Left:     WFL - decreased quality of motion into IR/ER, but function  - IR T6 ER T2  Right:   WFL into flexion/abduction/extension; IR - hand to L4; ER - HBH to C7      Strength:    Left Right   DNF Weak - hold position less than 15 sec      Upper trap 4-/5 4-/5   Mid trap 3+/5 4-/5   Lower trap 3/5 3/5   Rhomboids 3/5 3/5    Functional         Upper Extremity Strength    Left Right   Shoulder flexion: 4-/5 4-/5   Shoulder Abduction: 3+/5 4-/5   Shoulder ER 3+/5 3+/5   Shoulder IR 4-/5 4-/5   Elbow flexion: 3+/5 3+/5   Elbow extension: 4-/5 3/5   Wrist flexion: 4/5 3/5   Wrist extension: 4/5 3/5            Joint Mobility: OA restriction in to extension, also noted at C7/T1;  mid-cervical segment restriction movement to right /for left rotation of neck; also has facet restriction into Side bending at mid level segments on right and left ,          Thoracic mobility: postural restriction into extension at upper segments;      Palpation: moderate tenderness to palpation at occipital, cervical paraspinals into bilateral upper traps/levator to medial border of scapula. Fascia restriction noted.     Sensation: intact to light touch BUE's      Flexibility:                Upper Trap = R moderate restriction, L moderate restriction              Scalenes: R moderate restriction, L moderate restriction              SCM: R minimal restriction, L minimal restriction              Levator Scap: R moderate restriction, L moderate restriction    Assessment     Update: Nicole reports that she gets a couple of days relief from the manual treatment and the dry needling.  She is making an effort to be more consistent with her exercise program; Improvement in pain/function will be limited until she can improve her strength in postural muscles to support her spine. She is aware of this and has been making more of an effort.     Previous Short Term Goals Status:   Progressing   Long Term Goal Status: continue per initial plan of care.  Reasons for Recertification of Therapy:   reporting pain relief from treatment, progressing toward goals, slowly    GOALS  Short Term Goals: 2 weeks   Reduction in daily pain levels to no more than 6/10 for improved ability to perform daily tasks. > PROGRESSING  Compliant with HEP >PROGRESSING     Long Term Goals: 6 weeks   Reduction in daily pain levels to no more than 4/10 for improved ability to perform daily tasks.>PROGRESSING  Compliant with HEP >PROGRESING  Cervical AROM performed without increase in pain>PROGRESSING  Compliant with HEP >PROGRESING  Able to reach overhead shelf without increased pain. >PROGRESSING  Compliant with HEP >PROGRESING  FOTO limitation score  improved to >PROGRESSING  Compliant with HEP >PROGRESING  Independent with HEP for continued improvement in function >PROGRESSING  Compliant with HEP >PROGRESING        Plan     Updated Certification Period: 6/30/2023 to 9/30/2023   Recommended Treatment Plan: 1-2 times per week for 6 weeks:  Manual Therapy, Moist Heat/ Ice, Neuromuscular Re-ed, Patient Education, Therapeutic Activities, and Therapeutic Exercise      Quita Holliday, PT

## 2023-07-03 ENCOUNTER — CLINICAL SUPPORT (OUTPATIENT)
Dept: REHABILITATION | Facility: HOSPITAL | Age: 53
End: 2023-07-03
Attending: ORTHOPAEDIC SURGERY
Payer: MEDICAID

## 2023-07-03 DIAGNOSIS — Z98.890 S/P CUBITAL TUNNEL RELEASE: ICD-10-CM

## 2023-07-03 DIAGNOSIS — Z98.890 S/P CARPAL TUNNEL RELEASE: Primary | ICD-10-CM

## 2023-07-03 PROCEDURE — 97032 APPL MODALITY 1+ESTIM EA 15: CPT | Mod: PN

## 2023-07-03 PROCEDURE — 97110 THERAPEUTIC EXERCISES: CPT | Mod: KX,PN

## 2023-07-03 PROCEDURE — 97530 THERAPEUTIC ACTIVITIES: CPT | Mod: KX,PN

## 2023-07-03 PROCEDURE — 97018 PARAFFIN BATH THERAPY: CPT | Mod: PN

## 2023-07-03 NOTE — PROGRESS NOTES
Occupational  Therapy Daily Treatment Note   Name: Nicole Harris 1970  MRN: [id number]    Visit Date: 7/3/2023  Visit #: 22/30  Authorization period Expiration: 12/31/2023    Plan of Care Expiration: 6/15/2023  Precautions: standard    Time In: 11:55  Time Out: 12:55  Total 1:1 Treatment Time: 55 min    Treatment Diagnosis:   Carpal tunnel release / cubital tunnel release    Physician: Alcides Yancey DO    Subjective   Pt reports: states her pain is 3/10 .  Certain positions  She was compliant with home exercise program.     Pain Scale:  3/10 on VAS currently  Pain Location: right wrist    Objective   Paraffin bath applied to the R hand for 10 mins prior to PROM.    Nicole performed the following supervised modalities:  IFC was applied to the R elbow @ 18.5 volts in order to improve blood flow to the impacted area and alleviate pain levels for 20 mins.      strengthening w hawk  activity.  Wrist ext/flexion arom w/ use of wrist exerciser . Patient performed fine motor strengthening w/ yellow theraputty. Patient performed wrist strengthening w/ use of wrist weight.   Home Exercises and Education Provided     Education provided re:   - progress towards goals   - role of therapy in multi - disciplinary team, goals for therapy  Pt educated on condition, POC, and expectations in therapy.  No spiritual or educational barriers to learning provided    Home exercises:  Pt will be provided HEP during course of treatment with progressions as appropriate. Pt was advised to perform these exercises free of pain, and to stop performing them if pain occurs.   Nicole demonstrated good  understanding of the education provided.     Assessment   Pt is showing signs of improvement in her R hand during grasping tasks, pt reporting the E-stim is making a difference along with improvement of AROM of the R hand /digit adduction .    Pt prognosis is Good. Pt will continue to benefit from skilled  outpatient occupational therapy to address the deficits listed in the problem list chart on initial evaluation, provide pt/family education and to maximize pt's level of independence in the home and community environment.     Medical necessity is demonstrated by the impairments and functional limitations listed on the Initial Evaluation.     Anticipated barriers to occupational therapy: none  Pt's spiritual, cultural and educational needs considered and pt agreeable to plan of care and goals.    Goals   Short term:  Pt will report R UE FOTO limitation of 50% by 4 weeks partial met  2. Pt will improve R hand functional  strength by 3 lbs in 3 weeks partial met  3. Pt will improve R wrist extension by 5 degrees in 4 weeks partial met     Long term:   Pt will report R UE FOTO limitation of 45% by dc ongoing  2. Pt will improve R hand functional  strength by 6 lbs by dc ongoing  3. Pt will improve R wrist extension by 10 degrees by dc met  4 Pt will report 100% compliance with HEP prior to dc met      Plan   OT EXTENDING PLAN OF CARE TODAY TO 6/15/2023 FOR AN ADDITIONAL 6 VISITS IN ORDER TO CONTINUE ESTABLISHED PLAN OF CARE AND WORK TOWARDS FUNCTIONAL GOALS.    Jacques Wolf, OT  7/3/2023

## 2023-07-07 ENCOUNTER — CLINICAL SUPPORT (OUTPATIENT)
Dept: REHABILITATION | Facility: HOSPITAL | Age: 53
End: 2023-07-07
Attending: ORTHOPAEDIC SURGERY
Payer: MEDICAID

## 2023-07-07 DIAGNOSIS — Z74.09 IMPAIRED FUNCTIONAL MOBILITY AND ACTIVITY TOLERANCE: ICD-10-CM

## 2023-07-07 DIAGNOSIS — M79.18 MYOFASCIAL PAIN SYNDROME, CERVICAL: Primary | ICD-10-CM

## 2023-07-07 PROCEDURE — 97014 ELECTRIC STIMULATION THERAPY: CPT | Mod: PN

## 2023-07-07 PROCEDURE — 97140 MANUAL THERAPY 1/> REGIONS: CPT | Mod: PN

## 2023-07-07 NOTE — PROGRESS NOTES
[facility]   Physical Therapy Treatment Note   Name: Nicole BONILLA [facility] Number: [id number]    Therapy Diagnosis:   Encounter Diagnoses   Name Primary?    Myofascial pain syndrome, cervical Yes    Impaired functional mobility and activity tolerance      Physician: Alcides Yancey DO    Visit Date: 7/7/2023    Medical Diagnosis from Referral: Degenerative Disc Disease, cervical; DDD Lumbar; myofascial pain syndrome - cervical/lumbar   Evaluation Date: 3/30/2023  Authorization Period Expiration: 3/30/2024  Plan of Care Expiration: 9/30/2023  Visit # / Visits authorized: 13   FOTO Visit #:  2/ 3    PTA Visit #: 0/5     Time In: 10:00 am   Time Out:  11:00 am   Total Billable Time: 53  minutes    SUBJECTIVE     Pt reports:  Usual complaints - neck, scapula and lower back pain; Didn't sleep well last night.    She is somewhat compliant with home exercise program. - does them about 2-3x/week - all that she can handle at this time.   Response to previous treatment: gets a couple of days relief from pain with the dry needling   Functional change: is trying to do more at home when she can.     Pain: 5 / 10  Location: bilateral neck; upper traps; rhomboids; hips; low back       OBJECTIVE     Objective Measures updated at progress report unless specified.        Treatment     Nicole received the treatments listed below:      therapeutic exercises to develop ROM and posture for 0 minutes including:  UBE x 10 mins  Wall Slides with end range stretch of shoulders x 2 mins   Neck retraction ex in supine x15  Upper trap/levator stretching on each side x 30 sec x 2   Scapular retraction - hold tband between hands - squeeze scapular together x 15       manual therapy techniques: Soft tissue Mobilization and dry needling  were applied to the: cervical,upper traps,scapular for 40 minutes, including:    Supine: cervical sub-occipital inhibition to address forward head posture and reduce tension; MET  for upper trap/levator stretch; STM rhomboids; Patient positioned in prone, bolster under ankles, towel roll for forehead; LF-ES at 6hz per protocol for cervical radiculopathy with inclusion of rhomboids at medial borders of scapula and upper traps. 1.5 fb lateral to C2, 3, 4, 5,6, mm bellies of upper traps at trigger points; 3 points for rhomboids at medial border of scapula.  Needle twist for tissue grasp. Intensity of stimulation adjusted to patient tolerance;  Needles left in-situ x 20 mins. Removed needles, pressure applied to c-spine for several seconds to ensure no bleeding, inflammation                 neuromuscular re-education activities to improve:  for  minutes. The following activities were included:    therapeutic activities to improve functional performance for   minutes, including:          Patient Education and Home Exercises     Home Exercises Provided and Patient Education Provided     Education provided:   - Encouraged more exercise when able     Written Home Exercises Provided: yes. Exercises were reviewed and Nicole was able to demonstrate them prior to the end of the session.  Nicole demonstrated fair  understanding of the education provided. See EMR under Patient Instructions for exercises provided during therapy sessions    ASSESSMENT     Nicole continues to note neck/shoulder pain but stated that she gets at least a couple of days relief from the manual treatment;  Noted reduction in H/A symptoms today following the manual tx. Inconsistent compliance with exercises, but is making an effort to increase her activity.     Nicole Is progressing well towards her goals.   Pt prognosis is Fair.     Pt will continue to benefit from skilled outpatient physical therapy to address the deficits listed in the problem list box on initial evaluation, provide pt/family education and to maximize pt's level of independence in the home and community environment.     Pt's spiritual, cultural and educational needs  considered and pt agreeable to plan of care and goals.     Anticipated barriers to physical therapy: none     Goals:  Short Term Goals: 3 weeks   Reduction in daily pain levels to no more than 6/10 for improved ability to perform daily tasks. > PROGRESSING  Compliant with HEP >PROGRESSING     Long Term Goals: 6 weeks   Reduction in daily pain levels to no more than 4/10 for improved ability to perform daily tasks.>PROGRESSING  Compliant with HEP >PROGRESING  Cervical AROM performed without increase in pain>PROGRESSING  Compliant with HEP >PROGRESING  Able to reach overhead shelf without increased pain. >PROGRESSING  Compliant with HEP >PROGRESING  FOTO limitation score improved to >PROGRESSING  Compliant with HEP >PROGRESING  Independent with HEP for continued improvement in function >PROGRESSING  Compliant with HEP >PROGRESING    PLAN     Continue with Skilled physical therapy per POC.      Quita Holliday, PT

## 2023-07-10 ENCOUNTER — CLINICAL SUPPORT (OUTPATIENT)
Dept: REHABILITATION | Facility: HOSPITAL | Age: 53
End: 2023-07-10
Attending: ORTHOPAEDIC SURGERY
Payer: COMMERCIAL

## 2023-07-10 ENCOUNTER — CLINICAL SUPPORT (OUTPATIENT)
Dept: REHABILITATION | Facility: HOSPITAL | Age: 53
End: 2023-07-10
Attending: ORTHOPAEDIC SURGERY
Payer: MEDICAID

## 2023-07-10 DIAGNOSIS — Z98.890 S/P CARPAL TUNNEL RELEASE: Primary | ICD-10-CM

## 2023-07-10 DIAGNOSIS — M79.18 MYOFASCIAL PAIN SYNDROME, CERVICAL: Primary | ICD-10-CM

## 2023-07-10 DIAGNOSIS — Z98.890 S/P CUBITAL TUNNEL RELEASE: ICD-10-CM

## 2023-07-10 PROCEDURE — 97140 MANUAL THERAPY 1/> REGIONS: CPT | Mod: KX,PN

## 2023-07-10 PROCEDURE — 97124 MASSAGE THERAPY: CPT | Mod: KX,PN

## 2023-07-10 PROCEDURE — 97110 THERAPEUTIC EXERCISES: CPT | Mod: KX,PN

## 2023-07-10 PROCEDURE — 97032 APPL MODALITY 1+ESTIM EA 15: CPT | Mod: PN

## 2023-07-10 PROCEDURE — 97014 ELECTRIC STIMULATION THERAPY: CPT | Mod: KX,PN

## 2023-07-10 NOTE — PROGRESS NOTES
[facility]   Physical Therapy Treatment Note   Name: Nicole BONILLA [facility] Number: [id number]    Therapy Diagnosis:   Encounter Diagnosis   Name Primary?    Myofascial pain syndrome, cervical Yes     Physician: Alcides Yancey DO    Visit Date: 7/10/2023    Medical Diagnosis from Referral: Degenerative Disc Disease, cervical; DDD Lumbar; myofascial pain syndrome - cervical/lumbar   Evaluation Date: 3/30/2023  Authorization Period Expiration: 3/30/2024  Plan of Care Expiration: 9/30/2023  Visit # / Visits authorized: 14   FOTO Visit #:  2/ 3    PTA Visit #: 0/5     Time In: 1:30 pm   Time Out:  2:30 pm    Total Billable Time: 53  minutes    SUBJECTIVE     Pt reports:  Usual complaints - neck, scapula and lower back pain;    She is somewhat compliant with home exercise program. - does them about 2-3x/week - all that she can handle at this time.   Response to previous treatment: gets a couple of days relief from pain with the dry needling   Functional change: is trying to do more at home when she can.     Pain: 5 / 10  Location: bilateral neck; upper traps; rhomboids; hips; low back       OBJECTIVE     Objective Measures updated at progress report unless specified.        Treatment     Nicole received the treatments listed below:      therapeutic exercises to develop ROM and posture for 10 minutes including:  Supine: head lifts for deep neck flexor strengthening x 6  Supine: OA flexion x 10  Supine: Serratus lifts x 10  Supine: bilateral UE flexion with towel roll supporting neck x 10  Supine: reverse flys with yellow tband x 10  Supine: horizontal abduction - alternate arms with yellow tband x 10   Wall Slides with end range stretch of shoulders x 2 mins       manual therapy techniques: Soft tissue Mobilization and dry needling  were applied to the: cervical,upper traps,scapular for 40 minutes, including:    Supine: cervical sub-occipital inhibition to address forward head posture and  reduce tension; MET for upper trap/levator stretch; STM rhomboids, light cranial lift; Patient positioned in prone, bolster under ankles, towel roll for forehead; LF-ES at 6hz per protocol for cervical radiculopathy with inclusion of rhomboids at medial borders of scapula and upper traps. 1.5 fb lateral to C2, 3, 4, 5,6, mm bellies of upper traps at trigger points; 3 points for rhomboids at medial border of scapula.  Needle twist for tissue grasp. Intensity of stimulation adjusted to patient tolerance;  Needles left in-situ x 20 mins. Removed needles, pressure applied to c-spine for several seconds to ensure no bleeding, inflammation                 neuromuscular re-education activities to improve:  for  minutes. The following activities were included:    therapeutic activities to improve functional performance for   minutes, including:          Patient Education and Home Exercises     Home Exercises Provided and Patient Education Provided     Education provided:   - Encouraged more exercise when able     Written Home Exercises Provided: yes. Exercises were reviewed and Nicole was able to demonstrate them prior to the end of the session.  Nicole demonstrated fair  understanding of the education provided. See EMR under Patient Instructions for exercises provided during therapy sessions    ASSESSMENT     Nicole continues to note neck/shoulder pain but stated that she gets at least a couple of days relief from the manual treatment;  Noted reduction in H/A symptoms today following the manual tx. Nicole demonstrated good tolerance of exercises noted above; continue to encourage HEP on daily basis.     Nicole Is progressing well towards her goals.   Pt prognosis is Fair.     Pt will continue to benefit from skilled outpatient physical therapy to address the deficits listed in the problem list box on initial evaluation, provide pt/family education and to maximize pt's level of independence in the home and community environment.      Pt's spiritual, cultural and educational needs considered and pt agreeable to plan of care and goals.     Anticipated barriers to physical therapy: none     Goals:  Short Term Goals: 3 weeks   Reduction in daily pain levels to no more than 6/10 for improved ability to perform daily tasks. > PROGRESSING  Compliant with HEP >PROGRESSING     Long Term Goals: 6 weeks   Reduction in daily pain levels to no more than 4/10 for improved ability to perform daily tasks.>PROGRESSING  Compliant with HEP >PROGRESING  Cervical AROM performed without increase in pain>PROGRESSING  Compliant with HEP >PROGRESING  Able to reach overhead shelf without increased pain. >PROGRESSING  Compliant with HEP >PROGRESING  FOTO limitation score improved to >PROGRESSING  Compliant with HEP >PROGRESING  Independent with HEP for continued improvement in function >PROGRESSING  Compliant with HEP >PROGRESING    PLAN     Continue with Skilled physical therapy per POC.      Quita Holliday, PT

## 2023-07-10 NOTE — PROGRESS NOTES
Occupational  Therapy Daily Treatment Note   Name: Nicole Harris 1970  MRN: [id number]    Visit Date: 7/10/2023  Visit #: 22/30  Authorization period Expiration: 12/31/2023    Plan of Care Expiration: 6/15/2023  Precautions: standard    Time In: 12:30  Time Out: 13:30  Total 1:1 Treatment Time: 60 min    Treatment Diagnosis:   Carpal tunnel release / cubital tunnel release    Physician: Alcides Yancey DO    Subjective   Pt reports:She was compliant with home exercise program.  Pain Scale:  2/10 on VAS currently  Pain Location: right wrist    Objective   Paraffin bath applied to the R hand for 10 mins prior to PROM.    Nicole performed the following supervised modalities:  IFC was applied to the R elbow @ 18.5 volts in order to improve blood flow to the impacted area and alleviate pain levels for 20 mins.      strengthening w hawk  activity.  Wrist ext/flexion arom w/ use of wrist exerciser . Patient performed fine motor strengthening w/ yellow theraputty. Patient performed wrist strengthening w/ use of wrist weight.   Home Exercises and Education Provided     Education provided re:   - progress towards goals   - role of therapy in multi - disciplinary team, goals for therapy  Pt educated on condition, POC, and expectations in therapy.  No spiritual or educational barriers to learning provided    Home exercises:  Pt will be provided HEP during course of treatment with progressions as appropriate. Pt was advised to perform these exercises free of pain, and to stop performing them if pain occurs.   Nicole demonstrated good  understanding of the education provided.     Assessment   Pt is showing signs of improvement in her R hand during grasping tasks, pt reporting the E-stim is making a difference along with improvement of AROM of the R hand /digit adduction .    Pt prognosis is Good. Pt will continue to benefit from skilled outpatient occupational therapy to address the  deficits listed in the problem list chart on initial evaluation, provide pt/family education and to maximize pt's level of independence in the home and community environment.     Medical necessity is demonstrated by the impairments and functional limitations listed on the Initial Evaluation.     Anticipated barriers to occupational therapy: none  Pt's spiritual, cultural and educational needs considered and pt agreeable to plan of care and goals.    Goals   Short term:  1.Pt will report R UE FOTO limitation of 50% by 4 weeks partial met  2. Pt will improve R hand functional  strength by 3 lbs in 3 weeks partial met  3. Pt will improve R wrist extension by 5 degrees in 4 weeks partial met     Long term:   Pt will report R UE FOTO limitation of 45% by dc ongoing  2. Pt will improve R hand functional  strength by 6 lbs by dc ongoing  3. Pt will improve R wrist extension by 10 degrees by dc met  4 Pt will report 100% compliance with HEP prior to dc met      Plan   OT EXTENDING PLAN OF CARE TODAY TO 6/15/2023 FOR AN ADDITIONAL 6 VISITS IN ORDER TO CONTINUE ESTABLISHED PLAN OF CARE AND WORK TOWARDS FUNCTIONAL GOALS.    Jacques Wolf, OT  7/10/2023

## 2023-07-11 ENCOUNTER — HOSPITAL ENCOUNTER (OUTPATIENT)
Dept: RADIOLOGY | Facility: HOSPITAL | Age: 53
Discharge: HOME | End: 2023-07-11
Attending: NURSE PRACTITIONER
Payer: COMMERCIAL

## 2023-07-11 DIAGNOSIS — M54.42 LUMBAGO WITH SCIATICA, LEFT SIDE: ICD-10-CM

## 2023-07-11 DIAGNOSIS — M25.552 PAIN IN LEFT HIP: ICD-10-CM

## 2023-07-17 ENCOUNTER — CLINICAL SUPPORT (OUTPATIENT)
Dept: REHABILITATION | Facility: HOSPITAL | Age: 53
End: 2023-07-17
Attending: ORTHOPAEDIC SURGERY
Payer: MEDICAID

## 2023-07-17 DIAGNOSIS — M79.18 MYOFASCIAL PAIN SYNDROME, CERVICAL: Primary | ICD-10-CM

## 2023-07-17 DIAGNOSIS — Z98.890 S/P CARPAL TUNNEL RELEASE: Primary | ICD-10-CM

## 2023-07-17 DIAGNOSIS — Z98.890 S/P CUBITAL TUNNEL RELEASE: ICD-10-CM

## 2023-07-17 PROCEDURE — 97032 APPL MODALITY 1+ESTIM EA 15: CPT | Mod: KX,PN,59

## 2023-07-17 PROCEDURE — 97018 PARAFFIN BATH THERAPY: CPT | Mod: 59,KX,PN

## 2023-07-17 PROCEDURE — 97140 MANUAL THERAPY 1/> REGIONS: CPT | Mod: KX,PN

## 2023-07-17 PROCEDURE — 97014 ELECTRIC STIMULATION THERAPY: CPT | Mod: KX,PN

## 2023-07-17 PROCEDURE — 97110 THERAPEUTIC EXERCISES: CPT | Mod: KX,PN

## 2023-07-17 PROCEDURE — 97530 THERAPEUTIC ACTIVITIES: CPT | Mod: KX,PN

## 2023-07-17 NOTE — PROGRESS NOTES
"                            Updated Occupational  Therapy  plan of care    Name: Nicole Harris 1970  MRN: [id number]    Visit Date: 7/17/2023  Visit #: 27/30  Authorization period Expiration: 12/31/2023    Plan of Care Expiration: 8/15/2023  Precautions: standard    Time In: 10:00  Time Out: 11:00  Total 1:1 Treatment Time: 60 min    Treatment Diagnosis:   Carpal tunnel release / cubital tunnel release    Physician: Alcides Yancey,     Subjective   Pt reports:"I feel like I need more therapy."  Pain Scale:  2/10 on VAS currently  Pain Location: right wrist    Objective   Paraffin bath applied to the R hand for 10 mins prior to PROM.    Nicole performed the following supervised modalities:  IFC was applied to the R elbow @ 18.5 volts in order to improve blood flow to the impacted area and alleviate pain levels for 20 mins.      strengthening w hawk  activity.  Wrist ext/flexion arom w/ use of 2# weight 2 set 15 reps. Patient performed fine motor strengthening w/ yellow theraputty. Patient performed wrist strengthening w/ use of 2# wrist weight fo pronation and supination.  Patient received scar massage to right wrist and elbow scar.   Home Exercises and Education Provided     Education provided re:   - progress towards goals   - role of therapy in multi - disciplinary team, goals for therapy  Pt educated on condition, POC, and expectations in therapy.  No spiritual or educational barriers to learning provided    Home exercises:  Pt will be provided HEP during course of treatment with progressions as appropriate. Pt was advised to perform these exercises free of pain, and to stop performing them if pain occurs.   Nicole demonstrated good  understanding of the education provided.     Assessment   Nicole is nearing her maximal potential w/ ot services, yet will benefit from approx 3 more visits for establishment  and mastery of final HEP.       Nicole is showing signs of improvement in her R hand during " grasping tasks, pt reporting the E-stim is making a difference along with improvement of AROM of the R hand /digit adduction .    Pt prognosis is Good. Pt will continue to benefit from skilled outpatient occupational therapy to address the deficits listed in the problem list chart on initial evaluation, provide pt/family education and to maximize pt's level of independence in the home and community environment.     Medical necessity is demonstrated by the impairments and functional limitations listed on the Initial Evaluation.     Anticipated barriers to occupational therapy: none  Pt's spiritual, cultural and educational needs considered and pt agreeable to plan of care and goals.    Goals   Short term:  1.Pt will report R UE FOTO limitation of 50% by 4 weeks partial met  2. Pt will improve R hand functional  strength by 3 lbs in 3 weeks partial met  3. Pt will improve R wrist extension by 5 degrees in 4 weeks partial met     Long term:   Pt will report R UE FOTO limitation of 45% by dc ongoing  2. Pt will improve R hand functional  strength by 6 lbs by dc ongoing  3. Pt will improve R wrist extension by 10 degrees by dc met  4 Pt will report 100% compliance with HEP prior to dc met      Plan   OT EXTENDING PLAN OF CARE TODAY TO 6/15/2023 FOR AN ADDITIONAL 6 VISITS IN ORDER TO CONTINUE ESTABLISHED PLAN OF CARE AND WORK TOWARDS FUNCTIONAL GOALS.    Jacques Wolf, OT  7/17/2023

## 2023-07-17 NOTE — PROGRESS NOTES
[facility]   Physical Therapy Treatment Note   Name: Nicole BONILLA [facility] Number: [id number]    Therapy Diagnosis:   Encounter Diagnosis   Name Primary?    Myofascial pain syndrome, cervical Yes     Physician: Alcides Yancey DO    Visit Date: 7/17/2023    Medical Diagnosis from Referral: Degenerative Disc Disease, cervical; DDD Lumbar; myofascial pain syndrome - cervical/lumbar   Evaluation Date: 3/30/2023  Authorization Period Expiration: 3/30/2024  Plan of Care Expiration: 9/30/2023  Visit # / Visits authorized: 16  FOTO Visit #:  2/ 3    PTA Visit #: 0/5     Time In: 11:00 am   Time Out:  12:00 pm    Total Billable Time: 55  minutes    SUBJECTIVE     Pt reports:  My anxiety is high today, don't know why.   Nicole reporting more pain in her shoulders / scapula today;   I think I'm going to try and find a yoga class that I can join;   She is somewhat compliant with home exercise program. - does them about 2-3x/week - all that she can handle at this time.   Response to previous treatment: gets a couple of days relief from pain with the dry needling   Functional change: is trying to do more at home when she can.     Pain: 5 / 10  Location: bilateral shoulders, scapula, rhomboids;        OBJECTIVE     Objective Measures updated at progress report unless specified.        Treatment     Nicole received the treatments listed below:      therapeutic exercises to develop ROM and posture for 10 minutes including:  Supine: head lifts for deep neck flexor strengthening x 6  Supine: OA flexion x 10  Supine: Serratus lifts x 10  Supine: bilateral UE flexion with towel roll supporting neck x 10  Supine: reverse flys with yellow tband x 10  Supine: horizontal abduction - alternate arms with yellow tband x 10   Wall Slides with end range stretch of shoulders x 2 mins       manual therapy techniques: Soft tissue Mobilization and dry needling  were applied to the: cervical,upper traps,scapular for  40 minutes, including:    Supine: cervical sub-occipital inhibition to address forward head posture and reduce tension; MET for upper trap/levator stretch; STM rhomboids, light cranial lift; Patient positioned in prone, bolster under ankles, towel roll for forehead; LF-ES at 6hz per protocol for rhomboids at medial borders of scapula and upper traps and upper Tspine: 1.5 fb lateral to T1, T3, T6 bilateral;  mm bellies of upper traps at trigger points; 3 points for rhomboids at medial border of scapula.  Needle twist for tissue grasp. Intensity of stimulation adjusted to patient tolerance;  Needles left in-situ x 20 mins. Removed needles, pressure applied to c-spine for several seconds to ensure no bleeding, inflammation                 neuromuscular re-education activities to improve:  for  minutes. The following activities were included:    therapeutic activities to improve functional performance for   minutes, including:          Patient Education and Home Exercises     Home Exercises Provided and Patient Education Provided     Education provided:   - Encouraged more exercise when able     Written Home Exercises Provided: yes. Exercises were reviewed and Nicole was able to demonstrate them prior to the end of the session.  Nicole demonstrated fair  understanding of the education provided. See EMR under Patient Instructions for exercises provided during therapy sessions    ASSESSMENT     Nicole continues to note neck/shoulder pain but stated that she gets at least a couple of days relief from the manual treatment;  Noted reduction in H/A symptoms today following the manual tx. Nicole demonstrated good tolerance of exercises noted above; continue to encourage HEP on daily basis. Strongly encouraged her interest in yoga - gave her ideas about looking on [employer] for free beginner classes.     Nicole Is progressing well towards her goals.   Pt prognosis is Fair.     Pt will continue to benefit from skilled outpatient  physical therapy to address the deficits listed in the problem list box on initial evaluation, provide pt/family education and to maximize pt's level of independence in the home and community environment.     Pt's spiritual, cultural and educational needs considered and pt agreeable to plan of care and goals.     Anticipated barriers to physical therapy: none     Goals:  Short Term Goals: 3 weeks   Reduction in daily pain levels to no more than 6/10 for improved ability to perform daily tasks. > PROGRESSING  Compliant with HEP >PROGRESSING     Long Term Goals: 6 weeks   Reduction in daily pain levels to no more than 4/10 for improved ability to perform daily tasks.>PROGRESSING  Compliant with HEP >PROGRESING  Cervical AROM performed without increase in pain>PROGRESSING  Compliant with HEP >PROGRESING  Able to reach overhead shelf without increased pain. >PROGRESSING  Compliant with HEP >PROGRESING  FOTO limitation score improved to >PROGRESSING  Compliant with HEP >PROGRESING  Independent with HEP for continued improvement in function >PROGRESSING  Compliant with HEP >PROGRESING    PLAN     Continue with Skilled physical therapy per POC.      Quita Holliday, PT

## 2023-07-28 ENCOUNTER — CLINICAL SUPPORT (OUTPATIENT)
Dept: REHABILITATION | Facility: HOSPITAL | Age: 53
End: 2023-07-28
Attending: ORTHOPAEDIC SURGERY
Payer: MEDICAID

## 2023-07-28 DIAGNOSIS — M79.18 MYOFASCIAL PAIN SYNDROME, CERVICAL: Primary | ICD-10-CM

## 2023-07-28 PROCEDURE — 97140 MANUAL THERAPY 1/> REGIONS: CPT | Mod: PN

## 2023-07-28 NOTE — PROGRESS NOTES
[facility]   Physical Therapy Treatment Note   Name: Nicole BONILLA [facility] Number: [id number]    Therapy Diagnosis:   Encounter Diagnosis   Name Primary?    Myofascial pain syndrome, cervical Yes     Physician: Alcides Yancey DO    Visit Date: 7/28/2023    Medical Diagnosis from Referral: Degenerative Disc Disease, cervical; DDD Lumbar; myofascial pain syndrome - cervical/lumbar   Evaluation Date: 3/30/2023  Authorization Period Expiration: 3/30/2024  Plan of Care Expiration: 9/30/2023  Visit # / Visits authorized: 17   FOTO Visit #:  2/ 3    PTA Visit #: 0/5     Time In: 12:30 pm   Time Out:  1:20 pm    Total Billable Time: 50 minutes    SUBJECTIVE     Pt reports:  I didn't sleep well last night, I just hurt everywhere, I think it is the heat.. Feel like my back needs to crack.   She is somewhat compliant with home exercise program. - does them about 2-3x/week - all that she can handle at this time.   Response to previous treatment: gets a couple of days relief from pain with the dry needling   Functional change: is trying to do more at home when she can,  it's just been too hot outside to do anything.     Pain: 6/ 10  Location: bilateral shoulders, scapula, rhomboids;         OBJECTIVE     Objective Measures updated at progress report unless specified.        Treatment     Nicole received the treatments listed below:      therapeutic exercises to develop ROM and posture for 0 minutes including:  Supine: head lifts for deep neck flexor strengthening x 6  Supine: OA flexion x 10  Supine: Serratus lifts x 10  Supine: bilateral UE flexion with towel roll supporting neck x 10  Supine: reverse flys with yellow tband x 10  Supine: horizontal abduction - alternate arms with yellow tband x 10   Wall Slides with end range stretch of shoulders x 2 mins       manual therapy techniques: Soft tissue Mobilization and dry needling  were applied to the: cervical,upper traps,scapular, lumbar spine   for 45 minutes, including:    Supine: cervical sub-occipital inhibition to address forward head posture and reduce tension; MET for upper trap/levator stretch; STM rhomboids, light cranial lift; Patient positioned in S/L - flexion to right/left lumbar spine with blocking of segment above for max facet movement;  Prone - mobilization of thoracic spine - grade II > III extension - targeting T8 - T11.  Parapsinal skin rolling with IASTM to follow.          neuromuscular re-education activities to improve:  for  minutes. The following activities were included:    therapeutic activities to improve functional performance for   minutes, including:          Patient Education and Home Exercises     Home Exercises Provided and Patient Education Provided     Education provided:   - Encouraged more exercise when able     Written Home Exercises Provided: yes. Exercises were reviewed and Nicole was able to demonstrate them prior to the end of the session.  Nicole demonstrated fair  understanding of the education provided. See EMR under Patient Instructions for exercises provided during therapy sessions    ASSESSMENT     Nicole continues to note neck/shoulder pain but stated that she gets at least a couple of days relief from the manual treatment;  Having some thoracic pain today - better with manual tx.  Nicole demonstrated good tolerance of exercises noted above; continue to encourage HEP on daily basis. Strongly encouraged her interest in yoga - gave her ideas about looking on [employer] for free beginner classes.     Nicole Is progressing well towards her goals.   Pt prognosis is Fair.     Pt will continue to benefit from skilled outpatient physical therapy to address the deficits listed in the problem list box on initial evaluation, provide pt/family education and to maximize pt's level of independence in the home and community environment.     Pt's spiritual, cultural and educational needs considered and pt agreeable to plan of  care and goals.     Anticipated barriers to physical therapy: none     Goals:  Short Term Goals: 3 weeks   Reduction in daily pain levels to no more than 6/10 for improved ability to perform daily tasks. > PROGRESSING  Compliant with HEP >PROGRESSING     Long Term Goals: 6 weeks   Reduction in daily pain levels to no more than 4/10 for improved ability to perform daily tasks.>PROGRESSING  Compliant with HEP >PROGRESING  Cervical AROM performed without increase in pain>PROGRESSING  Compliant with HEP >PROGRESING  Able to reach overhead shelf without increased pain. >PROGRESSING  Compliant with HEP >PROGRESING  FOTO limitation score improved to >PROGRESSING  Compliant with HEP >PROGRESING  Independent with HEP for continued improvement in function >PROGRESSING  Compliant with HEP >PROGRESING    PLAN     Continue with Skilled physical therapy per POC.      Quita Holliday, PT

## 2023-08-31 ENCOUNTER — PATIENT MESSAGE (OUTPATIENT)
Dept: PAIN MEDICINE | Facility: CLINIC | Age: 53
End: 2023-08-31
Payer: MEDICAID

## 2023-09-01 DIAGNOSIS — M79.18 MYOFASCIAL PAIN: ICD-10-CM

## 2023-09-01 DIAGNOSIS — M51.36 DDD (DEGENERATIVE DISC DISEASE), LUMBAR: ICD-10-CM

## 2023-09-01 DIAGNOSIS — M54.12 CERVICAL RADICULOPATHY: Primary | ICD-10-CM

## 2023-09-08 ENCOUNTER — PATIENT MESSAGE (OUTPATIENT)
Dept: PAIN MEDICINE | Facility: CLINIC | Age: 53
End: 2023-09-08
Payer: MEDICAID

## 2023-09-08 DIAGNOSIS — M54.12 CERVICAL RADICULOPATHY: Primary | ICD-10-CM

## 2023-09-08 NOTE — TELEPHONE ENCOUNTER
Injection, Steroid, Epidural  MEHRAN C6-C7  last done 11/22 pt now has medicare last seen 04/2023 can she have a repeat injection?

## 2023-09-25 ENCOUNTER — PATIENT MESSAGE (OUTPATIENT)
Dept: PAIN MEDICINE | Facility: CLINIC | Age: 53
End: 2023-09-25
Payer: MEDICAID

## 2023-09-27 ENCOUNTER — HOSPITAL ENCOUNTER (OUTPATIENT)
Dept: RADIOLOGY | Facility: HOSPITAL | Age: 53
Discharge: HOME | End: 2023-09-27
Attending: NURSE PRACTITIONER
Payer: COMMERCIAL

## 2023-09-27 DIAGNOSIS — R14.0 BLOATING: ICD-10-CM

## 2023-09-27 PROCEDURE — 76700 US EXAM ABDOM COMPLETE: CPT | Mod: TC

## 2023-09-27 PROCEDURE — 76700 US EXAM ABDOM COMPLETE: CPT | Mod: 26,,, | Performed by: RADIOLOGY

## 2023-09-27 PROCEDURE — 76700 US ABDOMEN COMPLETE: ICD-10-PCS | Mod: 26,,, | Performed by: RADIOLOGY

## 2023-09-30 ENCOUNTER — PATIENT MESSAGE (OUTPATIENT)
Dept: PAIN MEDICINE | Facility: CLINIC | Age: 53
End: 2023-09-30
Payer: MEDICAID

## 2023-10-02 ENCOUNTER — CLINICAL SUPPORT (OUTPATIENT)
Dept: REHABILITATION | Facility: HOSPITAL | Age: 53
End: 2023-10-02
Attending: ORTHOPAEDIC SURGERY
Payer: MEDICARE

## 2023-10-02 DIAGNOSIS — M54.12 CERVICAL RADICULOPATHY: ICD-10-CM

## 2023-10-02 DIAGNOSIS — Z74.09 IMPAIRED FUNCTIONAL MOBILITY AND ACTIVITY TOLERANCE: Primary | ICD-10-CM

## 2023-10-02 DIAGNOSIS — M51.36 DDD (DEGENERATIVE DISC DISEASE), LUMBAR: ICD-10-CM

## 2023-10-02 DIAGNOSIS — M79.18 MYOFASCIAL PAIN: ICD-10-CM

## 2023-10-02 PROCEDURE — 97161 PT EVAL LOW COMPLEX 20 MIN: CPT | Mod: PN

## 2023-10-02 PROCEDURE — 97140 MANUAL THERAPY 1/> REGIONS: CPT | Mod: PN

## 2023-10-02 NOTE — PLAN OF CARE
[facility]   Physical Therapy Initial Evaluation      Name: Nicole BONILLA [facility] Number: [id number]    Therapy Diagnosis:   Encounter Diagnoses   Name Primary?    Cervical radiculopathy     DDD (degenerative disc disease), lumbar     Myofascial pain     Impaired functional mobility and activity tolerance Yes        Physician: Brii Mcneil, NP    Physician Orders: PT Eval and Treat   Medical Diagnosis from Referral:   M54.12 (ICD-10-CM) - Cervical radiculopathy   M51.36 (ICD-10-CM) - DDD (degenerative disc disease), lumbar   M79.18 (ICD-10-CM) - Myofascial pain       Evaluation Date: 10/2/2023  Authorization Period Expiration: 10/2/2024  Plan of Care Expiration: 01/02/2024  Progress Note Due: 11/15/2023  Date of Surgery: n/a   Visit # / Visits authorized: 1/ 1   FOTO: 1/ 3    Precautions: Standard     Time In: 8:00   Time Out: 9:00   Total Billable Time: 60 minutes    Subjective     Date of onset: Chronic neck/back and myofascial pain     History of current condition - Nicole reports: long history of neck and lower back pain; she reports migraine headache now for about 3 weeks - primarily left side of her neck; c/o bilateral shoulder pain; arms/hands go numb; reports difficulty lifting things with any weight. She had been coming to therapy - last seen 7/28/2023 then had to quit coming secondary to insurance problems.     Falls: no reported falls     Imaging: : nothing current; but is scheduled for an SILVIA 10/13/2023     Prior Therapy: Nicole has been seen multiple times in PT over the past 3-4 years; most recently from 3/2023 to 7/2023  Social History: lives alone    Occupation: does not work, disabled   Prior Level of Function: Independent, but sedentary, has good/bad days; bad days she just stays in the house in bed, watching TV   Current Level of Function: Trying to do a little more in the way of exercise when she is feeling good; Remains Independent     Pain:  Current 8/10, worst  10/10, best -5/10   Location: bilateral  sides of cervical spine; also periscapular area/thoracic   Description: Aching, Burning, Grabbing, Tingling, and Numb  Aggravating Factors: Sitting, Standing, Walking, Night Time, and movement in general   Easing Factors: ice, stretching - sometimes     Patients goals: To relieve her pain      Medical History:   Past Medical History:   Diagnosis Date    Anxiety     Bipolar disorder     Depression     Diabetes mellitus, type 2     Fibromyalgia     HTN (hypertension)     Insomnia     Migraine        Surgical History:   Nicole Harris  has a past surgical history that includes  section; Tonsillectomy; Epidural steroid injection (N/A, 2019); Epidural steroid injection (N/A, 2019); Epidural steroid injection (N/A, 2019); Trigger point injection (N/A, 2019); Colonoscopy (N/A, 2019); Esophagogastroduodenoscopy (N/A, 2019); Epidural steroid injection (N/A, 10/21/2019); Trigger point injection (N/A, 10/21/2019); ANGIOGRAM, CORONARY, WITH LEFT HEART CATHETERIZATION (Left, 2020); Coronary Artery Bypass Graft (CABG) (N/A, 2020); Endoscopic harvest of vein (Left, 2020); Cardiac catheterization; triple bypass; Epidural steroid injection (N/A, 10/1/2020); Trigger point injection (N/A, 10/1/2020); Epidural steroid injection (N/A, 2021); Injection of anesthetic agent around nerve (Bilateral, 2021); Epidural steroid injection (N/A, 9/15/2021); Epidural steroid injection into cervical spine (N/A, 3/2/2022); Epidural steroid injection (N/A, 2022); Epidural steroid injection (N/A, 2022); Carpal tunnel release (Right, 2023); and decompression, nerve, ulnar (Right, 2023).    Medications:   Nicole has a current medication list which includes the following prescription(s): albuterol, aspirin, atorvastatin, azelastine, cetirizine, cyclobenzaprine, empagliflozin, escitalopram oxalate, fluconazole, fluticasone propionate,  insulin glargine,hum.rec.anlog, levocetirizine, lisinopril, metformin, montelukast, naproxen sodium, nortriptyline, ozempic, pantoprazole, and true metrix glucose test strip, and the following Facility-Administered Medications: sodium chloride 0.9%, lactated ringers, lactated ringers, and lactated ringers.    Allergies:   Review of patient's allergies indicates:   Allergen Reactions    Doxycycline     Hydrocodone Nausea And Vomiting    Vaccine adjuvant system, as01b liposomal     Varicella-zoster ge-as01b (pf)      Other reaction(s): Memory problems    Varicella-zoster virus glycoprotein e, recombinant     Benadryl [diphenhydramine hcl] Nausea Only      Observation: Nicole is alert/oriented x 4; appears in no acute distress     Posture:     -       Rounded shoulders  -       Forward head  -       Posterior pelvic tilt    Cervical Range of Motion:    Degrees Pain   Flexion 58    Extension 52 Pinches on Right    Left Rotation 50    Right Rotation 52    Left Side Bending 30    Right Side Bending 15 Increased pain on left       Shoulder Range of Motion:   Left:  WFL - decreased quality of motion into IR/ER, but functional   Right: WFL - as above, IR/ER quality of motion is decreased     Strength:   Left Right   DNF Fair minus     Upper trap 4-/5 4-/5   Mid trap 3+/5 3+/5   Lower trap 3/5 3/5   Rhomboids 3+/5 3+/5    20 lb   20 lb      Upper Extremity Strength   Left Right   Shoulder flexion: 4-/5 4-/5   Shoulder Abduction: 3+/5 3+/5   Shoulder ER 3+/5 3+/5   Shoulder IR 4-/5 4-/5   Elbow flexion: 4-/5 4-/5   Elbow extension: 3+/5 3+/5   Wrist flexion: 3+/5 3+/5   Wrist extension: 3+/5 3+/5       Special Tests:  Distraction Negative   Compression Negative   Spurlings Negative   Sharp-Lizz Negative   VA test Negative   Lateral Flexion Alar Ligament Negative     Upper Limb Neurodynamic testing:   Left  Right   UNT Negative  Negative   MNT Negative  Negative   RNT Negative  Negative     Joint Mobility: OA restriction  in to extension, also noted at C7/T1; mid-cervical segment restriction movement to right /for left rotation of neck; also has facet restriction into Side bending at mid level segments on right and left ,          Thoracic mobility: postural restriction into extension at upper segments;      Palpation: moderate tenderness to palpation at occipital, cervical paraspinals into bilateral upper traps/levator to medial border of scapula. Fascia restriction noted at Tp's C3, 4, 5, on right.      Sensation: intact to light touch BUE's      Flexibility:                Upper Trap = R moderate restriction, L moderate restriction              Scalenes: R moderate restriction, L moderate restriction              SCM: R minimal restriction, L minimal restriction              Levator Scap: R moderate restriction, L moderate restriction     PT Evaluation Completed? Yes  Discussed Plan of Care with patient: Yes     Objective          Intake Outcome Measure for FOTO Cervical  Survey (NDI)     Therapist reviewed FOTO scores for Nicole Harris on 10/2/2023.   FOTO report - see Media section or FOTO account episode details.    Intake Score: 84  - Higher score = greater disability          Treatment     Total Treatment time (time-based codes) separate from Evaluation: 25 minutes     Nicole received the treatments listed below:        manual therapy techniques: Joint mobilizations and Soft tissue Mobilization were applied to the: cervical/periscapular - right and left  for 25 minutes, including:   Functional Dry Needling and joint mobilization   See EMR under MEDIA for written consent provided.  Supine: cervical sub-occipital inhibition to address forward head posture and reduce tension; MET for upper trap/levator stretch; STM rhomboids, light cranial lift; Patient positioned in prone, bolster under ankles, towel roll for forehead; LF-ES at 6hz per protocol for rhomboids at medial borders of scapula and upper traps and upper Tspine: 1.5  fb lateral to T1, T3, T6 bilateral;  mm bellies of upper traps at trigger points; 3 points for rhomboids at medial border of scapula.  Needle twist for tissue grasp. Intensity of stimulation adjusted to patient tolerance;  Needles left in-situ x 20 mins. Removed needles, pressure applied to c-spine for several seconds to ensure no bleeding, inflammation          Patient Education and Home Exercises     Education provided:   - Role of PT - will perform FDN along with other manual therapy techniques, but explained to Nicole that treatment would include exercise this time as well.  Needs to develop some mm support for her spine/joints in order for manual tx to be effective for anything but a few days.  Nicole indicated understanding of this.     Written Home Exercises Provided:   No HEP given at this time.  Will issue one as visits progress.   Nicole demonstrated good  understanding of the education provided.     Assessment     Nicole is a 53 y.o. female referred to outpatient Physical Therapy with a medical diagnosis of Cervical radiculopathy, Lumbar DDD, myofascial pain. Patient presents with impaired activity tolerance and ability to perform daily chores as result of her chronic pain situation.  She has been to therapy many times in the past - starts out well, then falls off in terms of exercise, activities, and attendance.  Explained to patient the attendance at appointments is required as is performance of a HEP to help support what we do in the clinic.  She stated that she understood, and was ready to start doing more in terms of exercise.     Patient prognosis is Fair.   Patient will benefit from skilled outpatient Physical Therapy to address the deficits stated above and in the chart below, provide patient /family education, and to maximize patientt's level of independence.     Plan of care discussed with patient: Yes  Patient's spiritual, cultural and educational needs considered and patient is agreeable to the  plan of care and goals as stated below:     Anticipated Barriers for therapy: compliance     Medical Necessity is demonstrated by the following  History  Co-morbidities and personal factors that may impact the plan of care [] LOW: no personal factors / co-morbidities  [x] MODERATE: 1-2 personal factors / co-morbidities  [] HIGH: 3+ personal factors / co-morbidities    Moderate / High Support Documentation:   Co-morbidities affecting plan of care: chronicity of pain, depression, bi-polar, diabetes     Personal Factors:   coping style  lifestyle     Examination  Body Structures and Functions, activity limitations and participation restrictions that may impact the plan of care [x] LOW: addressing 1-2 elements  [] MODERATE: 3+ elements  [] HIGH: 4+ elements (please support below)    Moderate / High Support Documentation:      Clinical Presentation [x] LOW: stable  [] MODERATE: Evolving  [] HIGH: Unstable     Decision Making/ Complexity Score: low       Goals:Short Term Goals: 3 weeks   Reduction in daily pain levels to no more than 6/10 for improved ability to perform daily tasks.   Compliant with HEP      Long Term Goals: 6 weeks   Reduction in daily pain levels to no more than 4/10 for improved ability to perform daily tasks.  Cervical AROM performed without increase in pain  Able to reach overhead shelf without increased pain.   FOTO limitation score improved to   Independent with HEP for continued improvement in function         Plan     Plan of care Certification: 10/2/2023 to 01/02/2024.    Outpatient Physical Therapy 2 times weekly for 6 weeks to include the following interventions: Manual Therapy, Moist Heat/ Ice, Neuromuscular Re-ed, Patient Education, Therapeutic Activities, and Therapeutic Exercise.     Quita Holliday, PT        Physician's Signature: _________________________________________ Date: ________________

## 2023-10-11 ENCOUNTER — CLINICAL SUPPORT (OUTPATIENT)
Dept: REHABILITATION | Facility: HOSPITAL | Age: 53
End: 2023-10-11
Payer: MEDICARE

## 2023-10-11 DIAGNOSIS — Z74.09 IMPAIRED FUNCTIONAL MOBILITY AND ACTIVITY TOLERANCE: Primary | ICD-10-CM

## 2023-10-11 PROCEDURE — 97110 THERAPEUTIC EXERCISES: CPT | Mod: KX,PN,CQ

## 2023-10-11 NOTE — PROGRESS NOTES
[facility]   Physical Therapy Treatment Note      Name: Nicole BONILLA [facility] Number: [id number]    Therapy Diagnosis:   Encounter Diagnosis   Name Primary?    Impaired functional mobility and activity tolerance Yes     Physician: Brii Mcneil NP    Visit Date: 10/11/2023    Physician Orders: PT Eval and Treat   Medical Diagnosis from Referral:   M54.12 (ICD-10-CM) - Cervical radiculopathy   M51.36 (ICD-10-CM) - DDD (degenerative disc disease), lumbar   M79.18 (ICD-10-CM) - Myofascial pain         Evaluation Date: 10/2/2023  Authorization Period Expiration: 10/2/2024  Plan of Care Expiration: 01/02/2024  Progress Note Due: 11/15/2023  Date of Surgery: n/a   Visit # / Visits authorized: 1 / 12   FOTO: 1/ 3     Precautions: Standard      Time In: 10:00 AM  Time Out: 10:50 AM  Total Billable Time: 45 minutes      PTA Visit #: 1/5       Subjective     Patient reports: I am hurting today- been up all night.  She was not compliant with home exercise program as one has not been issued yet.   Response to previous treatment: N/A  Functional change: N/A    Pain: 8/10  Location:  Throughout body     Objective      Objective Measures updated at progress report unless specified.     Treatment     Nicole received the treatments listed below:      therapeutic exercises to develop strength, endurance, and flexibility for 45 minutes including:  Nustep level 1 x 15 min 30 sec  Wall Slides x 10  Lat Pulls w/ BTB x 10  3 Way Scapular Retraction w/ BTB x 10  Supine Shld extension w/ RTB x 10  Serratus Lits w/ 2# bar x 10  Supine Shld Flex w/ 2# bar x 10  Chest Press w/ 2# bar x 10    manual therapy techniques:  were applied to the:  for  minutes, including:      neuromuscular re-education activities to improve:  for  minutes. The following activities were included:      therapeutic activities to improve functional performance for   minutes, including:      gait training to improve functional mobility  and safety for   minutes, including:      direct contact modalities after being cleared for contraindications:     supervised modalities after being cleared for contradictions:     hot pack for  minutes to .    cold pack for  minutes to .    Patient Education and Home Exercises       Education provided:       Written Home Exercises Provided:  Will issue HEP as visits go on . Exercises were reviewed and Nicole was able to demonstrate them prior to the end of the session.  Nicole demonstrated good  understanding of the education provided. See Electronic Medical Record under Patient Instructions for exercises provided during therapy sessions    Assessment     Patient did ok with exercises; no exacerbations noted. General weakness and moderate to severe pain noted with all activities.    Nicole Is progressing well towards her goals.   Patient prognosis is Fair.     Patient will continue to benefit from skilled outpatient physical therapy to address the deficits listed in the problem list box on initial evaluation, provide pt/family education and to maximize pt's level of independence in the home and community environment.     Patient's spiritual, cultural and educational needs considered and pt agreeable to plan of care and goals.     Anticipated barriers to physical therapy: Compliance    Goals:   Short Term Goals: 3 weeks   Reduction in daily pain levels to no more than 6/10 for improved ability to perform daily tasks.   Compliant with HEP      Long Term Goals: 6 weeks   Reduction in daily pain levels to no more than 4/10 for improved ability to perform daily tasks.  Cervical AROM performed without increase in pain  Able to reach overhead shelf without increased pain.   FOTO limitation score improved to   Independent with HEP for continued improvement in function     Plan     Continue per POC and progress with strength/mobility exercises as patient tolerates.    Outpatient Physical Therapy 2 times weekly for 6 weeks to  include the following interventions: Manual Therapy, Moist Heat/ Ice, Neuromuscular Re-ed, Patient Education, Therapeutic Activities, and Therapeutic Exercise.     Jonathan Favre, PTA

## 2023-10-13 ENCOUNTER — HOSPITAL ENCOUNTER (OUTPATIENT)
Facility: HOSPITAL | Age: 53
Discharge: HOME | End: 2023-10-13
Attending: ANESTHESIOLOGY | Admitting: ANESTHESIOLOGY
Payer: MEDICARE

## 2023-10-13 DIAGNOSIS — M54.12 CERVICAL RADICULITIS: ICD-10-CM

## 2023-10-13 LAB — POCT GLUCOSE: 95 MG/DL (ref 70–110)

## 2023-10-13 PROCEDURE — 25000003 PHARM REV CODE 250: Performed by: ANESTHESIOLOGY

## 2023-10-13 PROCEDURE — 62321 PR INJ CERV/THORAC, W/GUIDANCE: ICD-10-PCS | Mod: ,,, | Performed by: ANESTHESIOLOGY

## 2023-10-13 PROCEDURE — 62321 NJX INTERLAMINAR CRV/THRC: CPT | Performed by: ANESTHESIOLOGY

## 2023-10-13 PROCEDURE — A4216 STERILE WATER/SALINE, 10 ML: HCPCS | Performed by: ANESTHESIOLOGY

## 2023-10-13 PROCEDURE — 63600175 PHARM REV CODE 636 W HCPCS: Mod: UD | Performed by: ANESTHESIOLOGY

## 2023-10-13 PROCEDURE — 25500020 PHARM REV CODE 255: Performed by: ANESTHESIOLOGY

## 2023-10-13 PROCEDURE — 62321 NJX INTERLAMINAR CRV/THRC: CPT | Mod: ,,, | Performed by: ANESTHESIOLOGY

## 2023-10-13 RX ORDER — DEXAMETHASONE SODIUM PHOSPHATE 10 MG/ML
INJECTION INTRAMUSCULAR; INTRAVENOUS
Status: DISCONTINUED | OUTPATIENT
Start: 2023-10-13 | End: 2023-10-13 | Stop reason: HOSPADM

## 2023-10-13 RX ORDER — LIDOCAINE HYDROCHLORIDE 10 MG/ML
INJECTION, SOLUTION EPIDURAL; INFILTRATION; INTRACAUDAL; PERINEURAL
Status: DISCONTINUED | OUTPATIENT
Start: 2023-10-13 | End: 2023-10-13 | Stop reason: HOSPADM

## 2023-10-13 RX ORDER — SODIUM CHLORIDE 9 MG/ML
INJECTION, SOLUTION INTRAMUSCULAR; INTRAVENOUS; SUBCUTANEOUS
Status: DISCONTINUED | OUTPATIENT
Start: 2023-10-13 | End: 2023-10-13 | Stop reason: HOSPADM

## 2023-10-13 RX ORDER — FENTANYL CITRATE 50 UG/ML
INJECTION, SOLUTION INTRAMUSCULAR; INTRAVENOUS
Status: DISCONTINUED | OUTPATIENT
Start: 2023-10-13 | End: 2023-10-13 | Stop reason: HOSPADM

## 2023-10-13 RX ORDER — SODIUM CHLORIDE, SODIUM LACTATE, POTASSIUM CHLORIDE, CALCIUM CHLORIDE 600; 310; 30; 20 MG/100ML; MG/100ML; MG/100ML; MG/100ML
INJECTION, SOLUTION INTRAVENOUS CONTINUOUS
Status: ACTIVE | OUTPATIENT
Start: 2023-10-13

## 2023-10-13 RX ORDER — MIDAZOLAM HYDROCHLORIDE 1 MG/ML
INJECTION INTRAMUSCULAR; INTRAVENOUS
Status: DISCONTINUED | OUTPATIENT
Start: 2023-10-13 | End: 2023-10-13 | Stop reason: HOSPADM

## 2023-10-13 RX ORDER — LIDOCAINE HYDROCHLORIDE 10 MG/ML
1 INJECTION, SOLUTION EPIDURAL; INFILTRATION; INTRACAUDAL; PERINEURAL ONCE
Status: COMPLETED | OUTPATIENT
Start: 2023-10-13 | End: 2023-10-13

## 2023-10-13 RX ADMIN — SODIUM CHLORIDE, POTASSIUM CHLORIDE, SODIUM LACTATE AND CALCIUM CHLORIDE: 600; 310; 30; 20 INJECTION, SOLUTION INTRAVENOUS at 02:10

## 2023-10-13 RX ADMIN — LIDOCAINE HYDROCHLORIDE 10 MG: 10 SOLUTION INTRAVENOUS at 01:10

## 2023-10-13 RX ADMIN — SODIUM CHLORIDE, POTASSIUM CHLORIDE, SODIUM LACTATE AND CALCIUM CHLORIDE: 600; 310; 30; 20 INJECTION, SOLUTION INTRAVENOUS at 01:10

## 2023-10-13 NOTE — OP NOTE
PROCEDURE DATE: 10/13/2023    Procedure: C6-7 cervical interlaminar epidural steroid injection under utilizing fluoroscopy.    Diagnosis: Cervical Degenerative Disc Diease; Cervical Radiculitis  POSTOP DIAGNOSIS: SAME    Physician: Umang Huggins MD    Medications injected:  Dexamethasone 10mg followed by a slow injection of 4mL sterile, preservative-free normal saline.    Local anesthetic used: Lidocaine 1%, 2 ml.    Sedation Medications: RN IV sedation    Complications:  None     Estimated blood loss: None    Technique:  A time-out was taken to identify patient and procedure prior to starting the procedure.  With the patient laying in a prone position with the neck in a mid-flexed forward position, the area was prepped and draped in the usual sterile fashion using ChloraPrep and a fenestrated drape.  The area was determined under AP fluoroscopic guidance.  Local anesthetic was given using a 25-gauge 1.5 inch needle by raising a wheal and then infiltrating ventrally.  A 3.5 inch 20-gauge Touhy needle was introduced under fluoroscopic guidance to meet the lamina of C7.  The needle was then hinged under the lamina then advanced using loss of resistance technique.  Once the tip of the needle was in the desired position, the 1ml contrast dye  was injected to determine placement and no uptake.  The steroid was then injected slowly followed by a slow injection of 4 mL of the sterile preservative-free normal saline.  The patient tolerated the procedure well.    The patient was monitored after the procedure and was given post-procedure and discharge instructions to follow at home. The patient was discharged in a stable condition.

## 2023-10-13 NOTE — DISCHARGE SUMMARY
[facility] - Periop Services  Discharge Note  Short Stay    Procedure(s) (LRB):  Injection-steroid-epidural-cervical (N/A)      OUTCOME: Patient tolerated treatment/procedure well without complication and is now ready for discharge.    DISPOSITION: Home or Self Care    FINAL DIAGNOSIS:  <principal problem not specified>    FOLLOWUP: In clinic    DISCHARGE INSTRUCTIONS:    Discharge Procedure Orders   Notify your health care provider if you experience any of the following:  temperature >100.4     Notify your health care provider if you experience any of the following:  severe uncontrolled pain     Notify your health care provider if you experience any of the following:  redness, tenderness, or signs of infection (pain, swelling, redness, odor or green/yellow discharge around incision site)     Activity as tolerated        TIME SPENT ON DISCHARGE: 30 minutes

## 2023-10-13 NOTE — PLAN OF CARE
Discharge instructions given to pt, verbalized understanding.  Tolerating PO fluids.  IV removed.  No c/o pain.  Ambulating out to bf  per RN in no distress.

## 2023-10-13 NOTE — H&P
CC: neck pain    HPI: The patient is a 53 y.o. female with a history of neck pain here for SILVIA. There are no major changes in history and physical from 2023 by Brii.    Past Medical History:   Diagnosis Date    Anxiety     Bipolar disorder     Depression     Diabetes mellitus, type 2     Fibromyalgia     HTN (hypertension)     Insomnia     Migraine        Past Surgical History:   Procedure Laterality Date    ANGIOGRAM, CORONARY, WITH LEFT HEART CATHETERIZATION Left 2020    Procedure: Left heart cath;  Surgeon: Kannan Santana MD;  Location: [facility] CATH/EP LAB;  Service: Cardiology;  Laterality: Left;    CARDIAC CATHETERIZATION      CARPAL TUNNEL RELEASE Right 2023    Procedure: Endoscopic VS. Open Carpal Tunnel Release;  Surgeon: Alcides Yancey DO;  Location: [facility] OR;  Service: Orthopedics;  Laterality: Right;     SECTION      COLONOSCOPY N/A 2019    Procedure: COLONOSCOPY;  Surgeon: Da Diallo MD;  Location: [facility] ENDO;  Service: General;  Laterality: N/A;    CORONARY ARTERY BYPASS GRAFT (CABG) N/A 2020    Procedure: CORONARY ARTERY BYPASS GRAFT (CABG);  Surgeon: Anshul Oakes MD;  Location: [facility] OR;  Service: Cardiothoracic;  Laterality: N/A;    DECOMPRESSION, NERVE, ULNAR Right 2023    Procedure: DECOMPRESSION, NERVE, ULNAR;  Surgeon: Alcides Yancey DO;  Location: [facility] OR;  Service: Orthopedics;  Laterality: Right;    ENDOSCOPIC HARVEST OF VEIN Left 2020    Procedure: SURGICAL PROCUREMENT, VEIN, ENDOSCOPIC;  Surgeon: Anshul Oakes MD;  Location: [facility] OR;  Service: Cardiothoracic;  Laterality: Left;    EPIDURAL STEROID INJECTION N/A 2019    Procedure: Injection, Steroid, Epidural - L4/5 EPIDURAL STEROID INJECTION;  Surgeon: Sherri Gardiner MD;  Location: [facility] OR;  Service: Pain Management;  Laterality: N/A;    EPIDURAL STEROID INJECTION N/A 2019    Procedure: Injection, Steroid, Epidural - C7/T1 EPIDURAL STEROID INJECTION;  Surgeon: Sherri JUAREZ  MD Zuleyka;  Location: [facility] OR;  Service: Pain Management;  Laterality: N/A;    EPIDURAL STEROID INJECTION N/A 7/8/2019    Procedure: Injection, Steroid, Epidural - L4/5 EPIDURAL STEROID INJECTION;  Surgeon: Sherri Gardiner MD;  Location: [facility] OR;  Service: Pain Management;  Laterality: N/A;    EPIDURAL STEROID INJECTION N/A 10/21/2019    Procedure: Injection, Steroid, Epidural, CERVICAL C7/T1 SILVIA;  Surgeon: Sherri Gardiner MD;  Location: [facility] OR;  Service: Pain Management;  Laterality: N/A;  09-23-19    EPIDURAL STEROID INJECTION N/A 10/1/2020    Procedure: Cervical SILVIA C7-T1;  Surgeon: Emily José MD;  Location: [facility] OR;  Service: Pain Management;  Laterality: N/A;    EPIDURAL STEROID INJECTION N/A 1/7/2021    Procedure: Lumbar SILVIA L4-L5;  Surgeon: Emily José MD;  Location: [facility] OR;  Service: Pain Management;  Laterality: N/A;    EPIDURAL STEROID INJECTION N/A 9/15/2021    Procedure: Injection, Steroid, Epidural, Cervical. C5 - C6;  Surgeon: Emily José MD;  Location: [facility] OR;  Service: Pain Management;  Laterality: N/A;    EPIDURAL STEROID INJECTION N/A 6/29/2022    Procedure: Injection, Steroid, Epidural MEHRAN C5-C6;  Surgeon: Emily José MD;  Location: [facility] OR;  Service: Pain Management;  Laterality: N/A;    EPIDURAL STEROID INJECTION N/A 11/2/2022    Procedure: Injection, Steroid, Epidural  MEHRAN C6-C7;  Surgeon: Emily José MD;  Location: [facility] OR;  Service: Pain Management;  Laterality: N/A;    EPIDURAL STEROID INJECTION INTO CERVICAL SPINE N/A 3/2/2022    Procedure: Injection-steroid-epidural-cervical C5-C6;  Surgeon: Emily José MD;  Location: [facility] OR;  Service: Pain Management;  Laterality: N/A;    ESOPHAGOGASTRODUODENOSCOPY N/A 9/18/2019    Procedure: ESOPHAGOGASTRODUODENOSCOPY (EGD);  Surgeon: Da Diallo MD;  Location: [facility] ENDO;  Service: General;  Laterality: N/A;    INJECTION OF ANESTHETIC AGENT AROUND NERVE Bilateral 4/1/2021    Procedure: Block, Nerve L3, L4, L5;   Surgeon: Emily José MD;  Location: [facility] OR;  Service: Pain Management;  Laterality: Bilateral;    TONSILLECTOMY      TRIGGER POINT INJECTION N/A 2019    Procedure: INJECTION, TRIGGER POINT - CERVICAL REGION;  Surgeon: Sherri Gardiner MD;  Location: [facility] OR;  Service: Pain Management;  Laterality: N/A;    TRIGGER POINT INJECTION N/A 10/21/2019    Procedure: INJECTION, TRIGGER POINT;  Surgeon: Sherri Gardiner MD;  Location: [facility] OR;  Service: Pain Management;  Laterality: N/A;    TRIGGER POINT INJECTION N/A 10/1/2020    Procedure: INJECTION, TRIGGER POINT;  Surgeon: Emily José MD;  Location: [facility] OR;  Service: Pain Management;  Laterality: N/A;  back    triple bypass         Family History   Problem Relation Age of Onset    Lung cancer Mother     Brain cancer Sister     Pancreatic cancer Maternal Aunt     Pancreatic cancer Paternal Aunt        Social History     Socioeconomic History    Marital status:    Tobacco Use    Smoking status: Former     Current packs/day: 0.00     Types: Cigarettes     Start date: 2012     Quit date: 2012     Years since quittin.0    Smokeless tobacco: Never   Substance and Sexual Activity    Alcohol use: Yes     Comment: occasional    Drug use: Not Currently     Types: Marijuana, Other-see comments    Sexual activity: Yes     Social Determinants of Health     Financial Resource Strain: Unknown (2022)    Overall Financial Resource Strain (CARDIA)     Difficulty of Paying Living Expenses: Patient refused   Food Insecurity: Unknown (2022)    Hunger Vital Sign     Worried About Running Out of Food in the Last Year: Patient refused     Ran Out of Food in the Last Year: Patient refused   Transportation Needs: Unknown (2022)    PRAPARE - Transportation     Lack of Transportation (Medical): Patient refused     Lack of Transportation (Non-Medical): Patient refused   Physical Activity: Unknown (2022)    Exercise Vital Sign     Days of  Exercise per Week: Patient refused   Stress: Unknown (2/23/2022)    [place] [employer] - Occupational Stress Questionnaire     Feeling of Stress : Patient refused   Social Connections: Unknown (2/23/2022)    Social Connection and Isolation Panel [NHANES]     Frequency of Communication with Friends and Family: Patient refused     Frequency of Social Gatherings with Friends and Family: Patient refused     Active Member of Clubs or Organizations: Patient refused     Attends Club or Organization Meetings: Patient refused     Marital Status: Patient refused   Housing Stability: Unknown (2/23/2022)    Housing Stability Vital Sign     Unable to Pay for Housing in the Last Year: Patient refused     Unstable Housing in the Last Year: Patient refused       Current Facility-Administered Medications   Medication Dose Route Frequency Provider Last Rate Last Admin    lactated ringers infusion   Intravenous Continuous Umang Huggins MD        LIDOcaine (PF) 10 mg/ml (1%) injection 10 mg  1 mL Intradermal Once Umang Huggins MD         Facility-Administered Medications Ordered in Other Encounters   Medication Dose Route Frequency Provider Last Rate Last Admin    0.9%  NaCl infusion   Intravenous Continuous Sherri Gardiner MD 20 mL/hr at 04/29/19 1227 New Bag at 04/29/19 1227    lactated ringers infusion   Intravenous Once PRN Emily José MD        lactated ringers infusion   Intravenous Continuous Emily José MD 25 mL/hr at 06/29/22 1246 New Bag at 06/29/22 1246    lactated ringers infusion   Intravenous Continuous Emily José MD 25 mL/hr at 11/02/22 0937 New Bag at 11/02/22 0937       Review of patient's allergies indicates:   Allergen Reactions    Doxycycline     Hydrocodone Nausea And Vomiting    Vaccine adjuvant system, as01b liposomal     Varicella-zoster ge-as01b (pf)      Other reaction(s): Memory problems    Varicella-zoster virus glycoprotein e, recombinant     Benadryl  "[diphenhydramine hcl] Nausea Only       Vitals:    10/09/23 1057 10/13/23 1252   BP:  (!) 125/57   Pulse:  83   Resp:  18   Temp:  98.1 °F (36.7 °C)   SpO2:  98%   Weight: 80.3 kg (177 lb)    Height: 5' 5" (1.651 m)        REVIEW OF SYSTEMS:     GENERAL: No weight loss, malaise or fevers.  HEENT:  No recent changes in vision or hearing  NECK: Negative for lumps, no difficulty with swallowing.  RESPIRATORY: Negative for cough, wheezing or shortness of breath, patient denies any recent URI.  CARDIOVASCULAR: Negative for chest pain, leg swelling or palpitations.  GI: Negative for abdominal discomfort, blood in stools or black stools or change in bowel habits.  MUSCULOSKELETAL: See HPI.  SKIN: Negative for lesions, rash, and itching.  PSYCH: No suicidal or homicidal ideations, no current mood disturbances.  HEMATOLOGY/LYMPHOLOGY: Negative for prolonged bleeding, bruising easily or swollen nodes. Patient is not currently taking any anti-coagulants  ENDO: No history of diabetes or thyroid dysfunction  NEURO: No history of syncope, paralysis, seizures or tremors.All other reviewed and negative other than HPI.    Physical exam:  Gen: A and O x3, pleasant, well-groomed  Skin: No rashes or obvious lesions  HEENT: PERRLA, no obvious deformities on ears or in canals. No thyroid masses, trachea midline, no palpable lymph nodes in neck, axilla.  CVS: Regular rate and rhythm, normal S1 and S2, no murmurs.  Resp: Clear to auscultation bilaterally.  Abdomen: Soft, NT/ND, normal bowel sounds present.  Musculoskeletal/Neuro: Moving all extremities    Assessment:  Cervical radiculitis          PLAN: SILVIA      This patient has been cleared for surgery in an ambulatory surgical facility    ASA 3,  Mallampatti Score 3  No history of anesthetic complications  Plan for RN IV sedation    "

## 2023-10-16 ENCOUNTER — CLINICAL SUPPORT (OUTPATIENT)
Dept: REHABILITATION | Facility: HOSPITAL | Age: 53
End: 2023-10-16
Payer: MEDICARE

## 2023-10-16 VITALS
RESPIRATION RATE: 18 BRPM | DIASTOLIC BLOOD PRESSURE: 63 MMHG | HEIGHT: 65 IN | HEART RATE: 84 BPM | BODY MASS INDEX: 29.49 KG/M2 | SYSTOLIC BLOOD PRESSURE: 116 MMHG | OXYGEN SATURATION: 93 % | TEMPERATURE: 98 F | WEIGHT: 177 LBS

## 2023-10-16 DIAGNOSIS — M79.18 MYOFASCIAL PAIN SYNDROME, CERVICAL: Primary | ICD-10-CM

## 2023-10-16 DIAGNOSIS — Z74.09 IMPAIRED FUNCTIONAL MOBILITY AND ACTIVITY TOLERANCE: ICD-10-CM

## 2023-10-16 PROCEDURE — 97140 MANUAL THERAPY 1/> REGIONS: CPT | Mod: PN

## 2023-10-16 NOTE — PROGRESS NOTES
[facility]   Physical Therapy Treatment Note      Name: Nicole BONILLA [facility] Number: [id number]    Therapy Diagnosis:   Encounter Diagnoses   Name Primary?    Myofascial pain syndrome, cervical Yes    Impaired functional mobility and activity tolerance      Physician: Brii Mcneil NP    Visit Date: 10/16/2023    Physician Orders: PT Eval and Treat   Medical Diagnosis from Referral:   M54.12 (ICD-10-CM) - Cervical radiculopathy   M51.36 (ICD-10-CM) - DDD (degenerative disc disease), lumbar   M79.18 (ICD-10-CM) - Myofascial pain         Evaluation Date: 10/2/2023  Authorization Period Expiration: 10/2/2024  Plan of Care Expiration: 01/02/2024  Progress Note Due: 11/15/2023  Date of Surgery: n/a   Visit # / Visits authorized: 2 / 12   FOTO: 1/ 3     Precautions: Standard      Time In: 12:30 pm   Time Out: 1:30 pm   Total Billable Time: 55  minutes      PTA Visit #: 0/5       Subjective     Patient reports:  I had my epidural on Friday and felt fine until this morning, I'm not sure if I slept wrong, or what, but I have this pulling sensation in my left upper trap, back of neck;   She was not compliant with home exercise program as one has not been issued yet.   Response to previous treatment: exercises were good; feels like she is ready/able to handle exercise now   Functional change: none     Pain  6/10  Location:  back and left side of neck into left upper trap/between scapula     Objective      Objective Measures updated at progress report unless specified.     Treatment     Nicole received the treatments listed below:      therapeutic exercises to develop strength, endurance, and flexibility for 0 minutes including:  Nustep level 1 x 15 min 30 sec  Wall Slides x 10  Lat Pulls w/ BTB x 10  3 Way Scapular Retraction w/ BTB x 10  Supine Shld extension w/ RTB x 10  Serratus Lits w/ 2# bar x 10  Supine Shld Flex w/ 2# bar x 10  Chest Press w/ 2# bar x 10    manual therapy techniques:   were applied to the:cervical/thoracic and scapula  for 45 minutes, including:  Supine: sub-occipital release; release of scalenes and upper traps on right/left; facet uplift from mid thoracic to cervical; Prone lying: cupping to bilateral upper traps, levator, rhomboids - moderate suction applied and cups left in place x 10 mins; removed cups and performed IASTM  > STM to same area to assist with tissue release, fluid movement for tissue relaxation.     Patient noting reduction in her pain with painfree AROM of cervical spine in all planes. Pain scale: from 6/10 to 4/10       neuromuscular re-education activities to improve:  for  minutes. The following activities were included:      therapeutic activities to improve functional performance for   minutes, including:      gait training to improve functional mobility and safety for   minutes, including:      direct contact modalities after being cleared for contraindications:     supervised modalities after being cleared for contradictions:     hot pack for  minutes to .    cold pack for  minutes to .    Patient Education and Home Exercises       Education provided:   -exercises at home - needs to do some everyday       Written Home Exercises Provided:  Will issue HEP as visits go on . Exercises were reviewed and Nicole was able to demonstrate them prior to the end of the session.  Nicole demonstrated good  understanding of the education provided. See Electronic Medical Record under Patient Instructions for exercises provided during therapy sessions    Assessment     Patient did really well with manual tx today; cupping and IASTM were effective in reducing her pain by a couple of levels.  She is willing to be more dedicated to exercise now, feels that she is ready for more activity.     Nicole Is progressing well towards her goals.   Patient prognosis is Fair.     Patient will continue to benefit from skilled outpatient physical therapy to address the deficits listed in  the problem list box on initial evaluation, provide pt/family education and to maximize pt's level of independence in the home and community environment.     Patient's spiritual, cultural and educational needs considered and pt agreeable to plan of care and goals.     Anticipated barriers to physical therapy: Compliance    Goals:   Short Term Goals: 3 weeks   Reduction in daily pain levels to no more than 6/10 for improved ability to perform daily tasks.   Compliant with HEP      Long Term Goals: 6 weeks   Reduction in daily pain levels to no more than 4/10 for improved ability to perform daily tasks.  Cervical AROM performed without increase in pain  Able to reach overhead shelf without increased pain.   FOTO limitation score improved to   Independent with HEP for continued improvement in function     Plan     Continue per POC and progress with strength/mobility exercises as patient tolerates.    Outpatient Physical Therapy 2 times weekly for 6 weeks to include the following interventions: Manual Therapy, Moist Heat/ Ice, Neuromuscular Re-ed, Patient Education, Therapeutic Activities, and Therapeutic Exercise.     Quita Holliday, PT

## 2023-10-19 ENCOUNTER — CLINICAL SUPPORT (OUTPATIENT)
Dept: REHABILITATION | Facility: HOSPITAL | Age: 53
End: 2023-10-19
Payer: MEDICARE

## 2023-10-19 DIAGNOSIS — Z74.09 IMPAIRED FUNCTIONAL MOBILITY AND ACTIVITY TOLERANCE: Primary | ICD-10-CM

## 2023-10-19 PROCEDURE — 97110 THERAPEUTIC EXERCISES: CPT | Mod: KX,PN,CQ

## 2023-10-19 PROCEDURE — 97140 MANUAL THERAPY 1/> REGIONS: CPT | Mod: KX,PN,CQ

## 2023-10-19 NOTE — PROGRESS NOTES
[facility]   Physical Therapy Treatment Note      Name: Nicole BONILLA [facility] Number: [id number]    Therapy Diagnosis:   Encounter Diagnosis   Name Primary?    Impaired functional mobility and activity tolerance Yes     Physician: Brii Mcneil NP    Visit Date: 10/19/2023    Physician Orders: PT Eval and Treat   Medical Diagnosis from Referral:   M54.12 (ICD-10-CM) - Cervical radiculopathy   M51.36 (ICD-10-CM) - DDD (degenerative disc disease), lumbar   M79.18 (ICD-10-CM) - Myofascial pain         Evaluation Date: 10/2/2023  Authorization Period Expiration: 10/2/2024  Plan of Care Expiration: 01/02/2024  Progress Note Due: 11/15/2023  Date of Surgery: n/a   Visit # / Visits authorized: 3 / 12   FOTO: 1/ 3     Precautions: Standard      Time In: 10:40 AM  Time Out: 11:40 AM  Total Billable Time: 45  minutes      PTA Visit #: 1/5       Subjective     Patient reports: I am feeling ok, but I lost my cat last night.   She was not compliant with home exercise program as one has not been issued yet.   Response to previous treatment: exercises were good; feels like she is ready/able to handle exercise now   Functional change: none     Pain  5/10  Location:  back and left side of neck into left upper trap/between scapula     Objective      Objective Measures updated at progress report unless specified.     Treatment     Nicole received the treatments listed below:      therapeutic exercises to develop strength, endurance, and flexibility for 30 minutes including:  Nustep level 1 x 15 min   Wall Slides x 10  Lat Pulls w/ BTB x 10  3 Way Scapular Retraction w/ BTB x 10  Cervical Retraction x 15  Supine Shld extension w/ RTB x 15  Serratus Lits w/ 2# bar x 10  Supine Shld Flex w/ 2# bar x 10  Chest Press w/ 2# bar x 10    manual therapy techniques:  were applied to the:cervical/thoracic and scapula for 15 minutes, including:  Supine: sub-occipital release; release of scalenes and upper traps  on right/left;   Patient noting reduction in her pain with painfree AROM of cervical spine in all planes. Pain scale: from 6/10 to 4/10       neuromuscular re-education activities to improve:  for  minutes. The following activities were included:      therapeutic activities to improve functional performance for   minutes, including:      gait training to improve functional mobility and safety for   minutes, including:      direct contact modalities after being cleared for contraindications:     supervised modalities after being cleared for contradictions:     hot pack for  minutes to .    cold pack for  minutes to .    Patient Education and Home Exercises       Education provided:   -exercises at home - needs to do some everyday       Written Home Exercises Provided:  Will issue HEP as visits go on . Exercises were reviewed and Nicole was able to demonstrate them prior to the end of the session.  Nicole demonstrated good  understanding of the education provided. See Electronic Medical Record under Patient Instructions for exercises provided during therapy sessions    Assessment     Patient did really well with treatment today. She is willing to be more dedicated to exercise now, feels that she is ready for more activity.     Nicole Is progressing well towards her goals.   Patient prognosis is Fair.     Patient will continue to benefit from skilled outpatient physical therapy to address the deficits listed in the problem list box on initial evaluation, provide pt/family education and to maximize pt's level of independence in the home and community environment.     Patient's spiritual, cultural and educational needs considered and pt agreeable to plan of care and goals.     Anticipated barriers to physical therapy: Compliance    Goals:   Short Term Goals: 3 weeks   Reduction in daily pain levels to no more than 6/10 for improved ability to perform daily tasks.   Compliant with HEP      Long Term Goals: 6 weeks    Reduction in daily pain levels to no more than 4/10 for improved ability to perform daily tasks.  Cervical AROM performed without increase in pain  Able to reach overhead shelf without increased pain.   FOTO limitation score improved to   Independent with HEP for continued improvement in function     Plan     Continue per POC and progress with strength/mobility exercises as patient tolerates.    Outpatient Physical Therapy 2 times weekly for 6 weeks to include the following interventions: Manual Therapy, Moist Heat/ Ice, Neuromuscular Re-ed, Patient Education, Therapeutic Activities, and Therapeutic Exercise.     Jonathan Favre, PTA

## 2023-10-20 ENCOUNTER — HOSPITAL ENCOUNTER (OUTPATIENT)
Dept: RADIOLOGY | Facility: HOSPITAL | Age: 53
Discharge: HOME | End: 2023-10-20
Attending: NURSE PRACTITIONER
Payer: MEDICARE

## 2023-10-20 DIAGNOSIS — R60.9 EDEMA: ICD-10-CM

## 2023-10-20 DIAGNOSIS — M79.669 PAIN IN UNSPECIFIED LOWER LEG: ICD-10-CM

## 2023-10-20 PROCEDURE — 93925 LOWER EXTREMITY STUDY: CPT | Mod: 26,,, | Performed by: RADIOLOGY

## 2023-10-20 PROCEDURE — 93925 LOWER EXTREMITY STUDY: CPT | Mod: TC

## 2023-10-20 PROCEDURE — 93970 EXTREMITY STUDY: CPT | Mod: 26,,, | Performed by: RADIOLOGY

## 2023-10-20 PROCEDURE — 93970 EXTREMITY STUDY: CPT | Mod: TC

## 2023-10-20 PROCEDURE — 93970 US LOWER EXTREMITY VEINS BILATERAL: ICD-10-PCS | Mod: 26,,, | Performed by: RADIOLOGY

## 2023-10-20 PROCEDURE — 93925 US LOWER EXTREMITY ARTERIES BILATERAL: ICD-10-PCS | Mod: 26,,, | Performed by: RADIOLOGY

## 2023-10-30 ENCOUNTER — CLINICAL SUPPORT (OUTPATIENT)
Dept: REHABILITATION | Facility: HOSPITAL | Age: 53
End: 2023-10-30
Payer: MEDICARE

## 2023-10-30 DIAGNOSIS — Z74.09 IMPAIRED FUNCTIONAL MOBILITY AND ACTIVITY TOLERANCE: Primary | ICD-10-CM

## 2023-10-30 PROCEDURE — 97110 THERAPEUTIC EXERCISES: CPT | Mod: PN,CQ

## 2023-10-30 PROCEDURE — 97014 ELECTRIC STIMULATION THERAPY: CPT | Mod: PN,CQ

## 2023-10-30 NOTE — PROGRESS NOTES
[facility]   Physical Therapy Treatment Note      Name: Nicole BONILLA [facility] Number: [id number]    Therapy Diagnosis:   Encounter Diagnosis   Name Primary?    Impaired functional mobility and activity tolerance Yes     Physician: Brii Mcneil NP    Visit Date: 10/30/2023    Physician Orders: PT Eval and Treat   Medical Diagnosis from Referral:   M54.12 (ICD-10-CM) - Cervical radiculopathy   M51.36 (ICD-10-CM) - DDD (degenerative disc disease), lumbar   M79.18 (ICD-10-CM) - Myofascial pain         Evaluation Date: 10/2/2023  Authorization Period Expiration: 10/2/2024  Plan of Care Expiration: 01/02/2024  Progress Note Due: 11/15/2023  Date of Surgery: n/a   Visit # / Visits authorized: 4 / 12   FOTO: 1/ 3     Precautions: Standard      Time In: 12:30 PM  Time Out: 1:35 PM  Total Billable Time: 45  minutes      PTA Visit #: 2/5       Subjective     Patient reports: I am hurting today and I am just angry. I need to get in touch with my doctor and let him know that the meds are not working.   She was not compliant with home exercise program as one has not been issued yet.   Response to previous treatment: exercises were good; feels like she is ready/able to handle exercise now   Functional change: none     Pain  9/10  Location:  back and left side of neck into left upper trap/between scapula     Objective      Objective Measures updated at progress report unless specified.     Treatment     Nicole received the treatments listed below:      therapeutic exercises to develop strength, endurance, and flexibility for 30 minutes including:  Nustep level 1 x 15 min   Wall Slides x 10  Lat Pulls w/ BTB x 10  3 Way Scapular Retraction w/ BTB x 10  Cervical Retraction x 15  Supine Shld extension w/ RTB x 15  Serratus Lits w/ 2# bar x 10  Supine Shld Flex w/ 2# bar x 10  Chest Press w/ 2# bar x 10    manual therapy techniques:  were applied to the:cervical/thoracic and scapula for 15 minutes,  including:  Supine: sub-occipital release; release of scalenes and upper traps on right/left;   Patient noting reduction in her pain with painfree AROM of cervical spine in all planes. Pain scale: from 6/10 to 4/10       neuromuscular re-education activities to improve:  for  minutes. The following activities were included:      therapeutic activities to improve functional performance for   minutes, including:      gait training to improve functional mobility and safety for   minutes, including:      direct contact modalities after being cleared for contraindications:     supervised modalities after being cleared for contradictions: IFC w/ CP to upper traps/scapular region x 20 minutes.     hot pack for  minutes to .    cold pack for  20 minutes to upper traps/scapular region.     Patient Education and Home Exercises       Education provided:   -exercises at home - needs to do some everyday       Written Home Exercises Provided:  Will issue HEP as visits go on . Exercises were reviewed and Nicole was able to demonstrate them prior to the end of the session.  Nicole demonstrated good  understanding of the education provided. See Electronic Medical Record under Patient Instructions for exercises provided during therapy sessions    Assessment     Patient did ok with treatment today. Feel like patient is hurting and depression is becoming more of an issue. She is willing to be more dedicated to exercise now, feels that she is ready for more activity.     Nicole Is progressing well towards her goals.   Patient prognosis is Fair.     Patient will continue to benefit from skilled outpatient physical therapy to address the deficits listed in the problem list box on initial evaluation, provide pt/family education and to maximize pt's level of independence in the home and community environment.     Patient's spiritual, cultural and educational needs considered and pt agreeable to plan of care and goals.     Anticipated barriers  to physical therapy: Compliance    Goals:   Short Term Goals: 3 weeks   Reduction in daily pain levels to no more than 6/10 for improved ability to perform daily tasks.   Compliant with HEP      Long Term Goals: 6 weeks   Reduction in daily pain levels to no more than 4/10 for improved ability to perform daily tasks.  Cervical AROM performed without increase in pain  Able to reach overhead shelf without increased pain.   FOTO limitation score improved to   Independent with HEP for continued improvement in function     Plan     Continue per POC and progress with strength/mobility exercises as patient tolerates.    Outpatient Physical Therapy 2 times weekly for 6 weeks to include the following interventions: Manual Therapy, Moist Heat/ Ice, Neuromuscular Re-ed, Patient Education, Therapeutic Activities, and Therapeutic Exercise.     Jonathan Favre, PTA

## 2023-11-03 ENCOUNTER — CLINICAL SUPPORT (OUTPATIENT)
Dept: REHABILITATION | Facility: HOSPITAL | Age: 53
End: 2023-11-03
Payer: MEDICARE

## 2023-11-03 DIAGNOSIS — Z74.09 IMPAIRED FUNCTIONAL MOBILITY AND ACTIVITY TOLERANCE: Primary | ICD-10-CM

## 2023-11-03 PROCEDURE — 97014 ELECTRIC STIMULATION THERAPY: CPT | Mod: PN,CQ

## 2023-11-03 PROCEDURE — 97110 THERAPEUTIC EXERCISES: CPT | Mod: PN,CQ

## 2023-11-03 PROCEDURE — 97140 MANUAL THERAPY 1/> REGIONS: CPT | Mod: PN,CQ

## 2023-11-03 NOTE — PROGRESS NOTES
[facility]   Physical Therapy Treatment Note      Name: Nicole BONILLA [facility] Number: [id number]    Therapy Diagnosis:   Encounter Diagnosis   Name Primary?    Impaired functional mobility and activity tolerance Yes     Physician: Brii Mcneil NP    Visit Date: 11/3/2023    Physician Orders: PT Eval and Treat   Medical Diagnosis from Referral:   M54.12 (ICD-10-CM) - Cervical radiculopathy   M51.36 (ICD-10-CM) - DDD (degenerative disc disease), lumbar   M79.18 (ICD-10-CM) - Myofascial pain         Evaluation Date: 10/2/2023  Authorization Period Expiration: 10/2/2024  Plan of Care Expiration: 01/02/2024  Progress Note Due: 11/15/2023  Date of Surgery: n/a   Visit # / Visits authorized: 5 / 12   FOTO: 1/ 3     Precautions: Standard      Time In: 12:30 PM  Time Out: 1:35 PM  Total Billable Time:  minutes      PTA Visit #: 3/5       Subjective     Patient reports: My whole spine is just hurting.   She was not compliant with home exercise program as one has not been issued yet.   Response to previous treatment: exercises were good; feels like she is ready/able to handle exercise now   Functional change: none     Pain  10/10  Location:  back and left side of neck into left upper trap/between scapula     Objective      Objective Measures updated at progress report unless specified.     Treatment     Nicole received the treatments listed below:      therapeutic exercises to develop strength, endurance, and flexibility for 15 minutes including:  Nustep level 1 x 15 min   Wall Slides x 10  Lat Pulls w/ BTB x 10  3 Way Scapular Retraction w/ BTB x 10  Cervical Retraction x 15  Supine Shld extension w/ RTB x 15  Serratus Lits w/ 2# bar x 10  Supine Shld Flex w/ 2# bar x 10  Chest Press w/ 2# bar x 10    manual therapy techniques:  were applied to the: cervical/thoracic and scapula for 15 minutes, including:        neuromuscular re-education activities to improve:  for  minutes. The following  activities were included:      therapeutic activities to improve functional performance for   minutes, including:      gait training to improve functional mobility and safety for   minutes, including:      direct contact modalities after being cleared for contraindications:     supervised modalities after being cleared for contradictions: IFC w/ CP to upper traps/scapular region x 20 minutes.     hot pack for  minutes to .    cold pack for  20 minutes to upper traps/scapular region.     Patient Education and Home Exercises       Education provided:   -exercises at home - needs to do some everyday       Written Home Exercises Provided:  Will issue HEP as visits go on . Exercises were reviewed and Nicole was able to demonstrate them prior to the end of the session.  Nicole demonstrated good  understanding of the education provided. See Electronic Medical Record under Patient Instructions for exercises provided during therapy sessions    Assessment     Patient did ok with treatment today. She is willing to be more dedicated to exercise now, feels that she is ready for more activity.     Nicole Is progressing well towards her goals.   Patient prognosis is Fair.     Patient will continue to benefit from skilled outpatient physical therapy to address the deficits listed in the problem list box on initial evaluation, provide pt/family education and to maximize pt's level of independence in the home and community environment.     Patient's spiritual, cultural and educational needs considered and pt agreeable to plan of care and goals.     Anticipated barriers to physical therapy: Compliance    Goals:   Short Term Goals: 3 weeks   Reduction in daily pain levels to no more than 6/10 for improved ability to perform daily tasks.   Compliant with HEP      Long Term Goals: 6 weeks   Reduction in daily pain levels to no more than 4/10 for improved ability to perform daily tasks.  Cervical AROM performed without increase in pain  Able  to reach overhead shelf without increased pain.   FOTO limitation score improved to   Independent with HEP for continued improvement in function     Plan     Continue per POC and progress with strength/mobility exercises as patient tolerates.    Outpatient Physical Therapy 2 times weekly for 6 weeks to include the following interventions: Manual Therapy, Moist Heat/ Ice, Neuromuscular Re-ed, Patient Education, Therapeutic Activities, and Therapeutic Exercise.     Jonathan Favre, PTA

## 2023-11-08 ENCOUNTER — CLINICAL SUPPORT (OUTPATIENT)
Dept: REHABILITATION | Facility: HOSPITAL | Age: 53
End: 2023-11-08
Payer: MEDICARE

## 2023-11-08 DIAGNOSIS — Z74.09 IMPAIRED FUNCTIONAL MOBILITY AND ACTIVITY TOLERANCE: ICD-10-CM

## 2023-11-08 DIAGNOSIS — M79.18 MYOFASCIAL PAIN SYNDROME, CERVICAL: Primary | ICD-10-CM

## 2023-11-08 PROCEDURE — 97014 ELECTRIC STIMULATION THERAPY: CPT | Mod: PN

## 2023-11-08 PROCEDURE — 97140 MANUAL THERAPY 1/> REGIONS: CPT | Mod: PN

## 2023-11-08 NOTE — PROGRESS NOTES
[facility]   Physical Therapy Treatment Note  / Progress Report      [id number]    Therapy Diagnosis:   Encounter Diagnoses   Name Primary?    Myofascial pain syndrome, cervical Yes    Impaired functional mobility and activity tolerance      Physician: Brii Mcneil NP    Visit Date: 11/8/2023    Physician Orders: PT Eval and Treat   Medical Diagnosis from Referral:   M54.12 (ICD-10-CM) - Cervical radiculopathy   M51.36 (ICD-10-CM) - DDD (degenerative disc disease), lumbar   M79.18 (ICD-10-CM) - Myofascial pain         Evaluation Date: 10/2/2023  Authorization Period Expiration: 10/2/2024  Plan of Care Expiration: 01/02/2024  Progress Note Due: 12/30/2023  Date of Surgery: n/a   Visit # / Visits authorized: 6 / 12   FOTO: 2/ 3     Precautions: Standard      Time In:  10:45 am   Time Out: 11:45 am    Total Billable Time:   45 minutes      PTA Visit #: 0/5     ;  Subjective     Patient reports: I was so sick on Monday don't know what it was, I really thought it was my heart.  Gave herself an injection of Ozempic - 2nd time she took the increased dose last Tuesday;  Not sure if this is what made her sick or not.  Vomiting, pain everywhere, still has migraines/ with eye pain - worse of left side.   She was not compliant with home exercise program as one has not been issued yet.   Response to previous treatment: good   Functional change: having increased headache pain,     Pain  10/10  Location:  back and left side of neck into left upper trap/between scapula     Objective      Objective Measures updated at progress report unless specified. Posture:     -       Rounded shoulders  -       Forward head  -       Posterior pelvic tilt     Cervical Range of Motion:     Degrees Pain   Flexion 58     Extension 52    Left Rotation 50 > 52     Right Rotation 52 > 56     Left Side Bending 30     Right Side Bending 15 < 20   Discomfort       Shoulder Range of  Motion:   Left:     WFL - decreased quality of motion into IR/ER, but functional   Right:   WFL - as above, IR/ER quality of motion is decreased      Strength:    Left Right   DNF Fair minus      Upper trap 4-/5 4-/5   Mid trap 3+/5 3+/5   Lower trap 3/5 3/5   Rhomboids 3+/5 3+/5    20 lb   20 lb       Upper Extremity Strength    Left Right   Shoulder flexion: 4-/5 4-/5   Shoulder Abduction: 3+/5 3+/5   Shoulder ER 3+/5 3+/5   Shoulder IR 4-/5 4-/5   Elbow flexion: 4-/5 4-/5   Elbow extension: 3+/5 3+/5   Wrist flexion: 3+/5 3+/5   Wrist extension: 3+/5 3+/5         Special Tests:  Distraction Negative   Compression Negative   Spurlings Negative   Sharp-Lizz Negative   VA test Negative   Lateral Flexion Alar Ligament Negative      Upper Limb Neurodynamic testing:    Left   Right   UNT Negative   Negative   MNT Negative   Negative   RNT Negative   Negative      Joint Mobility: Passive mobility WFL;  Active mild restrictions into rotation - more stability than flexibility restriction secondary to mm weakness/tightness.     Thoracic mobility: postural restriction into extension at upper segments;      Palpation: Still has some tenderness in upper traps with TrPs noted,    Sensation: intact to light touch BUE's      Flexibility:                Upper Trap = R moderate restriction, L moderate restriction  > Improved to minimal restriction               Scalenes: R moderate restriction, L moderate restriction  > still moderate, but improving               SCM: R minimal restriction, L minimal restriction > no restriction               Levator Scap: R moderate restriction, L moderate restriction > improving, less than moderate         Treatment     Nicole received the treatments listed below:      therapeutic exercises to develop strength, endurance, and flexibility for 15 minutes including:  Nustep level 3 x 15 min   Wall Slides x 10  Lat Pulls w/ BTB x 10  3 Way Scapular Retraction w/ BTB x 10  Cervical Retraction x  15  Supine Shld extension w/ RTB x 15  Serratus Lits w/ 2# bar x 10  Supine Shld Flex w/ 2# bar x 10  Chest Press w/ 2# bar x 10    manual therapy techniques:  STM and functional dry needling were applied to the: cervical/thoracic and scapula for 30 minutes, including:  Supine - sub-occipital inhibition with slight traction; MET for upper trap, scalene and levator tissue restrictions on each side; Patient positioned into prone with appropriate support and head/neck, ankles; LF-ES at 6hz per protocol for cervical, upper trap, rhomboid fascia restrictions and pain: 30 mm needles at 1.5 fb lateral to C2, C3, C5  angling needles at 45 deg lateral to medial, 15 mm needles at OA, occipital notches and mastoid process - angled inferiorly; 50 mm needles to TrPss in bilateral upper traps; 40 mm needles to rhomboids - 3 points 1/3 apart medial border of scapula - angled back at bone; Clockwise winding to all needles for increased tissue grasp; Intensity of stimulation adjusted to patient tolerance with good rhythmical contraction of tissue noted.   Needles left in-situ x 20 mins; Removed without adverse effects.     Patient reporting reduction in pain with needling, from 10/10 to 6/10         neuromuscular re-education activities to improve:  for  minutes. The following activities were included:      therapeutic activities to improve functional performance for   minutes, including:      gait training to improve functional mobility and safety for   minutes, including:      direct contact modalities after being cleared for contraindications:     supervised modalities after being cleared for contradictions:     hot pack for  minutes to .      Patient Education and Home Exercises       Education provided:   -exercises at home - needs to do some everyday       Written Home Exercises Provided:  Will issue HEP as visits go on . Exercises were reviewed and Nicole was able to demonstrate them prior to the end of the session.  Nicole  demonstrated good  understanding of the education provided. See Electronic Medical Record under Patient Instructions for exercises provided during therapy sessions    Assessment     Patient with improvement in AROM and tissue extensibility as noted above; Continues with high levels of pain, headaches - sporadic; now taking Ozempic - uped dose last week, not feeling well this week; to talk with PCP regarding this.     Nicole Is progressing well towards her goals.   Patient prognosis is Fair.     Patient will continue to benefit from skilled outpatient physical therapy to address the deficits listed in the problem list box on initial evaluation, provide pt/family education and to maximize pt's level of independence in the home and community environment.     Patient's spiritual, cultural and educational needs considered and pt agreeable to plan of care and goals.     Anticipated barriers to physical therapy: Compliance    Goals:   Short Term Goals: 3 weeks   Reduction in daily pain levels to no more than 6/10 for improved ability to perform daily tasks.   Compliant with HEP      Long Term Goals: 6 weeks   Reduction in daily pain levels to no more than 4/10 for improved ability to perform daily tasks.  Cervical AROM performed without increase in pain  Able to reach overhead shelf without increased pain.   FOTO limitation score improved to   Independent with HEP for continued improvement in function     Plan     Continue per POC and progress with strength/mobility exercises as patient tolerates.    Outpatient Physical Therapy 2 times weekly for 6 weeks to include the following interventions: Manual Therapy, Moist Heat/ Ice, Neuromuscular Re-ed, Patient Education, Therapeutic Activities, and Therapeutic Exercise.     Quita Holliday, PT

## 2023-11-10 ENCOUNTER — CLINICAL SUPPORT (OUTPATIENT)
Dept: REHABILITATION | Facility: HOSPITAL | Age: 53
End: 2023-11-10
Payer: MEDICARE

## 2023-11-10 DIAGNOSIS — M79.18 MYOFASCIAL PAIN SYNDROME, CERVICAL: ICD-10-CM

## 2023-11-10 DIAGNOSIS — Z74.09 IMPAIRED FUNCTIONAL MOBILITY AND ACTIVITY TOLERANCE: Primary | ICD-10-CM

## 2023-11-10 PROCEDURE — 97014 ELECTRIC STIMULATION THERAPY: CPT | Mod: PN

## 2023-11-10 PROCEDURE — 97140 MANUAL THERAPY 1/> REGIONS: CPT | Mod: PN

## 2023-11-10 NOTE — PROGRESS NOTES
[facility]   Physical Therapy Treatment Note       [facility] Number[facility] [id number]    Therapy Diagnosis:   Encounter Diagnoses   Name Primary?    Impaired functional mobility and activity tolerance Yes    Myofascial pain syndrome, cervical      Physician: Brii Mcneil NP    Visit Date: 11/10/2023    Physician Orders: PT Eval and Treat   Medical Diagnosis from Referral:   M54.12 (ICD-10-CM) - Cervical radiculopathy   M51.36 (ICD-10-CM) - DDD (degenerative disc disease), lumbar   M79.18 (ICD-10-CM) - Myofascial pain         Evaluation Date: 10/2/2023  Authorization Period Expiration: 10/2/2024  Plan of Care Expiration: 01/02/2024  Progress Note Due: 12/30/2023  Date of Surgery: n/a   Visit # / Visits authorized: 7 / 12   FOTO: 2/ 3     Precautions: Standard      Time In:  10:45 am   Time Out: 11:50 am    Total Billable Time:   45 minutes      PTA Visit #: 0/5     ;  Subjective     Patient reports:  I got sick again the other day after I left here; Vira told me to stop taking the Ozempic for now, also recommended that I get an appointment with my cardiologist just to make sure everything is ok.  Have to make an appointment.     She was not compliant with home exercise program as one has not been issued yet.   Response to previous treatment: good   Functional change: having increased headache pain, Dry Needling is the only thing that gives me any relief - it last's a couple of days.     Pain  6-7/10  Location:  neck to between shoulder blades.     Objective      Objective Measures updated at progress report unless specified. Posture:     -       Rounded shoulders  -       Forward head  -       Posterior pelvic tilt     Cervical Range of Motion:     Degrees Pain   Flexion 58     Extension 52    Left Rotation 50 > 52     Right Rotation 52 > 56     Left Side Bending 30     Right Side Bending 15 < 20   Discomfort       Shoulder Range of Motion:   Left:     WFL -  decreased quality of motion into IR/ER, but functional   Right:   WFL - as above, IR/ER quality of motion is decreased      Strength:    Left Right   DNF Fair minus      Upper trap 4-/5 4-/5   Mid trap 3+/5 3+/5   Lower trap 3/5 3/5   Rhomboids 3+/5 3+/5    20 lb   20 lb       Upper Extremity Strength    Left Right   Shoulder flexion: 4-/5 4-/5   Shoulder Abduction: 3+/5 3+/5   Shoulder ER 3+/5 3+/5   Shoulder IR 4-/5 4-/5   Elbow flexion: 4-/5 4-/5   Elbow extension: 3+/5 3+/5   Wrist flexion: 3+/5 3+/5   Wrist extension: 3+/5 3+/5         Special Tests:  Distraction Negative   Compression Negative   Spurlings Negative   Sharp-Lizz Negative   VA test Negative   Lateral Flexion Alar Ligament Negative      Upper Limb Neurodynamic testing:    Left   Right   UNT Negative   Negative   MNT Negative   Negative   RNT Negative   Negative      Joint Mobility: Passive mobility WFL;  Active mild restrictions into rotation - more stability than flexibility restriction secondary to mm weakness/tightness.     Thoracic mobility: postural restriction into extension at upper segments;      Palpation: Still has some tenderness in upper traps with TrPs noted,    Sensation: intact to light touch BUE's      Flexibility:                Upper Trap = R moderate restriction, L moderate restriction  > Improved to minimal restriction               Scalenes: R moderate restriction, L moderate restriction  > still moderate, but improving               SCM: R minimal restriction, L minimal restriction > no restriction               Levator Scap: R moderate restriction, L moderate restriction > improving, less than moderate         Treatment     Nicole received the treatments listed below:      therapeutic exercises to develop strength, endurance, and flexibility for 15 minutes including:  Nustep level 3 x 15 min   Wall Slides x 10  Lat Pulls w/ BTB x 10  3 Way Scapular Retraction w/ BTB x 10  Cervical Retraction x 15  Supine Shld extension  w/ RTB x 15  Serratus Lits w/ 2# bar x 10  Supine Shld Flex w/ 2# bar x 10  Chest Press w/ 2# bar x 10    manual therapy techniques:  STM and functional dry needling were applied to the: cervical/thoracic and scapula for 30 minutes, including:  Supine - sub-occipital inhibition with slight traction; MET for upper trap, scalene and levator tissue restrictions on each side; Patient positioned into prone with appropriate support and head/neck, ankles; LF-ES at 6hz per protocol for cervical, upper trap, rhomboid fascia restrictions and pain: 30 mm needles at 1.5 fb lateral to C2, C3, C5  angling needles at 45 deg lateral to medial, 15 mm needles at OA, occipital notches and mastoid process - angled inferiorly; 50 mm needles to TrPss in bilateral upper traps; 40 mm needles to rhomboids - 3 points 1/3 apart medial border of scapula - angled back at bone; Clockwise winding to all needles for increased tissue grasp; Intensity of stimulation adjusted to patient tolerance with good rhythmical contraction of tissue noted.   Needles left in-situ x 20 mins; Removed without adverse effects.     Patient reporting reduction in pain with needling, from 10/10 to 6/10         neuromuscular re-education activities to improve:  for  minutes. The following activities were included:      therapeutic activities to improve functional performance for   minutes, including:      gait training to improve functional mobility and safety for   minutes, including:      direct contact modalities after being cleared for contraindications:     supervised modalities after being cleared for contradictions:     hot pack for  minutes to .      Patient Education and Home Exercises       Education provided:   -exercises at home - needs to do some everyday       Written Home Exercises Provided:  Will issue HEP as visits go on . Exercises were reviewed and Nicole was able to demonstrate them prior to the end of the session.  Nicole demonstrated good   understanding of the education provided. See Electronic Medical Record under Patient Instructions for exercises provided during therapy sessions    Assessment     Patient with improvement in AROM and tissue extensibility as noted above; Continues with high levels of pain, headaches - sporadic; reports pain relief from the Dry Needling.     Nicole Is progressing well towards her goals.   Patient prognosis is Fair.     Patient will continue to benefit from skilled outpatient physical therapy to address the deficits listed in the problem list box on initial evaluation, provide pt/family education and to maximize pt's level of independence in the home and community environment.     Patient's spiritual, cultural and educational needs considered and pt agreeable to plan of care and goals.     Anticipated barriers to physical therapy: Compliance    Goals:   Short Term Goals: 3 weeks   Reduction in daily pain levels to no more than 6/10 for improved ability to perform daily tasks.   Compliant with HEP      Long Term Goals: 6 weeks   Reduction in daily pain levels to no more than 4/10 for improved ability to perform daily tasks.  Cervical AROM performed without increase in pain  Able to reach overhead shelf without increased pain.   FOTO limitation score improved to   Independent with HEP for continued improvement in function     Plan     Continue per POC and progress with strength/mobility exercises as patient tolerates.    Outpatient Physical Therapy 2 times weekly for 6 weeks to include the following interventions: Manual Therapy, Moist Heat/ Ice, Neuromuscular Re-ed, Patient Education, Therapeutic Activities, and Therapeutic Exercise.     Quita Holliday, PT

## 2023-11-30 ENCOUNTER — TELEPHONE (OUTPATIENT)
Dept: PAIN MEDICINE | Facility: CLINIC | Age: 53
End: 2023-11-30
Payer: MEDICARE

## 2023-11-30 DIAGNOSIS — M54.12 CERVICAL RADICULOPATHY: Primary | ICD-10-CM

## 2023-11-30 NOTE — TELEPHONE ENCOUNTER
Yes ma'am.   ----- Message -----   From: Leanna Ibanez LPN   Sent: 11/30/2023   7:58 AM CST   To: Brii Mcneil NP   Subject: FW: Appointment Request                            Ok to repeat SILVIA ?   ----- Message -----   From: Judit Stanley LPN   Sent: 11/30/2023   7:48 AM CST   To: Leanna Ibanez LPN   Subject: FW: Appointment Request                               ----- Message -----   From: Nicole Harris   Sent: 11/30/2023   7:40 AM CST   To: [facility] Support Staff   Subject: Appointment Request                                Yes neck 60% he moved the injection over which caused me to have a bad migraine but other than that it went well. Im about an [age].

## 2023-12-06 ENCOUNTER — CLINICAL SUPPORT (OUTPATIENT)
Dept: REHABILITATION | Facility: HOSPITAL | Age: 53
End: 2023-12-06
Payer: MEDICARE

## 2023-12-06 DIAGNOSIS — Z74.09 IMPAIRED FUNCTIONAL MOBILITY AND ACTIVITY TOLERANCE: ICD-10-CM

## 2023-12-06 DIAGNOSIS — M79.18 MYOFASCIAL PAIN SYNDROME, CERVICAL: Primary | ICD-10-CM

## 2023-12-06 PROCEDURE — 97140 MANUAL THERAPY 1/> REGIONS: CPT | Mod: KX,PN

## 2023-12-06 PROCEDURE — 97014 ELECTRIC STIMULATION THERAPY: CPT | Mod: KX,PN

## 2023-12-06 NOTE — PROGRESS NOTES
[facility]   Physical Therapy Treatment Note       Name: Nicole BONILLA [facility] Number: [id number]    Therapy Diagnosis:   Encounter Diagnoses   Name Primary?    Myofascial pain syndrome, cervical Yes    Impaired functional mobility and activity tolerance      Physician: Brii Mcneil NP    Visit Date: 12/6/2023    Physician Orders: PT Eval and Treat   Medical Diagnosis from Referral:   M54.12 (ICD-10-CM) - Cervical radiculopathy   M51.36 (ICD-10-CM) - DDD (degenerative disc disease), lumbar   M79.18 (ICD-10-CM) - Myofascial pain         Evaluation Date: 10/2/2023  Authorization Period Expiration: 10/2/2024  Plan of Care Expiration: 01/02/2024  Progress Note Due: 12/30/2023  Date of Surgery: n/a   Visit # / Visits authorized: 9 / 12   FOTO: 2/ 3     Precautions: Standard      Time In:  9:15 am   Time Out: 10:15  am    Total Billable Time:   45 minutes      PTA Visit #: 0/5     ;  Subjective     Patient reports:  I have been so sick since I saw you last, continued with N/V, continues to have chest congestion;   Today is last visit on referral.  Nicole continues to c/o cervical pain, limited exercise; limited lasting improvement from therapy.   She was not compliant with home exercise program as one has not been issued yet.   Response to previous treatment: N/A   Functional change: having increased headache pain, Dry Needling is the only thing that gives me any relief - it last's a couple of days.     Pain  7/10  Location:  neck to between shoulder blades.     Objective      Objective Measures updated at progress report unless specified. Posture:     -       Rounded shoulders  -       Forward head  -       Posterior pelvic tilt     Cervical Range of Motion:     Degrees Pain   Flexion 58     Extension 52    Left Rotation 50 > 52     Right Rotation 52 > 56     Left Side Bending 30     Right Side Bending 15 < 20   Discomfort       Shoulder Range of Motion:   Left:     WFL - decreased  quality of motion into IR/ER, but functional   Right:   WFL - as above, IR/ER quality of motion is decreased      Strength:    Left Right   DNF Fair minus      Upper trap 4-/5 4-/5   Mid trap 3+/5 3+/5   Lower trap 3/5 3/5   Rhomboids 3+/5 3+/5    20 lb   20 lb       Upper Extremity Strength    Left Right   Shoulder flexion: 4-/5 4-/5   Shoulder Abduction: 3+/5 3+/5   Shoulder ER 3+/5 3+/5   Shoulder IR 4-/5 4-/5   Elbow flexion: 4-/5 4-/5   Elbow extension: 3+/5 3+/5   Wrist flexion: 3+/5 3+/5   Wrist extension: 3+/5 3+/5         Special Tests:  Distraction Negative   Compression Negative   Spurlings Negative   Sharp-Lizz Negative   VA test Negative   Lateral Flexion Alar Ligament Negative      Upper Limb Neurodynamic testing:    Left   Right   UNT Negative   Negative   MNT Negative   Negative   RNT Negative   Negative      Joint Mobility: Passive mobility WFL;  Active mild restrictions into rotation - more stability than flexibility restriction secondary to mm weakness/tightness.     Thoracic mobility: postural restriction into extension at upper segments;      Palpation: Still has some tenderness in upper traps with TrPs noted,    Sensation: intact to light touch BUE's      Flexibility:                Upper Trap = R moderate restriction, L moderate restriction  > Improved to minimal restriction               Scalenes: R moderate restriction, L moderate restriction  > still moderate, but improving               SCM: R minimal restriction, L minimal restriction > no restriction               Levator Scap: R moderate restriction, L moderate restriction > improving, less than moderate         Treatment     Nicole received the treatments listed below:      therapeutic exercises to develop strength, endurance, and flexibility for 15 minutes including:  Nustep level 3 x 15 min   Wall Slides x 10  Lat Pulls w/ BTB x 10  3 Way Scapular Retraction w/ BTB x 10  Cervical Retraction x 15  Supine Shld extension w/ RTB x  15  Serratus Lits w/ 2# bar x 10  Supine Shld Flex w/ 2# bar x 10  Chest Press w/ 2# bar x 10    manual therapy techniques:  STM and functional dry needling were applied to the: cervical/thoracic and scapula for 30 minutes, including:  Supine - sub-occipital inhibition with slight traction; MET for upper trap, scalene and levator tissue restrictions on each side; Patient positioned into prone with appropriate support and head/neck, ankles; LF-ES at 6hz per protocol for cervical, upper trap, rhomboid fascia restrictions and pain: 30 mm needles at 1.5 fb lateral to C2, C3, C5  angling needles at 45 deg lateral to medial, 15 mm needles at OA, occipital notches and mastoid process - angled inferiorly; 50 mm needles to TrPss in bilateral upper traps; 40 mm needles to rhomboids - 3 points 1/3 apart medial border of scapula - angled back at bone; Clockwise winding to all needles for increased tissue grasp; Intensity of stimulation adjusted to patient tolerance with good rhythmical contraction of tissue noted.   Needles left in-situ x 20 mins; Removed without adverse effects.     Patient reporting reduction in pain with needling, from 7/10 to 6/10         Patient Education and Home Exercises       Education provided:   -exercises at home - needs to do some everyday if she wants to improve/reduce her neck pain; needs core stability and support.       Written Home Exercises Provided:  Will issue HEP as visits go on . Exercises were reviewed and Nicole was able to demonstrate them prior to the end of the session.  Nicole demonstrated good  understanding of the education provided. See Electronic Medical Record under Patient Instructions for exercises provided during therapy sessions    Assessment     Patient with improvement in AROM and tissue extensibility as noted above; Continues with high levels of pain, headaches - sporadic; reports pain relief from the Dry Needling, but only of limited benefit.  Nicole is not consistent with  HEP, She needs mm support and stability in order to help reduce her neck pain.  Therapy has been off/on for several years - same c/o's and limited benefit.   It is time for discharge from PT at this time.  Asked patient to concentrate on HEP / cardio/strengthening.  She needs to demonstrate some steady progress for therapy to continue in the future.     Nicole Is progressing well towards her goals.   Patient prognosis is Fair.       Patient's spiritual, cultural and educational needs considered and pt agreeable to plan of care and goals.     Anticipated barriers to physical therapy: Compliance    Goals:   Short Term Goals: 3 weeks   Reduction in daily pain levels to no more than 6/10 for improved ability to perform daily tasks.  > MET  Compliant with HEP  > Not MET      Long Term Goals: 6 weeks   Reduction in daily pain levels to no more than 4/10 for improved ability to perform daily tasks. > Limited progress   Cervical AROM performed without increase in pain > Limited progress   Able to reach overhead shelf without increased pain.  > able to perform   FOTO limitation score improved to 37 > FOTO score only improved from 14 > 17   Independent with HEP for continued improvement in function  > Limited progress     Plan     Discharge from PT at this time.     Quita Holliday, PT

## 2023-12-14 ENCOUNTER — HOSPITAL ENCOUNTER (OUTPATIENT)
Dept: RADIOLOGY | Facility: HOSPITAL | Age: 53
Discharge: HOME | End: 2023-12-14
Attending: NURSE PRACTITIONER
Payer: MEDICARE

## 2023-12-14 DIAGNOSIS — R05.3 CHRONIC COUGH: ICD-10-CM

## 2023-12-14 PROCEDURE — 71250 CT CHEST WITHOUT CONTRAST: ICD-10-PCS | Mod: 26,,, | Performed by: RADIOLOGY

## 2023-12-14 PROCEDURE — 71250 CT THORAX DX C-: CPT | Mod: TC

## 2023-12-14 PROCEDURE — 71250 CT THORAX DX C-: CPT | Mod: 26,,, | Performed by: RADIOLOGY

## 2023-12-27 ENCOUNTER — HOSPITAL ENCOUNTER (OUTPATIENT)
Facility: HOSPITAL | Age: 53
Discharge: HOME | End: 2023-12-27
Attending: ANESTHESIOLOGY | Admitting: ANESTHESIOLOGY
Payer: MEDICARE

## 2023-12-27 VITALS
DIASTOLIC BLOOD PRESSURE: 66 MMHG | BODY MASS INDEX: 29.49 KG/M2 | TEMPERATURE: 98 F | SYSTOLIC BLOOD PRESSURE: 130 MMHG | OXYGEN SATURATION: 96 % | WEIGHT: 177 LBS | RESPIRATION RATE: 17 BRPM | HEIGHT: 65 IN | HEART RATE: 100 BPM

## 2023-12-27 DIAGNOSIS — M54.12 CERVICAL RADICULITIS: ICD-10-CM

## 2023-12-27 LAB — POCT GLUCOSE: 164 MG/DL (ref 70–110)

## 2023-12-27 PROCEDURE — A4216 STERILE WATER/SALINE, 10 ML: HCPCS | Performed by: ANESTHESIOLOGY

## 2023-12-27 PROCEDURE — 25000003 PHARM REV CODE 250: Performed by: ANESTHESIOLOGY

## 2023-12-27 PROCEDURE — 63600175 PHARM REV CODE 636 W HCPCS: Mod: UD | Performed by: ANESTHESIOLOGY

## 2023-12-27 PROCEDURE — 25500020 PHARM REV CODE 255: Performed by: ANESTHESIOLOGY

## 2023-12-27 PROCEDURE — 62321 NJX INTERLAMINAR CRV/THRC: CPT | Performed by: ANESTHESIOLOGY

## 2023-12-27 PROCEDURE — 62321 NJX INTERLAMINAR CRV/THRC: CPT | Mod: ,,, | Performed by: ANESTHESIOLOGY

## 2023-12-27 PROCEDURE — 62321 PR INJ CERV/THORAC, W/GUIDANCE: ICD-10-PCS | Mod: ,,, | Performed by: ANESTHESIOLOGY

## 2023-12-27 RX ORDER — LIDOCAINE HYDROCHLORIDE 10 MG/ML
1 INJECTION, SOLUTION EPIDURAL; INFILTRATION; INTRACAUDAL; PERINEURAL ONCE
Status: COMPLETED | OUTPATIENT
Start: 2023-12-27 | End: 2023-12-27

## 2023-12-27 RX ORDER — MIDAZOLAM HYDROCHLORIDE 1 MG/ML
INJECTION INTRAMUSCULAR; INTRAVENOUS
Status: DISCONTINUED | OUTPATIENT
Start: 2023-12-27 | End: 2023-12-27 | Stop reason: HOSPADM

## 2023-12-27 RX ORDER — FENTANYL CITRATE 50 UG/ML
INJECTION, SOLUTION INTRAMUSCULAR; INTRAVENOUS
Status: DISCONTINUED | OUTPATIENT
Start: 2023-12-27 | End: 2023-12-27 | Stop reason: HOSPADM

## 2023-12-27 RX ORDER — SODIUM CHLORIDE 9 MG/ML
INJECTION, SOLUTION INTRAMUSCULAR; INTRAVENOUS; SUBCUTANEOUS
Status: DISCONTINUED | OUTPATIENT
Start: 2023-12-27 | End: 2023-12-27 | Stop reason: HOSPADM

## 2023-12-27 RX ORDER — SODIUM CHLORIDE, SODIUM LACTATE, POTASSIUM CHLORIDE, CALCIUM CHLORIDE 600; 310; 30; 20 MG/100ML; MG/100ML; MG/100ML; MG/100ML
INJECTION, SOLUTION INTRAVENOUS CONTINUOUS
Status: ACTIVE | OUTPATIENT
Start: 2023-12-27

## 2023-12-27 RX ORDER — LIDOCAINE HYDROCHLORIDE 10 MG/ML
INJECTION, SOLUTION EPIDURAL; INFILTRATION; INTRACAUDAL; PERINEURAL
Status: DISCONTINUED | OUTPATIENT
Start: 2023-12-27 | End: 2023-12-27 | Stop reason: HOSPADM

## 2023-12-27 RX ORDER — DEXAMETHASONE SODIUM PHOSPHATE 10 MG/ML
INJECTION INTRAMUSCULAR; INTRAVENOUS
Status: DISCONTINUED | OUTPATIENT
Start: 2023-12-27 | End: 2023-12-27 | Stop reason: HOSPADM

## 2023-12-27 RX ADMIN — SODIUM CHLORIDE, POTASSIUM CHLORIDE, SODIUM LACTATE AND CALCIUM CHLORIDE 500 ML: 600; 310; 30; 20 INJECTION, SOLUTION INTRAVENOUS at 12:12

## 2023-12-27 RX ADMIN — LIDOCAINE HYDROCHLORIDE 0.2 MG: 10 SOLUTION INTRAVENOUS at 12:12

## 2023-12-27 NOTE — PLAN OF CARE
Discharge instructions given to pt, verbalized understanding.  Tolerating PO fluids.  IV removed.  Pain 5/10.  Ambulating out to dtr  per RN in no distress.

## 2023-12-27 NOTE — H&P
CC: neck pain    HPI: The patient is a 53 y.o. female with a history of neck pain here for SILVIA. There are no major changes in history and physical from 2023 by Brii.    Past Medical History:   Diagnosis Date    Anxiety     Bipolar disorder     Depression     Diabetes mellitus, type 2     Fibromyalgia     HTN (hypertension)     Insomnia     Migraine        Past Surgical History:   Procedure Laterality Date    ANGIOGRAM, CORONARY, WITH LEFT HEART CATHETERIZATION Left 2020    Procedure: Left heart cath;  Surgeon: Kannan Santana MD;  Location: [facility] CATH/EP LAB;  Service: Cardiology;  Laterality: Left;    CARDIAC CATHETERIZATION      CARPAL TUNNEL RELEASE Right 2023    Procedure: Endoscopic VS. Open Carpal Tunnel Release;  Surgeon: Alcides Yancey DO;  Location: [facility] OR;  Service: Orthopedics;  Laterality: Right;     SECTION      COLONOSCOPY N/A 2019    Procedure: COLONOSCOPY;  Surgeon: Da Diallo MD;  Location: [facility] ENDO;  Service: General;  Laterality: N/A;    CORONARY ARTERY BYPASS GRAFT (CABG) N/A 2020    Procedure: CORONARY ARTERY BYPASS GRAFT (CABG);  Surgeon: Anshul Oakes MD;  Location: [facility] OR;  Service: Cardiothoracic;  Laterality: N/A;    DECOMPRESSION, NERVE, ULNAR Right 2023    Procedure: DECOMPRESSION, NERVE, ULNAR;  Surgeon: Alcides Yancey DO;  Location: [facility] OR;  Service: Orthopedics;  Laterality: Right;    ENDOSCOPIC HARVEST OF VEIN Left 2020    Procedure: SURGICAL PROCUREMENT, VEIN, ENDOSCOPIC;  Surgeon: Anshul Oakes MD;  Location: [facility] OR;  Service: Cardiothoracic;  Laterality: Left;    EPIDURAL STEROID INJECTION N/A 2019    Procedure: Injection, Steroid, Epidural - L4/5 EPIDURAL STEROID INJECTION;  Surgeon: Sherri Gardiner MD;  Location: [facility] OR;  Service: Pain Management;  Laterality: N/A;    EPIDURAL STEROID INJECTION N/A 2019    Procedure: Injection, Steroid, Epidural - C7/T1 EPIDURAL STEROID INJECTION;  Surgeon: Sherri JUAREZ  MD Zuleyka;  Location: [facility] OR;  Service: Pain Management;  Laterality: N/A;    EPIDURAL STEROID INJECTION N/A 7/8/2019    Procedure: Injection, Steroid, Epidural - L4/5 EPIDURAL STEROID INJECTION;  Surgeon: Sherri Gardiner MD;  Location: [facility] OR;  Service: Pain Management;  Laterality: N/A;    EPIDURAL STEROID INJECTION N/A 10/21/2019    Procedure: Injection, Steroid, Epidural, CERVICAL C7/T1 SILVIA;  Surgeon: Sherri Gardiner MD;  Location: [facility] OR;  Service: Pain Management;  Laterality: N/A;  09-23-19    EPIDURAL STEROID INJECTION N/A 10/1/2020    Procedure: Cervical SILVIA C7-T1;  Surgeon: Emily José MD;  Location: [facility] OR;  Service: Pain Management;  Laterality: N/A;    EPIDURAL STEROID INJECTION N/A 1/7/2021    Procedure: Lumbar SILVIA L4-L5;  Surgeon: Emily José MD;  Location: [facility] OR;  Service: Pain Management;  Laterality: N/A;    EPIDURAL STEROID INJECTION N/A 9/15/2021    Procedure: Injection, Steroid, Epidural, Cervical. C5 - C6;  Surgeon: Emily José MD;  Location: [facility] OR;  Service: Pain Management;  Laterality: N/A;    EPIDURAL STEROID INJECTION N/A 6/29/2022    Procedure: Injection, Steroid, Epidural MEHRAN C5-C6;  Surgeon: Emily José MD;  Location: [facility] OR;  Service: Pain Management;  Laterality: N/A;    EPIDURAL STEROID INJECTION N/A 11/2/2022    Procedure: Injection, Steroid, Epidural  MEHRAN C6-C7;  Surgeon: Emily José MD;  Location: [facility] OR;  Service: Pain Management;  Laterality: N/A;    EPIDURAL STEROID INJECTION INTO CERVICAL SPINE N/A 3/2/2022    Procedure: Injection-steroid-epidural-cervical C5-C6;  Surgeon: Emily José MD;  Location: [facility] OR;  Service: Pain Management;  Laterality: N/A;    EPIDURAL STEROID INJECTION INTO CERVICAL SPINE N/A 10/13/2023    Procedure: Injection-steroid-epidural-cervical;  Surgeon: Umang Huggins MD;  Location: [facility] OR;  Service: Anesthesiology;  Laterality: N/A;  c6-7    ESOPHAGOGASTRODUODENOSCOPY N/A 9/18/2019    Procedure:  ESOPHAGOGASTRODUODENOSCOPY (EGD);  Surgeon: Da Diallo MD;  Location: [facility] ENDO;  Service: General;  Laterality: N/A;    INJECTION OF ANESTHETIC AGENT AROUND NERVE Bilateral 2021    Procedure: Block, Nerve L3, L4, L5;  Surgeon: Emily José MD;  Location: [facility] OR;  Service: Pain Management;  Laterality: Bilateral;    TONSILLECTOMY      TRIGGER POINT INJECTION N/A 2019    Procedure: INJECTION, TRIGGER POINT - CERVICAL REGION;  Surgeon: Sherri Gardiner MD;  Location: [facility] OR;  Service: Pain Management;  Laterality: N/A;    TRIGGER POINT INJECTION N/A 10/21/2019    Procedure: INJECTION, TRIGGER POINT;  Surgeon: Sherri Gardiner MD;  Location: [facility] OR;  Service: Pain Management;  Laterality: N/A;    TRIGGER POINT INJECTION N/A 10/1/2020    Procedure: INJECTION, TRIGGER POINT;  Surgeon: Emily José MD;  Location: [facility] OR;  Service: Pain Management;  Laterality: N/A;  back    triple bypass         Family History   Problem Relation Age of Onset    Lung cancer Mother     Brain cancer Sister     Pancreatic cancer Maternal Aunt     Pancreatic cancer Paternal Aunt        Social History     Socioeconomic History    Marital status:    Tobacco Use    Smoking status: Former     Current packs/day: 0.00     Types: Cigarettes     Start date: 2012     Quit date: 2012     Years since quittin.2    Smokeless tobacco: Never   Substance and Sexual Activity    Alcohol use: Yes     Comment: occasional    Drug use: Not Currently     Types: Marijuana, Other-see comments    Sexual activity: Yes     Social Determinants of Health     Financial Resource Strain: Unknown (2022)    Overall Financial Resource Strain (CARDIA)     Difficulty of Paying Living Expenses: Patient declined   Food Insecurity: Unknown (2022)    Hunger Vital Sign     Worried About Running Out of Food in the Last Year: Patient declined     Ran Out of Food in the Last Year: Patient declined   Transportation Needs: Unknown  (2/23/2022)    PRAPARE - Transportation     Lack of Transportation (Medical): Patient declined     Lack of Transportation (Non-Medical): Patient declined   Physical Activity: Unknown (2/23/2022)    Exercise Vital Sign     Days of Exercise per Week: Patient declined   Stress: Unknown (2/23/2022)    [employer] - Occupational Stress Questionnaire     Feeling of Stress : Patient declined   Social Connections: Unknown (2/23/2022)    Social Connection and Isolation Panel [NHANES]     Frequency of Communication with Friends and Family: Patient declined     Frequency of Social Gatherings with Friends and Family: Patient declined     Active Member of Clubs or Organizations: Patient declined     Attends Club or Organization Meetings: Patient declined     Marital Status: Patient declined   Housing Stability: Unknown (2/23/2022)    Housing Stability Vital Sign     Unable to Pay for Housing in the Last Year: Patient refused     Unstable Housing in the Last Year: Patient refused       Current Facility-Administered Medications   Medication Dose Route Frequency Provider Last Rate Last Admin    lactated ringers infusion   Intravenous Continuous Umang Huggins MD        LIDOcaine (PF) 10 mg/ml (1%) injection 10 mg  1 mL Intradermal Once Umang Huggins MD         Facility-Administered Medications Ordered in Other Encounters   Medication Dose Route Frequency Provider Last Rate Last Admin    0.9%  NaCl infusion   Intravenous Continuous Sherri Gardiner MD 20 mL/hr at 04/29/19 1227 New Bag at 04/29/19 1227    lactated ringers infusion   Intravenous Once PRN Emily José MD        lactated ringers infusion   Intravenous Continuous Emily José MD 25 mL/hr at 06/29/22 1246 New Bag at 06/29/22 1246    lactated ringers infusion   Intravenous Continuous Emily José MD 25 mL/hr at 11/02/22 0937 New Bag at 11/02/22 0937    lactated ringers infusion   Intravenous Continuous Umang Huggins MD 25 mL/hr at  "10/13/23 1400 New Bag at 10/13/23 1400       Review of patient's allergies indicates:   Allergen Reactions    Doxycycline     Hydrocodone Nausea And Vomiting    Vaccine adjuvant system, as01b liposomal     Varicella-zoster ge-as01b (pf)      Other reaction(s): Memory problems    Varicella-zoster virus glycoprotein e, recombinant     Benadryl [diphenhydramine hcl] Nausea Only       Vitals:    12/18/23 0925   Weight: 80.3 kg (177 lb 0.5 oz)   Height: 5' 5" (1.651 m)       REVIEW OF SYSTEMS:     GENERAL: No weight loss, malaise or fevers.  HEENT:  No recent changes in vision or hearing  NECK: Negative for lumps, no difficulty with swallowing.  RESPIRATORY: Negative for cough, wheezing or shortness of breath, patient denies any recent URI.  CARDIOVASCULAR: Negative for chest pain, leg swelling or palpitations.  GI: Negative for abdominal discomfort, blood in stools or black stools or change in bowel habits.  MUSCULOSKELETAL: See HPI.  SKIN: Negative for lesions, rash, and itching.  PSYCH: No suicidal or homicidal ideations, no current mood disturbances.  HEMATOLOGY/LYMPHOLOGY: Negative for prolonged bleeding, bruising easily or swollen nodes. Patient is not currently taking any anti-coagulants  ENDO: No history of diabetes or thyroid dysfunction  NEURO: No history of syncope, paralysis, seizures or tremors.All other reviewed and negative other than HPI.    Physical exam:  Gen: A and O x3, pleasant, well-groomed  Skin: No rashes or obvious lesions  HEENT: PERRLA, no obvious deformities on ears or in canals. No thyroid masses, trachea midline, no palpable lymph nodes in neck, axilla.  CVS: Regular rate and rhythm, normal S1 and S2, no murmurs.  Resp: Clear to auscultation bilaterally.  Abdomen: Soft, NT/ND, normal bowel sounds present.  Musculoskeletal/Neuro: Moving all extremities    Assessment:  Cervical radiculitis          PLAN: SILVIA      This patient has been cleared for surgery in an ambulatory surgical " facility    ASA 3,  Mallampatti Score 3  No history of anesthetic complications  Plan for RN IV sedation

## 2023-12-27 NOTE — DISCHARGE SUMMARY
[facility] - Periop Services  Discharge Note  Short Stay    Procedure(s) (LRB):  Injection-steroid-epidural-cervical (N/A)      OUTCOME: Patient tolerated treatment/procedure well without complication and is now ready for discharge.    DISPOSITION: Home or Self Care    FINAL DIAGNOSIS:  <principal problem not specified>    FOLLOWUP: In clinic    DISCHARGE INSTRUCTIONS:    Discharge Procedure Orders   Notify your health care provider if you experience any of the following:  temperature >100.4     Notify your health care provider if you experience any of the following:  severe uncontrolled pain     Notify your health care provider if you experience any of the following:  redness, tenderness, or signs of infection (pain, swelling, redness, odor or green/yellow discharge around incision site)     Activity as tolerated        TIME SPENT ON DISCHARGE: 30 minutes

## 2023-12-27 NOTE — OP NOTE
PROCEDURE DATE: 12/27/2023    Procedure: C6-7 cervical interlaminar epidural steroid injection under utilizing fluoroscopy.    Diagnosis: Cervical Degenerative Disc Diease; Cervical Radiculitis  POSTOP DIAGNOSIS: SAME    Physician: Umang Huggins MD    Medications injected:  Dexamethasone 10mg followed by a slow injection of 4mL sterile, preservative-free normal saline.    Local anesthetic used: Lidocaine 1%, 2 ml.    Sedation Medications: RN IV sedation    Complications:  None     Estimated blood loss: None    Technique:  A time-out was taken to identify patient and procedure prior to starting the procedure.  With the patient laying in a prone position with the neck in a mid-flexed forward position, the area was prepped and draped in the usual sterile fashion using ChloraPrep and a fenestrated drape.  The area was determined under AP fluoroscopic guidance.  Local anesthetic was given using a 25-gauge 1.5 inch needle by raising a wheal and then infiltrating ventrally.  A 3.5 inch 20-gauge Touhy needle was introduced under fluoroscopic guidance to meet the lamina of C7.  The needle was then hinged under the lamina then advanced using loss of resistance technique.  Once the tip of the needle was in the desired position, the 1ml contrast dye  was injected to determine placement and no uptake.  The steroid was then injected slowly followed by a slow injection of 4 mL of the sterile preservative-free normal saline.  The patient tolerated the procedure well.    The patient was monitored after the procedure and was given post-procedure and discharge instructions to follow at home. The patient was discharged in a stable condition.

## 2024-01-24 ENCOUNTER — PATIENT MESSAGE (OUTPATIENT)
Dept: PAIN MEDICINE | Facility: CLINIC | Age: 54
End: 2024-01-24
Payer: MEDICARE

## 2024-02-07 ENCOUNTER — PATIENT MESSAGE (OUTPATIENT)
Dept: PAIN MEDICINE | Facility: CLINIC | Age: 54
End: 2024-02-07
Payer: MEDICARE

## 2024-02-07 DIAGNOSIS — M50.30 DEGENERATIVE DISC DISEASE, CERVICAL: ICD-10-CM

## 2024-02-07 DIAGNOSIS — M46.1 SI (SACROILIAC) JOINT INFLAMMATION: ICD-10-CM

## 2024-02-07 DIAGNOSIS — M54.12 CERVICAL RADICULOPATHY: Primary | ICD-10-CM

## 2024-02-07 DIAGNOSIS — M51.36 DDD (DEGENERATIVE DISC DISEASE), LUMBAR: Primary | ICD-10-CM

## 2024-02-08 ENCOUNTER — TELEPHONE (OUTPATIENT)
Dept: DERMATOLOGY | Facility: CLINIC | Age: 54
End: 2024-02-08
Payer: MEDICARE

## 2024-02-08 NOTE — TELEPHONE ENCOUNTER
----- Message from Krista Pearce sent at 2/8/2024 10:47 AM CST -----  Type: Needs Medical Advice  Who Called:  pt     Best Call Back Number: [phone] (home)     Additional Information: pt is calling in regards to having a few questions before wanting to sched please advise

## 2024-02-26 ENCOUNTER — PROCEDURE VISIT (OUTPATIENT)
Dept: SLEEP MEDICINE | Facility: HOSPITAL | Age: 54
End: 2024-02-26
Attending: NURSE PRACTITIONER
Payer: MEDICARE

## 2024-02-26 DIAGNOSIS — R06.83 SNORING: ICD-10-CM

## 2024-02-26 DIAGNOSIS — R40.0 HAS DAYTIME DROWSINESS: ICD-10-CM

## 2024-02-26 DIAGNOSIS — R45.1 RESTLESSNESS: ICD-10-CM

## 2024-02-26 DIAGNOSIS — G47.00 INSOMNIA: ICD-10-CM

## 2024-02-26 DIAGNOSIS — R53.83 LOSS OF ENERGY: ICD-10-CM

## 2024-02-26 DIAGNOSIS — G47.33 OSA (OBSTRUCTIVE SLEEP APNEA): Primary | ICD-10-CM

## 2024-02-26 DIAGNOSIS — G47.33 OSA (OBSTRUCTIVE SLEEP APNEA): ICD-10-CM

## 2024-02-26 PROCEDURE — 95811 POLYSOM 6/>YRS CPAP 4/> PARM: CPT

## 2024-02-27 ENCOUNTER — CLINICAL SUPPORT (OUTPATIENT)
Dept: REHABILITATION | Facility: HOSPITAL | Age: 54
End: 2024-02-27
Payer: MEDICARE

## 2024-02-27 DIAGNOSIS — M51.36 DDD (DEGENERATIVE DISC DISEASE), LUMBAR: ICD-10-CM

## 2024-02-27 DIAGNOSIS — M62.81 MUSCLE WEAKNESS: Primary | ICD-10-CM

## 2024-02-27 DIAGNOSIS — M46.1 SI (SACROILIAC) JOINT INFLAMMATION: ICD-10-CM

## 2024-02-27 DIAGNOSIS — M50.30 DEGENERATIVE DISC DISEASE, CERVICAL: ICD-10-CM

## 2024-02-27 PROCEDURE — 97140 MANUAL THERAPY 1/> REGIONS: CPT | Mod: PN

## 2024-02-27 PROCEDURE — 97162 PT EVAL MOD COMPLEX 30 MIN: CPT | Mod: PN

## 2024-02-27 NOTE — PROGRESS NOTES
[facility]  Physical Therapy Initial Evaluation / Plan Of Care    Name: Nicole Harris  Clinic Number: [id number]    Therapy Diagnosis:   Encounter Diagnoses   Name Primary?    DDD (degenerative disc disease), lumbar     Degenerative disc disease, cervical     SI (sacroiliac) joint inflammation     Muscle weakness Yes     Physician: Brii Mcneil NP    Physician Orders: PT Eval and Treat   Medical Diagnosis: M51.36 (ICD-10-CM) - DDD (degenerative disc disease), lumbar M50.30 (ICD-10-CM) - Degenerative disc disease, cervical M46.1 (ICD-10-CM) - SI (sacroiliac) joint inflammation  Evaluation Date: 2024  Authorization period Expiration: 2024  Plan of Care Expiration: 5/10/2024  Progress Note Due: 2024  Visit #/Visits authorized:    FOTO: 1/ 3       Precautions: Standard  Date of Surgery: na      Time In: 0930  Time Out: 1015  Total Appointment Time: 45 minutes  Total Billable Time: 45 minutes  SUBJECTIVE       Past Medical History:   Diagnosis Date    Anxiety     Bipolar disorder     Depression     Diabetes mellitus, type 2     Fibromyalgia     HTN (hypertension)     Insomnia     Migraine      Nicole Harris  has a past surgical history that includes  section; Tonsillectomy; Epidural steroid injection (N/A, 2019); Epidural steroid injection (N/A, 2019); Epidural steroid injection (N/A, 2019); Trigger point injection (N/A, 2019); Colonoscopy (N/A, 2019); Esophagogastroduodenoscopy (N/A, 2019); Epidural steroid injection (N/A, 10/21/2019); Trigger point injection (N/A, 10/21/2019); ANGIOGRAM, CORONARY, WITH LEFT HEART CATHETERIZATION (Left, 2020); Coronary Artery Bypass Graft (CABG) (N/A, 2020); Endoscopic harvest of vein (Left, 2020); Cardiac catheterization; triple bypass; Epidural steroid injection (N/A, 10/1/2020); Trigger point injection (N/A, 10/1/2020); Epidural steroid injection (N/A, 2021); Injection of  "anesthetic agent around nerve (Bilateral, 4/1/2021); Epidural steroid injection (N/A, 9/15/2021); Epidural steroid injection into cervical spine (N/A, 3/2/2022); Epidural steroid injection (N/A, 6/29/2022); Epidural steroid injection (N/A, 11/2/2022); Carpal tunnel release (Right, 1/5/2023); decompression, nerve, ulnar (Right, 1/5/2023); Epidural steroid injection into cervical spine (N/A, 10/13/2023); and Epidural steroid injection into cervical spine (N/A, 12/27/2023).    Nicole has a current medication list which includes the following prescription(s): albuterol, aspirin, atorvastatin, azelastine, cetirizine, cyclobenzaprine, empagliflozin, escitalopram oxalate, fluconazole, fluticasone propionate, insulin glargine,hum.rec.anlog, levocetirizine, lisinopril, metformin, montelukast, naproxen sodium, nortriptyline, ozempic, pantoprazole, and true metrix glucose test strip, and the following Facility-Administered Medications: sodium chloride 0.9%, lactated ringers, lactated ringers, lactated ringers, lactated ringers, and lactated ringers.    Review of patient's allergies indicates:   Allergen Reactions    Doxycycline     Hydrocodone Nausea And Vomiting    Vaccine adjuvant system, as01b liposomal     Varicella-zoster ge-as01b (pf)      Other reaction(s): Memory problems    Varicella-zoster virus glycoprotein e, recombinant     Benadryl [diphenhydramine hcl] Nausea Only        Prior Therapy: yes for current condition, reports limited progress "the needles help the most"  Social History: Patient lives alone in a single story home with ramp to enter   Occupation: na  Prior Level of Function: independent, limited activity due to chronic pain issues  Current Level of Function: lifting greater than 5 lbs to shoulder or overhead, difficulty lifting items from the floor, sleeping between 2-6 hours depending on how she feels    Pain:  Current 7/10, worst 10/10, best 4/10   Location:  bilateral neck more so than on the " "left  Description: Aching, Burning, Tingling, and Sharp  Aggravating Factors: prolonged postures  Easing Factors: ice and changing positions      Onset/JACOBO: chronic    History of current condition - Nicole reports: patient reports she was coming to therapy for neck pain at the end of 2023 and was discharged due to lack of progress and, per patient "it was the end of the year". She then had an SILVIA in the neck which lasted about a month. She states she's been sick so she hasn't been very compliant with exercises but does some because "if not I won't be able to move." Patient states she has trouble finding a happy medium in her life. She states on days she feels good she tends to overdo it and then she can't move for 3 days. When attempting to discuss activity modifications, patient became defensive and started listing all of her ailments.   Patient reports she is not a candidate for surgery because "it won't take away the pain"    Pts goals: strengthen    OBJECTIVE     Objective Measures updated at progress report unless specified. Posture:     -       Rounded shoulders  -       Forward head  -       Posterior pelvic tilt     Cervical Range of Motion:       Degrees  12/6/24 Degrees  2/27/24   Flexion 58  45   Extension 52  51   Left Rotation 52  53   Right Rotation 56  57   Left Side Bending 30  24   Right Side Bending 20   11      Shoulder Range of Motion:   Left:     WFL - decreased quality of motion into IR/ER, but functional   Right:   WFL - as above, IR/ER quality of motion is decreased      Strength:    Left Right   DNF 5" head lift     Upper trap 4-/5 4-/5   Mid trap 3+/5 3+/5   Lower trap 3/5 3/5   Rhomboids 3+/5 3+/5    20 lb   20 lb       Upper Extremity Strength    Left Right   Shoulder flexion: 4-/5 4-/5   Shoulder Abduction: 4/5 4/5   Shoulder ER 4/5 4/5   Shoulder IR 4-/5 4-/5   Elbow flexion: 4/5 4/5   Elbow extension: 4/5 4/5   Wrist flexion: 3+/5 3+/5   Wrist extension: 3+/5 3+/5         Special " Tests:  Distraction Negative   Compression Negative   Spurlings Negative   Sharp-Lizz Negative   VA test Negative   Lateral Flexion Alar Ligament Negative      Upper Limb Neurodynamic testing:    Left   Right   UNT Negative   Negative   MNT Negative   Negative   RNT Negative   Negative      Joint Mobility: Passive mobility WFL;  Active mild restrictions into rotation - more stability than flexibility restriction secondary to mm weakness/tightness.      Thoracic mobility: postural restriction into extension at upper segments;      Palpation: Still has some tenderness in upper traps with TrPs noted,    Sensation: intact to light touch BUE's      Flexibility:                Upper Trap = R moderate restriction, L moderate restriction  > Improved to minimal restriction               Scalenes: R moderate restriction, L moderate restriction  > still moderate, but improving               SCM: R minimal restriction, L minimal restriction > no restriction               Levator Scap: R moderate restriction, L moderate restriction > improving, less than moderate     Treatment     Treatment Time In: 1000  Treatment Time Out: 1014  Total Treatment time separate from Evaluation: 14 minutes    Nicole received therapeutic exercises to develop strength and ROM for 4 minutes including:  Reviewed HEP given last round of therapy    Nicole received the following manual therapy techniques: Joint mobilizations for 10 minutes.   Cervical grade III-IV side glides and rotational mobs bilateral   Soft tissue mobilization Cervical paravertebral muscles   Manual stretching      Patient Education and Home Exercises     Home Exercises and Patient Education Provided    Education provided re:   - progress towards goals   - role of therapy in multi - disciplinary team, goals for therapy  Pt educated on condition, POC, and expectations in therapy.  No spiritual or educational barriers to learning provided    Home exercises:  Pt will be provided HEP  "during course of treatment with progressions as appropriate. Pt was advised to perform these exercises free of pain, and to stop performing them if pain occurs.   Nicole demonstrated good  understanding of the education provided.     Functional Limitations Reports  Tool: FOTO  Intake: 32  Predicted: 48    ASSESSMENT      Nicole is a 54 y.o. female referred to outpatient physical therapy with a medical diagnosis of M51.36 (ICD-10-CM) - DDD (degenerative disc disease), lumbar M50.30 (ICD-10-CM) - Degenerative disc disease, cervical M46.1 (ICD-10-CM) - SI (sacroiliac) joint inflammation, and presents to PT with pain limiting function, decreased strength affecting ability to perform daily activities. Patient with minimal regression in cervical range of motion since last assessment, minimal improvement in upper extremity strength. Patient has undergone therapy, injections, and other pain management in the past with limited success. She would benefit from therapy to improve cervical range and improve upper extremity strength in order to increase activity tolerance with less "bad days" following. Patient demonstrates limitations as described in the problem list. Pt will benefit from physcial therapy services in order to maximize pain free and/or functional use of bilateral upper extremities. The following goals were discussed with the patient and patient is in agreement with them as to be addressed in the treatment plan.     Pt prognosis is Guarded.     Pt's spiritual, cultural and educational needs considered and pt agreeable to plan of care and goals as stated below:     Anticipated Barriers for therapy: chronicity of symptoms, failed therapy in the past    Plan of care discussed with patient: Yes    Pt will benefit from skilled outpatient Physical Therapy to address the deficits stated above and in the chart below, provide pt/family education, and to maximize pt's level of independence.     Medical necessity is " demonstrated by the following IMPAIRMENTS/PROBLEM LIST:    weakness, decreased upper extremity function, pain, abnormal tone, decreased ROM, impaired joint extensibility, and impaired muscle length      Medical Necessity is demonstrated by the following  History  Co-morbidities and personal factors that may impact the plan of care Co-morbidities/Personal Factors    0= low, 1-2 = mod, 3+ = high   mod   Examination  Body Structures and Functions, activity limitations and participation restrictions that may impact the plan of care Body Regions/Body Systems    Participation Restrictions    Activity limitations  Mobility/Self care/Domestic Life/Community and Social Life    1-2 = low, 3+ = mod, 4+ = high       mod   Clinical Presentation evolving clinical presentation with changing clinical characteristics  Stable/uncomplicated = low, evolving/changing = mod,  Unstable/unpredictable = high moderate   Decision Making/ Complexity Score: moderate           GOALS     Short Term Goals: 3 weeks  Demonstrate improvement in recent symptoms to progress toward long term goals  Correct postural deviations in sitting and standing to decrease pain and promote postural awareness for injury prevention.  Transfer supine to/from sit with proper log roll technique and no limitation   Demonstrate compliance with initial exercise program    Long Term Goals: 5 weeks  Perform usual household tasks with minimal limitation  Improve sitting tolerance while driving with no increase in symptoms  Lift 8 lbs overhead and/or shoulder height with minimal limitation  Lift 10 lbs with both arms from floor to waist and carry for 15 feet with minimal limitation  Exercise: demonstrate independence with home exercise program to maintain gains made in therapy.      PLAN      Certification Period: 2/27/2024 to 5/10/2024.    Outpatient Physical Therapy 2 times weekly for 5 weeks to include the following interventions: patient education, Manual Therapy, Moist  Heat/ Ice, Neuromuscular Re-ed, Patient Education, Self Care, Therapeutic Activities, Therapeutic Exercise, Ultrasound, and dry needling as needed .   Pt may be seen by PTA as part of the rehabilitation team.     I certify the need for these services furnished under this plan of treatment and while under my care.    Ana Boyle, PT        Attestation:   I have seen the patient, reviewed the therapist's plan of care, and I agree with the plan of care.   I certify the need for these services furnished under this plan of treatment and while under my care.         _______________            ________                                               _____________________  Physician/Referring Practitioner                                                            Date of Signature

## 2024-02-27 NOTE — PLAN OF CARE
[facility]  Physical Therapy Initial Evaluation / Plan Of Care    Name: Nicole Harris  Clinic Number: [id number]    Therapy Diagnosis:   Encounter Diagnoses   Name Primary?    DDD (degenerative disc disease), lumbar     Degenerative disc disease, cervical     SI (sacroiliac) joint inflammation     Muscle weakness Yes     Physician: Brii Mcneil NP    Physician Orders: PT Eval and Treat   Medical Diagnosis: M51.36 (ICD-10-CM) - DDD (degenerative disc disease), lumbar M50.30 (ICD-10-CM) - Degenerative disc disease, cervical M46.1 (ICD-10-CM) - SI (sacroiliac) joint inflammation  Evaluation Date: 2024  Authorization period Expiration: 2024  Plan of Care Expiration: 5/10/2024  Progress Note Due: 2024  Visit #/Visits authorized:    FOTO: 1/ 3       Precautions: Standard  Date of Surgery: na      Time In: 0930  Time Out: 1015  Total Appointment Time: 45 minutes  Total Billable Time: 45 minutes  SUBJECTIVE       Past Medical History:   Diagnosis Date    Anxiety     Bipolar disorder     Depression     Diabetes mellitus, type 2     Fibromyalgia     HTN (hypertension)     Insomnia     Migraine      Nicole Harris  has a past surgical history that includes  section; Tonsillectomy; Epidural steroid injection (N/A, 2019); Epidural steroid injection (N/A, 2019); Epidural steroid injection (N/A, 2019); Trigger point injection (N/A, 2019); Colonoscopy (N/A, 2019); Esophagogastroduodenoscopy (N/A, 2019); Epidural steroid injection (N/A, 10/21/2019); Trigger point injection (N/A, 10/21/2019); ANGIOGRAM, CORONARY, WITH LEFT HEART CATHETERIZATION (Left, 2020); Coronary Artery Bypass Graft (CABG) (N/A, 2020); Endoscopic harvest of vein (Left, 2020); Cardiac catheterization; triple bypass; Epidural steroid injection (N/A, 10/1/2020); Trigger point injection (N/A, 10/1/2020); Epidural steroid injection (N/A, 2021); Injection of  "anesthetic agent around nerve (Bilateral, 4/1/2021); Epidural steroid injection (N/A, 9/15/2021); Epidural steroid injection into cervical spine (N/A, 3/2/2022); Epidural steroid injection (N/A, 6/29/2022); Epidural steroid injection (N/A, 11/2/2022); Carpal tunnel release (Right, 1/5/2023); decompression, nerve, ulnar (Right, 1/5/2023); Epidural steroid injection into cervical spine (N/A, 10/13/2023); and Epidural steroid injection into cervical spine (N/A, 12/27/2023).    Nicole has a current medication list which includes the following prescription(s): albuterol, aspirin, atorvastatin, azelastine, cetirizine, cyclobenzaprine, empagliflozin, escitalopram oxalate, fluconazole, fluticasone propionate, insulin glargine,hum.rec.anlog, levocetirizine, lisinopril, metformin, montelukast, naproxen sodium, nortriptyline, ozempic, pantoprazole, and true metrix glucose test strip, and the following Facility-Administered Medications: sodium chloride 0.9%, lactated ringers, lactated ringers, lactated ringers, lactated ringers, and lactated ringers.    Review of patient's allergies indicates:   Allergen Reactions    Doxycycline     Hydrocodone Nausea And Vomiting    Vaccine adjuvant system, as01b liposomal     Varicella-zoster ge-as01b (pf)      Other reaction(s): Memory problems    Varicella-zoster virus glycoprotein e, recombinant     Benadryl [diphenhydramine hcl] Nausea Only        Prior Therapy: yes for current condition, reports limited progress "the needles help the most"  Social History: Patient lives alone in a single story home with ramp to enter   Occupation: na  Prior Level of Function: independent, limited activity due to chronic pain issues  Current Level of Function: lifting greater than 5 lbs to shoulder or overhead, difficulty lifting items from the floor, sleeping between 2-6 hours depending on how she feels    Pain:  Current 7/10, worst 10/10, best 4/10   Location:  bilateral neck more so than on the " "left  Description: Aching, Burning, Tingling, and Sharp  Aggravating Factors: prolonged postures  Easing Factors: ice and changing positions      Onset/JACOBO: chronic    History of current condition - Nicole reports: patient reports she was coming to therapy for neck pain at the end of 2023 and was discharged due to lack of progress and, per patient "it was the end of the year". She then had an SILVIA in the neck which lasted about a month. She states she's been sick so she hasn't been very compliant with exercises but does some because "if not I won't be able to move." Patient states she has trouble finding a happy medium in her life. She states on days she feels good she tends to overdo it and then she can't move for 3 days. When attempting to discuss activity modifications, patient became defensive and started listing all of her ailments.   Patient reports she is not a candidate for surgery because "it won't take away the pain"    Pts goals: strengthen    OBJECTIVE     Objective Measures updated at progress report unless specified. Posture:     -       Rounded shoulders  -       Forward head  -       Posterior pelvic tilt     Cervical Range of Motion:       Degrees  12/6/24 Degrees  2/27/24   Flexion 58  45   Extension 52  51   Left Rotation 52  53   Right Rotation 56  57   Left Side Bending 30  24   Right Side Bending 20   11      Shoulder Range of Motion:   Left:     WFL - decreased quality of motion into IR/ER, but functional   Right:   WFL - as above, IR/ER quality of motion is decreased      Strength:    Left Right   DNF 5" head lift     Upper trap 4-/5 4-/5   Mid trap 3+/5 3+/5   Lower trap 3/5 3/5   Rhomboids 3+/5 3+/5    20 lb   20 lb       Upper Extremity Strength    Left Right   Shoulder flexion: 4-/5 4-/5   Shoulder Abduction: 4/5 4/5   Shoulder ER 4/5 4/5   Shoulder IR 4-/5 4-/5   Elbow flexion: 4/5 4/5   Elbow extension: 4/5 4/5   Wrist flexion: 3+/5 3+/5   Wrist extension: 3+/5 3+/5         Special " Tests:  Distraction Negative   Compression Negative   Spurlings Negative   Sharp-Lizz Negative   VA test Negative   Lateral Flexion Alar Ligament Negative      Upper Limb Neurodynamic testing:    Left   Right   UNT Negative   Negative   MNT Negative   Negative   RNT Negative   Negative      Joint Mobility: Passive mobility WFL;  Active mild restrictions into rotation - more stability than flexibility restriction secondary to mm weakness/tightness.      Thoracic mobility: postural restriction into extension at upper segments;      Palpation: Still has some tenderness in upper traps with TrPs noted,    Sensation: intact to light touch BUE's      Flexibility:                Upper Trap = R moderate restriction, L moderate restriction  > Improved to minimal restriction               Scalenes: R moderate restriction, L moderate restriction  > still moderate, but improving               SCM: R minimal restriction, L minimal restriction > no restriction               Levator Scap: R moderate restriction, L moderate restriction > improving, less than moderate     Treatment     Treatment Time In: 1000  Treatment Time Out: 1014  Total Treatment time separate from Evaluation: 14 minutes    Nicole received therapeutic exercises to develop strength and ROM for 4 minutes including:  Reviewed HEP given last round of therapy    Nicole received the following manual therapy techniques: Joint mobilizations for 10 minutes.   Cervical grade III-IV side glides and rotational mobs bilateral   Soft tissue mobilization Cervical paravertebral muscles   Manual stretching      Patient Education and Home Exercises     Home Exercises and Patient Education Provided    Education provided re:   - progress towards goals   - role of therapy in multi - disciplinary team, goals for therapy  Pt educated on condition, POC, and expectations in therapy.  No spiritual or educational barriers to learning provided    Home exercises:  Pt will be provided HEP  "during course of treatment with progressions as appropriate. Pt was advised to perform these exercises free of pain, and to stop performing them if pain occurs.   Nicole demonstrated good  understanding of the education provided.     Functional Limitations Reports  Tool: FOTO  Intake: 32  Predicted: 48    ASSESSMENT      Nicole is a 54 y.o. female referred to outpatient physical therapy with a medical diagnosis of M51.36 (ICD-10-CM) - DDD (degenerative disc disease), lumbar M50.30 (ICD-10-CM) - Degenerative disc disease, cervical M46.1 (ICD-10-CM) - SI (sacroiliac) joint inflammation, and presents to PT with pain limiting function, decreased strength affecting ability to perform daily activities. Patient with minimal regression in cervical range of motion since last assessment, minimal improvement in upper extremity strength. Patient has undergone therapy, injections, and other pain management in the past with limited success. She would benefit from therapy to improve cervical range and improve upper extremity strength in order to increase activity tolerance with less "bad days" following. Patient demonstrates limitations as described in the problem list. Pt will benefit from physcial therapy services in order to maximize pain free and/or functional use of bilateral upper extremities. The following goals were discussed with the patient and patient is in agreement with them as to be addressed in the treatment plan.     Pt prognosis is Guarded.     Pt's spiritual, cultural and educational needs considered and pt agreeable to plan of care and goals as stated below:     Anticipated Barriers for therapy: chronicity of symptoms, failed therapy in the past    Plan of care discussed with patient: Yes    Pt will benefit from skilled outpatient Physical Therapy to address the deficits stated above and in the chart below, provide pt/family education, and to maximize pt's level of independence.     Medical necessity is " demonstrated by the following IMPAIRMENTS/PROBLEM LIST:    weakness, decreased upper extremity function, pain, abnormal tone, decreased ROM, impaired joint extensibility, and impaired muscle length      Medical Necessity is demonstrated by the following  History  Co-morbidities and personal factors that may impact the plan of care Co-morbidities/Personal Factors    0= low, 1-2 = mod, 3+ = high   mod   Examination  Body Structures and Functions, activity limitations and participation restrictions that may impact the plan of care Body Regions/Body Systems    Participation Restrictions    Activity limitations  Mobility/Self care/Domestic Life/Community and Social Life    1-2 = low, 3+ = mod, 4+ = high       mod   Clinical Presentation evolving clinical presentation with changing clinical characteristics  Stable/uncomplicated = low, evolving/changing = mod,  Unstable/unpredictable = high moderate   Decision Making/ Complexity Score: moderate           GOALS     Short Term Goals: 3 weeks  Demonstrate improvement in recent symptoms to progress toward long term goals  Correct postural deviations in sitting and standing to decrease pain and promote postural awareness for injury prevention.  Transfer supine to/from sit with proper log roll technique and no limitation   Demonstrate compliance with initial exercise program    Long Term Goals: 5 weeks  Perform usual household tasks with minimal limitation  Improve sitting tolerance while driving with no increase in symptoms  Lift 8 lbs overhead and/or shoulder height with minimal limitation  Lift 10 lbs with both arms from floor to waist and carry for 15 feet with minimal limitation  Exercise: demonstrate independence with home exercise program to maintain gains made in therapy.      PLAN      Certification Period: 2/27/2024 to 5/10/2024.    Outpatient Physical Therapy 2 times weekly for 5 weeks to include the following interventions: patient education, Manual Therapy, Moist  Heat/ Ice, Neuromuscular Re-ed, Patient Education, Self Care, Therapeutic Activities, Therapeutic Exercise, Ultrasound, and dry needling as needed.   Pt may be seen by PTA as part of the rehabilitation team.     I certify the need for these services furnished under this plan of treatment and while under my care.    Ana Boyle, PT        Attestation:   I have seen the patient, reviewed the therapist's plan of care, and I agree with the plan of care.   I certify the need for these services furnished under this plan of treatment and while under my care.         _______________            ________                                               _____________________  Physician/Referring Practitioner                                                            Date of Signature

## 2024-02-29 ENCOUNTER — CLINICAL SUPPORT (OUTPATIENT)
Dept: REHABILITATION | Facility: HOSPITAL | Age: 54
End: 2024-02-29
Payer: MEDICARE

## 2024-02-29 DIAGNOSIS — M62.81 MUSCLE WEAKNESS: Primary | ICD-10-CM

## 2024-02-29 DIAGNOSIS — M50.30 DEGENERATIVE DISC DISEASE, CERVICAL: Chronic | ICD-10-CM

## 2024-02-29 PROCEDURE — 97112 NEUROMUSCULAR REEDUCATION: CPT | Mod: PN

## 2024-02-29 PROCEDURE — 97140 MANUAL THERAPY 1/> REGIONS: CPT | Mod: PN

## 2024-02-29 PROCEDURE — 97110 THERAPEUTIC EXERCISES: CPT | Mod: PN

## 2024-02-29 NOTE — PROGRESS NOTES
"[facility]   Physical Therapy Treatment Note       Name: Nicole BONILLA [facility]  Clinic Number: [id number]    Therapy Diagnosis:   Encounter Diagnoses   Name Primary?    Muscle weakness Yes    Degenerative disc disease, cervical      Physician: Brii Mcneil NP    Visit Date: 2/29/2024    Physician Orders: PT Eval and Treat   Medical Diagnosis: M51.36 (ICD-10-CM) - DDD (degenerative disc disease), lumbar M50.30 (ICD-10-CM) - Degenerative disc disease, cervical M46.1 (ICD-10-CM) - SI (sacroiliac) joint inflammation  Evaluation Date: 2/27/2024  Authorization period Expiration: 12/31/2024  Plan of Care Expiration: 5/10/2024  Progress Note Due: 4/5/2024  Visit #/Visits authorized: 1/ 1   FOTO: 1/ 3     Precautions: Standard  Date of Surgery: na     PTA Visit #: 0/5     Time In: 1245  Time Out: 1330  Total Appointment Time: 45 minutes  Total Billable Time: 45 minutes  SUBJECTIVE      Pt reports: she felt pretty good after last visit. Feels like she has more mobility.  She was compliant with home exercise program.  Response to previous treatment: positive   Functional change: more mobility    Pain: 3/10  Location: bilateral upper trapezius      OBJECTIVE     Objective Measures updated at progress report unless specified.    Treatment      Nicole received therapeutic exercises to develop strength, ROM, and posture for 20 minutes, neuromuscular re-education for 10 mins including:  Lat Pulls w/ BTB x 10  3 Way Scapular Retraction w/ BTB x 10 (NMR)  Cervical Retraction x 15 (NMR)  Supine Shld extension w/ RTB x 10 (NMR)  Supine Horizontal abduction red thera-band x 10 (NMR)  Serratus Lits w/ 2# bar x 15  Supine Shld Flex w/ 2# bar x 15  Chest Press w/ 2# bar x 10  Deep neck flexor chin tuck 5"H x 2 min (NMR)  Cervical rotation with towel assist 5"H x 5 ea  Cervical extension with towel assist 5"H x 5    Nicole received the following manual therapy techniques: Joint mobilizations for 15 minutes. " "  Cervical grade III-IV side glides and rotational mobs bilateral   Soft tissue mobilization Cervical paravertebral muscles   Manual stretching     Patient Education and Home Exercises     Home Exercises Provided and Patient Education Provided     Education provided:   - HEP  - self cervical mobilizations    Written Home Exercises Provided: Patient instructed to cont prior HEP.  Exercises were reviewed and Nicole was able to demonstrate them prior to the end of the session.  Nicole demonstrated good  understanding of the education provided.          ASSESSMENT        Patient with good tolerance to treatment this date. Still very tender on right C7 lamina and transverse process. Multiple cavitations in mid cervical spine during manual treatment today. Decreased tolerance to suboccipital release and right upper trapezius trigger point release. PT educated patient in self soft tissue mobilization and cervical mobilization techniques to improve tolerance in therapy.     Paste from julia Nicole is a 54 y.o. female referred to outpatient physical therapy with a medical diagnosis of M51.36 (ICD-10-CM) - DDD (degenerative disc disease), lumbar M50.30 (ICD-10-CM) - Degenerative disc disease, cervical M46.1 (ICD-10-CM) - SI (sacroiliac) joint inflammation, and presents to PT with pain limiting function, decreased strength affecting ability to perform daily activities. Patient with minimal regression in cervical range of motion since last assessment, minimal improvement in upper extremity strength. Patient has undergone therapy, injections, and other pain management in the past with limited success. She would benefit from therapy to improve cervical range and improve upper extremity strength in order to increase activity tolerance with less "bad days" following. Patient demonstrates limitations as described in the problem list. Pt will benefit from physcial therapy services in order to maximize pain free and/or functional use of " bilateral upper extremities. The following goals were discussed with the patient and patient is in agreement with them as to be addressed in the treatment plan.      Pt prognosis is Guarded.      Pt will continue to benefit from skilled outpatient physical therapy to address the deficits listed in the problem list box on initial evaluation, provide pt/family education and to maximize pt's level of independence in the home and community environment.     Goals:   Short Term Goals: 3 weeks  Demonstrate improvement in recent symptoms to progress toward long term goals  Correct postural deviations in sitting and standing to decrease pain and promote postural awareness for injury prevention.  Transfer supine to/from sit with proper log roll technique and no limitation   Demonstrate compliance with initial exercise program     Long Term Goals: 5 weeks  Perform usual household tasks with minimal limitation  Improve sitting tolerance while driving with no increase in symptoms  Lift 8 lbs overhead and/or shoulder height with minimal limitation  Lift 10 lbs with both arms from floor to waist and carry for 15 feet with minimal limitation  Exercise: demonstrate independence with home exercise program to maintain gains made in therapy.    PLAN    Certification Period: 2/27/2024 to 5/10/2024.     Outpatient Physical Therapy 2 times weekly for 5 weeks to include the following interventions: patient education, Manual Therapy, Moist Heat/ Ice, Neuromuscular Re-ed, Patient Education, Self Care, Therapeutic Activities, Therapeutic Exercise, Ultrasound, and dry needling as needed.   Pt may be seen by PTA as part of the rehabilitation team.        Continue to progress per plan of care. Advance as tolerated     Ana Boyle, PT

## 2024-03-05 ENCOUNTER — CLINICAL SUPPORT (OUTPATIENT)
Dept: REHABILITATION | Facility: HOSPITAL | Age: 54
End: 2024-03-05
Payer: MEDICARE

## 2024-03-05 DIAGNOSIS — M50.30 DEGENERATIVE DISC DISEASE, CERVICAL: Chronic | ICD-10-CM

## 2024-03-05 DIAGNOSIS — M62.81 MUSCLE WEAKNESS: Primary | ICD-10-CM

## 2024-03-05 PROCEDURE — 97140 MANUAL THERAPY 1/> REGIONS: CPT | Mod: PN,CQ

## 2024-03-05 PROCEDURE — 97112 NEUROMUSCULAR REEDUCATION: CPT | Mod: PN,CQ

## 2024-03-05 PROCEDURE — 97110 THERAPEUTIC EXERCISES: CPT | Mod: PN,CQ

## 2024-03-05 NOTE — PROGRESS NOTES
"[facility]   Physical Therapy Treatment Note       Name: Nicole BONILLA [facility]  Clinic Number: [id number]    Therapy Diagnosis:   Encounter Diagnoses   Name Primary?    Muscle weakness Yes    Degenerative disc disease, cervical      Physician: Brii Mcneil NP    Visit Date: 3/5/2024    Physician Orders: PT Eval and Treat   Medical Diagnosis: M51.36 (ICD-10-CM) - DDD (degenerative disc disease), lumbar M50.30 (ICD-10-CM) - Degenerative disc disease, cervical M46.1 (ICD-10-CM) - SI (sacroiliac) joint inflammation  Evaluation Date: 2/27/2024  Authorization period Expiration: 12/31/2024  Plan of Care Expiration: 5/10/2024  Progress Note Due: 4/5/2024  Visit #/Visits authorized: 2/ 12 + eval  FOTO: 1/ 3     Precautions: Standard  Date of Surgery: na     PTA Visit #: 1/5     Time In: 1030  Time Out: 1115  Total Appointment Time: 45 minutes  Total Billable Time: 40 minutes  SUBJECTIVE      Pt reports: that her fibro has been acting up.  She was compliant with home exercise program.  Response to previous treatment: positive   Functional change: more mobility    Pain: 6/10  Location: bilateral upper trapezius      OBJECTIVE     Objective Measures updated at progress report unless specified.    Treatment      Nicole received therapeutic exercises to develop strength, ROM, and posture for 15 minutes, neuromuscular re-education for 10 mins including:  Lat Pulls w/ BTB x 10  3 Way Scapular Retraction w/ BTB x 10 (NMR)  Cervical Retraction x 15 (NMR)  Supine Shld extension w/ RTB x 10 (NMR)  Supine Horizontal abduction red thera-band x 10 (NMR)  Serratus Lits w/ 2# bar x 15  Supine Shld Flex w/ 2# bar x 15  Chest Press w/ 2# bar x 10  Deep neck flexor chin tuck 5"H x 2 min (NMR)  Cervical rotation with towel assist 5"H x 5 ea  Cervical extension with towel assist 5"H x 5    Nicole received the following manual therapy techniques: Joint mobilizations for 15 minutes.   Myofascial Release   Sub " "occipitals   Upper traps/scalenes   Light lift     Patient Education and Home Exercises     Home Exercises Provided and Patient Education Provided     Education provided:   - HEP  - self cervical mobilizations    Written Home Exercises Provided: Patient instructed to cont prior HEP.  Exercises were reviewed and Nicole was able to demonstrate them prior to the end of the session.  Nicole demonstrated good  understanding of the education provided.          ASSESSMENT        Patient with good tolerance to treatment this date. Still very tender on right C7 lamina and transverse process. Multiple cavitations in mid cervical spine during manual treatment today. Decreased tolerance to suboccipital release and right upper trapezius trigger point release. PT educated patient in self soft tissue mobilization and cervical mobilization techniques to improve tolerance in therapy.     Paste from julia Nicole is a 54 y.o. female referred to outpatient physical therapy with a medical diagnosis of M51.36 (ICD-10-CM) - DDD (degenerative disc disease), lumbar M50.30 (ICD-10-CM) - Degenerative disc disease, cervical M46.1 (ICD-10-CM) - SI (sacroiliac) joint inflammation, and presents to PT with pain limiting function, decreased strength affecting ability to perform daily activities. Patient with minimal regression in cervical range of motion since last assessment, minimal improvement in upper extremity strength. Patient has undergone therapy, injections, and other pain management in the past with limited success. She would benefit from therapy to improve cervical range and improve upper extremity strength in order to increase activity tolerance with less "bad days" following. Patient demonstrates limitations as described in the problem list. Pt will benefit from physcial therapy services in order to maximize pain free and/or functional use of bilateral upper extremities. The following goals were discussed with the patient and patient is in " agreement with them as to be addressed in the treatment plan.      Pt prognosis is Guarded.      Pt will continue to benefit from skilled outpatient physical therapy to address the deficits listed in the problem list box on initial evaluation, provide pt/family education and to maximize pt's level of independence in the home and community environment.     Goals:   Short Term Goals: 3 weeks  Demonstrate improvement in recent symptoms to progress toward long term goals  Correct postural deviations in sitting and standing to decrease pain and promote postural awareness for injury prevention.  Transfer supine to/from sit with proper log roll technique and no limitation   Demonstrate compliance with initial exercise program     Long Term Goals: 5 weeks  Perform usual household tasks with minimal limitation  Improve sitting tolerance while driving with no increase in symptoms  Lift 8 lbs overhead and/or shoulder height with minimal limitation  Lift 10 lbs with both arms from floor to waist and carry for 15 feet with minimal limitation  Exercise: demonstrate independence with home exercise program to maintain gains made in therapy.    PLAN    Certification Period: 2/27/2024 to 5/10/2024.     Outpatient Physical Therapy 2 times weekly for 5 weeks to include the following interventions: patient education, Manual Therapy, Moist Heat/ Ice, Neuromuscular Re-ed, Patient Education, Self Care, Therapeutic Activities, Therapeutic Exercise, Ultrasound, and dry needling as needed.   Pt may be seen by PTA as part of the rehabilitation team.        Continue to progress per plan of care. Advance as tolerated     Medhat Teran PTA

## 2024-03-07 ENCOUNTER — CLINICAL SUPPORT (OUTPATIENT)
Dept: REHABILITATION | Facility: HOSPITAL | Age: 54
End: 2024-03-07
Payer: MEDICARE

## 2024-03-07 DIAGNOSIS — M50.30 DEGENERATIVE DISC DISEASE, CERVICAL: Chronic | ICD-10-CM

## 2024-03-07 DIAGNOSIS — M62.81 MUSCLE WEAKNESS: Primary | ICD-10-CM

## 2024-03-07 PROCEDURE — 97110 THERAPEUTIC EXERCISES: CPT | Mod: PN,CQ

## 2024-03-07 NOTE — PROGRESS NOTES
"[facility]   Physical Therapy Treatment Note       Name: Nicole BONILLA [facility]  Clinic Number: [id number]    Therapy Diagnosis:   Encounter Diagnoses   Name Primary?    Muscle weakness Yes    Degenerative disc disease, cervical      Physician: Brii Mcneil NP    Visit Date: 3/7/2024    Physician Orders: PT Eval and Treat   Medical Diagnosis: M51.36 (ICD-10-CM) - DDD (degenerative disc disease), lumbar M50.30 (ICD-10-CM) - Degenerative disc disease, cervical M46.1 (ICD-10-CM) - SI (sacroiliac) joint inflammation  Evaluation Date: 2/27/2024  Authorization period Expiration: 12/31/2024  Plan of Care Expiration: 5/10/2024  Progress Note Due: 4/5/2024  Visit #/Visits authorized: 3/ 12 + eval  FOTO: 1/ 3     Precautions: Standard  Date of Surgery: na     PTA Visit #: 1/5     Time In: 1:15  Time Out: 155  Total Appointment Time: 45 minutes  Total Billable Time: 40 minutes  SUBJECTIVE      Pt reports: no new complaints.  She was compliant with home exercise program.  Response to previous treatment: positive   Functional change: more mobility    Pain: 6/10  Location: bilateral upper trapezius      OBJECTIVE     Objective Measures updated at progress report unless specified.    Treatment      Nicole received therapeutic exercises to develop strength, ROM, and posture for 40 minutes for  mins including:  NuStep level 1 x 15 min  Supine PF stretch   Ball squeeze x 2 min  Hip abd with green band x 10  Lat Pulls w/ BTB x 10  3 Way Scapular Retraction w/ BTB x 10   Cervical Retraction x 15   Rowing with red t band x 15  Shoulder ext with red t band x 10  Supine Shld extension w/ RTB x 10   Supine Horizontal abduction red thera-band x 10   Serratus Lits w/ 2# bar x 15  Supine Shld Flex w/ 2# bar x 15  Chest Press w/ 2# bar x 10  Deep neck flexor chin tuck 5"H x 2 min (NMR)  Cervical rotation with towel assist 5"H x 5 ea  Cervical extension with towel assist 5"H x 5    Nicole received the following manual " "therapy techniques: Joint mobilizations for 0 minutes.   Myofascial Release   Sub occipitals   Upper traps/scalenes   Light lift     Patient Education and Home Exercises     Home Exercises Provided and Patient Education Provided     Education provided:   - HEP  - self cervical mobilizations    Written Home Exercises Provided: Patient instructed to cont prior HEP.  Exercises were reviewed and Nicole was able to demonstrate them prior to the end of the session.  Nicole demonstrated good  understanding of the education provided.          ASSESSMENT        Patient performed some exercises today.  She is looking forward to dry needling because it helps with the pain.  PT educated patient in some exercises to hopefully help strengthen and preserve range of motion.    Paste alexei Rivera is a 54 y.o. female referred to outpatient physical therapy with a medical diagnosis of M51.36 (ICD-10-CM) - DDD (degenerative disc disease), lumbar M50.30 (ICD-10-CM) - Degenerative disc disease, cervical M46.1 (ICD-10-CM) - SI (sacroiliac) joint inflammation, and presents to PT with pain limiting function, decreased strength affecting ability to perform daily activities. Patient with minimal regression in cervical range of motion since last assessment, minimal improvement in upper extremity strength. Patient has undergone therapy, injections, and other pain management in the past with limited success. She would benefit from therapy to improve cervical range and improve upper extremity strength in order to increase activity tolerance with less "bad days" following. Patient demonstrates limitations as described in the problem list. Pt will benefit from physcial therapy services in order to maximize pain free and/or functional use of bilateral upper extremities. The following goals were discussed with the patient and patient is in agreement with them as to be addressed in the treatment plan.      Pt prognosis is Guarded.      Pt will " continue to benefit from skilled outpatient physical therapy to address the deficits listed in the problem list box on initial evaluation, provide pt/family education and to maximize pt's level of independence in the home and community environment.     Goals:   Short Term Goals: 3 weeks  Demonstrate improvement in recent symptoms to progress toward long term goals  Correct postural deviations in sitting and standing to decrease pain and promote postural awareness for injury prevention.  Transfer supine to/from sit with proper log roll technique and no limitation   Demonstrate compliance with initial exercise program     Long Term Goals: 5 weeks  Perform usual household tasks with minimal limitation  Improve sitting tolerance while driving with no increase in symptoms  Lift 8 lbs overhead and/or shoulder height with minimal limitation  Lift 10 lbs with both arms from floor to waist and carry for 15 feet with minimal limitation  Exercise: demonstrate independence with home exercise program to maintain gains made in therapy.    PLAN    Certification Period: 2/27/2024 to 5/10/2024.     Outpatient Physical Therapy 2 times weekly for 5 weeks to include the following interventions: patient education, Manual Therapy, Moist Heat/ Ice, Neuromuscular Re-ed, Patient Education, Self Care, Therapeutic Activities, Therapeutic Exercise, Ultrasound, and dry needling as needed.   Pt may be seen by PTA as part of the rehabilitation team.        Continue to progress per plan of care. Advance as tolerated     Medhat Teran PTA

## 2024-03-09 ENCOUNTER — PATIENT MESSAGE (OUTPATIENT)
Dept: PAIN MEDICINE | Facility: CLINIC | Age: 54
End: 2024-03-09
Payer: MEDICARE

## 2024-03-13 ENCOUNTER — CLINICAL SUPPORT (OUTPATIENT)
Dept: REHABILITATION | Facility: HOSPITAL | Age: 54
End: 2024-03-13
Payer: MEDICARE

## 2024-03-13 DIAGNOSIS — M50.30 DEGENERATIVE DISC DISEASE, CERVICAL: Chronic | ICD-10-CM

## 2024-03-13 DIAGNOSIS — M62.81 MUSCLE WEAKNESS: Primary | ICD-10-CM

## 2024-03-13 PROCEDURE — 97014 ELECTRIC STIMULATION THERAPY: CPT | Mod: PN

## 2024-03-13 PROCEDURE — 97140 MANUAL THERAPY 1/> REGIONS: CPT | Mod: PN

## 2024-03-14 NOTE — PROGRESS NOTES
[facility]   Physical Therapy Treatment Note       Name: Nicole BONILLA [facility] Number: [id number]    Therapy Diagnosis:   Encounter Diagnoses   Name Primary?    Muscle weakness Yes    Degenerative disc disease, cervical      Physician: Brii Mcneil NP    Visit Date: 3/13/2024    Physician Orders: PT Eval and Treat   Medical Diagnosis: M51.36 (ICD-10-CM) - DDD (degenerative disc disease), lumbar M50.30 (ICD-10-CM) - Degenerative disc disease, cervical M46.1 (ICD-10-CM) - SI (sacroiliac) joint inflammation  Evaluation Date: 2/27/2024  Authorization period Expiration: 12/31/2024  Plan of Care Expiration: 5/10/2024  Progress Note Due: 4/5/2024  Visit #/Visits authorized: 4/ 12 + eval  FOTO: 1/ 3     Precautions: Standard  Date of Surgery: na     PTA Visit #: 0/5     Time In: 10:45 am   Time Out:  11:55 am   Total Appointment Time: 65 minutes  Total Billable Time: 45 minutes  SUBJECTIVE      Pt reports: I had horrible pain yesterday for no reason; I hurt all over, my muscles and joints were just aching really bad.  I spent most of the day in bed.  Today I feel slightly better, but still very achy.   She was compliant with home exercise program.  Response to previous treatment: positive   Functional change: more mobility    Pain: 6-7/10  Location: neck/shoulders/lower back     OBJECTIVE     Objective Measures updated at progress report unless specified.    Treatment      Nicole received therapeutic exercises to develop strength, ROM, and posture for -0 minutes for  mins including:  NuStep level 1 x 15 min  Supine PF stretch   Ball squeeze x 2 min  Hip abd with green band x 10  Lat Pulls w/ BTB x 10  3 Way Scapular Retraction w/ BTB x 10   Cervical Retraction x 15   Rowing with red t band x 15  Shoulder ext with red t band x 10  Supine Shld extension w/ RTB x 10   Supine Horizontal abduction red thera-band x 10   Serratus Lits w/ 2# bar x 15  Supine Shld Flex w/ 2# bar x 15  Chest Press  "w/ 2# bar x 10  Deep neck flexor chin tuck 5"H x 2 min (NMR)  Cervical rotation with towel assist 5"H x 5 ea  Cervical extension with towel assist 5"H x 5    Nicole received the following manual therapy techniques: Joint mobilizations and functional dry needling  for 45 minutes.   Consent for FDN signed and scanned into MEDIA section of chart.   Supine: sub-occipital inhibition with light traction; gentle friction massage at occipital notch to address nerve pain; MET for upper trap, levator, scalene on right and left; facet uplifts from mid thoracic to cervical on bilateral sides.   Patient position in prone with bolster support for ankles and forehead; LF-ES per protocol for cervical radiculopathy utilizing 30, 40 and 50 mm needles in protocol points; clockwise winding of needles for increased tissue graps.  Intensity of stimulation adjusted to patient tolerance with good rhythmical contraction of tissues noted.  Needles left in situ x 20 mins.  Removed needles without adverse effects.  Had patient sit for several minutes at edge of mat to prevent dizziness upon standing.     Reminded patient of what to expect from Dry Needling; hydration important over next 24 hours. Patiejnt acknowledged understanding.      Patient Education and Home Exercises     Home Exercises Provided and Patient Education Provided     Education provided:   -What to Expect following FDN   - HEP  - self cervical mobilizations    Written Home Exercises Provided: Patient instructed to cont prior HEP.  Exercises were reviewed and Nicole was able to demonstrate them prior to the end of the session.  Nicole demonstrated good  understanding of the education provided.          ASSESSMENT    Nicole demonstrating good response to dry needling and manual tx today; She reported intense joint/mm pain yesterday not in response to any heavy work or activity.  Suggested she talk with PCP about this - may want to do further blood work to r/o some type of auto " "immune response.     Paste alexei Rivera is a 54 y.o. female referred to outpatient physical therapy with a medical diagnosis of M51.36 (ICD-10-CM) - DDD (degenerative disc disease), lumbar M50.30 (ICD-10-CM) - Degenerative disc disease, cervical M46.1 (ICD-10-CM) - SI (sacroiliac) joint inflammation, and presents to PT with pain limiting function, decreased strength affecting ability to perform daily activities. Patient with minimal regression in cervical range of motion since last assessment, minimal improvement in upper extremity strength. Patient has undergone therapy, injections, and other pain management in the past with limited success. She would benefit from therapy to improve cervical range and improve upper extremity strength in order to increase activity tolerance with less "bad days" following. Patient demonstrates limitations as described in the problem list. Pt will benefit from physcial therapy services in order to maximize pain free and/or functional use of bilateral upper extremities. The following goals were discussed with the patient and patient is in agreement with them as to be addressed in the treatment plan.      Pt prognosis is Guarded.      Pt will continue to benefit from skilled outpatient physical therapy to address the deficits listed in the problem list box on initial evaluation, provide pt/family education and to maximize pt's level of independence in the home and community environment.     Goals:   Short Term Goals: 3 weeks  Demonstrate improvement in recent symptoms to progress toward long term goals  Correct postural deviations in sitting and standing to decrease pain and promote postural awareness for injury prevention.  Transfer supine to/from sit with proper log roll technique and no limitation   Demonstrate compliance with initial exercise program     Long Term Goals: 5 weeks  Perform usual household tasks with minimal limitation  Improve sitting tolerance while driving with " no increase in symptoms  Lift 8 lbs overhead and/or shoulder height with minimal limitation  Lift 10 lbs with both arms from floor to waist and carry for 15 feet with minimal limitation  Exercise: demonstrate independence with home exercise program to maintain gains made in therapy.    PLAN    Certification Period: 2/27/2024 to 5/10/2024.     Outpatient Physical Therapy 2 times weekly for 5 weeks to include the following interventions: patient education, Manual Therapy, Moist Heat/ Ice, Neuromuscular Re-ed, Patient Education, Self Care, Therapeutic Activities, Therapeutic Exercise, Ultrasound, and dry needling as needed.   Pt may be seen by PTA as part of the rehabilitation team.        Continue to progress per plan of care. Advance as tolerated     Quita Holliday, PT

## 2024-03-22 ENCOUNTER — CLINICAL SUPPORT (OUTPATIENT)
Dept: REHABILITATION | Facility: HOSPITAL | Age: 54
End: 2024-03-22
Payer: MEDICARE

## 2024-03-22 DIAGNOSIS — M62.81 MUSCLE WEAKNESS: Primary | ICD-10-CM

## 2024-03-22 DIAGNOSIS — M50.30 DEGENERATIVE DISC DISEASE, CERVICAL: Chronic | ICD-10-CM

## 2024-03-22 PROCEDURE — 97140 MANUAL THERAPY 1/> REGIONS: CPT | Mod: PN

## 2024-03-22 PROCEDURE — 97014 ELECTRIC STIMULATION THERAPY: CPT | Mod: PN

## 2024-03-22 NOTE — PROGRESS NOTES
[facility]   Physical Therapy Treatment Note       Name: Nicole BONILLA [facility] Number: [id number]    Therapy Diagnosis:   Encounter Diagnoses   Name Primary?    Muscle weakness Yes    Degenerative disc disease, cervical      Physician: Brii Mcneil NP    Visit Date: 3/22/2024    Physician Orders: PT Eval and Treat   Medical Diagnosis: M51.36 (ICD-10-CM) - DDD (degenerative disc disease), lumbar M50.30 (ICD-10-CM) - Degenerative disc disease, cervical M46.1 (ICD-10-CM) - SI (sacroiliac) joint inflammation  Evaluation Date: 2/27/2024  Authorization period Expiration: 12/31/2024  Plan of Care Expiration: 5/10/2024  Progress Note Due: 4/5/2024  Visit #/Visits authorized: 4/ 12 + eval  FOTO: 1/ 3     Precautions: Standard  Date of Surgery: na     PTA Visit #: 0/5     Time In: 1:45 pm    Time Out:  2:53 pm    Total Appointment Time: 68 minutes  Total Billable Time: 45 minutes  SUBJECTIVE      Pt reports:  My migraine is better, but it has been there off/on for about 2 months.  It's a little better today; Nicole is going for an SILVIA Cervical spine on 3/27.   She was compliant with home exercise program.  Response to previous treatment: positive   Functional change: trying to exercise more.     Pain: 6-7/10  Location: neck/shoulders/lower back     OBJECTIVE     Objective Measures updated at progress report unless specified.    Treatment      Nicole received therapeutic exercises to develop strength, ROM, and posture for -0 minutes for  mins including:  NuStep level 1 x 15 min  Supine PF stretch   Ball squeeze x 2 min  Hip abd with green band x 10  Lat Pulls w/ BTB x 10  3 Way Scapular Retraction w/ BTB x 10   Cervical Retraction x 15   Rowing with red t band x 15  Shoulder ext with red t band x 10  Supine Shld extension w/ RTB x 10   Supine Horizontal abduction red thera-band x 10   Serratus Lits w/ 2# bar x 15  Supine Shld Flex w/ 2# bar x 15  Chest Press w/ 2# bar x 10  Deep neck flexor  "chin tuck 5"H x 2 min (NMR)  Cervical rotation with towel assist 5"H x 5 ea  Cervical extension with towel assist 5"H x 5    Nicole received the following manual therapy techniques: Joint mobilizations and functional dry needling  for 45 minutes.   Consent for FDN signed and scanned into MEDIA section of chart.   Supine: sub-occipital inhibition with light traction; gentle friction massage at occipital notch to address nerve pain; MET for upper trap, levator, scalene on right and left; facet uplifts from mid thoracic to cervical on bilateral sides.   Patient position in prone with bolster support for ankles and forehead; LF-ES per protocol for cervical radiculopathy utilizing 30, 40 and 50 mm needles in protocol points; clockwise winding of needles for increased tissue graps.  Intensity of stimulation adjusted to patient tolerance with good rhythmical contraction of tissues noted.  Needles left in situ x 20 mins.  Removed needles without adverse effects.  Had patient sit for several minutes at edge of mat to prevent dizziness upon standing.     Reminded patient of what to expect from Dry Needling; hydration important over next 24 hours. Patiejnt acknowledged understanding.      Patient Education and Home Exercises     Home Exercises Provided and Patient Education Provided     Education provided:   -What to Expect following FDN   - HEP  - self cervical mobilizations    Written Home Exercises Provided: Patient instructed to cont prior HEP.  Exercises were reviewed and Nicole was able to demonstrate them prior to the end of the session.  Nicole demonstrated good  understanding of the education provided.          ASSESSMENT    Nicole demonstrating good response to dry needling and manual tx today; She reported intense joint/mm pain yesterday not in response to any heavy work or activity.  Suggested she talk with PCP about this - may want to do further blood work to r/o some type of auto immune response.     Paste from eval " "Nicole is a 54 y.o. female referred to outpatient physical therapy with a medical diagnosis of M51.36 (ICD-10-CM) - DDD (degenerative disc disease), lumbar M50.30 (ICD-10-CM) - Degenerative disc disease, cervical M46.1 (ICD-10-CM) - SI (sacroiliac) joint inflammation, and presents to PT with pain limiting function, decreased strength affecting ability to perform daily activities. Patient with minimal regression in cervical range of motion since last assessment, minimal improvement in upper extremity strength. Patient has undergone therapy, injections, and other pain management in the past with limited success. She would benefit from therapy to improve cervical range and improve upper extremity strength in order to increase activity tolerance with less "bad days" following. Patient demonstrates limitations as described in the problem list. Pt will benefit from physcial therapy services in order to maximize pain free and/or functional use of bilateral upper extremities. The following goals were discussed with the patient and patient is in agreement with them as to be addressed in the treatment plan.      Pt prognosis is Guarded.      Pt will continue to benefit from skilled outpatient physical therapy to address the deficits listed in the problem list box on initial evaluation, provide pt/family education and to maximize pt's level of independence in the home and community environment.     Goals:   Short Term Goals: 3 weeks  Demonstrate improvement in recent symptoms to progress toward long term goals > Progressing   Correct postural deviations in sitting and standing to decrease pain and promote postural awareness for injury prevention. > Improving   Transfer supine to/from sit with proper log roll technique and no limitation  > Progressing   Demonstrate compliance with initial exercise program > slow progress      Long Term Goals: 5 weeks  Perform usual household tasks with minimal limitation  Improve sitting " tolerance while driving with no increase in symptoms  Lift 8 lbs overhead and/or shoulder height with minimal limitation  Lift 10 lbs with both arms from floor to waist and carry for 15 feet with minimal limitation  Exercise: demonstrate independence with home exercise program to maintain gains made in therapy.    PLAN    Certification Period: 2/27/2024 to 5/10/2024.     Outpatient Physical Therapy 2 times weekly for 5 weeks to include the following interventions: patient education, Manual Therapy, Moist Heat/ Ice, Neuromuscular Re-ed, Patient Education, Self Care, Therapeutic Activities, Therapeutic Exercise, Ultrasound, and dry needling as needed.   Pt may be seen by PTA as part of the rehabilitation team.        Continue to progress per plan of care. Advance as tolerated     Quita Holliday, PT

## 2024-03-27 ENCOUNTER — HOSPITAL ENCOUNTER (OUTPATIENT)
Facility: HOSPITAL | Age: 54
Discharge: HOME | End: 2024-03-27
Attending: ANESTHESIOLOGY | Admitting: ANESTHESIOLOGY
Payer: MEDICARE

## 2024-03-27 DIAGNOSIS — M54.12 CERVICAL RADICULITIS: ICD-10-CM

## 2024-03-27 LAB — POCT GLUCOSE: 151 MG/DL (ref 70–110)

## 2024-03-27 PROCEDURE — 25000003 PHARM REV CODE 250: Performed by: ANESTHESIOLOGY

## 2024-03-27 PROCEDURE — A4216 STERILE WATER/SALINE, 10 ML: HCPCS | Performed by: ANESTHESIOLOGY

## 2024-03-27 PROCEDURE — 63600175 PHARM REV CODE 636 W HCPCS: Mod: UD | Performed by: ANESTHESIOLOGY

## 2024-03-27 PROCEDURE — 25500020 PHARM REV CODE 255: Performed by: ANESTHESIOLOGY

## 2024-03-27 PROCEDURE — 62321 NJX INTERLAMINAR CRV/THRC: CPT | Mod: ,,, | Performed by: ANESTHESIOLOGY

## 2024-03-27 PROCEDURE — 62321 NJX INTERLAMINAR CRV/THRC: CPT | Performed by: ANESTHESIOLOGY

## 2024-03-27 RX ORDER — SODIUM CHLORIDE, SODIUM LACTATE, POTASSIUM CHLORIDE, CALCIUM CHLORIDE 600; 310; 30; 20 MG/100ML; MG/100ML; MG/100ML; MG/100ML
INJECTION, SOLUTION INTRAVENOUS CONTINUOUS
Status: ACTIVE | OUTPATIENT
Start: 2024-03-27

## 2024-03-27 RX ORDER — DEXAMETHASONE SODIUM PHOSPHATE 10 MG/ML
INJECTION INTRAMUSCULAR; INTRAVENOUS
Status: DISCONTINUED | OUTPATIENT
Start: 2024-03-27 | End: 2024-04-08 | Stop reason: HOSPADM

## 2024-03-27 RX ORDER — LIDOCAINE HYDROCHLORIDE 10 MG/ML
INJECTION, SOLUTION EPIDURAL; INFILTRATION; INTRACAUDAL; PERINEURAL
Status: DISCONTINUED | OUTPATIENT
Start: 2024-03-27 | End: 2024-04-08 | Stop reason: HOSPADM

## 2024-03-27 RX ORDER — LIDOCAINE HYDROCHLORIDE 10 MG/ML
1 INJECTION, SOLUTION EPIDURAL; INFILTRATION; INTRACAUDAL; PERINEURAL ONCE
Status: COMPLETED | OUTPATIENT
Start: 2024-03-27 | End: 2024-03-27

## 2024-03-27 RX ORDER — SODIUM CHLORIDE 9 MG/ML
INJECTION, SOLUTION INTRAMUSCULAR; INTRAVENOUS; SUBCUTANEOUS
Status: DISCONTINUED | OUTPATIENT
Start: 2024-03-27 | End: 2024-04-08 | Stop reason: HOSPADM

## 2024-03-27 RX ORDER — FENTANYL CITRATE 50 UG/ML
INJECTION, SOLUTION INTRAMUSCULAR; INTRAVENOUS
Status: DISCONTINUED | OUTPATIENT
Start: 2024-03-27 | End: 2024-04-08 | Stop reason: HOSPADM

## 2024-03-27 RX ORDER — MIDAZOLAM HYDROCHLORIDE 1 MG/ML
INJECTION INTRAMUSCULAR; INTRAVENOUS
Status: DISCONTINUED | OUTPATIENT
Start: 2024-03-27 | End: 2024-04-08 | Stop reason: HOSPADM

## 2024-03-27 RX ADMIN — SODIUM CHLORIDE, POTASSIUM CHLORIDE, SODIUM LACTATE AND CALCIUM CHLORIDE: 600; 310; 30; 20 INJECTION, SOLUTION INTRAVENOUS at 11:03

## 2024-03-27 RX ADMIN — LIDOCAINE HYDROCHLORIDE 10 MG: 10 INJECTION, SOLUTION EPIDURAL; INFILTRATION; INTRACAUDAL; PERINEURAL at 11:03

## 2024-03-27 NOTE — H&P
CC: neck pain    HPI: The patient is a 54 y.o. female with a history of neck pain here for SILVIA. There are no major changes in history and physical from 2023 by Brii.    Past Medical History:   Diagnosis Date    Anxiety     Bipolar disorder     Depression     Diabetes mellitus, type 2     Fibromyalgia     HTN (hypertension)     Insomnia     Migraine        Past Surgical History:   Procedure Laterality Date    ANGIOGRAM, CORONARY, WITH LEFT HEART CATHETERIZATION Left 2020    Procedure: Left heart cath;  Surgeon: Kannan Santana MD;  Location: [facility] CATH/EP LAB;  Service: Cardiology;  Laterality: Left;    CARDIAC CATHETERIZATION      CARPAL TUNNEL RELEASE Right 2023    Procedure: Endoscopic VS. Open Carpal Tunnel Release;  Surgeon: Alcides Yancey DO;  Location: [facility] OR;  Service: Orthopedics;  Laterality: Right;     SECTION      COLONOSCOPY N/A 2019    Procedure: COLONOSCOPY;  Surgeon: Da Diallo MD;  Location: [facility] ENDO;  Service: General;  Laterality: N/A;    CORONARY ARTERY BYPASS GRAFT (CABG) N/A 2020    Procedure: CORONARY ARTERY BYPASS GRAFT (CABG);  Surgeon: Anshul Oakes MD;  Location: [facility] OR;  Service: Cardiothoracic;  Laterality: N/A;    DECOMPRESSION, NERVE, ULNAR Right 2023    Procedure: DECOMPRESSION, NERVE, ULNAR;  Surgeon: Alcides Yancey DO;  Location: [facility] OR;  Service: Orthopedics;  Laterality: Right;    ENDOSCOPIC HARVEST OF VEIN Left 2020    Procedure: SURGICAL PROCUREMENT, VEIN, ENDOSCOPIC;  Surgeon: Anshul Oakes MD;  Location: [facility] OR;  Service: Cardiothoracic;  Laterality: Left;    EPIDURAL STEROID INJECTION N/A 2019    Procedure: Injection, Steroid, Epidural - L4/5 EPIDURAL STEROID INJECTION;  Surgeon: Sherri Gardiner MD;  Location: [facility] OR;  Service: Pain Management;  Laterality: N/A;    EPIDURAL STEROID INJECTION N/A 2019    Procedure: Injection, Steroid, Epidural - C7/T1 EPIDURAL STEROID INJECTION;  Surgeon: Sherri JUAREZ  MD Zuleyka;  Location: [facility] OR;  Service: Pain Management;  Laterality: N/A;    EPIDURAL STEROID INJECTION N/A 7/8/2019    Procedure: Injection, Steroid, Epidural - L4/5 EPIDURAL STEROID INJECTION;  Surgeon: Sherri Gardiner MD;  Location: [facility] OR;  Service: Pain Management;  Laterality: N/A;    EPIDURAL STEROID INJECTION N/A 10/21/2019    Procedure: Injection, Steroid, Epidural, CERVICAL C7/T1 SILVIA;  Surgeon: Sherri Gardiner MD;  Location: [facility] OR;  Service: Pain Management;  Laterality: N/A;  09-23-19    EPIDURAL STEROID INJECTION N/A 10/1/2020    Procedure: Cervical SILVIA C7-T1;  Surgeon: Emily José MD;  Location: [facility] OR;  Service: Pain Management;  Laterality: N/A;    EPIDURAL STEROID INJECTION N/A 1/7/2021    Procedure: Lumbar SILVIA L4-L5;  Surgeon: Emily José MD;  Location: [facility] OR;  Service: Pain Management;  Laterality: N/A;    EPIDURAL STEROID INJECTION N/A 9/15/2021    Procedure: Injection, Steroid, Epidural, Cervical. C5 - C6;  Surgeon: Emily José MD;  Location: [facility] OR;  Service: Pain Management;  Laterality: N/A;    EPIDURAL STEROID INJECTION N/A 6/29/2022    Procedure: Injection, Steroid, Epidural MEHRAN C5-C6;  Surgeon: Emily José MD;  Location: [facility] OR;  Service: Pain Management;  Laterality: N/A;    EPIDURAL STEROID INJECTION N/A 11/2/2022    Procedure: Injection, Steroid, Epidural  MEHRAN C6-C7;  Surgeon: Emily José MD;  Location: [facility] OR;  Service: Pain Management;  Laterality: N/A;    EPIDURAL STEROID INJECTION INTO CERVICAL SPINE N/A 3/2/2022    Procedure: Injection-steroid-epidural-cervical C5-C6;  Surgeon: Emily José MD;  Location: [facility] OR;  Service: Pain Management;  Laterality: N/A;    EPIDURAL STEROID INJECTION INTO CERVICAL SPINE N/A 10/13/2023    Procedure: Injection-steroid-epidural-cervical;  Surgeon: Umang Huggins MD;  Location: [facility] OR;  Service: Anesthesiology;  Laterality: N/A;  c6-7    EPIDURAL STEROID INJECTION INTO CERVICAL SPINE N/A 12/27/2023     Procedure: Injection-steroid-epidural-cervical;  Surgeon: Umang Huggins MD;  Location: [facility];  Service: Anesthesiology;  Laterality: N/A;  C6-7    ESOPHAGOGASTRODUODENOSCOPY N/A 2019    Procedure: ESOPHAGOGASTRODUODENOSCOPY (EGD);  Surgeon: Da Diallo MD;  Location: [facility] ENDO;  Service: General;  Laterality: N/A;    INJECTION OF ANESTHETIC AGENT AROUND NERVE Bilateral 2021    Procedure: Block, Nerve L3, L4, L5;  Surgeon: Emily José MD;  Location: [facility] OR;  Service: Pain Management;  Laterality: Bilateral;    TONSILLECTOMY      TRIGGER POINT INJECTION N/A 2019    Procedure: INJECTION, TRIGGER POINT - CERVICAL REGION;  Surgeon: Sherri Gardiner MD;  Location: [facility] OR;  Service: Pain Management;  Laterality: N/A;    TRIGGER POINT INJECTION N/A 10/21/2019    Procedure: INJECTION, TRIGGER POINT;  Surgeon: Sherri Gardiner MD;  Location: [facility] OR;  Service: Pain Management;  Laterality: N/A;    TRIGGER POINT INJECTION N/A 10/1/2020    Procedure: INJECTION, TRIGGER POINT;  Surgeon: Emily José MD;  Location: [facility] OR;  Service: Pain Management;  Laterality: N/A;  back    triple bypass         Family History   Problem Relation Age of Onset    Lung cancer Mother     Brain cancer Sister     Pancreatic cancer Maternal Aunt     Pancreatic cancer Paternal Aunt        Social History     Socioeconomic History    Marital status:    Tobacco Use    Smoking status: Former     Current packs/day: 0.00     Types: Cigarettes     Start date: 2012     Quit date: 2012     Years since quittin.5    Smokeless tobacco: Never   Substance and Sexual Activity    Alcohol use: Yes     Comment: occasional    Drug use: Not Currently     Types: Marijuana, Other-see comments    Sexual activity: Yes     Social Determinants of Health     Financial Resource Strain: Unknown (2022)    Overall Financial Resource Strain (CARDIA)     Difficulty of Paying Living Expenses: Patient declined   Food  Insecurity: Unknown (2/23/2022)    Hunger Vital Sign     Worried About Running Out of Food in the Last Year: Patient declined     Ran Out of Food in the Last Year: Patient declined   Transportation Needs: Unknown (2/23/2022)    PRAPARE - Transportation     Lack of Transportation (Medical): Patient declined     Lack of Transportation (Non-Medical): Patient declined   Physical Activity: Unknown (2/23/2022)    Exercise Vital Sign     Days of Exercise per Week: Patient declined   Stress: Unknown (2/23/2022)    [employer] [place] [employer] - Occupational Stress Questionnaire     Feeling of Stress : Patient declined   Social Connections: Unknown (2/23/2022)    Social Connection and Isolation Panel [NHANES]     Frequency of Communication with Friends and Family: Patient declined     Frequency of Social Gatherings with Friends and Family: Patient declined     Active Member of Clubs or Organizations: Patient declined     Attends Club or Organization Meetings: Patient declined     Marital Status: Patient declined   Housing Stability: Unknown (2/23/2022)    Housing Stability Vital Sign     Unable to Pay for Housing in the Last Year: Patient refused     Unstable Housing in the Last Year: Patient refused       Current Facility-Administered Medications   Medication Dose Route Frequency Provider Last Rate Last Admin    lactated ringers infusion   Intravenous Continuous Umang Huggins MD        LIDOcaine (PF) 10 mg/ml (1%) injection 10 mg  1 mL Intradermal Once Umang Huggins MD         Facility-Administered Medications Ordered in Other Encounters   Medication Dose Route Frequency Provider Last Rate Last Admin    0.9%  NaCl infusion   Intravenous Continuous Sherri Gardiner MD 20 mL/hr at 04/29/19 1227 New Bag at 04/29/19 1227    lactated ringers infusion   Intravenous Once PRN Emily José MD        lactated ringers infusion   Intravenous Continuous Emily José MD 25 mL/hr at 06/29/22 1246 New Bag at 06/29/22  "1246    lactated ringers infusion   Intravenous Continuous Emily José MD 25 mL/hr at 11/02/22 0937 New Bag at 11/02/22 0937    lactated ringers infusion   Intravenous Continuous Umang Huggins MD 25 mL/hr at 10/13/23 1400 New Bag at 10/13/23 1400    lactated ringers infusion   Intravenous Continuous Umang Huggins MD 25 mL/hr at 12/27/23 1215 500 mL at 12/27/23 1215       Review of patient's allergies indicates:   Allergen Reactions    Doxycycline     Hydrocodone Nausea And Vomiting    Vaccine adjuvant system, as01b liposomal     Varicella-zoster ge-as01b (pf)      Other reaction(s): Memory problems    Varicella-zoster virus glycoprotein e, recombinant     Benadryl [diphenhydramine hcl] Nausea Only       Vitals:    03/21/24 1010   Weight: 80.3 kg (177 lb 0.5 oz)   Height: 5' 5" (1.651 m)       REVIEW OF SYSTEMS:     GENERAL: No weight loss, malaise or fevers.  HEENT:  No recent changes in vision or hearing  NECK: Negative for lumps, no difficulty with swallowing.  RESPIRATORY: Negative for cough, wheezing or shortness of breath, patient denies any recent URI.  CARDIOVASCULAR: Negative for chest pain, leg swelling or palpitations.  GI: Negative for abdominal discomfort, blood in stools or black stools or change in bowel habits.  MUSCULOSKELETAL: See HPI.  SKIN: Negative for lesions, rash, and itching.  PSYCH: No suicidal or homicidal ideations, no current mood disturbances.  HEMATOLOGY/LYMPHOLOGY: Negative for prolonged bleeding, bruising easily or swollen nodes. Patient is not currently taking any anti-coagulants  ENDO: No history of diabetes or thyroid dysfunction  NEURO: No history of syncope, paralysis, seizures or tremors.All other reviewed and negative other than HPI.    Physical exam:  Gen: A and O x3, pleasant, well-groomed  Skin: No rashes or obvious lesions  HEENT: PERRLA, no obvious deformities on ears or in canals. No thyroid masses, trachea midline, no palpable lymph nodes in neck, axilla.  CVS: " Regular rate and rhythm, normal S1 and S2, no murmurs.  Resp: Clear to auscultation bilaterally.  Abdomen: Soft, NT/ND, normal bowel sounds present.  Musculoskeletal/Neuro: Moving all extremities    Assessment:  Cervical radiculitis    Other orders  -     FL Fluoro for Pain Management; Standing          PLAN: SILVIA      This patient has been cleared for surgery in an ambulatory surgical facility    ASA 3,  Mallampatti Score 3  No history of anesthetic complications  Plan for RN IV sedation

## 2024-03-27 NOTE — DISCHARGE SUMMARY
[facility] - Periop Services  Discharge Note  Short Stay    Procedure(s) (LRB):  Injection-steroid-epidural-cervical (N/A)      OUTCOME: Patient tolerated treatment/procedure well without complication and is now ready for discharge.    DISPOSITION: Home or Self Care    FINAL DIAGNOSIS:  <principal problem not specified>    FOLLOWUP: In clinic    DISCHARGE INSTRUCTIONS:    Discharge Procedure Orders   Notify your health care provider if you experience any of the following:  temperature >100.4     Notify your health care provider if you experience any of the following:  severe uncontrolled pain     Notify your health care provider if you experience any of the following:  redness, tenderness, or signs of infection (pain, swelling, redness, odor or green/yellow discharge around incision site)     Activity as tolerated        TIME SPENT ON DISCHARGE: 30 minutes

## 2024-03-27 NOTE — OP NOTE
PROCEDURE DATE: 3/27/2024    Procedure: C6-7 cervical interlaminar epidural steroid injection under utilizing fluoroscopy.    Diagnosis: Cervical Degenerative Disc Diease; Cervical Radiculitis  POSTOP DIAGNOSIS: SAME    Physician: Umang Huggins MD    Medications injected:  Dexamethasone 10mg followed by a slow injection of 4mL sterile, preservative-free normal saline.    Local anesthetic used: Lidocaine 1%, 2 ml.    Sedation Medications: RN IV sedation    Complications:  None     Estimated blood loss: None    Technique:  A time-out was taken to identify patient and procedure prior to starting the procedure.  With the patient laying in a prone position with the neck in a mid-flexed forward position, the area was prepped and draped in the usual sterile fashion using ChloraPrep and a fenestrated drape.  The area was determined under AP fluoroscopic guidance.  Local anesthetic was given using a 25-gauge 1.5 inch needle by raising a wheal and then infiltrating ventrally.  A 3.5 inch 20-gauge Touhy needle was introduced under fluoroscopic guidance to meet the lamina of C7.  The needle was then hinged under the lamina then advanced using loss of resistance technique.  Once the tip of the needle was in the desired position, the 1ml contrast dye  was injected to determine placement and no uptake.  The steroid was then injected slowly followed by a slow injection of 4 mL of the sterile preservative-free normal saline.  The patient tolerated the procedure well.    The patient was monitored after the procedure and was given post-procedure and discharge instructions to follow at home. The patient was discharged in a stable condition.

## 2024-03-27 NOTE — PLAN OF CARE
Tolerated procedure well. Denies pain at this time. Transferred out of facility via wheelchair to friend () without incident. Discharge instructions in hand upon departure.

## 2024-03-28 VITALS
OXYGEN SATURATION: 96 % | SYSTOLIC BLOOD PRESSURE: 111 MMHG | BODY MASS INDEX: 29.49 KG/M2 | HEART RATE: 92 BPM | DIASTOLIC BLOOD PRESSURE: 70 MMHG | WEIGHT: 177 LBS | RESPIRATION RATE: 18 BRPM | HEIGHT: 65 IN | TEMPERATURE: 98 F

## 2024-04-01 ENCOUNTER — CLINICAL SUPPORT (OUTPATIENT)
Dept: REHABILITATION | Facility: HOSPITAL | Age: 54
End: 2024-04-01
Payer: MEDICARE

## 2024-04-01 DIAGNOSIS — M62.81 MUSCLE WEAKNESS: Primary | ICD-10-CM

## 2024-04-01 DIAGNOSIS — M50.30 DEGENERATIVE DISC DISEASE, CERVICAL: Chronic | ICD-10-CM

## 2024-04-01 PROCEDURE — 97014 ELECTRIC STIMULATION THERAPY: CPT | Mod: PN

## 2024-04-01 PROCEDURE — 97140 MANUAL THERAPY 1/> REGIONS: CPT | Mod: PN

## 2024-04-01 NOTE — PROGRESS NOTES
[facility]   Physical Therapy Treatment Note / Progress Report       Name: Nicole Harris  [facility] Number: [id number]    Therapy Diagnosis:   Encounter Diagnoses   Name Primary?    Muscle weakness Yes    Degenerative disc disease, cervical      Physician: Brii Mcneil NP    Visit Date: 4/1/2024    Physician Orders: PT Eval and Treat   Medical Diagnosis: M51.36 (ICD-10-CM) - DDD (degenerative disc disease), lumbar M50.30 (ICD-10-CM) - Degenerative disc disease, cervical M46.1 (ICD-10-CM) - SI (sacroiliac) joint inflammation  Evaluation Date: 2/27/2024  Authorization period Expiration: 12/31/2024  Plan of Care Expiration: 5/10/2024  Progress Note Due: 5/10/2024   Visit #/Visits authorized: 6/ 12 + eval  FOTO: 1/ 3     Precautions: Standard  Date of Surgery: na     PTA Visit #: 0/5     Time In: 10:00 am     Time Out: 11:00 am    Total Appointment Time:  60 mins   Total Billable Time: 45 minutes    SUBJECTIVE      Pt reports: Had SILVIA on 3/27 - stated that the procedure was very painful this time; had increased neck pain that day, but stated that she felt better the next day;  Today, pain is a little better than prior to the SILVIA;   She was compliant with home exercise program.  Response to previous treatment: positive, reduction in pain noted with reports of only 4/10 today   Functional change: trying to exercise more.     Pain: 4/10  Location: neck/shoulders/lower back     OBJECTIVE     Objective Measures updated at progress report unless specified.    Range of Motion:        Degrees  12/6/24 Degrees  2/27/24 Degrees   4/1/2024   Flexion 58  45  52   Extension 52  51  51   Left Rotation 52  53  53   Right Rotation 56  57  55   Left Side Bending 30  24  25   Right Side Bending 20   11  18      Shoulder Range of Motion:   Left:     WFL - decreased quality of motion into IR/ER, but functional   Right:   WFL - as above, IR/ER quality of motion is decreased      Strength:    Left Right   DNF  "5" head lift     Upper trap 4-/5 4-/5   Mid trap 3+/5 3+/5   Lower trap 3/5 3/5   Rhomboids 3+/5 3+/5    20 lb   20 lb       Upper Extremity Strength    Left Right   Shoulder flexion: 4-/5 4-/5   Shoulder Abduction: 4/5 4/5   Shoulder ER 4/5 4/5   Shoulder IR 4-/5 4-/5   Elbow flexion: 4/5 4/5   Elbow extension: 4/5 4/5   Wrist flexion: 3+/5 3+/5   Wrist extension: 3+/5 3+/5            Treatment      Nicole received therapeutic exercises to develop strength, ROM, and posture for -0 minutes for  mins including:  NuStep level 4 x 15 min  Supine PF stretch   Ball squeeze x 2 min  Hip abd with green band x 10  Lat Pulls w/ BTB x 10  3 Way Scapular Retraction w/ BTB x 10   Cervical Retraction x 15   Rowing with red t band x 15  Shoulder ext with red t band x 10  Supine Shld extension w/ RTB x 10   Supine Horizontal abduction red thera-band x 10   Serratus Lits w/ 2# bar x 15  Supine Shld Flex w/ 2# bar x 15  Chest Press w/ 2# bar x 10  Deep neck flexor chin tuck 5"H x 2 min (NMR)  Cervical rotation with towel assist 5"H x 5 ea  Cervical extension with towel assist 5"H x 5    Nicole received the following manual therapy techniques: Joint mobilizations and functional dry needling  for 40 minutes.   Consent for FDN signed and scanned into MEDIA section of chart.   Supine: sub-occipital inhibition with light traction; gentle friction massage at occipital notch to address nerve pain; MET for upper trap, levator, scalene on right and left; facet uplifts from mid thoracic to cervical on bilateral sides.   Patient position in prone with bolster support for ankles and forehead; LF-ES per protocol for cervical radiculopathy utilizing 30, 40 and 50 mm needles in protocol points; clockwise winding of needles for increased tissue graps.  Intensity of stimulation adjusted to patient tolerance with good rhythmical contraction of tissues noted.  Needles left in situ x 20 mins.  Removed needles without adverse effects.  Had patient sit " "for several minutes at edge of mat to prevent dizziness upon standing.     Reminded patient of what to expect from Dry Needling; hydration important over next 24 hours. Patiejnt acknowledged understanding.      Patient Education and Home Exercises     Home Exercises Provided and Patient Education Provided     Education provided:   -now that pain is better, really need to work on strengthening to give support to her spine. Patient acknowledged understanding    - HEP  - self cervical mobilizations    Written Home Exercises Provided: Patient instructed to cont prior HEP.  Exercises were reviewed and Nicole was able to demonstrate them prior to the end of the session.  Nicole demonstrated good  understanding of the education provided.          ASSESSMENT    Nicole demonstrating good response to dry needling and manual tx today; Good response to cervical SILVIA last week as well.  Encouraged Nicole to increase in activity level, strengthening to take advantage of lower pain levels - needs to make some strength improvements to support spine, and keep pain levels at bay.      Justine soaresal Nicole is a 54 y.o. female referred to outpatient physical therapy with a medical diagnosis of M51.36 (ICD-10-CM) - DDD (degenerative disc disease), lumbar M50.30 (ICD-10-CM) - Degenerative disc disease, cervical M46.1 (ICD-10-CM) - SI (sacroiliac) joint inflammation, and presents to PT with pain limiting function, decreased strength affecting ability to perform daily activities. Patient with minimal regression in cervical range of motion since last assessment, minimal improvement in upper extremity strength. Patient has undergone therapy, injections, and other pain management in the past with limited success. She would benefit from therapy to improve cervical range and improve upper extremity strength in order to increase activity tolerance with less "bad days" following. Patient demonstrates limitations as described in the problem list. Pt " will benefit from physcial therapy services in order to maximize pain free and/or functional use of bilateral upper extremities. The following goals were discussed with the patient and patient is in agreement with them as to be addressed in the treatment plan.      Pt prognosis is Guarded.      Pt will continue to benefit from skilled outpatient physical therapy to address the deficits listed in the problem list box on initial evaluation, provide pt/family education and to maximize pt's level of independence in the home and community environment.     Goals:   Short Term Goals: 3 weeks  Demonstrate improvement in recent symptoms to progress toward long term goals > Progressing   Correct postural deviations in sitting and standing to decrease pain and promote postural awareness for injury prevention. > Improving   Transfer supine to/from sit with proper log roll technique and no limitation  > Progressing   Demonstrate compliance with initial exercise program > slow progress      Long Term Goals: 5 weeks  Perform usual household tasks with minimal limitation  Improve sitting tolerance while driving with no increase in symptoms  Lift 8 lbs overhead and/or shoulder height with minimal limitation  Lift 10 lbs with both arms from floor to waist and carry for 15 feet with minimal limitation  Exercise: demonstrate independence with home exercise program to maintain gains made in therapy.    PLAN    Certification Period: 2/27/2024 to 5/10/2024.     Outpatient Physical Therapy 2 times weekly for 5 weeks to include the following interventions: patient education, Manual Therapy, Moist Heat/ Ice, Neuromuscular Re-ed, Patient Education, Self Care, Therapeutic Activities, Therapeutic Exercise, Ultrasound, and dry needling as needed.   Pt may be seen by PTA as part of the rehabilitation team.        Continue to progress per plan of care. Advance as tolerated     Quita Holliday, PT

## 2024-04-12 ENCOUNTER — CLINICAL SUPPORT (OUTPATIENT)
Dept: REHABILITATION | Facility: HOSPITAL | Age: 54
End: 2024-04-12
Payer: MEDICARE

## 2024-04-12 DIAGNOSIS — M62.81 MUSCLE WEAKNESS: Primary | ICD-10-CM

## 2024-04-12 DIAGNOSIS — M50.30 DEGENERATIVE DISC DISEASE, CERVICAL: Chronic | ICD-10-CM

## 2024-04-12 PROCEDURE — 97014 ELECTRIC STIMULATION THERAPY: CPT | Mod: PN

## 2024-04-12 PROCEDURE — 97140 MANUAL THERAPY 1/> REGIONS: CPT | Mod: PN

## 2024-04-12 NOTE — PROGRESS NOTES
[facility]   Physical Therapy Treatment Note       Name: Nicole BONILLA [facility] Number: [id number]    Therapy Diagnosis:   Encounter Diagnoses   Name Primary?    Muscle weakness Yes    Degenerative disc disease, cervical      Physician: Brii Mcneil NP    Visit Date: 4/12/2024    Physician Orders: PT Eval and Treat   Medical Diagnosis: M51.36 (ICD-10-CM) - DDD (degenerative disc disease), lumbar M50.30 (ICD-10-CM) - Degenerative disc disease, cervical M46.1 (ICD-10-CM) - SI (sacroiliac) joint inflammation  Evaluation Date: 2/27/2024  Authorization period Expiration: 12/31/2024  Plan of Care Expiration: 5/10/2024  Progress Note Due: 5/10/2024   Visit #/Visits authorized   7 / 10   FOTO: 1/ 3     Precautions: Standard  Date of Surgery: na     PTA Visit #: 0/5     Time In: 10:00 am     Time Out: 11:05 : am    Total Appointment Time:  60 mins   Total Billable Time: 45 minutes    SUBJECTIVE      Pt reports: I'm in pain today, I've have had sciatic pain for several days now - starts in my buttocks and runs down both my legs - at least this is what I think it is.  Nicole stated she has been doing a lot of driving over the past several days - helping her friend get to MD appointments and errands,  as she cannot drive.   She was compliant with home exercise program.  Response to previous treatment: positive, but different pain today   Functional change: trying to exercise more.    Pain: 6/10  Location: neck/shoulders/lower back     OBJECTIVE     Objective Measures updated at progress report unless specified.    Treatment      Nicloe received therapeutic exercises to develop strength, ROM, and posture for -0 minutes for  mins including:  NuStep level 4 x 15 min  Supine PF stretch   Ball squeeze x 2 min  Hip abd with green band x 10  Lat Pulls w/ BTB x 10  3 Way Scapular Retraction w/ BTB x 10   Cervical Retraction x 15   Rowing with red t band x 15  Shoulder ext with red t band x 10  Supine  "Shld extension w/ RTB x 10   Supine Horizontal abduction red thera-band x 10   Serratus Lits w/ 2# bar x 15  Supine Shld Flex w/ 2# bar x 15  Chest Press w/ 2# bar x 10  Deep neck flexor chin tuck 5"H x 2 min (NMR)  Cervical rotation with towel assist 5"H x 5 ea  Cervical extension with towel assist 5"H x 5    Nicole received the following manual therapy techniques: Joint mobilizations and functional dry needling  for 40 minutes.   Consent for FDN signed and scanned into MEDIA section of chart.  FDN to lumbar spine today to address lower back pain.  S/L: flexion to lumbar segments with blocking of segment above; - performed on each side; Prone: piriformis STM on right and left - right more painful with fascia bundles noted; Extension of sacrum to encourage  more flexion of lower lumbar segments to reduce compression.   Patient position in prone with bolster support for ankles and forehead; LF-ES per protocol for lumbar radiculopathy utilizing  60 and 75 mm needles in protocol points; clockwise winding of needles for increased tissue graps.  Needles noted at L4, L5, S1, S2 , PSIS and piriformis x 2. Intensity of stimulation adjusted to patient tolerance with good rhythmical contraction of tissues noted.  Needles left in situ x 15 mins.  Removed needles without adverse effects.  Had patient sit for several minutes at edge of mat to prevent dizziness upon standing.     Reminded patient of what to expect from Dry Needling; hydration important over next 24 hours. Patiejnt acknowledged understanding.      Patient Education and Home Exercises     Home Exercises Provided and Patient Education Provided     Education provided:   -now that pain is better, really need to work on strengthening to give support to her spine. Patient acknowledged understanding    - HEP  - self cervical mobilizations    Written Home Exercises Provided: Patient instructed to cont prior HEP.  Exercises were reviewed and Nicole was able to demonstrate " "them prior to the end of the session.  Nicole demonstrated good  understanding of the education provided.          ASSESSMENT    Nicole demonstrating good response to dry needling and manual tx today; .  Encouraged Nicole to increase in activity level, strengthening to take advantage of lower pain levels - needs to make some strength improvements to support spine, and keep pain levels at bay.      Justine Rivera is a 54 y.o. female referred to outpatient physical therapy with a medical diagnosis of M51.36 (ICD-10-CM) - DDD (degenerative disc disease), lumbar M50.30 (ICD-10-CM) - Degenerative disc disease, cervical M46.1 (ICD-10-CM) - SI (sacroiliac) joint inflammation, and presents to PT with pain limiting function, decreased strength affecting ability to perform daily activities. Patient with minimal regression in cervical range of motion since last assessment, minimal improvement in upper extremity strength. Patient has undergone therapy, injections, and other pain management in the past with limited success. She would benefit from therapy to improve cervical range and improve upper extremity strength in order to increase activity tolerance with less "bad days" following. Patient demonstrates limitations as described in the problem list. Pt will benefit from physcial therapy services in order to maximize pain free and/or functional use of bilateral upper extremities. The following goals were discussed with the patient and patient is in agreement with them as to be addressed in the treatment plan.      Pt prognosis is Guarded.      Pt will continue to benefit from skilled outpatient physical therapy to address the deficits listed in the problem list box on initial evaluation, provide pt/family education and to maximize pt's level of independence in the home and community environment.     Goals:   Short Term Goals: 3 weeks  Demonstrate improvement in recent symptoms to progress toward long term goals > " Progressing   Correct postural deviations in sitting and standing to decrease pain and promote postural awareness for injury prevention. > Improving   Transfer supine to/from sit with proper log roll technique and no limitation  > Progressing   Demonstrate compliance with initial exercise program > slow progress      Long Term Goals: 5 weeks  Perform usual household tasks with minimal limitation  Improve sitting tolerance while driving with no increase in symptoms  Lift 8 lbs overhead and/or shoulder height with minimal limitation  Lift 10 lbs with both arms from floor to waist and carry for 15 feet with minimal limitation  Exercise: demonstrate independence with home exercise program to maintain gains made in therapy.    PLAN    Certification Period: 2/27/2024 to 5/10/2024.     Outpatient Physical Therapy 2 times weekly for 5 weeks to include the following interventions: patient education, Manual Therapy, Moist Heat/ Ice, Neuromuscular Re-ed, Patient Education, Self Care, Therapeutic Activities, Therapeutic Exercise, Ultrasound, and dry needling as needed.   Pt may be seen by PTA as part of the rehabilitation team.        Continue to progress per plan of care. Advance as tolerated     Quita Holliday, PT

## 2024-04-17 ENCOUNTER — CLINICAL SUPPORT (OUTPATIENT)
Dept: REHABILITATION | Facility: HOSPITAL | Age: 54
End: 2024-04-17
Payer: MEDICARE

## 2024-04-17 DIAGNOSIS — M50.30 DEGENERATIVE DISC DISEASE, CERVICAL: Chronic | ICD-10-CM

## 2024-04-17 DIAGNOSIS — M62.81 MUSCLE WEAKNESS: Primary | ICD-10-CM

## 2024-04-17 PROCEDURE — 97140 MANUAL THERAPY 1/> REGIONS: CPT | Mod: PN,CQ

## 2024-04-17 NOTE — PROGRESS NOTES
"[facility]   Physical Therapy Treatment Note       Name: Nicole BONILLA [facility]  Clinic Number: [id number]    Therapy Diagnosis:   Encounter Diagnoses   Name Primary?    Muscle weakness Yes    Degenerative disc disease, cervical      Physician: Brii Mcneil NP    Visit Date: 4/17/2024    Physician Orders: PT Eval and Treat   Medical Diagnosis: M51.36 (ICD-10-CM) - DDD (degenerative disc disease), lumbar M50.30 (ICD-10-CM) - Degenerative disc disease, cervical M46.1 (ICD-10-CM) - SI (sacroiliac) joint inflammation  Evaluation Date: 2/27/2024  Authorization period Expiration: 12/31/2024  Plan of Care Expiration: 5/10/2024  Progress Note Due: 5/10/2024   Visit #/Visits authorized   8 / 10   FOTO: 1/ 3     Precautions: Standard  Date of Surgery: na     PTA Visit #: 1/5     Time In: 300  Time Out: 340  Total Appointment Time:  45 mins   Total Billable Time: 40 minutes    SUBJECTIVE      Pt reports: I'm in pain today, I've have had sciatic pain for several days.    She was compliant with home exercise program.  Response to previous treatment: positive, but different pain today   Functional change: trying to exercise more.    Pain: 6/10  Location: neck/shoulders/lower back     OBJECTIVE     Objective Measures updated at progress report unless specified.    Treatment      Nicole received therapeutic exercises to develop strength, ROM, and posture for -0 minutes for  mins including:  NuStep level 4 x 15 min  Supine PF stretch   Ball squeeze x 2 min  Hip abd with green band x 10  Lat Pulls w/ BTB x 10  3 Way Scapular Retraction w/ BTB x 10   Cervical Retraction x 15   Rowing with red t band x 15  Shoulder ext with red t band x 10  Supine Shld extension w/ RTB x 10   Supine Horizontal abduction red thera-band x 10   Serratus Lits w/ 2# bar x 15  Supine Shld Flex w/ 2# bar x 15  Chest Press w/ 2# bar x 10  Deep neck flexor chin tuck 5"H x 2 min (NMR)  Cervical rotation with towel assist 5"H x 5 " "ea  Cervical extension with towel assist 5"H x 5    Nicole received the following manual therapy techniques: Joint mobilizations and functional dry needling  for 40 minutes.   Myofascial Release   Bilateral piriformis L>R   Bilateral QL   Bilateral psoas      DNP today  Consent for FDN signed and scanned into MEDIA section of chart.  FDN to lumbar spine today to address lower back pain.  S/L: flexion to lumbar segments with blocking of segment above; - performed on each side; Prone: piriformis STM on right and left - right more painful with fascia bundles noted; Extension of sacrum to encourage  more flexion of lower lumbar segments to reduce compression.   Patient position in prone with bolster support for ankles and forehead; LF-ES per protocol for lumbar radiculopathy utilizing  60 and 75 mm needles in protocol points; clockwise winding of needles for increased tissue graps.  Needles noted at L4, L5, S1, S2 , PSIS and piriformis x 2. Intensity of stimulation adjusted to patient tolerance with good rhythmical contraction of tissues noted.  Needles left in situ x 15 mins.  Removed needles without adverse effects.  Had patient sit for several minutes at edge of mat to prevent dizziness upon standing.     Reminded patient of what to expect from Dry Needling; hydration important over next 24 hours. Patiejnt acknowledged understanding.      Patient Education and Home Exercises     Home Exercises Provided and Patient Education Provided     Education provided:   -now that pain is better, really need to work on strengthening to give support to her spine. Patient acknowledged understanding    - HEP  - self cervical mobilizations    Written Home Exercises Provided: Patient instructed to cont prior HEP.  Exercises were reviewed and Nicole was able to demonstrate them prior to the end of the session.  Nicole demonstrated good  understanding of the education provided.          ASSESSMENT    Nicole felt better after the releases.  " "Her left piriforms was very tight but did release.    Paste from julia Rivera is a 54 y.o. female referred to outpatient physical therapy with a medical diagnosis of M51.36 (ICD-10-CM) - DDD (degenerative disc disease), lumbar M50.30 (ICD-10-CM) - Degenerative disc disease, cervical M46.1 (ICD-10-CM) - SI (sacroiliac) joint inflammation, and presents to PT with pain limiting function, decreased strength affecting ability to perform daily activities. Patient with minimal regression in cervical range of motion since last assessment, minimal improvement in upper extremity strength. Patient has undergone therapy, injections, and other pain management in the past with limited success. She would benefit from therapy to improve cervical range and improve upper extremity strength in order to increase activity tolerance with less "bad days" following. Patient demonstrates limitations as described in the problem list. Pt will benefit from physcial therapy services in order to maximize pain free and/or functional use of bilateral upper extremities. The following goals were discussed with the patient and patient is in agreement with them as to be addressed in the treatment plan.      Pt prognosis is Guarded.      Pt will continue to benefit from skilled outpatient physical therapy to address the deficits listed in the problem list box on initial evaluation, provide pt/family education and to maximize pt's level of independence in the home and community environment.     Goals:   Short Term Goals: 3 weeks  Demonstrate improvement in recent symptoms to progress toward long term goals > Progressing   Correct postural deviations in sitting and standing to decrease pain and promote postural awareness for injury prevention. > Improving   Transfer supine to/from sit with proper log roll technique and no limitation  > Progressing   Demonstrate compliance with initial exercise program > slow progress      Long Term Goals: 5 weeks  Perform " usual household tasks with minimal limitation  Improve sitting tolerance while driving with no increase in symptoms  Lift 8 lbs overhead and/or shoulder height with minimal limitation  Lift 10 lbs with both arms from floor to waist and carry for 15 feet with minimal limitation  Exercise: demonstrate independence with home exercise program to maintain gains made in therapy.    PLAN    Certification Period: 2/27/2024 to 5/10/2024.     Outpatient Physical Therapy 2 times weekly for 5 weeks to include the following interventions: patient education, Manual Therapy, Moist Heat/ Ice, Neuromuscular Re-ed, Patient Education, Self Care, Therapeutic Activities, Therapeutic Exercise, Ultrasound, and dry needling as needed.   Pt may be seen by PTA as part of the rehabilitation team.        Continue to progress per plan of care. Advance as tolerated     Medhat Teran PTA

## 2024-04-22 ENCOUNTER — PATIENT MESSAGE (OUTPATIENT)
Dept: PAIN MEDICINE | Facility: CLINIC | Age: 54
End: 2024-04-22
Payer: MEDICARE

## 2024-05-01 ENCOUNTER — OFFICE VISIT (OUTPATIENT)
Dept: PAIN MEDICINE | Facility: CLINIC | Age: 54
End: 2024-05-01
Payer: MEDICARE

## 2024-05-01 DIAGNOSIS — M54.42 ACUTE BILATERAL LOW BACK PAIN WITH LEFT-SIDED SCIATICA: Primary | ICD-10-CM

## 2024-05-01 DIAGNOSIS — M50.30 DEGENERATIVE DISC DISEASE, CERVICAL: ICD-10-CM

## 2024-05-01 DIAGNOSIS — M51.36 DDD (DEGENERATIVE DISC DISEASE), LUMBAR: ICD-10-CM

## 2024-05-01 DIAGNOSIS — M46.1 SI (SACROILIAC) JOINT INFLAMMATION: Primary | ICD-10-CM

## 2024-05-01 DIAGNOSIS — M46.1 SI (SACROILIAC) JOINT INFLAMMATION: ICD-10-CM

## 2024-05-01 PROCEDURE — 99214 OFFICE O/P EST MOD 30 MIN: CPT | Mod: S$PBB,,,

## 2024-05-01 PROCEDURE — 99999 PR PBB SHADOW E&M-EST. PATIENT-LVL III: CPT | Mod: PBBFAC,,,

## 2024-05-01 PROCEDURE — 99213 OFFICE O/P EST LOW 20 MIN: CPT | Mod: PBBFAC

## 2024-05-01 RX ORDER — ERYTHROMYCIN 5 MG/G
OINTMENT OPHTHALMIC
COMMUNITY
Start: 2024-04-02

## 2024-05-01 RX ORDER — OXYCODONE AND ACETAMINOPHEN 5; 325 MG/1; MG/1
1 TABLET ORAL EVERY 6 HOURS PRN
Qty: 28 EACH | Refills: 0 | Status: SHIPPED | OUTPATIENT
Start: 2024-05-01 | End: 2024-05-08

## 2024-05-01 RX ORDER — TIRZEPATIDE 2.5 MG/.5ML
INJECTION, SOLUTION SUBCUTANEOUS
COMMUNITY
Start: 2024-04-09

## 2024-05-01 RX ORDER — ZOLPIDEM TARTRATE 5 MG/1
5 TABLET ORAL NIGHTLY
COMMUNITY
Start: 2024-04-12
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
## 2024-05-15 ENCOUNTER — CLINICAL SUPPORT (OUTPATIENT)
Dept: REHABILITATION | Facility: HOSPITAL | Age: 54
End: 2024-05-15
Payer: MEDICARE

## 2024-05-15 DIAGNOSIS — M46.1 SI (SACROILIAC) JOINT INFLAMMATION: ICD-10-CM

## 2024-05-15 DIAGNOSIS — Z74.09 IMPAIRED FUNCTIONAL MOBILITY AND ACTIVITY TOLERANCE: Primary | ICD-10-CM

## 2024-05-15 DIAGNOSIS — M50.30 DEGENERATIVE DISC DISEASE, CERVICAL: ICD-10-CM

## 2024-05-15 DIAGNOSIS — M51.36 DDD (DEGENERATIVE DISC DISEASE), LUMBAR: ICD-10-CM

## 2024-05-15 PROCEDURE — 97161 PT EVAL LOW COMPLEX 20 MIN: CPT | Mod: KX,PN

## 2024-05-15 PROCEDURE — 97110 THERAPEUTIC EXERCISES: CPT | Mod: KX,PN

## 2024-05-15 NOTE — PLAN OF CARE
"[facility]   Physical Therapy Initial Evaluation      [facility]  Clinic Number: [id number]    Therapy Diagnosis:   Encounter Diagnoses   Name Primary?    SI (sacroiliac) joint inflammation     DDD (degenerative disc disease), lumbar     Degenerative disc disease, cervical     Impaired functional mobility and activity tolerance Yes        Physician: Brii Mcneil, NP    Physician Orders: PT Eval and Treat   Medical Diagnosis from Referral:   M46.1 (ICD-10-CM) - SI (sacroiliac) joint inflammation   M51.36 (ICD-10-CM) - DDD (degenerative disc disease), lumbar   M50.30 (ICD-10-CM) - Degenerative disc disease, cervical     Evaluation Date: 5/15/2024  Authorization Period Expiration: 12/31/2024  Plan of Care Expiration: 8/15/2024  Progress Note Due: 6/15/2024  Date of Surgery: n/a  Visit # / Visits authorized: 1/ 1   FOTO: 1/ 3    Precautions: Standard and Diabetes     Time In: 7:40 am   Time Out: 8:25 am   Total Billable Time: 45  minutes    Subjective     Date of onset: chronic     History of current condition - Nicole reports: continued neck/lumbar and now some SI joint pain - bilateral LE shooting/sharp pain at times.  Reports that the pain stops her from walking sometimes. Pain is worse when going from sitting <> standing; Also noting continued neck pain with "popping" in her neck at times, but today she is noting more back and buttock/LE pain than neck pain.  Nicole is well known to PT clinic from previous treatment; Last seen in April and was progressing fairly well with good response to manual tx's including MFR and Dry Needling as well as initial starts at some exercise.      Falls: no recent falls     Imaging:  nothing recent.     Prior Therapy: yes, extensive history of PT over past several years - chronic pain situation   Social History: lives alone,    Occupation: disabled   Prior Level of Function: Independent   Current Level of Function: Independent, limited " activities as result of pain, fatigue;     Pain:  Current 5/10, worst 8/10, best 4/10   Location:   global pain - neck/back/arms/legs   Description: Aching and Sharp  Aggravating Factors: Sitting, Standing, Walking, Night Time, and Lifting  Easing Factors: pain medication and ice    Patients goals: To reduce my pain and improve overall wellbeing      Medical History:   Past Medical History:   Diagnosis Date    Anxiety     Bipolar disorder     Depression     Diabetes mellitus, type 2     Fibromyalgia     HTN (hypertension)     Insomnia     Migraine        Surgical History:   Nicole Harris  has a past surgical history that includes  section; Tonsillectomy; Epidural steroid injection (N/A, 2019); Epidural steroid injection (N/A, 2019); Epidural steroid injection (N/A, 2019); Trigger point injection (N/A, 2019); Colonoscopy (N/A, 2019); Esophagogastroduodenoscopy (N/A, 2019); Epidural steroid injection (N/A, 10/21/2019); Trigger point injection (N/A, 10/21/2019); ANGIOGRAM, CORONARY, WITH LEFT HEART CATHETERIZATION (Left, 2020); Coronary Artery Bypass Graft (CABG) (N/A, 2020); Endoscopic harvest of vein (Left, 2020); Cardiac catheterization; triple bypass; Epidural steroid injection (N/A, 10/1/2020); Trigger point injection (N/A, 10/1/2020); Epidural steroid injection (N/A, 2021); Injection of anesthetic agent around nerve (Bilateral, 2021); Epidural steroid injection (N/A, 9/15/2021); Epidural steroid injection into cervical spine (N/A, 3/2/2022); Epidural steroid injection (N/A, 2022); Epidural steroid injection (N/A, 2022); Carpal tunnel release (Right, 2023); decompression, nerve, ulnar (Right, 2023); Epidural steroid injection into cervical spine (N/A, 10/13/2023); Epidural steroid injection into cervical spine (N/A, 2023); and Epidural steroid injection into cervical spine (N/A, 3/27/2024).    Medications:   Nicole has a current  "medication list which includes the following prescription(s): albuterol, atorvastatin, azelastine, cetirizine, cyclobenzaprine, empagliflozin, erythromycin, escitalopram oxalate, fluconazole, fluticasone propionate, insulin glargine,hum.rec.anlog, levocetirizine, lisinopril, metformin, montelukast, mounjaro, naproxen sodium, nortriptyline, pantoprazole, true metrix glucose test strip, and zolpidem, and the following Facility-Administered Medications: sodium chloride 0.9%, lactated ringers, lactated ringers, lactated ringers, lactated ringers, lactated ringers, and lactated ringers.    Allergies:   Review of patient's allergies indicates:   Allergen Reactions    Doxycycline     Hydrocodone Nausea And Vomiting    Vaccine adjuvant system, as01b liposomal     Varicella-zoster ge-as01b (pf)      Other reaction(s): Memory problems    Varicella-zoster virus glycoprotein e, recombinant     Benadryl [diphenhydramine hcl] Nausea Only        Objective      Objective Measures updated at progress report unless specified. Posture:     -       Rounded shoulders  -       Forward head  -       Posterior pelvic tilt     Cervical Range of Motion:        Degrees  12/6/24 Degrees  2/27/24 Degrees   4/1/24 Degrees   5/15/24   Flexion 58  45   52   42   Extension 52  51   51   52   Left Rotation 52  53   53   48   Right Rotation 56  57   57   60   Left Side Bending 30  24   24    25   Right Side Bending 20   11   11    20      Shoulder Range of Motion:   Left:     WFL - decreased quality of motion into IR/ER, but functional   Right:   WFL - as above, IR/ER quality of motion is decreased      Strength:    Left Right   DNF 5" head lift     Upper trap 4-/5 4-/5   Mid trap 3+/5 3+/5   Lower trap 3/5 3/5   Rhomboids 3+/5 3+/5     18 lb   22 lb       Upper Extremity Strength    Left Right   Shoulder flexion: 4-/5 4-/5   Shoulder Abduction: 4/5 4-/5   Shoulder ER 4/5 4/5   Shoulder IR 4-/5 4-/5   Elbow flexion: 4/5 4/5   Elbow extension: 4/5 " 4/5   Wrist flexion: 3+/5 3+/5   Wrist extension: 3+/5 3+/5         Special Tests:  Distraction Negative   Compression Negative   Spurlings Negative   Sharp-Lizz Negative   VA test Negative   Lateral Flexion Alar Ligament Negative      Upper Limb Neurodynamic testing:    Left   Right   UNT Negative   Negative   MNT Negative   Negative   RNT Negative   Negative      Joint Mobility: Passive mobility WFL;  Active mild restrictions into rotation - more stability than flexibility restriction secondary to mm weakness/tightness.      Thoracic mobility: postural restriction into extension at upper segments;      Palpation: Still has some tenderness in upper traps with TrPs noted,    Sensation: intact to light touch BUE's      Flexibility:                Upper Trap = R moderate restriction, L moderate restriction  > Improved to minimal restriction               Scalenes: R moderate restriction, L moderate restriction  > still moderate, but improving               SCM: R minimal restriction, L minimal restriction > no restriction               Levator Scap: R moderate restriction, L moderate restriction > improving, less than moderate     Intake Outcome Measure for FOTO Lumbar  Survey    Therapist reviewed FOTO scores for Nicole Harris on 5/15/2024.   FOTO report - see Media section or FOTO account episode details.    Intake Score: 31 %         Treatment     Total Treatment time (time-based codes) separate from Evaluation: 15 minutes     Nicloe received the treatments listed below:      therapeutic exercises to develop strength and endurance for 15 minutes including:  Treadmill - started at 1.0 mph and able to increase to 1.3 mph with 0% incline;  Walked 1/4 mile in 12.5 mins with good tolerance.   with O2 sats at 99%.   To continue with HEP for lumbar spine/SI joint stretching given to her by Brii Mcneil NP at her recent appointment     Patient Education and Home Exercises     Education provided:   - importance  of exercise with her daignosis/condition; needs support for joints, increase in endorphins for mood, improvement in sleep;     Written Home Exercises Provided: Patient instructed to cont prior HEP. Will develop new exercise program over next few weeks for patient to work on at home.   Nicole demonstrated good  understanding of the education provided.     Assessment     Nicole is a 54 y.o. female referred to outpatient Physical Therapy with a medical diagnosis of SI jt inflammation and DDD cervical/lumbar spine. Patient presents with chronic pain, fibromyalgia involving cervical, lumbar and now SI joint's.  Patient demonstrates functional AROM in neck/back and extremities, but has reduced mm support for all of her joints secondary to lack of physical exercise as well as myofascial restrictions in neck/upper traps, lumbar spine and piriformis mm.  Nicole will benefit from Skilled physical therapy to address her deficits noted above.     Patient prognosis is Good.   Patient will benefit from skilled outpatient Physical Therapy to address the deficits stated above and in the chart below, provide patient /family education, and to maximize patientt's level of independence.     Plan of care discussed with patient: Yes  Patient's spiritual, cultural and educational needs considered and patient is agreeable to the plan of care and goals as stated below:     Anticipated Barriers for therapy: none     Medical Necessity is demonstrated by the following  History  Co-morbidities and personal factors that may impact the plan of care [] LOW: no personal factors / co-morbidities  [x] MODERATE: 1-2 personal factors / co-morbidities  [] HIGH: 3+ personal factors / co-morbidities    Moderate / High Support Documentation:   Co-morbidities affecting plan of care: diabetes, COPD, bipolar, chronicity of pain symptoms     Personal Factors:   lifestyle     Examination  Body Structures and Functions, activity limitations and participation  restrictions that may impact the plan of care [x] LOW: addressing 1-2 elements  [] MODERATE: 3+ elements  [] HIGH: 4+ elements (please support below)    Moderate / High Support Documentation: n/a      Clinical Presentation [x] LOW: stable  [] MODERATE: Evolving  [] HIGH: Unstable     Decision Making/ Complexity Score: low       Goals:  Short Term Goals: 3 weeks   Demonstrate improvement in recent symptoms to progress toward long term goals   Correct sitting/standing postural deficits to reduce pain and promote postural awareness for injury prevention.   Demonstrate compliance with initial exercise program     Long Term Goals: 6 weeks   Able to perform household tasks with improved tolerance.   Able to improve standing tolerance to 45 mins   Able to improve walking distance to 1 mile   Able to perform transfers from all surfaces with no limitation.   FOTO score improvement to 44  Independent with HEP for continued improvement in function.     Plan     Plan of care Certification: 5/15/2024 to 8/15/2024.    Outpatient Physical Therapy 2 times weekly for 6 weeks to include the following interventions: Aquatic Therapy, Manual Therapy, Neuromuscular Re-ed, Patient Education, Therapeutic Activities, and Therapeutic Exercise.     Quita Holliday, PT        Physician's Signature: _________________________________________ Date: ________________

## 2024-05-20 ENCOUNTER — CLINICAL SUPPORT (OUTPATIENT)
Dept: REHABILITATION | Facility: HOSPITAL | Age: 54
End: 2024-05-20
Payer: MEDICARE

## 2024-05-20 DIAGNOSIS — M50.30 DEGENERATIVE DISC DISEASE, CERVICAL: Chronic | ICD-10-CM

## 2024-05-20 DIAGNOSIS — M53.3 SACROILIAC JOINT DYSFUNCTION OF BOTH SIDES: ICD-10-CM

## 2024-05-20 DIAGNOSIS — M51.36 DEGENERATIVE DISC DISEASE, LUMBAR: ICD-10-CM

## 2024-05-20 DIAGNOSIS — Z74.09 IMPAIRED FUNCTIONAL MOBILITY AND ACTIVITY TOLERANCE: Primary | ICD-10-CM

## 2024-05-20 PROCEDURE — 97110 THERAPEUTIC EXERCISES: CPT | Mod: KX,PN,CQ

## 2024-05-20 PROCEDURE — 97140 MANUAL THERAPY 1/> REGIONS: CPT | Mod: KX,PN,CQ

## 2024-05-20 NOTE — PROGRESS NOTES
[facility]   Physical Therapy Treatment Note      Name: Nicole BONILLA [facility] Number: [id number]    Therapy Diagnosis:   Encounter Diagnoses   Name Primary?    Impaired functional mobility and activity tolerance Yes    Sacroiliac joint dysfunction of both sides     Degenerative disc disease, lumbar     Degenerative disc disease, cervical      Physician: Brii Mcneil NP    Visit Date: 5/20/2024    Evaluation Date: 5/15/2024  Authorization Period Expiration: 12/31/2024  Plan of Care Expiration: 8/15/2024  Progress Note Due: 6/15/2024  Date of Surgery: n/a  Visit # / Visits authorized: 1/ 12 + eval   FOTO: 1/ 3     Precautions: Standard and Diabetes      Time In: 1025  Time Out: 1110  Total Billable Time: 40  minutes    PTA Visit #: 1/5       Subjective     Patient reports: her legs are hurting.  She was compliant with home exercise program.  Response to previous treatment: ok  Functional change: none    Pain: 7/10  Location:   global pain - neck/back/arms/legs     Objective      Objective Measures updated at progress report unless specified.     Treatment     Nicole received the treatments listed below:      therapeutic exercises to develop strength, endurance, ROM, and core stabilization for 30 minutes including:  NuStep level 0 x 15 min  Supine hamstring stretch with manual help 3 x 30 sec each  Supine piriformis stretch 2 x 30 sec  Supine shoulder flexion x 15 with 1 # bar  Supine chess press x 15 with 1 # bar    manual therapy techniques: Myofacial release and Soft tissue Mobilization were applied to the: cervical/lumbar for 10 minutes, including:  STM   Upper and mid traps       neuromuscular re-education activities to improve: Balance, Coordination, and Proprioception for 0 minutes. The following activities were included:      therapeutic activities to improve functional performance for 0  minutes, including:      direct contact modalities after being cleared for  contraindications:     supervised modalities after being cleared for contradictions:     Patient Education and Home Exercises       Education provided:   - therapeutic exercise    Written Home Exercises Provided: Patient instructed to cont prior HEP. Exercises were reviewed and Nicole was able to demonstrate them prior to the end of the session.  Nicole demonstrated good  understanding of the education provided. See Electronic Medical Record under Patient Instructions for exercises provided during therapy sessions    Assessment     Nicole is a 54 y.o. female referred to outpatient Physical Therapy with a medical diagnosis of SI jt inflammation and DDD cervical/lumbar spine. Patient presents with chronic pain, fibromyalgia involving cervical, lumbar and now SI joint's.  Patient demonstrates functional AROM in neck/back and extremities, but has reduced mm support for all of her joints secondary to lack of physical exercise as well as myofascial restrictions in neck/upper traps, lumbar spine and piriformis mm.  Nicole will benefit from Skilled physical therapy to address her deficits noted above.        Nicole Is progressing well towards her goals.   Patient prognosis is Good.     Patient will continue to benefit from skilled outpatient physical therapy to address the deficits listed in the problem list box on initial evaluation, provide pt/family education and to maximize pt's level of independence in the home and community environment.     Patient's spiritual, cultural and educational needs considered and pt agreeable to plan of care and goals.     Anticipated barriers to physical therapy: none    Goals:   Short Term Goals: 3 weeks   Demonstrate improvement in recent symptoms to progress toward long term goals   Correct sitting/standing postural deficits to reduce pain and promote postural awareness for injury prevention.   Demonstrate compliance with initial exercise program      Long Term Goals: 6 weeks   Able to  perform household tasks with improved tolerance.   Able to improve standing tolerance to 45 mins   Able to improve walking distance to 1 mile   Able to perform transfers from all surfaces with no limitation.   FOTO score improvement to 44  Independent with HEP for continued improvement in function    Plan     Plan of care Certification: 5/15/2024 to 8/15/2024.     Outpatient Physical Therapy 2 times weekly for 6 weeks to include the following interventions: Aquatic Therapy, Manual Therapy, Neuromuscular Re-ed, Patient Education, Therapeutic Activities, and Therapeutic Exercise.     Medhat Teran, PTA

## 2024-05-22 ENCOUNTER — CLINICAL SUPPORT (OUTPATIENT)
Dept: REHABILITATION | Facility: HOSPITAL | Age: 54
End: 2024-05-22
Payer: MEDICARE

## 2024-05-22 DIAGNOSIS — M50.30 DEGENERATIVE DISC DISEASE, CERVICAL: Chronic | ICD-10-CM

## 2024-05-22 DIAGNOSIS — M53.3 SACROILIAC JOINT DYSFUNCTION OF BOTH SIDES: ICD-10-CM

## 2024-05-22 DIAGNOSIS — M51.36 DEGENERATIVE DISC DISEASE, LUMBAR: ICD-10-CM

## 2024-05-22 DIAGNOSIS — Z74.09 IMPAIRED FUNCTIONAL MOBILITY AND ACTIVITY TOLERANCE: Primary | ICD-10-CM

## 2024-05-22 PROCEDURE — 97110 THERAPEUTIC EXERCISES: CPT | Mod: KX,PN,CQ

## 2024-05-22 NOTE — PROGRESS NOTES
[facility]   Physical Therapy Treatment Note      Name: Nicole BONILLA [facility] Number: [id number]    Therapy Diagnosis:   Encounter Diagnoses   Name Primary?    Impaired functional mobility and activity tolerance Yes    Sacroiliac joint dysfunction of both sides     Degenerative disc disease, lumbar     Degenerative disc disease, cervical        Physician: Brii Mcneil NP    Visit Date: 5/22/2024    Evaluation Date: 5/15/2024  Authorization Period Expiration: 12/31/2024  Plan of Care Expiration: 8/15/2024  Progress Note Due: 6/15/2024  Date of Surgery: n/a  Visit # / Visits authorized: 1/ 12 + eval   FOTO: 1/ 3     Precautions: Standard and Diabetes      Time In: 850  Time Out: 925  Total Billable Time: 30  minutes    PTA Visit #: 1/5       Subjective     Patient reports: feeling bad today. And didn't sleep well last night.  She will do what she can.  She was compliant with home exercise program.  Response to previous treatment: ok  Functional change: none    Pain: 7/10  Location:   global pain - neck/back/arms/legs     Objective      Objective Measures updated at progress report unless specified.     Treatment     Nicole received the treatments listed below:      therapeutic exercises to develop strength, endurance, ROM, and core stabilization for 30 minutes including:  NuStep level 0 x 25 min almost 3 miles  Supine hamstring stretch with manual help 3 x 30 sec each  Supine piriformis stretch 2 x 30 sec  Supine shoulder flexion x 15 with 1 # bar  Supine chess press x 15 with 1 # bar    manual therapy techniques: Myofacial release and Soft tissue Mobilization were applied to the: cervical/lumbar for 0 minutes, including:  STM   Upper and mid traps       neuromuscular re-education activities to improve: Balance, Coordination, and Proprioception for 0 minutes. The following activities were included:      therapeutic activities to improve functional performance for 0  minutes,  including:      direct contact modalities after being cleared for contraindications:     supervised modalities after being cleared for contradictions:     Patient Education and Home Exercises       Education provided:   - therapeutic exercise    Written Home Exercises Provided: Patient instructed to cont prior HEP. Exercises were reviewed and Nicole was able to demonstrate them prior to the end of the session.  Nicole demonstrated good  understanding of the education provided. See Electronic Medical Record under Patient Instructions for exercises provided during therapy sessions    Assessment   Patient feels like the nustep and hamstring stretches are good for her condition and declined the rest today.    Nicole is a 54 y.o. female referred to outpatient Physical Therapy with a medical diagnosis of SI jt inflammation and DDD cervical/lumbar spine. Patient presents with chronic pain, fibromyalgia involving cervical, lumbar and now SI joint's.  Patient demonstrates functional AROM in neck/back and extremities, but has reduced mm support for all of her joints secondary to lack of physical exercise as well as myofascial restrictions in neck/upper traps, lumbar spine and piriformis mm.  Nicole will benefit from Skilled physical therapy to address her deficits noted above.        Nicole Is progressing well towards her goals.   Patient prognosis is Good.     Patient will continue to benefit from skilled outpatient physical therapy to address the deficits listed in the problem list box on initial evaluation, provide pt/family education and to maximize pt's level of independence in the home and community environment.     Patient's spiritual, cultural and educational needs considered and pt agreeable to plan of care and goals.     Anticipated barriers to physical therapy: none    Goals:   Short Term Goals: 3 weeks   Demonstrate improvement in recent symptoms to progress toward long term goals   Correct sitting/standing postural  deficits to reduce pain and promote postural awareness for injury prevention.   Demonstrate compliance with initial exercise program      Long Term Goals: 6 weeks   Able to perform household tasks with improved tolerance.   Able to improve standing tolerance to 45 mins   Able to improve walking distance to 1 mile   Able to perform transfers from all surfaces with no limitation.   FOTO score improvement to 44  Independent with HEP for continued improvement in function    Plan     Plan of care Certification: 5/15/2024 to 8/15/2024.     Outpatient Physical Therapy 2 times weekly for 6 weeks to include the following interventions: Aquatic Therapy, Manual Therapy, Neuromuscular Re-ed, Patient Education, Therapeutic Activities, and Therapeutic Exercise.     Medhat Teran, PTA

## 2024-05-29 ENCOUNTER — CLINICAL SUPPORT (OUTPATIENT)
Dept: REHABILITATION | Facility: HOSPITAL | Age: 54
End: 2024-05-29
Payer: MEDICARE

## 2024-05-29 DIAGNOSIS — Z74.09 IMPAIRED FUNCTIONAL MOBILITY AND ACTIVITY TOLERANCE: Primary | ICD-10-CM

## 2024-05-29 DIAGNOSIS — M79.18 MYOFASCIAL PAIN SYNDROME, CERVICAL: ICD-10-CM

## 2024-05-29 PROCEDURE — 97014 ELECTRIC STIMULATION THERAPY: CPT | Mod: KX,PN

## 2024-05-29 PROCEDURE — 97140 MANUAL THERAPY 1/> REGIONS: CPT | Mod: KX,PN

## 2024-05-29 NOTE — PROGRESS NOTES
"[facility]   Physical Therapy Treatment Note      Name: Nicole BONILLA [facility]  Clinic Number: [id number]    Therapy Diagnosis:   Encounter Diagnoses   Name Primary?    Impaired functional mobility and activity tolerance Yes    Myofascial pain syndrome, cervical        Physician: Brii Mcneil NP    Visit Date: 5/29/2024    Evaluation Date: 5/15/2024  Authorization Period Expiration: 12/31/2024  Plan of Care Expiration: 8/15/2024  Progress Note Due: 6/15/2024  Date of Surgery: n/a  Visit # / Visits authorized: 2/ 12 + eval   FOTO: 1/ 3     Precautions: Standard and Diabetes      Time In: 12:50 pm   Time Out:1:50 pm    Total Billable Time: 60  minutes    PTA Visit #: 0/5       Subjective     Patient reports: "I'm not worth anything today, tired, no energy" Nicole stated that she has been battling insurance problems.  She lost her drug coverage, dentist called to tell her that her Medicaid was not active, she is very frustrated about all of this.   She was compliant with home exercise program.  Response to previous treatment: ok  Functional change: none    Pain: 7/10  Location:   global pain - neck/back/arms/legs     Objective      Objective Measures updated at progress report unless specified.     Treatment     Nicole received the treatments listed below:      therapeutic exercises to develop strength, endurance, ROM, and core stabilization for 30 minutes including:  NuStep level 0 x 25 min almost 3 miles  Supine hamstring stretch with manual help 3 x 30 sec each  Supine piriformis stretch 2 x 30 sec  Supine shoulder flexion x 15 with 1 # bar  Supine chess press x 15 with 1 # bar    manual therapy techniques: Myofacial release and Soft tissue Mobilization, dry needling were applied to the: cervical  for 35 minutes, including:  Supine: light traction at c-spine with sub-occipital tissue mobilization; MET for upper trap tissue release on right and left; Positioned for dry needling in prone.  " LF-ES per protocol for cervical radiculopathy including upper traps and rhomboids with needling medial border of scapula.  Needling winding for increased tissue grasp; Intensity of stimulation adjusted to patient tolerance.  Ble to obtain good rhythmical tissue contraction.  Needles left in-situ x 15 mins.  Removed without any adverse effects.        neuromuscular re-education activities to improve: Balance, Coordination, and Proprioception for 0 minutes. The following activities were included:      therapeutic activities to improve functional performance for 0  minutes, including:      direct contact modalities after being cleared for contraindications:     supervised modalities after being cleared for contradictions:     Patient Education and Home Exercises       Education provided:   - therapeutic exercise    Written Home Exercises Provided: Patient instructed to cont prior HEP. Exercises were reviewed and Nicole was able to demonstrate them prior to the end of the session.  Nicole demonstrated good  understanding of the education provided. See Electronic Medical Record under Patient Instructions for exercises provided during therapy sessions    Assessment   Patient with fair tolerance of treatment today; low energy, frustration about her insurance issues.  Will continue with treatment to address chronic pain situation as well as general deconditioning.     Nicole is a 54 y.o. female referred to outpatient Physical Therapy with a medical diagnosis of SI jt inflammation and DDD cervical/lumbar spine. Patient presents with chronic pain, fibromyalgia involving cervical, lumbar and now SI joint's.  Patient demonstrates functional AROM in neck/back and extremities, but has reduced mm support for all of her joints secondary to lack of physical exercise as well as myofascial restrictions in neck/upper traps, lumbar spine and piriformis mm.  Nicole will benefit from Skilled physical therapy to address her deficits noted  above.        Nicole Is progressing well towards her goals.   Patient prognosis is Good.     Patient will continue to benefit from skilled outpatient physical therapy to address the deficits listed in the problem list box on initial evaluation, provide pt/family education and to maximize pt's level of independence in the home and community environment.     Patient's spiritual, cultural and educational needs considered and pt agreeable to plan of care and goals.     Anticipated barriers to physical therapy: none    Goals:   Short Term Goals: 3 weeks   Demonstrate improvement in recent symptoms to progress toward long term goals   Correct sitting/standing postural deficits to reduce pain and promote postural awareness for injury prevention.   Demonstrate compliance with initial exercise program      Long Term Goals: 6 weeks   Able to perform household tasks with improved tolerance.   Able to improve standing tolerance to 45 mins   Able to improve walking distance to 1 mile   Able to perform transfers from all surfaces with no limitation.   FOTO score improvement to 44  Independent with HEP for continued improvement in function    Plan     Plan of care Certification: 5/15/2024 to 8/15/2024.     Outpatient Physical Therapy 2 times weekly for 6 weeks to include the following interventions: Aquatic Therapy, Manual Therapy, Neuromuscular Re-ed, Patient Education, Therapeutic Activities, and Therapeutic Exercise.     Quita Holliday, PT

## 2024-06-20 ENCOUNTER — CLINICAL SUPPORT (OUTPATIENT)
Dept: REHABILITATION | Facility: HOSPITAL | Age: 54
End: 2024-06-20
Payer: MEDICARE

## 2024-06-20 DIAGNOSIS — Z74.09 IMPAIRED FUNCTIONAL MOBILITY AND ACTIVITY TOLERANCE: Primary | ICD-10-CM

## 2024-06-20 DIAGNOSIS — M79.18 MYOFASCIAL PAIN SYNDROME, CERVICAL: ICD-10-CM

## 2024-06-20 PROCEDURE — 97110 THERAPEUTIC EXERCISES: CPT | Mod: KX,PN

## 2024-06-20 PROCEDURE — 97140 MANUAL THERAPY 1/> REGIONS: CPT | Mod: KX,PN

## 2024-06-20 NOTE — PROGRESS NOTES
"[facility]   Physical Therapy Treatment Note      Name: Nicole BONILLA [facility]  Clinic Number: [id number]    Therapy Diagnosis:   Encounter Diagnoses   Name Primary?    Impaired functional mobility and activity tolerance Yes    Myofascial pain syndrome, cervical        Physician: Brii Mcneil NP    Visit Date: 6/20/2024    Evaluation Date: 5/15/2024  Authorization Period Expiration: 12/31/2024  Plan of Care Expiration: 8/15/2024  Progress Note Due: 6/15/2024  Date of Surgery: n/a  Visit # / Visits authorized: 4/ 12 + eval   FOTO: 1/ 3     Precautions: Standard and Diabetes      Time In: 1030 am   Time Out:  11:15 am   Total Billable Time: 45  minutes    PTA Visit #: 0/5       Subjective     Patient reports:  "I did something to my neck day before yesterday, it hurts really bad to turn my head to the left"  I also tripped and feel backwards later that day; Having increased muscle spasms too; left leg, left arm.   She was compliant with home exercise program.  Response to previous treatment: ok  Functional change: none    Pain: 8/10  Location:   global pain - neck/back/arms/legs  - left side of neck     Objective      Objective Measures updated at progress report unless specified.     Treatment     Nicole received the treatments listed below:      therapeutic exercises to develop strength, endurance, ROM, and core stabilization for 10 minutes including:  NuStep level 1 x 10 min   Supine hamstring stretch with manual help 3 x 30 sec each  Supine piriformis stretch 2 x 30 sec  Supine shoulder flexion x 15 with 1 # bar  Supine chess press x 15 with 1 # bar    manual therapy techniques: Myofacial release and Soft tissue Mobilization, dry needling were applied to the: cervical  for 35 minutes, including:  Supine: light traction at c-spine with sub-occipital tissue mobilization; MET for upper trap/ levator / scalenes tissue release on right and left;  Facet uplift from mid thoracic to upper " cervical on each side for general facet mobilization;   Patient seated at edge of mat - PT in front of patient - MET for upper t-spine/lower c-spine extension performed with patient applying light resistance on PT arms.     DNP  Positioned for dry needling in prone.  LF-ES per protocol for cervical radiculopathy including upper traps and rhomboids with needling medial border of scapula.  Needling winding for increased tissue grasp; Intensity of stimulation adjusted to patient tolerance.  Ble to obtain good rhythmical tissue contraction.  Needles left in-situ x 15 mins.  Removed without any adverse effects.        neuromuscular re-education activities to improve: Balance, Coordination, and Proprioception for 0 minutes. The following activities were included:      therapeutic activities to improve functional performance for 0  minutes, including:      direct contact modalities after being cleared for contraindications:     supervised modalities after being cleared for contradictions:     Patient Education and Home Exercises       Education provided:   - therapeutic exercise    Written Home Exercises Provided: Patient instructed to cont prior HEP. Exercises were reviewed and Nicole was able to demonstrate them prior to the end of the session.  Nicole demonstrated good  understanding of the education provided. See Electronic Medical Record under Patient Instructions for exercises provided during therapy sessions    ,   Patient with recent onset of increased left sided cervical pain, unsure what the cause was, also had a fall the other day - just lost her balance.  Responded well to manual tx today with pain free passive movement noted in c-spine with gravity eliminated position, did have some return of discomfort when ROM performed in sitting/standing;  Will continue with treatment to address chronic pain situation as well as general deconditioning.     Nicole is a 54 y.o. female referred to outpatient Physical Therapy  with a medical diagnosis of SI jt inflammation and DDD cervical/lumbar spine. Patient presents with chronic pain, fibromyalgia involving cervical, lumbar and now SI joint's.  Patient demonstrates functional AROM in neck/back and extremities, but has reduced mm support for all of her joints secondary to lack of physical exercise as well as myofascial restrictions in neck/upper traps, lumbar spine and piriformis mm.  Nicole will benefit from Skilled physical therapy to address her deficits noted above.        Nicole Is progressing well towards her goals.   Patient prognosis is Good.     Patient will continue to benefit from skilled outpatient physical therapy to address the deficits listed in the problem list box on initial evaluation, provide pt/family education and to maximize pt's level of independence in the home and community environment.     Patient's spiritual, cultural and educational needs considered and pt agreeable to plan of care and goals.     Anticipated barriers to physical therapy: none    Goals:   Short Term Goals: 3 weeks   Demonstrate improvement in recent symptoms to progress toward long term goals   Correct sitting/standing postural deficits to reduce pain and promote postural awareness for injury prevention.   Demonstrate compliance with initial exercise program      Long Term Goals: 6 weeks   Able to perform household tasks with improved tolerance.   Able to improve standing tolerance to 45 mins   Able to improve walking distance to 1 mile   Able to perform transfers from all surfaces with no limitation.   FOTO score improvement to 44  Independent with HEP for continued improvement in function    Plan     Plan of care Certification: 5/15/2024 to 8/15/2024.     Outpatient Physical Therapy 2 times weekly for 6 weeks to include the following interventions: Aquatic Therapy, Manual Therapy, Neuromuscular Re-ed, Patient Education, Therapeutic Activities, and Therapeutic Exercise.     Quita Holliday, PT

## 2024-07-02 ENCOUNTER — CLINICAL SUPPORT (OUTPATIENT)
Dept: REHABILITATION | Facility: HOSPITAL | Age: 54
End: 2024-07-02
Payer: MEDICARE

## 2024-07-02 DIAGNOSIS — Z74.09 IMPAIRED FUNCTIONAL MOBILITY AND ACTIVITY TOLERANCE: Primary | ICD-10-CM

## 2024-07-02 DIAGNOSIS — M79.18 MYOFASCIAL PAIN SYNDROME, CERVICAL: ICD-10-CM

## 2024-07-02 PROCEDURE — 97140 MANUAL THERAPY 1/> REGIONS: CPT | Mod: KX,PN

## 2024-07-02 PROCEDURE — 97014 ELECTRIC STIMULATION THERAPY: CPT | Mod: KX,PN

## 2024-07-02 PROCEDURE — 97110 THERAPEUTIC EXERCISES: CPT | Mod: KX,PN

## 2024-07-02 NOTE — PROGRESS NOTES
"[facility]   Physical Therapy Treatment Note / Progress Report      Name: Nicole Harris  Clinic Number: [id number]    Therapy Diagnosis:   Encounter Diagnoses   Name Primary?    Impaired functional mobility and activity tolerance Yes    Myofascial pain syndrome, cervical        Physician: Brii Mcneil NP    Visit Date: 7/2/2024    Evaluation Date: 5/15/2024  Authorization Period Expiration: 12/31/2024  Plan of Care Expiration: 8/15/2024  Progress Note Due: 8/15/2024  Date of Surgery: n/a  Visit # / Visits authorized: 5 / 12 + eval   FOTO: 1/ 3     Precautions: Standard and Diabetes      Time In: 1230 pm   Time Out:  1:15 pm    Total Billable Time: 45  minutes    PTA Visit #: 0/5       Subjective     Patient reports:  my neck is killing me;     She was compliant with home exercise program.  Response to previous treatment: ok  Functional change: none, patient attempting to increase her activity, but has not been able to be consistent with anything; now the heat is too much for her to do things outside;     Pain: 8/10  Location:   global pain - neck/back/arms/legs  - left side of neck     Objective      Objective Measures updated at progress report unless specified.   Range of Motion:        Degrees  12/6/24 Degrees  2/27/24 Degrees   4/1/2024   Flexion 58  45  52   Extension 52  51  51   Left Rotation 52  53  53   Right Rotation 56  57  55   Left Side Bending 30  24  25   Right Side Bending 20   11  18      Shoulder Range of Motion:   Left:     WFL - decreased quality of motion into IR/ER, but functional   Right:   WFL - as above, IR/ER quality of motion is decreased      Strength:    Left Right   DNF 5" head lift     Upper trap 4-/5 4-/5   Mid trap 3+/5 3+/5   Lower trap 3/5 3/5   Rhomboids 3+/5 3+/5    20 lb   20 lb       Upper Extremity Strength    Left Right   Shoulder flexion: 4-/5 4-/5   Shoulder Abduction: 4/5 4/5   Shoulder ER 4/5 4/5   Shoulder IR 4-/5 4-/5   Elbow " flexion: 4/5 4/5   Elbow extension: 4/5 4/5   Wrist flexion: 3+/5 3+/5   Wrist extension: 3+/5 3+/5                  Treatment     Nicole received the treatments listed below:      therapeutic exercises to develop strength, endurance, ROM, and core stabilization for 10 minutes including:  NuStep level 1 x 10 min   Supine hamstring stretch with manual help 3 x 30 sec each  Supine piriformis stretch 2 x 30 sec  Supine shoulder flexion x 15 with 1 # bar  Supine chess press x 15 with 1 # bar    manual therapy techniques: Myofacial release and Soft tissue Mobilization, dry needling were applied to the: cervical  for 35 minutes, including:  Supine: light traction at c-spine with sub-occipital tissue mobilization; MET for upper trap/ levator / scalenes tissue release on right and left;  Facet uplift from mid thoracic to upper cervical on each side for general facet mobilization;     Positioned for dry needling in prone.  LF-ES per protocol for cervical radiculopathy including upper traps and rhomboids with needling medial border of scapula.  Needling winding for increased tissue grasp; Intensity of stimulation adjusted to patient tolerance.  Ble to obtain good rhythmical tissue contraction.  Needles left in-situ x 15 mins.  Removed without any adverse effects.        neuromuscular re-education activities to improve: Balance, Coordination, and Proprioception for 0 minutes. The following activities were included:      therapeutic activities to improve functional performance for 0  minutes, including:      direct contact modalities after being cleared for contraindications:     supervised modalities after being cleared for contradictions:     Patient Education and Home Exercises       Education provided:   - therapeutic exercise    Written Home Exercises Provided: Patient instructed to cont prior HEP. Exercises were reviewed and Nicole was able to demonstrate them prior to the end of the session.  Nicole demonstrated good   understanding of the education provided. See Electronic Medical Record under Patient Instructions for exercises provided during therapy sessions    ,   Patient with recent onset of increased left sided cervical pain,  Responded well to manual tx today with pain free passive movement noted in c-spine with gravity eliminated position, did have some return of discomfort when ROM performed in sitting/standing;  Will continue with treatment to address chronic pain situation as well as general deconditioning.     Nicole is a 54 y.o. female referred to outpatient Physical Therapy with a medical diagnosis of SI jt inflammation and DDD cervical/lumbar spine. Patient presents with chronic pain, fibromyalgia involving cervical, lumbar and now SI joint's.  Patient demonstrates functional AROM in neck/back and extremities, but has reduced mm support for all of her joints secondary to lack of physical exercise as well as myofascial restrictions in neck/upper traps, lumbar spine and piriformis mm.  Nicole will benefit from Skilled physical therapy to address her deficits noted above.        Nicole Is progressing well towards her goals.   Patient prognosis is Good.     Patient will continue to benefit from skilled outpatient physical therapy to address the deficits listed in the problem list box on initial evaluation, provide pt/family education and to maximize pt's level of independence in the home and community environment.     Patient's spiritual, cultural and educational needs considered and pt agreeable to plan of care and goals.     Anticipated barriers to physical therapy: none    Goals:   Short Term Goals: 3 weeks   Demonstrate improvement in recent symptoms to progress toward long term goals   Correct sitting/standing postural deficits to reduce pain and promote postural awareness for injury prevention.   Demonstrate compliance with initial exercise program      Long Term Goals: 6 weeks   Able to perform household tasks with  improved tolerance.   Able to improve standing tolerance to 45 mins   Able to improve walking distance to 1 mile   Able to perform transfers from all surfaces with no limitation.   FOTO score improvement to 44  Independent with HEP for continued improvement in function    Plan     Plan of care Certification: 5/15/2024 to 8/15/2024.     Outpatient Physical Therapy 2 times weekly for 6 weeks to include the following interventions: Aquatic Therapy, Manual Therapy, Neuromuscular Re-ed, Patient Education, Therapeutic Activities, and Therapeutic Exercise.     Quita Holliday, PT

## 2024-07-03 ENCOUNTER — CLINICAL SUPPORT (OUTPATIENT)
Dept: REHABILITATION | Facility: HOSPITAL | Age: 54
End: 2024-07-03
Payer: MEDICARE

## 2024-07-03 DIAGNOSIS — Z74.09 IMPAIRED FUNCTIONAL MOBILITY AND ACTIVITY TOLERANCE: Primary | ICD-10-CM

## 2024-07-03 DIAGNOSIS — M79.18 MYOFASCIAL PAIN SYNDROME, CERVICAL: ICD-10-CM

## 2024-07-03 PROCEDURE — 97140 MANUAL THERAPY 1/> REGIONS: CPT | Mod: KX,PN

## 2024-07-03 PROCEDURE — 97110 THERAPEUTIC EXERCISES: CPT | Mod: KX,PN

## 2024-07-03 PROCEDURE — 97014 ELECTRIC STIMULATION THERAPY: CPT | Mod: KX,PN

## 2024-07-03 NOTE — PROGRESS NOTES
[facility]   Physical Therapy Treatment Note      Name: Nicole BONILLA [facility] Number: [id number]    Therapy Diagnosis:   Encounter Diagnoses   Name Primary?    Impaired functional mobility and activity tolerance Yes    Myofascial pain syndrome, cervical        Physician: Brii Mcneil NP    Visit Date: 7/3/2024    Evaluation Date: 5/15/2024  Authorization Period Expiration: 12/31/2024  Plan of Care Expiration: 8/15/2024  Progress Note Due: 8/15/2024  Date of Surgery: n/a  Visit # / Visits authorized: 6 / 12 + eval   FOTO: 1/ 3     Precautions: Standard and Diabetes      Time In:  9:50 pm   Time Out:  11:00 pm    Total Billable Time: 45  minutes    PTA Visit #: 0/5       Subjective     Patient reports:  I'm doing a little bit better today;    She was compliant with home exercise program.  Response to previous treatment: ok  Functional change: none, patient attempting to increase her activity, but has not been able to be consistent with anything; now the heat is too much for her to do things outside;     Pain: 8/10  Location:   global pain - neck/back/arms/legs  - left side of neck     Objective      Objective Measures updated at progress report unless specified.       Treatment     Nicole received the treatments listed below:      therapeutic exercises to develop strength, endurance, ROM, and core stabilization for 15 minutes including:  NuStep level 2 x 15 min   Supine hamstring stretch with manual help 3 x 30 sec each  Supine piriformis stretch 2 x 30 sec  Supine shoulder flexion x 15 with 1 # bar  Supine chess press x 15 with 1 # bar    manual therapy techniques: Myofacial release and Soft tissue Mobilization, dry needling were applied to the: cervical  for 35 minutes, including:  Supine: light traction at c-spine with sub-occipital tissue mobilization; MET for upper trap/ levator / scalenes tissue release on right and left;  Facet uplift from mid thoracic to upper cervical on  each side for general facet mobilization;     Positioned for dry needling in prone.  LF-ES per protocol for cervical radiculopathy including upper traps and rhomboids with needling medial border of scapula.  Needling winding for increased tissue grasp; Intensity of stimulation adjusted to patient tolerance.  Ble to obtain good rhythmical tissue contraction.  Needles left in-situ x 22  mins.  Removed without any adverse effects.        neuromuscular re-education activities to improve: Balance, Coordination, and Proprioception for 0 minutes. The following activities were included:      therapeutic activities to improve functional performance for 0  minutes, including:      direct contact modalities after being cleared for contraindications:     supervised modalities after being cleared for contradictions:     Patient Education and Home Exercises       Education provided:   - therapeutic exercise    Written Home Exercises Provided: Patient instructed to cont prior HEP. Exercises were reviewed and Nicole was able to demonstrate them prior to the end of the session.  Nicole demonstrated good  understanding of the education provided. See Electronic Medical Record under Patient Instructions for exercises provided during therapy sessions    ,   Patient with recent onset of increased left sided cervical pain,  Responded well to manual tx and dry needling today with pain free passive movement noted in c-spine with gravity eliminated position, Will continue with treatment to address chronic pain situation as well as general deconditioning.     Nicole is a 54 y.o. female referred to outpatient Physical Therapy with a medical diagnosis of SI jt inflammation and DDD cervical/lumbar spine. Patient presents with chronic pain, fibromyalgia involving cervical, lumbar and now SI joint's.  Patient demonstrates functional AROM in neck/back and extremities, but has reduced mm support for all of her joints secondary to lack of physical  exercise as well as myofascial restrictions in neck/upper traps, lumbar spine and piriformis mm.  Nicole will benefit from Skilled physical therapy to address her deficits noted above.        Nicole Is progressing well towards her goals.   Patient prognosis is Good.     Patient will continue to benefit from skilled outpatient physical therapy to address the deficits listed in the problem list box on initial evaluation, provide pt/family education and to maximize pt's level of independence in the home and community environment.     Patient's spiritual, cultural and educational needs considered and pt agreeable to plan of care and goals.     Anticipated barriers to physical therapy: none    Goals:   Short Term Goals: 3 weeks   Demonstrate improvement in recent symptoms to progress toward long term goals   Correct sitting/standing postural deficits to reduce pain and promote postural awareness for injury prevention.   Demonstrate compliance with initial exercise program      Long Term Goals: 6 weeks   Able to perform household tasks with improved tolerance.   Able to improve standing tolerance to 45 mins   Able to improve walking distance to 1 mile   Able to perform transfers from all surfaces with no limitation.   FOTO score improvement to 44  Independent with HEP for continued improvement in function    Plan     Plan of care Certification: 5/15/2024 to 8/15/2024.     Outpatient Physical Therapy 2 times weekly for 6 weeks to include the following interventions: Aquatic Therapy, Manual Therapy, Neuromuscular Re-ed, Patient Education, Therapeutic Activities, and Therapeutic Exercise.     Quita Holliday, PT

## 2024-07-10 ENCOUNTER — HOSPITAL ENCOUNTER (OUTPATIENT)
Dept: RADIOLOGY | Facility: HOSPITAL | Age: 54
Discharge: HOME | End: 2024-07-10
Attending: NURSE PRACTITIONER
Payer: MEDICARE

## 2024-07-10 ENCOUNTER — CLINICAL SUPPORT (OUTPATIENT)
Dept: REHABILITATION | Facility: HOSPITAL | Age: 54
End: 2024-07-10
Payer: MEDICARE

## 2024-07-10 DIAGNOSIS — R05.3 CHRONIC COUGH: ICD-10-CM

## 2024-07-10 DIAGNOSIS — M25.532 PAIN IN LEFT WRIST: ICD-10-CM

## 2024-07-10 DIAGNOSIS — Z74.09 IMPAIRED FUNCTIONAL MOBILITY AND ACTIVITY TOLERANCE: Primary | ICD-10-CM

## 2024-07-10 DIAGNOSIS — M79.18 MYOFASCIAL PAIN SYNDROME, CERVICAL: ICD-10-CM

## 2024-07-10 PROCEDURE — 97110 THERAPEUTIC EXERCISES: CPT | Mod: KX,PN,CQ

## 2024-07-10 PROCEDURE — 97140 MANUAL THERAPY 1/> REGIONS: CPT | Mod: KX,PN,CQ

## 2024-07-10 NOTE — PROGRESS NOTES
[facility]   Physical Therapy Treatment Note      Name: Nicole BONILLA [facility] Number: [id number]    Therapy Diagnosis:   Encounter Diagnoses   Name Primary?    Impaired functional mobility and activity tolerance Yes    Myofascial pain syndrome, cervical        Physician: Brii Mcneil NP    Visit Date: 7/10/2024    Evaluation Date: 5/15/2024  Authorization Period Expiration: 12/31/2024  Plan of Care Expiration: 8/15/2024  Progress Note Due: 8/15/2024  Date of Surgery: n/a  Visit # / Visits authorized: 7 / 12 + eval   FOTO: 1/ 3     Precautions: Standard and Diabetes      Time In:  8:45 AM  Time Out: 9:40 AM   Total Billable Time: 40 minutes    PTA Visit #: 1/5       Subjective     Patient reports: I have had a few rough days.   She was compliant with home exercise program.  Response to previous treatment: ok  Functional change: none, patient attempting to increase her activity, but has not been able to be consistent with anything; now the heat is too much for her to do things outside;     Pain: 5/10  Location:   global pain - neck/back/arms/legs  - left side of neck     Objective      Objective Measures updated at progress report unless specified.       Treatment     Nicole received the treatments listed below:      therapeutic exercises to develop strength, endurance, ROM, and core stabilization for 15 minutes including:  NuStep level 2 x 15 min   Supine hamstring stretch with strap 3 x 30 sec each  Supine piriformis stretch 2 x 30 sec  Supine shoulder flexion x 15 with 1 # bar  Supine chest press x 15 with 1 # bar    manual therapy techniques: Myofacial release and Soft tissue Mobilization applied to cervical  for 25 minutes, including:  Supine: light traction at c-spine with sub-occipital tissue mobilization; MET for upper trap/ levator / scalenes tissue release on right and left.         neuromuscular re-education activities to improve: Balance, Coordination, and Proprioception  for 0 minutes. The following activities were included:      therapeutic activities to improve functional performance for 0  minutes, including:      direct contact modalities after being cleared for contraindications:     supervised modalities after being cleared for contradictions:     Patient Education and Home Exercises       Education provided:   - therapeutic exercise    Written Home Exercises Provided: Patient instructed to cont prior HEP. Exercises were reviewed and Nicole was able to demonstrate them prior to the end of the session.  Nicole demonstrated good  understanding of the education provided. See Electronic Medical Record under Patient Instructions for exercises provided during therapy sessions    Assessment    Responded well to manual tx today with pain free passive movement noted in c-spine with gravity eliminated position, Will continue with treatment to address chronic pain situation as well as general deconditioning.     Nicole is a 54 y.o. female referred to outpatient Physical Therapy with a medical diagnosis of SI jt inflammation and DDD cervical/lumbar spine. Patient presents with chronic pain, fibromyalgia involving cervical, lumbar and now SI joint's.  Patient demonstrates functional AROM in neck/back and extremities, but has reduced mm support for all of her joints secondary to lack of physical exercise as well as myofascial restrictions in neck/upper traps, lumbar spine and piriformis mm.  Nicole will benefit from Skilled physical therapy to address her deficits noted above.        Nicole Is progressing well towards her goals.   Patient prognosis is Good.     Patient will continue to benefit from skilled outpatient physical therapy to address the deficits listed in the problem list box on initial evaluation, provide pt/family education and to maximize pt's level of independence in the home and community environment.     Patient's spiritual, cultural and educational needs considered and pt  agreeable to plan of care and goals.     Anticipated barriers to physical therapy: none    Goals:   Short Term Goals: 3 weeks   Demonstrate improvement in recent symptoms to progress toward long term goals   Correct sitting/standing postural deficits to reduce pain and promote postural awareness for injury prevention.   Demonstrate compliance with initial exercise program      Long Term Goals: 6 weeks   Able to perform household tasks with improved tolerance.   Able to improve standing tolerance to 45 mins   Able to improve walking distance to 1 mile   Able to perform transfers from all surfaces with no limitation.   FOTO score improvement to 44  Independent with HEP for continued improvement in function    Plan     Plan of care Certification: 5/15/2024 to 8/15/2024.     Outpatient Physical Therapy 2 times weekly for 6 weeks to include the following interventions: Aquatic Therapy, Manual Therapy, Neuromuscular Re-ed, Patient Education, Therapeutic Activities, and Therapeutic Exercise.     Jonathan Favre, PTA

## 2024-07-12 ENCOUNTER — CLINICAL SUPPORT (OUTPATIENT)
Dept: REHABILITATION | Facility: HOSPITAL | Age: 54
End: 2024-07-12
Payer: MEDICARE

## 2024-07-12 DIAGNOSIS — Z74.09 IMPAIRED FUNCTIONAL MOBILITY AND ACTIVITY TOLERANCE: Primary | ICD-10-CM

## 2024-07-12 DIAGNOSIS — M79.18 MYOFASCIAL PAIN SYNDROME, CERVICAL: ICD-10-CM

## 2024-07-12 PROCEDURE — 97014 ELECTRIC STIMULATION THERAPY: CPT | Mod: KX,PN

## 2024-07-12 PROCEDURE — 97110 THERAPEUTIC EXERCISES: CPT | Mod: KX,PN

## 2024-07-12 PROCEDURE — 97140 MANUAL THERAPY 1/> REGIONS: CPT | Mod: KX,PN

## 2024-07-12 NOTE — PROGRESS NOTES
[facility]   Physical Therapy Treatment Note      Name: Nicole BONILLA [facility] Number: [id number]    Therapy Diagnosis:   Encounter Diagnoses   Name Primary?    Impaired functional mobility and activity tolerance Yes    Myofascial pain syndrome, cervical        Physician: Brii Mcneil NP    Visit Date: 7/12/2024    Evaluation Date: 5/15/2024  Authorization Period Expiration: 12/31/2024  Plan of Care Expiration: 8/15/2024  Progress Note Due: 8/15/2024  Date of Surgery: n/a  Visit # / Visits authorized: 8 / 12 + eval   FOTO: 1/ 3     Precautions: Standard and Diabetes      Time In:  10:30 AM  Time Out: 11:25 AM   Total Billable Time: 40 minutes    PTA Visit #: 0/5       Subjective     Patient reports: I'm having a better day today; yesterday was bad;   She was compliant with home exercise program.  Response to previous treatment: ok  Functional change: none, patient attempting to increase her activity, but has not been able to be consistent with anything; now the heat is too much for her to do things outside;     Pain: 5/10  Location:   global pain - neck/back/arms/legs  - left side of neck     Objective      Objective Measures updated at progress report unless specified.       Treatment     Nicole received the treatments listed below:      therapeutic exercises to develop strength, endurance, ROM, and core stabilization for 15 minutes including:  NuStep level 4 x 15 min   Supine hamstring stretch with strap 3 x 30 sec each  Supine piriformis stretch 2 x 30 sec  Supine shoulder flexion x 15 with 1 # bar  Supine chest press x 15 with 1 # bar    manual therapy techniques: Myofacial release and Soft tissue Mobilization applied to cervical  for 40 minutes, including:  Supine: light traction at c-spine with sub-occipital tissue mobilization; MET for upper trap/ levator / scalenes tissue release on right and left  Positioned in prone with proper support for head/neck and ankles; LF-ES per  protocol for cervical radiculopathy with involvement of upper traps, rhomboids - 30 mm needles 1/5 fb lateral to S/P's at C2, C3, C5,   40 mm needles medial border of scapular - needling back to bone at T2, T4, T6 levels on both sides; 50 mm needles x 2 in upper trap trigger points.  Intensity of stimulation adjusted to patient tolerance with good rhythmical contraction of tissue noted.  Applied ice pack to lower back while needling to help patient stay cooler.  Needles left in-situ x 20 mins. Removed without adverse effects.        neuromuscular re-education activities to improve: Balance, Coordination, and Proprioception for 0 minutes. The following activities were included:      therapeutic activities to improve functional performance for 0  minutes, including:      direct contact modalities after being cleared for contraindications:     supervised modalities after being cleared for contradictions:     Patient Education and Home Exercises       Education provided:   - therapeutic exercise    Written Home Exercises Provided: Patient instructed to cont prior HEP. Exercises were reviewed and Nicole was able to demonstrate them prior to the end of the session.  Nicole demonstrated good  understanding of the education provided. See Electronic Medical Record under Patient Instructions for exercises provided during therapy sessions    Assessment    Responded well to manual/ dry needling  tx today with pain free passive movement noted in c-spine with gravity eliminated position, Patient reports DN helps reduce her pain for several days after;  Will continue with treatment to address chronic pain situation as well as general deconditioning.     Nicole is a 54 y.o. female referred to outpatient Physical Therapy with a medical diagnosis of SI jt inflammation and DDD cervical/lumbar spine. Patient presents with chronic pain, fibromyalgia involving cervical, lumbar and now SI joint's.  Patient demonstrates functional AROM in  neck/back and extremities, but has reduced mm support for all of her joints secondary to lack of physical exercise as well as myofascial restrictions in neck/upper traps, lumbar spine and piriformis mm.  Nicole will benefit from Skilled physical therapy to address her deficits noted above.        Nicole Is progressing well towards her goals.   Patient prognosis is Good.     Patient will continue to benefit from skilled outpatient physical therapy to address the deficits listed in the problem list box on initial evaluation, provide pt/family education and to maximize pt's level of independence in the home and community environment.     Patient's spiritual, cultural and educational needs considered and pt agreeable to plan of care and goals.     Anticipated barriers to physical therapy: none    Goals:   Short Term Goals: 3 weeks   Demonstrate improvement in recent symptoms to progress toward long term goals   Correct sitting/standing postural deficits to reduce pain and promote postural awareness for injury prevention.   Demonstrate compliance with initial exercise program      Long Term Goals: 6 weeks   Able to perform household tasks with improved tolerance.   Able to improve standing tolerance to 45 mins   Able to improve walking distance to 1 mile   Able to perform transfers from all surfaces with no limitation.   FOTO score improvement to 44  Independent with HEP for continued improvement in function    Plan     Plan of care Certification: 5/15/2024 to 8/15/2024.     Outpatient Physical Therapy 2 times weekly for 6 weeks to include the following interventions: Aquatic Therapy, Manual Therapy, Neuromuscular Re-ed, Patient Education, Therapeutic Activities, and Therapeutic Exercise.     Quita Holliday, PT

## 2024-07-17 ENCOUNTER — CLINICAL SUPPORT (OUTPATIENT)
Dept: REHABILITATION | Facility: HOSPITAL | Age: 54
End: 2024-07-17
Payer: MEDICARE

## 2024-07-17 DIAGNOSIS — M79.18 MYOFASCIAL PAIN SYNDROME, CERVICAL: ICD-10-CM

## 2024-07-17 DIAGNOSIS — Z74.09 IMPAIRED FUNCTIONAL MOBILITY AND ACTIVITY TOLERANCE: Primary | ICD-10-CM

## 2024-07-17 PROCEDURE — 97140 MANUAL THERAPY 1/> REGIONS: CPT | Mod: KX,PN

## 2024-07-17 PROCEDURE — 97110 THERAPEUTIC EXERCISES: CPT | Mod: KX,PN

## 2024-07-17 NOTE — PROGRESS NOTES
[facility]   Physical Therapy Treatment Note      Name: Nicole BONILLA [facility] Number: [id number]    Therapy Diagnosis:   Encounter Diagnoses   Name Primary?    Impaired functional mobility and activity tolerance Yes    Myofascial pain syndrome, cervical        Physician: Brii Mcneil NP    Visit Date: 7/17/2024    Evaluation Date: 5/15/2024  Authorization Period Expiration: 12/31/2024  Plan of Care Expiration: 8/15/2024  Progress Note Due: 8/15/2024  Date of Surgery: n/a  Visit # / Visits authorized: 9 / 12 + eval   FOTO: 1/ 3     Precautions: Standard and Diabetes      Time In:   2:00 pm   Time Out: 2:50 pm    Total Billable Time: 40 minutes    PTA Visit #: 0/5       Subjective     Patient reports: I have been having chest pain - right sided,  I thought I was going to have to pull over and call 911.  Nicole stated that it went away pretty much as quickly as it started.  She has a nuclear stress test scheduled next week and echo cardiogram.  Told Nicole to contact MD if her pain persists.   She was compliant with home exercise program.  Response to previous treatment: ok  Functional change: none, patient attempting to increase her activity, but has not been able to be consistent with anything; now the heat is too much for her to do things outside;     Pain: 5/10  Location:   global pain - neck/back/arms/legs  - left side of neck     Objective      Objective Measures updated at progress report unless specified.       Treatment     Nicole received the treatments listed below:      therapeutic exercises to develop strength, endurance, ROM, and core stabilization for 15 minutes including:  NuStep level 4 x 15 min   Supine hamstring stretch with strap 3 x 30 sec each  Supine piriformis stretch 2 x 30 sec  Supine shoulder flexion x 15 with 1 # bar  Supine chest press x 15 with 1 # bar    manual therapy techniques: Myofacial release and Soft tissue Mobilization applied to cervical  for 30   minutes, including:  Supine: light traction at c-spine with sub-occipital tissue mobilization; MET for upper trap/ levator / scalenes tissue release on right and  Left, S/L flexion to lumbar spine with blocking of segment above for max motion; performed on both the right/left sides; Prone - lamina release in lumbar spine; piriformis release bilateral; extension of sacrum for decompression of lumbar spine.        neuromuscular re-education activities to improve: Balance, Coordination, and Proprioception for 0 minutes. The following activities were included:      therapeutic activities to improve functional performance for 0  minutes, including:      direct contact modalities after being cleared for contraindications:     supervised modalities after being cleared for contradictions:     Patient Education and Home Exercises       Education provided:   - therapeutic exercise    Written Home Exercises Provided: Patient instructed to cont prior HEP. Exercises were reviewed and Nicole was able to demonstrate them prior to the end of the session.  Nicole demonstrated good  understanding of the education provided. See Electronic Medical Record under Patient Instructions for exercises provided during therapy sessions    Assessment    Responded well to manual tx,light exercise today with pain free passive movement noted in c-spine with gravity eliminated position, Patient reports DN helps reduce her pain for several days after;  Will continue with treatment to address chronic pain situation as well as general deconditioning.     Nicole is a 54 y.o. female referred to outpatient Physical Therapy with a medical diagnosis of SI jt inflammation and DDD cervical/lumbar spine. Patient presents with chronic pain, fibromyalgia involving cervical, lumbar and now SI joint's.  Patient demonstrates functional AROM in neck/back and extremities, but has reduced mm support for all of her joints secondary to lack of physical exercise as well as  myofascial restrictions in neck/upper traps, lumbar spine and piriformis mm.  Nicole will benefit from Skilled physical therapy to address her deficits noted above.        Nicole Is progressing well towards her goals.   Patient prognosis is Good.     Patient will continue to benefit from skilled outpatient physical therapy to address the deficits listed in the problem list box on initial evaluation, provide pt/family education and to maximize pt's level of independence in the home and community environment.     Patient's spiritual, cultural and educational needs considered and pt agreeable to plan of care and goals.     Anticipated barriers to physical therapy: none    Goals:   Short Term Goals: 3 weeks   Demonstrate improvement in recent symptoms to progress toward long term goals   Correct sitting/standing postural deficits to reduce pain and promote postural awareness for injury prevention.   Demonstrate compliance with initial exercise program      Long Term Goals: 6 weeks   Able to perform household tasks with improved tolerance.   Able to improve standing tolerance to 45 mins   Able to improve walking distance to 1 mile   Able to perform transfers from all surfaces with no limitation.   FOTO score improvement to 44  Independent with HEP for continued improvement in function    Plan     Plan of care Certification: 5/15/2024 to 8/15/2024.     Outpatient Physical Therapy 2 times weekly for 6 weeks to include the following interventions: Aquatic Therapy, Manual Therapy, Neuromuscular Re-ed, Patient Education, Therapeutic Activities, and Therapeutic Exercise.     Quita Holliday, PT

## 2024-07-23 ENCOUNTER — CLINICAL SUPPORT (OUTPATIENT)
Dept: REHABILITATION | Facility: HOSPITAL | Age: 54
End: 2024-07-23
Payer: MEDICARE

## 2024-07-23 DIAGNOSIS — M79.18 MYOFASCIAL PAIN SYNDROME, CERVICAL: ICD-10-CM

## 2024-07-23 DIAGNOSIS — Z74.09 IMPAIRED FUNCTIONAL MOBILITY AND ACTIVITY TOLERANCE: Primary | ICD-10-CM

## 2024-07-23 PROCEDURE — 97110 THERAPEUTIC EXERCISES: CPT | Mod: KX,PN

## 2024-07-23 PROCEDURE — 97140 MANUAL THERAPY 1/> REGIONS: CPT | Mod: KX,PN

## 2024-07-23 NOTE — PROGRESS NOTES
[facility]   Physical Therapy Treatment Note      Name: Nicole BONILLA [facility] Number: [id number]    Therapy Diagnosis:   Encounter Diagnoses   Name Primary?    Impaired functional mobility and activity tolerance Yes    Myofascial pain syndrome, cervical        Physician: Brii Mcneil NP    Visit Date: 7/23/2024    Evaluation Date: 5/15/2024  Authorization Period Expiration: 12/31/2024  Plan of Care Expiration: 8/15/2024  Progress Note Due: 8/15/2024  Date of Surgery: n/a  Visit # / Visits authorized: 10 / 12 + eval   FOTO: 2/ 3     Precautions: Standard and Diabetes      Time In:  9:30 am    Time Out: 10:20 am   Total Billable Time: 45 minutes    PTA Visit #: 0/5       Subjective     Patient reports: I'm just not doing well, not sleeping, congestion, I get overheated so easily and it just makes me sick.  Has an appointment with pulmonologist tomorrow to address persistent cough.    She was compliant with home exercise program.  Response to previous treatment: ok  Functional change: none, patient attempting to increase her activity, but has not been able to be consistent with anything; now the heat is too much for her to do things outside;     Pain: 5/10  Location:   global pain - neck/back/arms/legs  - left side of neck     Objective      Objective Measures updated at progress report unless specified.       Treatment     Nicole received the treatments listed below:      therapeutic exercises to develop strength, endurance, ROM, and core stabilization for 20  minutes including:  NuStep level 4 x 15 min   Seated: upper trap stretch 2 x 30 secs   Seated: levator stretch 2 x 30 sec   Supine hamstring stretch with strap 3 x 30 sec each  Supine piriformis stretch 2 x 30 sec  Supine shoulder flexion x 15 with 1 # bar  Supine chest press x 15 with 1 # bar    manual therapy techniques: Myofacial release and Soft tissue Mobilization applied to cervical  for 25  minutes, including: (needs  fan)   Supine: light traction at c-spine with sub-occipital tissue mobilization;  Seated on edge of mat with green step under feet for support - pillows for UE support:  IASTM to bilateral upper trap, levator, rhomboids moving into lats and paraspinals; Cervical passive motion to stretch corresponding tissues with upper trap and levator.          neuromuscular re-education activities to improve: Balance, Coordination, and Proprioception for 0 minutes. The following activities were included:      therapeutic activities to improve functional performance for 0  minutes, including:      direct contact modalities after being cleared for contraindications:     supervised modalities after being cleared for contradictions:     Patient Education and Home Exercises       Education provided:   - therapeutic exercise    Written Home Exercises Provided: Patient instructed to cont prior HEP. Exercises were reviewed and Nicole was able to demonstrate them prior to the end of the session.  Nicole demonstrated good  understanding of the education provided. See Electronic Medical Record under Patient Instructions for exercises provided during therapy sessions    Assessment    Responded well to manual tx,light exercise today with pain free passive movement noted in c-spine with gravity eliminated position, Patient reports DN helps reduce her pain for several days after;  Will continue with treatment to address chronic pain situation as well as general deconditioning.     Nicole is a 54 y.o. female referred to outpatient Physical Therapy with a medical diagnosis of SI jt inflammation and DDD cervical/lumbar spine. Patient presents with chronic pain, fibromyalgia involving cervical, lumbar and now SI joint's.  Patient demonstrates functional AROM in neck/back and extremities, but has reduced mm support for all of her joints secondary to lack of physical exercise as well as myofascial restrictions in neck/upper traps, lumbar spine and  piriformis mm.  Nicole will benefit from Skilled physical therapy to address her deficits noted above.        Nicole Is progressing well towards her goals.   Patient prognosis is Good.     Patient will continue to benefit from skilled outpatient physical therapy to address the deficits listed in the problem list box on initial evaluation, provide pt/family education and to maximize pt's level of independence in the home and community environment.     Patient's spiritual, cultural and educational needs considered and pt agreeable to plan of care and goals.     Anticipated barriers to physical therapy: none    Goals:   Short Term Goals: 3 weeks   Demonstrate improvement in recent symptoms to progress toward long term goals  > demonstrates short term progress   Correct sitting/standing postural deficits to reduce pain and promote postural awareness for injury prevention.  > Improving   Demonstrate compliance with initial exercise program  > Improving      Long Term Goals: 6 weeks   Able to perform household tasks with improved tolerance.  > Progressing   Able to improve standing tolerance to 45 mins  > Progressing   Able to improve walking distance to 1 mile  > Progressing   Able to perform transfers from all surfaces with no limitation.  > Progressing   FOTO score improvement to 44  Independent with HEP for continued improvement in function    Plan     Plan of care Certification: 5/15/2024 to 8/15/2024.     Outpatient Physical Therapy 2 times weekly for 6 weeks to include the following interventions: Aquatic Therapy, Manual Therapy, Neuromuscular Re-ed, Patient Education, Therapeutic Activities, and Therapeutic Exercise.     Quita Holliday, PT

## 2024-07-25 ENCOUNTER — CLINICAL SUPPORT (OUTPATIENT)
Dept: REHABILITATION | Facility: HOSPITAL | Age: 54
End: 2024-07-25
Payer: MEDICARE

## 2024-07-25 DIAGNOSIS — M79.18 MYOFASCIAL PAIN SYNDROME, CERVICAL: ICD-10-CM

## 2024-07-25 DIAGNOSIS — Z74.09 IMPAIRED FUNCTIONAL MOBILITY AND ACTIVITY TOLERANCE: Primary | ICD-10-CM

## 2024-07-25 PROCEDURE — 97140 MANUAL THERAPY 1/> REGIONS: CPT | Mod: KX,PN

## 2024-07-25 PROCEDURE — 97110 THERAPEUTIC EXERCISES: CPT | Mod: KX,PN

## 2024-07-25 NOTE — PROGRESS NOTES
[facility]   Physical Therapy Treatment Note      Name: Nicole BONILLA [facility] Number: [id number]    Therapy Diagnosis:   Encounter Diagnoses   Name Primary?    Impaired functional mobility and activity tolerance Yes    Myofascial pain syndrome, cervical        Physician: Brii Mcneil NP    Visit Date: 7/25/2024    Evaluation Date: 5/15/2024  Authorization Period Expiration: 12/31/2024  Plan of Care Expiration: 8/15/2024  Progress Note Due: 8/15/2024  Date of Surgery: n/a  Visit # / Visits authorized: 11 / 12 + eval   FOTO: 2/ 3     Precautions: Standard and Diabetes      Time In:  8:00 am    Time Out: 8:45 am     Total Billable Time: 45 minutes    PTA Visit #: 0/5       Subjective     Patient reports:  saw pulmonary MD yesterday - to have couple of lung scans done, blood work to see what the cause of her persistent cough and fatigue might be.      She was compliant with home exercise program.  Response to previous treatment: ok  Functional change: none, patient attempting to increase her activity, but has not been able to be consistent with anything; now the heat is too much for her to do things outside;     Pain: 5/10  Location:   global pain - neck/back/arms/legs  - left side of neck     Objective      Objective Measures updated at progress report unless specified.       Treatment     Nicole received the treatments listed below:      therapeutic exercises to develop strength, endurance, ROM, and core stabilization for 20  minutes including:  NuStep level 4 x 15 min   Seated: upper trap stretch 2 x 30 secs   Seated: levator stretch 2 x 30 sec   Supine hamstring stretch with strap 3 x 30 sec each  Supine piriformis stretch 2 x 30 sec  Supine shoulder flexion x 15 with 1 # bar  Supine chest press x 15 with 1 # bar    manual therapy techniques: Myofacial release and Soft tissue Mobilization applied to cervical  for 25  minutes, including: (needs fan)   Supine: light traction at  c-spine with sub-occipital tissue mobilization;  Seated on edge of mat with green step under feet for support - pillows for UE support:  IASTM to bilateral upper trap, levator, rhomboids moving into lats and paraspinals; Cervical passive motion to stretch corresponding tissues with upper trap and levator.          neuromuscular re-education activities to improve: Balance, Coordination, and Proprioception for 0 minutes. The following activities were included:      therapeutic activities to improve functional performance for 0  minutes, including:      direct contact modalities after being cleared for contraindications:     supervised modalities after being cleared for contradictions:     Patient Education and Home Exercises       Education provided:   - therapeutic exercise    Written Home Exercises Provided: Patient instructed to cont prior HEP. Exercises were reviewed and Nicole was able to demonstrate them prior to the end of the session.  Nicole demonstrated good  understanding of the education provided. See Electronic Medical Record under Patient Instructions for exercises provided during therapy sessions    Assessment    Responded well to manual tx,light exercise today with pain free passive movement noted in c-spine with gravity eliminated position, Patient reports DN helps reduce her pain for several days after;  Will continue with treatment to address chronic pain situation as well as general deconditioning.     Nicole is a 54 y.o. female referred to outpatient Physical Therapy with a medical diagnosis of SI jt inflammation and DDD cervical/lumbar spine. Patient presents with chronic pain, fibromyalgia involving cervical, lumbar and now SI joint's.  Patient demonstrates functional AROM in neck/back and extremities, but has reduced mm support for all of her joints secondary to lack of physical exercise as well as myofascial restrictions in neck/upper traps, lumbar spine and piriformis mm.  Nicole will benefit  from Skilled physical therapy to address her deficits noted above.        Nicole Is progressing well towards her goals.   Patient prognosis is Good.     Patient will continue to benefit from skilled outpatient physical therapy to address the deficits listed in the problem list box on initial evaluation, provide pt/family education and to maximize pt's level of independence in the home and community environment.     Patient's spiritual, cultural and educational needs considered and pt agreeable to plan of care and goals.     Anticipated barriers to physical therapy: none    Goals:   Short Term Goals: 3 weeks   Demonstrate improvement in recent symptoms to progress toward long term goals  > demonstrates short term progress   Correct sitting/standing postural deficits to reduce pain and promote postural awareness for injury prevention.  > Improving   Demonstrate compliance with initial exercise program  > Improving      Long Term Goals: 6 weeks   Able to perform household tasks with improved tolerance.  > Progressing   Able to improve standing tolerance to 45 mins  > Progressing   Able to improve walking distance to 1 mile  > Progressing   Able to perform transfers from all surfaces with no limitation.  > Progressing   FOTO score improvement to 44  Independent with HEP for continued improvement in function    Plan     Plan of care Certification: 5/15/2024 to 8/15/2024.     Outpatient Physical Therapy 2 times weekly for 6 weeks to include the following interventions: Aquatic Therapy, Manual Therapy, Neuromuscular Re-ed, Patient Education, Therapeutic Activities, and Therapeutic Exercise.     Quita Holliday, PT

## 2024-07-31 ENCOUNTER — CLINICAL SUPPORT (OUTPATIENT)
Dept: REHABILITATION | Facility: HOSPITAL | Age: 54
End: 2024-07-31
Payer: MEDICARE

## 2024-07-31 DIAGNOSIS — Z74.09 IMPAIRED FUNCTIONAL MOBILITY AND ACTIVITY TOLERANCE: Primary | ICD-10-CM

## 2024-07-31 DIAGNOSIS — M79.18 MYOFASCIAL PAIN SYNDROME, CERVICAL: ICD-10-CM

## 2024-07-31 PROCEDURE — 97110 THERAPEUTIC EXERCISES: CPT | Mod: KX,PN,CQ

## 2024-07-31 NOTE — PROGRESS NOTES
[facility]   Physical Therapy Treatment Note      Name: Nicole BONILLA [facility] Number: [id number]    Therapy Diagnosis:   Encounter Diagnoses   Name Primary?    Impaired functional mobility and activity tolerance Yes    Myofascial pain syndrome, cervical        Physician: Brii Mcneil NP    Visit Date: 7/31/2024    Evaluation Date: 5/15/2024  Authorization Period Expiration: 12/31/2024  Plan of Care Expiration: 8/15/2024  Progress Note Due: 8/15/2024  Date of Surgery: n/a  Visit # / Visits authorized: 12 / 12 + eval   FOTO: 2/ 3     Precautions: Standard and Diabetes      Time In:  840   Time Out: 905  Total Billable Time: 25 minutes    PTA Visit #: 1/5       Subjective     Patient reports: not feeling good today.    She was compliant with home exercise program.  Response to previous treatment: ok  Functional change: none, patient attempting to increase her activity, but has not been able to be consistent with anything; now the heat is too much for her to do things outside;     Pain: 5/10  Location:   global pain - neck/back/arms/legs  - left side of neck     Objective      Objective Measures updated at progress report unless specified.       Treatment     Nicole received the treatments listed below:      therapeutic exercises to develop strength, endurance, ROM, and core stabilization for 25  minutes including:  NuStep level 4 x 20 min   Seated: upper trap stretch 2 x 30 secs   Seated: levator stretch 2 x 30 sec   Supine hamstring stretch with strap 3 x 30 sec each  Supine piriformis stretch 2 x 30 sec  Supine shoulder flexion x 15 with 1 # bar  Supine chest press x 15 with 1 # bar    manual therapy techniques: Myofacial release and Soft tissue Mobilization applied to cervical  for 0  minutes, including: (needs fan)   Supine: light traction at c-spine with sub-occipital tissue mobilization;  Seated on edge of mat with green step under feet for support - pillows for UE support:   IASTM to bilateral upper trap, levator, rhomboids moving into lats and paraspinals; Cervical passive motion to stretch corresponding tissues with upper trap and levator.          neuromuscular re-education activities to improve: Balance, Coordination, and Proprioception for 0 minutes. The following activities were included:      therapeutic activities to improve functional performance for 0  minutes, including:      direct contact modalities after being cleared for contraindications:     supervised modalities after being cleared for contradictions:     Patient Education and Home Exercises       Education provided:   - therapeutic exercise    Written Home Exercises Provided: Patient instructed to cont prior HEP. Exercises were reviewed and Nicole was able to demonstrate them prior to the end of the session.  Nicole demonstrated good  understanding of the education provided. See Electronic Medical Record under Patient Instructions for exercises provided during therapy sessions    Assessment    Patient performed some exercises and decided to go home today.  She wasn't feeling well.    Nicole is a 54 y.o. female referred to outpatient Physical Therapy with a medical diagnosis of SI jt inflammation and DDD cervical/lumbar spine. Patient presents with chronic pain, fibromyalgia involving cervical, lumbar and now SI joint's.  Patient demonstrates functional AROM in neck/back and extremities, but has reduced mm support for all of her joints secondary to lack of physical exercise as well as myofascial restrictions in neck/upper traps, lumbar spine and piriformis mm.  Nicole will benefit from Skilled physical therapy to address her deficits noted above.        Nicole Is progressing well towards her goals.   Patient prognosis is Good.     Patient will continue to benefit from skilled outpatient physical therapy to address the deficits listed in the problem list box on initial evaluation, provide pt/family education and to maximize  pt's level of independence in the home and community environment.     Patient's spiritual, cultural and educational needs considered and pt agreeable to plan of care and goals.     Anticipated barriers to physical therapy: none    Goals:   Short Term Goals: 3 weeks   Demonstrate improvement in recent symptoms to progress toward long term goals  > demonstrates short term progress   Correct sitting/standing postural deficits to reduce pain and promote postural awareness for injury prevention.  > Improving   Demonstrate compliance with initial exercise program  > Improving      Long Term Goals: 6 weeks   Able to perform household tasks with improved tolerance.  > Progressing   Able to improve standing tolerance to 45 mins  > Progressing   Able to improve walking distance to 1 mile  > Progressing   Able to perform transfers from all surfaces with no limitation.  > Progressing   FOTO score improvement to 44  Independent with HEP for continued improvement in function    Plan     Plan of care Certification: 5/15/2024 to 8/15/2024.     Outpatient Physical Therapy 2 times weekly for 6 weeks to include the following interventions: Aquatic Therapy, Manual Therapy, Neuromuscular Re-ed, Patient Education, Therapeutic Activities, and Therapeutic Exercise.     Medhat Teran, PTA

## 2024-08-05 ENCOUNTER — CLINICAL SUPPORT (OUTPATIENT)
Dept: REHABILITATION | Facility: HOSPITAL | Age: 54
End: 2024-08-05
Payer: MEDICARE

## 2024-08-05 DIAGNOSIS — Z74.09 IMPAIRED FUNCTIONAL MOBILITY AND ACTIVITY TOLERANCE: Primary | ICD-10-CM

## 2024-08-05 DIAGNOSIS — M79.18 MYOFASCIAL PAIN SYNDROME, CERVICAL: ICD-10-CM

## 2024-08-05 PROCEDURE — 97140 MANUAL THERAPY 1/> REGIONS: CPT | Mod: PN,CQ

## 2024-08-07 ENCOUNTER — CLINICAL SUPPORT (OUTPATIENT)
Dept: REHABILITATION | Facility: HOSPITAL | Age: 54
End: 2024-08-07
Payer: MEDICARE

## 2024-08-07 DIAGNOSIS — Z74.09 IMPAIRED FUNCTIONAL MOBILITY AND ACTIVITY TOLERANCE: Primary | ICD-10-CM

## 2024-08-07 DIAGNOSIS — M79.18 MYOFASCIAL PAIN SYNDROME, CERVICAL: ICD-10-CM

## 2024-08-07 PROCEDURE — 97110 THERAPEUTIC EXERCISES: CPT | Mod: KX,PN,CQ

## 2024-08-14 ENCOUNTER — CLINICAL SUPPORT (OUTPATIENT)
Dept: REHABILITATION | Facility: HOSPITAL | Age: 54
End: 2024-08-14
Payer: MEDICARE

## 2024-08-14 DIAGNOSIS — M79.18 MYOFASCIAL PAIN SYNDROME, CERVICAL: ICD-10-CM

## 2024-08-14 DIAGNOSIS — Z74.09 IMPAIRED FUNCTIONAL MOBILITY AND ACTIVITY TOLERANCE: Primary | ICD-10-CM

## 2024-08-14 PROCEDURE — 97014 ELECTRIC STIMULATION THERAPY: CPT | Mod: KX,PN

## 2024-08-14 PROCEDURE — 97140 MANUAL THERAPY 1/> REGIONS: CPT | Mod: KX,PN

## 2024-08-14 NOTE — PROGRESS NOTES
[facility]   Physical Therapy Treatment Note      Name: Nicole BONILLA [facility] Number: [id number]    Therapy Diagnosis:   Encounter Diagnoses   Name Primary?    Impaired functional mobility and activity tolerance Yes    Myofascial pain syndrome, cervical        Physician: Brii Mcneil NP    Visit Date: 8/14/2024    Evaluation Date: 5/15/2024  Authorization Period Expiration: 12/31/2024  Plan of Care Expiration: 8/15/2024  Progress Note Due: 8/15/2024  Date of Surgery: n/a  Visit # / Visits authorized: 15+ eval   FOTO: 2/ 3     Precautions: Standard and Diabetes     Time In:   10:40 am   Time Out:   11:30    Total Billable Time:   50 minutes    PTA Visit #: 0/5       Subjective     Patient reports: I just hurt everywhere today, my neck, my back are just on fire.  I'm not sure what it is from, I guess the exercise - the little that I can do.  The heat just makes it really hard for me to do much, but I do what I can.  Nicole completed all of her cardiac/pulmonary tests - but hasn't gotten results.     She was compliant with home exercise program.  Response to previous treatment: ok  Functional change: none, patient attempting to increase her activity, but has not been able to be consistent with anything; now the heat is too much for her to do things outside;     Pain: 8/10  Location:   global pain - neck/back/arms/legs  - left side of neck     Objective      Objective Measures updated at progress report unless specified.       Treatment     Nicole received the treatments listed below:      therapeutic exercises to develop strength, endurance, ROM, and core stabilization for 15 minutes including:  NuStep level 4 x 15 min  (600 steps)  Seated: upper trap stretch 2 x 30 secs   Seated: levator stretch 2 x 30 sec   Supine hamstring stretch with strap 3 x 30 sec each  Supine piriformis stretch 2 x 30 sec  Supine shoulder flexion x 20 with 1 # bar  Supine chest press x 20 with 1 #  bar    manual therapy techniques: Myofacial release and Soft tissue Mobilization applied to cervical  for 45  minutes, including: (needs fan)   Myofacial release and Soft tissue Mobilization applied to cervical  for 40 minutes, including:  Supine: light traction at c-spine with sub-occipital tissue mobilization; MET for upper trap/ levator / scalenes tissue release on right and left  Positioned in prone with proper support for head/neck and ankles; LF-ES per protocol for cervical radiculopathy with involvement of upper traps, rhomboids - 30 mm needles 1/5 fb lateral to S/P's at C2, C3, C5,   40 mm needles medial border of scapular - needling back to bone at T2, T4, T6 levels on both sides; 50 mm needles x 2 in upper trap trigger points.  Intensity of stimulation adjusted to patient tolerance with good rhythmical contraction of tissue noted.  Applied ice pack to lower back while needling to help patient stay cooler.  Needles left in-situ x 20 mins. Removed without adverse effects.                   neuromuscular re-education activities to improve: Balance, Coordination, and Proprioception for 0 minutes. The following activities were included:      therapeutic activities to improve functional performance for 0  minutes, including:      direct contact modalities after being cleared for contraindications:     supervised modalities after being cleared for contradictions:     Patient Education and Home Exercises       Education provided:   - encouraged Nicole to continue with exercise as she is able; some is better than nothing; Stretching neck and lumbar spine to address tissue tightness.     Written Home Exercises Provided: Patient instructed to cont prior HEP. Exercises were reviewed and Nicole was able to demonstrate them prior to the end of the session.  Nicole demonstrated good  understanding of the education provided. See Electronic Medical Record under Patient Instructions for exercises provided during therapy  sessions    Assessment    Patient discouraged, continues to have moderate to significant pain in her neck and lower back - insidious onset usually; Recently completed several cardiac and pulmonary tests to get to root cause of her persistent cough and general malaise.  Hopefully she will get results of tests quickly.     Nicole is a 54 y.o. female referred to outpatient Physical Therapy with a medical diagnosis of SI jt inflammation and DDD cervical/lumbar spine. Patient presents with chronic pain, fibromyalgia involving cervical, lumbar and now SI joint's.  Patient demonstrates functional AROM in neck/back and extremities, but has reduced mm support for all of her joints secondary to lack of physical exercise as well as myofascial restrictions in neck/upper traps, lumbar spine and piriformis mm.  Nicole will benefit from Skilled physical therapy to address her deficits noted above.        Nicole Is progressing well towards her goals.   Patient prognosis is Good.     Patient will continue to benefit from skilled outpatient physical therapy to address the deficits listed in the problem list box on initial evaluation, provide pt/family education and to maximize pt's level of independence in the home and community environment.     Patient's spiritual, cultural and educational needs considered and pt agreeable to plan of care and goals.     Anticipated barriers to physical therapy: none    Goals:   Short Term Goals: 3 weeks   Demonstrate improvement in recent symptoms to progress toward long term goals  > demonstrates short term progress   Correct sitting/standing postural deficits to reduce pain and promote postural awareness for injury prevention.  > Improving   Demonstrate compliance with initial exercise program  > Improving      Long Term Goals: 6 weeks   Able to perform household tasks with improved tolerance.  > Progressing   Able to improve standing tolerance to 45 mins  > Progressing   Able to improve walking  distance to 1 mile  > Progressing   Able to perform transfers from all surfaces with no limitation.  > Progressing   FOTO score improvement to 44  Independent with HEP for continued improvement in function    Plan     Plan of care Certification: 5/15/2024 to 8/15/2024.     Outpatient Physical Therapy 2 times weekly for 6 weeks to include the following interventions: Aquatic Therapy, Manual Therapy, Neuromuscular Re-ed, Patient Education, Therapeutic Activities, and Therapeutic Exercise.     Quita Holliday, PT

## 2024-08-21 ENCOUNTER — CLINICAL SUPPORT (OUTPATIENT)
Dept: REHABILITATION | Facility: HOSPITAL | Age: 54
End: 2024-08-21
Payer: MEDICARE

## 2024-08-21 DIAGNOSIS — Z74.09 IMPAIRED FUNCTIONAL MOBILITY AND ACTIVITY TOLERANCE: Primary | ICD-10-CM

## 2024-08-21 DIAGNOSIS — M79.18 MYOFASCIAL PAIN SYNDROME, CERVICAL: ICD-10-CM

## 2024-08-21 PROCEDURE — 97140 MANUAL THERAPY 1/> REGIONS: CPT | Mod: KX,PN,CQ

## 2024-08-21 PROCEDURE — 97110 THERAPEUTIC EXERCISES: CPT | Mod: KX,PN,CQ

## 2024-08-21 NOTE — PROGRESS NOTES
[facility]   Physical Therapy Treatment Note      Name: Nicole BONILLA [facility] Number: [id number]    Therapy Diagnosis:   Encounter Diagnoses   Name Primary?    Impaired functional mobility and activity tolerance Yes    Myofascial pain syndrome, cervical        Physician: Brii Mcneil NP    Visit Date: 8/21/2024    Evaluation Date: 5/15/2024  Authorization Period Expiration: 12/31/2024  Plan of Care Expiration: 8/15/2024  Progress Note Due: 8/15/2024  Date of Surgery: n/a  Visit # / Visits authorized: 16 / 20+ eval   FOTO: 2/ 3     Precautions: Standard and Diabetes     Time In: 7:55 AM  Time Out:  8:50 AM  Total Billable Time: 35 minutes    PTA Visit #: 1/5       Subjective     Patient reports: Sciatica is kicking my butt today.     She was compliant with home exercise program.  Response to previous treatment: ok  Functional change: none, patient attempting to increase her activity, but has not been able to be consistent with anything; now the heat is too much for her to do things outside;     Pain: 12/10  Location:   global pain - low back - left leg    Objective      Objective Measures updated at progress report unless specified.       Treatment     Nicole received the treatments listed below:      therapeutic exercises to develop strength, endurance, ROM, and core stabilization for 20 minutes including:  NuStep level 4 x 13 min    Seated: upper trap stretch 2 x 30 secs   Seated: levator stretch 2 x 30 sec   Supine hamstring stretch with strap 3 x 30 sec each  Supine piriformis stretch 2 x 30 sec  Supine shoulder flexion x 20 with 1 # bar  Supine chest press x 20 with 1 # bar    manual therapy techniques: Myofacial release and Soft tissue Mobilization applied to cervical  for 15 minutes, including:   STM to left low back/piriformis/IT band                   neuromuscular re-education activities to improve: Balance, Coordination, and Proprioception for 0 minutes. The following  activities were included:      therapeutic activities to improve functional performance for 0  minutes, including:      direct contact modalities after being cleared for contraindications:     supervised modalities after being cleared for contradictions:     Patient Education and Home Exercises       Education provided:   - encouraged Nicole to continue with exercise as she is able; some is better than nothing; Stretching neck and lumbar spine to address tissue tightness.     Written Home Exercises Provided: Patient instructed to cont prior HEP. Exercises were reviewed and Nicole was able to demonstrate them prior to the end of the session.  Nicole demonstrated good  understanding of the education provided. See Electronic Medical Record under Patient Instructions for exercises provided during therapy sessions    Assessment    Patient came into clinic with increased pain in low back/left piriformis. Did get some relief from manual treatment. Recently completed several cardiac and pulmonary tests to get to root cause of her persistent cough and general malaise.  Hopefully she will get results of tests quickly.     Nicole is a 54 y.o. female referred to outpatient Physical Therapy with a medical diagnosis of SI jt inflammation and DDD cervical/lumbar spine. Patient presents with chronic pain, fibromyalgia involving cervical, lumbar and now SI joint's.  Patient demonstrates functional AROM in neck/back and extremities, but has reduced mm support for all of her joints secondary to lack of physical exercise as well as myofascial restrictions in neck/upper traps, lumbar spine and piriformis mm.  Nicole will benefit from Skilled physical therapy to address her deficits noted above.        Nicole Is progressing well towards her goals.   Patient prognosis is Good.     Patient will continue to benefit from skilled outpatient physical therapy to address the deficits listed in the problem list box on initial evaluation, provide  pt/family education and to maximize pt's level of independence in the home and community environment.     Patient's spiritual, cultural and educational needs considered and pt agreeable to plan of care and goals.     Anticipated barriers to physical therapy: none    Goals:   Short Term Goals: 3 weeks   Demonstrate improvement in recent symptoms to progress toward long term goals  > demonstrates short term progress   Correct sitting/standing postural deficits to reduce pain and promote postural awareness for injury prevention.  > Improving   Demonstrate compliance with initial exercise program  > Improving      Long Term Goals: 6 weeks   Able to perform household tasks with improved tolerance.  > Progressing   Able to improve standing tolerance to 45 mins  > Progressing   Able to improve walking distance to 1 mile  > Progressing   Able to perform transfers from all surfaces with no limitation.  > Progressing   FOTO score improvement to 44  Independent with HEP for continued improvement in function    Plan     Plan of care Certification: 5/15/2024 to 8/15/2024.     Outpatient Physical Therapy 2 times weekly for 6 weeks to include the following interventions: Aquatic Therapy, Manual Therapy, Neuromuscular Re-ed, Patient Education, Therapeutic Activities, and Therapeutic Exercise.     Jonathan Favre, PTA

## 2024-08-26 ENCOUNTER — CLINICAL SUPPORT (OUTPATIENT)
Dept: REHABILITATION | Facility: HOSPITAL | Age: 54
End: 2024-08-26
Payer: MEDICARE

## 2024-08-26 DIAGNOSIS — Z74.09 IMPAIRED FUNCTIONAL MOBILITY AND ACTIVITY TOLERANCE: Primary | ICD-10-CM

## 2024-08-26 DIAGNOSIS — M79.18 MYOFASCIAL PAIN SYNDROME, CERVICAL: ICD-10-CM

## 2024-08-26 PROCEDURE — 97110 THERAPEUTIC EXERCISES: CPT | Mod: KX,PN,CQ

## 2024-08-26 PROCEDURE — 97140 MANUAL THERAPY 1/> REGIONS: CPT | Mod: KX,PN,CQ

## 2024-08-26 NOTE — PROGRESS NOTES
[facility]   Physical Therapy Treatment Note      Name: Nicole BONILLA [facility] Number: [id number]    Therapy Diagnosis:   Encounter Diagnoses   Name Primary?    Impaired functional mobility and activity tolerance Yes    Myofascial pain syndrome, cervical        Physician: Brii Mcneil NP    Visit Date: 8/26/2024    Evaluation Date: 5/15/2024  Authorization Period Expiration: 12/31/2024  Plan of Care Expiration: 8/15/2024  Progress Note Due: 8/15/2024  Date of Surgery: n/a  Visit # / Visits authorized: 17 / 20+ eval   FOTO: 2/ 3     Precautions: Standard and Diabetes     Time In: 8:00 AM  Time Out:  9:00 AM  Total Billable Time: 45 minutes    PTA Visit #: 2/5       Subjective     Patient reports: All my test results are good.  I rested over the weekend, did my exercises and only have one small area of pain at this time.    She was compliant with home exercise program.  Response to previous treatment: ok  Functional change: none, patient attempting to increase her activity, but has not been able to be consistent with anything; now the heat is too much for her to do things outside;     Pain: 8/10  Location:   global pain - low back - left leg    Objective      Objective Measures updated at progress report unless specified.       Treatment     Nicole received the treatments listed below:      therapeutic exercises to develop strength, endurance, ROM, and core stabilization for 20 minutes including:  NuStep level 4 x 15 min    Seated: upper trap stretch 2 x 30 secs   Seated: levator stretch 2 x 30 sec   Supine hamstring stretch with strap 3 x 30 sec each  Supine piriformis stretch 2 x 30 sec  Supine shoulder flexion x 20 with 1 # bar  Supine chest press x 20 with 1 # bar    manual therapy techniques: Myofacial release and Soft tissue Mobilization applied to cervical  for 25 minutes, including:   STM to left low back/piriformis/IT band                   neuromuscular re-education  activities to improve: Balance, Coordination, and Proprioception for 0 minutes. The following activities were included:      therapeutic activities to improve functional performance for 0  minutes, including:      direct contact modalities after being cleared for contraindications:     supervised modalities after being cleared for contradictions:     Patient Education and Home Exercises       Education provided:   - encouraged Nicole to continue with exercise as she is able; some is better than nothing; Stretching neck and lumbar spine to address tissue tightness.     Written Home Exercises Provided: Patient instructed to cont prior HEP. Exercises were reviewed and Nicole was able to demonstrate them prior to the end of the session.  Nicole demonstrated good  understanding of the education provided. See Electronic Medical Record under Patient Instructions for exercises provided during therapy sessions    Assessment    Patient did well with treatment; got some relief from manual treatment. Received good news on test results.     Nicole is a 54 y.o. female referred to outpatient Physical Therapy with a medical diagnosis of SI jt inflammation and DDD cervical/lumbar spine. Patient presents with chronic pain, fibromyalgia involving cervical, lumbar and now SI joint's.  Patient demonstrates functional AROM in neck/back and extremities, but has reduced mm support for all of her joints secondary to lack of physical exercise as well as myofascial restrictions in neck/upper traps, lumbar spine and piriformis mm.  Nicole will benefit from Skilled physical therapy to address her deficits noted above.        Nicole Is progressing well towards her goals.   Patient prognosis is Good.     Patient will continue to benefit from skilled outpatient physical therapy to address the deficits listed in the problem list box on initial evaluation, provide pt/family education and to maximize pt's level of independence in the home and community  environment.     Patient's spiritual, cultural and educational needs considered and pt agreeable to plan of care and goals.     Anticipated barriers to physical therapy: none    Goals:   Short Term Goals: 3 weeks   Demonstrate improvement in recent symptoms to progress toward long term goals  > demonstrates short term progress   Correct sitting/standing postural deficits to reduce pain and promote postural awareness for injury prevention.  > Improving   Demonstrate compliance with initial exercise program  > Improving      Long Term Goals: 6 weeks   Able to perform household tasks with improved tolerance.  > Progressing   Able to improve standing tolerance to 45 mins  > Progressing   Able to improve walking distance to 1 mile  > Progressing   Able to perform transfers from all surfaces with no limitation.  > Progressing   FOTO score improvement to 44  Independent with HEP for continued improvement in function    Plan     Plan of care Certification: 5/15/2024 to 8/15/2024.     Outpatient Physical Therapy 2 times weekly for 6 weeks to include the following interventions: Aquatic Therapy, Manual Therapy, Neuromuscular Re-ed, Patient Education, Therapeutic Activities, and Therapeutic Exercise.     Jonathan Favre, PTA

## 2024-08-28 ENCOUNTER — CLINICAL SUPPORT (OUTPATIENT)
Dept: REHABILITATION | Facility: HOSPITAL | Age: 54
End: 2024-08-28
Payer: MEDICARE

## 2024-08-28 DIAGNOSIS — M79.18 MYOFASCIAL PAIN SYNDROME, CERVICAL: ICD-10-CM

## 2024-08-28 DIAGNOSIS — Z74.09 IMPAIRED FUNCTIONAL MOBILITY AND ACTIVITY TOLERANCE: Primary | ICD-10-CM

## 2024-08-28 PROCEDURE — 97110 THERAPEUTIC EXERCISES: CPT | Mod: KX,PN

## 2024-08-28 PROCEDURE — 97014 ELECTRIC STIMULATION THERAPY: CPT | Mod: KX,PN

## 2024-08-28 PROCEDURE — 97140 MANUAL THERAPY 1/> REGIONS: CPT | Mod: KX,PN

## 2024-08-28 NOTE — PROGRESS NOTES
[facility]   Physical Therapy Treatment Note      Name: Nicole BONILLA [facility] Number: [id number]    Therapy Diagnosis:   Encounter Diagnoses   Name Primary?    Impaired functional mobility and activity tolerance Yes    Myofascial pain syndrome, cervical        Physician: Brii Mcneil NP    Visit Date: 8/28/2024    Evaluation Date: 5/15/2024  Authorization Period Expiration: 12/31/2024  Plan of Care Expiration:  9/30/2024  Progress Note Due:   Date of Surgery: n/a  Visit # / Visits authorized: 18 / 20+ eval   FOTO: 2/ 3     Precautions: Standard and Diabetes     Time In: 8:45 AM  Time Out:  9:45 AM  Total Billable Time: 60 minutes    PTA Visit #: 0/5       Subjective     Patient reports: my left hip and into left leg are just killing me, I don't know what I did - it certainly wasn't from exercise!  Nicole is reporting general fatigue, hasn't been feeling well;     She was compliant with home exercise program.  Response to previous treatment: ok  Functional change: none, patient attempting to increase her activity, but has not been able to be consistent with anything; now the heat is too much for her to do things outside;     Pain: 8/10  Location:   global pain - low back - left leg    Objective      Objective Measures updated at progress report unless specified.       Treatment     Nicole received the treatments listed below:      therapeutic exercises to develop strength, endurance, ROM, and core stabilization for 20 minutes including:  NuStep level 4 x 15 min    Seated: upper trap stretch 2 x 30 secs   Seated: levator stretch 2 x 30 sec   Supine hamstring stretch with strap 3 x 30 sec each  Supine piriformis stretch 2 x 30 sec  Supine shoulder flexion x 20 with 1 # bar  Supine chest press x 20 with 1 # bar    manual therapy techniques: Myofacial release and Soft tissue Mobilization applied to lumbar spine/LLE  for 35  minutes, including:   S/L on right - segmental flexion  mobilization to lumbar segments in the left with blocking of segment above; piriformis STM, friction massage to proximal hamstring at IT on left to address pain; Functional Dry Needling for lumbEar pain into left piriformis, left posterior hip: LF-ES per protocol for lumbar spine, gluteal mm and posterior hip: 60 mm needles 1/5 fb lateral to L3/ 4 5, PSIS; 1/2 way between ASIS and PSIS; 1/2 was between greater trochanter and PSIS; Proximal hamstring at IT using 75 mm needle.  Intensity of stimulation adjusted to patient tolerance; good rhythmical contraction of tissue noted.  Needles left in situ x 20 mins.  Removed needles without adverse effects.                   neuromuscular re-education activities to improve: Balance, Coordination, and Proprioception for 0 minutes. The following activities were included:      therapeutic activities to improve functional performance for 0  minutes, including:      direct contact modalities after being cleared for contraindications:     supervised modalities after being cleared for contradictions:     Patient Education and Home Exercises       Education provided:   - encouraged Nicole to continue with exercise as she is able; some is better than nothing; Stretching neck and lumbar spine to address tissue tightness.     Written Home Exercises Provided: Patient instructed to cont prior HEP. Exercises were reviewed and Nicole was able to demonstrate them prior to the end of the session.  Nicole demonstrated good  understanding of the education provided. See Electronic Medical Record under Patient Instructions for exercises provided during therapy sessions    Assessment    Patient generally not feeling well again today; having lumbar and LLE sciatic pain  - good fair results with the dry needling and man txLeatha Rivera is a 54 y.o. female referred to outpatient Physical Therapy with a medical diagnosis of SI jt inflammation and DDD cervical/lumbar spine. Patient presents with chronic  pain, fibromyalgia involving cervical, lumbar and now SI joint's.  Patient demonstrates functional AROM in neck/back and extremities, but has reduced mm support for all of her joints secondary to lack of physical exercise as well as myofascial restrictions in neck/upper traps, lumbar spine and piriformis mm.  Nicole will benefit from Skilled physical therapy to address her deficits noted above.        Nicole Is progressing well towards her goals.   Patient prognosis is Good.     Patient will continue to benefit from skilled outpatient physical therapy to address the deficits listed in the problem list box on initial evaluation, provide pt/family education and to maximize pt's level of independence in the home and community environment.     Patient's spiritual, cultural and educational needs considered and pt agreeable to plan of care and goals.     Anticipated barriers to physical therapy: none    Goals:   Short Term Goals: 3 weeks   Demonstrate improvement in recent symptoms to progress toward long term goals  > demonstrates short term progress   Correct sitting/standing postural deficits to reduce pain and promote postural awareness for injury prevention.  > Improving   Demonstrate compliance with initial exercise program  > Improving      Long Term Goals: 6 weeks   Able to perform household tasks with improved tolerance.  > Progressing   Able to improve standing tolerance to 45 mins  > Progressing   Able to improve walking distance to 1 mile  > Progressing   Able to perform transfers from all surfaces with no limitation.  > Progressing   FOTO score improvement to 44  Independent with HEP for continued improvement in function    Plan     Plan of care Certification: 5/15/2024 to 9/30/2024.     Outpatient Physical Therapy 2 times weekly for 6 weeks to include the following interventions: Aquatic Therapy, Manual Therapy, Neuromuscular Re-ed, Patient Education, Therapeutic Activities, and Therapeutic Exercise.     Quita  Mg, PT

## 2024-09-03 ENCOUNTER — CLINICAL SUPPORT (OUTPATIENT)
Dept: REHABILITATION | Facility: HOSPITAL | Age: 54
End: 2024-09-03
Payer: MEDICARE

## 2024-09-03 DIAGNOSIS — Z74.09 IMPAIRED FUNCTIONAL MOBILITY AND ACTIVITY TOLERANCE: Primary | ICD-10-CM

## 2024-09-03 DIAGNOSIS — M79.18 MYOFASCIAL PAIN SYNDROME, CERVICAL: ICD-10-CM

## 2024-09-03 PROCEDURE — 97110 THERAPEUTIC EXERCISES: CPT | Mod: KX,PN,CQ

## 2024-09-03 PROCEDURE — 97140 MANUAL THERAPY 1/> REGIONS: CPT | Mod: KX,PN,CQ

## 2024-09-03 NOTE — ED NOTES
Patient was advised and in agreement they do not meet Urgent Care criteria based on triage symptoms.    RN calls [facility] to confirm transport has been arranged, spoke with Yane. Transport has not been arranged, RN does so at this time. [facility] transport arranged.

## 2024-09-03 NOTE — PROGRESS NOTES
[facility]   Physical Therapy Treatment Note      Name: Nicole BONILLA [facility] Number: [id number]    Therapy Diagnosis:   Encounter Diagnoses   Name Primary?    Impaired functional mobility and activity tolerance Yes    Myofascial pain syndrome, cervical        Physician: Brii Mcneil NP    Visit Date: 9/3/2024    Evaluation Date: 5/15/2024  Authorization Period Expiration: 12/31/2024  Plan of Care Expiration:  9/30/2024  Progress Note Due:   Date of Surgery: n/a  Visit # / Visits authorized: 19 / 20+ eval   FOTO: 2/ 3     Precautions: Standard and Diabetes     Time In: 8:45 AM  Time Out:  9:35 AM  Total Billable Time: 40 minutes    PTA Visit #: 1/5       Subjective     Patient reports: Still feeling about the same.     She was compliant with home exercise program.  Response to previous treatment: ok  Functional change: none, patient attempting to increase her activity, but has not been able to be consistent with anything; now the heat is too much for her to do things outside;     Pain: 8/10  Location:   global pain - low back - left leg    Objective      Objective Measures updated at progress report unless specified.       Treatment     Nicole received the treatments listed below:      therapeutic exercises to develop strength, endurance, ROM, and core stabilization for 25 minutes including:  NuStep level 4 x 16 min    Seated: upper trap stretch 2 x 30 secs   Seated: levator stretch 2 x 30 sec   Supine hamstring stretch with strap 3 x 30 sec each  Supine piriformis stretch 2 x 30 sec  Supine shoulder flexion x 20 with 1 # bar  Supine chest press x 20 with 1 # bar    manual therapy techniques: Myofacial release and Soft tissue Mobilization applied to lumbar spine/LLE  for 10 minutes, including:   S/L on right - piriformis STM, friction massage to proximal hamstring at IT on left to address pain.                   neuromuscular re-education activities to improve: Balance,  Coordination, and Proprioception for 0 minutes. The following activities were included:      therapeutic activities to improve functional performance for 0  minutes, including:      direct contact modalities after being cleared for contraindications:     supervised modalities after being cleared for contradictions:     Patient Education and Home Exercises       Education provided:   - encouraged Nicole to continue with exercise as she is able; some is better than nothing; Stretching neck and lumbar spine to address tissue tightness.     Written Home Exercises Provided: Patient instructed to cont prior HEP. Exercises were reviewed and Nicole was able to demonstrate them prior to the end of the session.  Nicole demonstrated good  understanding of the education provided. See Electronic Medical Record under Patient Instructions for exercises provided during therapy sessions    Assessment    Patient still feeling about the same. Trying to perform more exercises, but pain continues.     Nicole is a 54 y.o. female referred to outpatient Physical Therapy with a medical diagnosis of SI jt inflammation and DDD cervical/lumbar spine. Patient presents with chronic pain, fibromyalgia involving cervical, lumbar and now SI joint's.  Patient demonstrates functional AROM in neck/back and extremities, but has reduced mm support for all of her joints secondary to lack of physical exercise as well as myofascial restrictions in neck/upper traps, lumbar spine and piriformis mm.  Nicole will benefit from Skilled physical therapy to address her deficits noted above.        Nicole Is progressing well towards her goals.   Patient prognosis is Good.     Patient will continue to benefit from skilled outpatient physical therapy to address the deficits listed in the problem list box on initial evaluation, provide pt/family education and to maximize pt's level of independence in the home and community environment.     Patient's spiritual, cultural and  educational needs considered and pt agreeable to plan of care and goals.     Anticipated barriers to physical therapy: none    Goals:   Short Term Goals: 3 weeks   Demonstrate improvement in recent symptoms to progress toward long term goals  > demonstrates short term progress   Correct sitting/standing postural deficits to reduce pain and promote postural awareness for injury prevention.  > Improving   Demonstrate compliance with initial exercise program  > Improving      Long Term Goals: 6 weeks   Able to perform household tasks with improved tolerance.  > Progressing   Able to improve standing tolerance to 45 mins  > Progressing   Able to improve walking distance to 1 mile  > Progressing   Able to perform transfers from all surfaces with no limitation.  > Progressing   FOTO score improvement to 44  Independent with HEP for continued improvement in function    Plan     Plan of care Certification: 5/15/2024 to 9/30/2024.     Outpatient Physical Therapy 2 times weekly for 6 weeks to include the following interventions: Aquatic Therapy, Manual Therapy, Neuromuscular Re-ed, Patient Education, Therapeutic Activities, and Therapeutic Exercise.     Jonathan Favre, PTA

## 2024-09-10 ENCOUNTER — CLINICAL SUPPORT (OUTPATIENT)
Dept: REHABILITATION | Facility: HOSPITAL | Age: 54
End: 2024-09-10
Payer: MEDICARE

## 2024-09-10 ENCOUNTER — OFFICE VISIT (OUTPATIENT)
Dept: OTOLARYNGOLOGY | Facility: CLINIC | Age: 54
End: 2024-09-10
Payer: MEDICARE

## 2024-09-10 VITALS — BODY MASS INDEX: 29.85 KG/M2 | WEIGHT: 179.19 LBS | HEIGHT: 65 IN

## 2024-09-10 DIAGNOSIS — M79.18 MYOFASCIAL PAIN SYNDROME, CERVICAL: ICD-10-CM

## 2024-09-10 DIAGNOSIS — Z74.09 IMPAIRED FUNCTIONAL MOBILITY AND ACTIVITY TOLERANCE: Primary | ICD-10-CM

## 2024-09-10 DIAGNOSIS — R05.3 CHRONIC COUGH: Primary | ICD-10-CM

## 2024-09-10 PROCEDURE — 99999 PR PBB SHADOW E&M-EST. PATIENT-LVL III: CPT | Mod: PBBFAC,,, | Performed by: OTOLARYNGOLOGY

## 2024-09-10 PROCEDURE — 97140 MANUAL THERAPY 1/> REGIONS: CPT | Mod: KX,PN

## 2024-09-10 PROCEDURE — 99203 OFFICE O/P NEW LOW 30 MIN: CPT | Mod: S$PBB,,, | Performed by: OTOLARYNGOLOGY

## 2024-09-10 PROCEDURE — 97110 THERAPEUTIC EXERCISES: CPT | Mod: KX,PN

## 2024-09-10 PROCEDURE — 99213 OFFICE O/P EST LOW 20 MIN: CPT | Mod: PBBFAC,PN | Performed by: OTOLARYNGOLOGY

## 2024-09-10 RX ORDER — AMOXICILLIN AND CLAVULANATE POTASSIUM 875; 125 MG/1; MG/1
1 TABLET, FILM COATED ORAL 2 TIMES DAILY
COMMUNITY
Start: 2024-09-07

## 2024-09-10 NOTE — PROGRESS NOTES
[facility]   Physical Therapy Treatment Note      Name: Nicole BONILLA [facility] Number: [id number]    Therapy Diagnosis:   Encounter Diagnoses   Name Primary?    Impaired functional mobility and activity tolerance Yes    Myofascial pain syndrome, cervical        Physician: Brii Mcneil NP    Visit Date: 9/10/2024    Evaluation Date: 5/15/2024  Authorization Period Expiration: 12/31/2024  Plan of Care Expiration:  9/30/2024  Progress Note Due:  9/30/24  Date of Surgery: n/a  Visit # / Visits authorized: 20 / 20+ eval   FOTO: 2/ 3     Precautions: Standard and Diabetes     Time In: 8:45 AM  Time Out:  9:30 AM  Total Billable Time: 40 minutes    PTA Visit #: 0/5       Subjective     Patient reports: Sciatic pain is much better; neck bothering her today;     She was compliant with home exercise program.  Response to previous treatment: ok  Functional change: none, patient attempting to increase her activity, but has not been able to be consistent with anything; now the heat is too much for her to do things outside;     Pain: 6/10  Location:   neck and lower back     Objective      Objective Measures updated at progress report unless specified.       Treatment     Nicole received the treatments listed below:      therapeutic exercises to develop strength, endurance, ROM, and core stabilization for 25 minutes including:  NuStep level 4 x 20 min    Seated: upper trap stretch 2 x 30 secs   Seated: levator stretch 2 x 30 sec   Supine hamstring stretch with strap 3 x 30 sec each  Supine piriformis stretch 2 x 30 sec  Supine shoulder flexion x 20 with 1 # bar  Supine chest press x 20 with 1 # bar;    manual therapy techniques: Myofacial release and Soft tissue Mobilization applied to lumbar spine/LLE  for 20 minutes, including:   Supine, sub-occipital inhibition with light traction; MET for upper trap/levator tissue stretching/lengthening on bilateral sides; Facet upslides from mid thoracic to  cervical - bilateral; Seated at edge of mat, IASTM to bilateral upper traps with neck SB to stretch appropriate tissues.                   neuromuscular re-education activities to improve: Balance, Coordination, and Proprioception for 0 minutes. The following activities were included:      therapeutic activities to improve functional performance for 0  minutes, including:      direct contact modalities after being cleared for contraindications:     supervised modalities after being cleared for contradictions:     Patient Education and Home Exercises       Education provided:   - encouraged Nicole to continue with exercise as she is able; some is better than nothing; Stretching neck and lumbar spine to address tissue tightness.     Written Home Exercises Provided: Patient instructed to cont prior HEP. Exercises were reviewed and Nicole was able to demonstrate them prior to the end of the session.  Nicole demonstrated good  understanding of the education provided. See Electronic Medical Record under Patient Instructions for exercises provided during therapy sessions    Assessment    Patient still feeling about the same. Trying to perform more exercises, but pain continues.  One visit left on POC.     Nicole is a 54 y.o. female referred to outpatient Physical Therapy with a medical diagnosis of SI jt inflammation and DDD cervical/lumbar spine. Patient presents with chronic pain, fibromyalgia involving cervical, lumbar and now SI joint's.  Patient demonstrates functional AROM in neck/back and extremities, but has reduced mm support for all of her joints secondary to lack of physical exercise as well as myofascial restrictions in neck/upper traps, lumbar spine and piriformis mm.  Nicole will benefit from Skilled physical therapy to address her deficits noted above.        Nicole Is progressing well towards her goals.   Patient prognosis is Good.     Patient will continue to benefit from skilled outpatient physical therapy to  address the deficits listed in the problem list box on initial evaluation, provide pt/family education and to maximize pt's level of independence in the home and community environment.     Patient's spiritual, cultural and educational needs considered and pt agreeable to plan of care and goals.     Anticipated barriers to physical therapy: none    Goals:   Short Term Goals: 3 weeks   Demonstrate improvement in recent symptoms to progress toward long term goals  > demonstrates short term progress   Correct sitting/standing postural deficits to reduce pain and promote postural awareness for injury prevention.  > Improving   Demonstrate compliance with initial exercise program  > Improving      Long Term Goals: 6 weeks   Able to perform household tasks with improved tolerance.  > Progressing   Able to improve standing tolerance to 45 mins  > Progressing   Able to improve walking distance to 1 mile  > Progressing   Able to perform transfers from all surfaces with no limitation.  > Progressing   FOTO score improvement to 44  Independent with HEP for continued improvement in function    Plan     Plan of care Certification: 5/15/2024 to 9/30/2024.     Outpatient Physical Therapy 2 times weekly for 6 weeks to include the following interventions: Aquatic Therapy, Manual Therapy, Neuromuscular Re-ed, Patient Education, Therapeutic Activities, and Therapeutic Exercise.     Quita Holliday, PT

## 2024-09-10 NOTE — PROGRESS NOTES
Subjective:       Patient ID: Nicole Harris is a 54 y.o. female.    Chief Complaint: Cough (Pt c/o chronic cough for a few months )      This 54-year-old patient comes in with a chronic cough for at least six months.  She quit smoking perhaps five years ago just before her heart surgery that was in 2020.  She has seen Dr. Landry, Pulmonary Medicine, who gave her an inhaler that has been helpful to her cough and has intermittently prescribed antibiotics that change the color of the material she coughs up from a darker color to a clear color.    She does take lisinopril every day and she takes Protonix but she has not positive if that is a 20 or 40 and she takes it at a variable time throughout the day sometimes right before breakfast sometimes after and sometimes it other times.    She does not feel like that is anything going on in her throat she feels more like that is a lung related problem.          Objective:      ENT Physical Exam  So she has not hoarse as we discuss this her nasal exam looks normal although she describes intermittent sinus and nasal issues and she mentioned she is allergic to cats and she uses sprays and Singulair    Her tympanic membranes and ear canals are normal     Her neck is without adenopathy in her oropharynx is crowded with a moderately large tongue base                Assessment:       1. Chronic cough         Plan:          So I think she needs to take a vacation from her lisinopril for at least a couple of weeks even though she is on a low dose and has been on a long time that that is still be a culprit or contributor     In the event that is helpful and clear things up then we have a potential diagnosis and I am happy to discuss that with her and she will need to find a replacement if her blood pressure is elevated     In the event that isn't helpful she should just get back on it for blood pressure control and I have asked her to let me send in enough Protonix so she can be  on 40 mg twice a day and we have discussed the more appropriate timing of that medication roughly 30 minutes before meals perhaps breakfast and then if I sent in twice a day 20 minutes for breakfast and 20 minutes before supper taken routinely for a month to see if reflux has any role in her chronic cough.    Otherwise I am happy to see her back and reconsider or probably continue with the good advice  has given her that is so far has been helpful and includes a puffer an intermittent antibiotics for presumed bronchial infections

## 2024-09-19 NOTE — TELEPHONE ENCOUNTER
----- Message from Ruslan Multani sent at 9/21/2020 10:26 AM CDT -----  Type: Needs Medical Advice    Who Called:  pt    Best Call Back Number: [phone]   Additional Information: pt states procedure is moved up to 9/24 and is no longer scheduled for 10/1 as stated in Epic. Needs to know when to take COVID test. Please call to discuss.       Your child strep test, COVID test, and Mono test were negative today. Your child symptoms are likely from a virus.  Please continue to push fluids.  Use Tylenol and or Motrin for pain per dosing guidelines.  Return to the emergency department if your child is not eating or drinking, having difficulty speaking or swallowing, or new symptoms develop.

## 2024-09-24 ENCOUNTER — CLINICAL SUPPORT (OUTPATIENT)
Dept: REHABILITATION | Facility: HOSPITAL | Age: 54
End: 2024-09-24
Payer: MEDICARE

## 2024-09-24 DIAGNOSIS — Z74.09 IMPAIRED FUNCTIONAL MOBILITY AND ACTIVITY TOLERANCE: Primary | ICD-10-CM

## 2024-09-24 DIAGNOSIS — M79.18 MYOFASCIAL PAIN SYNDROME, CERVICAL: ICD-10-CM

## 2024-09-24 PROCEDURE — 97140 MANUAL THERAPY 1/> REGIONS: CPT | Mod: PN

## 2024-09-24 PROCEDURE — 97110 THERAPEUTIC EXERCISES: CPT | Mod: PN

## 2024-09-24 PROCEDURE — 97014 ELECTRIC STIMULATION THERAPY: CPT | Mod: PN

## 2024-09-24 NOTE — PROGRESS NOTES
"[facility]   Physical Therapy Treatment Note      Name: Nicole BONILLA [facility]  Clinic Number: [id number]    Therapy Diagnosis:   No diagnosis found.      Physician: Brii Mcneil NP    Visit Date: 9/24/2024    Evaluation Date: 5/15/2024  Authorization Period Expiration: 12/31/2024  Plan of Care Expiration:  9/30/2024  Progress Note Due:  9/30/24  Date of Surgery: n/a  Visit # / Visits authorized: 21 / 20+ eval   FOTO: 2/ 3     Precautions: Standard and Diabetes     Time In: 12:30 pm   Time Out:  1:30 pm   Total Billable Time: 40 minutes    PTA Visit #: 0/5       Subjective     Patient reports: Sciatic pain in right leg/lower back has been really bad over the past week; Patient having increased stress at home, pain in back, has sores on her hands/knuckles that currently have scabs on them, but are red; stated "It's my nerves, when I get stressed I pick and then my skin doesn't heal"  Indicated that she just finished an antibiotic to help avoid skin infection.      She was compliant with home exercise program.  Response to previous treatment: ok  Functional change: none, patient attempting to increase her activity, but has not been able to be consistent with anything; now the heat is too much for her to do things outside;     Pain: 7-8/10  Location:    lower back > RLE     Objective      Objective Measures updated at progress report unless specified.       Treatment     Nicole received the treatments listed below:      therapeutic exercises to develop strength, endurance, ROM, and core stabilization for 15 minutes including:  NuStep level 4 x 10 min    Seated: upper trap stretch 2 x 30 secs   Seated: levator stretch 2 x 30 sec   Supine hamstring stretch with strap 3 x 30 sec each  Supine piriformis stretch 2 x 30 sec  Supine shoulder flexion x 20 with 1 # bar  Supine chest press x 20 with 1 # bar;    manual therapy techniques: Myofacial release and Soft tissue Mobilization applied to lumbar " spine/LLE  for 35  minutes, including:   S/L on left - segmental flexion of right side lumbar spine with blocking of segment above for max spacing; paraspinals skin rolling; long axis MFR quadratus, lower lat on right and left in prone; Positioned in S/L on left side; LF-ES per protocol for lumbar radiculopathy > RLE:  utilized 60 and 75 mm needles for points lateral to lumbar SP's; sacral segments > piriformis x 2; quadratus x 2 and posterior right hip.  Intensity of stimulation adjusted to patient tolerance with good vibration of tissues noted.  Provided fan for patient to help resolve hot flashes; Needles left in-situ x 20 mins.  Removed needles without adverse effects; patient reporting improvement in back/RLE pain by 2 levels (5-6/10)             neuromuscular re-education activities to improve: Balance, Coordination, and Proprioception for 0 minutes. The following activities were included:      therapeutic activities to improve functional performance for 0  minutes, including:      direct contact modalities after being cleared for contraindications:     supervised modalities after being cleared for contradictions:     Patient Education and Home Exercises       Education provided:   - encouraged Nicole to continue with exercise as she is able; some is better than nothing; Stretching neck and lumbar spine to address tissue tightness.     Written Home Exercises Provided: Patient instructed to cont prior HEP. Exercises were reviewed and Nicole was able to demonstrate them prior to the end of the session.  Nicole demonstrated good  understanding of the education provided. See Electronic Medical Record under Patient Instructions for exercises provided during therapy sessions    Assessment    Patient having a difficult time today - bipolar issues, under stress, nervous, increased pain.  Good tolerance of dry needling and manual tx today.  Ernest better after treatment before leaving clinic.     Nicole is a 54 y.o. female  referred to outpatient Physical Therapy with a medical diagnosis of SI jt inflammation and DDD cervical/lumbar spine. Patient presents with chronic pain, fibromyalgia involving cervical, lumbar and now SI joint's.  Patient demonstrates functional AROM in neck/back and extremities, but has reduced mm support for all of her joints secondary to lack of physical exercise as well as myofascial restrictions in neck/upper traps, lumbar spine and piriformis mm.  Nicole will benefit from Skilled physical therapy to address her deficits noted above.        Nicole Is progressing well towards her goals.   Patient prognosis is Good.     Patient will continue to benefit from skilled outpatient physical therapy to address the deficits listed in the problem list box on initial evaluation, provide pt/family education and to maximize pt's level of independence in the home and community environment.     Patient's spiritual, cultural and educational needs considered and pt agreeable to plan of care and goals.     Anticipated barriers to physical therapy: none    Goals:   Short Term Goals: 3 weeks   Demonstrate improvement in recent symptoms to progress toward long term goals  > demonstrates short term progress   Correct sitting/standing postural deficits to reduce pain and promote postural awareness for injury prevention.  > Improving   Demonstrate compliance with initial exercise program  > Improving      Long Term Goals: 6 weeks   Able to perform household tasks with improved tolerance.  > Progressing   Able to improve standing tolerance to 45 mins  > Progressing   Able to improve walking distance to 1 mile  > Progressing   Able to perform transfers from all surfaces with no limitation.  > Progressing   FOTO score improvement to 44  Independent with HEP for continued improvement in function    Plan     Plan of care Certification: 5/15/2024 to 9/30/2024.     Outpatient Physical Therapy 2 times weekly for 6 weeks to include the following  interventions: Aquatic Therapy, Manual Therapy, Neuromuscular Re-ed, Patient Education, Therapeutic Activities, and Therapeutic Exercise.     Quita Holliday, PT

## 2024-09-30 ENCOUNTER — CLINICAL SUPPORT (OUTPATIENT)
Dept: REHABILITATION | Facility: HOSPITAL | Age: 54
End: 2024-09-30
Payer: MEDICARE

## 2024-09-30 DIAGNOSIS — M79.18 MYOFASCIAL PAIN SYNDROME, CERVICAL: ICD-10-CM

## 2024-09-30 DIAGNOSIS — Z74.09 IMPAIRED FUNCTIONAL MOBILITY AND ACTIVITY TOLERANCE: Primary | ICD-10-CM

## 2024-09-30 PROCEDURE — 97110 THERAPEUTIC EXERCISES: CPT | Mod: PN,CQ

## 2024-09-30 NOTE — PROGRESS NOTES
[facility]   Physical Therapy Treatment Note      Name: Nicole BONILLA [facility] Number: [id number]    Therapy Diagnosis:   Encounter Diagnoses   Name Primary?    Impaired functional mobility and activity tolerance Yes    Myofascial pain syndrome, cervical          Physician: Brii Mcneil NP    Visit Date: 9/30/2024    Evaluation Date: 5/15/2024  Authorization Period Expiration: 12/31/2024  Plan of Care Expiration:  9/30/2024  Progress Note Due:  9/30/24  Date of Surgery: n/a  Visit # / Visits authorized: 22 / 22+ eval   FOTO: 2/ 3     Precautions: Standard and Diabetes     Time In: 3:00 PM  Time Out:  3:45 PM  Total Billable Time: 40 minutes    PTA Visit #: 1/5       Subjective     Patient reports: Feeling pretty good today. Sciatic pain in right leg/lower back is still there.    She was compliant with home exercise program.  Response to previous treatment: ok  Functional change: none, patient attempting to increase her activity, but has not been able to be consistent with anything; now the heat is too much for her to do things outside;     Pain: 6/10  Location:    lower back > RLE     Objective      Objective Measures updated at progress report unless specified.       Treatment     Nicole received the treatments listed below:      therapeutic exercises to develop strength, endurance, ROM, and core stabilization for 30 minutes including:  NuStep level 4 x 15 min    LTR x 2 min  DKC w/PB x 2 min  Seated: upper trap stretch 2 x 30 secs   Seated: levator stretch 2 x 30 sec   Supine hamstring stretch with strap 3 x 30 sec each  Supine piriformis stretch 2 x 30 sec  Supine shoulder flexion x 20 with 1 # bar  Supine chest press x 20 with 1 # bar;    manual therapy techniques: Myofacial release and Soft tissue Mobilization applied to lumbar spine/LLE  for minutes,           neuromuscular re-education activities to improve: Balance, Coordination, and Proprioception for 0 minutes. The  following activities were included:      therapeutic activities to improve functional performance for 0  minutes, including:      direct contact modalities after being cleared for contraindications:     supervised modalities after being cleared for contradictions:     Patient Education and Home Exercises       Education provided:   - encouraged Nicole to continue with exercise as she is able; some is better than nothing; Stretching neck and lumbar spine to address tissue tightness.     Written Home Exercises Provided: Patient instructed to cont prior HEP. Exercises were reviewed and Nicole was able to demonstrate them prior to the end of the session.  Nicole demonstrated good  understanding of the education provided. See Electronic Medical Record under Patient Instructions for exercises provided during therapy sessions    Assessment    Patient did well with exercises; no exacerbations.    Nicole is a 54 y.o. female referred to outpatient Physical Therapy with a medical diagnosis of SI jt inflammation and DDD cervical/lumbar spine. Patient presents with chronic pain, fibromyalgia involving cervical, lumbar and now SI joint's.  Patient demonstrates functional AROM in neck/back and extremities, but has reduced mm support for all of her joints secondary to lack of physical exercise as well as myofascial restrictions in neck/upper traps, lumbar spine and piriformis mm.  Nicole will benefit from Skilled physical therapy to address her deficits noted above.        Nicole Is progressing well towards her goals.   Patient prognosis is Good.     Patient will continue to benefit from skilled outpatient physical therapy to address the deficits listed in the problem list box on initial evaluation, provide pt/family education and to maximize pt's level of independence in the home and community environment.     Patient's spiritual, cultural and educational needs considered and pt agreeable to plan of care and goals.     Anticipated  barriers to physical therapy: none    Goals:   Short Term Goals: 3 weeks   Demonstrate improvement in recent symptoms to progress toward long term goals  > demonstrates short term progress   Correct sitting/standing postural deficits to reduce pain and promote postural awareness for injury prevention.  > Improving   Demonstrate compliance with initial exercise program  > Improving      Long Term Goals: 6 weeks   Able to perform household tasks with improved tolerance.  > Progressing   Able to improve standing tolerance to 45 mins  > Progressing   Able to improve walking distance to 1 mile  > Progressing   Able to perform transfers from all surfaces with no limitation.  > Progressing   FOTO score improvement to 44  Independent with HEP for continued improvement in function    Plan     Plan of care Certification: 5/15/2024 to 9/30/2024.     Outpatient Physical Therapy 2 times weekly for 6 weeks to include the following interventions: Aquatic Therapy, Manual Therapy, Neuromuscular Re-ed, Patient Education, Therapeutic Activities, and Therapeutic Exercise.     Jonathan Favre, PTA

## 2024-10-02 DIAGNOSIS — M54.42 LUMBAGO WITH SCIATICA, LEFT SIDE: Primary | ICD-10-CM

## 2024-10-04 ENCOUNTER — CLINICAL SUPPORT (OUTPATIENT)
Dept: REHABILITATION | Facility: HOSPITAL | Age: 54
End: 2024-10-04
Payer: MEDICARE

## 2024-10-04 DIAGNOSIS — M79.18 MYOFASCIAL PAIN SYNDROME, CERVICAL: ICD-10-CM

## 2024-10-04 DIAGNOSIS — Z74.09 IMPAIRED FUNCTIONAL MOBILITY AND ACTIVITY TOLERANCE: Primary | ICD-10-CM

## 2024-10-04 PROCEDURE — 97110 THERAPEUTIC EXERCISES: CPT | Mod: KX,PN

## 2024-10-04 PROCEDURE — 97161 PT EVAL LOW COMPLEX 20 MIN: CPT | Mod: KX,PN

## 2024-10-04 NOTE — PLAN OF CARE
[facility]   Physical Therapy Initial Evaluation      Name: Nicole Harris  Clinic Number: [id number]    Therapy Diagnosis:   Encounter Diagnoses   Name Primary?    Impaired functional mobility and activity tolerance Yes    Myofascial pain syndrome, cervical         Physician: Brii Mcneil NP    Physician Orders: PT Eval and Treat   Medical Diagnosis from Referral: M54.42 (ICD-10-CM) - Lumbago with sciatica, left side   Evaluation Date: 10/4/2024  Authorization Period Expiration: 24  Plan of Care Expiration: 24  Progress Note Due: 24  Date of Surgery: none recenly  Visit # / Visits authorized:    FOTO: 1/ 3    Precautions: Standard and Fall     Time In: 1230  Time Out: 115  Total Billable Time: 45 minutes    Subjective     Date of onset: Chronic pain her whole life, started with back pain in ,     History of current condition - Nicole reports: she has pain allover. Pt reports having fibromyalgia. She does not remember when her neck pain started to hurt. She also feels her sciatica has been acting up.    Falls: none    Imaging: see chart for complete list    Prior Therapy: yes discharged on 24  Social History:  lives alone  Occupation: disabled  Prior Level of Function: I  Current Level of Function: I with increased pain.     Pain:  Current 8/10, worst 10/10, best 4/10   Location: bilateral neck    Description: Tight, Tingling, Superficial, and Numb  Aggravating Factors: any cervical motions  Easing Factors: relaxation, pain medication, and ice    Patients goals: ' to get stronger. '      Medical History:   Past Medical History:   Diagnosis Date    Anxiety     Bipolar disorder     Depression     Diabetes mellitus, type 2     Fibromyalgia     HTN (hypertension)     Insomnia     Migraine        Surgical History:   Nicole Harris  has a past surgical history that includes  section; Tonsillectomy; Epidural steroid injection (N/A, 2019);  Epidural steroid injection (N/A, 5/27/2019); Epidural steroid injection (N/A, 7/8/2019); Trigger point injection (N/A, 7/8/2019); Colonoscopy (N/A, 9/18/2019); Esophagogastroduodenoscopy (N/A, 9/18/2019); Epidural steroid injection (N/A, 10/21/2019); Trigger point injection (N/A, 10/21/2019); ANGIOGRAM, CORONARY, WITH LEFT HEART CATHETERIZATION (Left, 6/24/2020); Coronary Artery Bypass Graft (CABG) (N/A, 6/26/2020); Endoscopic harvest of vein (Left, 6/26/2020); Cardiac catheterization; triple bypass; Epidural steroid injection (N/A, 10/1/2020); Trigger point injection (N/A, 10/1/2020); Epidural steroid injection (N/A, 1/7/2021); Injection of anesthetic agent around nerve (Bilateral, 4/1/2021); Epidural steroid injection (N/A, 9/15/2021); Epidural steroid injection into cervical spine (N/A, 3/2/2022); Epidural steroid injection (N/A, 6/29/2022); Epidural steroid injection (N/A, 11/2/2022); Carpal tunnel release (Right, 1/5/2023); decompression, nerve, ulnar (Right, 1/5/2023); Epidural steroid injection into cervical spine (N/A, 10/13/2023); Epidural steroid injection into cervical spine (N/A, 12/27/2023); and Epidural steroid injection into cervical spine (N/A, 3/27/2024).    Medications:   Nicole has a current medication list which includes the following prescription(s): albuterol, amoxicillin-clavulanate 875-125mg, atorvastatin, azelastine, cetirizine, cyclobenzaprine, empagliflozin, erythromycin, escitalopram oxalate, fluconazole, fluticasone propionate, insulin glargine,hum.rec.anlog, levocetirizine, lisinopril, metformin, montelukast, mounjaro, naproxen sodium, nortriptyline, pantoprazole, true metrix glucose test strip, and zolpidem, and the following Facility-Administered Medications: 0.9% nacl, lactated ringers, lactated ringers, lactated ringers, lactated ringers, lactated ringers, and lactated ringers.    Allergies:   Review of patient's allergies indicates:   Allergen Reactions    Doxycycline      Hydrocodone Nausea And Vomiting    Vaccine adjuvant system, as01b liposomal     Varicella-zoster ge-as01b (pf)      Other reaction(s): Memory problems    Varicella-zoster virus glycoprotein e, recombinant     Benadryl [diphenhydramine hcl] Nausea Only        Objective        Observation: alert and orient x 4    Posture:     -       Rounded shoulders  -       Forward head    Cervical Range of Motion:        Degrees  12/6/23 Degrees  2/27/24 Degrees   4/1/24 Degrees   5/15/24 Degrees  10/4/24   Flexion 58  45   52   42 40   Extension 52  51   51   52 3 finger tips to chest   Left Rotation 52  53   53   48 50   Right Rotation 56  57   57   60 60   Left Side Bending 30  24   24    25 30   Right Side Bending 20   11   11    20 23      Shoulder Range of Motion:   Left:     WFL - decreased quality of motion into IR/ER, but functional   Right:   WFL - as above, IR/ER quality of motion is decreased      Strength:    Left Right   Upper trap 4-/5 4-/5   Mid trap 3+/5 3+/5   Lower trap 3/5 3/5   Rhomboids 3+/5 3+/5      Upper Extremity Strength    Left Right   Shoulder flexion: 4-/5 4-/5   Shoulder Abduction: 4/5 4-/5   Shoulder ER 4/5 4/5   Shoulder IR 4-/5 4-/5   Elbow flexion: 4/5 4/5   Elbow extension: 4/5 4/5   Wrist flexion: 3+/5 3+/5   Wrist extension: 3+/5 3+/5         Special Tests:  Distraction Negative   Compression Negative   Spurlings Negative   Sharp-Lizz Negative   VA test Negative   Lateral Flexion Alar Ligament Negative      Upper Limb Neurodynamic testing:    Left   Right   UNT Negative   Negative   MNT Negative   Negative   RNT Negative   Negative      Joint Mobility: Passive mobility WFL;  Active mild restrictions into rotation - more stability than flexibility restriction secondary to mm weakness/tightness.      Thoracic mobility: postural restriction into extension at upper segments;      Palpation: Still has some tenderness in upper traps with TrPs noted,    Sensation: intact to light touch BUE's       Flexibility:                Upper Trap = R moderate restriction, L moderate restriction                 Scalenes: R moderate restriction, L moderate restriction                SCM: R minimal restriction, L minimal restriction              Levator Scap: R min restriction, L min restriction     PT Evaluation Completed? Yes  Discussed Plan of Care with patient: Yes    Intake Outcome Measure for FOTO  Survey    Therapist reviewed FOTO scores for Nicole Harris on 10/4/2024.   FOTO report - see Media section or FOTO account episode details.    Intake Score: see chart         Treatment     Total Treatment time (time-based codes) separate from Evaluation: 15 minutes     Nicole received the treatments listed below:      therapeutic exercises to develop strength, endurance, and ROM for 15 minutes including:  Nu step 15 level 3        Patient Education and Home Exercises     Education provided:   - poc, hep, doing exercises at home.    Written Home Exercises Provided: Pt instructed to continue prior HEP. Exercises were reviewed and Nicole was able to demonstrate them prior to the end of the session.  Nicole demonstrated good  understanding of the education provided. See EMR under Patient Instructions for exercises provided during therapy sessions.    Assessment     Nicole is a 54 y.o. female referred to outpatient Physical Therapy with a medical diagnosis of M54.42 (ICD-10-CM) - Lumbago with sciatica, left side . Patient presents with decreased cervical ROM. Pt is still having same complaints as she previously had with cervical and lumbar region. Pt has decreased cervical ROM with more severe with right rotation. Pt lumbar pain is tender to touch around L1-L5 with tighter pisiformis with stretching in supine on both sides. Pt is in need of PT at this time to prevent further decline in functional status.      Patient prognosis is Good.   Patient will benefit from skilled outpatient Physical Therapy to address the deficits  stated above and in the chart below, provide patient /family education, and to maximize patientt's level of independence.       Plan of care discussed with patient: Yes  Patient's spiritual, cultural and educational needs considered and patient is agreeable to the plan of care and goals as stated below:     Anticipated Barriers for therapy: na    Medical Necessity is demonstrated by the following  History  Co-morbidities and personal factors that may impact the plan of care [] LOW: no personal factors / co-morbidities  [] MODERATE: 1-2 personal factors / co-morbidities  [x] HIGH: 3+ personal factors / co-morbidities    Moderate / High Support Documentation:   Co-morbidities affecting plan of care: na    Personal Factors:   character  attitudes     Examination  Body Structures and Functions, activity limitations and participation restrictions that may impact the plan of care [] LOW: addressing 1-2 elements  [x] MODERATE: 3+ elements  [] HIGH: 4+ elements (please support below)    Moderate / High Support Documentation: na     Clinical Presentation [] LOW: stable  [x] MODERATE: Evolving  [] HIGH: Unstable     Decision Making/ Complexity Score: low       Goals:  Short Term Goals: 3 weeks   Pt will be compliant HEP.  Pt will improve body mechanics with minimal cues for proper alignment.  Pt will be able to sleep threw the night.  Pt will improve pain to 3/10 at worse.    Long Term Goals: 6 weeks   Pt will be independent with HEP.  Pt will improve pain to 1/10 at worse to be able to complete daily tasks.   Pt will improve foto by 10%.  Plan     Plan of care Certification: 10/4/2024 to 12/31/24.    Outpatient Physical Therapy 2 times weekly for 6 weeks to include the following interventions: Aquatic Therapy, Electrical Stimulation  , Gait Training, Manual Therapy, Neuromuscular Re-ed, Patient Education, Therapeutic Activities, Therapeutic Exercise, and Ultrasound.     Giovana Wang, PT        Physician's Signature:  _________________________________________ Date: ________________

## 2024-10-11 ENCOUNTER — CLINICAL SUPPORT (OUTPATIENT)
Dept: REHABILITATION | Facility: HOSPITAL | Age: 54
End: 2024-10-11
Payer: MEDICARE

## 2024-10-11 DIAGNOSIS — M79.18 MYOFASCIAL PAIN SYNDROME, CERVICAL: ICD-10-CM

## 2024-10-11 DIAGNOSIS — Z74.09 IMPAIRED FUNCTIONAL MOBILITY AND ACTIVITY TOLERANCE: Primary | ICD-10-CM

## 2024-10-11 PROCEDURE — 97110 THERAPEUTIC EXERCISES: CPT | Mod: KX,PN,CQ

## 2024-10-11 PROCEDURE — 97140 MANUAL THERAPY 1/> REGIONS: CPT | Mod: KX,PN,CQ

## 2024-10-11 NOTE — PROGRESS NOTES
[facility]   Physical Therapy Treatment Note      Name: Nicole BONILLA [facility] Number: [id number]    Therapy Diagnosis:   Encounter Diagnoses   Name Primary?    Impaired functional mobility and activity tolerance Yes    Myofascial pain syndrome, cervical          Physician: Vira Conn NP    Visit Date: 10/11/2024    Evaluation Date: 5/15/2024  Authorization Period Expiration: 12/31/2024  Plan of Care Expiration:  9/30/2024  Progress Note Due:  9/30/24  Date of Surgery: n/a  Visit # / Visits authorized: 2 / 20 (new order)  FOTO: 2/ 3     Precautions: Standard and Diabetes     Time In: 855  Time Out:  935  Total Billable Time: 40 minutes    PTA Visit #: 1/5       Subjective     Patient reports: increase in pain levels today.    She was compliant with home exercise program.  Response to previous treatment: ok  Functional change: none, patient attempting to increase her activity, but has not been able to be consistent with anything; now the heat is too much for her to do things outside;     Pain: 6-8/10  Location:    lower back > RLE     Objective      Objective Measures updated at progress report unless specified.       Treatment     Nicole received the treatments listed below:      therapeutic exercises to develop strength, endurance, ROM, and core stabilization for 25 minutes including:  NuStep level 4 x 15 min    LTR x 2 min  DKC w/PB x 2 min  Seated: upper trap stretch 2 x 30 secs   Seated: levator stretch 2 x 30 sec   Supine hamstring stretch with strap 3 x 30 sec each  Supine piriformis stretch 2 x 30 sec  Supine shoulder flexion x 20 with 1 # bar  Supine chest press x 20 with 1 # bar;    manual therapy techniques: Myofacial release and Soft tissue Mobilization applied to lumbar spine/LLE  for 15 minutes,       IASTM  and STM   Lumbar   Left hip    neuromuscular re-education activities to improve: Balance, Coordination, and Proprioception for 0 minutes. The following  activities were included:      therapeutic activities to improve functional performance for 0  minutes, including:      direct contact modalities after being cleared for contraindications:     supervised modalities after being cleared for contradictions:     Patient Education and Home Exercises       Education provided:   - encouraged Nicole to continue with exercise as she is able; some is better than nothing; Stretching neck and lumbar spine to address tissue tightness.     Written Home Exercises Provided: Patient instructed to cont prior HEP. Exercises were reviewed and Nicole was able to demonstrate them prior to the end of the session.  Nicole demonstrated good  understanding of the education provided. See Electronic Medical Record under Patient Instructions for exercises provided during therapy sessions    Assessment    Patient did well with exercises.  Attempted manual therapy but patient did not indicated that it helped.    Nicole is a 54 y.o. female referred to outpatient Physical Therapy with a medical diagnosis of SI jt inflammation and DDD cervical/lumbar spine. Patient presents with chronic pain, fibromyalgia involving cervical, lumbar and now SI joint's.  Patient demonstrates functional AROM in neck/back and extremities, but has reduced mm support for all of her joints secondary to lack of physical exercise as well as myofascial restrictions in neck/upper traps, lumbar spine and piriformis mm.  Nicole will benefit from Skilled physical therapy to address her deficits noted above.        Nicole Is progressing well towards her goals.   Patient prognosis is Good.     Patient will continue to benefit from skilled outpatient physical therapy to address the deficits listed in the problem list box on initial evaluation, provide pt/family education and to maximize pt's level of independence in the home and community environment.     Patient's spiritual, cultural and educational needs considered and pt agreeable to  plan of care and goals.     Anticipated barriers to physical therapy: none    Goals:   Short Term Goals: 3 weeks   Demonstrate improvement in recent symptoms to progress toward long term goals  > demonstrates short term progress   Correct sitting/standing postural deficits to reduce pain and promote postural awareness for injury prevention.  > Improving   Demonstrate compliance with initial exercise program  > Improving      Long Term Goals: 6 weeks   Able to perform household tasks with improved tolerance.  > Progressing   Able to improve standing tolerance to 45 mins  > Progressing   Able to improve walking distance to 1 mile  > Progressing   Able to perform transfers from all surfaces with no limitation.  > Progressing   FOTO score improvement to 44  Independent with HEP for continued improvement in function    Plan     Plan of care Certification: 5/15/2024 to 9/30/2024.     Outpatient Physical Therapy 2 times weekly for 6 weeks to include the following interventions: Aquatic Therapy, Manual Therapy, Neuromuscular Re-ed, Patient Education, Therapeutic Activities, and Therapeutic Exercise.     Medhat Teran, PTA

## 2024-10-16 ENCOUNTER — CLINICAL SUPPORT (OUTPATIENT)
Dept: REHABILITATION | Facility: HOSPITAL | Age: 54
End: 2024-10-16
Payer: MEDICARE

## 2024-10-16 DIAGNOSIS — M79.18 MYOFASCIAL PAIN SYNDROME, CERVICAL: ICD-10-CM

## 2024-10-16 DIAGNOSIS — Z74.09 IMPAIRED FUNCTIONAL MOBILITY AND ACTIVITY TOLERANCE: Primary | ICD-10-CM

## 2024-10-16 PROCEDURE — 97110 THERAPEUTIC EXERCISES: CPT | Mod: KX,PN

## 2024-10-16 PROCEDURE — 97140 MANUAL THERAPY 1/> REGIONS: CPT | Mod: KX,PN

## 2024-10-16 PROCEDURE — 97014 ELECTRIC STIMULATION THERAPY: CPT | Mod: KX,PN

## 2024-10-16 NOTE — PROGRESS NOTES
[facility]   Physical Therapy Treatment Note      Name: Nicole BONILLA [facility] Number: [id number]    Therapy Diagnosis:   Encounter Diagnoses   Name Primary?    Impaired functional mobility and activity tolerance Yes    Myofascial pain syndrome, cervical          Physician: Vira Conn NP    Visit Date: 10/16/2024    Physician Orders: PT Eval and Treat   Medical Diagnosis from Referral: M54.42 (ICD-10-CM) - Lumbago with sciatica, left side   Evaluation Date: 10/4/2024  Authorization Period Expiration: 12/31/24  Plan of Care Expiration: 12/31/24  Progress Note Due: 11/14/24  Date of Surgery: none recenly  Visit # / Visits authorized: 2/12    FOTO: 1/ 3     Precautions: Standard and Fall      Time In: 1:45 pm   Time Out: 2: 40 pm    Total Billable Time: 45 minutes       PTA Visit #: 0/5       Subjective     Patient reports: increase in pain levels today legs hurting Left > Right; stated that she almost fell the other day; tingling into legs as well as pain;   She was compliant with home exercise program.  Response to previous treatment: ok  Functional change: none, patient attempting to increase her activity, but has not been able to be consistent with anything; now the heat is too much for her to do things outside;     Pain: 8-9 / 10   Location:    lower back > RLE     Objective      Objective Measures updated at progress report unless specified.       Treatment     Nicole received the treatments listed below:      therapeutic exercises to develop strength, endurance, ROM, and core stabilization for 15  minutes including:  NuStep level 4 x 15 min    LTR x 2 min  DKC w/PB x 2 min  Seated: upper trap stretch 2 x 30 secs   Seated: levator stretch 2 x 30 sec   Supine hamstring stretch with strap 3 x 30 sec each  Supine piriformis stretch 2 x 30 sec  Supine shoulder flexion x 20 with 1 # bar  Supine chest press x 20 with 1 # bar;    manual therapy techniques: Myofacial release and  Soft tissue Mobilization/dry needling  applied to lumbar spine/LLE  for 30 minutes,       Myofacial release and Soft tissue Mobilization applied to lumbar spine/LLE  for 35  minutes, including:   S/L on left - segmental flexion of right side lumbar spine with blocking of segment above for max spacing; paraspinals skin rolling; long axis MFR quadratus, lower lat on right and left in prone; Positioned in S/L on left side; LF-ES per protocol for lumbar radiculopathy > RLE:  utilized 60 and 75 mm needles for points lateral to lumbar SP's; sacral segments > piriformis x 2; quadratus x 2 and posterior right hip.  Intensity of stimulation adjusted to patient tolerance with good vibration of tissues noted.  Provided fan for patient to help resolve hot flashes; Needles left in-situ x 20 mins.  Removed needles without adverse effects; patient reporting improvement in back/RLE pain by 2 levels (5-6/10)       neuromuscular re-education activities to improve: Balance, Coordination, and Proprioception for 0 minutes. The following activities were included:      therapeutic activities to improve functional performance for 0  minutes, including:      direct contact modalities after being cleared for contraindications:     supervised modalities after being cleared for contradictions:     Patient Education and Home Exercises       Education provided:   - encouraged Nicole to continue with exercise as she is able; some is better than nothing; Stretching neck and lumbar spine to address tissue tightness.     Written Home Exercises Provided: Patient instructed to cont prior HEP. Exercises were reviewed and Nicole was able to demonstrate them prior to the end of the session.  Nicole demonstrated good  understanding of the education provided. See Electronic Medical Record under Patient Instructions for exercises provided during therapy sessions    Assessment    Patient with increased lower back and LLE pain today; stated that it has been  severe over past several days; afraid that her legs are going to give out.  Good response to the dry needling and manual tx today with reduction in her pain noted     Nicole is a 54 y.o. female referred to outpatient Physical Therapy with a medical diagnosis of SI jt inflammation and DDD cervical/lumbar spine. Patient presents with chronic pain, fibromyalgia involving cervical, lumbar and now SI joint's.  Patient demonstrates functional AROM in neck/back and extremities, but has reduced mm support for all of her joints secondary to lack of physical exercise as well as myofascial restrictions in neck/upper traps, lumbar spine and piriformis mm.  Nicloe will benefit from Skilled physical therapy to address her deficits noted above.        Nicole Is progressing well towards her goals.   Patient prognosis is Good.     Patient will continue to benefit from skilled outpatient physical therapy to address the deficits listed in the problem list box on initial evaluation, provide pt/family education and to maximize pt's level of independence in the home and community environment.     Patient's spiritual, cultural and educational needs considered and pt agreeable to plan of care and goals.     Anticipated barriers to physical therapy: none    Goals:   Short Term Goals: 3 weeks   Pt will be compliant HEP.  Pt will improve body mechanics with minimal cues for proper alignment.  Pt will be able to sleep threw the night.  Pt will improve pain to 3/10 at worse.     Long Term Goals: 6 weeks   Pt will be independent with HEP.  Pt will improve pain to 1/10 at worse to be able to complete daily tasks.   Pt will improve foto by 10%.    Plan     Plan of care Certification: 10/4/2024 to 12/31/24.     Outpatient Physical Therapy 2 times weekly for 6 weeks to include the following interventions: Aquatic Therapy, Electrical Stimulation  , Gait Training, Manual Therapy, Neuromuscular Re-ed, Patient Education, Therapeutic Activities, Therapeutic  Exercise, and Ultrasound.      Quita Holliday, PT

## 2024-10-22 ENCOUNTER — CLINICAL SUPPORT (OUTPATIENT)
Dept: REHABILITATION | Facility: HOSPITAL | Age: 54
End: 2024-10-22
Payer: MEDICARE

## 2024-10-22 DIAGNOSIS — Z74.09 IMPAIRED FUNCTIONAL MOBILITY AND ACTIVITY TOLERANCE: Primary | ICD-10-CM

## 2024-10-22 DIAGNOSIS — M79.18 MYOFASCIAL PAIN SYNDROME, CERVICAL: ICD-10-CM

## 2024-10-22 PROCEDURE — 97140 MANUAL THERAPY 1/> REGIONS: CPT | Mod: KX,PN,CQ

## 2024-10-22 PROCEDURE — 97110 THERAPEUTIC EXERCISES: CPT | Mod: KX,PN,CQ

## 2024-10-22 NOTE — PROGRESS NOTES
[facility]   Physical Therapy Treatment Note      Name: Nicole BONILLA [facility] Number: [id number]    Therapy Diagnosis:   Encounter Diagnoses   Name Primary?    Impaired functional mobility and activity tolerance Yes    Myofascial pain syndrome, cervical          Physician: Vira Conn NP    Visit Date: 10/22/2024    Physician Orders: PT Eval and Treat   Medical Diagnosis from Referral: M54.42 (ICD-10-CM) - Lumbago with sciatica, left side   Evaluation Date: 10/4/2024  Authorization Period Expiration: 12/31/24  Plan of Care Expiration: 12/31/24  Progress Note Due: 11/14/24  Date of Surgery: none recenly  Visit # / Visits authorized: 3/12    FOTO: 1/ 3     Precautions: Standard and Fall      Time In: 8:45 AM  Time Out: 9:35 AM   Total Billable Time: 40 minutes       PTA Visit #: 1/5       Subjective     Patient reports: I am hurting and have not had any sleep. Went to see the doctor last week, tested positive for staph.   She was compliant with home exercise program.  Response to previous treatment: ok  Functional change: none, patient attempting to increase her activity, but has not been able to be consistent with anything; now the heat is too much for her to do things outside;     Pain: 8 / 10   Location:    lower back > RLE     Objective      Objective Measures updated at progress report unless specified.       Treatment     Nicole received the treatments listed below:      therapeutic exercises to develop strength, endurance, ROM, and core stabilization for 25 minutes including:  NuStep level 0 x 11 min    Heel Cord stretch on wedge 3 x 30 sec  LTR w/PB x 2 min  DKC w/PB x 2 min  Seated: upper trap stretch 2 x 30 secs   Seated: levator stretch 2 x 30 sec   Supine hamstring stretch with strap 3 x 30 sec each  Supine piriformis stretch 2 x 30 sec  Supine shoulder flexion x 20 with 1 # bar  Supine chest press x 20 with 1 # bar;    manual therapy techniques: Soft tissue  Mobilization applied to cervical/upper traps  for 15 minutes     S/L on left - segmental flexion of right side lumbar spine with blocking of segment above for max spacing; paraspinals skin rolling; long axis MFR quadratus, lower lat on right and left in prone; Positioned in S/L on left side; LF-ES per protocol for lumbar radiculopathy > RLE:  utilized 60 and 75 mm needles for points lateral to lumbar SP's; sacral segments > piriformis x 2; quadratus x 2 and posterior right hip.  Intensity of stimulation adjusted to patient tolerance with good vibration of tissues noted.  Provided fan for patient to help resolve hot flashes; Needles left in-situ x 20 mins.  Removed needles without adverse effects; patient reporting improvement in back/RLE pain by 2 levels (5-6/10)       neuromuscular re-education activities to improve: Balance, Coordination, and Proprioception for 0 minutes. The following activities were included:      therapeutic activities to improve functional performance for 0  minutes, including:      direct contact modalities after being cleared for contraindications:     supervised modalities after being cleared for contradictions:     Patient Education and Home Exercises       Education provided:   - encouraged Nicole to continue with exercise as she is able; some is better than nothing; Stretching neck and lumbar spine to address tissue tightness.     Written Home Exercises Provided: Patient instructed to cont prior HEP. Exercises were reviewed and Nicole was able to demonstrate them prior to the end of the session.  Nicole demonstrated good  understanding of the education provided. See Electronic Medical Record under Patient Instructions for exercises provided during therapy sessions    Assessment    Patient with increased neck and LLE pain today; stated that it has been severe over past several days. Patient able to tolerate some exercises, but did report some increased discomfort with most activity.    Nicole  is a 54 y.o. female referred to outpatient Physical Therapy with a medical diagnosis of SI jt inflammation and DDD cervical/lumbar spine. Patient presents with chronic pain, fibromyalgia involving cervical, lumbar and now SI joint's.  Patient demonstrates functional AROM in neck/back and extremities, but has reduced mm support for all of her joints secondary to lack of physical exercise as well as myofascial restrictions in neck/upper traps, lumbar spine and piriformis mm.  Nicole will benefit from Skilled physical therapy to address her deficits noted above.        Nicole Is progressing well towards her goals.   Patient prognosis is Good.     Patient will continue to benefit from skilled outpatient physical therapy to address the deficits listed in the problem list box on initial evaluation, provide pt/family education and to maximize pt's level of independence in the home and community environment.     Patient's spiritual, cultural and educational needs considered and pt agreeable to plan of care and goals.     Anticipated barriers to physical therapy: none    Goals:   Short Term Goals: 3 weeks   Pt will be compliant HEP.  Pt will improve body mechanics with minimal cues for proper alignment.  Pt will be able to sleep threw the night.  Pt will improve pain to 3/10 at worse.     Long Term Goals: 6 weeks   Pt will be independent with HEP.  Pt will improve pain to 1/10 at worse to be able to complete daily tasks.   Pt will improve foto by 10%.    Plan     Plan of care Certification: 10/4/2024 to 12/31/24.     Outpatient Physical Therapy 2 times weekly for 6 weeks to include the following interventions: Aquatic Therapy, Electrical Stimulation  , Gait Training, Manual Therapy, Neuromuscular Re-ed, Patient Education, Therapeutic Activities, Therapeutic Exercise, and Ultrasound.      Jonathan Favre, PTA

## 2024-10-25 ENCOUNTER — CLINICAL SUPPORT (OUTPATIENT)
Dept: REHABILITATION | Facility: HOSPITAL | Age: 54
End: 2024-10-25
Payer: MEDICARE

## 2024-10-25 DIAGNOSIS — Z74.09 IMPAIRED FUNCTIONAL MOBILITY AND ACTIVITY TOLERANCE: Primary | ICD-10-CM

## 2024-10-25 DIAGNOSIS — M79.18 MYOFASCIAL PAIN SYNDROME, CERVICAL: ICD-10-CM

## 2024-10-25 PROCEDURE — 97110 THERAPEUTIC EXERCISES: CPT | Mod: KX,PN,CQ

## 2024-10-25 PROCEDURE — 97140 MANUAL THERAPY 1/> REGIONS: CPT | Mod: KX,PN,CQ

## 2024-10-25 NOTE — PROGRESS NOTES
[facility]   Physical Therapy Treatment Note      Name: Nicole BONILLA [facility] Number: [id number]    Therapy Diagnosis:   Encounter Diagnoses   Name Primary?    Impaired functional mobility and activity tolerance Yes    Myofascial pain syndrome, cervical          Physician: Vira Conn NP    Visit Date: 10/25/2024    Physician Orders: PT Eval and Treat   Medical Diagnosis from Referral: M54.42 (ICD-10-CM) - Lumbago with sciatica, left side   Evaluation Date: 10/4/2024  Authorization Period Expiration: 12/31/24  Plan of Care Expiration: 12/31/24  Progress Note Due: 11/14/24  Date of Surgery: none recenly  Visit # / Visits authorized: 4/12    FOTO: 1/ 3     Precautions: Standard and Fall      Time In: 8:45 AM  Time Out: 9:45 AM   Total Billable Time: 40 minutes       PTA Visit #: 2/5       Subjective     Patient reports: I am hurting today.  She was compliant with home exercise program.  Response to previous treatment: ok  Functional change: none, patient attempting to increase her activity, but has not been able to be consistent with anything; now the heat is too much for her to do things outside;     Pain: 5-6 / 10   Location:    lower back > RLE     Objective      Objective Measures updated at progress report unless specified.       Treatment     Nicole received the treatments listed below:      therapeutic exercises to develop strength, endurance, ROM, and core stabilization for 25 minutes including:  NuStep level 0 x 15 min    Heel Cord stretch on wedge 3 x 30 sec  LTR w/PB x 2 min  DKC w/PB x 2 min  Seated: upper trap stretch 2 x 30 secs   Seated: levator stretch 2 x 30 sec   Supine hamstring stretch with strap 3 x 30 sec each  Supine piriformis stretch 2 x 30 sec  Supine shoulder flexion x 20 with 1 # bar  Supine chest press x 20 with 1 # bar;    manual therapy techniques: Soft tissue Mobilization applied to cervical/upper traps  for 15 minutes     S/L on left - segmental  flexion of right side lumbar spine with blocking of segment above for max spacing; paraspinals skin rolling; long axis MFR quadratus, lower lat on right and left in prone; Positioned in S/L on left side; LF-ES per protocol for lumbar radiculopathy > RLE:  utilized 60 and 75 mm needles for points lateral to lumbar SP's; sacral segments > piriformis x 2; quadratus x 2 and posterior right hip.  Intensity of stimulation adjusted to patient tolerance with good vibration of tissues noted.  Provided fan for patient to help resolve hot flashes; Needles left in-situ x 20 mins.  Removed needles without adverse effects; patient reporting improvement in back/RLE pain by 2 levels (5-6/10)       neuromuscular re-education activities to improve: Balance, Coordination, and Proprioception for 0 minutes. The following activities were included:      therapeutic activities to improve functional performance for 0  minutes, including:      direct contact modalities after being cleared for contraindications:     supervised modalities after being cleared for contradictions:     Patient Education and Home Exercises       Education provided:   - encouraged Nicole to continue with exercise as she is able; some is better than nothing; Stretching neck and lumbar spine to address tissue tightness.     Written Home Exercises Provided: Patient instructed to cont prior HEP. Exercises were reviewed and Nicole was able to demonstrate them prior to the end of the session.  Nicole demonstrated good  understanding of the education provided. See Electronic Medical Record under Patient Instructions for exercises provided during therapy sessions    Assessment    Patient did pretty well with limited exercises; no exacerbations today.    Nicole is a 54 y.o. female referred to outpatient Physical Therapy with a medical diagnosis of SI jt inflammation and DDD cervical/lumbar spine. Patient presents with chronic pain, fibromyalgia involving cervical, lumbar and now  SI joint's.  Patient demonstrates functional AROM in neck/back and extremities, but has reduced mm support for all of her joints secondary to lack of physical exercise as well as myofascial restrictions in neck/upper traps, lumbar spine and piriformis mm.  Nicole will benefit from Skilled physical therapy to address her deficits noted above.        Nicole Is progressing well towards her goals.   Patient prognosis is Good.     Patient will continue to benefit from skilled outpatient physical therapy to address the deficits listed in the problem list box on initial evaluation, provide pt/family education and to maximize pt's level of independence in the home and community environment.     Patient's spiritual, cultural and educational needs considered and pt agreeable to plan of care and goals.     Anticipated barriers to physical therapy: none    Goals:   Short Term Goals: 3 weeks   Pt will be compliant HEP.  Pt will improve body mechanics with minimal cues for proper alignment.  Pt will be able to sleep threw the night.  Pt will improve pain to 3/10 at worse.     Long Term Goals: 6 weeks   Pt will be independent with HEP.  Pt will improve pain to 1/10 at worse to be able to complete daily tasks.   Pt will improve foto by 10%.    Plan     Plan of care Certification: 10/4/2024 to 12/31/24.     Outpatient Physical Therapy 2 times weekly for 6 weeks to include the following interventions: Aquatic Therapy, Electrical Stimulation  , Gait Training, Manual Therapy, Neuromuscular Re-ed, Patient Education, Therapeutic Activities, Therapeutic Exercise, and Ultrasound.      Jonathan Favre, PTA

## 2024-10-29 ENCOUNTER — CLINICAL SUPPORT (OUTPATIENT)
Dept: REHABILITATION | Facility: HOSPITAL | Age: 54
End: 2024-10-29
Payer: MEDICARE

## 2024-10-29 DIAGNOSIS — Z74.09 IMPAIRED FUNCTIONAL MOBILITY AND ACTIVITY TOLERANCE: Primary | ICD-10-CM

## 2024-10-29 DIAGNOSIS — M79.18 MYOFASCIAL PAIN SYNDROME, CERVICAL: ICD-10-CM

## 2024-10-29 PROCEDURE — 97110 THERAPEUTIC EXERCISES: CPT | Mod: KX,PN

## 2024-10-29 PROCEDURE — 97014 ELECTRIC STIMULATION THERAPY: CPT | Mod: KX,PN

## 2024-10-29 PROCEDURE — 97140 MANUAL THERAPY 1/> REGIONS: CPT | Mod: KX,PN

## 2024-11-05 ENCOUNTER — CLINICAL SUPPORT (OUTPATIENT)
Dept: REHABILITATION | Facility: HOSPITAL | Age: 54
End: 2024-11-05
Payer: MEDICARE

## 2024-11-05 DIAGNOSIS — Z74.09 IMPAIRED FUNCTIONAL MOBILITY AND ACTIVITY TOLERANCE: Primary | ICD-10-CM

## 2024-11-05 DIAGNOSIS — M79.18 MYOFASCIAL PAIN SYNDROME, CERVICAL: ICD-10-CM

## 2024-11-05 PROCEDURE — 97140 MANUAL THERAPY 1/> REGIONS: CPT | Mod: KX,PN,CQ

## 2024-11-05 PROCEDURE — 97110 THERAPEUTIC EXERCISES: CPT | Mod: KX,PN,CQ

## 2024-11-05 NOTE — PROGRESS NOTES
[facility]   Physical Therapy Treatment Note      Name: Nicole BONILLA [facility] Number: [id number]    Therapy Diagnosis:   Encounter Diagnoses   Name Primary?    Impaired functional mobility and activity tolerance Yes    Myofascial pain syndrome, cervical        Physician: Vira Conn NP    Visit Date: 11/5/2024    Physician Orders: PT Eval and Treat   Medical Diagnosis from Referral: M54.42 (ICD-10-CM) - Lumbago with sciatica, left side   Evaluation Date: 10/4/2024  Authorization Period Expiration: 12/31/24  Plan of Care Expiration: 12/31/24  Progress Note Due: 11/14/24  Date of Surgery: none recenly  Visit # / Visits authorized: 6 / 12    FOTO: 1/ 3     Precautions: Standard and Fall      Time In: 8:45 AM  Time Out: 9:30 AM   Total Billable Time: 40 minutes       PTA Visit #: 1/5       Subjective     Patient reports: No new c/o's.  She was compliant with home exercise program.  Response to previous treatment: ok  Functional change: none, patient attempting to increase her activity, but has not been able to be consistent with anything; now the heat is too much for her to do things outside;     Pain:  8 / 10   Location:    lower back > LLE; also noting some     Objective      Objective Measures updated at progress report unless specified.       Treatment     Nicole received the treatments listed below:      therapeutic exercises to develop strength, endurance, ROM, and core stabilization for 25 minutes including:  NuStep level 1 x 20 min    Heel Cord stretch on wedge 3 x 30 sec  LTR w/PB x 2 min  DKC w/PB x 2 min  Seated: upper trap stretch 2 x 30 secs   Seated: levator stretch 2 x 30 sec   Supine hamstring stretch with strap 3 x 30 sec each  Supine piriformis stretch 2 x 30 sec  Supine shoulder flexion x 20 with 1 # bar  Supine chest press x 20 with 1 # bar;    manual therapy techniques: Soft tissue Mobilization applied to cervical/upper traps and periscapular tissue with blade  for 15 minutes in seated position        Prone position - with head /neck support; ankle support: STM bilateral upper trap, rhomboids and levator; LF-ES per protocol for levator, upper trap and rhomboid myofascial tissue tightness/ pain;  Utilized 50 mm needles for UT, medial scapula - rhomboids, 1/5 fb lateral to T1 , T2, T3 bilateral; 30 mm needles to 1/5 fb lateral to C4, C5 and C6.  Clockwise winding of needles for increased tissue grasp; Intensity of stimulation adjusted to patient tolerance with good rhythmical stimulation of tissue noted. Needles left in situ x 20 mins.  Removed without adverse effects.     neuromuscular re-education activities to improve: Balance, Coordination, and Proprioception for 0 minutes. The following activities were included:      therapeutic activities to improve functional performance for 0  minutes, including:      direct contact modalities after being cleared for contraindications:     supervised modalities after being cleared for contradictions:     Patient Education and Home Exercises       Education provided:   - encouraged Nicole to continue with exercise as she is able; some is better than nothing; Stretching neck and lumbar spine to address tissue tightness.     Written Home Exercises Provided: Patient instructed to cont prior HEP. Exercises were reviewed and Nicole was able to demonstrate them prior to the end of the session.  Nicole demonstrated good  understanding of the education provided. See Electronic Medical Record under Patient Instructions for exercises provided during therapy sessions    Assessment    Nicole was able to perform exercises today without exacerbations along with manual tx for neck/shoulder/scapular pain.  Good response to treatment today.     Nicole is a 54 y.o. female referred to outpatient Physical Therapy with a medical diagnosis of SI jt inflammation and DDD cervical/lumbar spine. Patient presents with chronic pain, fibromyalgia involving cervical,  lumbar and now SI joint's.  Patient demonstrates functional AROM in neck/back and extremities, but has reduced mm support for all of her joints secondary to lack of physical exercise as well as myofascial restrictions in neck/upper traps, lumbar spine and piriformis mm.  Nicole will benefit from Skilled physical therapy to address her deficits noted above.        Nicole Is progressing well towards her goals.   Patient prognosis is Good.     Patient will continue to benefit from skilled outpatient physical therapy to address the deficits listed in the problem list box on initial evaluation, provide pt/family education and to maximize pt's level of independence in the home and community environment.     Patient's spiritual, cultural and educational needs considered and pt agreeable to plan of care and goals.     Anticipated barriers to physical therapy: none    Goals:   Short Term Goals: 3 weeks   Pt will be compliant HEP.  Pt will improve body mechanics with minimal cues for proper alignment.  Pt will be able to sleep threw the night.  Pt will improve pain to 3/10 at worse.     Long Term Goals: 6 weeks   Pt will be independent with HEP.  Pt will improve pain to 1/10 at worse to be able to complete daily tasks.   Pt will improve foto by 10%.    Plan     Plan of care Certification: 10/4/2024 to 12/31/24.     Outpatient Physical Therapy 2 times weekly for 6 weeks to include the following interventions: Aquatic Therapy, Electrical Stimulation  , Gait Training, Manual Therapy, Neuromuscular Re-ed, Patient Education, Therapeutic Activities, Therapeutic Exercise, and Ultrasound.      Jonathan Favre, PTA

## 2024-11-07 ENCOUNTER — CLINICAL SUPPORT (OUTPATIENT)
Dept: REHABILITATION | Facility: HOSPITAL | Age: 54
End: 2024-11-07
Payer: MEDICARE

## 2024-11-07 DIAGNOSIS — Z74.09 IMPAIRED FUNCTIONAL MOBILITY AND ACTIVITY TOLERANCE: Primary | ICD-10-CM

## 2024-11-07 DIAGNOSIS — M79.18 MYOFASCIAL PAIN SYNDROME, CERVICAL: ICD-10-CM

## 2024-11-07 PROCEDURE — 97140 MANUAL THERAPY 1/> REGIONS: CPT | Mod: KX,PN

## 2024-11-07 PROCEDURE — 97110 THERAPEUTIC EXERCISES: CPT | Mod: KX,PN

## 2024-11-07 NOTE — PROGRESS NOTES
[facility]   Physical Therapy Treatment Note      Name: Nicole BONILLA [facility] Number: [id number]    Therapy Diagnosis:   Encounter Diagnoses   Name Primary?    Impaired functional mobility and activity tolerance Yes    Myofascial pain syndrome, cervical        Physician: Vira Conn NP    Visit Date: 11/7/2024    Physician Orders: PT Eval and Treat   Medical Diagnosis from Referral: M54.42 (ICD-10-CM) - Lumbago with sciatica, left side   Evaluation Date: 10/4/2024  Authorization Period Expiration: 12/31/24  Plan of Care Expiration: 12/31/24  Progress Note Due: 11/14/24  Date of Surgery: none recenly  Visit # / Visits authorized: 7 / 12    FOTO: 1/ 3     Precautions: Standard and Fall      Time In: 8:45 AM  Time Out: 9:30 AM   Total Billable Time: 30  minutes - Split billing Medicare        PTA Visit #: 0/5       Subjective     Patient reports: No new c/o's.  She was compliant with home exercise program.  Response to previous treatment: ok  Functional change: none, patient attempting to increase her activity, but has not been able to be consistent with anything; now the heat is too much for her to do things outside;     Pain:  8 / 10   Location:    lower back > LLE; also noting some     Objective      Objective Measures updated at progress report unless specified.       Treatment     Nicole received the treatments listed below:      therapeutic exercises to develop strength, endurance, ROM, and core stabilization for 25 minutes including:  NuStep level 1 x 20 min    Heel Cord stretch on wedge 3 x 30 sec  LTR w/PB x 2 min  DKC w/PB x 2 min  Seated: upper trap stretch 2 x 30 secs   Seated: levator stretch 2 x 30 sec   Supine hamstring stretch with strap 3 x 30 sec each  Supine piriformis stretch 2 x 30 sec  Supine shoulder flexion x 20 with 1 # bar  Supine chest press x 20 with 1 # bar;    manual therapy techniques: Soft tissue Mobilization applied to cervical/upper traps and  periscapular tissue with blade for 15 minutes in seated position        Prone position - with head /neck support; ankle support: STM bilateral upper trap, rhomboids and levator; LF-ES per protocol for levator, upper trap and rhomboid myofascial tissue tightness/ pain;  Utilized 50 mm needles for UT, medial scapula - rhomboids, 1/5 fb lateral to T1 , T2, T3 bilateral; 30 mm needles to 1/5 fb lateral to C4, C5 and C6.  Clockwise winding of needles for increased tissue grasp; Intensity of stimulation adjusted to patient tolerance with good rhythmical stimulation of tissue noted. Needles left in situ x 20 mins.  Removed without adverse effects.     neuromuscular re-education activities to improve: Balance, Coordination, and Proprioception for 0 minutes. The following activities were included:      therapeutic activities to improve functional performance for 0  minutes, including:      direct contact modalities after being cleared for contraindications:     supervised modalities after being cleared for contradictions:     Patient Education and Home Exercises       Education provided:   - encouraged Nicole to continue with exercise as she is able; some is better than nothing; Stretching neck and lumbar spine to address tissue tightness.     Written Home Exercises Provided: Patient instructed to cont prior HEP. Exercises were reviewed and Nicole was able to demonstrate them prior to the end of the session.  Nicole demonstrated good  understanding of the education provided. See Electronic Medical Record under Patient Instructions for exercises provided during therapy sessions    Assessment    Nicole was able to perform exercises today without exacerbations along with manual tx for neck/shoulder/scapular pain.  Good response to treatment today.     Nicole is a 54 y.o. female referred to outpatient Physical Therapy with a medical diagnosis of SI jt inflammation and DDD cervical/lumbar spine. Patient presents with chronic pain,  fibromyalgia involving cervical, lumbar and now SI joint's.  Patient demonstrates functional AROM in neck/back and extremities, but has reduced mm support for all of her joints secondary to lack of physical exercise as well as myofascial restrictions in neck/upper traps, lumbar spine and piriformis mm.  Nicole will benefit from Skilled physical therapy to address her deficits noted above.        Nicole Is progressing well towards her goals.   Patient prognosis is Good.     Patient will continue to benefit from skilled outpatient physical therapy to address the deficits listed in the problem list box on initial evaluation, provide pt/family education and to maximize pt's level of independence in the home and community environment.     Patient's spiritual, cultural and educational needs considered and pt agreeable to plan of care and goals.     Anticipated barriers to physical therapy: none    Goals:   Short Term Goals: 3 weeks   Pt will be compliant HEP.  Pt will improve body mechanics with minimal cues for proper alignment.  Pt will be able to sleep threw the night.  Pt will improve pain to 3/10 at worse.     Long Term Goals: 6 weeks   Pt will be independent with HEP.  Pt will improve pain to 1/10 at worse to be able to complete daily tasks.   Pt will improve foto by 10%.    Plan     Plan of care Certification: 10/4/2024 to 12/31/24.     Outpatient Physical Therapy 2 times weekly for 6 weeks to include the following interventions: Aquatic Therapy, Electrical Stimulation  , Gait Training, Manual Therapy, Neuromuscular Re-ed, Patient Education, Therapeutic Activities, Therapeutic Exercise, and Ultrasound.      Quita Holliday, PT

## 2024-11-14 ENCOUNTER — HOSPITAL ENCOUNTER (OUTPATIENT)
Dept: RADIOLOGY | Facility: HOSPITAL | Age: 54
Discharge: HOME | End: 2024-11-14
Attending: NURSE PRACTITIONER
Payer: MEDICARE

## 2024-11-14 ENCOUNTER — CLINICAL SUPPORT (OUTPATIENT)
Dept: REHABILITATION | Facility: HOSPITAL | Age: 54
End: 2024-11-14
Payer: MEDICARE

## 2024-11-14 DIAGNOSIS — M79.18 MYOFASCIAL PAIN SYNDROME, CERVICAL: ICD-10-CM

## 2024-11-14 DIAGNOSIS — Z74.09 IMPAIRED FUNCTIONAL MOBILITY AND ACTIVITY TOLERANCE: Primary | ICD-10-CM

## 2024-11-14 DIAGNOSIS — M54.9 DORSALGIA: ICD-10-CM

## 2024-11-14 DIAGNOSIS — M54.42 LUMBAGO WITH SCIATICA, LEFT SIDE: ICD-10-CM

## 2024-11-14 DIAGNOSIS — M54.2 CERVICALGIA: ICD-10-CM

## 2024-11-14 PROCEDURE — 97140 MANUAL THERAPY 1/> REGIONS: CPT | Mod: KX,PN

## 2024-11-14 PROCEDURE — 97110 THERAPEUTIC EXERCISES: CPT | Mod: KX,PN
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
## 2024-11-21 ENCOUNTER — CLINICAL SUPPORT (OUTPATIENT)
Dept: REHABILITATION | Facility: HOSPITAL | Age: 54
End: 2024-11-21
Payer: MEDICARE

## 2024-11-21 DIAGNOSIS — M79.18 MYOFASCIAL PAIN SYNDROME, CERVICAL: ICD-10-CM

## 2024-11-21 DIAGNOSIS — Z74.09 IMPAIRED FUNCTIONAL MOBILITY AND ACTIVITY TOLERANCE: Primary | ICD-10-CM

## 2024-11-21 PROCEDURE — 97140 MANUAL THERAPY 1/> REGIONS: CPT | Mod: PN

## 2024-11-21 PROCEDURE — 97110 THERAPEUTIC EXERCISES: CPT | Mod: PN
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
## 2024-11-22 ENCOUNTER — HOSPITAL ENCOUNTER (OUTPATIENT)
Dept: RADIOLOGY | Facility: HOSPITAL | Age: 54
Discharge: HOME | End: 2024-11-22
Attending: NURSE PRACTITIONER
Payer: MEDICARE

## 2024-11-22 DIAGNOSIS — M54.2 CERVICALGIA: ICD-10-CM

## 2024-11-22 PROCEDURE — 72141 MRI NECK SPINE W/O DYE: CPT | Mod: 26,,, | Performed by: RADIOLOGY

## 2024-11-22 PROCEDURE — 72141 MRI NECK SPINE W/O DYE: CPT | Mod: TC

## 2024-12-03 ENCOUNTER — HOSPITAL ENCOUNTER (OUTPATIENT)
Dept: RADIOLOGY | Facility: HOSPITAL | Age: 54
Discharge: HOME | End: 2024-12-03
Attending: NURSE PRACTITIONER
Payer: MEDICARE

## 2024-12-03 DIAGNOSIS — M54.42 LUMBAGO WITH SCIATICA, LEFT SIDE: ICD-10-CM

## 2024-12-03 PROCEDURE — 72148 MRI LUMBAR SPINE W/O DYE: CPT | Mod: TC

## 2024-12-03 PROCEDURE — 72148 MRI LUMBAR SPINE W/O DYE: CPT | Mod: 26,,, | Performed by: RADIOLOGY

## 2024-12-04 ENCOUNTER — CLINICAL SUPPORT (OUTPATIENT)
Dept: REHABILITATION | Facility: HOSPITAL | Age: 54
End: 2024-12-04
Payer: MEDICARE

## 2024-12-04 DIAGNOSIS — Z74.09 IMPAIRED FUNCTIONAL MOBILITY AND ACTIVITY TOLERANCE: Primary | ICD-10-CM

## 2024-12-04 PROCEDURE — 97140 MANUAL THERAPY 1/> REGIONS: CPT | Mod: KX,PN

## 2024-12-04 PROCEDURE — 97110 THERAPEUTIC EXERCISES: CPT | Mod: KX,PN
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
## 2024-12-06 ENCOUNTER — CLINICAL SUPPORT (OUTPATIENT)
Dept: REHABILITATION | Facility: HOSPITAL | Age: 54
End: 2024-12-06
Payer: MEDICARE

## 2024-12-06 DIAGNOSIS — M79.18 MYOFASCIAL PAIN SYNDROME, CERVICAL: ICD-10-CM

## 2024-12-06 DIAGNOSIS — Z74.09 IMPAIRED FUNCTIONAL MOBILITY AND ACTIVITY TOLERANCE: Primary | ICD-10-CM

## 2024-12-06 PROCEDURE — 97140 MANUAL THERAPY 1/> REGIONS: CPT | Mod: KX,PN

## 2024-12-06 PROCEDURE — 97014 ELECTRIC STIMULATION THERAPY: CPT | Mod: KX,PN

## 2024-12-06 PROCEDURE — 97110 THERAPEUTIC EXERCISES: CPT | Mod: KX,PN
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
## 2024-12-10 ENCOUNTER — CLINICAL SUPPORT (OUTPATIENT)
Dept: REHABILITATION | Facility: HOSPITAL | Age: 54
End: 2024-12-10
Payer: MEDICARE

## 2024-12-10 DIAGNOSIS — Z74.09 IMPAIRED FUNCTIONAL MOBILITY AND ACTIVITY TOLERANCE: Primary | ICD-10-CM

## 2024-12-10 DIAGNOSIS — M79.18 MYOFASCIAL PAIN SYNDROME, CERVICAL: ICD-10-CM

## 2024-12-10 PROCEDURE — 97140 MANUAL THERAPY 1/> REGIONS: CPT | Mod: KX,PN

## 2024-12-10 PROCEDURE — 97110 THERAPEUTIC EXERCISES: CPT | Mod: KX,PN

## 2024-12-10 PROCEDURE — 97014 ELECTRIC STIMULATION THERAPY: CPT | Mod: KX,PN
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
## 2024-12-12 ENCOUNTER — CLINICAL SUPPORT (OUTPATIENT)
Dept: REHABILITATION | Facility: HOSPITAL | Age: 54
End: 2024-12-12
Payer: MEDICARE

## 2024-12-12 DIAGNOSIS — Z74.09 IMPAIRED FUNCTIONAL MOBILITY AND ACTIVITY TOLERANCE: Primary | ICD-10-CM

## 2024-12-12 DIAGNOSIS — M79.18 MYOFASCIAL PAIN SYNDROME, CERVICAL: ICD-10-CM

## 2024-12-12 PROCEDURE — 97110 THERAPEUTIC EXERCISES: CPT | Mod: PN,CQ

## 2024-12-12 PROCEDURE — 97140 MANUAL THERAPY 1/> REGIONS: CPT | Mod: PN,CQ
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
## 2024-12-17 ENCOUNTER — CLINICAL SUPPORT (OUTPATIENT)
Dept: REHABILITATION | Facility: HOSPITAL | Age: 54
End: 2024-12-17
Payer: MEDICARE

## 2024-12-17 DIAGNOSIS — Z74.09 IMPAIRED FUNCTIONAL MOBILITY AND ACTIVITY TOLERANCE: Primary | ICD-10-CM

## 2024-12-17 DIAGNOSIS — M79.18 MYOFASCIAL PAIN SYNDROME, CERVICAL: ICD-10-CM

## 2024-12-17 PROCEDURE — 97140 MANUAL THERAPY 1/> REGIONS: CPT | Mod: KX,PN,CQ

## 2024-12-17 PROCEDURE — 97110 THERAPEUTIC EXERCISES: CPT | Mod: KX,PN,CQ
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
## 2024-12-19 ENCOUNTER — CLINICAL SUPPORT (OUTPATIENT)
Dept: REHABILITATION | Facility: HOSPITAL | Age: 54
End: 2024-12-19
Payer: MEDICARE

## 2024-12-19 DIAGNOSIS — Z74.09 IMPAIRED FUNCTIONAL MOBILITY AND ACTIVITY TOLERANCE: Primary | ICD-10-CM

## 2024-12-19 DIAGNOSIS — M79.18 MYOFASCIAL PAIN SYNDROME, CERVICAL: ICD-10-CM

## 2024-12-19 PROCEDURE — 97140 MANUAL THERAPY 1/> REGIONS: CPT | Mod: KX,PN,CQ

## 2024-12-19 PROCEDURE — 97110 THERAPEUTIC EXERCISES: CPT | Mod: KX,PN,CQ
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
## 2024-12-24 ENCOUNTER — CLINICAL SUPPORT (OUTPATIENT)
Dept: REHABILITATION | Facility: HOSPITAL | Age: 54
End: 2024-12-24
Payer: MEDICARE

## 2024-12-24 DIAGNOSIS — Z74.09 IMPAIRED FUNCTIONAL MOBILITY AND ACTIVITY TOLERANCE: Primary | ICD-10-CM

## 2024-12-24 DIAGNOSIS — M79.18 MYOFASCIAL PAIN SYNDROME, CERVICAL: ICD-10-CM

## 2024-12-24 PROCEDURE — 97110 THERAPEUTIC EXERCISES: CPT | Mod: KX,PN,CQ

## 2024-12-24 PROCEDURE — 97140 MANUAL THERAPY 1/> REGIONS: CPT | Mod: KX,PN,CQ
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
## 2024-12-27 ENCOUNTER — CLINICAL SUPPORT (OUTPATIENT)
Dept: REHABILITATION | Facility: HOSPITAL | Age: 54
End: 2024-12-27
Payer: MEDICARE

## 2024-12-27 DIAGNOSIS — M79.18 MYOFASCIAL PAIN SYNDROME, CERVICAL: ICD-10-CM

## 2024-12-27 DIAGNOSIS — Z74.09 IMPAIRED FUNCTIONAL MOBILITY AND ACTIVITY TOLERANCE: Primary | ICD-10-CM

## 2024-12-27 PROCEDURE — 97110 THERAPEUTIC EXERCISES: CPT | Mod: KX,PN,CQ

## 2024-12-27 PROCEDURE — 97140 MANUAL THERAPY 1/> REGIONS: CPT | Mod: KX,PN,CQ
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
## 2025-02-07 DIAGNOSIS — M54.2 CERVICALGIA: Primary | ICD-10-CM

## 2025-02-17 DIAGNOSIS — Z48.811 ENCOUNTER FOR SURGICAL AFTERCARE FOLLOWING SURGERY ON THE NERVOUS SYSTEM: Primary | ICD-10-CM

## 2025-02-25 ENCOUNTER — CLINICAL SUPPORT (OUTPATIENT)
Dept: REHABILITATION | Facility: HOSPITAL | Age: 55
End: 2025-02-25
Payer: MEDICARE

## 2025-02-25 DIAGNOSIS — Z48.811 ENCOUNTER FOR SURGICAL AFTERCARE FOLLOWING SURGERY ON THE NERVOUS SYSTEM: ICD-10-CM

## 2025-02-25 DIAGNOSIS — M53.82 IMPAIRED RANGE OF MOTION OF CERVICAL SPINE: Primary | ICD-10-CM

## 2025-02-25 PROCEDURE — 97530 THERAPEUTIC ACTIVITIES: CPT | Mod: PN

## 2025-02-25 PROCEDURE — 97161 PT EVAL LOW COMPLEX 20 MIN: CPT | Mod: PN

## 2025-02-25 NOTE — Clinical Note
February 26, 2025  Lorenza Zepeda NP  [address]  [address]  [place] [address]    To whom it may concern,     The attached plan of care is being sent to you for review and reference.    You may indicate your approval by signing the document electronically, or by faxing/mailing a signed copy of the final page of this document back to the attention of Giovana Wang, PT:         Plan of Care 2/26/25   Effective from: 2/26/2025  Effective to: 5/26/2025    Plan ID: [id number]            Participants as of Finalize on 2/26/2025    Name Type Comments Contact Info    Lorenza Zepeda NP Referring Provider  [phone]    Giovana Wang PT Physical Therapist         Last Plan Note     Author: Giovana Wang PT Status: Signed Last edited: 2/25/2025 10:00 AM         Outpatient Rehab    Physical Therapy Evaluation    Patient Name: Nicole Harris  MRN: [id number]  YOB: 1970  Encounter Date: 2/25/2025    Therapy Diagnosis:   Encounter Diagnoses   Name Primary?    Encounter for surgical aftercare following surgery on the nervous system     Impaired range of motion of cervical spine Yes     Physician: Lorenza Zepeda NP    Physician Orders: Eval and Treat  Medical Diagnosis: Z48.811 (ICD-10-CM) - Encounter for surgical aftercare following surgery on the nervous system     Visit # / Visits Authorized:  1 / 1   Date of Evaluation:  2/25/2025   Insurance Authorization Period: 2/25/25 to 12/31/25  Plan of Care Certification:  2/25/2025 to 5/25/25      Time In:   1000  Time Out:  1100  Total Time:   60  Total Billable Time: 60    Intake Outcome Measure for FOTO Survey    Therapist reviewed FOTO scores for Nicole Harris on 2/25/2025.   FOTO report - see Media section or FOTO account episode details.     Intake Score:  %    Precautions     Wear cervical collar for 2 months post op. When in a car or ambulating. No more than 10# for lifting.       Subjective   History of Present Illness  Nicole hudson nelson  55 y.o. female who reports to physical therapy with a chief concern of neck and shoulder pain.     The patient reports a medical diagnosis of Z48.811 (ICD-10-CM) - Encounter for surgical aftercare following surgery on the nervous system. The patient has experienced this issue since 23.           History of Present Condition/Illness: Pt reports she had an ACDF on 2025.Pt reports she is just now starting to be able to eat and drink. She has a hard time sleeping at this time. Pt has had minimal pain on left shoulder > right shoulder. Pt feels more weak on left. Pt reports she has been in pain since surgery. She reports her neck is still popping. She has been wearing the collar any time she gets up.     Activities of Daily Living  Social history was obtained from Patient.    General Prior Level of Function Comments: I  General Current Level of Function Comments: I with pain and fatigue.           Pain     Patient reports a current pain level of 7/10. Pain at best is reported as 6/10. Pain at worst is reported as 9/10.   Location: neck and Bilateral arms  Clinical Progression (since onset): Stable  Pain Qualities: Aching, Throbbing, Tenderness  Pain-Relieving Factors: Ice         Treatment History  Treatments  Previously Received Treatments: Yes  Previous Treatments: Physical therapy  Currently Receiving Treatments: No    Living Arrangements  Living Situation  Housing: Home independently  Living Arrangements: Alone        Employment  Patient does not report that: Does the patient's condition impact their ability to work?  Employment Status: On disability          Past Medical History/Physical Systems Review:   Nicole Harris  has a past medical history of Anxiety, Bipolar disorder, Depression, Diabetes mellitus, type 2, Fibromyalgia, HTN (hypertension), Insomnia, and Migraine.    Nicole Harris  has a past surgical history that includes  section; Tonsillectomy; Epidural steroid injection (N/A,  4/29/2019); Epidural steroid injection (N/A, 5/27/2019); Epidural steroid injection (N/A, 7/8/2019); Trigger point injection (N/A, 7/8/2019); Colonoscopy (N/A, 9/18/2019); Esophagogastroduodenoscopy (N/A, 9/18/2019); Epidural steroid injection (N/A, 10/21/2019); Trigger point injection (N/A, 10/21/2019); ANGIOGRAM, CORONARY, WITH LEFT HEART CATHETERIZATION (Left, 6/24/2020); Coronary Artery Bypass Graft (CABG) (N/A, 6/26/2020); Endoscopic harvest of vein (Left, 6/26/2020); Cardiac catheterization; triple bypass; Epidural steroid injection (N/A, 10/1/2020); Trigger point injection (N/A, 10/1/2020); Epidural steroid injection (N/A, 1/7/2021); Injection of anesthetic agent around nerve (Bilateral, 4/1/2021); Epidural steroid injection (N/A, 9/15/2021); Epidural steroid injection into cervical spine (N/A, 3/2/2022); Epidural steroid injection (N/A, 6/29/2022); Epidural steroid injection (N/A, 11/2/2022); Carpal tunnel release (Right, 1/5/2023); decompression, nerve, ulnar (Right, 1/5/2023); Epidural steroid injection into cervical spine (N/A, 10/13/2023); Epidural steroid injection into cervical spine (N/A, 12/27/2023); and Epidural steroid injection into cervical spine (N/A, 3/27/2024).    Nicole has a current medication list which includes the following prescription(s): albuterol, amoxicillin-clavulanate 875-125mg, atorvastatin, azelastine, cetirizine, cyclobenzaprine, empagliflozin, erythromycin, escitalopram oxalate, fluconazole, fluticasone propionate, insulin glargine,hum.rec.anlog, levocetirizine, lisinopril, metformin, montelukast, mounjaro, naproxen sodium, nortriptyline, pantoprazole, true metrix glucose test strip, and zolpidem, and the following Facility-Administered Medications: 0.9% nacl, lactated ringers, lactated ringers, lactated ringers, lactated ringers, lactated ringers, and lactated ringers.    Review of patient's allergies indicates:   Allergen Reactions    Doxycycline     Hydrocodone Nausea And  Vomiting    Vaccine adjuvant system, as01b liposomal     Varicella-zoster ge-as01b (pf)      Other reaction(s): Memory problems    Varicella-zoster virus glycoprotein e, recombinant     Benadryl [diphenhydramine hcl] Nausea Only        Objective      Shoulder Range of Motion     Shoulder, Elbow, or Forearm Range of Motion Details: All within functional limits          Shoulder Strength - Planes of Motion   Right Strength Right Pain Left Strength Left  Pain   Flexion 4-   4-     Extension           ABduction 4-   4-     ADduction           Horizontal ABduction           Horizontal ADduction           Internal Rotation 0°           Internal Rotation 90° 4-   4-     External Rotation 0°           External Rotation 90° 4-   4-                   Bed Mobility Assessment  Rolling Assistance: Independent  Scooting to Edge of Bed Assistance: Independent            Treatment:          Therapeutic Activity  Therapeutic Activity 1: UAB 5 fwd, 5 bkw    Assessment & Plan   Assessment  Nicole presents with a condition of Low complexity.   Presentation of Symptoms: Stable  Will Comorbidities Impact Care: No       Functional Limitations: Activity tolerance, Bed mobility, Carrying objects, Functional mobility, Increased risk of fall, Maintaining balance, Manipulating objects, Pain with ADLs/IADLs, Range of motion, Reaching, Standing tolerance, Transfers  Impairments: Abnormal coordination, Abnormal muscle tone, Abnormal muscle firing, Abnormal or restricted range of motion, Activity intolerance, Impaired balance, Impaired physical strength, Pain with functional activity  Personal Factors Affecting Prognosis: Pain    Prognosis: Good  Assessment Details: Pt presents to therapy following ACDF on cervical spine. Pt has been wearing cervical collar since surgery. Pt has some numbness and tingling in left shoulder > right shoulder. Pt is feeling discouraged that she is still having pain. Sleep is challenging for pt at this time. Pt has  limited strength in B arms at this time. Pt has poor posture with abducted scapula. Pt is in need of education and strengthening at this time.     Plan  From a physical therapy perspective, the patient would benefit from: Skilled Rehab Services    Planned therapy interventions include: Therapeutic activities, Therapeutic exercise, Neuromuscular re-education, Manual therapy, and ADLs/IADLs.    Planned modalities to include: Electrical stimulation - attended, Electrical stimulation - passive/unattended, Ultrasound, and Thermotherapy (hot pack).        Visit Frequency: 2 times Per Week for 6 Weeks.       This plan was discussed with Patient and Therapy assistant.   Discussion participants: Agreed Upon Plan of Care             Patient's spiritual, cultural, and educational needs considered and patient agreeable to plan of care and goals.           Goals:   Active       LTG       Pt will be independent with HEP to allow for safe DC.        Start:  02/25/25    Expected End:  04/01/25            Pt will improve FOTo by 10%       Start:  02/25/25    Expected End:  04/01/25            Pt will be able to  an item over head to allow for increased functional tasks.        Start:  02/25/25    Expected End:  04/01/25               STG       Pt will be able to complete HEP with minimal cues to allow for increased mobility.        Start:  02/25/25    Expected End:  04/01/25            Pt will be able to complete all functional tasks with Ue's to allow for normalicy of tasks.        Start:  02/25/25    Expected End:  04/01/25            Pt will have normalized body mechanics with no cues.        Start:  02/25/25    Expected End:  04/01/25                Giovana Wang PT         Current Participants as of 2/26/2025    Name Type Comments Contact Info    Lorenza Zepeda NP Referring Provider  [phone]    Signature pending    Giovana Wang PT Physical Therapist                  Sincerely,      Sarah Lacoste, PT [facility]                                                            Dear Giovana Wang, PT,    RE: Ms. Nicole Harris, MRN: [id number]    I certify that I have reviewed the attached plan of care and agree to the details within.        ___________________________  ___________________________  Patient Printed Name    Patient Signed Name      ___________________________  Date and Time

## 2025-02-26 NOTE — PROGRESS NOTES
Outpatient Rehab    Physical Therapy Evaluation    Patient Name: Nicole Harris  MRN: [id number]  YOB: 1970  Encounter Date: 2/25/2025    Therapy Diagnosis:   Encounter Diagnoses   Name Primary?    Encounter for surgical aftercare following surgery on the nervous system     Impaired range of motion of cervical spine Yes     Physician: Lorenza Zepeda NP    Physician Orders: Eval and Treat  Medical Diagnosis: Z48.811 (ICD-10-CM) - Encounter for surgical aftercare following surgery on the nervous system     Visit # / Visits Authorized:  1 / 1   Date of Evaluation:  2/25/2025   Insurance Authorization Period: 2/25/25 to 12/31/25  Plan of Care Certification:  2/25/2025 to 5/25/25      Time In:   1000  Time Out:  1100  Total Time:   60  Total Billable Time: 60    Intake Outcome Measure for FOTO Survey    Therapist reviewed FOTO scores for Nicole Harris on 2/25/2025.   FOTO report - see Media section or FOTO account episode details.     Intake Score:  %    Precautions     Wear cervical collar for 2 months post op. When in a car or ambulating. No more than 10# for lifting.       Subjective   History of Present Illness  Nicole is a 55 y.o. female who reports to physical therapy with a chief concern of neck and shoulder pain.     The patient reports a medical diagnosis of Z48.811 (ICD-10-CM) - Encounter for surgical aftercare following surgery on the nervous system. The patient has experienced this issue since 02/25/25.           History of Present Condition/Illness: Pt reports she had an ACDF on Feb 6, 2025.Pt reports she is just now starting to be able to eat and drink. She has a hard time sleeping at this time. Pt has had minimal pain on left shoulder > right shoulder. Pt feels more weak on left. Pt reports she has been in pain since surgery. She reports her neck is still popping. She has been wearing the collar any time she gets up.     Activities of Daily Living  Social history was obtained from  Patient.    General Prior Level of Function Comments: I  General Current Level of Function Comments: I with pain and fatigue.           Pain     Patient reports a current pain level of 7/10. Pain at best is reported as 6/10. Pain at worst is reported as 9/10.   Location: neck and Bilateral arms  Clinical Progression (since onset): Stable  Pain Qualities: Aching, Throbbing, Tenderness  Pain-Relieving Factors: Ice         Treatment History  Treatments  Previously Received Treatments: Yes  Previous Treatments: Physical therapy  Currently Receiving Treatments: No    Living Arrangements  Living Situation  Housing: Home independently  Living Arrangements: Alone        Employment  Patient does not report that: Does the patient's condition impact their ability to work?  Employment Status: On disability          Past Medical History/Physical Systems Review:   Nicole Harris  has a past medical history of Anxiety, Bipolar disorder, Depression, Diabetes mellitus, type 2, Fibromyalgia, HTN (hypertension), Insomnia, and Migraine.    Nicole Harris  has a past surgical history that includes  section; Tonsillectomy; Epidural steroid injection (N/A, 2019); Epidural steroid injection (N/A, 2019); Epidural steroid injection (N/A, 2019); Trigger point injection (N/A, 2019); Colonoscopy (N/A, 2019); Esophagogastroduodenoscopy (N/A, 2019); Epidural steroid injection (N/A, 10/21/2019); Trigger point injection (N/A, 10/21/2019); ANGIOGRAM, CORONARY, WITH LEFT HEART CATHETERIZATION (Left, 2020); Coronary Artery Bypass Graft (CABG) (N/A, 2020); Endoscopic harvest of vein (Left, 2020); Cardiac catheterization; triple bypass; Epidural steroid injection (N/A, 10/1/2020); Trigger point injection (N/A, 10/1/2020); Epidural steroid injection (N/A, 2021); Injection of anesthetic agent around nerve (Bilateral, 2021); Epidural steroid injection (N/A, 9/15/2021); Epidural steroid  injection into cervical spine (N/A, 3/2/2022); Epidural steroid injection (N/A, 6/29/2022); Epidural steroid injection (N/A, 11/2/2022); Carpal tunnel release (Right, 1/5/2023); decompression, nerve, ulnar (Right, 1/5/2023); Epidural steroid injection into cervical spine (N/A, 10/13/2023); Epidural steroid injection into cervical spine (N/A, 12/27/2023); and Epidural steroid injection into cervical spine (N/A, 3/27/2024).    Nicole has a current medication list which includes the following prescription(s): albuterol, amoxicillin-clavulanate 875-125mg, atorvastatin, azelastine, cetirizine, cyclobenzaprine, empagliflozin, erythromycin, escitalopram oxalate, fluconazole, fluticasone propionate, insulin glargine,hum.rec.anlog, levocetirizine, lisinopril, metformin, montelukast, mounjaro, naproxen sodium, nortriptyline, pantoprazole, true metrix glucose test strip, and zolpidem, and the following Facility-Administered Medications: 0.9% nacl, lactated ringers, lactated ringers, lactated ringers, lactated ringers, lactated ringers, and lactated ringers.    Review of patient's allergies indicates:   Allergen Reactions    Doxycycline     Hydrocodone Nausea And Vomiting    Vaccine adjuvant system, as01b liposomal     Varicella-zoster ge-as01b (pf)      Other reaction(s): Memory problems    Varicella-zoster virus glycoprotein e, recombinant     Benadryl [diphenhydramine hcl] Nausea Only        Objective      Shoulder Range of Motion     Shoulder, Elbow, or Forearm Range of Motion Details: All within functional limits          Shoulder Strength - Planes of Motion   Right Strength Right Pain Left Strength Left  Pain   Flexion 4-   4-     Extension           ABduction 4-   4-     ADduction           Horizontal ABduction           Horizontal ADduction           Internal Rotation 0°           Internal Rotation 90° 4-   4-     External Rotation 0°           External Rotation 90° 4-   4-                   Bed Mobility  Assessment  Rolling Assistance: Independent  Scooting to Edge of Bed Assistance: Independent            Treatment:          Therapeutic Activity  Therapeutic Activity 1: UAB 5 fwd, 5 bkw    Assessment & Plan   Assessment  Nicole presents with a condition of Low complexity.   Presentation of Symptoms: Stable  Will Comorbidities Impact Care: No       Functional Limitations: Activity tolerance, Bed mobility, Carrying objects, Functional mobility, Increased risk of fall, Maintaining balance, Manipulating objects, Pain with ADLs/IADLs, Range of motion, Reaching, Standing tolerance, Transfers  Impairments: Abnormal coordination, Abnormal muscle tone, Abnormal muscle firing, Abnormal or restricted range of motion, Activity intolerance, Impaired balance, Impaired physical strength, Pain with functional activity  Personal Factors Affecting Prognosis: Pain    Prognosis: Good  Assessment Details: Pt presents to therapy following ACDF on cervical spine. Pt has been wearing cervical collar since surgery. Pt has some numbness and tingling in left shoulder > right shoulder. Pt is feeling discouraged that she is still having pain. Sleep is challenging for pt at this time. Pt has limited strength in B arms at this time. Pt has poor posture with abducted scapula. Pt is in need of education and strengthening at this time.     Plan  From a physical therapy perspective, the patient would benefit from: Skilled Rehab Services    Planned therapy interventions include: Therapeutic activities, Therapeutic exercise, Neuromuscular re-education, Manual therapy, and ADLs/IADLs.    Planned modalities to include: Electrical stimulation - attended, Electrical stimulation - passive/unattended, Ultrasound, and Thermotherapy (hot pack).        Visit Frequency: 2 times Per Week for 6 Weeks.       This plan was discussed with Patient and Therapy assistant.   Discussion participants: Agreed Upon Plan of Care             Patient's spiritual, cultural, and  educational needs considered and patient agreeable to plan of care and goals.           Goals:   Active       LTG       Pt will be independent with HEP to allow for safe DC.        Start:  02/25/25    Expected End:  04/01/25            Pt will improve FOTo by 10%       Start:  02/25/25    Expected End:  04/01/25            Pt will be able to  an item over head to allow for increased functional tasks.        Start:  02/25/25    Expected End:  04/01/25               STG       Pt will be able to complete HEP with minimal cues to allow for increased mobility.        Start:  02/25/25    Expected End:  04/01/25            Pt will be able to complete all functional tasks with Ue's to allow for normalicy of tasks.        Start:  02/25/25    Expected End:  04/01/25            Pt will have normalized body mechanics with no cues.        Start:  02/25/25    Expected End:  04/01/25                Giovana Wang, PT

## 2025-02-27 ENCOUNTER — CLINICAL SUPPORT (OUTPATIENT)
Dept: REHABILITATION | Facility: HOSPITAL | Age: 55
End: 2025-02-27
Payer: MEDICARE

## 2025-02-27 DIAGNOSIS — M53.82 IMPAIRED RANGE OF MOTION OF CERVICAL SPINE: Primary | ICD-10-CM

## 2025-02-27 PROCEDURE — 97530 THERAPEUTIC ACTIVITIES: CPT | Mod: PN

## 2025-02-27 PROCEDURE — 97140 MANUAL THERAPY 1/> REGIONS: CPT | Mod: PN

## 2025-02-27 PROCEDURE — 97110 THERAPEUTIC EXERCISES: CPT | Mod: PN

## 2025-02-27 NOTE — PROGRESS NOTES
Outpatient Rehab    Physical Therapy Visit    Patient Name: Nicole Harris  MRN: [id number]  YOB: 1970  Encounter Date: 2/27/2025    Therapy Diagnosis:   Encounter Diagnosis   Name Primary?    Impaired range of motion of cervical spine Yes     Physician: Lorenza Zepeda NP    Physician Orders: Eval and Treat  Medical Diagnosis: Z48.811 (ICD-10-CM) - Encounter for surgical aftercare following surgery on the nervous system      Visit # / Visits Authorized:  1 / 12  Date of Evaluation:  2/25/2025   Insurance Authorization Period: 2/25/25 to 12/31/25  Plan of Care Certification:  2/25/2025 to 5/25/25     Time In: 1045   Time Out: 1142  Total Time: 57   Total Billable Time: 57    FOTO:  Intake Score:  %  Survey Score 1:  %  Survey Score 2:  %         Subjective   pt reports she feels okay today took a pain pill this morning.  Pain reported as 5/10.      Objective            Treatment:  Therapeutic Exercise  Therapeutic Exercise Activity 1: nu step level 3 for 20 minutes    Manual Therapy  Manual Therapy Activity 1: STM completed to upper and medial traps to allow for muscle release.    Balance/Neuromuscular Re-Education  Balance/Neuromuscular Re-Education Activity 1: push ups 20x in increments of 5  Balance/Neuromuscular Re-Education Activity 2: reverse push ups 15x  Balance/Neuromuscular Re-Education Activity 3: pulleys 3 minutes  Balance/Neuromuscular Re-Education Activity 4: scapula squeezes 20x  Balance/Neuromuscular Re-Education Activity 5: supine scapula squeezes 1# 20x  Balance/Neuromuscular Re-Education Activity 6: supine dowel 1# extension    Therapeutic Activity  Therapeutic Activity 1: nu step level 4 for 15 minutes  Therapeutic Activity 2: calf stretch  Therapeutic Activity 3: hip 3 way  Therapeutic Activity 4: supine: ball flexion and extension  Therapeutic Activity 5: supine: LTR    Assessment & Plan   Assessment: Pt felt better post session. Pt noted to have left muscle tightness at this  time. Pt remains to have poor body mechanics due to pain.       Patient will continue to benefit from skilled outpatient physical therapy to address the deficits listed in the problem list box on initial evaluation, provide pt/family education and to maximize pt's level of independence in the home and community environment.     Patient's spiritual, cultural, and educational needs considered and patient agreeable to plan of care and goals.           Plan:      Goals:   Active       LTG       Pt will be independent with HEP to allow for safe DC.        Start:  02/25/25    Expected End:  04/01/25            Pt will improve FOTo by 10%       Start:  02/25/25    Expected End:  04/01/25            Pt will be able to  an item over head to allow for increased functional tasks.        Start:  02/25/25    Expected End:  04/01/25               STG       Pt will be able to complete HEP with minimal cues to allow for increased mobility.        Start:  02/25/25    Expected End:  04/01/25            Pt will be able to complete all functional tasks with Ue's to allow for normalicy of tasks.        Start:  02/25/25    Expected End:  04/01/25            Pt will have normalized body mechanics with no cues.        Start:  02/25/25    Expected End:  04/01/25                Giovana Wang, PT

## 2025-03-06 ENCOUNTER — CLINICAL SUPPORT (OUTPATIENT)
Dept: REHABILITATION | Facility: HOSPITAL | Age: 55
End: 2025-03-06
Payer: MEDICARE

## 2025-03-06 DIAGNOSIS — M53.82 IMPAIRED RANGE OF MOTION OF CERVICAL SPINE: Primary | ICD-10-CM

## 2025-03-06 PROCEDURE — 97530 THERAPEUTIC ACTIVITIES: CPT | Mod: PN,CQ

## 2025-03-06 PROCEDURE — 97112 NEUROMUSCULAR REEDUCATION: CPT | Mod: PN,CQ

## 2025-03-06 PROCEDURE — 97110 THERAPEUTIC EXERCISES: CPT | Mod: PN,CQ

## 2025-03-06 NOTE — PROGRESS NOTES
Outpatient Rehab    Physical Therapy Visit    Patient Name: Nicole Harris  MRN: [id number]  YOB: 1970  Encounter Date: 3/6/2025    Therapy Diagnosis:   Encounter Diagnosis   Name Primary?    Impaired range of motion of cervical spine Yes     Physician: Lorenza Zepeda NP    Physician Orders: Eval and Treat           Time In:   12:30 PM  Time Out:  1:30 PM  Total Time:   60 Minutes  Total Billable Time: 40 Minutes    FOTO:  Intake Score:  %  Survey Score 1:  %  Survey Score 2:  %         Subjective   No new c/o's; feeling ok- took pain med prior to arrival.  Pain reported as 5/10. B/L Shoulders; Left Arm; Low back    Objective            Treatment:  20 Minutes  Therapeutic Exercise  Therapeutic Exercise Activity 1: Nustep level 3 for 20 minutes    10 Minutes  Balance/Neuromuscular Re-Education  Balance/Neuromuscular Re-Education Activity 1: Wall Push-ups x 15  Balance/Neuromuscular Re-Education Activity 2: Reverse Wall Push-ups x 15  Balance/Neuromuscular Re-Education Activity 3: Pulleys x 5 min  Balance/Neuromuscular Re-Education Activity 4: Scapula Squeezes x 20    10 Minutes  Therapeutic Activity  Therapeutic Activity 2: Heel Cord stretch on wedge 3 x 30 sec  Therapeutic Activity 3: Standing Hip Flex/Abd/Ext x 15  Therapeutic Activity 4: DKC w/ PB x 2 min  Therapeutic Activity 5: LTR w/ PB x 2 min    Assessment & Plan   Assessment: Patient did well with exercises; no exacerbations; general core weakness noted       Patient will continue to benefit from skilled outpatient physical therapy to address the deficits listed in the problem list box on initial evaluation, provide pt/family education and to maximize pt's level of independence in the home and community environment.     Patient's spiritual, cultural, and educational needs considered and patient agreeable to plan of care and goals.           Plan: Continue per POC and progress with strength and mobility exercises as patient  tolerates    Goals:   Active       LTG       Pt will be independent with HEP to allow for safe DC.  (Progressing)       Start:  02/25/25    Expected End:  04/01/25            Pt will improve FOTo by 10% (Progressing)       Start:  02/25/25    Expected End:  04/01/25            Pt will be able to  an item over head to allow for increased functional tasks.  (Progressing)       Start:  02/25/25    Expected End:  04/01/25               STG       Pt will be able to complete HEP with minimal cues to allow for increased mobility.  (Progressing)       Start:  02/25/25    Expected End:  04/01/25            Pt will be able to complete all functional tasks with Ue's to allow for normalicy of tasks.  (Progressing)       Start:  02/25/25    Expected End:  04/01/25            Pt will have normalized body mechanics with no cues.  (Progressing)       Start:  02/25/25    Expected End:  04/01/25                Jonathan Favre, PTA

## 2025-03-11 ENCOUNTER — CLINICAL SUPPORT (OUTPATIENT)
Dept: REHABILITATION | Facility: HOSPITAL | Age: 55
End: 2025-03-11
Payer: MEDICARE

## 2025-03-11 DIAGNOSIS — M53.82 IMPAIRED RANGE OF MOTION OF CERVICAL SPINE: Primary | ICD-10-CM

## 2025-03-11 PROCEDURE — 97112 NEUROMUSCULAR REEDUCATION: CPT | Mod: PN

## 2025-03-11 PROCEDURE — 97530 THERAPEUTIC ACTIVITIES: CPT | Mod: PN

## 2025-03-12 NOTE — PROGRESS NOTES
Outpatient Rehab    Physical Therapy Visit    Patient Name: Nicole Harris  MRN: [id number]  YOB: 1970  Encounter Date: 3/11/2025    Therapy Diagnosis:   Encounter Diagnosis   Name Primary?    Impaired range of motion of cervical spine Yes     Physician: Lorenza Zepeda NP    Physician Orders: Eval and Treat  Medical Diagnosis: Z48.811 (ICD-10-CM) - Encounter for surgical aftercare following surgery on the nervous system      Visit # / Visits Authorized:  3 / 12   Date of Evaluation:  2/25/2025   Insurance Authorization Period: 2/25/25 to 12/31/25  Plan of Care Certification:  2/25/2025 to 5/25/25      Time In: 1330   Time Out: 1415  Total Time: 45   Total Billable Time: 45    FOTO:  Intake Score:  %  Survey Score 1:  %  Survey Score 2:  %         Subjective         Pt reports she is feeling okay at this time.     Objective            Treatment:  Balance/Neuromuscular Re-Education  Balance/Neuromuscular Re-Education Activity 1: Wall Push-ups x 15  Balance/Neuromuscular Re-Education Activity 2: Reverse Wall Push-ups x 15  Balance/Neuromuscular Re-Education Activity 3: Pulleys x 5 min  Balance/Neuromuscular Re-Education Activity 4: Scapula Squeezes x 20  Balance/Neuromuscular Re-Education Activity 5: supine scapula squeezes 1# 20x  Balance/Neuromuscular Re-Education Activity 6: supine dowel 1# extension    Therapeutic Activity  Therapeutic Activity 1: nu step level 4 for 15 minutes  Therapeutic Activity 2: Heel Cord stretch on wedge 3 x 30 sec  Therapeutic Activity 3: Standing Hip Flex/Abd/Ext x 15  Therapeutic Activity 4: DKC w/ PB x 2 min  Therapeutic Activity 5: LTR w/ PB x 2 min    Assessment & Plan   Assessment:     Pt did well with treatment a tthis time.     Patient will continue to benefit from skilled outpatient physical therapy to address the deficits listed in the problem list box on initial evaluation, provide pt/family education and to maximize pt's level of independence in the home and  community environment.     Patient's spiritual, cultural, and educational needs considered and patient agreeable to plan of care and goals.           Plan:      Goals:   Active       LTG       Pt will be independent with HEP to allow for safe DC.  (Progressing)       Start:  02/25/25    Expected End:  04/01/25            Pt will improve FOTo by 10% (Progressing)       Start:  02/25/25    Expected End:  04/01/25            Pt will be able to  an item over head to allow for increased functional tasks.  (Progressing)       Start:  02/25/25    Expected End:  04/01/25               STG       Pt will be able to complete HEP with minimal cues to allow for increased mobility.  (Progressing)       Start:  02/25/25    Expected End:  04/01/25            Pt will be able to complete all functional tasks with Ue's to allow for normalicy of tasks.  (Progressing)       Start:  02/25/25    Expected End:  04/01/25            Pt will have normalized body mechanics with no cues.  (Progressing)       Start:  02/25/25    Expected End:  04/01/25                Giovana Wang, PT

## 2025-03-18 ENCOUNTER — CLINICAL SUPPORT (OUTPATIENT)
Dept: REHABILITATION | Facility: HOSPITAL | Age: 55
End: 2025-03-18
Payer: MEDICARE

## 2025-03-18 DIAGNOSIS — Z74.09 IMPAIRED FUNCTIONAL MOBILITY AND ACTIVITY TOLERANCE: Primary | ICD-10-CM

## 2025-03-18 PROCEDURE — 97530 THERAPEUTIC ACTIVITIES: CPT | Mod: PN,CQ

## 2025-03-18 PROCEDURE — 97150 GROUP THERAPEUTIC PROCEDURES: CPT | Mod: PN,CQ

## 2025-03-18 PROCEDURE — 97112 NEUROMUSCULAR REEDUCATION: CPT | Mod: PN,CQ

## 2025-03-18 NOTE — PROGRESS NOTES
Outpatient Rehab    Physical Therapy Visit    Patient Name: Nicole Harris  MRN: [id number]  YOB: 1970  Encounter Date: 3/18/2025    Therapy Diagnosis:   Encounter Diagnosis   Name Primary?    Impaired functional mobility and activity tolerance Yes     Physician: Lorenza Zepeda NP    Physician Orders: Eval and Treat  Medical Diagnosis: Encounter for surgical aftercare following surgery on the nervous system    Visit # / Visits Authorized:  4 / 20  Date of Evaluation: 2/25/2025  Insurance Authorization Period: 2/26/2025 to 12/31/2025  Plan of Care Certification:  2/25/2025 to 5/25/2025      PT/PTA:     Number of PTA visits since last PT visit:  1  Time In:   1:30 PM  Time Out:  2:30 PM  Total Time:   60 Minutes  Total Billable Time: 45 Minutes split billing    FOTO:  Intake Score:  %  Survey Score 1:  %  Survey Score 2:  %         Subjective   Doing ok today.    B/L Shoulders; Left Arm; Low back    Objective            Treatment:  Balance/Neuromuscular Re-Education  NMR 1: Wall Push-ups x 15  NMR 2: Reverse Wall Push-ups x 15  NMR 3: Pulleys x 5 min  NMR 4: Scapula Squeezes x 20  NMR 5: supine scapula squeezes 1# 20x  NMR 6: supine dowel 1# extension  Therapeutic Activity  TA 1: nu step level 4 for 15 minutes  TA 2: Heel Cord stretch on wedge 3 x 30 sec  TA 3: Standing Hip Flex/Abd/Ext x 15  TA 4: DKC w/ PB x 2 min  TA 5: LTR w/ PB x 2 min    Time Entry(in minutes):       Assessment & Plan   Assessment: Patient did well with exercises; no exacerbations; general core weakness noted       Patient will continue to benefit from skilled outpatient physical therapy to address the deficits listed in the problem list box on initial evaluation, provide pt/family education and to maximize pt's level of independence in the home and community environment.     Patient's spiritual, cultural, and educational needs considered and patient agreeable to plan of care and goals.           Plan: Continue per POC and  progress with strength and mobility exercises as patient tolerates    Goals:     Jonathan Favre, PTA

## 2025-03-25 ENCOUNTER — CLINICAL SUPPORT (OUTPATIENT)
Dept: REHABILITATION | Facility: HOSPITAL | Age: 55
End: 2025-03-25
Payer: MEDICARE

## 2025-03-25 DIAGNOSIS — M62.81 MUSCLE WEAKNESS: Primary | ICD-10-CM

## 2025-03-25 DIAGNOSIS — Z74.09 IMPAIRED FUNCTIONAL MOBILITY AND ACTIVITY TOLERANCE: ICD-10-CM

## 2025-03-25 PROCEDURE — 97112 NEUROMUSCULAR REEDUCATION: CPT | Mod: PN

## 2025-03-25 PROCEDURE — 97530 THERAPEUTIC ACTIVITIES: CPT | Mod: PN

## 2025-03-25 PROCEDURE — 97140 MANUAL THERAPY 1/> REGIONS: CPT | Mod: PN

## 2025-03-25 NOTE — PROGRESS NOTES
Outpatient Rehab    Physical Therapy Visit    Patient Name: Nicole Harris  MRN: [id number]  YOB: 1970  Encounter Date: 3/25/2025    Therapy Diagnosis:   Encounter Diagnoses   Name Primary?    Muscle weakness Yes    Impaired functional mobility and activity tolerance      Physician: Lorenza Zepeda NP    Physician Orders: Eval and Treat  Medical Diagnosis: Encounter for surgical aftercare following surgery on the nervous system    Visit # / Visits Authorized:  5 / 20  Insurance Authorization Period: 2/26/2025 to 12/31/2025  Date of Evaluation: 2/25/25  Plan of Care Certification: 2/25/25 to 5/25/25     PT/PTA:     Number of PTA visits since last PT visit:   Time In: 0900   Time Out: 1000  Total Time: 60   Total Billable Time:  60    FOTO:  Intake Score:  %  Survey Score 1:  %  Survey Score 2:  %         Subjective   Hurts all over today. She was sick last week. Hurt more yesterday because of the weather..  Pain reported as 5/10.      Objective            Treatment:  Manual Therapy  MT 1: STM completed to upper and medial traps to allow for muscle release.  Balance/Neuromuscular Re-Education  NMR 1: Wall Push-ups x 15  NMR 2: Reverse Wall Push-ups x 15  NMR 3: Pulleys x 5 min  NMR 4: Scapula Squeezes x 20  NMR 5: Supine serratus punch w/ 1# bar x 15  NMR 6: supine dowel 1# extension x 15  Therapeutic Activity  TA 1: nu step level 4 for 15 minutes  TA 2: Heel Cord stretch on wedge 3 x 30 sec  TA 3: Standing Hip Flex/Abd/Ext x 15  TA 4: DKC w/ PB x 2 min  TA 5: LTR w/ PB x 2 min    Time Entry(in minutes):  Manual Therapy Time Entry: 15  Neuromuscular Re-Education Time Entry: 15  Therapeutic Activity Time Entry: 30    Assessment & Plan   Assessment: Patient did well with exercises; no exacerbations; general core weakness noted. Pt educated about condition/treatment.       Patient will continue to benefit from skilled outpatient physical therapy to address the deficits listed in the problem list box on  initial evaluation, provide pt/family education and to maximize pt's level of independence in the home and community environment.     Patient's spiritual, cultural, and educational needs considered and patient agreeable to plan of care and goals.           Plan: Continue per POC and progress with strength and mobility exercises as patient tolerates    Goals:   Active       LTG       Pt will be independent with HEP to allow for safe DC.  (Progressing)       Start:  02/25/25    Expected End:  04/01/25            Pt will improve FOTo by 10% (Progressing)       Start:  02/25/25    Expected End:  04/01/25            Pt will be able to  an item over head to allow for increased functional tasks.  (Progressing)       Start:  02/25/25    Expected End:  04/01/25               STG       Pt will be able to complete HEP with minimal cues to allow for increased mobility.  (Progressing)       Start:  02/25/25    Expected End:  04/01/25            Pt will be able to complete all functional tasks with Ue's to allow for normalicy of tasks.  (Progressing)       Start:  02/25/25    Expected End:  04/01/25            Pt will have normalized body mechanics with no cues.  (Progressing)       Start:  02/25/25    Expected End:  04/01/25                Wendy Cerna, PT

## 2025-03-28 ENCOUNTER — CLINICAL SUPPORT (OUTPATIENT)
Dept: REHABILITATION | Facility: HOSPITAL | Age: 55
End: 2025-03-28
Payer: MEDICARE

## 2025-03-28 DIAGNOSIS — M53.82 IMPAIRED RANGE OF MOTION OF CERVICAL SPINE: Primary | ICD-10-CM

## 2025-03-28 PROCEDURE — 97112 NEUROMUSCULAR REEDUCATION: CPT | Mod: PN,CQ

## 2025-03-28 PROCEDURE — 97530 THERAPEUTIC ACTIVITIES: CPT | Mod: PN,CQ

## 2025-03-28 PROCEDURE — 97140 MANUAL THERAPY 1/> REGIONS: CPT | Mod: PN,CQ

## 2025-03-28 NOTE — PROGRESS NOTES
Outpatient Rehab    Physical Therapy Visit    Patient Name: Nicole Harris  MRN: [id number]  YOB: 1970  Encounter Date: 3/28/2025    Therapy Diagnosis:   Encounter Diagnosis   Name Primary?    Impaired range of motion of cervical spine Yes     Physician: Lorenza Zepeda NP    Physician Orders: Eval and Treat  Medical Diagnosis: Encounter for surgical aftercare following surgery on the nervous system    Visit # / Visits Authorized:  6 / 20  Insurance Authorization Period: 2/26/2025 to 12/31/2025  Date of Evaluation: 2/25/2025  Plan of Care Certification: 2/25/2025 to 5/25/2025     PT/PTA:     Number of PTA visits since last PT visit:  1  Time In:   9:50 AM  Time Out:  10:55 AM  Total Time:   65 Minutes  Total Billable Time:   55 Minutes    FOTO:  Intake Score:  %  Survey Score 1:  %  Survey Score 2:  %         Subjective   I am stiff today.  Pain reported as 5/10. B/L Shoulders; Left Arm; Low back    Objective            Treatment:  Manual Therapy  MT 1: STM completed to upper and medial traps to allow for muscle release.  Balance/Neuromuscular Re-Education  NMR 1: Wall Push-ups x 15  NMR 2: Reverse Wall Push-ups x 15  NMR 3: Pulleys x 5 min  NMR 4: Scapula Squeezes x 20  NMR 5: Supine serratus punch w/ 1# bar x 15  NMR 6: Supine flexion w/ 1# bar x 15  Therapeutic Activity  TA 1: Nustep level 2 x 20 minutes  TA 2: Heel Cord stretch on wedge 3 x 30 sec  TA 3: Standing Hip Flex/Abd/Ext x 15  TA 4: DKC w/ PB x 2 min  TA 5: LTR w/ PB x 2 min    Time Entry(in minutes):       Assessment & Plan   Assessment: Patient did well with exercises; no exacerbations; general core weakness noted. Pt educated about condition/treatment.       Patient will continue to benefit from skilled outpatient physical therapy to address the deficits listed in the problem list box on initial evaluation, provide pt/family education and to maximize pt's level of independence in the home and community environment.     Patient's  spiritual, cultural, and educational needs considered and patient agreeable to plan of care and goals.           Plan: Continue per POC and progress with strength and mobility exercises as patient tolerates    Goals:   Active       LTG       Pt will be independent with HEP to allow for safe DC.  (Progressing)       Start:  02/25/25    Expected End:  04/01/25            Pt will improve FOTo by 10% (Progressing)       Start:  02/25/25    Expected End:  04/01/25            Pt will be able to  an item over head to allow for increased functional tasks.  (Progressing)       Start:  02/25/25    Expected End:  04/01/25               STG       Pt will be able to complete HEP with minimal cues to allow for increased mobility.  (Progressing)       Start:  02/25/25    Expected End:  04/01/25            Pt will be able to complete all functional tasks with Ue's to allow for normalicy of tasks.  (Progressing)       Start:  02/25/25    Expected End:  04/01/25            Pt will have normalized body mechanics with no cues.  (Progressing)       Start:  02/25/25    Expected End:  04/01/25                Jonathan Favre, PTA

## 2025-04-01 ENCOUNTER — CLINICAL SUPPORT (OUTPATIENT)
Dept: REHABILITATION | Facility: HOSPITAL | Age: 55
End: 2025-04-01
Payer: MEDICARE

## 2025-04-01 DIAGNOSIS — Z74.09 IMPAIRED FUNCTIONAL MOBILITY AND ACTIVITY TOLERANCE: Primary | ICD-10-CM

## 2025-04-01 PROCEDURE — 97140 MANUAL THERAPY 1/> REGIONS: CPT | Mod: PN

## 2025-04-01 PROCEDURE — 97112 NEUROMUSCULAR REEDUCATION: CPT | Mod: PN

## 2025-04-01 NOTE — PROGRESS NOTES
"  Outpatient Rehab    Physical Therapy Visit    Patient Name: Nicole Harris  MRN: [id number]  YOB: 1970  Encounter Date: 4/1/2025    Therapy Diagnosis:   Encounter Diagnosis   Name Primary?    Impaired functional mobility and activity tolerance [Z74.09] Yes     Physician: Lorenza Zepeda NP    Physician Orders: Eval and Treat  Medical Diagnosis: Encounter for surgical aftercare following surgery on the nervous system    Visit # / Visits Authorized:  7 / 20  Insurance Authorization Period: 2/26/2025 to 12/31/2025       PT/PTA:     Number of PTA visits since last PT visit:   Time In: 0900   Time Out: 0955  Total Time: 55   Total Billable Time: 30    FOTO:  Intake Score:  %  Survey Score 1:  %  Survey Score 2:  %         Subjective   Not doing too good today. The moisture in the air is getting to her.  Pain reported as 7/10. B/L Shoulders; Left Arm; Low back    Objective            Treatment:  Manual Therapy  MT 1: STM completed to upper and medial traps to allow for muscle release.  Balance/Neuromuscular Re-Education  NMR 1: Wall Push-ups x 15  NMR 2: Reverse Wall Push-ups x 15  NMR 3: Pulleys x 5 min  NMR 4: scap retractions x 20, GTB  NMR 5: Supine serratus punch w/ 1# bar x 15  NMR 6: Supine flexion w/ 1# bar x 15  NMR 7: bilateral shld extension with scap retraction 3"H x 15, GTB  Therapeutic Activity  TA 1: Nustep level 2 x 20 minutes  TA 2: Heel Cord stretch on wedge 3 x 30 sec  TA 3: Standing Hip Flex/Abd/Ext x 15    Time Entry(in minutes):  Manual Therapy Time Entry: 8  Neuromuscular Re-Education Time Entry: 15  Therapeutic Activity Time Entry: 7    Assessment & Plan   Assessment: Good tolernace to treatment despite complaints of increased pain upon arrival. Able to progress mid back strengthening exercises without complaint. Still very tight and tender in bilateral upper traps.       Patient will continue to benefit from skilled outpatient physical therapy to address the deficits listed in " the problem list box on initial evaluation, provide pt/family education and to maximize pt's level of independence in the home and community environment.     Patient's spiritual, cultural, and educational needs considered and patient agreeable to plan of care and goals.           Plan: Continue per POC and progress with strength and mobility exercises as patient tolerates    Goals:   Active       LTG       Pt will be independent with HEP to allow for safe DC.  (Progressing)       Start:  02/25/25    Expected End:  04/01/25            Pt will improve FOTo by 10% (Progressing)       Start:  02/25/25    Expected End:  04/01/25            Pt will be able to  an item over head to allow for increased functional tasks.  (Progressing)       Start:  02/25/25    Expected End:  04/01/25               STG       Pt will be able to complete HEP with minimal cues to allow for increased mobility.  (Progressing)       Start:  02/25/25    Expected End:  04/01/25            Pt will be able to complete all functional tasks with Ue's to allow for normalicy of tasks.  (Progressing)       Start:  02/25/25    Expected End:  04/01/25            Pt will have normalized body mechanics with no cues.  (Progressing)       Start:  02/25/25    Expected End:  04/01/25                Ana Boyle, PT

## 2025-04-08 ENCOUNTER — CLINICAL SUPPORT (OUTPATIENT)
Dept: REHABILITATION | Facility: HOSPITAL | Age: 55
End: 2025-04-08
Payer: MEDICARE

## 2025-04-08 DIAGNOSIS — M53.82 IMPAIRED RANGE OF MOTION OF CERVICAL SPINE: Primary | ICD-10-CM

## 2025-04-08 PROCEDURE — 97530 THERAPEUTIC ACTIVITIES: CPT | Mod: PN,CQ

## 2025-04-08 PROCEDURE — 97112 NEUROMUSCULAR REEDUCATION: CPT | Mod: PN,CQ

## 2025-04-08 NOTE — PROGRESS NOTES
"  Outpatient Rehab    Physical Therapy Visit    Patient Name: Nicole Harris  MRN: [id number]  YOB: 1970  Encounter Date: 4/8/2025    Therapy Diagnosis:   Encounter Diagnosis   Name Primary?    Impaired range of motion of cervical spine Yes     Physician: Lorenza Zepeda NP    Physician Orders: Eval and Treat  Medical Diagnosis: Encounter for surgical aftercare following surgery on the nervous system    Visit # / Visits Authorized:  8 / 20  Insurance Authorization Period: 2/26/2025 to 12/31/2025  Date of Evaluation: 2/25/2025  Plan of Care Certification: 2/25/2025 to 5/25/2025     PT/PTA:     Number of PTA visits since last PT visit:  1  Time In:   10:00 AM  Time Out:   10:50 AM  Total Time:   50 Minutes  Total Billable Time:   45 Minutes    FOTO:  Intake Score:  %  Survey Score 1:  %  Survey Score 2:  %         Subjective   I almost called in today. I am hurting; I am miserable.  Pain reported as 10/10. B/L Shoulders; Neck; Low back; Hands    Objective            Treatment:  Balance/Neuromuscular Re-Education  NMR 1: Wall Push-ups x 15  NMR 2: Reverse Wall Push-ups x 15  NMR 3: Pulleys x 5 min  NMR 4: Scap retractions x 20, GTB  NMR 5: Supine serratus punch w/ 1# bar x 15  NMR 6: Supine flexion w/ 1# bar x 15  NMR 7: bilateral shld extension with scap retraction 3"H x 15, GTB  Therapeutic Activity  TA 1: Nustep level 2 x 20 minutes  TA 2: Heel Cord stretch on wedge 3 x 30 sec  TA 3: Standing Hip Flex/Abd/Ext x 15  TA 4: DKC w/ PB x 2 min  TA 5: LTR w/ PB x 2 min    Time Entry(in minutes):  Neuromuscular Re-Education Time Entry: 15  Therapeutic Activity Time Entry: 30    Assessment & Plan   Assessment: Good tolernace to treatment despite complaints of increased pain upon arrival.       Patient will continue to benefit from skilled outpatient physical therapy to address the deficits listed in the problem list box on initial evaluation, provide pt/family education and to maximize pt's level of " independence in the home and community environment.     Patient's spiritual, cultural, and educational needs considered and patient agreeable to plan of care and goals.           Plan: Continue per POC and progress with strength and mobility exercises as patient tolerates    Goals:   Active       LTG       Pt will be independent with HEP to allow for safe DC.  (Progressing)       Start:  02/25/25    Expected End:  04/01/25            Pt will improve FOTo by 10% (Progressing)       Start:  02/25/25    Expected End:  04/01/25            Pt will be able to  an item over head to allow for increased functional tasks.  (Progressing)       Start:  02/25/25    Expected End:  04/01/25               STG       Pt will be able to complete HEP with minimal cues to allow for increased mobility.  (Progressing)       Start:  02/25/25    Expected End:  04/01/25            Pt will be able to complete all functional tasks with Ue's to allow for normalicy of tasks.  (Progressing)       Start:  02/25/25    Expected End:  04/01/25            Pt will have normalized body mechanics with no cues.  (Progressing)       Start:  02/25/25    Expected End:  04/01/25                Jonathan Favre, PTA

## 2025-04-21 ENCOUNTER — CLINICAL SUPPORT (OUTPATIENT)
Dept: REHABILITATION | Facility: HOSPITAL | Age: 55
End: 2025-04-21
Payer: MEDICARE

## 2025-04-21 DIAGNOSIS — Z74.09 IMPAIRED FUNCTIONAL MOBILITY AND ACTIVITY TOLERANCE: Primary | ICD-10-CM

## 2025-04-21 PROCEDURE — 97112 NEUROMUSCULAR REEDUCATION: CPT | Mod: PN

## 2025-04-21 NOTE — PROGRESS NOTES
Outpatient Rehab    Physical Therapy Visit    Patient Name: Nicole Harris  MRN: [id number]  YOB: 1970  Encounter Date: 4/21/2025    Therapy Diagnosis: No diagnosis found.  Physician: Lorenza Zepeda NP    Physician Orders: Eval and Treat  Medical Diagnosis: Encounter for surgical aftercare following surgery on the nervous system    Visit # / Visits Authorized:  9 / 20  Insurance Authorization Period: 2/26/2025 to 12/31/2025  Date of Evaluation: 2/26/25  Plan of Care Certification: 2/26/25 to 5/26/25     PT/PTA:     Number of PTA visits since last PT visit:   Time In: 0900   Time Out: 0925  Total Time: 25   Total Billable Time: 15    FOTO:  Intake Score:  %  Survey Score 1:  %  Survey Score 2:  %         Subjective   Pt is in increased pain at this time. Pt has stomach pain at this time..  Pain reported as 10/10. B/L Shoulders; Neck; Low back; Hands    Objective            Treatment:  Therapeutic Exercise  TE 1: nu step level 1 for 10 minutes  Balance/Neuromuscular Re-Education  NMR 1: Wall Push-ups x 20  NMR 2: Reverse Wall Push-ups x 20  NMR 3: Pulleys x 5 min    Time Entry(in minutes):  Neuromuscular Re-Education Time Entry: 15  Therapeutic Exercise Time Entry: 10    Assessment & Plan   Assessment: Pt not in a good mood at this time due to pain. Pt stomach is in increased pain.       Patient will continue to benefit from skilled outpatient physical therapy to address the deficits listed in the problem list box on initial evaluation, provide pt/family education and to maximize pt's level of independence in the home and community environment.     Patient's spiritual, cultural, and educational needs considered and patient agreeable to plan of care and goals.           Plan:      Goals:   Active       LTG       Pt will be independent with HEP to allow for safe DC.  (Progressing)       Start:  02/25/25    Expected End:  04/01/25            Pt will improve FOTo by 10% (Progressing)       Start:   02/25/25    Expected End:  04/01/25            Pt will be able to  an item over head to allow for increased functional tasks.  (Progressing)       Start:  02/25/25    Expected End:  04/01/25               STG       Pt will be able to complete HEP with minimal cues to allow for increased mobility.  (Progressing)       Start:  02/25/25    Expected End:  04/01/25            Pt will be able to complete all functional tasks with Ue's to allow for normalicy of tasks.  (Progressing)       Start:  02/25/25    Expected End:  04/01/25            Pt will have normalized body mechanics with no cues.  (Progressing)       Start:  02/25/25    Expected End:  04/01/25                Giovana Wang, PT

## 2025-04-29 ENCOUNTER — CLINICAL SUPPORT (OUTPATIENT)
Dept: REHABILITATION | Facility: HOSPITAL | Age: 55
End: 2025-04-29
Payer: MEDICARE

## 2025-04-29 DIAGNOSIS — M53.82 IMPAIRED RANGE OF MOTION OF CERVICAL SPINE: Primary | ICD-10-CM

## 2025-04-29 PROCEDURE — 97530 THERAPEUTIC ACTIVITIES: CPT | Mod: PN

## 2025-04-29 PROCEDURE — 97112 NEUROMUSCULAR REEDUCATION: CPT | Mod: PN

## 2025-04-29 PROCEDURE — 97140 MANUAL THERAPY 1/> REGIONS: CPT | Mod: PN

## 2025-04-29 NOTE — PROGRESS NOTES
"  Outpatient Rehab    Physical Therapy Visit    Patient Name: Nicole Harris  MRN: [id number]  YOB: 1970  Encounter Date: 4/29/2025    Therapy Diagnosis:   Encounter Diagnosis   Name Primary?    Impaired range of motion of cervical spine Yes     Physician: Lorenza Zepeda NP    Physician Orders: Eval and Treat  Medical Diagnosis: Encounter for surgical aftercare following surgery on the nervous system    Visit # / Visits Authorized:  10 / 20  Insurance Authorization Period: 2/26/2025 to 12/31/2025  Date of Evaluation: 2/26/25  Plan of Care Certification: 2/26/25 to 5/26/25     PT/PTA:     Number of PTA visits since last PT visit:   Time In: 1100   Time Out: 1155  Total Time: 55   Total Billable Time: 55    FOTO:  Intake Score:  %  Survey Score 1:  %  Survey Score 2:  %         Subjective   Having fibro flare up today; legs are sore/stiff. Neck and shoulders are the same..  Pain reported as 6/10.      Objective            Treatment:  Manual Therapy  MT 1: Instrument assisted STM to bilat. upper traps and rhomboids as tolerated sitting upright  MT 2: TPDN to left upper trap and bilat. superior medial border of scapulas, one needle each (.30 x 40mm) inserted up to depth of 40 mm into trigger points identified by palpation. UT needle inserted P/A, rhomboid needles inserted medial to lateral at distal angle; twitch response noted at each site. Needles left in-situ for 2 min, then removed with no bleeding.  Balance/Neuromuscular Re-Education  NMR 1: Wall Push-ups x 20  NMR 2: Reverse Wall Push-ups x 20  NMR 4: Scap retractions x 20, GTB  NMR 7: bilateral shld extension with scap retraction 3"H x 15, GTB  NMR 8: Seated pull downs on Quantum machine x 15; 10#  Therapeutic Activity  TA 1: Nustep level 2 x 10 minutes  TA 2: Heel Cord stretch on wedge 3 x 30 sec  TA 3: Standing Hip Flex/Abd/Ext x 15  TA 4: DKC w/ PB x 2 min  TA 5: LTR w/ PB x 2 min  TA 6: Treadmill walking 1.0mph x 8 min    Time Entry(in " minutes):  Manual Therapy Time Entry: 15  Neuromuscular Re-Education Time Entry: 15  Therapeutic Activity Time Entry: 25    Assessment & Plan   Assessment: Pt tolerated treatment without complaints of pain. Pt loses track of reps despite VC to count. No adverse response to treatment.       Patient will continue to benefit from skilled outpatient physical therapy to address the deficits listed in the problem list box on initial evaluation, provide pt/family education and to maximize pt's level of independence in the home and community environment.     Patient's spiritual, cultural, and educational needs considered and patient agreeable to plan of care and goals.           Plan: Continue per POC and progress with strength and mobility exercises as patient tolerates    Goals:   Active       LTG       Pt will be independent with HEP to allow for safe DC.  (Progressing)       Start:  02/25/25    Expected End:  04/01/25            Pt will improve FOTo by 10% (Progressing)       Start:  02/25/25    Expected End:  04/01/25            Pt will be able to  an item over head to allow for increased functional tasks.  (Progressing)       Start:  02/25/25    Expected End:  04/01/25               STG       Pt will be able to complete HEP with minimal cues to allow for increased mobility.  (Progressing)       Start:  02/25/25    Expected End:  04/01/25            Pt will be able to complete all functional tasks with Ue's to allow for normalicy of tasks.  (Progressing)       Start:  02/25/25    Expected End:  04/01/25            Pt will have normalized body mechanics with no cues.  (Progressing)       Start:  02/25/25    Expected End:  04/01/25                Wendy Cerna, PT

## 2025-05-08 ENCOUNTER — CLINICAL SUPPORT (OUTPATIENT)
Dept: REHABILITATION | Facility: HOSPITAL | Age: 55
End: 2025-05-08
Payer: MEDICARE

## 2025-05-08 DIAGNOSIS — M53.3 SACROILIAC JOINT DYSFUNCTION OF BOTH SIDES: ICD-10-CM

## 2025-05-08 DIAGNOSIS — M62.81 MUSCLE WEAKNESS: ICD-10-CM

## 2025-05-08 DIAGNOSIS — M79.7 FIBROMYALGIA: Primary | ICD-10-CM

## 2025-05-08 PROCEDURE — 97530 THERAPEUTIC ACTIVITIES: CPT | Mod: PN

## 2025-05-08 PROCEDURE — 97140 MANUAL THERAPY 1/> REGIONS: CPT | Mod: PN

## 2025-05-08 PROCEDURE — 97112 NEUROMUSCULAR REEDUCATION: CPT | Mod: PN

## 2025-05-08 NOTE — PROGRESS NOTES
"  Outpatient Rehab    Physical Therapy Visit    Patient Name: Nicole Harris  MRN: [id number]  YOB: 1970  Encounter Date: 5/8/2025    Therapy Diagnosis:   Encounter Diagnoses   Name Primary?    Fibromyalgia Yes    Muscle weakness     Sacroiliac joint dysfunction of both sides      Physician: Lorenza Zepeda NP    Physician Orders: Eval and Treat  Medical Diagnosis: Encounter for surgical aftercare following surgery on the nervous system    Visit # / Visits Authorized:  11 / 20  Insurance Authorization Period: 2/26/2025 to 12/31/2025  Date of Evaluation: 2/26/25  Plan of Care Certification: 2/26/25 to 5/26/25     PT/PTA:     Number of PTA visits since last PT visit:   Time In: 1100   Time Out: 1155  Total Time (in minutes): 55   Total Billable Time (in minutes): 55    FOTO:  Intake Score:  %  Survey Score 2:  %  Survey Score 3:  %         Subjective   She has not taken medicine today and rates pain as 6/10..  Pain reported as 6/10.      Objective            Treatment:  Manual Therapy  MT 3: MFR to low back, pt laying prone as vaishnavi. Light pressure  Balance/Neuromuscular Re-Education  NMR 1: Wall Push-ups x 20  NMR 2: Reverse Wall Push-ups x 20  NMR 3: Pulleys x 5 min  NMR 4: Scap retractions x 20, GTB  NMR 7: bilateral shld extension with scap retraction 3"H x 15, GTB  NMR 8: Seated pull downs on Quantum machine x 20; 10#  Therapeutic Activity  TA 1: Nustep level 2 x 15 min  TA 2: Heel Cord stretch on wedge 3 x 30 sec  TA 3: Standing Hip Flex/Abd/Ext x 15  TA 4: DKC w/ PB x 2 min  TA 5: LTR w/ PB x 2 min    Time Entry(in minutes):  Manual Therapy Time Entry: 10  Neuromuscular Re-Education Time Entry: 20  Therapeutic Activity Time Entry: 25    Assessment & Plan   Assessment: Pt deferred NEVAEH and Claudine today secondary to feeling more sensitive. She also declined to do the treadmill secondary to not feeling well this morning. Pt reports decreased discomfort after treatment.       Patient will continue to " benefit from skilled outpatient physical therapy to address the deficits listed in the problem list box on initial evaluation, provide pt/family education and to maximize pt's level of independence in the home and community environment.     Patient's spiritual, cultural, and educational needs considered and patient agreeable to plan of care and goals.           Plan: Continue per POC and progress with strength and mobility exercises as patient tolerates    Goals:   Active       LTG       Pt will be independent with HEP to allow for safe DC.  (Progressing)       Start:  02/25/25    Expected End:  04/01/25            Pt will improve FOTo by 10% (Progressing)       Start:  02/25/25    Expected End:  04/01/25            Pt will be able to  an item over head to allow for increased functional tasks.  (Progressing)       Start:  02/25/25    Expected End:  04/01/25               STG       Pt will be able to complete HEP with minimal cues to allow for increased mobility.  (Progressing)       Start:  02/25/25    Expected End:  04/01/25            Pt will be able to complete all functional tasks with Ue's to allow for normalicy of tasks.  (Progressing)       Start:  02/25/25    Expected End:  04/01/25            Pt will have normalized body mechanics with no cues.  (Progressing)       Start:  02/25/25    Expected End:  04/01/25                Wendy Cerna, PT

## 2025-05-20 ENCOUNTER — CLINICAL SUPPORT (OUTPATIENT)
Dept: REHABILITATION | Facility: HOSPITAL | Age: 55
End: 2025-05-20
Payer: MEDICARE

## 2025-05-20 DIAGNOSIS — Z74.09 IMPAIRED FUNCTIONAL MOBILITY AND ACTIVITY TOLERANCE: Primary | ICD-10-CM

## 2025-05-20 PROCEDURE — 97530 THERAPEUTIC ACTIVITIES: CPT | Mod: PN

## 2025-05-20 PROCEDURE — 97112 NEUROMUSCULAR REEDUCATION: CPT | Mod: PN

## 2025-05-20 NOTE — PROGRESS NOTES
Outpatient Rehab    Physical Therapy Visit    Patient Name: Nicole Harris  MRN: [id number]  YOB: 1970  Encounter Date: 5/20/2025    Therapy Diagnosis:   Encounter Diagnosis   Name Primary?    Impaired functional mobility and activity tolerance Yes     Physician: Lorenza Zepeda NP    Physician Orders: Eval and Treat  Medical Diagnosis: Encounter for surgical aftercare following surgery on the nervous system    Visit # / Visits Authorized:  12 / 20  Insurance Authorization Period: 2/26/2025 to 12/31/2025  Date of Evaluation: 3/18/2025  Plan of Care Certification:       PT/PTA:     Number of PTA visits since last PT visit:   Time In: 1100   Time Out: 1200  Total Time (in minutes): 60   Total Billable Time (in minutes): 55    FOTO:  Intake Score:  %  Survey Score 2:  %  Survey Score 3:  %    Precautions:       Subjective   neck and lower back pain today; I took a pain pill this morning..  Pain reported as 5/10. B/L Shoulders; Neck; Low back; Hands    Objective            Treatment:  Balance/Neuromuscular Re-Education  NMR 1: Wall Push-ups x 20  NMR 3: Pulleys x 3 min  NMR 4: Scapular retractions - 3-way with black tband x 10 at vieira level  NMR 8: Seated pull downs on Quantum machine x 20; 10#  Therapeutic Activity  TA 1: Nustep level 2 x 15 min  TA 2: Heel Cord stretch on wedge 3 x 30 sec  TA 3: Standing Hip Flex/Abd/Ext x 15  TA 4: DKC w/ PB x 2 min  TA 5: LTR w/ PB x 2 min    Time Entry(in minutes):  Neuromuscular Re-Education Time Entry: 25  Therapeutic Activity Time Entry: 30    Assessment & Plan   Assessment: Still having elevated pain, but did well with exercises today; Deferred manual tx today.  Evaluation/Treatment Tolerance: Patient tolerated treatment well    Patient will continue to benefit from skilled outpatient physical therapy to address the deficits listed in the problem list box on initial evaluation, provide pt/family education and to maximize pt's level of independence in the home  and community environment.     Patient's spiritual, cultural, and educational needs considered and patient agreeable to plan of care and goals.           Plan: Continue per POC and progress with strength and mobility exercises as patient tolerates    Goals:   LTG         Pt will be independent with HEP to allow for safe DC.      Progressing     Start:  02/25/25    Expected End:  5/30/2025           Pt will improve FOTo by 10%     Progressing     Start:  02/25/25    Expected End:  05/30/2025            Pt will be able to  an item over head to allow for increased functional tasks.     Progressing     Start:  02/25/25    Expected End:  05/30/2025               STG         Pt will be able to complete HEP with minimal cues to allow for increased mobility.          Start:  02/25/25    Expected End:  05/20/2025  MET           Pt will be able to complete all functional tasks with Ue's to allow for normalicy of tasks.          Start:  02/25/25    Expected End:  05/20/2025 MET           Pt will have normalized body mechanics with no cues.          Start:  02/25/25    Expected End:  5/20/2025 Progressing                Quita Holliday, PT

## 2025-05-27 ENCOUNTER — CLINICAL SUPPORT (OUTPATIENT)
Dept: REHABILITATION | Facility: HOSPITAL | Age: 55
End: 2025-05-27
Payer: MEDICARE

## 2025-05-27 DIAGNOSIS — Z74.09 IMPAIRED FUNCTIONAL MOBILITY AND ACTIVITY TOLERANCE: Primary | ICD-10-CM

## 2025-05-27 PROCEDURE — 97140 MANUAL THERAPY 1/> REGIONS: CPT | Mod: PN

## 2025-05-27 PROCEDURE — 97530 THERAPEUTIC ACTIVITIES: CPT | Mod: PN

## 2025-05-27 PROCEDURE — 97112 NEUROMUSCULAR REEDUCATION: CPT | Mod: PN

## 2025-05-27 NOTE — PROGRESS NOTES
"  Outpatient Rehab    Physical Therapy Visit    Patient Name: Nicole Harris  MRN: [id number]  YOB: 1970  Encounter Date: 5/27/2025    Therapy Diagnosis:   Encounter Diagnosis   Name Primary?    Impaired functional mobility and activity tolerance Yes     Physician: Lorenza Zepeda NP    Physician Orders: Eval and Treat  Medical Diagnosis: Encounter for surgical aftercare following surgery on the nervous system    Visit # / Visits Authorized:  13 / 20  Insurance Authorization Period: 2/26/2025 to 12/31/2025  Date of Evaluation: 3/18/2025  Plan of Care Certification:       PT/PTA: PT   Number of PTA visits since last PT visit:0  Time In: 1000   Time Out: 1055  Total Time (in minutes): 55   Total Billable Time (in minutes): 55    FOTO:  Intake Score:  %  Survey Score 2:  %  Survey Score 3:  %    Precautions:       Subjective   Her shins are sore from doing exercises over the weekend..         Objective            Treatment:  Manual Therapy  MT 1: Instrument assisted STM to bilat. upper traps and rhomboids as tolerated sitting upright  Balance/Neuromuscular Re-Education  NMR 1: Wall Push-ups x 20  NMR 2: Reverse Wall Push-ups x 20  NMR 3: Pulleys x 3 min  NMR 7: bilateral shld extension with scap retraction 3"H x 20, GTB  NMR 8: Seated pull downs on Quantum machine x 20; 10#  Therapeutic Activity  TA 1: Nustep level 2 x 15 min  TA 2: Heel Cord stretch on wedge 3 x 30 sec  TA 3: Standing Hip Flex/Abd/Ext x 10 w/ orange band  TA 4: DKC w/ PB x 2 min  TA 5: LTR w/ PB x 2 min    Time Entry(in minutes):  Hot/Cold Pack Time Entry: 8  Manual Therapy Time Entry: 10  Neuromuscular Re-Education Time Entry: 15  Therapeutic Activity Time Entry: 30    Assessment & Plan   Assessment: No complaints of pain while doing the exercises. Pt reports decreased discomfort after treatment.       The patient will continue to benefit from skilled outpatient physical therapy in order to address the deficits listed in the problem " list on the initial evaluation, provide patient and family education, and maximize the patients level of independence in the home and community environments.     The patient's spiritual, cultural, and educational needs were considered, and the patient is agreeable to the plan of care and goals.           Plan: Continue per POC and progress with strength and mobility exercises as patient tolerates    Goals:     Wendy eCrna, PT

## 2025-06-05 ENCOUNTER — CLINICAL SUPPORT (OUTPATIENT)
Dept: REHABILITATION | Facility: HOSPITAL | Age: 55
End: 2025-06-05
Payer: MEDICARE

## 2025-06-05 DIAGNOSIS — Z74.09 IMPAIRED FUNCTIONAL MOBILITY AND ACTIVITY TOLERANCE: Primary | ICD-10-CM

## 2025-06-05 PROCEDURE — 97140 MANUAL THERAPY 1/> REGIONS: CPT | Mod: PN

## 2025-06-05 PROCEDURE — 97530 THERAPEUTIC ACTIVITIES: CPT | Mod: PN

## 2025-06-05 PROCEDURE — 97112 NEUROMUSCULAR REEDUCATION: CPT | Mod: PN

## 2025-06-12 ENCOUNTER — CLINICAL SUPPORT (OUTPATIENT)
Dept: REHABILITATION | Facility: HOSPITAL | Age: 55
End: 2025-06-12
Payer: MEDICARE

## 2025-06-12 DIAGNOSIS — Z74.09 IMPAIRED FUNCTIONAL MOBILITY AND ACTIVITY TOLERANCE: Primary | ICD-10-CM

## 2025-06-12 PROCEDURE — 97530 THERAPEUTIC ACTIVITIES: CPT | Mod: PN

## 2025-06-12 PROCEDURE — 97112 NEUROMUSCULAR REEDUCATION: CPT | Mod: PN

## 2025-06-12 PROCEDURE — 97140 MANUAL THERAPY 1/> REGIONS: CPT | Mod: PN

## 2025-06-12 NOTE — PROGRESS NOTES
Outpatient Rehab    Physical Therapy Visit    Patient Name: Nicole Harris  MRN: [id number]  YOB: 1970  Encounter Date: 6/12/2025    Therapy Diagnosis:   Encounter Diagnosis   Name Primary?    Impaired functional mobility and activity tolerance Yes     Physician: Lorenza Zepeda NP    Physician Orders: Eval and Treat  Medical Diagnosis: Encounter for surgical aftercare following surgery on the nervous system  Surgical Diagnosis: Not applicable for this Episode   Surgical Date: Not applicable for this Episode    Visit # / Visits Authorized:  15 / 20  Insurance Authorization Period: 2/26/2025 to 12/31/2025  Date of Evaluation: 3/18/2025  Plan of Care Certification:       PT/PTA:     Number of PTA visits since last PT visit:   Time In: 0905   Time Out: 1001  Total Time (in minutes): 56   Total Billable Time (in minutes):      FOTO:  Intake Score:  %  Survey Score 2:  %  Survey Score 3:  %    Precautions:       Subjective   Pt reports she went to the doctor who said they are going to start here on epidurals..  Pain reported as 5/10.      Objective            Treatment:  Manual Therapy  MT 1: Instrument assisted STM to bilat. upper traps and rhomboids along cervical SP's  as tolerated sitting on edge of vic  Balance/Neuromuscular Re-Education  NMR 1: Wall Push-ups x 20  NMR 2: Reverse Wall Push-ups x 20  NMR 6: supine clam shells 20x  NMR 9: Heel raises x 20  Therapeutic Activity  TA 1: Nustep level 2 x 22 min  TA 2: Heel Cord stretch on wedge 3 x 30 sec  TA 3: Standing Hip Flex/Abd/Ext x 15 w/ o band  TA 4: DKC w/ PB x 2 min  TA 5: LTR w/ PB x 2 min    Time Entry(in minutes):  Manual Therapy Time Entry: 8  Neuromuscular Re-Education Time Entry: 15  Therapeutic Activity Time Entry: 30    Assessment & Plan   Assessment: Pt felt good post session. she is making gains daily.  Evaluation/Treatment Tolerance: Patient tolerated treatment well    The patient will continue to benefit from skilled outpatient  physical therapy in order to address the deficits listed in the problem list on the initial evaluation, provide patient and family education, and maximize the patients level of independence in the home and community environments.     The patient's spiritual, cultural, and educational needs were considered, and the patient is agreeable to the plan of care and goals.           Plan:      Goals:     Giovana Wnag, PT

## 2025-06-18 ENCOUNTER — CLINICAL SUPPORT (OUTPATIENT)
Dept: REHABILITATION | Facility: HOSPITAL | Age: 55
End: 2025-06-18
Payer: MEDICARE

## 2025-06-18 DIAGNOSIS — Z74.09 IMPAIRED FUNCTIONAL MOBILITY AND ACTIVITY TOLERANCE: Primary | ICD-10-CM

## 2025-06-18 PROCEDURE — 97140 MANUAL THERAPY 1/> REGIONS: CPT | Mod: PN

## 2025-06-18 PROCEDURE — 97112 NEUROMUSCULAR REEDUCATION: CPT | Mod: PN

## 2025-06-18 PROCEDURE — 97530 THERAPEUTIC ACTIVITIES: CPT | Mod: PN

## 2025-06-18 NOTE — PROGRESS NOTES
"  Outpatient Rehab    Physical Therapy Discharge    Patient Name: Nicole Harris  MRN: [id number]  YOB: 1970  Encounter Date: 6/18/2025    Therapy Diagnosis:   Encounter Diagnosis   Name Primary?    Impaired functional mobility and activity tolerance Yes     Physician: Lorenza Zepeda NP    Physician Orders: Eval and Treat  Medical Diagnosis: Encounter for surgical aftercare following surgery on the nervous system  Surgical Diagnosis: Not applicable for this Episode   Surgical Date: Not applicable for this Episode  Days Since Last Surgery: Not applicable for this Episode    Visit # / Visits Authorized:  16 / 20  Insurance Authorization Period: 2/26/2025 to 12/31/2025  Date of Evaluation: 3/18/2025  Plan of Care Certification:       PT/PTA:     Number of PTA visits since last PT visit:   Time In: 1230   Time Out: 1330  Total Time (in minutes): 60   Total Billable Time (in minutes):      FOTO:  Intake Score:  %  Survey Score 2:  %  Survey Score 3:  %    Precautions:       Subjective   Pt reports she went back to the doctor for back pain. Pt neck is doing better..  Pain reported as 3/10.      Objective            Treatment:  Manual Therapy  MT 1: Instrument assisted STM to bilat. upper traps and rhomboids along cervical SP's  as tolerated sitting on edge of vic  Balance/Neuromuscular Re-Education  NMR 1: Wall Push-ups x 20  NMR 2: Reverse Wall Push-ups x 20  NMR 4: Scapular retractions - 3-way with black tband x 20 at vieira level  NMR 7: bilateral shld extension with scap retraction 3"H x 20, GTB  Therapeutic Activity  TA 1: Nustep level 2 x 21 min- completed 4 laps    Time Entry(in minutes):  Manual Therapy Time Entry: 10  Neuromuscular Re-Education Time Entry: 15  Therapeutic Activity Time Entry: 30    Assessment & Plan   Assessment: Pt is doing better at this time. Pt has met cervical ROM goals at this time. Pt is safe for DC for cervical therapy at this time.   Evaluation/Treatment Tolerance: " Patient tolerated treatment well    The patient's spiritual, cultural, and educational needs were considered, and the patient is agreeable to the plan of care and goals.           Plan:      Goals:   Goals: goals met at this time.  Active         LTG         Pt will be independent with HEP to allow for safe DC.          Start:  02/25/25    Expected End:  04/01/25              Pt will improve FOTo by 10%         Start:  02/25/25    Expected End:  04/01/25              Pt will be able to  an item over head to allow for increased functional tasks.          Start:  02/25/25    Expected End:  04/01/25                 STG         Pt will be able to complete HEP with minimal cues to allow for increased mobility.          Start:  02/25/25    Expected End:  04/01/25              Pt will be able to complete all functional tasks with Ue's to allow for normalicy of tasks.          Start:  02/25/25    Expected End:  04/01/25              Pt will have normalized body mechanics with no cues.          Start:  02/25/25    Expected End:  04/01/25              Giovana Wang, PT
